# Patient Record
Sex: FEMALE | Race: WHITE | Employment: OTHER | URBAN - METROPOLITAN AREA
[De-identification: names, ages, dates, MRNs, and addresses within clinical notes are randomized per-mention and may not be internally consistent; named-entity substitution may affect disease eponyms.]

---

## 2020-01-01 ENCOUNTER — HOSPITAL ENCOUNTER (OUTPATIENT)
Dept: MRI IMAGING | Age: 80
Discharge: HOME | End: 2020-12-30
Payer: MEDICARE

## 2020-01-01 ENCOUNTER — TRANSCRIBE ORDER (OUTPATIENT)
Dept: SCHEDULING | Age: 80
End: 2020-01-01

## 2020-01-01 DIAGNOSIS — M48.54XA COLLAPSED VERTEBRA, NOT ELSEWHERE CLASSIFIED, THORACIC REGION, INITIAL ENCOUNTER FOR FRACTURE (HCC): ICD-10-CM

## 2020-01-01 DIAGNOSIS — M48.56XA COLLAPSED VERTEBRA, NOT ELSEWHERE CLASSIFIED, LUMBAR REGION, INITIAL ENCOUNTER FOR FRACTURE (HCC): ICD-10-CM

## 2020-01-01 DIAGNOSIS — M48.56XA COLLAPSED VERTEBRA, NOT ELSEWHERE CLASSIFIED, LUMBAR REGION, INITIAL ENCOUNTER FOR FRACTURE (HCC): Primary | ICD-10-CM

## 2020-01-01 DIAGNOSIS — M48.54XA COLLAPSED VERTEBRA, NOT ELSEWHERE CLASSIFIED, THORACIC REGION, INITIAL ENCOUNTER FOR FRACTURE (HCC): Primary | ICD-10-CM

## 2020-01-01 PROCEDURE — 72146 MRI CHEST SPINE W/O DYE: CPT

## 2020-01-01 PROCEDURE — 72148 MRI LUMBAR SPINE W/O DYE: CPT

## 2021-01-01 ENCOUNTER — PATIENT OUTREACH (OUTPATIENT)
Dept: CASE MANAGEMENT | Age: 81
End: 2021-01-01

## 2021-01-01 ENCOUNTER — HOSPITAL ENCOUNTER (EMERGENCY)
Age: 81
Discharge: HOME | End: 2021-02-08
Attending: EMERGENCY MEDICINE
Payer: MEDICARE

## 2021-01-01 ENCOUNTER — APPOINTMENT (OUTPATIENT)
Dept: GENERAL RADIOLOGY | Age: 81
DRG: 871 | End: 2021-01-01
Attending: PHYSICIAN ASSISTANT
Payer: MEDICARE

## 2021-01-01 ENCOUNTER — APPOINTMENT (OUTPATIENT)
Dept: GENERAL RADIOLOGY | Age: 81
DRG: 871 | End: 2021-01-01
Attending: INTERNAL MEDICINE
Payer: MEDICARE

## 2021-01-01 ENCOUNTER — HOSPITAL ENCOUNTER (OUTPATIENT)
Age: 81
Setting detail: OBSERVATION
Discharge: HOME | End: 2021-01-04
Attending: EMERGENCY MEDICINE | Admitting: INTERNAL MEDICINE
Payer: MEDICARE

## 2021-01-01 ENCOUNTER — APPOINTMENT (OUTPATIENT)
Dept: CT IMAGING | Age: 81
DRG: 643 | End: 2021-01-01
Attending: EMERGENCY MEDICINE
Payer: MEDICARE

## 2021-01-01 ENCOUNTER — HOSPITAL ENCOUNTER (INPATIENT)
Age: 81
LOS: 2 days | Discharge: HOME HEALTH | DRG: 643 | End: 2021-03-10
Attending: EMERGENCY MEDICINE | Admitting: HOSPITALIST
Payer: MEDICARE

## 2021-01-01 ENCOUNTER — APPOINTMENT (OUTPATIENT)
Dept: MRI IMAGING | Age: 81
DRG: 871 | End: 2021-01-01
Attending: INTERNAL MEDICINE
Payer: MEDICARE

## 2021-01-01 ENCOUNTER — HOSPITAL ENCOUNTER (INPATIENT)
Age: 81
LOS: 19 days | Discharge: NURSING, REHAB OR LONG-TERM CARE | DRG: 871 | End: 2021-06-07
Attending: EMERGENCY MEDICINE | Admitting: HOSPITALIST
Payer: MEDICARE

## 2021-01-01 ENCOUNTER — APPOINTMENT (OUTPATIENT)
Dept: CT IMAGING | Age: 81
End: 2021-01-01
Attending: INTERNAL MEDICINE
Payer: MEDICARE

## 2021-01-01 ENCOUNTER — APPOINTMENT (OUTPATIENT)
Dept: CT IMAGING | Age: 81
DRG: 177 | End: 2021-01-01
Attending: EMERGENCY MEDICINE
Payer: MEDICARE

## 2021-01-01 ENCOUNTER — HOSPITAL ENCOUNTER (INPATIENT)
Age: 81
LOS: 3 days | Discharge: HOSPICE | DRG: 177 | End: 2021-06-24
Attending: EMERGENCY MEDICINE | Admitting: HOSPITALIST
Payer: MEDICARE

## 2021-01-01 ENCOUNTER — TRANSCRIBE ORDER (OUTPATIENT)
Dept: SCHEDULING | Age: 81
End: 2021-01-01

## 2021-01-01 ENCOUNTER — APPOINTMENT (OUTPATIENT)
Dept: CT IMAGING | Age: 81
DRG: 871 | End: 2021-01-01
Attending: EMERGENCY MEDICINE
Payer: MEDICARE

## 2021-01-01 ENCOUNTER — APPOINTMENT (OUTPATIENT)
Dept: CT IMAGING | Age: 81
DRG: 193 | End: 2021-01-01
Attending: STUDENT IN AN ORGANIZED HEALTH CARE EDUCATION/TRAINING PROGRAM
Payer: MEDICARE

## 2021-01-01 ENCOUNTER — HOSPICE ADMISSION (OUTPATIENT)
Dept: HOSPICE | Facility: HOSPICE | Age: 81
End: 2021-01-01
Payer: MEDICARE

## 2021-01-01 ENCOUNTER — APPOINTMENT (OUTPATIENT)
Dept: VASCULAR SURGERY | Age: 81
DRG: 175 | End: 2021-01-01
Attending: PSYCHIATRY & NEUROLOGY
Payer: MEDICARE

## 2021-01-01 ENCOUNTER — APPOINTMENT (OUTPATIENT)
Dept: CT IMAGING | Age: 81
DRG: 871 | End: 2021-01-01
Attending: INTERNAL MEDICINE
Payer: MEDICARE

## 2021-01-01 ENCOUNTER — HOSPITAL ENCOUNTER (INPATIENT)
Age: 81
LOS: 1 days | End: 2021-06-25
Attending: FAMILY MEDICINE | Admitting: FAMILY MEDICINE
Payer: MEDICARE

## 2021-01-01 ENCOUNTER — APPOINTMENT (OUTPATIENT)
Dept: CT IMAGING | Age: 81
DRG: 177 | End: 2021-01-01
Attending: HOSPITALIST
Payer: MEDICARE

## 2021-01-01 ENCOUNTER — APPOINTMENT (OUTPATIENT)
Dept: VASCULAR SURGERY | Age: 81
DRG: 871 | End: 2021-01-01
Attending: INTERNAL MEDICINE
Payer: MEDICARE

## 2021-01-01 ENCOUNTER — APPOINTMENT (OUTPATIENT)
Dept: MRI IMAGING | Age: 81
DRG: 643 | End: 2021-01-01
Attending: HOSPITALIST
Payer: MEDICARE

## 2021-01-01 ENCOUNTER — APPOINTMENT (OUTPATIENT)
Dept: CT IMAGING | Age: 81
End: 2021-01-01
Attending: EMERGENCY MEDICINE
Payer: MEDICARE

## 2021-01-01 ENCOUNTER — APPOINTMENT (OUTPATIENT)
Dept: GENERAL RADIOLOGY | Age: 81
End: 2021-01-01
Attending: NURSE PRACTITIONER
Payer: MEDICARE

## 2021-01-01 ENCOUNTER — HOSPITAL ENCOUNTER (INPATIENT)
Age: 81
LOS: 2 days | Discharge: HOME HEALTH | DRG: 175 | End: 2021-02-27
Attending: STUDENT IN AN ORGANIZED HEALTH CARE EDUCATION/TRAINING PROGRAM | Admitting: INTERNAL MEDICINE
Payer: MEDICARE

## 2021-01-01 ENCOUNTER — APPOINTMENT (OUTPATIENT)
Dept: GENERAL RADIOLOGY | Age: 81
DRG: 175 | End: 2021-01-01
Attending: STUDENT IN AN ORGANIZED HEALTH CARE EDUCATION/TRAINING PROGRAM
Payer: MEDICARE

## 2021-01-01 ENCOUNTER — APPOINTMENT (OUTPATIENT)
Dept: CT IMAGING | Age: 81
DRG: 175 | End: 2021-01-01
Attending: STUDENT IN AN ORGANIZED HEALTH CARE EDUCATION/TRAINING PROGRAM
Payer: MEDICARE

## 2021-01-01 ENCOUNTER — OFFICE VISIT (OUTPATIENT)
Dept: NEUROLOGY | Age: 81
End: 2021-01-01
Payer: MEDICARE

## 2021-01-01 ENCOUNTER — APPOINTMENT (OUTPATIENT)
Dept: GENERAL RADIOLOGY | Age: 81
End: 2021-01-01
Attending: EMERGENCY MEDICINE
Payer: MEDICARE

## 2021-01-01 ENCOUNTER — APPOINTMENT (OUTPATIENT)
Dept: NON INVASIVE DIAGNOSTICS | Age: 81
DRG: 175 | End: 2021-01-01
Attending: INTERNAL MEDICINE
Payer: MEDICARE

## 2021-01-01 ENCOUNTER — TELEPHONE (OUTPATIENT)
Dept: CARDIOLOGY CLINIC | Age: 81
End: 2021-01-01

## 2021-01-01 ENCOUNTER — APPOINTMENT (OUTPATIENT)
Dept: NON INVASIVE DIAGNOSTICS | Age: 81
DRG: 177 | End: 2021-01-01
Attending: INTERNAL MEDICINE
Payer: MEDICARE

## 2021-01-01 ENCOUNTER — APPOINTMENT (OUTPATIENT)
Dept: GENERAL RADIOLOGY | Age: 81
DRG: 177 | End: 2021-01-01
Attending: EMERGENCY MEDICINE
Payer: MEDICARE

## 2021-01-01 ENCOUNTER — APPOINTMENT (OUTPATIENT)
Dept: GENERAL RADIOLOGY | Age: 81
DRG: 177 | End: 2021-01-01
Attending: INTERNAL MEDICINE
Payer: MEDICARE

## 2021-01-01 ENCOUNTER — APPOINTMENT (OUTPATIENT)
Dept: GENERAL RADIOLOGY | Age: 81
DRG: 643 | End: 2021-01-01
Attending: EMERGENCY MEDICINE
Payer: MEDICARE

## 2021-01-01 ENCOUNTER — TELEPHONE (OUTPATIENT)
Dept: NEUROLOGY | Age: 81
End: 2021-01-01

## 2021-01-01 ENCOUNTER — APPOINTMENT (OUTPATIENT)
Dept: VASCULAR SURGERY | Age: 81
DRG: 175 | End: 2021-01-01
Attending: INTERNAL MEDICINE
Payer: MEDICARE

## 2021-01-01 ENCOUNTER — APPOINTMENT (OUTPATIENT)
Dept: MRI IMAGING | Age: 81
DRG: 175 | End: 2021-01-01
Attending: INTERNAL MEDICINE
Payer: MEDICARE

## 2021-01-01 ENCOUNTER — HOSPITAL ENCOUNTER (INPATIENT)
Dept: MRI IMAGING | Age: 81
Discharge: HOME | End: 2021-03-02
Attending: INTERNAL MEDICINE
Payer: MEDICARE

## 2021-01-01 ENCOUNTER — HOSPITAL ENCOUNTER (OUTPATIENT)
Age: 81
Setting detail: OBSERVATION
Discharge: HOME HEALTH | End: 2021-03-03
Attending: EMERGENCY MEDICINE | Admitting: INTERNAL MEDICINE
Payer: MEDICARE

## 2021-01-01 ENCOUNTER — HOSPITAL ENCOUNTER (INPATIENT)
Age: 81
LOS: 3 days | Discharge: HOME | DRG: 193 | End: 2021-05-13
Attending: STUDENT IN AN ORGANIZED HEALTH CARE EDUCATION/TRAINING PROGRAM | Admitting: HOSPITALIST
Payer: MEDICARE

## 2021-01-01 ENCOUNTER — APPOINTMENT (OUTPATIENT)
Dept: NON INVASIVE DIAGNOSTICS | Age: 81
End: 2021-01-01
Attending: NURSE PRACTITIONER
Payer: MEDICARE

## 2021-01-01 ENCOUNTER — APPOINTMENT (OUTPATIENT)
Dept: ULTRASOUND IMAGING | Age: 81
DRG: 175 | End: 2021-01-01
Attending: INTERNAL MEDICINE
Payer: MEDICARE

## 2021-01-01 VITALS
HEIGHT: 64 IN | RESPIRATION RATE: 20 BRPM | HEART RATE: 78 BPM | TEMPERATURE: 97.3 F | BODY MASS INDEX: 21.68 KG/M2 | SYSTOLIC BLOOD PRESSURE: 145 MMHG | DIASTOLIC BLOOD PRESSURE: 57 MMHG | WEIGHT: 127 LBS | OXYGEN SATURATION: 96 %

## 2021-01-01 VITALS
HEIGHT: 64 IN | WEIGHT: 129 LBS | TEMPERATURE: 98.1 F | DIASTOLIC BLOOD PRESSURE: 55 MMHG | RESPIRATION RATE: 16 BRPM | BODY MASS INDEX: 22.02 KG/M2 | SYSTOLIC BLOOD PRESSURE: 146 MMHG | HEART RATE: 87 BPM | OXYGEN SATURATION: 96 %

## 2021-01-01 VITALS
TEMPERATURE: 97.8 F | HEART RATE: 82 BPM | BODY MASS INDEX: 22.06 KG/M2 | HEIGHT: 64 IN | WEIGHT: 129.19 LBS | OXYGEN SATURATION: 95 % | DIASTOLIC BLOOD PRESSURE: 60 MMHG | RESPIRATION RATE: 20 BRPM | SYSTOLIC BLOOD PRESSURE: 154 MMHG

## 2021-01-01 VITALS
BODY MASS INDEX: 22.02 KG/M2 | HEART RATE: 80 BPM | DIASTOLIC BLOOD PRESSURE: 66 MMHG | OXYGEN SATURATION: 97 % | TEMPERATURE: 98 F | SYSTOLIC BLOOD PRESSURE: 145 MMHG | RESPIRATION RATE: 18 BRPM | HEIGHT: 64 IN | WEIGHT: 129 LBS

## 2021-01-01 VITALS
TEMPERATURE: 100.8 F | OXYGEN SATURATION: 71 % | RESPIRATION RATE: 9 BRPM | HEART RATE: 74 BPM | DIASTOLIC BLOOD PRESSURE: 38 MMHG | SYSTOLIC BLOOD PRESSURE: 72 MMHG

## 2021-01-01 VITALS
HEIGHT: 64 IN | HEART RATE: 72 BPM | OXYGEN SATURATION: 95 % | DIASTOLIC BLOOD PRESSURE: 63 MMHG | BODY MASS INDEX: 20.89 KG/M2 | SYSTOLIC BLOOD PRESSURE: 115 MMHG | RESPIRATION RATE: 12 BRPM | TEMPERATURE: 97.2 F | WEIGHT: 122.36 LBS

## 2021-01-01 VITALS
TEMPERATURE: 98.3 F | OXYGEN SATURATION: 95 % | DIASTOLIC BLOOD PRESSURE: 72 MMHG | BODY MASS INDEX: 21.34 KG/M2 | RESPIRATION RATE: 18 BRPM | WEIGHT: 125 LBS | HEART RATE: 81 BPM | SYSTOLIC BLOOD PRESSURE: 181 MMHG | HEIGHT: 64 IN

## 2021-01-01 VITALS
TEMPERATURE: 98 F | HEART RATE: 77 BPM | OXYGEN SATURATION: 98 % | DIASTOLIC BLOOD PRESSURE: 63 MMHG | SYSTOLIC BLOOD PRESSURE: 135 MMHG | HEIGHT: 64 IN | WEIGHT: 144.84 LBS | RESPIRATION RATE: 18 BRPM | BODY MASS INDEX: 24.73 KG/M2

## 2021-01-01 VITALS
BODY MASS INDEX: 22.02 KG/M2 | OXYGEN SATURATION: 98 % | SYSTOLIC BLOOD PRESSURE: 147 MMHG | TEMPERATURE: 97.4 F | WEIGHT: 129 LBS | DIASTOLIC BLOOD PRESSURE: 69 MMHG | HEIGHT: 64 IN | HEART RATE: 95 BPM | RESPIRATION RATE: 20 BRPM

## 2021-01-01 VITALS
HEART RATE: 78 BPM | RESPIRATION RATE: 16 BRPM | DIASTOLIC BLOOD PRESSURE: 60 MMHG | HEIGHT: 66 IN | SYSTOLIC BLOOD PRESSURE: 104 MMHG | TEMPERATURE: 97.9 F | WEIGHT: 122.36 LBS | BODY MASS INDEX: 19.66 KG/M2 | OXYGEN SATURATION: 96 %

## 2021-01-01 DIAGNOSIS — R52 DIFFUSE PAIN: ICD-10-CM

## 2021-01-01 DIAGNOSIS — K59.00 CONSTIPATION, UNSPECIFIED CONSTIPATION TYPE: ICD-10-CM

## 2021-01-01 DIAGNOSIS — E87.1 HYPONATREMIA: Primary | ICD-10-CM

## 2021-01-01 DIAGNOSIS — R79.89 ELEVATED BRAIN NATRIURETIC PEPTIDE (BNP) LEVEL: ICD-10-CM

## 2021-01-01 DIAGNOSIS — I63.9 CEREBROVASCULAR ACCIDENT (CVA), UNSPECIFIED MECHANISM (HCC): Primary | ICD-10-CM

## 2021-01-01 DIAGNOSIS — G81.14 LEFT SPASTIC HEMIPARESIS (HCC): ICD-10-CM

## 2021-01-01 DIAGNOSIS — R07.9 CHEST PAIN, UNSPECIFIED TYPE: ICD-10-CM

## 2021-01-01 DIAGNOSIS — R06.09 DYSPNEA ON EXERTION: ICD-10-CM

## 2021-01-01 DIAGNOSIS — R47.1 DYSARTHRIA: ICD-10-CM

## 2021-01-01 DIAGNOSIS — G89.4 CHRONIC PAIN SYNDROME: ICD-10-CM

## 2021-01-01 DIAGNOSIS — R53.81 DEBILITY: ICD-10-CM

## 2021-01-01 DIAGNOSIS — J96.01 ACUTE HYPOXEMIC RESPIRATORY FAILURE (HCC): ICD-10-CM

## 2021-01-01 DIAGNOSIS — Z79.899 ON STATIN THERAPY: ICD-10-CM

## 2021-01-01 DIAGNOSIS — Z87.09 HISTORY OF COPD: Primary | ICD-10-CM

## 2021-01-01 DIAGNOSIS — F32.A ANXIETY AND DEPRESSION: ICD-10-CM

## 2021-01-01 DIAGNOSIS — R45.1 RESTLESSNESS: ICD-10-CM

## 2021-01-01 DIAGNOSIS — I15.9 SECONDARY HYPERTENSION: ICD-10-CM

## 2021-01-01 DIAGNOSIS — G89.21 CHRONIC PAIN DUE TO TRAUMA: ICD-10-CM

## 2021-01-01 DIAGNOSIS — R52 PAIN: ICD-10-CM

## 2021-01-01 DIAGNOSIS — R07.81 RIB PAIN ON RIGHT SIDE: Primary | ICD-10-CM

## 2021-01-01 DIAGNOSIS — I63.9 ACUTE CVA (CEREBROVASCULAR ACCIDENT) (HCC): ICD-10-CM

## 2021-01-01 DIAGNOSIS — Z86.73 HISTORY OF CVA (CEREBROVASCULAR ACCIDENT): ICD-10-CM
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
## 2021-01-01 DIAGNOSIS — R20.2 NUMBNESS AND TINGLING IN RIGHT HAND: ICD-10-CM

## 2021-01-01 DIAGNOSIS — J18.9 HAP (HOSPITAL-ACQUIRED PNEUMONIA): Primary | ICD-10-CM

## 2021-01-01 DIAGNOSIS — F41.9 ANXIETY AND DEPRESSION: ICD-10-CM

## 2021-01-01 DIAGNOSIS — D50.9 IRON DEFICIENCY ANEMIA, UNSPECIFIED IRON DEFICIENCY ANEMIA TYPE: Primary | ICD-10-CM

## 2021-01-01 DIAGNOSIS — E87.1 HYPONATREMIA: ICD-10-CM

## 2021-01-01 DIAGNOSIS — A41.9 SEPSIS, DUE TO UNSPECIFIED ORGANISM, UNSPECIFIED WHETHER ACUTE ORGAN DYSFUNCTION PRESENT (HCC): ICD-10-CM

## 2021-01-01 DIAGNOSIS — R10.84 ABDOMINAL PAIN, GENERALIZED: ICD-10-CM

## 2021-01-01 DIAGNOSIS — S51.012A ELBOW LACERATION, LEFT, INITIAL ENCOUNTER: ICD-10-CM

## 2021-01-01 DIAGNOSIS — K11.7 INCREASED OROPHARYNGEAL SECRETIONS: ICD-10-CM

## 2021-01-01 DIAGNOSIS — Z79.82 ASPIRIN LONG-TERM USE: ICD-10-CM

## 2021-01-01 DIAGNOSIS — F41.9 ANXIETY: ICD-10-CM

## 2021-01-01 DIAGNOSIS — R41.82 ALTERED MENTAL STATUS, UNSPECIFIED ALTERED MENTAL STATUS TYPE: ICD-10-CM

## 2021-01-01 DIAGNOSIS — R47.81 SLURRED SPEECH: ICD-10-CM

## 2021-01-01 DIAGNOSIS — R00.2 PALPITATIONS: ICD-10-CM
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
## 2021-01-01 DIAGNOSIS — Z51.5 HOSPICE CARE: ICD-10-CM

## 2021-01-01 DIAGNOSIS — R42 DIZZINESS: ICD-10-CM

## 2021-01-01 DIAGNOSIS — R07.9 ACUTE CHEST PAIN: ICD-10-CM

## 2021-01-01 DIAGNOSIS — R06.4 LABORED BREATHING: ICD-10-CM
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
## 2021-01-01 DIAGNOSIS — G62.9 NEUROPATHY: ICD-10-CM

## 2021-01-01 DIAGNOSIS — I27.82 OTHER CHRONIC PULMONARY EMBOLISM WITHOUT ACUTE COR PULMONALE (HCC): ICD-10-CM

## 2021-01-01 DIAGNOSIS — R07.2 PRECORDIAL PAIN: ICD-10-CM

## 2021-01-01 DIAGNOSIS — I26.99 ACUTE PULMONARY EMBOLISM WITHOUT ACUTE COR PULMONALE, UNSPECIFIED PULMONARY EMBOLISM TYPE (HCC): Primary | ICD-10-CM

## 2021-01-01 DIAGNOSIS — J18.9 COMMUNITY ACQUIRED PNEUMONIA, UNSPECIFIED LATERALITY: ICD-10-CM

## 2021-01-01 DIAGNOSIS — Z71.89 ACP (ADVANCE CARE PLANNING): ICD-10-CM

## 2021-01-01 DIAGNOSIS — R29.90 STROKE-LIKE SYMPTOMS: ICD-10-CM

## 2021-01-01 DIAGNOSIS — R51.9 ACUTE NONINTRACTABLE HEADACHE, UNSPECIFIED HEADACHE TYPE: ICD-10-CM

## 2021-01-01 DIAGNOSIS — D72.825 BANDEMIA: ICD-10-CM

## 2021-01-01 DIAGNOSIS — J18.9 PNEUMONIA: Primary | ICD-10-CM

## 2021-01-01 DIAGNOSIS — I26.99 OTHER ACUTE PULMONARY EMBOLISM WITHOUT ACUTE COR PULMONALE (HCC): ICD-10-CM

## 2021-01-01 DIAGNOSIS — R20.0 NUMBNESS AND TINGLING IN RIGHT HAND: ICD-10-CM

## 2021-01-01 DIAGNOSIS — R77.8 TROPONIN LEVEL ELEVATED: ICD-10-CM

## 2021-01-01 DIAGNOSIS — J18.9 HOSPITAL-ACQUIRED PNEUMONIA: Primary | ICD-10-CM

## 2021-01-01 DIAGNOSIS — Z71.89 GOALS OF CARE, COUNSELING/DISCUSSION: ICD-10-CM

## 2021-01-01 DIAGNOSIS — N39.0 URINARY TRACT INFECTION WITHOUT HEMATURIA, SITE UNSPECIFIED: ICD-10-CM
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
## 2021-01-01 LAB
ALBUMIN SERPL-MCNC: 2.3 G/DL (ref 3.5–5)
ALBUMIN SERPL-MCNC: 2.3 G/DL (ref 3.5–5)
ALBUMIN SERPL-MCNC: 2.4 G/DL (ref 3.5–5)
ALBUMIN SERPL-MCNC: 2.7 G/DL (ref 3.5–5)
ALBUMIN SERPL-MCNC: 2.9 G/DL (ref 3.5–5)
ALBUMIN SERPL-MCNC: 3 G/DL (ref 3.5–5)
ALBUMIN SERPL-MCNC: 3 G/DL (ref 3.5–5)
ALBUMIN SERPL-MCNC: 3.1 G/DL (ref 3.5–5)
ALBUMIN SERPL-MCNC: 3.2 G/DL (ref 3.5–5)
ALBUMIN SERPL-MCNC: 3.3 G/DL (ref 3.5–5)
ALBUMIN SERPL-MCNC: 3.8 G/DL (ref 3.5–5)
ALBUMIN/GLOB SERPL: 0.6 {RATIO} (ref 1.1–2.2)
ALBUMIN/GLOB SERPL: 0.7 {RATIO} (ref 1.1–2.2)
ALBUMIN/GLOB SERPL: 0.8 {RATIO} (ref 1.1–2.2)
ALBUMIN/GLOB SERPL: 0.9 {RATIO} (ref 1.1–2.2)
ALBUMIN/GLOB SERPL: 0.9 {RATIO} (ref 1.1–2.2)
ALBUMIN/GLOB SERPL: 1 {RATIO} (ref 1.1–2.2)
ALBUMIN/GLOB SERPL: 1.1 {RATIO} (ref 1.1–2.2)
ALBUMIN/GLOB SERPL: 1.1 {RATIO} (ref 1.1–2.2)
ALP SERPL-CCNC: 65 U/L (ref 45–117)
ALP SERPL-CCNC: 68 U/L (ref 45–117)
ALP SERPL-CCNC: 71 U/L (ref 45–117)
ALP SERPL-CCNC: 73 U/L (ref 45–117)
ALP SERPL-CCNC: 74 U/L (ref 45–117)
ALP SERPL-CCNC: 78 U/L (ref 45–117)
ALP SERPL-CCNC: 78 U/L (ref 45–117)
ALP SERPL-CCNC: 79 U/L (ref 45–117)
ALP SERPL-CCNC: 86 U/L (ref 45–117)
ALP SERPL-CCNC: 86 U/L (ref 45–117)
ALP SERPL-CCNC: 90 U/L (ref 45–117)
ALP SERPL-CCNC: 91 U/L (ref 45–117)
ALP SERPL-CCNC: 92 U/L (ref 45–117)
ALP SERPL-CCNC: 97 U/L (ref 45–117)
ALT SERPL-CCNC: 26 U/L (ref 12–78)
ALT SERPL-CCNC: 28 U/L (ref 12–78)
ALT SERPL-CCNC: 30 U/L (ref 12–78)
ALT SERPL-CCNC: 32 U/L (ref 12–78)
ALT SERPL-CCNC: 34 U/L (ref 12–78)
ALT SERPL-CCNC: 36 U/L (ref 12–78)
ALT SERPL-CCNC: 37 U/L (ref 12–78)
ALT SERPL-CCNC: 37 U/L (ref 12–78)
ALT SERPL-CCNC: 39 U/L (ref 12–78)
ALT SERPL-CCNC: 40 U/L (ref 12–78)
ALT SERPL-CCNC: 47 U/L (ref 12–78)
ALT SERPL-CCNC: 49 U/L (ref 12–78)
ALT SERPL-CCNC: 52 U/L (ref 12–78)
ALT SERPL-CCNC: 69 U/L (ref 12–78)
ANION GAP SERPL CALC-SCNC: 0 MMOL/L (ref 5–15)
ANION GAP SERPL CALC-SCNC: 1 MMOL/L (ref 5–15)
ANION GAP SERPL CALC-SCNC: 1 MMOL/L (ref 5–15)
ANION GAP SERPL CALC-SCNC: 10 MMOL/L (ref 5–15)
ANION GAP SERPL CALC-SCNC: 16 MMOL/L (ref 5–15)
ANION GAP SERPL CALC-SCNC: 2 MMOL/L (ref 5–15)
ANION GAP SERPL CALC-SCNC: 3 MMOL/L (ref 5–15)
ANION GAP SERPL CALC-SCNC: 4 MMOL/L (ref 5–15)
ANION GAP SERPL CALC-SCNC: 5 MMOL/L (ref 5–15)
ANION GAP SERPL CALC-SCNC: 6 MMOL/L (ref 5–15)
ANION GAP SERPL CALC-SCNC: 7 MMOL/L (ref 5–15)
ANION GAP SERPL CALC-SCNC: 8 MMOL/L (ref 5–15)
ANION GAP SERPL CALC-SCNC: 8 MMOL/L (ref 5–15)
ANION GAP SERPL CALC-SCNC: 9 MMOL/L (ref 5–15)
APPEARANCE UR: CLEAR
APTT PPP: 28.6 SEC (ref 22.1–31)
APTT PPP: 31.3 SEC (ref 22.1–31)
AST SERPL-CCNC: 24 U/L (ref 15–37)
AST SERPL-CCNC: 25 U/L (ref 15–37)
AST SERPL-CCNC: 26 U/L (ref 15–37)
AST SERPL-CCNC: 27 U/L (ref 15–37)
AST SERPL-CCNC: 30 U/L (ref 15–37)
AST SERPL-CCNC: 32 U/L (ref 15–37)
AST SERPL-CCNC: 35 U/L (ref 15–37)
AST SERPL-CCNC: 35 U/L (ref 15–37)
AST SERPL-CCNC: 37 U/L (ref 15–37)
AST SERPL-CCNC: 41 U/L (ref 15–37)
AST SERPL-CCNC: 42 U/L (ref 15–37)
AST SERPL-CCNC: 98 U/L (ref 15–37)
ATRIAL RATE: 80 BPM
ATRIAL RATE: 81 BPM
ATRIAL RATE: 84 BPM
ATRIAL RATE: 87 BPM
ATRIAL RATE: 87 BPM
ATRIAL RATE: 94 BPM
ATRIAL RATE: 94 BPM
ATRIAL RATE: 99 BPM
BACTERIA SPEC CULT: ABNORMAL
BACTERIA SPEC CULT: NORMAL
BACTERIA URNS QL MICRO: ABNORMAL /HPF
BACTERIA URNS QL MICRO: NEGATIVE /HPF
BASE EXCESS BLD CALC-SCNC: 3.7 MMOL/L
BASOPHILS # BLD: 0 K/UL (ref 0–0.1)
BASOPHILS # BLD: 0.1 K/UL (ref 0–0.1)
BASOPHILS NFR BLD: 0 % (ref 0–1)
BASOPHILS NFR BLD: 1 % (ref 0–1)
BILIRUB DIRECT SERPL-MCNC: 0.1 MG/DL (ref 0–0.2)
BILIRUB SERPL-MCNC: 0.3 MG/DL (ref 0.2–1)
BILIRUB SERPL-MCNC: 0.4 MG/DL (ref 0.2–1)
BILIRUB SERPL-MCNC: 0.5 MG/DL (ref 0.2–1)
BILIRUB SERPL-MCNC: 0.6 MG/DL (ref 0.2–1)
BILIRUB SERPL-MCNC: 0.7 MG/DL (ref 0.2–1)
BILIRUB SERPL-MCNC: 0.7 MG/DL (ref 0.2–1)
BILIRUB SERPL-MCNC: 0.8 MG/DL (ref 0.2–1)
BILIRUB SERPL-MCNC: 0.8 MG/DL (ref 0.2–1)
BILIRUB SERPL-MCNC: 1.2 MG/DL (ref 0.2–1)
BILIRUB UR QL: NEGATIVE
BNP SERPL-MCNC: 1350 PG/ML
BNP SERPL-MCNC: 1822 PG/ML
BNP SERPL-MCNC: 2696 PG/ML
BNP SERPL-MCNC: 3192 PG/ML
BNP SERPL-MCNC: 4329 PG/ML
BUN SERPL-MCNC: 10 MG/DL (ref 6–20)
BUN SERPL-MCNC: 11 MG/DL (ref 6–20)
BUN SERPL-MCNC: 12 MG/DL (ref 6–20)
BUN SERPL-MCNC: 13 MG/DL (ref 6–20)
BUN SERPL-MCNC: 15 MG/DL (ref 6–20)
BUN SERPL-MCNC: 16 MG/DL (ref 6–20)
BUN SERPL-MCNC: 17 MG/DL (ref 6–20)
BUN SERPL-MCNC: 18 MG/DL (ref 6–20)
BUN SERPL-MCNC: 18 MG/DL (ref 6–20)
BUN SERPL-MCNC: 19 MG/DL (ref 6–20)
BUN SERPL-MCNC: 20 MG/DL (ref 6–20)
BUN SERPL-MCNC: 21 MG/DL (ref 6–20)
BUN SERPL-MCNC: 22 MG/DL (ref 6–20)
BUN SERPL-MCNC: 23 MG/DL (ref 6–20)
BUN SERPL-MCNC: 23 MG/DL (ref 6–20)
BUN SERPL-MCNC: 25 MG/DL (ref 6–20)
BUN SERPL-MCNC: 25 MG/DL (ref 6–20)
BUN SERPL-MCNC: 28 MG/DL (ref 6–20)
BUN SERPL-MCNC: 30 MG/DL (ref 6–20)
BUN SERPL-MCNC: 30 MG/DL (ref 6–20)
BUN SERPL-MCNC: 34 MG/DL (ref 6–20)
BUN SERPL-MCNC: 40 MG/DL (ref 6–20)
BUN SERPL-MCNC: 8 MG/DL (ref 6–20)
BUN SERPL-MCNC: 9 MG/DL (ref 6–20)
BUN SERPL-MCNC: 9 MG/DL (ref 6–20)
BUN/CREAT SERPL: 13 (ref 12–20)
BUN/CREAT SERPL: 13 (ref 12–20)
BUN/CREAT SERPL: 14 (ref 12–20)
BUN/CREAT SERPL: 15 (ref 12–20)
BUN/CREAT SERPL: 16 (ref 12–20)
BUN/CREAT SERPL: 17 (ref 12–20)
BUN/CREAT SERPL: 19 (ref 12–20)
BUN/CREAT SERPL: 20 (ref 12–20)
BUN/CREAT SERPL: 24 (ref 12–20)
BUN/CREAT SERPL: 26 (ref 12–20)
BUN/CREAT SERPL: 27 (ref 12–20)
BUN/CREAT SERPL: 28 (ref 12–20)
BUN/CREAT SERPL: 28 (ref 12–20)
BUN/CREAT SERPL: 29 (ref 12–20)
BUN/CREAT SERPL: 31 (ref 12–20)
BUN/CREAT SERPL: 32 (ref 12–20)
BUN/CREAT SERPL: 33 (ref 12–20)
BUN/CREAT SERPL: 33 (ref 12–20)
BUN/CREAT SERPL: 38 (ref 12–20)
BUN/CREAT SERPL: 39 (ref 12–20)
BUN/CREAT SERPL: 40 (ref 12–20)
BUN/CREAT SERPL: 41 (ref 12–20)
BUN/CREAT SERPL: 42 (ref 12–20)
BUN/CREAT SERPL: 43 (ref 12–20)
BUN/CREAT SERPL: 46 (ref 12–20)
BUN/CREAT SERPL: 48 (ref 12–20)
BUN/CREAT SERPL: 56 (ref 12–20)
CA-I BLD-MCNC: 1.06 MMOL/L (ref 1.12–1.32)
CALCIUM SERPL-MCNC: 6.7 MG/DL (ref 8.5–10.1)
CALCIUM SERPL-MCNC: 6.9 MG/DL (ref 8.5–10.1)
CALCIUM SERPL-MCNC: 7 MG/DL (ref 8.5–10.1)
CALCIUM SERPL-MCNC: 7.1 MG/DL (ref 8.5–10.1)
CALCIUM SERPL-MCNC: 7.2 MG/DL (ref 8.5–10.1)
CALCIUM SERPL-MCNC: 7.3 MG/DL (ref 8.5–10.1)
CALCIUM SERPL-MCNC: 7.4 MG/DL (ref 8.5–10.1)
CALCIUM SERPL-MCNC: 7.5 MG/DL (ref 8.5–10.1)
CALCIUM SERPL-MCNC: 7.6 MG/DL (ref 8.5–10.1)
CALCIUM SERPL-MCNC: 7.6 MG/DL (ref 8.5–10.1)
CALCIUM SERPL-MCNC: 7.7 MG/DL (ref 8.5–10.1)
CALCIUM SERPL-MCNC: 7.8 MG/DL (ref 8.5–10.1)
CALCIUM SERPL-MCNC: 7.9 MG/DL (ref 8.5–10.1)
CALCIUM SERPL-MCNC: 8 MG/DL (ref 8.5–10.1)
CALCIUM SERPL-MCNC: 8.1 MG/DL (ref 8.5–10.1)
CALCIUM SERPL-MCNC: 8.3 MG/DL (ref 8.5–10.1)
CALCIUM SERPL-MCNC: 8.3 MG/DL (ref 8.5–10.1)
CALCIUM SERPL-MCNC: 8.7 MG/DL (ref 8.5–10.1)
CALCULATED P AXIS, ECG09: 28 DEGREES
CALCULATED P AXIS, ECG09: 52 DEGREES
CALCULATED P AXIS, ECG09: 56 DEGREES
CALCULATED P AXIS, ECG09: 64 DEGREES
CALCULATED P AXIS, ECG09: 70 DEGREES
CALCULATED P AXIS, ECG09: 70 DEGREES
CALCULATED P AXIS, ECG09: 73 DEGREES
CALCULATED R AXIS, ECG10: -3 DEGREES
CALCULATED R AXIS, ECG10: 15 DEGREES
CALCULATED R AXIS, ECG10: 15 DEGREES
CALCULATED R AXIS, ECG10: 19 DEGREES
CALCULATED R AXIS, ECG10: 2 DEGREES
CALCULATED R AXIS, ECG10: 21 DEGREES
CALCULATED R AXIS, ECG10: 21 DEGREES
CALCULATED T AXIS, ECG11: -5 DEGREES
CALCULATED T AXIS, ECG11: 12 DEGREES
CALCULATED T AXIS, ECG11: 14 DEGREES
CALCULATED T AXIS, ECG11: 15 DEGREES
CALCULATED T AXIS, ECG11: 15 DEGREES
CALCULATED T AXIS, ECG11: 2 DEGREES
CALCULATED T AXIS, ECG11: 22 DEGREES
CALCULATED T AXIS, ECG11: 5 DEGREES
CC UR VC: ABNORMAL
CHLORIDE BLD-SCNC: 88 MMOL/L (ref 100–108)
CHLORIDE SERPL-SCNC: 100 MMOL/L (ref 97–108)
CHLORIDE SERPL-SCNC: 101 MMOL/L (ref 97–108)
CHLORIDE SERPL-SCNC: 102 MMOL/L (ref 97–108)
CHLORIDE SERPL-SCNC: 102 MMOL/L (ref 97–108)
CHLORIDE SERPL-SCNC: 103 MMOL/L (ref 97–108)
CHLORIDE SERPL-SCNC: 105 MMOL/L (ref 97–108)
CHLORIDE SERPL-SCNC: 105 MMOL/L (ref 97–108)
CHLORIDE SERPL-SCNC: 106 MMOL/L (ref 97–108)
CHLORIDE SERPL-SCNC: 107 MMOL/L (ref 97–108)
CHLORIDE SERPL-SCNC: 107 MMOL/L (ref 97–108)
CHLORIDE SERPL-SCNC: 108 MMOL/L (ref 97–108)
CHLORIDE SERPL-SCNC: 109 MMOL/L (ref 97–108)
CHLORIDE SERPL-SCNC: 111 MMOL/L (ref 97–108)
CHLORIDE SERPL-SCNC: 90 MMOL/L (ref 97–108)
CHLORIDE SERPL-SCNC: 90 MMOL/L (ref 97–108)
CHLORIDE SERPL-SCNC: 91 MMOL/L (ref 97–108)
CHLORIDE SERPL-SCNC: 92 MMOL/L (ref 97–108)
CHLORIDE SERPL-SCNC: 93 MMOL/L (ref 97–108)
CHLORIDE SERPL-SCNC: 94 MMOL/L (ref 97–108)
CHLORIDE SERPL-SCNC: 94 MMOL/L (ref 97–108)
CHLORIDE SERPL-SCNC: 95 MMOL/L (ref 97–108)
CHLORIDE SERPL-SCNC: 95 MMOL/L (ref 97–108)
CHLORIDE SERPL-SCNC: 96 MMOL/L (ref 97–108)
CHLORIDE SERPL-SCNC: 97 MMOL/L (ref 97–108)
CHLORIDE SERPL-SCNC: 98 MMOL/L (ref 97–108)
CHOLEST SERPL-MCNC: 89 MG/DL
CO2 BLD-SCNC: 28 MMOL/L (ref 19–24)
CO2 SERPL-SCNC: 21 MMOL/L (ref 21–32)
CO2 SERPL-SCNC: 22 MMOL/L (ref 21–32)
CO2 SERPL-SCNC: 23 MMOL/L (ref 21–32)
CO2 SERPL-SCNC: 23 MMOL/L (ref 21–32)
CO2 SERPL-SCNC: 24 MMOL/L (ref 21–32)
CO2 SERPL-SCNC: 25 MMOL/L (ref 21–32)
CO2 SERPL-SCNC: 26 MMOL/L (ref 21–32)
CO2 SERPL-SCNC: 27 MMOL/L (ref 21–32)
CO2 SERPL-SCNC: 28 MMOL/L (ref 21–32)
CO2 SERPL-SCNC: 29 MMOL/L (ref 21–32)
CO2 SERPL-SCNC: 31 MMOL/L (ref 21–32)
CO2 SERPL-SCNC: 31 MMOL/L (ref 21–32)
CO2 SERPL-SCNC: 32 MMOL/L (ref 21–32)
CO2 SERPL-SCNC: 33 MMOL/L (ref 21–32)
CO2 SERPL-SCNC: 34 MMOL/L (ref 21–32)
CO2 SERPL-SCNC: 34 MMOL/L (ref 21–32)
CO2 SERPL-SCNC: 35 MMOL/L (ref 21–32)
CO2 SERPL-SCNC: 36 MMOL/L (ref 21–32)
CO2 SERPL-SCNC: 37 MMOL/L (ref 21–32)
CO2 SERPL-SCNC: 37 MMOL/L (ref 21–32)
COLOR UR: ABNORMAL
COLOR UR: NORMAL
COMMENT, HOLDF: NORMAL
COVID-19 RAPID TEST, COVR: NOT DETECTED
CREAT SERPL-MCNC: 0.41 MG/DL (ref 0.55–1.02)
CREAT SERPL-MCNC: 0.42 MG/DL (ref 0.55–1.02)
CREAT SERPL-MCNC: 0.44 MG/DL (ref 0.55–1.02)
CREAT SERPL-MCNC: 0.47 MG/DL (ref 0.55–1.02)
CREAT SERPL-MCNC: 0.49 MG/DL (ref 0.55–1.02)
CREAT SERPL-MCNC: 0.49 MG/DL (ref 0.55–1.02)
CREAT SERPL-MCNC: 0.5 MG/DL (ref 0.55–1.02)
CREAT SERPL-MCNC: 0.51 MG/DL (ref 0.55–1.02)
CREAT SERPL-MCNC: 0.53 MG/DL (ref 0.55–1.02)
CREAT SERPL-MCNC: 0.53 MG/DL (ref 0.55–1.02)
CREAT SERPL-MCNC: 0.54 MG/DL (ref 0.55–1.02)
CREAT SERPL-MCNC: 0.54 MG/DL (ref 0.55–1.02)
CREAT SERPL-MCNC: 0.56 MG/DL (ref 0.55–1.02)
CREAT SERPL-MCNC: 0.6 MG/DL (ref 0.55–1.02)
CREAT SERPL-MCNC: 0.61 MG/DL (ref 0.55–1.02)
CREAT SERPL-MCNC: 0.61 MG/DL (ref 0.55–1.02)
CREAT SERPL-MCNC: 0.62 MG/DL (ref 0.55–1.02)
CREAT SERPL-MCNC: 0.63 MG/DL (ref 0.55–1.02)
CREAT SERPL-MCNC: 0.63 MG/DL (ref 0.55–1.02)
CREAT SERPL-MCNC: 0.64 MG/DL (ref 0.55–1.02)
CREAT SERPL-MCNC: 0.65 MG/DL (ref 0.55–1.02)
CREAT SERPL-MCNC: 0.65 MG/DL (ref 0.55–1.02)
CREAT SERPL-MCNC: 0.66 MG/DL (ref 0.55–1.02)
CREAT SERPL-MCNC: 0.67 MG/DL (ref 0.55–1.02)
CREAT SERPL-MCNC: 0.69 MG/DL (ref 0.55–1.02)
CREAT SERPL-MCNC: 0.69 MG/DL (ref 0.55–1.02)
CREAT SERPL-MCNC: 0.7 MG/DL (ref 0.55–1.02)
CREAT SERPL-MCNC: 0.71 MG/DL (ref 0.55–1.02)
CREAT SERPL-MCNC: 0.72 MG/DL (ref 0.55–1.02)
CREAT SERPL-MCNC: 0.72 MG/DL (ref 0.55–1.02)
CREAT SERPL-MCNC: 0.74 MG/DL (ref 0.55–1.02)
CREAT SERPL-MCNC: 0.74 MG/DL (ref 0.55–1.02)
CREAT SERPL-MCNC: 0.79 MG/DL (ref 0.55–1.02)
CREAT SERPL-MCNC: 0.81 MG/DL (ref 0.55–1.02)
CREAT SERPL-MCNC: 0.82 MG/DL (ref 0.55–1.02)
CREAT SERPL-MCNC: 0.82 MG/DL (ref 0.55–1.02)
CREAT SERPL-MCNC: 0.85 MG/DL (ref 0.55–1.02)
CREAT SERPL-MCNC: 0.91 MG/DL (ref 0.55–1.02)
CREAT UR-MCNC: 0.8 MG/DL (ref 0.6–1.3)
D DIMER PPP FEU-MCNC: 0.74 MG/L FEU (ref 0–0.65)
DATE LAST DOSE: ABNORMAL
DIAGNOSIS, 93000: NORMAL
DIFFERENTIAL METHOD BLD: ABNORMAL
ECHO AO ROOT DIAM: 2.39 CM
ECHO AO ROOT DIAM: 3.15 CM
ECHO AR MAX VEL PISA: 228.88 CENTIMETER/SECOND
ECHO AV AREA PEAK VELOCITY: 0.71 CM2
ECHO AV AREA PEAK VELOCITY: 0.76 CM2
ECHO AV AREA VTI: 0.76 CM2
ECHO AV AREA VTI: 0.84 CM2
ECHO AV AREA/BSA PEAK VELOCITY: 0.4 CM2/M2
ECHO AV AREA/BSA PEAK VELOCITY: 0.5 CM2/M2
ECHO AV AREA/BSA VTI: 0.5 CM2/M2
ECHO AV AREA/BSA VTI: 0.5 CM2/M2
ECHO AV MEAN GRADIENT: 17.87 MMHG
ECHO AV MEAN GRADIENT: 21.16 MMHG
ECHO AV PEAK GRADIENT: 30.18 MMHG
ECHO AV PEAK GRADIENT: 36.32 MMHG
ECHO AV PEAK VELOCITY: 274.65 CM/S
ECHO AV PEAK VELOCITY: 301.35 CM/S
ECHO AV REGURGITANT PHT: 611.98 MS
ECHO AV VTI: 62.81 CM
ECHO AV VTI: 73.31 CM
ECHO EST RA PRESSURE: 3 MMHG
ECHO LA AREA 4C: 26.53 CM2
ECHO LA MAJOR AXIS: 3.22 CM
ECHO LA MAJOR AXIS: 3.99 CM
ECHO LA MINOR AXIS: 1.96 CM
ECHO LA MINOR AXIS: 2.46 CM
ECHO LA VOL 2C: 81.79 ML (ref 22–52)
ECHO LA VOL 4C: 91.06 ML (ref 22–52)
ECHO LA VOL BP: 94.05 ML (ref 22–52)
ECHO LA VOL/BSA BIPLANE: 57.22 ML/M2 (ref 16–28)
ECHO LA VOLUME INDEX A2C: 49.76 ML/M2 (ref 16–28)
ECHO LA VOLUME INDEX A4C: 55.4 ML/M2 (ref 16–28)
ECHO LV E' LATERAL VELOCITY: 11.6 CENTIMETER/SECOND
ECHO LV E' LATERAL VELOCITY: 9.48 CM/S
ECHO LV E' SEPTAL VELOCITY: 5.44 CENTIMETER/SECOND
ECHO LV E' SEPTAL VELOCITY: 8.17 CM/S
ECHO LV INTERNAL DIMENSION DIASTOLIC: 3.79 CM (ref 3.9–5.3)
ECHO LV INTERNAL DIMENSION DIASTOLIC: 3.95 CM (ref 3.9–5.3)
ECHO LV INTERNAL DIMENSION SYSTOLIC: 2.66 CM
ECHO LV INTERNAL DIMENSION SYSTOLIC: 3.27 CM
ECHO LV IVSD: 0.83 CM (ref 0.6–0.9)
ECHO LV IVSD: 1.01 CM (ref 0.6–0.9)
ECHO LV MASS 2D: 105.4 G (ref 67–162)
ECHO LV MASS 2D: 121.1 G (ref 67–162)
ECHO LV MASS INDEX 2D: 64.1 G/M2 (ref 43–95)
ECHO LV MASS INDEX 2D: 74.7 G/M2 (ref 43–95)
ECHO LV POSTERIOR WALL DIASTOLIC: 0.85 CM (ref 0.6–0.9)
ECHO LV POSTERIOR WALL DIASTOLIC: 1.13 CM (ref 0.6–0.9)
ECHO LVOT CARDIAC OUTPUT: 4.32 LITER/MINUTE
ECHO LVOT DIAM: 1.82 CM
ECHO LVOT DIAM: 2 CM
ECHO LVOT PEAK GRADIENT: 2.2 MMHG
ECHO LVOT PEAK GRADIENT: 2.44 MMHG
ECHO LVOT PEAK VELOCITY: 73.48 CM/S
ECHO LVOT PEAK VELOCITY: 74.22 CM/S
ECHO LVOT SV: 52.9 ML
ECHO LVOT SV: 55.7 ML
ECHO LVOT VTI: 17.82 CM
ECHO LVOT VTI: 20.24 CM
ECHO MV A VELOCITY: 117.47 CENTIMETER/SECOND
ECHO MV A VELOCITY: 125.98 CM/S
ECHO MV AREA PHT: 3.79 CM2
ECHO MV AREA PHT: 5.66 CM2
ECHO MV AREA VTI: 1.29 CM2
ECHO MV AREA VTI: 2.44 CM2
ECHO MV E DECELERATION TIME (DT): 126.75 MS
ECHO MV E DECELERATION TIME (DT): 200.24 MS
ECHO MV E VELOCITY: 105.29 CENTIMETER/SECOND
ECHO MV E VELOCITY: 82.42 CM/S
ECHO MV E/A RATIO: 0.65
ECHO MV E/E' LATERAL: 8.69
ECHO MV E/E' RATIO (AVERAGED): 9.39
ECHO MV E/E' SEPTAL: 10.09
ECHO MV MAX VELOCITY: 142.07 CM/S
ECHO MV MAX VELOCITY: 158.68 CM/S
ECHO MV MEAN GRADIENT: 3.26 MMHG
ECHO MV MEAN GRADIENT: 4.8 MMHG
ECHO MV PEAK GRADIENT: 10.07 MMHG
ECHO MV PEAK GRADIENT: 8.07 MMHG
ECHO MV PRESSURE HALF TIME (PHT): 38.89 MS
ECHO MV PRESSURE HALF TIME (PHT): 58.07 MS
ECHO MV VTI: 21.65 CM
ECHO MV VTI: 43.27 CM
ECHO PV PEAK INSTANTANEOUS GRADIENT SYSTOLIC: 2.72 MMHG
ECHO PV REGURGITANT MAX VELOCITY: 82.35 CM/S
ECHO RIGHT VENTRICULAR SYSTOLIC PRESSURE (RVSP): 26.36 MMHG
ECHO RV INTERNAL DIMENSION: 3.31 CM
ECHO RV TAPSE: 1.99 CM (ref 1.5–2)
ECHO TV REGURGITANT MAX VELOCITY: 241.66 CM/S
ECHO TV REGURGITANT PEAK GRADIENT: 23.36 MMHG
EOSINOPHIL # BLD: 0 K/UL (ref 0–0.4)
EOSINOPHIL # BLD: 0.1 K/UL (ref 0–0.4)
EOSINOPHIL # BLD: 0.1 K/UL (ref 0–0.4)
EOSINOPHIL # BLD: 0.2 K/UL (ref 0–0.4)
EOSINOPHIL # BLD: 0.3 K/UL (ref 0–0.4)
EOSINOPHIL # BLD: 0.4 K/UL (ref 0–0.4)
EOSINOPHIL # BLD: 0.5 K/UL (ref 0–0.4)
EOSINOPHIL NFR BLD: 0 % (ref 0–7)
EOSINOPHIL NFR BLD: 1 % (ref 0–7)
EOSINOPHIL NFR BLD: 2 % (ref 0–7)
EOSINOPHIL NFR BLD: 3 % (ref 0–7)
EOSINOPHIL NFR BLD: 4 % (ref 0–7)
EOSINOPHIL NFR BLD: 5 % (ref 0–7)
EOSINOPHIL NFR BLD: 5 % (ref 0–7)
EPITH CASTS URNS QL MICRO: ABNORMAL /LPF
EPITH CASTS URNS QL MICRO: NORMAL /LPF
ERYTHROCYTE [DISTWIDTH] IN BLOOD BY AUTOMATED COUNT: 14.3 % (ref 11.5–14.5)
ERYTHROCYTE [DISTWIDTH] IN BLOOD BY AUTOMATED COUNT: 14.3 % (ref 11.5–14.5)
ERYTHROCYTE [DISTWIDTH] IN BLOOD BY AUTOMATED COUNT: 14.5 % (ref 11.5–14.5)
ERYTHROCYTE [DISTWIDTH] IN BLOOD BY AUTOMATED COUNT: 14.6 % (ref 11.5–14.5)
ERYTHROCYTE [DISTWIDTH] IN BLOOD BY AUTOMATED COUNT: 14.7 % (ref 11.5–14.5)
ERYTHROCYTE [DISTWIDTH] IN BLOOD BY AUTOMATED COUNT: 14.8 % (ref 11.5–14.5)
ERYTHROCYTE [DISTWIDTH] IN BLOOD BY AUTOMATED COUNT: 14.9 % (ref 11.5–14.5)
ERYTHROCYTE [DISTWIDTH] IN BLOOD BY AUTOMATED COUNT: 14.9 % (ref 11.5–14.5)
ERYTHROCYTE [DISTWIDTH] IN BLOOD BY AUTOMATED COUNT: 15 % (ref 11.5–14.5)
ERYTHROCYTE [DISTWIDTH] IN BLOOD BY AUTOMATED COUNT: 15.3 % (ref 11.5–14.5)
ERYTHROCYTE [DISTWIDTH] IN BLOOD BY AUTOMATED COUNT: 15.4 % (ref 11.5–14.5)
ERYTHROCYTE [DISTWIDTH] IN BLOOD BY AUTOMATED COUNT: 15.7 % (ref 11.5–14.5)
ERYTHROCYTE [DISTWIDTH] IN BLOOD BY AUTOMATED COUNT: 15.8 % (ref 11.5–14.5)
ERYTHROCYTE [DISTWIDTH] IN BLOOD BY AUTOMATED COUNT: 15.9 % (ref 11.5–14.5)
EST. AVERAGE GLUCOSE BLD GHB EST-MCNC: 134 MG/DL
EST. AVERAGE GLUCOSE BLD GHB EST-MCNC: 137 MG/DL
EST. AVERAGE GLUCOSE BLD GHB EST-MCNC: 146 MG/DL
FLUID CULTURE, SPNG2: NORMAL
FLUID CULTURE, SPNG2: NORMAL
FOLATE SERPL-MCNC: 40.4 NG/ML (ref 5–21)
GLOBULIN SER CALC-MCNC: 2.8 G/DL (ref 2–4)
GLOBULIN SER CALC-MCNC: 2.8 G/DL (ref 2–4)
GLOBULIN SER CALC-MCNC: 2.9 G/DL (ref 2–4)
GLOBULIN SER CALC-MCNC: 2.9 G/DL (ref 2–4)
GLOBULIN SER CALC-MCNC: 3 G/DL (ref 2–4)
GLOBULIN SER CALC-MCNC: 3.1 G/DL (ref 2–4)
GLOBULIN SER CALC-MCNC: 3.1 G/DL (ref 2–4)
GLOBULIN SER CALC-MCNC: 3.4 G/DL (ref 2–4)
GLOBULIN SER CALC-MCNC: 3.4 G/DL (ref 2–4)
GLOBULIN SER CALC-MCNC: 3.5 G/DL (ref 2–4)
GLOBULIN SER CALC-MCNC: 3.8 G/DL (ref 2–4)
GLOBULIN SER CALC-MCNC: 3.9 G/DL (ref 2–4)
GLOBULIN SER CALC-MCNC: 4 G/DL (ref 2–4)
GLOBULIN SER CALC-MCNC: 4.2 G/DL (ref 2–4)
GLUCOSE BLD STRIP.AUTO-MCNC: 100 MG/DL (ref 65–117)
GLUCOSE BLD STRIP.AUTO-MCNC: 102 MG/DL (ref 74–106)
GLUCOSE BLD STRIP.AUTO-MCNC: 104 MG/DL (ref 65–117)
GLUCOSE BLD STRIP.AUTO-MCNC: 105 MG/DL (ref 65–100)
GLUCOSE BLD STRIP.AUTO-MCNC: 105 MG/DL (ref 65–117)
GLUCOSE BLD STRIP.AUTO-MCNC: 106 MG/DL (ref 65–117)
GLUCOSE BLD STRIP.AUTO-MCNC: 107 MG/DL (ref 65–100)
GLUCOSE BLD STRIP.AUTO-MCNC: 107 MG/DL (ref 65–100)
GLUCOSE BLD STRIP.AUTO-MCNC: 111 MG/DL (ref 65–117)
GLUCOSE BLD STRIP.AUTO-MCNC: 112 MG/DL (ref 65–117)
GLUCOSE BLD STRIP.AUTO-MCNC: 114 MG/DL (ref 65–100)
GLUCOSE BLD STRIP.AUTO-MCNC: 115 MG/DL (ref 65–100)
GLUCOSE BLD STRIP.AUTO-MCNC: 118 MG/DL (ref 65–117)
GLUCOSE BLD STRIP.AUTO-MCNC: 119 MG/DL (ref 65–100)
GLUCOSE BLD STRIP.AUTO-MCNC: 124 MG/DL (ref 65–100)
GLUCOSE BLD STRIP.AUTO-MCNC: 125 MG/DL (ref 65–100)
GLUCOSE BLD STRIP.AUTO-MCNC: 129 MG/DL (ref 65–100)
GLUCOSE BLD STRIP.AUTO-MCNC: 130 MG/DL (ref 65–100)
GLUCOSE BLD STRIP.AUTO-MCNC: 132 MG/DL (ref 65–117)
GLUCOSE BLD STRIP.AUTO-MCNC: 134 MG/DL (ref 65–117)
GLUCOSE BLD STRIP.AUTO-MCNC: 136 MG/DL (ref 65–100)
GLUCOSE BLD STRIP.AUTO-MCNC: 138 MG/DL (ref 65–100)
GLUCOSE BLD STRIP.AUTO-MCNC: 138 MG/DL (ref 65–117)
GLUCOSE BLD STRIP.AUTO-MCNC: 138 MG/DL (ref 65–117)
GLUCOSE BLD STRIP.AUTO-MCNC: 139 MG/DL (ref 65–100)
GLUCOSE BLD STRIP.AUTO-MCNC: 142 MG/DL (ref 65–117)
GLUCOSE BLD STRIP.AUTO-MCNC: 146 MG/DL (ref 65–117)
GLUCOSE BLD STRIP.AUTO-MCNC: 147 MG/DL (ref 65–117)
GLUCOSE BLD STRIP.AUTO-MCNC: 149 MG/DL (ref 65–100)
GLUCOSE BLD STRIP.AUTO-MCNC: 150 MG/DL (ref 65–117)
GLUCOSE BLD STRIP.AUTO-MCNC: 151 MG/DL (ref 65–100)
GLUCOSE BLD STRIP.AUTO-MCNC: 151 MG/DL (ref 65–117)
GLUCOSE BLD STRIP.AUTO-MCNC: 153 MG/DL (ref 65–100)
GLUCOSE BLD STRIP.AUTO-MCNC: 160 MG/DL (ref 65–100)
GLUCOSE BLD STRIP.AUTO-MCNC: 164 MG/DL (ref 65–100)
GLUCOSE BLD STRIP.AUTO-MCNC: 164 MG/DL (ref 65–117)
GLUCOSE BLD STRIP.AUTO-MCNC: 166 MG/DL (ref 65–117)
GLUCOSE BLD STRIP.AUTO-MCNC: 168 MG/DL (ref 65–100)
GLUCOSE BLD STRIP.AUTO-MCNC: 172 MG/DL (ref 65–117)
GLUCOSE BLD STRIP.AUTO-MCNC: 180 MG/DL (ref 65–117)
GLUCOSE BLD STRIP.AUTO-MCNC: 189 MG/DL (ref 65–100)
GLUCOSE BLD STRIP.AUTO-MCNC: 193 MG/DL (ref 65–100)
GLUCOSE BLD STRIP.AUTO-MCNC: 193 MG/DL (ref 65–117)
GLUCOSE BLD STRIP.AUTO-MCNC: 197 MG/DL (ref 65–100)
GLUCOSE BLD STRIP.AUTO-MCNC: 202 MG/DL (ref 65–117)
GLUCOSE BLD STRIP.AUTO-MCNC: 204 MG/DL (ref 65–117)
GLUCOSE BLD STRIP.AUTO-MCNC: 205 MG/DL (ref 65–117)
GLUCOSE BLD STRIP.AUTO-MCNC: 206 MG/DL (ref 65–117)
GLUCOSE BLD STRIP.AUTO-MCNC: 207 MG/DL (ref 65–100)
GLUCOSE BLD STRIP.AUTO-MCNC: 207 MG/DL (ref 65–117)
GLUCOSE BLD STRIP.AUTO-MCNC: 207 MG/DL (ref 65–117)
GLUCOSE BLD STRIP.AUTO-MCNC: 210 MG/DL (ref 65–117)
GLUCOSE BLD STRIP.AUTO-MCNC: 213 MG/DL (ref 65–117)
GLUCOSE BLD STRIP.AUTO-MCNC: 215 MG/DL (ref 65–100)
GLUCOSE BLD STRIP.AUTO-MCNC: 215 MG/DL (ref 65–117)
GLUCOSE BLD STRIP.AUTO-MCNC: 216 MG/DL (ref 65–117)
GLUCOSE BLD STRIP.AUTO-MCNC: 217 MG/DL (ref 65–117)
GLUCOSE BLD STRIP.AUTO-MCNC: 217 MG/DL (ref 65–117)
GLUCOSE BLD STRIP.AUTO-MCNC: 220 MG/DL (ref 65–117)
GLUCOSE BLD STRIP.AUTO-MCNC: 223 MG/DL (ref 65–117)
GLUCOSE BLD STRIP.AUTO-MCNC: 226 MG/DL (ref 65–117)
GLUCOSE BLD STRIP.AUTO-MCNC: 229 MG/DL (ref 65–117)
GLUCOSE BLD STRIP.AUTO-MCNC: 238 MG/DL (ref 65–117)
GLUCOSE BLD STRIP.AUTO-MCNC: 238 MG/DL (ref 65–117)
GLUCOSE BLD STRIP.AUTO-MCNC: 240 MG/DL (ref 65–100)
GLUCOSE BLD STRIP.AUTO-MCNC: 240 MG/DL (ref 65–117)
GLUCOSE BLD STRIP.AUTO-MCNC: 245 MG/DL (ref 65–117)
GLUCOSE BLD STRIP.AUTO-MCNC: 247 MG/DL (ref 65–117)
GLUCOSE BLD STRIP.AUTO-MCNC: 248 MG/DL (ref 65–100)
GLUCOSE BLD STRIP.AUTO-MCNC: 248 MG/DL (ref 65–117)
GLUCOSE BLD STRIP.AUTO-MCNC: 250 MG/DL (ref 65–117)
GLUCOSE BLD STRIP.AUTO-MCNC: 259 MG/DL (ref 65–117)
GLUCOSE BLD STRIP.AUTO-MCNC: 262 MG/DL (ref 65–117)
GLUCOSE BLD STRIP.AUTO-MCNC: 262 MG/DL (ref 65–117)
GLUCOSE BLD STRIP.AUTO-MCNC: 266 MG/DL (ref 65–117)
GLUCOSE BLD STRIP.AUTO-MCNC: 266 MG/DL (ref 65–117)
GLUCOSE BLD STRIP.AUTO-MCNC: 275 MG/DL (ref 65–117)
GLUCOSE BLD STRIP.AUTO-MCNC: 276 MG/DL (ref 65–117)
GLUCOSE BLD STRIP.AUTO-MCNC: 276 MG/DL (ref 65–117)
GLUCOSE BLD STRIP.AUTO-MCNC: 277 MG/DL (ref 65–117)
GLUCOSE BLD STRIP.AUTO-MCNC: 277 MG/DL (ref 65–117)
GLUCOSE BLD STRIP.AUTO-MCNC: 278 MG/DL (ref 65–117)
GLUCOSE BLD STRIP.AUTO-MCNC: 279 MG/DL (ref 65–100)
GLUCOSE BLD STRIP.AUTO-MCNC: 280 MG/DL (ref 65–117)
GLUCOSE BLD STRIP.AUTO-MCNC: 283 MG/DL (ref 65–117)
GLUCOSE BLD STRIP.AUTO-MCNC: 284 MG/DL (ref 65–117)
GLUCOSE BLD STRIP.AUTO-MCNC: 284 MG/DL (ref 65–117)
GLUCOSE BLD STRIP.AUTO-MCNC: 288 MG/DL (ref 65–117)
GLUCOSE BLD STRIP.AUTO-MCNC: 293 MG/DL (ref 65–100)
GLUCOSE BLD STRIP.AUTO-MCNC: 293 MG/DL (ref 65–117)
GLUCOSE BLD STRIP.AUTO-MCNC: 294 MG/DL (ref 65–100)
GLUCOSE BLD STRIP.AUTO-MCNC: 294 MG/DL (ref 65–117)
GLUCOSE BLD STRIP.AUTO-MCNC: 295 MG/DL (ref 65–117)
GLUCOSE BLD STRIP.AUTO-MCNC: 296 MG/DL (ref 65–117)
GLUCOSE BLD STRIP.AUTO-MCNC: 297 MG/DL (ref 65–117)
GLUCOSE BLD STRIP.AUTO-MCNC: 300 MG/DL (ref 65–117)
GLUCOSE BLD STRIP.AUTO-MCNC: 300 MG/DL (ref 65–117)
GLUCOSE BLD STRIP.AUTO-MCNC: 301 MG/DL (ref 65–117)
GLUCOSE BLD STRIP.AUTO-MCNC: 302 MG/DL (ref 65–117)
GLUCOSE BLD STRIP.AUTO-MCNC: 302 MG/DL (ref 65–117)
GLUCOSE BLD STRIP.AUTO-MCNC: 316 MG/DL (ref 65–117)
GLUCOSE BLD STRIP.AUTO-MCNC: 322 MG/DL (ref 65–117)
GLUCOSE BLD STRIP.AUTO-MCNC: 325 MG/DL (ref 65–117)
GLUCOSE BLD STRIP.AUTO-MCNC: 331 MG/DL (ref 65–117)
GLUCOSE BLD STRIP.AUTO-MCNC: 331 MG/DL (ref 65–117)
GLUCOSE BLD STRIP.AUTO-MCNC: 340 MG/DL (ref 65–117)
GLUCOSE BLD STRIP.AUTO-MCNC: 342 MG/DL (ref 65–117)
GLUCOSE BLD STRIP.AUTO-MCNC: 342 MG/DL (ref 65–117)
GLUCOSE BLD STRIP.AUTO-MCNC: 346 MG/DL (ref 65–117)
GLUCOSE BLD STRIP.AUTO-MCNC: 347 MG/DL (ref 65–100)
GLUCOSE BLD STRIP.AUTO-MCNC: 348 MG/DL (ref 65–117)
GLUCOSE BLD STRIP.AUTO-MCNC: 356 MG/DL (ref 65–100)
GLUCOSE BLD STRIP.AUTO-MCNC: 357 MG/DL (ref 65–117)
GLUCOSE BLD STRIP.AUTO-MCNC: 399 MG/DL (ref 65–117)
GLUCOSE BLD STRIP.AUTO-MCNC: 411 MG/DL (ref 65–117)
GLUCOSE BLD STRIP.AUTO-MCNC: 414 MG/DL (ref 65–117)
GLUCOSE BLD STRIP.AUTO-MCNC: 426 MG/DL (ref 65–117)
GLUCOSE BLD STRIP.AUTO-MCNC: 428 MG/DL (ref 65–117)
GLUCOSE BLD STRIP.AUTO-MCNC: 437 MG/DL (ref 65–117)
GLUCOSE BLD STRIP.AUTO-MCNC: 469 MG/DL (ref 65–117)
GLUCOSE BLD STRIP.AUTO-MCNC: 470 MG/DL (ref 65–117)
GLUCOSE BLD STRIP.AUTO-MCNC: 496 MG/DL (ref 65–117)
GLUCOSE BLD STRIP.AUTO-MCNC: 65 MG/DL (ref 65–100)
GLUCOSE BLD STRIP.AUTO-MCNC: 82 MG/DL (ref 65–117)
GLUCOSE BLD STRIP.AUTO-MCNC: 85 MG/DL (ref 65–100)
GLUCOSE BLD STRIP.AUTO-MCNC: 86 MG/DL (ref 65–100)
GLUCOSE BLD STRIP.AUTO-MCNC: 89 MG/DL (ref 65–100)
GLUCOSE BLD STRIP.AUTO-MCNC: 89 MG/DL (ref 65–100)
GLUCOSE BLD STRIP.AUTO-MCNC: 91 MG/DL (ref 65–100)
GLUCOSE BLD STRIP.AUTO-MCNC: 95 MG/DL (ref 65–117)
GLUCOSE SERPL-MCNC: 100 MG/DL (ref 65–100)
GLUCOSE SERPL-MCNC: 110 MG/DL (ref 65–100)
GLUCOSE SERPL-MCNC: 114 MG/DL (ref 65–100)
GLUCOSE SERPL-MCNC: 117 MG/DL (ref 65–100)
GLUCOSE SERPL-MCNC: 117 MG/DL (ref 65–100)
GLUCOSE SERPL-MCNC: 123 MG/DL (ref 65–100)
GLUCOSE SERPL-MCNC: 129 MG/DL (ref 65–100)
GLUCOSE SERPL-MCNC: 136 MG/DL (ref 65–100)
GLUCOSE SERPL-MCNC: 139 MG/DL (ref 65–100)
GLUCOSE SERPL-MCNC: 140 MG/DL (ref 65–100)
GLUCOSE SERPL-MCNC: 146 MG/DL (ref 65–100)
GLUCOSE SERPL-MCNC: 147 MG/DL (ref 65–100)
GLUCOSE SERPL-MCNC: 153 MG/DL (ref 65–100)
GLUCOSE SERPL-MCNC: 156 MG/DL (ref 65–100)
GLUCOSE SERPL-MCNC: 157 MG/DL (ref 65–100)
GLUCOSE SERPL-MCNC: 160 MG/DL (ref 65–100)
GLUCOSE SERPL-MCNC: 175 MG/DL (ref 65–100)
GLUCOSE SERPL-MCNC: 177 MG/DL (ref 65–100)
GLUCOSE SERPL-MCNC: 179 MG/DL (ref 65–100)
GLUCOSE SERPL-MCNC: 179 MG/DL (ref 65–100)
GLUCOSE SERPL-MCNC: 194 MG/DL (ref 65–100)
GLUCOSE SERPL-MCNC: 194 MG/DL (ref 65–100)
GLUCOSE SERPL-MCNC: 196 MG/DL (ref 65–100)
GLUCOSE SERPL-MCNC: 197 MG/DL (ref 65–100)
GLUCOSE SERPL-MCNC: 200 MG/DL (ref 65–100)
GLUCOSE SERPL-MCNC: 208 MG/DL (ref 65–100)
GLUCOSE SERPL-MCNC: 214 MG/DL (ref 65–100)
GLUCOSE SERPL-MCNC: 232 MG/DL (ref 65–100)
GLUCOSE SERPL-MCNC: 234 MG/DL (ref 65–100)
GLUCOSE SERPL-MCNC: 235 MG/DL (ref 65–100)
GLUCOSE SERPL-MCNC: 237 MG/DL (ref 65–100)
GLUCOSE SERPL-MCNC: 250 MG/DL (ref 65–100)
GLUCOSE SERPL-MCNC: 265 MG/DL (ref 65–100)
GLUCOSE SERPL-MCNC: 279 MG/DL (ref 65–100)
GLUCOSE SERPL-MCNC: 85 MG/DL (ref 65–100)
GLUCOSE SERPL-MCNC: 85 MG/DL (ref 65–100)
GLUCOSE SERPL-MCNC: 90 MG/DL (ref 65–100)
GLUCOSE SERPL-MCNC: 96 MG/DL (ref 65–100)
GLUCOSE SERPL-MCNC: 98 MG/DL (ref 65–100)
GLUCOSE UR STRIP.AUTO-MCNC: 100 MG/DL
GLUCOSE UR STRIP.AUTO-MCNC: NEGATIVE MG/DL
GRAM STN SPEC: ABNORMAL
HAPTOGLOB SERPL-MCNC: 180 MG/DL (ref 30–200)
HBA1C MFR BLD: 6.3 % (ref 4–5.6)
HBA1C MFR BLD: 6.4 % (ref 4–5.6)
HBA1C MFR BLD: 6.7 % (ref 4–5.6)
HCO3 BLDA-SCNC: 28 MMOL/L
HCT VFR BLD AUTO: 24.2 % (ref 35–47)
HCT VFR BLD AUTO: 26.6 % (ref 35–47)
HCT VFR BLD AUTO: 27.6 % (ref 35–47)
HCT VFR BLD AUTO: 28.1 % (ref 35–47)
HCT VFR BLD AUTO: 28.9 % (ref 35–47)
HCT VFR BLD AUTO: 29.2 % (ref 35–47)
HCT VFR BLD AUTO: 29.5 % (ref 35–47)
HCT VFR BLD AUTO: 29.9 % (ref 35–47)
HCT VFR BLD AUTO: 30.2 % (ref 35–47)
HCT VFR BLD AUTO: 30.4 % (ref 35–47)
HCT VFR BLD AUTO: 30.7 % (ref 35–47)
HCT VFR BLD AUTO: 30.7 % (ref 35–47)
HCT VFR BLD AUTO: 31.2 % (ref 35–47)
HCT VFR BLD AUTO: 31.2 % (ref 35–47)
HCT VFR BLD AUTO: 31.5 % (ref 35–47)
HCT VFR BLD AUTO: 31.8 % (ref 35–47)
HCT VFR BLD AUTO: 31.9 % (ref 35–47)
HCT VFR BLD AUTO: 32.2 % (ref 35–47)
HCT VFR BLD AUTO: 32.6 % (ref 35–47)
HCT VFR BLD AUTO: 32.9 % (ref 35–47)
HCT VFR BLD AUTO: 33.2 % (ref 35–47)
HCT VFR BLD AUTO: 33.3 % (ref 35–47)
HCT VFR BLD AUTO: 33.4 % (ref 35–47)
HCT VFR BLD AUTO: 33.7 % (ref 35–47)
HCT VFR BLD AUTO: 33.7 % (ref 35–47)
HCT VFR BLD AUTO: 33.8 % (ref 35–47)
HCT VFR BLD AUTO: 33.9 % (ref 35–47)
HCT VFR BLD AUTO: 34.1 % (ref 35–47)
HCT VFR BLD AUTO: 34.1 % (ref 35–47)
HCT VFR BLD AUTO: 34.3 % (ref 35–47)
HCT VFR BLD AUTO: 34.8 % (ref 35–47)
HCT VFR BLD AUTO: 35.3 % (ref 35–47)
HCT VFR BLD AUTO: 36 % (ref 35–47)
HCT VFR BLD AUTO: 38.6 % (ref 35–47)
HCYS SERPL-SCNC: 4.2 UMOL/L (ref 3.7–13.9)
HDLC SERPL-MCNC: 44 MG/DL
HDLC SERPL: 2 {RATIO} (ref 0–5)
HEALTH STATUS, XMCV2T: NORMAL
HEMOCCULT STL QL: NEGATIVE
HETEROPH AB BLD QL IA: NEGATIVE
HGB BLD-MCNC: 10 G/DL (ref 11.5–16)
HGB BLD-MCNC: 10.1 G/DL (ref 11.5–16)
HGB BLD-MCNC: 10.2 G/DL (ref 11.5–16)
HGB BLD-MCNC: 10.2 G/DL (ref 11.5–16)
HGB BLD-MCNC: 10.3 G/DL (ref 11.5–16)
HGB BLD-MCNC: 10.3 G/DL (ref 11.5–16)
HGB BLD-MCNC: 10.4 G/DL (ref 11.5–16)
HGB BLD-MCNC: 10.5 G/DL (ref 11.5–16)
HGB BLD-MCNC: 10.7 G/DL (ref 11.5–16)
HGB BLD-MCNC: 10.8 G/DL (ref 11.5–16)
HGB BLD-MCNC: 10.8 G/DL (ref 11.5–16)
HGB BLD-MCNC: 11.2 G/DL (ref 11.5–16)
HGB BLD-MCNC: 11.3 G/DL (ref 11.5–16)
HGB BLD-MCNC: 11.4 G/DL (ref 11.5–16)
HGB BLD-MCNC: 11.8 G/DL (ref 11.5–16)
HGB BLD-MCNC: 7.5 G/DL (ref 11.5–16)
HGB BLD-MCNC: 8.2 G/DL (ref 11.5–16)
HGB BLD-MCNC: 8.6 G/DL (ref 11.5–16)
HGB BLD-MCNC: 8.8 G/DL (ref 11.5–16)
HGB BLD-MCNC: 8.8 G/DL (ref 11.5–16)
HGB BLD-MCNC: 9.1 G/DL (ref 11.5–16)
HGB BLD-MCNC: 9.1 G/DL (ref 11.5–16)
HGB BLD-MCNC: 9.2 G/DL (ref 11.5–16)
HGB BLD-MCNC: 9.3 G/DL (ref 11.5–16)
HGB BLD-MCNC: 9.3 G/DL (ref 11.5–16)
HGB BLD-MCNC: 9.4 G/DL (ref 11.5–16)
HGB BLD-MCNC: 9.7 G/DL (ref 11.5–16)
HGB BLD-MCNC: 9.8 G/DL (ref 11.5–16)
HGB BLD-MCNC: 9.9 G/DL (ref 11.5–16)
HGB BLD-MCNC: 9.9 G/DL (ref 11.5–16)
HGB UR QL STRIP: ABNORMAL
HGB UR QL STRIP: ABNORMAL
HGB UR QL STRIP: NEGATIVE
HYALINE CASTS URNS QL MICRO: ABNORMAL /LPF (ref 0–5)
HYALINE CASTS URNS QL MICRO: ABNORMAL /LPF (ref 0–5)
HYALINE CASTS URNS QL MICRO: NORMAL /LPF (ref 0–5)
IMM GRANULOCYTES # BLD AUTO: 0 K/UL
IMM GRANULOCYTES # BLD AUTO: 0.1 K/UL (ref 0–0.04)
IMM GRANULOCYTES # BLD AUTO: 0.2 K/UL (ref 0–0.04)
IMM GRANULOCYTES # BLD AUTO: 0.3 K/UL (ref 0–0.04)
IMM GRANULOCYTES NFR BLD AUTO: 0 %
IMM GRANULOCYTES NFR BLD AUTO: 1 % (ref 0–0.5)
IMM GRANULOCYTES NFR BLD AUTO: 2 % (ref 0–0.5)
INR PPP: 1.2 (ref 0.9–1.1)
INR PPP: 1.2 (ref 0.9–1.1)
INR PPP: 1.3 (ref 0.9–1.1)
INR PPP: 1.3 (ref 0.9–1.1)
IRON SATN MFR SERPL: 6 % (ref 20–50)
IRON SERPL-MCNC: 16 UG/DL (ref 35–150)
KETONES UR QL STRIP.AUTO: NEGATIVE MG/DL
L PNEUMO1 AG UR QL IA: NEGATIVE
L PNEUMO1 AG UR QL IA: NEGATIVE
LA VOL DISK BP: 86.73 ML (ref 22–52)
LACTATE BLD-SCNC: 0.9 MMOL/L (ref 0.4–2)
LACTATE SERPL-SCNC: 1.1 MMOL/L (ref 0.4–2)
LDLC SERPL CALC-MCNC: 26 MG/DL (ref 0–100)
LEFT CCA DIST DIAS: 9 CM/S
LEFT CCA DIST SYS: 110.7 CM/S
LEFT CCA PROX DIAS: 0 CM/S
LEFT CCA PROX SYS: 183.6 CM/S
LEFT ECA DIAS: 0 CM/S
LEFT ECA SYS: 125.2 CM/S
LEFT ICA DIST DIAS: 6 CM/S
LEFT ICA DIST SYS: 122.5 CM/S
LEFT ICA MID DIAS: 6 CM/S
LEFT ICA MID SYS: 105.9 CM/S
LEFT ICA PROX DIAS: 16 CM/S
LEFT ICA PROX SYS: 148.5 CM/S
LEFT ICA/CCA SYS: 1.34
LEFT SUBCLAVIAN DIAS: 0 CM/S
LEFT SUBCLAVIAN SYS: 108.1 CM/S
LEFT VERTEBRAL DIAS: 13.6 CM/S
LEFT VERTEBRAL SYS: 74.7 CM/S
LEUKOCYTE ESTERASE UR QL STRIP.AUTO: ABNORMAL
LEUKOCYTE ESTERASE UR QL STRIP.AUTO: NEGATIVE
LIPASE SERPL-CCNC: 24 U/L (ref 73–393)
LIPASE SERPL-CCNC: 54 U/L (ref 73–393)
LIPID PROFILE,FLP: NORMAL
LVOT MG: 1.13 MMHG
LVOT MG: 1.16 MMHG
LYMPHOCYTES # BLD: 0.2 K/UL (ref 0.8–3.5)
LYMPHOCYTES # BLD: 0.3 K/UL (ref 0.8–3.5)
LYMPHOCYTES # BLD: 0.4 K/UL (ref 0.8–3.5)
LYMPHOCYTES # BLD: 0.5 K/UL (ref 0.8–3.5)
LYMPHOCYTES # BLD: 0.6 K/UL (ref 0.8–3.5)
LYMPHOCYTES # BLD: 0.7 K/UL (ref 0.8–3.5)
LYMPHOCYTES # BLD: 0.8 K/UL (ref 0.8–3.5)
LYMPHOCYTES # BLD: 0.9 K/UL (ref 0.8–3.5)
LYMPHOCYTES # BLD: 1 K/UL (ref 0.8–3.5)
LYMPHOCYTES NFR BLD: 1 % (ref 12–49)
LYMPHOCYTES NFR BLD: 10 % (ref 12–49)
LYMPHOCYTES NFR BLD: 10 % (ref 12–49)
LYMPHOCYTES NFR BLD: 11 % (ref 12–49)
LYMPHOCYTES NFR BLD: 2 % (ref 12–49)
LYMPHOCYTES NFR BLD: 3 % (ref 12–49)
LYMPHOCYTES NFR BLD: 4 % (ref 12–49)
LYMPHOCYTES NFR BLD: 5 % (ref 12–49)
LYMPHOCYTES NFR BLD: 6 % (ref 12–49)
LYMPHOCYTES NFR BLD: 6 % (ref 12–49)
LYMPHOCYTES NFR BLD: 7 % (ref 12–49)
LYMPHOCYTES NFR BLD: 8 % (ref 12–49)
M PNEUMO IGG SER IA-ACNC: 266 U/ML (ref 0–99)
M PNEUMO IGM SER IA-ACNC: <770 U/ML (ref 0–769)
MAGNESIUM SERPL-MCNC: 1.6 MG/DL (ref 1.6–2.4)
MAGNESIUM SERPL-MCNC: 1.7 MG/DL (ref 1.6–2.4)
MAGNESIUM SERPL-MCNC: 1.7 MG/DL (ref 1.6–2.4)
MAGNESIUM SERPL-MCNC: 1.8 MG/DL (ref 1.6–2.4)
MAGNESIUM SERPL-MCNC: 1.9 MG/DL (ref 1.6–2.4)
MCH RBC QN AUTO: 24.1 PG (ref 26–34)
MCH RBC QN AUTO: 24.2 PG (ref 26–34)
MCH RBC QN AUTO: 24.4 PG (ref 26–34)
MCH RBC QN AUTO: 24.5 PG (ref 26–34)
MCH RBC QN AUTO: 24.6 PG (ref 26–34)
MCH RBC QN AUTO: 24.6 PG (ref 26–34)
MCH RBC QN AUTO: 24.7 PG (ref 26–34)
MCH RBC QN AUTO: 24.8 PG (ref 26–34)
MCH RBC QN AUTO: 24.9 PG (ref 26–34)
MCH RBC QN AUTO: 24.9 PG (ref 26–34)
MCH RBC QN AUTO: 25 PG (ref 26–34)
MCH RBC QN AUTO: 25.1 PG (ref 26–34)
MCH RBC QN AUTO: 25.2 PG (ref 26–34)
MCH RBC QN AUTO: 25.2 PG (ref 26–34)
MCH RBC QN AUTO: 25.3 PG (ref 26–34)
MCH RBC QN AUTO: 25.3 PG (ref 26–34)
MCH RBC QN AUTO: 25.6 PG (ref 26–34)
MCH RBC QN AUTO: 25.7 PG (ref 26–34)
MCH RBC QN AUTO: 25.8 PG (ref 26–34)
MCH RBC QN AUTO: 25.9 PG (ref 26–34)
MCH RBC QN AUTO: 26 PG (ref 26–34)
MCH RBC QN AUTO: 26.2 PG (ref 26–34)
MCH RBC QN AUTO: 26.2 PG (ref 26–34)
MCH RBC QN AUTO: 26.4 PG (ref 26–34)
MCH RBC QN AUTO: 26.5 PG (ref 26–34)
MCHC RBC AUTO-ENTMCNC: 29.3 G/DL (ref 30–36.5)
MCHC RBC AUTO-ENTMCNC: 29.9 G/DL (ref 30–36.5)
MCHC RBC AUTO-ENTMCNC: 30 G/DL (ref 30–36.5)
MCHC RBC AUTO-ENTMCNC: 30 G/DL (ref 30–36.5)
MCHC RBC AUTO-ENTMCNC: 30.1 G/DL (ref 30–36.5)
MCHC RBC AUTO-ENTMCNC: 30.3 G/DL (ref 30–36.5)
MCHC RBC AUTO-ENTMCNC: 30.4 G/DL (ref 30–36.5)
MCHC RBC AUTO-ENTMCNC: 30.4 G/DL (ref 30–36.5)
MCHC RBC AUTO-ENTMCNC: 30.5 G/DL (ref 30–36.5)
MCHC RBC AUTO-ENTMCNC: 30.6 G/DL (ref 30–36.5)
MCHC RBC AUTO-ENTMCNC: 30.7 G/DL (ref 30–36.5)
MCHC RBC AUTO-ENTMCNC: 30.8 G/DL (ref 30–36.5)
MCHC RBC AUTO-ENTMCNC: 30.8 G/DL (ref 30–36.5)
MCHC RBC AUTO-ENTMCNC: 31 G/DL (ref 30–36.5)
MCHC RBC AUTO-ENTMCNC: 31 G/DL (ref 30–36.5)
MCHC RBC AUTO-ENTMCNC: 31.1 G/DL (ref 30–36.5)
MCHC RBC AUTO-ENTMCNC: 31.2 G/DL (ref 30–36.5)
MCHC RBC AUTO-ENTMCNC: 31.2 G/DL (ref 30–36.5)
MCHC RBC AUTO-ENTMCNC: 31.7 G/DL (ref 30–36.5)
MCHC RBC AUTO-ENTMCNC: 31.9 G/DL (ref 30–36.5)
MCHC RBC AUTO-ENTMCNC: 31.9 G/DL (ref 30–36.5)
MCHC RBC AUTO-ENTMCNC: 32 G/DL (ref 30–36.5)
MCHC RBC AUTO-ENTMCNC: 32 G/DL (ref 30–36.5)
MCHC RBC AUTO-ENTMCNC: 32.3 G/DL (ref 30–36.5)
MCHC RBC AUTO-ENTMCNC: 32.4 G/DL (ref 30–36.5)
MCHC RBC AUTO-ENTMCNC: 32.5 G/DL (ref 30–36.5)
MCHC RBC AUTO-ENTMCNC: 32.5 G/DL (ref 30–36.5)
MCHC RBC AUTO-ENTMCNC: 33.1 G/DL (ref 30–36.5)
MCHC RBC AUTO-ENTMCNC: 33.1 G/DL (ref 30–36.5)
MCHC RBC AUTO-ENTMCNC: 33.9 G/DL (ref 30–36.5)
MCV RBC AUTO: 77.1 FL (ref 80–99)
MCV RBC AUTO: 77.1 FL (ref 80–99)
MCV RBC AUTO: 78.2 FL (ref 80–99)
MCV RBC AUTO: 78.6 FL (ref 80–99)
MCV RBC AUTO: 79.2 FL (ref 80–99)
MCV RBC AUTO: 79.3 FL (ref 80–99)
MCV RBC AUTO: 79.9 FL (ref 80–99)
MCV RBC AUTO: 80.2 FL (ref 80–99)
MCV RBC AUTO: 80.4 FL (ref 80–99)
MCV RBC AUTO: 80.4 FL (ref 80–99)
MCV RBC AUTO: 80.7 FL (ref 80–99)
MCV RBC AUTO: 80.8 FL (ref 80–99)
MCV RBC AUTO: 80.8 FL (ref 80–99)
MCV RBC AUTO: 80.9 FL (ref 80–99)
MCV RBC AUTO: 81 FL (ref 80–99)
MCV RBC AUTO: 81 FL (ref 80–99)
MCV RBC AUTO: 81.4 FL (ref 80–99)
MCV RBC AUTO: 81.5 FL (ref 80–99)
MCV RBC AUTO: 81.6 FL (ref 80–99)
MCV RBC AUTO: 81.7 FL (ref 80–99)
MCV RBC AUTO: 81.8 FL (ref 80–99)
MCV RBC AUTO: 81.8 FL (ref 80–99)
MCV RBC AUTO: 81.9 FL (ref 80–99)
MCV RBC AUTO: 82 FL (ref 80–99)
MCV RBC AUTO: 82.5 FL (ref 80–99)
MCV RBC AUTO: 82.6 FL (ref 80–99)
MCV RBC AUTO: 83.1 FL (ref 80–99)
MCV RBC AUTO: 83.1 FL (ref 80–99)
MCV RBC AUTO: 83.4 FL (ref 80–99)
MCV RBC AUTO: 83.7 FL (ref 80–99)
MONOCYTES # BLD: 0 K/UL (ref 0–1)
MONOCYTES # BLD: 0.1 K/UL (ref 0–1)
MONOCYTES # BLD: 0.2 K/UL (ref 0–1)
MONOCYTES # BLD: 0.3 K/UL (ref 0–1)
MONOCYTES # BLD: 0.4 K/UL (ref 0–1)
MONOCYTES # BLD: 0.5 K/UL (ref 0–1)
MONOCYTES # BLD: 0.6 K/UL (ref 0–1)
MONOCYTES # BLD: 0.7 K/UL (ref 0–1)
MONOCYTES # BLD: 0.7 K/UL (ref 0–1)
MONOCYTES # BLD: 0.8 K/UL (ref 0–1)
MONOCYTES # BLD: 0.8 K/UL (ref 0–1)
MONOCYTES # BLD: 0.9 K/UL (ref 0–1)
MONOCYTES # BLD: 1 K/UL (ref 0–1)
MONOCYTES # BLD: 1.9 K/UL (ref 0–1)
MONOCYTES NFR BLD: 0 % (ref 5–13)
MONOCYTES NFR BLD: 1 % (ref 5–13)
MONOCYTES NFR BLD: 2 % (ref 5–13)
MONOCYTES NFR BLD: 3 % (ref 5–13)
MONOCYTES NFR BLD: 4 % (ref 5–13)
MONOCYTES NFR BLD: 5 % (ref 5–13)
MONOCYTES NFR BLD: 6 % (ref 5–13)
MONOCYTES NFR BLD: 6 % (ref 5–13)
MONOCYTES NFR BLD: 7 % (ref 5–13)
MONOCYTES NFR BLD: 8 % (ref 5–13)
MYELOCYTES NFR BLD MANUAL: 2 %
NEUTS BAND NFR BLD MANUAL: 22 % (ref 0–6)
NEUTS BAND NFR BLD MANUAL: 3 % (ref 0–6)
NEUTS BAND NFR BLD MANUAL: 3 % (ref 0–6)
NEUTS SEG # BLD: 11 K/UL (ref 1.8–8)
NEUTS SEG # BLD: 11.8 K/UL (ref 1.8–8)
NEUTS SEG # BLD: 12.9 K/UL (ref 1.8–8)
NEUTS SEG # BLD: 13.1 K/UL (ref 1.8–8)
NEUTS SEG # BLD: 13.4 K/UL (ref 1.8–8)
NEUTS SEG # BLD: 13.5 K/UL (ref 1.8–8)
NEUTS SEG # BLD: 14.1 K/UL (ref 1.8–8)
NEUTS SEG # BLD: 14.2 K/UL (ref 1.8–8)
NEUTS SEG # BLD: 14.7 K/UL (ref 1.8–8)
NEUTS SEG # BLD: 14.8 K/UL (ref 1.8–8)
NEUTS SEG # BLD: 15.1 K/UL (ref 1.8–8)
NEUTS SEG # BLD: 15.1 K/UL (ref 1.8–8)
NEUTS SEG # BLD: 15.7 K/UL (ref 1.8–8)
NEUTS SEG # BLD: 17.2 K/UL (ref 1.8–8)
NEUTS SEG # BLD: 17.4 K/UL (ref 1.8–8)
NEUTS SEG # BLD: 20.5 K/UL (ref 1.8–8)
NEUTS SEG # BLD: 21.6 K/UL (ref 1.8–8)
NEUTS SEG # BLD: 27.3 K/UL (ref 1.8–8)
NEUTS SEG # BLD: 27.6 K/UL (ref 1.8–8)
NEUTS SEG # BLD: 28.9 K/UL (ref 1.8–8)
NEUTS SEG # BLD: 31.2 K/UL (ref 1.8–8)
NEUTS SEG # BLD: 44.6 K/UL (ref 1.8–8)
NEUTS SEG # BLD: 5.8 K/UL (ref 1.8–8)
NEUTS SEG # BLD: 5.9 K/UL (ref 1.8–8)
NEUTS SEG # BLD: 6.8 K/UL (ref 1.8–8)
NEUTS SEG # BLD: 7 K/UL (ref 1.8–8)
NEUTS SEG # BLD: 8.8 K/UL (ref 1.8–8)
NEUTS SEG # BLD: 9.1 K/UL (ref 1.8–8)
NEUTS SEG # BLD: 9.6 K/UL (ref 1.8–8)
NEUTS SEG NFR BLD: 73 % (ref 32–75)
NEUTS SEG NFR BLD: 76 % (ref 32–75)
NEUTS SEG NFR BLD: 76 % (ref 32–75)
NEUTS SEG NFR BLD: 77 % (ref 32–75)
NEUTS SEG NFR BLD: 81 % (ref 32–75)
NEUTS SEG NFR BLD: 84 % (ref 32–75)
NEUTS SEG NFR BLD: 85 % (ref 32–75)
NEUTS SEG NFR BLD: 86 % (ref 32–75)
NEUTS SEG NFR BLD: 88 % (ref 32–75)
NEUTS SEG NFR BLD: 90 % (ref 32–75)
NEUTS SEG NFR BLD: 91 % (ref 32–75)
NEUTS SEG NFR BLD: 92 % (ref 32–75)
NEUTS SEG NFR BLD: 93 % (ref 32–75)
NEUTS SEG NFR BLD: 94 % (ref 32–75)
NEUTS SEG NFR BLD: 94 % (ref 32–75)
NEUTS SEG NFR BLD: 95 % (ref 32–75)
NEUTS SEG NFR BLD: 96 % (ref 32–75)
NEUTS SEG NFR BLD: 96 % (ref 32–75)
NEUTS SEG NFR BLD: 99 % (ref 32–75)
NITRITE UR QL STRIP.AUTO: NEGATIVE
NITRITE UR QL STRIP.AUTO: POSITIVE
NRBC # BLD: 0 K/UL (ref 0–0.01)
NRBC BLD-RTO: 0 PER 100 WBC
ORGANISM ID, SPNG3: NORMAL
ORGANISM ID, SPNG3: NORMAL
OSMOLALITY SERPL: 251 MOSM/KG H2O
OSMOLALITY SERPL: 272 MOSM/KG H2O
OSMOLALITY UR: 225 MOSM/KG H2O
OSMOLALITY UR: 301 MOSM/KG H2O
P-R INTERVAL, ECG05: 156 MS
P-R INTERVAL, ECG05: 172 MS
P-R INTERVAL, ECG05: 174 MS
P-R INTERVAL, ECG05: 176 MS
P-R INTERVAL, ECG05: 182 MS
P-R INTERVAL, ECG05: 206 MS
P-R INTERVAL, ECG05: 210 MS
P2Y12 PLT RESPONSE,PPPR: 246 PRU (ref 194–418)
PCO2 BLDV: 39.8 MMHG (ref 41–51)
PH BLDV: 7.46 [PH] (ref 7.32–7.42)
PH UR STRIP: 6.5 [PH] (ref 5–8)
PH UR STRIP: 6.5 [PH] (ref 5–8)
PH UR STRIP: 7 [PH] (ref 5–8)
PH UR STRIP: 7.5 [PH] (ref 5–8)
PHOSPHATE SERPL-MCNC: 3.3 MG/DL (ref 2.6–4.7)
PHOSPHATE SERPL-MCNC: 3.6 MG/DL (ref 2.6–4.7)
PHOSPHATE SERPL-MCNC: 3.7 MG/DL (ref 2.6–4.7)
PHOSPHATE SERPL-MCNC: 3.7 MG/DL (ref 2.6–4.7)
PHOSPHATE SERPL-MCNC: 4.2 MG/DL (ref 2.6–4.7)
PLATELET # BLD AUTO: 222 K/UL (ref 150–400)
PLATELET # BLD AUTO: 228 K/UL (ref 150–400)
PLATELET # BLD AUTO: 240 K/UL (ref 150–400)
PLATELET # BLD AUTO: 253 K/UL (ref 150–400)
PLATELET # BLD AUTO: 280 K/UL (ref 150–400)
PLATELET # BLD AUTO: 284 K/UL (ref 150–400)
PLATELET # BLD AUTO: 308 K/UL (ref 150–400)
PLATELET # BLD AUTO: 313 K/UL (ref 150–400)
PLATELET # BLD AUTO: 318 K/UL (ref 150–400)
PLATELET # BLD AUTO: 321 K/UL (ref 150–400)
PLATELET # BLD AUTO: 321 K/UL (ref 150–400)
PLATELET # BLD AUTO: 333 K/UL (ref 150–400)
PLATELET # BLD AUTO: 339 K/UL (ref 150–400)
PLATELET # BLD AUTO: 339 K/UL (ref 150–400)
PLATELET # BLD AUTO: 340 K/UL (ref 150–400)
PLATELET # BLD AUTO: 349 K/UL (ref 150–400)
PLATELET # BLD AUTO: 355 K/UL (ref 150–400)
PLATELET # BLD AUTO: 358 K/UL (ref 150–400)
PLATELET # BLD AUTO: 358 K/UL (ref 150–400)
PLATELET # BLD AUTO: 368 K/UL (ref 150–400)
PLATELET # BLD AUTO: 374 K/UL (ref 150–400)
PLATELET # BLD AUTO: 388 K/UL (ref 150–400)
PLATELET # BLD AUTO: 394 K/UL (ref 150–400)
PLATELET # BLD AUTO: 396 K/UL (ref 150–400)
PLATELET # BLD AUTO: 399 K/UL (ref 150–400)
PLATELET # BLD AUTO: 405 K/UL (ref 150–400)
PLATELET # BLD AUTO: 422 K/UL (ref 150–400)
PLATELET # BLD AUTO: 423 K/UL (ref 150–400)
PLATELET # BLD AUTO: 441 K/UL (ref 150–400)
PLATELET # BLD AUTO: 462 K/UL (ref 150–400)
PLATELET # BLD AUTO: 462 K/UL (ref 150–400)
PLATELET # BLD AUTO: 499 K/UL (ref 150–400)
PLEASE NOTE, SPNG4: NORMAL
PLEASE NOTE, SPNG4: NORMAL
PMV BLD AUTO: 10 FL (ref 8.9–12.9)
PMV BLD AUTO: 10 FL (ref 8.9–12.9)
PMV BLD AUTO: 10.1 FL (ref 8.9–12.9)
PMV BLD AUTO: 10.1 FL (ref 8.9–12.9)
PMV BLD AUTO: 10.2 FL (ref 8.9–12.9)
PMV BLD AUTO: 10.3 FL (ref 8.9–12.9)
PMV BLD AUTO: 10.4 FL (ref 8.9–12.9)
PMV BLD AUTO: 10.5 FL (ref 8.9–12.9)
PMV BLD AUTO: 9.1 FL (ref 8.9–12.9)
PMV BLD AUTO: 9.3 FL (ref 8.9–12.9)
PMV BLD AUTO: 9.4 FL (ref 8.9–12.9)
PMV BLD AUTO: 9.6 FL (ref 8.9–12.9)
PMV BLD AUTO: 9.7 FL (ref 8.9–12.9)
PMV BLD AUTO: 9.8 FL (ref 8.9–12.9)
PMV BLD AUTO: 9.9 FL (ref 8.9–12.9)
PO2 BLDV: 57 MMHG (ref 25–40)
POTASSIUM BLD-SCNC: 3.1 MMOL/L (ref 3.5–5.5)
POTASSIUM SERPL-SCNC: 3 MMOL/L (ref 3.5–5.1)
POTASSIUM SERPL-SCNC: 3.1 MMOL/L (ref 3.5–5.1)
POTASSIUM SERPL-SCNC: 3.3 MMOL/L (ref 3.5–5.1)
POTASSIUM SERPL-SCNC: 3.5 MMOL/L (ref 3.5–5.1)
POTASSIUM SERPL-SCNC: 3.6 MMOL/L (ref 3.5–5.1)
POTASSIUM SERPL-SCNC: 3.7 MMOL/L (ref 3.5–5.1)
POTASSIUM SERPL-SCNC: 3.8 MMOL/L (ref 3.5–5.1)
POTASSIUM SERPL-SCNC: 3.9 MMOL/L (ref 3.5–5.1)
POTASSIUM SERPL-SCNC: 3.9 MMOL/L (ref 3.5–5.1)
POTASSIUM SERPL-SCNC: 4 MMOL/L (ref 3.5–5.1)
POTASSIUM SERPL-SCNC: 4.1 MMOL/L (ref 3.5–5.1)
POTASSIUM SERPL-SCNC: 4.2 MMOL/L (ref 3.5–5.1)
POTASSIUM SERPL-SCNC: 4.3 MMOL/L (ref 3.5–5.1)
POTASSIUM SERPL-SCNC: 4.3 MMOL/L (ref 3.5–5.1)
POTASSIUM SERPL-SCNC: 4.4 MMOL/L (ref 3.5–5.1)
POTASSIUM SERPL-SCNC: 4.5 MMOL/L (ref 3.5–5.1)
POTASSIUM SERPL-SCNC: 4.6 MMOL/L (ref 3.5–5.1)
POTASSIUM SERPL-SCNC: 4.9 MMOL/L (ref 3.5–5.1)
PROCALCITONIN SERPL-MCNC: 0.16 NG/ML
PROCALCITONIN SERPL-MCNC: 0.6 NG/ML
PROCALCITONIN SERPL-MCNC: 0.62 NG/ML
PROCALCITONIN SERPL-MCNC: <0.05 NG/ML
PROCALCITONIN SERPL-MCNC: <0.05 NG/ML
PROT SERPL-MCNC: 5.2 G/DL (ref 6.4–8.2)
PROT SERPL-MCNC: 5.3 G/DL (ref 6.4–8.2)
PROT SERPL-MCNC: 5.7 G/DL (ref 6.4–8.2)
PROT SERPL-MCNC: 5.8 G/DL (ref 6.4–8.2)
PROT SERPL-MCNC: 6 G/DL (ref 6.4–8.2)
PROT SERPL-MCNC: 6.1 G/DL (ref 6.4–8.2)
PROT SERPL-MCNC: 6.3 G/DL (ref 6.4–8.2)
PROT SERPL-MCNC: 6.3 G/DL (ref 6.4–8.2)
PROT SERPL-MCNC: 6.4 G/DL (ref 6.4–8.2)
PROT SERPL-MCNC: 6.6 G/DL (ref 6.4–8.2)
PROT SERPL-MCNC: 6.6 G/DL (ref 6.4–8.2)
PROT SERPL-MCNC: 6.7 G/DL (ref 6.4–8.2)
PROT SERPL-MCNC: 6.8 G/DL (ref 6.4–8.2)
PROT SERPL-MCNC: 7.4 G/DL (ref 6.4–8.2)
PROT UR STRIP-MCNC: NEGATIVE MG/DL
PROTHROMBIN TIME: 12.3 SEC (ref 9–11.1)
PROTHROMBIN TIME: 12.8 SEC (ref 9–11.1)
PROTHROMBIN TIME: 13.5 SEC (ref 9–11.1)
PROTHROMBIN TIME: 13.6 SEC (ref 9–11.1)
Q-T INTERVAL, ECG07: 356 MS
Q-T INTERVAL, ECG07: 366 MS
Q-T INTERVAL, ECG07: 366 MS
Q-T INTERVAL, ECG07: 378 MS
Q-T INTERVAL, ECG07: 384 MS
Q-T INTERVAL, ECG07: 392 MS
Q-T INTERVAL, ECG07: 394 MS
Q-T INTERVAL, ECG07: 420 MS
QRS DURATION, ECG06: 110 MS
QRS DURATION, ECG06: 118 MS
QRS DURATION, ECG06: 120 MS
QRS DURATION, ECG06: 122 MS
QRS DURATION, ECG06: 124 MS
QRS DURATION, ECG06: 124 MS
QRS DURATION, ECG06: 130 MS
QRS DURATION, ECG06: 132 MS
QTC CALCULATION (BEZET), ECG08: 452 MS
QTC CALCULATION (BEZET), ECG08: 454 MS
QTC CALCULATION (BEZET), ECG08: 456 MS
QTC CALCULATION (BEZET), ECG08: 457 MS
QTC CALCULATION (BEZET), ECG08: 462 MS
QTC CALCULATION (BEZET), ECG08: 496 MS
RBC # BLD AUTO: 3.08 M/UL (ref 3.8–5.2)
RBC # BLD AUTO: 3.19 M/UL (ref 3.8–5.2)
RBC # BLD AUTO: 3.43 M/UL (ref 3.8–5.2)
RBC # BLD AUTO: 3.58 M/UL (ref 3.8–5.2)
RBC # BLD AUTO: 3.61 M/UL (ref 3.8–5.2)
RBC # BLD AUTO: 3.64 M/UL (ref 3.8–5.2)
RBC # BLD AUTO: 3.65 M/UL (ref 3.8–5.2)
RBC # BLD AUTO: 3.67 M/UL (ref 3.8–5.2)
RBC # BLD AUTO: 3.72 M/UL (ref 3.8–5.2)
RBC # BLD AUTO: 3.76 M/UL (ref 3.8–5.2)
RBC # BLD AUTO: 3.79 M/UL (ref 3.8–5.2)
RBC # BLD AUTO: 3.81 M/UL (ref 3.8–5.2)
RBC # BLD AUTO: 3.86 M/UL (ref 3.8–5.2)
RBC # BLD AUTO: 3.9 M/UL (ref 3.8–5.2)
RBC # BLD AUTO: 3.92 M/UL (ref 3.8–5.2)
RBC # BLD AUTO: 3.93 M/UL (ref 3.8–5.2)
RBC # BLD AUTO: 4 M/UL (ref 3.8–5.2)
RBC # BLD AUTO: 4.04 M/UL (ref 3.8–5.2)
RBC # BLD AUTO: 4.05 M/UL (ref 3.8–5.2)
RBC # BLD AUTO: 4.06 M/UL (ref 3.8–5.2)
RBC # BLD AUTO: 4.08 M/UL (ref 3.8–5.2)
RBC # BLD AUTO: 4.09 M/UL (ref 3.8–5.2)
RBC # BLD AUTO: 4.11 M/UL (ref 3.8–5.2)
RBC # BLD AUTO: 4.13 M/UL (ref 3.8–5.2)
RBC # BLD AUTO: 4.19 M/UL (ref 3.8–5.2)
RBC # BLD AUTO: 4.25 M/UL (ref 3.8–5.2)
RBC # BLD AUTO: 4.27 M/UL (ref 3.8–5.2)
RBC # BLD AUTO: 4.31 M/UL (ref 3.8–5.2)
RBC # BLD AUTO: 4.31 M/UL (ref 3.8–5.2)
RBC # BLD AUTO: 4.32 M/UL (ref 3.8–5.2)
RBC # BLD AUTO: 4.54 M/UL (ref 3.8–5.2)
RBC # BLD AUTO: 4.72 M/UL (ref 3.8–5.2)
RBC #/AREA URNS HPF: ABNORMAL /HPF (ref 0–5)
RBC #/AREA URNS HPF: NORMAL /HPF (ref 0–5)
RBC MORPH BLD: ABNORMAL
REPORTED DOSE,DOSE: ABNORMAL UNITS
REPORTED DOSE/TIME,TMG: ABNORMAL
RETICS # AUTO: 0.05 M/UL (ref 0.02–0.08)
RETICS/RBC NFR AUTO: 1.6 % (ref 0.7–2.1)
RIGHT CCA DIST DIAS: 13 CM/S
RIGHT CCA DIST SYS: 39.2 CM/S
RIGHT CCA PROX DIAS: 7.8 CM/S
RIGHT CCA PROX SYS: 32.2 CM/S
RIGHT ECA DIAS: 0 CM/S
RIGHT ECA SYS: 45.8 CM/S
RIGHT ICA DIST DIAS: 22.2 CM/S
RIGHT ICA DIST SYS: 47.4 CM/S
RIGHT ICA MID DIAS: 15.8 CM/S
RIGHT ICA MID SYS: 48.2 CM/S
RIGHT ICA PROX DIAS: 15.3 CM/S
RIGHT ICA PROX SYS: 52.7 CM/S
RIGHT ICA/CCA SYS: 1.3
RIGHT SUBCLAVIAN DIAS: 0 CM/S
RIGHT SUBCLAVIAN SYS: 45.5 CM/S
RIGHT VERTEBRAL DIAS: 14.48 CM/S
RIGHT VERTEBRAL SYS: 32.5 CM/S
S PNEUM AG SPEC QL LA: NEGATIVE
S PNEUM AG SPEC QL LA: NEGATIVE
SAMPLES BEING HELD,HOLD: NORMAL
SARS-COV-2, COV2: NORMAL
SARS-COV-2, XPLCVT: NOT DETECTED
SERVICE CMNT-IMP: ABNORMAL
SERVICE CMNT-IMP: NORMAL
SODIUM BLD-SCNC: 132 MMOL/L (ref 136–145)
SODIUM SERPL-SCNC: 120 MMOL/L (ref 136–145)
SODIUM SERPL-SCNC: 122 MMOL/L (ref 136–145)
SODIUM SERPL-SCNC: 122 MMOL/L (ref 136–145)
SODIUM SERPL-SCNC: 123 MMOL/L (ref 136–145)
SODIUM SERPL-SCNC: 126 MMOL/L (ref 136–145)
SODIUM SERPL-SCNC: 126 MMOL/L (ref 136–145)
SODIUM SERPL-SCNC: 130 MMOL/L (ref 136–145)
SODIUM SERPL-SCNC: 131 MMOL/L (ref 136–145)
SODIUM SERPL-SCNC: 132 MMOL/L (ref 136–145)
SODIUM SERPL-SCNC: 133 MMOL/L (ref 136–145)
SODIUM SERPL-SCNC: 134 MMOL/L (ref 136–145)
SODIUM SERPL-SCNC: 135 MMOL/L (ref 136–145)
SODIUM SERPL-SCNC: 136 MMOL/L (ref 136–145)
SODIUM SERPL-SCNC: 137 MMOL/L (ref 136–145)
SODIUM SERPL-SCNC: 138 MMOL/L (ref 136–145)
SODIUM SERPL-SCNC: 138 MMOL/L (ref 136–145)
SODIUM SERPL-SCNC: 139 MMOL/L (ref 136–145)
SODIUM SERPL-SCNC: 140 MMOL/L (ref 136–145)
SODIUM SERPL-SCNC: 140 MMOL/L (ref 136–145)
SODIUM SERPL-SCNC: 141 MMOL/L (ref 136–145)
SODIUM SERPL-SCNC: 141 MMOL/L (ref 136–145)
SODIUM SERPL-SCNC: 143 MMOL/L (ref 136–145)
SODIUM UR-SCNC: 62 MMOL/L
SODIUM UR-SCNC: 79 MMOL/L
SOURCE, COVRS: NORMAL
SP GR UR REFRACTOMETRY: 1.01 (ref 1–1.03)
SP GR UR REFRACTOMETRY: 1.02 (ref 1–1.03)
SPECIMEN SITE: ABNORMAL
SPECIMEN SOURCE, FCOV2M: NORMAL
SPECIMEN SOURCE: NORMAL
SPECIMEN TYPE, XMCV1T: NORMAL
SPECIMEN, SPNG1: NORMAL
SPECIMEN, SPNG1: NORMAL
T4 SERPL-MCNC: 9.6 UG/DL (ref 4.8–13.9)
THERAPEUTIC RANGE,PTTT: ABNORMAL SECS (ref 58–77)
THERAPEUTIC RANGE,PTTT: NORMAL SECS (ref 58–77)
TIBC SERPL-MCNC: 278 UG/DL (ref 250–450)
TRIGL SERPL-MCNC: 95 MG/DL (ref ?–150)
TROPONIN I SERPL-MCNC: 0.1 NG/ML
TROPONIN I SERPL-MCNC: 0.22 NG/ML
TROPONIN I SERPL-MCNC: 0.35 NG/ML
TROPONIN I SERPL-MCNC: 0.37 NG/ML
TROPONIN I SERPL-MCNC: 0.37 NG/ML
TROPONIN I SERPL-MCNC: <0.05 NG/ML
TSH SERPL DL<=0.05 MIU/L-ACNC: 2.37 UIU/ML (ref 0.36–3.74)
TSH SERPL DL<=0.05 MIU/L-ACNC: 3.12 UIU/ML (ref 0.36–3.74)
TSH SERPL DL<=0.05 MIU/L-ACNC: 3.75 UIU/ML (ref 0.36–3.74)
UA: UC IF INDICATED,UAUC: ABNORMAL
UA: UC IF INDICATED,UAUC: ABNORMAL
UA: UC IF INDICATED,UAUC: NORMAL
UR CULT HOLD, URHOLD: NORMAL
UR CULT HOLD, URHOLD: NORMAL
URATE SERPL-MCNC: 3 MG/DL (ref 2.6–6)
UROBILINOGEN UR QL STRIP.AUTO: 0.2 EU/DL (ref 0.2–1)
UROBILINOGEN UR QL STRIP.AUTO: 1 EU/DL (ref 0.2–1)
VANCOMYCIN SERPL-MCNC: 16.6 UG/ML
VANCOMYCIN SERPL-MCNC: 17.5 UG/ML
VANCOMYCIN TROUGH SERPL-MCNC: 10.7 UG/ML (ref 5–10)
VANCOMYCIN TROUGH SERPL-MCNC: 17.2 UG/ML (ref 5–10)
VANCOMYCIN TROUGH SERPL-MCNC: 19 UG/ML (ref 5–10)
VANCOMYCIN TROUGH SERPL-MCNC: 20.6 UG/ML (ref 5–10)
VANCOMYCIN TROUGH SERPL-MCNC: 28.5 UG/ML (ref 5–10)
VENTRICULAR RATE, ECG03: 80 BPM
VENTRICULAR RATE, ECG03: 81 BPM
VENTRICULAR RATE, ECG03: 84 BPM
VENTRICULAR RATE, ECG03: 87 BPM
VENTRICULAR RATE, ECG03: 87 BPM
VENTRICULAR RATE, ECG03: 94 BPM
VENTRICULAR RATE, ECG03: 94 BPM
VENTRICULAR RATE, ECG03: 99 BPM
VIT B12 SERPL-MCNC: 981 PG/ML (ref 193–986)
VLDLC SERPL CALC-MCNC: 19 MG/DL
WBC # BLD AUTO: 10.3 K/UL (ref 3.6–11)
WBC # BLD AUTO: 10.8 K/UL (ref 3.6–11)
WBC # BLD AUTO: 11.2 K/UL (ref 3.6–11)
WBC # BLD AUTO: 12 K/UL (ref 3.6–11)
WBC # BLD AUTO: 13.4 K/UL (ref 3.6–11)
WBC # BLD AUTO: 13.5 K/UL (ref 3.6–11)
WBC # BLD AUTO: 14.1 K/UL (ref 3.6–11)
WBC # BLD AUTO: 14.5 K/UL (ref 3.6–11)
WBC # BLD AUTO: 14.5 K/UL (ref 3.6–11)
WBC # BLD AUTO: 14.9 K/UL (ref 3.6–11)
WBC # BLD AUTO: 15.6 K/UL (ref 3.6–11)
WBC # BLD AUTO: 15.8 K/UL (ref 3.6–11)
WBC # BLD AUTO: 16.1 K/UL (ref 3.6–11)
WBC # BLD AUTO: 16.2 K/UL (ref 3.6–11)
WBC # BLD AUTO: 16.4 K/UL (ref 3.6–11)
WBC # BLD AUTO: 16.5 K/UL (ref 3.6–11)
WBC # BLD AUTO: 16.9 K/UL (ref 3.6–11)
WBC # BLD AUTO: 17.3 K/UL (ref 3.6–11)
WBC # BLD AUTO: 18.1 K/UL (ref 3.6–11)
WBC # BLD AUTO: 18.6 K/UL (ref 3.6–11)
WBC # BLD AUTO: 21.5 K/UL (ref 3.6–11)
WBC # BLD AUTO: 22.4 K/UL (ref 3.6–11)
WBC # BLD AUTO: 27.9 K/UL (ref 3.6–11)
WBC # BLD AUTO: 28.5 K/UL (ref 3.6–11)
WBC # BLD AUTO: 30.4 K/UL (ref 3.6–11)
WBC # BLD AUTO: 32.5 K/UL (ref 3.6–11)
WBC # BLD AUTO: 47 K/UL (ref 3.6–11)
WBC # BLD AUTO: 7.6 K/UL (ref 3.6–11)
WBC # BLD AUTO: 7.6 K/UL (ref 3.6–11)
WBC # BLD AUTO: 7.8 K/UL (ref 3.6–11)
WBC # BLD AUTO: 8.4 K/UL (ref 3.6–11)
WBC # BLD AUTO: 8.5 K/UL (ref 3.6–11)
WBC # BLD AUTO: 8.8 K/UL (ref 3.6–11)
WBC # BLD AUTO: 9.2 K/UL (ref 3.6–11)
WBC MORPH BLD: ABNORMAL
WBC URNS QL MICRO: ABNORMAL /HPF (ref 0–4)
WBC URNS QL MICRO: NORMAL /HPF (ref 0–4)

## 2021-01-01 PROCEDURE — 87086 URINE CULTURE/COLONY COUNT: CPT

## 2021-01-01 PROCEDURE — 74011250637 HC RX REV CODE- 250/637: Performed by: NURSE PRACTITIONER

## 2021-01-01 PROCEDURE — 74011250637 HC RX REV CODE- 250/637: Performed by: INTERNAL MEDICINE

## 2021-01-01 PROCEDURE — 80053 COMPREHEN METABOLIC PANEL: CPT

## 2021-01-01 PROCEDURE — 83735 ASSAY OF MAGNESIUM: CPT

## 2021-01-01 PROCEDURE — 82962 GLUCOSE BLOOD TEST: CPT

## 2021-01-01 PROCEDURE — 65660000000 HC RM CCU STEPDOWN

## 2021-01-01 PROCEDURE — 99233 SBSQ HOSP IP/OBS HIGH 50: CPT | Performed by: NURSE PRACTITIONER

## 2021-01-01 PROCEDURE — 97165 OT EVAL LOW COMPLEX 30 MIN: CPT | Performed by: OCCUPATIONAL THERAPIST

## 2021-01-01 PROCEDURE — 74011250636 HC RX REV CODE- 250/636: Performed by: INTERNAL MEDICINE

## 2021-01-01 PROCEDURE — 74011000258 HC RX REV CODE- 258: Performed by: INTERNAL MEDICINE

## 2021-01-01 PROCEDURE — 84484 ASSAY OF TROPONIN QUANT: CPT

## 2021-01-01 PROCEDURE — 74011000250 HC RX REV CODE- 250: Performed by: INTERNAL MEDICINE

## 2021-01-01 PROCEDURE — 84550 ASSAY OF BLOOD/URIC ACID: CPT

## 2021-01-01 PROCEDURE — 74011000250 HC RX REV CODE- 250: Performed by: HOSPITALIST

## 2021-01-01 PROCEDURE — 74011250637 HC RX REV CODE- 250/637: Performed by: FAMILY MEDICINE

## 2021-01-01 PROCEDURE — 77030038269 HC DRN EXT URIN PURWCK BARD -A

## 2021-01-01 PROCEDURE — 74011636637 HC RX REV CODE- 636/637: Performed by: FAMILY MEDICINE

## 2021-01-01 PROCEDURE — 97530 THERAPEUTIC ACTIVITIES: CPT

## 2021-01-01 PROCEDURE — 94640 AIRWAY INHALATION TREATMENT: CPT

## 2021-01-01 PROCEDURE — 36415 COLL VENOUS BLD VENIPUNCTURE: CPT

## 2021-01-01 PROCEDURE — 2709999900 HC NON-CHARGEABLE SUPPLY

## 2021-01-01 PROCEDURE — 94664 DEMO&/EVAL PT USE INHALER: CPT

## 2021-01-01 PROCEDURE — 99356 PR PROLONGED SVC I/P OR OBS SETTING 1ST HOUR: CPT | Performed by: NURSE PRACTITIONER

## 2021-01-01 PROCEDURE — 97116 GAIT TRAINING THERAPY: CPT

## 2021-01-01 PROCEDURE — 84145 PROCALCITONIN (PCT): CPT

## 2021-01-01 PROCEDURE — 77010033678 HC OXYGEN DAILY

## 2021-01-01 PROCEDURE — 74011250636 HC RX REV CODE- 250/636: Performed by: FAMILY MEDICINE

## 2021-01-01 PROCEDURE — 74011000636 HC RX REV CODE- 636: Performed by: STUDENT IN AN ORGANIZED HEALTH CARE EDUCATION/TRAINING PROGRAM

## 2021-01-01 PROCEDURE — 93306 TTE W/DOPPLER COMPLETE: CPT | Performed by: INTERNAL MEDICINE

## 2021-01-01 PROCEDURE — 77030029684 HC NEB SM VOL KT MONA -A

## 2021-01-01 PROCEDURE — 77030003560 HC NDL HUBR BARD -A

## 2021-01-01 PROCEDURE — 74011636637 HC RX REV CODE- 636/637: Performed by: HOSPITALIST

## 2021-01-01 PROCEDURE — 84100 ASSAY OF PHOSPHORUS: CPT

## 2021-01-01 PROCEDURE — 74011000250 HC RX REV CODE- 250: Performed by: FAMILY MEDICINE

## 2021-01-01 PROCEDURE — 77030013140 HC MSK NEB VYRM -A

## 2021-01-01 PROCEDURE — 87635 SARS-COV-2 COVID-19 AMP PRB: CPT

## 2021-01-01 PROCEDURE — 74011250636 HC RX REV CODE- 250/636: Performed by: NURSE PRACTITIONER

## 2021-01-01 PROCEDURE — 77030012341 HC CHMB SPCR OPTC MDI VYRM -A

## 2021-01-01 PROCEDURE — 96375 TX/PRO/DX INJ NEW DRUG ADDON: CPT

## 2021-01-01 PROCEDURE — 74177 CT ABD & PELVIS W/CONTRAST: CPT

## 2021-01-01 PROCEDURE — 80061 LIPID PANEL: CPT

## 2021-01-01 PROCEDURE — C9113 INJ PANTOPRAZOLE SODIUM, VIA: HCPCS | Performed by: INTERNAL MEDICINE

## 2021-01-01 PROCEDURE — 74011250636 HC RX REV CODE- 250/636: Performed by: EMERGENCY MEDICINE

## 2021-01-01 PROCEDURE — 74011636637 HC RX REV CODE- 636/637: Performed by: INTERNAL MEDICINE

## 2021-01-01 PROCEDURE — 74011250636 HC RX REV CODE- 250/636: Performed by: HOSPITALIST

## 2021-01-01 PROCEDURE — 92526 ORAL FUNCTION THERAPY: CPT

## 2021-01-01 PROCEDURE — 85025 COMPLETE CBC W/AUTO DIFF WBC: CPT

## 2021-01-01 PROCEDURE — 70551 MRI BRAIN STEM W/O DYE: CPT

## 2021-01-01 PROCEDURE — 74011250636 HC RX REV CODE- 250/636: Performed by: PHYSICIAN ASSISTANT

## 2021-01-01 PROCEDURE — 65270000029 HC RM PRIVATE

## 2021-01-01 PROCEDURE — 94760 N-INVAS EAR/PLS OXIMETRY 1: CPT

## 2021-01-01 PROCEDURE — 97162 PT EVAL MOD COMPLEX 30 MIN: CPT

## 2021-01-01 PROCEDURE — 83090 ASSAY OF HOMOCYSTEINE: CPT

## 2021-01-01 PROCEDURE — 99232 SBSQ HOSP IP/OBS MODERATE 35: CPT | Performed by: NURSE PRACTITIONER

## 2021-01-01 PROCEDURE — 99223 1ST HOSP IP/OBS HIGH 75: CPT | Performed by: PSYCHIATRY & NEUROLOGY

## 2021-01-01 PROCEDURE — 74176 CT ABD & PELVIS W/O CONTRAST: CPT

## 2021-01-01 PROCEDURE — 85730 THROMBOPLASTIN TIME PARTIAL: CPT

## 2021-01-01 PROCEDURE — 81001 URINALYSIS AUTO W/SCOPE: CPT

## 2021-01-01 PROCEDURE — 83880 ASSAY OF NATRIURETIC PEPTIDE: CPT

## 2021-01-01 PROCEDURE — 74011000250 HC RX REV CODE- 250: Performed by: EMERGENCY MEDICINE

## 2021-01-01 PROCEDURE — 74011000258 HC RX REV CODE- 258: Performed by: HOSPITALIST

## 2021-01-01 PROCEDURE — 74011250637 HC RX REV CODE- 250/637: Performed by: STUDENT IN AN ORGANIZED HEALTH CARE EDUCATION/TRAINING PROGRAM

## 2021-01-01 PROCEDURE — 74011250637 HC RX REV CODE- 250/637: Performed by: HOSPITALIST

## 2021-01-01 PROCEDURE — 80048 BASIC METABOLIC PNL TOTAL CA: CPT

## 2021-01-01 PROCEDURE — 97535 SELF CARE MNGMENT TRAINING: CPT

## 2021-01-01 PROCEDURE — 76700 US EXAM ABDOM COMPLETE: CPT

## 2021-01-01 PROCEDURE — 71045 X-RAY EXAM CHEST 1 VIEW: CPT

## 2021-01-01 PROCEDURE — 97161 PT EVAL LOW COMPLEX 20 MIN: CPT

## 2021-01-01 PROCEDURE — 93306 TTE W/DOPPLER COMPLETE: CPT

## 2021-01-01 PROCEDURE — 93005 ELECTROCARDIOGRAM TRACING: CPT

## 2021-01-01 PROCEDURE — 99218 HC RM OBSERVATION: CPT

## 2021-01-01 PROCEDURE — 85610 PROTHROMBIN TIME: CPT

## 2021-01-01 PROCEDURE — 83540 ASSAY OF IRON: CPT

## 2021-01-01 PROCEDURE — 87449 NOS EACH ORGANISM AG IA: CPT

## 2021-01-01 PROCEDURE — 87186 SC STD MICRODIL/AGAR DIL: CPT

## 2021-01-01 PROCEDURE — 96376 TX/PRO/DX INJ SAME DRUG ADON: CPT

## 2021-01-01 PROCEDURE — 80202 ASSAY OF VANCOMYCIN: CPT

## 2021-01-01 PROCEDURE — 96365 THER/PROPH/DIAG IV INF INIT: CPT

## 2021-01-01 PROCEDURE — 5A09357 ASSISTANCE WITH RESPIRATORY VENTILATION, LESS THAN 24 CONSECUTIVE HOURS, CONTINUOUS POSITIVE AIRWAY PRESSURE: ICD-10-PCS | Performed by: INTERNAL MEDICINE

## 2021-01-01 PROCEDURE — 70498 CT ANGIOGRAPHY NECK: CPT

## 2021-01-01 PROCEDURE — 94761 N-INVAS EAR/PLS OXIMETRY MLT: CPT

## 2021-01-01 PROCEDURE — 84300 ASSAY OF URINE SODIUM: CPT

## 2021-01-01 PROCEDURE — G0299 HHS/HOSPICE OF RN EA 15 MIN: HCPCS

## 2021-01-01 PROCEDURE — 96367 TX/PROPH/DG ADDL SEQ IV INF: CPT

## 2021-01-01 PROCEDURE — 83930 ASSAY OF BLOOD OSMOLALITY: CPT

## 2021-01-01 PROCEDURE — 83010 ASSAY OF HAPTOGLOBIN QUANT: CPT

## 2021-01-01 PROCEDURE — 74011000250 HC RX REV CODE- 250: Performed by: NURSE PRACTITIONER

## 2021-01-01 PROCEDURE — 87040 BLOOD CULTURE FOR BACTERIA: CPT

## 2021-01-01 PROCEDURE — 97165 OT EVAL LOW COMPLEX 30 MIN: CPT

## 2021-01-01 PROCEDURE — 93970 EXTREMITY STUDY: CPT

## 2021-01-01 PROCEDURE — U0003 INFECTIOUS AGENT DETECTION BY NUCLEIC ACID (DNA OR RNA); SEVERE ACUTE RESPIRATORY SYNDROME CORONAVIRUS 2 (SARS-COV-2) (CORONAVIRUS DISEASE [COVID-19]), AMPLIFIED PROBE TECHNIQUE, MAKING USE OF HIGH THROUGHPUT TECHNOLOGIES AS DESCRIBED BY CMS-2020-01-R: HCPCS

## 2021-01-01 PROCEDURE — 83690 ASSAY OF LIPASE: CPT

## 2021-01-01 PROCEDURE — U0005 INFEC AGEN DETEC AMPLI PROBE: HCPCS

## 2021-01-01 PROCEDURE — 71046 X-RAY EXAM CHEST 2 VIEWS: CPT

## 2021-01-01 PROCEDURE — 87899 AGENT NOS ASSAY W/OPTIC: CPT

## 2021-01-01 PROCEDURE — 92610 EVALUATE SWALLOWING FUNCTION: CPT

## 2021-01-01 PROCEDURE — G8427 DOCREV CUR MEDS BY ELIG CLIN: HCPCS | Performed by: PSYCHIATRY & NEUROLOGY

## 2021-01-01 PROCEDURE — 97535 SELF CARE MNGMENT TRAINING: CPT | Performed by: OCCUPATIONAL THERAPIST

## 2021-01-01 PROCEDURE — G8754 DIAS BP LESS 90: HCPCS | Performed by: PSYCHIATRY & NEUROLOGY

## 2021-01-01 PROCEDURE — 3336500001 HSPC ELECTION

## 2021-01-01 PROCEDURE — P9047 ALBUMIN (HUMAN), 25%, 50ML: HCPCS | Performed by: HOSPITALIST

## 2021-01-01 PROCEDURE — 74011250636 HC RX REV CODE- 250/636: Performed by: STUDENT IN AN ORGANIZED HEALTH CARE EDUCATION/TRAINING PROGRAM

## 2021-01-01 PROCEDURE — 99283 EMERGENCY DEPT VISIT LOW MDM: CPT

## 2021-01-01 PROCEDURE — 83036 HEMOGLOBIN GLYCOSYLATED A1C: CPT

## 2021-01-01 PROCEDURE — 74011250637 HC RX REV CODE- 250/637: Performed by: GENERAL ACUTE CARE HOSPITAL

## 2021-01-01 PROCEDURE — 77030040361 HC SLV COMPR DVT MDII -B

## 2021-01-01 PROCEDURE — 82746 ASSAY OF FOLIC ACID SERUM: CPT

## 2021-01-01 PROCEDURE — 74011250637 HC RX REV CODE- 250/637: Performed by: EMERGENCY MEDICINE

## 2021-01-01 PROCEDURE — 77030037878 HC DRSG MEPILEX >48IN BORD MOLN -B

## 2021-01-01 PROCEDURE — 70450 CT HEAD/BRAIN W/O DYE: CPT

## 2021-01-01 PROCEDURE — 99215 OFFICE O/P EST HI 40 MIN: CPT | Performed by: PSYCHIATRY & NEUROLOGY

## 2021-01-01 PROCEDURE — 87070 CULTURE OTHR SPECIMN AEROBIC: CPT

## 2021-01-01 PROCEDURE — 77030027138 HC INCENT SPIROMETER -A

## 2021-01-01 PROCEDURE — 92526 ORAL FUNCTION THERAPY: CPT | Performed by: SPEECH-LANGUAGE PATHOLOGIST

## 2021-01-01 PROCEDURE — 99285 EMERGENCY DEPT VISIT HI MDM: CPT

## 2021-01-01 PROCEDURE — 96372 THER/PROPH/DIAG INJ SC/IM: CPT

## 2021-01-01 PROCEDURE — 93880 EXTRACRANIAL BILAT STUDY: CPT

## 2021-01-01 PROCEDURE — 74011636637 HC RX REV CODE- 636/637: Performed by: NURSE PRACTITIONER

## 2021-01-01 PROCEDURE — 4A03X5D MEASUREMENT OF ARTERIAL FLOW, INTRACRANIAL, EXTERNAL APPROACH: ICD-10-PCS | Performed by: RADIOLOGY

## 2021-01-01 PROCEDURE — 95816 EEG AWAKE AND DROWSY: CPT | Performed by: PSYCHIATRY & NEUROLOGY

## 2021-01-01 PROCEDURE — 1090F PRES/ABSN URINE INCON ASSESS: CPT | Performed by: PSYCHIATRY & NEUROLOGY

## 2021-01-01 PROCEDURE — G9717 DOC PT DX DEP/BP F/U NT REQ: HCPCS | Performed by: PSYCHIATRY & NEUROLOGY

## 2021-01-01 PROCEDURE — 85379 FIBRIN DEGRADATION QUANT: CPT

## 2021-01-01 PROCEDURE — 70496 CT ANGIOGRAPHY HEAD: CPT

## 2021-01-01 PROCEDURE — 0656 HSPC GENERAL INPATIENT

## 2021-01-01 PROCEDURE — 85576 BLOOD PLATELET AGGREGATION: CPT

## 2021-01-01 PROCEDURE — 96366 THER/PROPH/DIAG IV INF ADDON: CPT

## 2021-01-01 PROCEDURE — 83935 ASSAY OF URINE OSMOLALITY: CPT

## 2021-01-01 PROCEDURE — 99233 SBSQ HOSP IP/OBS HIGH 50: CPT | Performed by: PSYCHIATRY & NEUROLOGY

## 2021-01-01 PROCEDURE — 87077 CULTURE AEROBIC IDENTIFY: CPT

## 2021-01-01 PROCEDURE — 77030037877 HC DRSG MEPILEX >48IN BORD MOLN -A

## 2021-01-01 PROCEDURE — 73080 X-RAY EXAM OF ELBOW: CPT

## 2021-01-01 PROCEDURE — 99232 SBSQ HOSP IP/OBS MODERATE 35: CPT | Performed by: INTERNAL MEDICINE

## 2021-01-01 PROCEDURE — 1111F DSCHRG MED/CURRENT MED MERGE: CPT | Performed by: PSYCHIATRY & NEUROLOGY

## 2021-01-01 PROCEDURE — 71275 CT ANGIOGRAPHY CHEST: CPT

## 2021-01-01 PROCEDURE — 84295 ASSAY OF SERUM SODIUM: CPT

## 2021-01-01 PROCEDURE — 96374 THER/PROPH/DIAG INJ IV PUSH: CPT

## 2021-01-01 PROCEDURE — 99223 1ST HOSP IP/OBS HIGH 75: CPT | Performed by: INTERNAL MEDICINE

## 2021-01-01 PROCEDURE — 99223 1ST HOSP IP/OBS HIGH 75: CPT | Performed by: NURSE PRACTITIONER

## 2021-01-01 PROCEDURE — 74018 RADEX ABDOMEN 1 VIEW: CPT

## 2021-01-01 PROCEDURE — 0HQEXZZ REPAIR LEFT LOWER ARM SKIN, EXTERNAL APPROACH: ICD-10-PCS | Performed by: EMERGENCY MEDICINE

## 2021-01-01 PROCEDURE — 74011000636 HC RX REV CODE- 636: Performed by: INTERNAL MEDICINE

## 2021-01-01 PROCEDURE — 86738 MYCOPLASMA ANTIBODY: CPT

## 2021-01-01 PROCEDURE — 84443 ASSAY THYROID STIM HORMONE: CPT

## 2021-01-01 PROCEDURE — G8420 CALC BMI NORM PARAMETERS: HCPCS | Performed by: PSYCHIATRY & NEUROLOGY

## 2021-01-01 PROCEDURE — 74011000636 HC RX REV CODE- 636

## 2021-01-01 PROCEDURE — 92611 MOTION FLUOROSCOPY/SWALLOW: CPT

## 2021-01-01 PROCEDURE — 77030013038 HC MSK CPAP FISP -A

## 2021-01-01 PROCEDURE — 99231 SBSQ HOSP IP/OBS SF/LOW 25: CPT | Performed by: NURSE PRACTITIONER

## 2021-01-01 PROCEDURE — 85027 COMPLETE CBC AUTOMATED: CPT

## 2021-01-01 PROCEDURE — 80069 RENAL FUNCTION PANEL: CPT

## 2021-01-01 PROCEDURE — 75810000293 HC SIMP/SUPERF WND  RPR

## 2021-01-01 PROCEDURE — 86308 HETEROPHILE ANTIBODY SCREEN: CPT

## 2021-01-01 PROCEDURE — 71250 CT THORAX DX C-: CPT

## 2021-01-01 PROCEDURE — 74011000258 HC RX REV CODE- 258: Performed by: EMERGENCY MEDICINE

## 2021-01-01 PROCEDURE — 0042T CT PERF W CBF: CPT

## 2021-01-01 PROCEDURE — 80076 HEPATIC FUNCTION PANEL: CPT

## 2021-01-01 PROCEDURE — 94660 CPAP INITIATION&MGMT: CPT

## 2021-01-01 PROCEDURE — 74011000258 HC RX REV CODE- 258: Performed by: STUDENT IN AN ORGANIZED HEALTH CARE EDUCATION/TRAINING PROGRAM

## 2021-01-01 PROCEDURE — 97166 OT EVAL MOD COMPLEX 45 MIN: CPT

## 2021-01-01 PROCEDURE — G8536 NO DOC ELDER MAL SCRN: HCPCS | Performed by: PSYCHIATRY & NEUROLOGY

## 2021-01-01 PROCEDURE — 99233 SBSQ HOSP IP/OBS HIGH 50: CPT | Performed by: FAMILY MEDICINE

## 2021-01-01 PROCEDURE — 93971 EXTREMITY STUDY: CPT

## 2021-01-01 PROCEDURE — 84436 ASSAY OF TOTAL THYROXINE: CPT

## 2021-01-01 PROCEDURE — 82272 OCCULT BLD FECES 1-3 TESTS: CPT

## 2021-01-01 PROCEDURE — 85045 AUTOMATED RETICULOCYTE COUNT: CPT

## 2021-01-01 PROCEDURE — 82607 VITAMIN B-12: CPT

## 2021-01-01 PROCEDURE — 0042T CT CODE NEURO PERF W CBF: CPT

## 2021-01-01 PROCEDURE — 93271 ECG/MONITORING AND ANALYSIS: CPT

## 2021-01-01 PROCEDURE — G8753 SYS BP > OR = 140: HCPCS | Performed by: PSYCHIATRY & NEUROLOGY

## 2021-01-01 PROCEDURE — 72125 CT NECK SPINE W/O DYE: CPT

## 2021-01-01 PROCEDURE — 83605 ASSAY OF LACTIC ACID: CPT

## 2021-01-01 PROCEDURE — 74230 X-RAY XM SWLNG FUNCJ C+: CPT

## 2021-01-01 PROCEDURE — G8400 PT W/DXA NO RESULTS DOC: HCPCS | Performed by: PSYCHIATRY & NEUROLOGY

## 2021-01-01 PROCEDURE — 1101F PT FALLS ASSESS-DOCD LE1/YR: CPT | Performed by: PSYCHIATRY & NEUROLOGY

## 2021-01-01 RX ORDER — INSULIN LISPRO 100 [IU]/ML
10 INJECTION, SOLUTION INTRAVENOUS; SUBCUTANEOUS ONCE
Status: COMPLETED | OUTPATIENT
Start: 2021-01-01 | End: 2021-01-01

## 2021-01-01 RX ORDER — GUAIFENESIN/DEXTROMETHORPHAN 100-10MG/5
5 SYRUP ORAL
Status: DISCONTINUED | OUTPATIENT
Start: 2021-01-01 | End: 2021-01-01

## 2021-01-01 RX ORDER — INSULIN GLARGINE 100 [IU]/ML
0.3 INJECTION, SOLUTION SUBCUTANEOUS DAILY
Status: DISCONTINUED | OUTPATIENT
Start: 2021-01-01 | End: 2021-01-01

## 2021-01-01 RX ORDER — MAGNESIUM SULFATE 100 %
4 CRYSTALS MISCELLANEOUS AS NEEDED
Status: DISCONTINUED | OUTPATIENT
Start: 2021-01-01 | End: 2021-01-01 | Stop reason: HOSPADM

## 2021-01-01 RX ORDER — SODIUM CHLORIDE 9 MG/ML
100 INJECTION, SOLUTION INTRAVENOUS CONTINUOUS
Status: DISCONTINUED | OUTPATIENT
Start: 2021-01-01 | End: 2021-01-01

## 2021-01-01 RX ORDER — DEXTROSE 50 % IN WATER (D50W) INTRAVENOUS SYRINGE
25-50 AS NEEDED
Status: DISCONTINUED | OUTPATIENT
Start: 2021-01-01 | End: 2021-01-01 | Stop reason: HOSPADM

## 2021-01-01 RX ORDER — ACETAMINOPHEN 650 MG/1
650 SUPPOSITORY RECTAL
Status: DISCONTINUED | OUTPATIENT
Start: 2021-01-01 | End: 2021-01-01 | Stop reason: HOSPADM

## 2021-01-01 RX ORDER — SODIUM CHLORIDE 450 MG/100ML
75 INJECTION, SOLUTION INTRAVENOUS CONTINUOUS
Status: DISCONTINUED | OUTPATIENT
Start: 2021-01-01 | End: 2021-01-01

## 2021-01-01 RX ORDER — CLONAZEPAM 0.5 MG/1
0.25 TABLET ORAL EVERY EVENING
Status: DISCONTINUED | OUTPATIENT
Start: 2021-01-01 | End: 2021-01-01 | Stop reason: HOSPADM

## 2021-01-01 RX ORDER — PREDNISONE 5 MG/1
5 TABLET ORAL DAILY
Status: DISCONTINUED | OUTPATIENT
Start: 2021-01-01 | End: 2021-01-01 | Stop reason: HOSPADM

## 2021-01-01 RX ORDER — IPRATROPIUM BROMIDE AND ALBUTEROL SULFATE 2.5; .5 MG/3ML; MG/3ML
3 SOLUTION RESPIRATORY (INHALATION)
Status: COMPLETED | OUTPATIENT
Start: 2021-01-01 | End: 2021-01-01

## 2021-01-01 RX ORDER — KETOROLAC TROMETHAMINE 30 MG/ML
15 INJECTION, SOLUTION INTRAMUSCULAR; INTRAVENOUS
Status: COMPLETED | OUTPATIENT
Start: 2021-01-01 | End: 2021-01-01

## 2021-01-01 RX ORDER — FUROSEMIDE 20 MG/1
20 TABLET ORAL
Status: DISCONTINUED | OUTPATIENT
Start: 2021-01-01 | End: 2021-01-01 | Stop reason: HOSPADM

## 2021-01-01 RX ORDER — INSULIN GLARGINE 100 [IU]/ML
16 INJECTION, SOLUTION SUBCUTANEOUS DAILY
Status: DISCONTINUED | OUTPATIENT
Start: 2021-01-01 | End: 2021-01-01 | Stop reason: HOSPADM

## 2021-01-01 RX ORDER — PREDNISONE 20 MG/1
40 TABLET ORAL 2 TIMES DAILY WITH MEALS
Status: DISCONTINUED | OUTPATIENT
Start: 2021-01-01 | End: 2021-01-01

## 2021-01-01 RX ORDER — DEXAMETHASONE SODIUM PHOSPHATE 10 MG/ML
10 INJECTION INTRAMUSCULAR; INTRAVENOUS
Status: COMPLETED | OUTPATIENT
Start: 2021-01-01 | End: 2021-01-01

## 2021-01-01 RX ORDER — SODIUM CHLORIDE 9 MG/ML
500 INJECTION, SOLUTION INTRAVENOUS ONCE
Status: DISCONTINUED | OUTPATIENT
Start: 2021-01-01 | End: 2021-01-01

## 2021-01-01 RX ORDER — FUROSEMIDE 20 MG/1
20 TABLET ORAL
COMMUNITY
End: 2021-01-01

## 2021-01-01 RX ORDER — HEPARIN 100 UNIT/ML
300 SYRINGE INTRAVENOUS AS NEEDED
Status: DISCONTINUED | OUTPATIENT
Start: 2021-01-01 | End: 2021-01-01 | Stop reason: HOSPADM

## 2021-01-01 RX ORDER — IPRATROPIUM BROMIDE AND ALBUTEROL SULFATE 2.5; .5 MG/3ML; MG/3ML
3 SOLUTION RESPIRATORY (INHALATION) ONCE
Status: COMPLETED | OUTPATIENT
Start: 2021-01-01 | End: 2021-01-01

## 2021-01-01 RX ORDER — HYDROMORPHONE HYDROCHLORIDE 2 MG/ML
2 INJECTION, SOLUTION INTRAMUSCULAR; INTRAVENOUS; SUBCUTANEOUS
Status: DISCONTINUED | OUTPATIENT
Start: 2021-01-01 | End: 2021-06-26 | Stop reason: HOSPADM

## 2021-01-01 RX ORDER — GLYCOPYRROLATE 0.2 MG/ML
0.2 INJECTION INTRAMUSCULAR; INTRAVENOUS
Status: DISCONTINUED | OUTPATIENT
Start: 2021-01-01 | End: 2021-01-01 | Stop reason: HOSPADM

## 2021-01-01 RX ORDER — ONDANSETRON 2 MG/ML
4 INJECTION INTRAMUSCULAR; INTRAVENOUS
Status: DISCONTINUED | OUTPATIENT
Start: 2021-01-01 | End: 2021-01-01 | Stop reason: HOSPADM

## 2021-01-01 RX ORDER — PREDNISONE 20 MG/1
40 TABLET ORAL
Status: DISCONTINUED | OUTPATIENT
Start: 2021-01-01 | End: 2021-01-01 | Stop reason: HOSPADM

## 2021-01-01 RX ORDER — LATANOPROST 50 UG/ML
1 SOLUTION/ DROPS OPHTHALMIC
COMMUNITY

## 2021-01-01 RX ORDER — ARFORMOTEROL TARTRATE 15 UG/2ML
15 SOLUTION RESPIRATORY (INHALATION)
Status: DISCONTINUED | OUTPATIENT
Start: 2021-01-01 | End: 2021-01-01 | Stop reason: HOSPADM

## 2021-01-01 RX ORDER — GABAPENTIN 300 MG/1
600 CAPSULE ORAL 2 TIMES DAILY
Status: DISCONTINUED | OUTPATIENT
Start: 2021-01-01 | End: 2021-01-01 | Stop reason: HOSPADM

## 2021-01-01 RX ORDER — LORAZEPAM 2 MG/ML
0.5 INJECTION INTRAMUSCULAR
Status: DISCONTINUED | OUTPATIENT
Start: 2021-01-01 | End: 2021-01-01

## 2021-01-01 RX ORDER — CLONAZEPAM 0.5 MG/1
0.25 TABLET, ORALLY DISINTEGRATING ORAL EVERY EVENING
Status: DISCONTINUED | OUTPATIENT
Start: 2021-01-01 | End: 2021-01-01 | Stop reason: SDUPTHER

## 2021-01-01 RX ORDER — ASPIRIN 81 MG/1
81 TABLET ORAL DAILY
Status: DISCONTINUED | OUTPATIENT
Start: 2021-01-01 | End: 2021-01-01 | Stop reason: HOSPADM

## 2021-01-01 RX ORDER — POTASSIUM CHLORIDE 20 MEQ/1
40 TABLET, EXTENDED RELEASE ORAL
Status: COMPLETED | OUTPATIENT
Start: 2021-01-01 | End: 2021-01-01

## 2021-01-01 RX ORDER — MECLIZINE HCL 12.5 MG 12.5 MG/1
12.5 TABLET ORAL
Status: DISCONTINUED | OUTPATIENT
Start: 2021-01-01 | End: 2021-01-01 | Stop reason: HOSPADM

## 2021-01-01 RX ORDER — ACETAMINOPHEN 325 MG/1
650 TABLET ORAL
Status: DISCONTINUED | OUTPATIENT
Start: 2021-01-01 | End: 2021-01-01 | Stop reason: HOSPADM

## 2021-01-01 RX ORDER — FUROSEMIDE 40 MG/1
20 TABLET ORAL
Status: DISCONTINUED | OUTPATIENT
Start: 2021-01-01 | End: 2021-01-01

## 2021-01-01 RX ORDER — OXYCODONE HYDROCHLORIDE 5 MG/1
5 TABLET ORAL ONCE
Status: DISCONTINUED | OUTPATIENT
Start: 2021-01-01 | End: 2021-01-01

## 2021-01-01 RX ORDER — ALBUMIN HUMAN 250 G/1000ML
50 SOLUTION INTRAVENOUS EVERY 6 HOURS
Status: DISPENSED | OUTPATIENT
Start: 2021-01-01 | End: 2021-01-01

## 2021-01-01 RX ORDER — FLUOXETINE 10 MG/1
10 CAPSULE ORAL EVERY EVENING
Status: DISCONTINUED | OUTPATIENT
Start: 2021-01-01 | End: 2021-01-01 | Stop reason: HOSPADM

## 2021-01-01 RX ORDER — DOXYCYCLINE 100 MG/1
100 CAPSULE ORAL 2 TIMES DAILY
Qty: 10 CAPSULE | Refills: 0 | Status: SHIPPED
Start: 2021-01-01 | End: 2021-06-29

## 2021-01-01 RX ORDER — DIPHENHYDRAMINE HYDROCHLORIDE 50 MG/ML
25 INJECTION, SOLUTION INTRAMUSCULAR; INTRAVENOUS
Status: DISCONTINUED | OUTPATIENT
Start: 2021-01-01 | End: 2021-01-01

## 2021-01-01 RX ORDER — INSULIN LISPRO 100 [IU]/ML
INJECTION, SOLUTION INTRAVENOUS; SUBCUTANEOUS
Qty: 1 VIAL | Refills: 0 | Status: SHIPPED
Start: 2021-01-01 | End: 2021-01-01

## 2021-01-01 RX ORDER — IPRATROPIUM BROMIDE AND ALBUTEROL SULFATE 2.5; .5 MG/3ML; MG/3ML
3 SOLUTION RESPIRATORY (INHALATION)
Status: DISCONTINUED | OUTPATIENT
Start: 2021-01-01 | End: 2021-01-01 | Stop reason: HOSPADM

## 2021-01-01 RX ORDER — BACITRACIN 500 UNIT/G
1 PACKET (EA) TOPICAL DAILY
Status: DISCONTINUED | OUTPATIENT
Start: 2021-01-01 | End: 2021-01-01 | Stop reason: HOSPADM

## 2021-01-01 RX ORDER — LEVOFLOXACIN 750 MG/1
750 TABLET ORAL
Status: DISCONTINUED | OUTPATIENT
Start: 2021-01-01 | End: 2021-01-01 | Stop reason: HOSPADM

## 2021-01-01 RX ORDER — LIDOCAINE HYDROCHLORIDE AND EPINEPHRINE 10; 10 MG/ML; UG/ML
1.5 INJECTION, SOLUTION INFILTRATION; PERINEURAL ONCE
Status: COMPLETED | OUTPATIENT
Start: 2021-01-01 | End: 2021-01-01

## 2021-01-01 RX ORDER — OXYCODONE HYDROCHLORIDE 5 MG/1
5 TABLET ORAL
Status: DISCONTINUED | OUTPATIENT
Start: 2021-01-01 | End: 2021-01-01 | Stop reason: HOSPADM

## 2021-01-01 RX ORDER — ALPRAZOLAM 0.25 MG/1
0.25 TABLET ORAL
Status: DISCONTINUED | OUTPATIENT
Start: 2021-01-01 | End: 2021-01-01

## 2021-01-01 RX ORDER — AMLODIPINE BESYLATE 5 MG/1
5 TABLET ORAL DAILY
Qty: 30 TAB | Refills: 0 | Status: SHIPPED | OUTPATIENT
Start: 2021-01-01 | End: 2021-01-01

## 2021-01-01 RX ORDER — HYDROMORPHONE HYDROCHLORIDE 1 MG/ML
0.2 INJECTION, SOLUTION INTRAMUSCULAR; INTRAVENOUS; SUBCUTANEOUS ONCE
Status: COMPLETED | OUTPATIENT
Start: 2021-01-01 | End: 2021-01-01

## 2021-01-01 RX ORDER — IPRATROPIUM BROMIDE 0.5 MG/2.5ML
0.5 SOLUTION RESPIRATORY (INHALATION)
Status: DISCONTINUED | OUTPATIENT
Start: 2021-01-01 | End: 2021-01-01 | Stop reason: HOSPADM

## 2021-01-01 RX ORDER — AMOXICILLIN 250 MG
1 CAPSULE ORAL DAILY
Status: DISCONTINUED | OUTPATIENT
Start: 2021-01-01 | End: 2021-01-01

## 2021-01-01 RX ORDER — CLONAZEPAM 0.5 MG/1
0.25 TABLET ORAL
Status: DISCONTINUED | OUTPATIENT
Start: 2021-01-01 | End: 2021-01-01 | Stop reason: HOSPADM

## 2021-01-01 RX ORDER — GABAPENTIN 300 MG/1
300 CAPSULE ORAL 3 TIMES DAILY
Status: DISCONTINUED | OUTPATIENT
Start: 2021-01-01 | End: 2021-01-01

## 2021-01-01 RX ORDER — CLOPIDOGREL BISULFATE 75 MG/1
75 TABLET ORAL DAILY
Status: DISCONTINUED | OUTPATIENT
Start: 2021-01-01 | End: 2021-01-01

## 2021-01-01 RX ORDER — SODIUM CHLORIDE 0.9 % (FLUSH) 0.9 %
5-40 SYRINGE (ML) INJECTION EVERY 8 HOURS
Status: DISCONTINUED | OUTPATIENT
Start: 2021-01-01 | End: 2021-01-01 | Stop reason: HOSPADM

## 2021-01-01 RX ORDER — PROMETHAZINE HYDROCHLORIDE 25 MG/1
12.5 TABLET ORAL
Status: DISCONTINUED | OUTPATIENT
Start: 2021-01-01 | End: 2021-01-01 | Stop reason: HOSPADM

## 2021-01-01 RX ORDER — TRAMADOL HYDROCHLORIDE 50 MG/1
25 TABLET ORAL
Status: DISCONTINUED | OUTPATIENT
Start: 2021-01-01 | End: 2021-01-01 | Stop reason: HOSPADM

## 2021-01-01 RX ORDER — ENOXAPARIN SODIUM 100 MG/ML
40 INJECTION SUBCUTANEOUS EVERY 24 HOURS
Status: DISCONTINUED | OUTPATIENT
Start: 2021-01-01 | End: 2021-01-01 | Stop reason: HOSPADM

## 2021-01-01 RX ORDER — ATORVASTATIN CALCIUM 20 MG/1
80 TABLET, FILM COATED ORAL
Status: DISCONTINUED | OUTPATIENT
Start: 2021-01-01 | End: 2021-01-01 | Stop reason: HOSPADM

## 2021-01-01 RX ORDER — LATANOPROST 50 UG/ML
1 SOLUTION/ DROPS OPHTHALMIC DAILY
Status: DISCONTINUED | OUTPATIENT
Start: 2021-01-01 | End: 2021-01-01 | Stop reason: HOSPADM

## 2021-01-01 RX ORDER — FACIAL-BODY WIPES
10 EACH TOPICAL DAILY PRN
Status: DISCONTINUED | OUTPATIENT
Start: 2021-01-01 | End: 2021-01-01 | Stop reason: HOSPADM

## 2021-01-01 RX ORDER — INSULIN LISPRO 100 [IU]/ML
INJECTION, SOLUTION INTRAVENOUS; SUBCUTANEOUS
Status: DISCONTINUED | OUTPATIENT
Start: 2021-01-01 | End: 2021-01-01

## 2021-01-01 RX ORDER — PREDNISONE 5 MG/1
5 TABLET ORAL DAILY
COMMUNITY
End: 2021-01-01

## 2021-01-01 RX ORDER — DEXTROSE 50 % IN WATER (D50W) INTRAVENOUS SYRINGE
25-50 AS NEEDED
Status: DISCONTINUED | OUTPATIENT
Start: 2021-01-01 | End: 2021-01-01 | Stop reason: SDUPTHER

## 2021-01-01 RX ORDER — LATANOPROST 50 UG/ML
1 SOLUTION/ DROPS OPHTHALMIC
Status: DISCONTINUED | OUTPATIENT
Start: 2021-01-01 | End: 2021-01-01 | Stop reason: HOSPADM

## 2021-01-01 RX ORDER — PANTOPRAZOLE SODIUM 40 MG/1
40 TABLET, DELAYED RELEASE ORAL
Status: DISCONTINUED | OUTPATIENT
Start: 2021-01-01 | End: 2021-01-01 | Stop reason: HOSPADM

## 2021-01-01 RX ORDER — SODIUM CHLORIDE 0.9 % (FLUSH) 0.9 %
5-40 SYRINGE (ML) INJECTION EVERY 8 HOURS
Status: DISCONTINUED | OUTPATIENT
Start: 2021-01-01 | End: 2021-01-01

## 2021-01-01 RX ORDER — FLUOXETINE 10 MG/1
10 CAPSULE ORAL EVERY EVENING
Status: DISCONTINUED | OUTPATIENT
Start: 2021-01-01 | End: 2021-01-01

## 2021-01-01 RX ORDER — HYDROMORPHONE HYDROCHLORIDE 2 MG/ML
0.5 INJECTION, SOLUTION INTRAMUSCULAR; INTRAVENOUS; SUBCUTANEOUS
Status: COMPLETED | OUTPATIENT
Start: 2021-01-01 | End: 2021-01-01

## 2021-01-01 RX ORDER — SIMETHICONE 80 MG
80 TABLET,CHEWABLE ORAL
Status: DISCONTINUED | OUTPATIENT
Start: 2021-01-01 | End: 2021-01-01 | Stop reason: HOSPADM

## 2021-01-01 RX ORDER — INSULIN LISPRO 100 [IU]/ML
INJECTION, SOLUTION INTRAVENOUS; SUBCUTANEOUS
Status: DISCONTINUED | OUTPATIENT
Start: 2021-01-01 | End: 2021-01-01 | Stop reason: HOSPADM

## 2021-01-01 RX ORDER — AMOXICILLIN 250 MG
2 CAPSULE ORAL 2 TIMES DAILY
Status: DISCONTINUED | OUTPATIENT
Start: 2021-01-01 | End: 2021-01-01

## 2021-01-01 RX ORDER — BUDESONIDE 0.5 MG/2ML
500 INHALANT ORAL
Status: DISCONTINUED | OUTPATIENT
Start: 2021-01-01 | End: 2021-01-01 | Stop reason: HOSPADM

## 2021-01-01 RX ORDER — MORPHINE SULFATE 2 MG/ML
2 INJECTION, SOLUTION INTRAMUSCULAR; INTRAVENOUS
Status: DISCONTINUED | OUTPATIENT
Start: 2021-01-01 | End: 2021-01-01

## 2021-01-01 RX ORDER — OXYCODONE HCL 20 MG/1
20 TABLET, FILM COATED, EXTENDED RELEASE ORAL EVERY 12 HOURS
Qty: 30 TABLET | Refills: 0 | Status: ON HOLD | OUTPATIENT
Start: 2021-01-01 | End: 2021-01-01

## 2021-01-01 RX ORDER — LABETALOL HCL 20 MG/4 ML
5 SYRINGE (ML) INTRAVENOUS
Status: DISCONTINUED | OUTPATIENT
Start: 2021-01-01 | End: 2021-01-01 | Stop reason: HOSPADM

## 2021-01-01 RX ORDER — HEPARIN 100 UNIT/ML
500 SYRINGE INTRAVENOUS AS NEEDED
Status: DISCONTINUED | OUTPATIENT
Start: 2021-01-01 | End: 2021-01-01 | Stop reason: HOSPADM

## 2021-01-01 RX ORDER — OXYCODONE HYDROCHLORIDE 5 MG/1
5 TABLET ORAL 2 TIMES DAILY
COMMUNITY
End: 2021-01-01

## 2021-01-01 RX ORDER — HYDRALAZINE HYDROCHLORIDE 20 MG/ML
20 INJECTION INTRAMUSCULAR; INTRAVENOUS
Status: DISCONTINUED | OUTPATIENT
Start: 2021-01-01 | End: 2021-01-01 | Stop reason: HOSPADM

## 2021-01-01 RX ORDER — HYDROMORPHONE HYDROCHLORIDE 1 MG/ML
0.5 INJECTION, SOLUTION INTRAMUSCULAR; INTRAVENOUS; SUBCUTANEOUS
Status: DISCONTINUED | OUTPATIENT
Start: 2021-01-01 | End: 2021-01-01

## 2021-01-01 RX ORDER — SODIUM CHLORIDE 9 MG/ML
25 INJECTION, SOLUTION INTRAVENOUS AS NEEDED
Status: DISCONTINUED | OUTPATIENT
Start: 2021-01-01 | End: 2021-01-01 | Stop reason: HOSPADM

## 2021-01-01 RX ORDER — TOLTERODINE 2 MG/1
4 CAPSULE, EXTENDED RELEASE ORAL DAILY
Status: DISCONTINUED | OUTPATIENT
Start: 2021-01-01 | End: 2021-01-01

## 2021-01-01 RX ORDER — INSULIN GLARGINE 100 [IU]/ML
13 INJECTION, SOLUTION SUBCUTANEOUS
Status: DISCONTINUED | OUTPATIENT
Start: 2021-01-01 | End: 2021-01-01 | Stop reason: HOSPADM

## 2021-01-01 RX ORDER — LORAZEPAM 2 MG/ML
1 INJECTION INTRAMUSCULAR EVERY 4 HOURS
Status: DISCONTINUED | OUTPATIENT
Start: 2021-01-01 | End: 2021-06-26 | Stop reason: HOSPADM

## 2021-01-01 RX ORDER — OXYCODONE AND ACETAMINOPHEN 5; 325 MG/1; MG/1
1 TABLET ORAL EVERY 6 HOURS
Status: DISCONTINUED | OUTPATIENT
Start: 2021-01-01 | End: 2021-01-01

## 2021-01-01 RX ORDER — LEVOFLOXACIN 750 MG/1
750 TABLET ORAL
Qty: 1 TAB | Refills: 0 | Status: SHIPPED | OUTPATIENT
Start: 2021-01-01 | End: 2021-01-01

## 2021-01-01 RX ORDER — NALOXONE HYDROCHLORIDE 0.4 MG/ML
0.4 INJECTION, SOLUTION INTRAMUSCULAR; INTRAVENOUS; SUBCUTANEOUS
Status: DISCONTINUED | OUTPATIENT
Start: 2021-01-01 | End: 2021-01-01 | Stop reason: HOSPADM

## 2021-01-01 RX ORDER — ASPIRIN 81 MG/1
81 TABLET ORAL DAILY
Status: DISCONTINUED | OUTPATIENT
Start: 2021-01-01 | End: 2021-01-01

## 2021-01-01 RX ORDER — CLONAZEPAM 0.5 MG/1
0.25 TABLET ORAL
Status: DISCONTINUED | OUTPATIENT
Start: 2021-01-01 | End: 2021-01-01

## 2021-01-01 RX ORDER — ALBUTEROL SULFATE 2.5 MG/.5ML
2.5 SOLUTION RESPIRATORY (INHALATION)
Status: ON HOLD | COMMUNITY
End: 2021-01-01 | Stop reason: ALTCHOICE

## 2021-01-01 RX ORDER — NALOXONE HYDROCHLORIDE 2 MG/.4ML
2 INJECTION, SOLUTION INTRAMUSCULAR; SUBCUTANEOUS
COMMUNITY

## 2021-01-01 RX ORDER — BUDESONIDE 0.5 MG/2ML
500 INHALANT ORAL
Status: DISCONTINUED | OUTPATIENT
Start: 2021-01-01 | End: 2021-01-01

## 2021-01-01 RX ORDER — LORAZEPAM 2 MG/ML
0.25 INJECTION INTRAMUSCULAR ONCE
Status: COMPLETED | OUTPATIENT
Start: 2021-01-01 | End: 2021-01-01

## 2021-01-01 RX ORDER — BUMETANIDE 0.25 MG/ML
1 INJECTION INTRAMUSCULAR; INTRAVENOUS DAILY
Status: DISCONTINUED | OUTPATIENT
Start: 2021-01-01 | End: 2021-01-01

## 2021-01-01 RX ORDER — PANTOPRAZOLE SODIUM 40 MG/1
40 TABLET, DELAYED RELEASE ORAL DAILY
COMMUNITY
End: 2021-01-01

## 2021-01-01 RX ORDER — LEVOTHYROXINE SODIUM 75 UG/1
75 TABLET ORAL
Status: DISCONTINUED | OUTPATIENT
Start: 2021-01-01 | End: 2021-01-01 | Stop reason: HOSPADM

## 2021-01-01 RX ORDER — ALBUTEROL SULFATE 90 UG/1
2 AEROSOL, METERED RESPIRATORY (INHALATION)
Status: DISCONTINUED | OUTPATIENT
Start: 2021-01-01 | End: 2021-01-01 | Stop reason: HOSPADM

## 2021-01-01 RX ORDER — MULTIVITAMIN
1 TABLET ORAL
COMMUNITY
End: 2021-01-01

## 2021-01-01 RX ORDER — PREDNISONE 20 MG/1
40 TABLET ORAL
Status: DISCONTINUED | OUTPATIENT
Start: 2021-01-01 | End: 2021-01-01

## 2021-01-01 RX ORDER — NOREPINEPHRINE BITARTRATE/D5W 8 MG/250ML
.5-2 PLASTIC BAG, INJECTION (ML) INTRAVENOUS
Status: DISCONTINUED | OUTPATIENT
Start: 2021-01-01 | End: 2021-01-01

## 2021-01-01 RX ORDER — ENOXAPARIN SODIUM 100 MG/ML
1 INJECTION SUBCUTANEOUS
Status: COMPLETED | OUTPATIENT
Start: 2021-01-01 | End: 2021-01-01

## 2021-01-01 RX ORDER — GUAIFENESIN 600 MG/1
600 TABLET, EXTENDED RELEASE ORAL 2 TIMES DAILY
Status: DISCONTINUED | OUTPATIENT
Start: 2021-01-01 | End: 2021-01-01 | Stop reason: HOSPADM

## 2021-01-01 RX ORDER — DOXYCYCLINE HYCLATE 100 MG
100 TABLET ORAL 2 TIMES DAILY WITH MEALS
Qty: 10 TAB | Refills: 0 | Status: SHIPPED | OUTPATIENT
Start: 2021-01-01 | End: 2021-01-01

## 2021-01-01 RX ORDER — SODIUM CHLORIDE 0.9 % (FLUSH) 0.9 %
5-40 SYRINGE (ML) INJECTION AS NEEDED
Status: DISCONTINUED | OUTPATIENT
Start: 2021-01-01 | End: 2021-01-01 | Stop reason: HOSPADM

## 2021-01-01 RX ORDER — IPRATROPIUM BROMIDE AND ALBUTEROL SULFATE 2.5; .5 MG/3ML; MG/3ML
3 SOLUTION RESPIRATORY (INHALATION)
Status: DISCONTINUED | OUTPATIENT
Start: 2021-01-01 | End: 2021-01-01

## 2021-01-01 RX ORDER — HYDROMORPHONE HYDROCHLORIDE 2 MG/ML
2 INJECTION, SOLUTION INTRAMUSCULAR; INTRAVENOUS; SUBCUTANEOUS EVERY 4 HOURS
Status: DISCONTINUED | OUTPATIENT
Start: 2021-01-01 | End: 2021-06-26 | Stop reason: HOSPADM

## 2021-01-01 RX ORDER — INSULIN GLARGINE 100 [IU]/ML
16 INJECTION, SOLUTION SUBCUTANEOUS
Qty: 4.8 ML | Refills: 0 | Status: SHIPPED
Start: 2021-01-01 | End: 2021-01-01

## 2021-01-01 RX ORDER — IPRATROPIUM BROMIDE AND ALBUTEROL SULFATE 2.5; .5 MG/3ML; MG/3ML
3 SOLUTION RESPIRATORY (INHALATION) 3 TIMES DAILY
Status: DISCONTINUED | OUTPATIENT
Start: 2021-01-01 | End: 2021-01-01

## 2021-01-01 RX ORDER — OXYCODONE HCL 10 MG/1
10 TABLET, FILM COATED, EXTENDED RELEASE ORAL EVERY 12 HOURS
Status: DISCONTINUED | OUTPATIENT
Start: 2021-01-01 | End: 2021-01-01 | Stop reason: SDUPTHER

## 2021-01-01 RX ORDER — ENOXAPARIN SODIUM 100 MG/ML
60 INJECTION SUBCUTANEOUS EVERY 12 HOURS
Status: DISCONTINUED | OUTPATIENT
Start: 2021-01-01 | End: 2021-01-01

## 2021-01-01 RX ORDER — OXYCODONE AND ACETAMINOPHEN 5; 325 MG/1; MG/1
1 TABLET ORAL
Status: DISCONTINUED | OUTPATIENT
Start: 2021-01-01 | End: 2021-01-01

## 2021-01-01 RX ORDER — POLYETHYLENE GLYCOL 3350 17 G/17G
17 POWDER, FOR SOLUTION ORAL DAILY PRN
Status: DISCONTINUED | OUTPATIENT
Start: 2021-01-01 | End: 2021-01-01 | Stop reason: HOSPADM

## 2021-01-01 RX ORDER — PANTOPRAZOLE SODIUM 40 MG/1
40 TABLET, DELAYED RELEASE ORAL DAILY
Status: DISCONTINUED | OUTPATIENT
Start: 2021-01-01 | End: 2021-01-01 | Stop reason: HOSPADM

## 2021-01-01 RX ORDER — ALPRAZOLAM 0.5 MG/1
0.5 TABLET ORAL
Status: DISCONTINUED | OUTPATIENT
Start: 2021-01-01 | End: 2021-01-01

## 2021-01-01 RX ORDER — LISINOPRIL 20 MG/1
40 TABLET ORAL DAILY
Status: DISCONTINUED | OUTPATIENT
Start: 2021-01-01 | End: 2021-01-01

## 2021-01-01 RX ORDER — CALCIUM CARB/MAGNESIUM CARB 311-232MG
5 TABLET ORAL
Status: DISCONTINUED | OUTPATIENT
Start: 2021-01-01 | End: 2021-01-01 | Stop reason: HOSPADM

## 2021-01-01 RX ORDER — BISMUTH SUBSALICYLATE 262 MG
1 TABLET,CHEWABLE ORAL DAILY
Status: ON HOLD | COMMUNITY
End: 2021-01-01

## 2021-01-01 RX ORDER — CLOPIDOGREL BISULFATE 75 MG/1
75 TABLET ORAL DAILY
Status: DISCONTINUED | OUTPATIENT
Start: 2021-01-01 | End: 2021-01-01 | Stop reason: HOSPADM

## 2021-01-01 RX ORDER — BACITRACIN 500 UNIT/G
1 PACKET (EA) TOPICAL 3 TIMES DAILY
Status: DISCONTINUED | OUTPATIENT
Start: 2021-01-01 | End: 2021-01-01

## 2021-01-01 RX ORDER — OXYBUTYNIN CHLORIDE 5 MG/1
10 TABLET, EXTENDED RELEASE ORAL DAILY
Status: DISCONTINUED | OUTPATIENT
Start: 2021-01-01 | End: 2021-01-01

## 2021-01-01 RX ORDER — INSULIN GLARGINE 100 [IU]/ML
16 INJECTION, SOLUTION SUBCUTANEOUS
Status: ON HOLD | COMMUNITY
End: 2021-01-01 | Stop reason: SDUPTHER

## 2021-01-01 RX ORDER — MORPHINE SULFATE 2 MG/ML
2 INJECTION, SOLUTION INTRAMUSCULAR; INTRAVENOUS
Status: DISCONTINUED | OUTPATIENT
Start: 2021-01-01 | End: 2021-01-01 | Stop reason: HOSPADM

## 2021-01-01 RX ORDER — INSULIN GLARGINE 100 [IU]/ML
0.2 INJECTION, SOLUTION SUBCUTANEOUS DAILY
Status: DISCONTINUED | OUTPATIENT
Start: 2021-01-01 | End: 2021-01-01

## 2021-01-01 RX ORDER — ATORVASTATIN CALCIUM 40 MG/1
80 TABLET, FILM COATED ORAL
Status: DISCONTINUED | OUTPATIENT
Start: 2021-01-01 | End: 2021-01-01

## 2021-01-01 RX ORDER — TOLTERODINE 4 MG/1
4 CAPSULE, EXTENDED RELEASE ORAL DAILY
COMMUNITY

## 2021-01-01 RX ORDER — OXYCODONE AND ACETAMINOPHEN 7.5; 325 MG/1; MG/1
1 TABLET ORAL
Qty: 10 TABLET | Refills: 0 | Status: SHIPPED | OUTPATIENT
Start: 2021-01-01 | End: 2021-01-01

## 2021-01-01 RX ORDER — OXYCODONE AND ACETAMINOPHEN 5; 325 MG/1; MG/1
1 TABLET ORAL 3 TIMES DAILY
Status: ON HOLD | COMMUNITY
End: 2021-01-01 | Stop reason: SDUPTHER

## 2021-01-01 RX ORDER — HYDROMORPHONE HYDROCHLORIDE 1 MG/ML
0.2 INJECTION, SOLUTION INTRAMUSCULAR; INTRAVENOUS; SUBCUTANEOUS
Status: DISCONTINUED | OUTPATIENT
Start: 2021-01-01 | End: 2021-01-01

## 2021-01-01 RX ORDER — AMOXICILLIN 250 MG
1 CAPSULE ORAL 2 TIMES DAILY
Status: DISCONTINUED | OUTPATIENT
Start: 2021-01-01 | End: 2021-01-01 | Stop reason: HOSPADM

## 2021-01-01 RX ORDER — ARFORMOTEROL TARTRATE 15 UG/2ML
15 SOLUTION RESPIRATORY (INHALATION)
Status: DISCONTINUED | OUTPATIENT
Start: 2021-01-01 | End: 2021-01-01

## 2021-01-01 RX ORDER — CLOPIDOGREL BISULFATE 75 MG/1
75 TABLET ORAL DAILY
COMMUNITY
End: 2021-01-01

## 2021-01-01 RX ORDER — HYDROMORPHONE HYDROCHLORIDE 1 MG/ML
1 INJECTION, SOLUTION INTRAMUSCULAR; INTRAVENOUS; SUBCUTANEOUS EVERY 4 HOURS
Status: DISCONTINUED | OUTPATIENT
Start: 2021-01-01 | End: 2021-01-01

## 2021-01-01 RX ORDER — CEFDINIR 300 MG/1
300 CAPSULE ORAL 2 TIMES DAILY
Qty: 6 CAP | Refills: 0 | Status: SHIPPED | OUTPATIENT
Start: 2021-01-01 | End: 2021-01-01

## 2021-01-01 RX ORDER — LORAZEPAM 2 MG/ML
1 INJECTION INTRAMUSCULAR
Status: DISCONTINUED | OUTPATIENT
Start: 2021-01-01 | End: 2021-06-26 | Stop reason: HOSPADM

## 2021-01-01 RX ORDER — AZTREONAM 1 G/1
1 INJECTION, POWDER, LYOPHILIZED, FOR SOLUTION INTRAMUSCULAR; INTRAVENOUS EVERY 8 HOURS
Status: DISCONTINUED | OUTPATIENT
Start: 2021-01-01 | End: 2021-01-01

## 2021-01-01 RX ORDER — CALCIUM CARB/MAGNESIUM CARB 311-232MG
3 TABLET ORAL
Status: DISCONTINUED | OUTPATIENT
Start: 2021-01-01 | End: 2021-01-01 | Stop reason: HOSPADM

## 2021-01-01 RX ORDER — GUAIFENESIN/DEXTROMETHORPHAN 100-10MG/5
10 SYRUP ORAL EVERY 6 HOURS
Status: DISCONTINUED | OUTPATIENT
Start: 2021-01-01 | End: 2021-01-01 | Stop reason: HOSPADM

## 2021-01-01 RX ORDER — KETOROLAC TROMETHAMINE 30 MG/ML
15 INJECTION, SOLUTION INTRAMUSCULAR; INTRAVENOUS EVERY 6 HOURS
Status: DISCONTINUED | OUTPATIENT
Start: 2021-01-01 | End: 2021-01-01

## 2021-01-01 RX ORDER — PREDNISONE 20 MG/1
20 TABLET ORAL
Status: DISCONTINUED | OUTPATIENT
Start: 2021-01-01 | End: 2021-01-01 | Stop reason: HOSPADM

## 2021-01-01 RX ORDER — FLUOXETINE HYDROCHLORIDE 20 MG/1
20 CAPSULE ORAL EVERY EVENING
Status: DISCONTINUED | OUTPATIENT
Start: 2021-01-01 | End: 2021-01-01 | Stop reason: HOSPADM

## 2021-01-01 RX ORDER — GUAIFENESIN 100 MG/5ML
81 LIQUID (ML) ORAL DAILY
Status: DISCONTINUED | OUTPATIENT
Start: 2021-01-01 | End: 2021-01-01 | Stop reason: HOSPADM

## 2021-01-01 RX ORDER — ALBUTEROL SULFATE 0.83 MG/ML
10 SOLUTION RESPIRATORY (INHALATION)
Status: DISCONTINUED | OUTPATIENT
Start: 2021-01-01 | End: 2021-01-01 | Stop reason: HOSPADM

## 2021-01-01 RX ORDER — MAG HYDROX/ALUMINUM HYD/SIMETH 200-200-20
30 SUSPENSION, ORAL (FINAL DOSE FORM) ORAL
Status: DISCONTINUED | OUTPATIENT
Start: 2021-01-01 | End: 2021-01-01 | Stop reason: HOSPADM

## 2021-01-01 RX ORDER — LEVOTHYROXINE SODIUM 75 UG/1
75 TABLET ORAL
COMMUNITY

## 2021-01-01 RX ORDER — AMLODIPINE BESYLATE 5 MG/1
10 TABLET ORAL DAILY
Status: DISCONTINUED | OUTPATIENT
Start: 2021-01-01 | End: 2021-01-01 | Stop reason: HOSPADM

## 2021-01-01 RX ORDER — ALBUTEROL SULFATE 2.5 MG/.5ML
2.5 SOLUTION RESPIRATORY (INHALATION)
COMMUNITY
End: 2021-01-01

## 2021-01-01 RX ORDER — GLYCOPYRROLATE 0.2 MG/ML
0.2 INJECTION INTRAMUSCULAR; INTRAVENOUS EVERY 4 HOURS
Status: DISCONTINUED | OUTPATIENT
Start: 2021-01-01 | End: 2021-06-26 | Stop reason: HOSPADM

## 2021-01-01 RX ORDER — PREDNISONE 20 MG/1
TABLET ORAL
Qty: 18 TAB | Refills: 0 | Status: ON HOLD | OUTPATIENT
Start: 2021-01-01 | End: 2021-01-01

## 2021-01-01 RX ORDER — ALBUTEROL SULFATE 0.83 MG/ML
10 SOLUTION RESPIRATORY (INHALATION)
Status: DISCONTINUED | OUTPATIENT
Start: 2021-01-01 | End: 2021-06-26 | Stop reason: HOSPADM

## 2021-01-01 RX ORDER — LISINOPRIL 40 MG/1
40 TABLET ORAL DAILY
Status: ON HOLD | COMMUNITY
End: 2021-01-01

## 2021-01-01 RX ORDER — GUAIFENESIN 100 MG/5ML
200 SOLUTION ORAL EVERY 6 HOURS
Status: COMPLETED | OUTPATIENT
Start: 2021-01-01 | End: 2021-01-01

## 2021-01-01 RX ORDER — FLUTICASONE FUROATE, UMECLIDINIUM BROMIDE AND VILANTEROL TRIFENATATE 100; 62.5; 25 UG/1; UG/1; UG/1
1 POWDER RESPIRATORY (INHALATION) DAILY
COMMUNITY

## 2021-01-01 RX ORDER — BUMETANIDE 0.25 MG/ML
1 INJECTION INTRAMUSCULAR; INTRAVENOUS 2 TIMES DAILY
Status: DISCONTINUED | OUTPATIENT
Start: 2021-01-01 | End: 2021-01-01

## 2021-01-01 RX ORDER — OXYCODONE HCL 20 MG/1
20 TABLET, FILM COATED, EXTENDED RELEASE ORAL EVERY 12 HOURS
Status: DISCONTINUED | OUTPATIENT
Start: 2021-01-01 | End: 2021-01-01 | Stop reason: HOSPADM

## 2021-01-01 RX ORDER — OXYCODONE HCL 10 MG/1
10 TABLET, FILM COATED, EXTENDED RELEASE ORAL EVERY 12 HOURS
Status: DISCONTINUED | OUTPATIENT
Start: 2021-01-01 | End: 2021-01-01 | Stop reason: HOSPADM

## 2021-01-01 RX ORDER — AMLODIPINE BESYLATE 5 MG/1
10 TABLET ORAL DAILY
Status: DISCONTINUED | OUTPATIENT
Start: 2021-01-01 | End: 2021-01-01

## 2021-01-01 RX ORDER — SODIUM CHLORIDE TAB 1 GM 1 G
1 TAB MISCELLANEOUS
COMMUNITY
End: 2021-01-01

## 2021-01-01 RX ORDER — ATORVASTATIN CALCIUM 80 MG/1
80 TABLET, FILM COATED ORAL
COMMUNITY

## 2021-01-01 RX ORDER — LANOLIN ALCOHOL/MO/W.PET/CERES
400 CREAM (GRAM) TOPICAL DAILY
COMMUNITY

## 2021-01-01 RX ORDER — HEPARIN 100 UNIT/ML
500 SYRINGE INTRAVENOUS ONCE
Status: COMPLETED | OUTPATIENT
Start: 2021-01-01 | End: 2021-01-01

## 2021-01-01 RX ORDER — ONDANSETRON 2 MG/ML
4 INJECTION INTRAMUSCULAR; INTRAVENOUS
Status: COMPLETED | OUTPATIENT
Start: 2021-01-01 | End: 2021-01-01

## 2021-01-01 RX ORDER — LEVOFLOXACIN 5 MG/ML
750 INJECTION, SOLUTION INTRAVENOUS
Status: DISCONTINUED | OUTPATIENT
Start: 2021-01-01 | End: 2021-01-01

## 2021-01-01 RX ORDER — PROCHLORPERAZINE EDISYLATE 5 MG/ML
10 INJECTION INTRAMUSCULAR; INTRAVENOUS
Status: DISCONTINUED | OUTPATIENT
Start: 2021-01-01 | End: 2021-06-26 | Stop reason: HOSPADM

## 2021-01-01 RX ORDER — GLYCOPYRROLATE 0.2 MG/ML
0.2 INJECTION INTRAMUSCULAR; INTRAVENOUS
Status: DISCONTINUED | OUTPATIENT
Start: 2021-01-01 | End: 2021-01-01

## 2021-01-01 RX ORDER — LANOLIN ALCOHOL/MO/W.PET/CERES
400 CREAM (GRAM) TOPICAL DAILY
Status: DISCONTINUED | OUTPATIENT
Start: 2021-01-01 | End: 2021-01-01 | Stop reason: HOSPADM

## 2021-01-01 RX ORDER — GUAIFENESIN 600 MG/1
600 TABLET, EXTENDED RELEASE ORAL EVERY 12 HOURS
Status: DISCONTINUED | OUTPATIENT
Start: 2021-01-01 | End: 2021-01-01 | Stop reason: HOSPADM

## 2021-01-01 RX ORDER — LORAZEPAM 2 MG/ML
0.5 INJECTION INTRAMUSCULAR
Status: DISCONTINUED | OUTPATIENT
Start: 2021-01-01 | End: 2021-01-01 | Stop reason: HOSPADM

## 2021-01-01 RX ORDER — KETOROLAC TROMETHAMINE 30 MG/ML
30 INJECTION, SOLUTION INTRAMUSCULAR; INTRAVENOUS
Status: DISCONTINUED | OUTPATIENT
Start: 2021-01-01 | End: 2021-06-26 | Stop reason: HOSPADM

## 2021-01-01 RX ORDER — ACETAMINOPHEN 650 MG/1
650 SUPPOSITORY RECTAL
Status: DISCONTINUED | OUTPATIENT
Start: 2021-01-01 | End: 2021-06-26 | Stop reason: HOSPADM

## 2021-01-01 RX ORDER — NALOXONE HYDROCHLORIDE 2 MG/.4ML
2 INJECTION, SOLUTION INTRAMUSCULAR; SUBCUTANEOUS
Qty: 1 DEVICE | Refills: 0 | Status: SHIPPED | OUTPATIENT
Start: 2021-01-01 | End: 2021-01-01

## 2021-01-01 RX ORDER — ALBUTEROL SULFATE 90 UG/1
4 AEROSOL, METERED RESPIRATORY (INHALATION)
Status: COMPLETED | OUTPATIENT
Start: 2021-01-01 | End: 2021-01-01

## 2021-01-01 RX ORDER — LABETALOL HYDROCHLORIDE 5 MG/ML
20 INJECTION, SOLUTION INTRAVENOUS
Status: DISCONTINUED | OUTPATIENT
Start: 2021-01-01 | End: 2021-01-01 | Stop reason: HOSPADM

## 2021-01-01 RX ORDER — INSULIN LISPRO 100 [IU]/ML
INJECTION, SOLUTION INTRAVENOUS; SUBCUTANEOUS
COMMUNITY

## 2021-01-01 RX ORDER — OXYCODONE HYDROCHLORIDE 5 MG/1
2.5 TABLET ORAL
Status: DISCONTINUED | OUTPATIENT
Start: 2021-01-01 | End: 2021-01-01

## 2021-01-01 RX ORDER — ONDANSETRON 4 MG/1
4 TABLET, ORALLY DISINTEGRATING ORAL
Status: DISCONTINUED | OUTPATIENT
Start: 2021-01-01 | End: 2021-01-01 | Stop reason: HOSPADM

## 2021-01-01 RX ORDER — ONDANSETRON 2 MG/ML
4 INJECTION INTRAMUSCULAR; INTRAVENOUS
Status: DISCONTINUED | OUTPATIENT
Start: 2021-01-01 | End: 2021-06-26 | Stop reason: HOSPADM

## 2021-01-01 RX ORDER — ACETAMINOPHEN 500 MG
1000 TABLET ORAL 3 TIMES DAILY
Status: DISCONTINUED | OUTPATIENT
Start: 2021-01-01 | End: 2021-01-01

## 2021-01-01 RX ORDER — TROSPIUM CHLORIDE 20 MG/1
20 TABLET, FILM COATED ORAL
Status: DISCONTINUED | OUTPATIENT
Start: 2021-01-01 | End: 2021-01-01 | Stop reason: HOSPADM

## 2021-01-01 RX ORDER — DOCUSATE SODIUM 100 MG/1
100 CAPSULE, LIQUID FILLED ORAL 2 TIMES DAILY
Status: DISCONTINUED | OUTPATIENT
Start: 2021-01-01 | End: 2021-01-01

## 2021-01-01 RX ORDER — DIPHENHYDRAMINE HCL 25 MG
25 CAPSULE ORAL
Status: DISCONTINUED | OUTPATIENT
Start: 2021-01-01 | End: 2021-01-01

## 2021-01-01 RX ORDER — LISINOPRIL 20 MG/1
40 TABLET ORAL DAILY
Status: DISCONTINUED | OUTPATIENT
Start: 2021-01-01 | End: 2021-01-01 | Stop reason: HOSPADM

## 2021-01-01 RX ORDER — ASPIRIN 81 MG/1
81 TABLET ORAL
Status: COMPLETED | OUTPATIENT
Start: 2021-01-01 | End: 2021-01-01

## 2021-01-01 RX ORDER — ACETAMINOPHEN 325 MG/1
650 TABLET ORAL
Qty: 10 TABLET | Refills: 0 | Status: SHIPPED
Start: 2021-01-01

## 2021-01-01 RX ORDER — ACETAMINOPHEN 325 MG/1
650 TABLET ORAL
Status: ACTIVE | OUTPATIENT
Start: 2021-01-01 | End: 2021-01-01

## 2021-01-01 RX ORDER — OXYCODONE HYDROCHLORIDE 5 MG/1
5 TABLET ORAL
Status: DISCONTINUED | OUTPATIENT
Start: 2021-01-01 | End: 2021-01-01

## 2021-01-01 RX ORDER — AMLODIPINE BESYLATE 5 MG/1
5 TABLET ORAL DAILY
Status: DISCONTINUED | OUTPATIENT
Start: 2021-01-01 | End: 2021-01-01

## 2021-01-01 RX ORDER — OXYCODONE HYDROCHLORIDE 5 MG/1
5 TABLET ORAL
Status: COMPLETED | OUTPATIENT
Start: 2021-01-01 | End: 2021-01-01

## 2021-01-01 RX ORDER — POLYETHYLENE GLYCOL 3350 17 G/17G
17 POWDER, FOR SOLUTION ORAL DAILY PRN
Status: DISCONTINUED | OUTPATIENT
Start: 2021-01-01 | End: 2021-01-01

## 2021-01-01 RX ORDER — LORAZEPAM 2 MG/ML
1 INJECTION INTRAMUSCULAR
Status: DISCONTINUED | OUTPATIENT
Start: 2021-01-01 | End: 2021-01-01 | Stop reason: HOSPADM

## 2021-01-01 RX ORDER — FUROSEMIDE 40 MG/1
40 TABLET ORAL DAILY
COMMUNITY
End: 2021-01-01

## 2021-01-01 RX ORDER — OXYCODONE AND ACETAMINOPHEN 5; 325 MG/1; MG/1
1 TABLET ORAL
Status: DISCONTINUED | OUTPATIENT
Start: 2021-01-01 | End: 2021-01-01 | Stop reason: HOSPADM

## 2021-01-01 RX ORDER — SIMETHICONE 80 MG
80 TABLET,CHEWABLE ORAL
Qty: 10 TABLET | Refills: 0 | Status: SHIPPED
Start: 2021-01-01

## 2021-01-01 RX ORDER — VANCOMYCIN/0.9 % SOD CHLORIDE 1.5G/250ML
1500 PLASTIC BAG, INJECTION (ML) INTRAVENOUS ONCE
Status: COMPLETED | OUTPATIENT
Start: 2021-01-01 | End: 2021-01-01

## 2021-01-01 RX ORDER — CHOLECALCIFEROL (VITAMIN D3) 50 MCG
2 CAPSULE ORAL 2 TIMES DAILY
COMMUNITY
End: 2021-01-01

## 2021-01-01 RX ORDER — PANTOPRAZOLE SODIUM 40 MG/1
40 TABLET, DELAYED RELEASE ORAL DAILY
Status: DISCONTINUED | OUTPATIENT
Start: 2021-01-01 | End: 2021-01-01

## 2021-01-01 RX ORDER — GABAPENTIN 300 MG/1
600 CAPSULE ORAL 2 TIMES DAILY
Status: DISCONTINUED | OUTPATIENT
Start: 2021-01-01 | End: 2021-01-01

## 2021-01-01 RX ORDER — ALBUTEROL SULFATE 0.83 MG/ML
2.5 SOLUTION RESPIRATORY (INHALATION)
Status: DISCONTINUED | OUTPATIENT
Start: 2021-01-01 | End: 2021-01-01 | Stop reason: HOSPADM

## 2021-01-01 RX ORDER — LEVOFLOXACIN 5 MG/ML
750 INJECTION, SOLUTION INTRAVENOUS EVERY 24 HOURS
Status: DISCONTINUED | OUTPATIENT
Start: 2021-01-01 | End: 2021-01-01

## 2021-01-01 RX ORDER — SODIUM CHLORIDE 450 MG/100ML
75 INJECTION, SOLUTION INTRAVENOUS CONTINUOUS
Status: DISCONTINUED | OUTPATIENT
Start: 2021-01-01 | End: 2021-01-01 | Stop reason: HOSPADM

## 2021-01-01 RX ORDER — PREDNISONE 5 MG/1
TABLET ORAL
Qty: 57 TABLET | Refills: 0 | Status: SHIPPED
Start: 2021-01-01 | End: 2021-01-01

## 2021-01-01 RX ORDER — AMLODIPINE BESYLATE 5 MG/1
5 TABLET ORAL DAILY
Status: DISCONTINUED | OUTPATIENT
Start: 2021-01-01 | End: 2021-01-01 | Stop reason: HOSPADM

## 2021-01-01 RX ORDER — KETOROLAC TROMETHAMINE 30 MG/ML
30 INJECTION, SOLUTION INTRAMUSCULAR; INTRAVENOUS
Status: DISCONTINUED | OUTPATIENT
Start: 2021-01-01 | End: 2021-01-01 | Stop reason: HOSPADM

## 2021-01-01 RX ORDER — HYDROMORPHONE HYDROCHLORIDE 1 MG/ML
1 INJECTION, SOLUTION INTRAMUSCULAR; INTRAVENOUS; SUBCUTANEOUS
Status: DISCONTINUED | OUTPATIENT
Start: 2021-01-01 | End: 2021-01-01 | Stop reason: HOSPADM

## 2021-01-01 RX ORDER — FACIAL-BODY WIPES
10 EACH TOPICAL DAILY PRN
Status: DISCONTINUED | OUTPATIENT
Start: 2021-01-01 | End: 2021-06-26 | Stop reason: HOSPADM

## 2021-01-01 RX ORDER — ASPIRIN 81 MG/1
81 TABLET ORAL DAILY
Qty: 30 TAB | Refills: 0 | Status: SHIPPED | OUTPATIENT
Start: 2021-01-01 | End: 2021-01-01

## 2021-01-01 RX ORDER — FLUOXETINE 10 MG/1
10 CAPSULE ORAL EVERY EVENING
COMMUNITY

## 2021-01-01 RX ORDER — IPRATROPIUM BROMIDE AND ALBUTEROL SULFATE 2.5; .5 MG/3ML; MG/3ML
3 SOLUTION RESPIRATORY (INHALATION) EVERY 6 HOURS
Qty: 30 NEBULE | Refills: 0 | Status: SHIPPED
Start: 2021-01-01 | End: 2021-01-01

## 2021-01-01 RX ORDER — LIDOCAINE 4 G/100G
2 PATCH TOPICAL EVERY 24 HOURS
Status: DISCONTINUED | OUTPATIENT
Start: 2021-01-01 | End: 2021-01-01 | Stop reason: HOSPADM

## 2021-01-01 RX ORDER — SODIUM CHLORIDE 9 MG/ML
50 INJECTION, SOLUTION INTRAVENOUS CONTINUOUS
Status: DISCONTINUED | OUTPATIENT
Start: 2021-01-01 | End: 2021-01-01

## 2021-01-01 RX ORDER — DEXTROSE 50 % IN WATER (D50W) INTRAVENOUS SYRINGE
12.5-25 AS NEEDED
Status: DISCONTINUED | OUTPATIENT
Start: 2021-01-01 | End: 2021-01-01 | Stop reason: HOSPADM

## 2021-01-01 RX ORDER — OXYCODONE AND ACETAMINOPHEN 5; 325 MG/1; MG/1
1 TABLET ORAL
Qty: 45 TAB | Refills: 0 | Status: SHIPPED
Start: 2021-01-01 | End: 2021-01-01

## 2021-01-01 RX ORDER — NITROGLYCERIN 0.4 MG/1
0.4 TABLET SUBLINGUAL
Status: COMPLETED | OUTPATIENT
Start: 2021-01-01 | End: 2021-01-01

## 2021-01-01 RX ORDER — HYDROMORPHONE HYDROCHLORIDE 1 MG/ML
0.5 INJECTION, SOLUTION INTRAMUSCULAR; INTRAVENOUS; SUBCUTANEOUS
Status: DISCONTINUED | OUTPATIENT
Start: 2021-01-01 | End: 2021-01-01 | Stop reason: HOSPADM

## 2021-01-01 RX ORDER — SODIUM CHLORIDE 9 MG/ML
75 INJECTION, SOLUTION INTRAVENOUS CONTINUOUS
Status: DISCONTINUED | OUTPATIENT
Start: 2021-01-01 | End: 2021-01-01 | Stop reason: HOSPADM

## 2021-01-01 RX ORDER — CLONAZEPAM 0.25 MG/1
0.25 TABLET, ORALLY DISINTEGRATING ORAL EVERY EVENING
Status: ON HOLD | COMMUNITY
End: 2021-01-01 | Stop reason: SDUPTHER

## 2021-01-01 RX ORDER — OXYCODONE HCL 20 MG/1
20 TABLET, FILM COATED, EXTENDED RELEASE ORAL EVERY 12 HOURS
Qty: 30 TABLET | Refills: 0 | Status: SHIPPED
Start: 2021-01-01 | End: 2021-07-09

## 2021-01-01 RX ORDER — TROSPIUM CHLORIDE 20 MG/1
20 TABLET, FILM COATED ORAL DAILY
Status: DISCONTINUED | OUTPATIENT
Start: 2021-01-01 | End: 2021-01-01 | Stop reason: HOSPADM

## 2021-01-01 RX ORDER — HYDROMORPHONE HYDROCHLORIDE 1 MG/ML
0.5 INJECTION, SOLUTION INTRAMUSCULAR; INTRAVENOUS; SUBCUTANEOUS ONCE
Status: COMPLETED | OUTPATIENT
Start: 2021-01-01 | End: 2021-01-01

## 2021-01-01 RX ORDER — POLYETHYLENE GLYCOL 3350 17 G/17G
17 POWDER, FOR SOLUTION ORAL DAILY
Status: DISCONTINUED | OUTPATIENT
Start: 2021-01-01 | End: 2021-01-01

## 2021-01-01 RX ORDER — GABAPENTIN 300 MG/1
600 CAPSULE ORAL 2 TIMES DAILY
Status: DISCONTINUED | OUTPATIENT
Start: 2021-01-01 | End: 2021-01-01 | Stop reason: SDUPTHER

## 2021-01-01 RX ORDER — FUROSEMIDE 10 MG/ML
40 INJECTION INTRAMUSCULAR; INTRAVENOUS ONCE
Status: COMPLETED | OUTPATIENT
Start: 2021-01-01 | End: 2021-01-01

## 2021-01-01 RX ORDER — LIDOCAINE 4 G/100G
1 PATCH TOPICAL EVERY 24 HOURS
Status: DISCONTINUED | OUTPATIENT
Start: 2021-01-01 | End: 2021-01-01 | Stop reason: HOSPADM

## 2021-01-01 RX ORDER — HYDRALAZINE HYDROCHLORIDE 20 MG/ML
10 INJECTION INTRAMUSCULAR; INTRAVENOUS
Status: DISCONTINUED | OUTPATIENT
Start: 2021-01-01 | End: 2021-01-01 | Stop reason: HOSPADM

## 2021-01-01 RX ORDER — ALBUTEROL SULFATE 90 UG/1
6 AEROSOL, METERED RESPIRATORY (INHALATION) ONCE
Status: COMPLETED | OUTPATIENT
Start: 2021-01-01 | End: 2021-01-01

## 2021-01-01 RX ORDER — INSULIN GLARGINE 100 [IU]/ML
10 INJECTION, SOLUTION SUBCUTANEOUS
Status: DISCONTINUED | OUTPATIENT
Start: 2021-01-01 | End: 2021-01-01

## 2021-01-01 RX ORDER — LORAZEPAM 0.5 MG/1
0.5 TABLET ORAL ONCE
Status: COMPLETED | OUTPATIENT
Start: 2021-01-01 | End: 2021-01-01

## 2021-01-01 RX ORDER — AMLODIPINE BESYLATE 10 MG/1
10 TABLET ORAL DAILY
Qty: 30 TABLET | Refills: 0 | Status: SHIPPED
Start: 2021-01-01 | End: 2021-07-08

## 2021-01-01 RX ORDER — INSULIN LISPRO 100 [IU]/ML
INJECTION, SOLUTION INTRAVENOUS; SUBCUTANEOUS
COMMUNITY
End: 2021-01-01

## 2021-01-01 RX ORDER — TOLTERODINE 4 MG/1
4 CAPSULE, EXTENDED RELEASE ORAL DAILY
Status: DISCONTINUED | OUTPATIENT
Start: 2021-01-01 | End: 2021-01-01 | Stop reason: HOSPADM

## 2021-01-01 RX ORDER — FUROSEMIDE 40 MG/1
40 TABLET ORAL DAILY
Status: DISCONTINUED | OUTPATIENT
Start: 2021-01-01 | End: 2021-01-01

## 2021-01-01 RX ORDER — GUAIFENESIN/DEXTROMETHORPHAN 100-10MG/5
10 SYRUP ORAL EVERY 6 HOURS
Qty: 400 ML | Refills: 0 | Status: SHIPPED
Start: 2021-01-01 | End: 2021-01-01

## 2021-01-01 RX ORDER — OXYCODONE AND ACETAMINOPHEN 7.5; 325 MG/1; MG/1
1 TABLET ORAL
Status: DISCONTINUED | OUTPATIENT
Start: 2021-01-01 | End: 2021-01-01 | Stop reason: HOSPADM

## 2021-01-01 RX ORDER — CLONAZEPAM 0.25 MG/1
0.25 TABLET, ORALLY DISINTEGRATING ORAL EVERY EVENING
Qty: 30 TABLET | Refills: 0 | Status: SHIPPED | OUTPATIENT
Start: 2021-01-01 | End: 2021-01-01

## 2021-01-01 RX ORDER — PREDNISONE 20 MG/1
TABLET ORAL
Qty: 45 TAB | Refills: 0 | Status: ON HOLD | OUTPATIENT
Start: 2021-01-01 | End: 2021-01-01

## 2021-01-01 RX ORDER — ALBUTEROL SULFATE 90 UG/1
1 AEROSOL, METERED RESPIRATORY (INHALATION)
COMMUNITY
End: 2021-01-01

## 2021-01-01 RX ORDER — INSULIN GLARGINE 100 [IU]/ML
10 INJECTION, SOLUTION SUBCUTANEOUS ONCE
Status: COMPLETED | OUTPATIENT
Start: 2021-01-01 | End: 2021-01-01

## 2021-01-01 RX ORDER — LEVOTHYROXINE SODIUM 75 UG/1
75 TABLET ORAL
Status: DISCONTINUED | OUTPATIENT
Start: 2021-01-01 | End: 2021-01-01

## 2021-01-01 RX ORDER — PROCHLORPERAZINE EDISYLATE 5 MG/ML
10 INJECTION INTRAMUSCULAR; INTRAVENOUS
Status: DISCONTINUED | OUTPATIENT
Start: 2021-01-01 | End: 2021-01-01 | Stop reason: HOSPADM

## 2021-01-01 RX ORDER — ACETAMINOPHEN 325 MG/1
650 TABLET ORAL ONCE
Status: ACTIVE | OUTPATIENT
Start: 2021-01-01 | End: 2021-01-01

## 2021-01-01 RX ORDER — HYDROMORPHONE HYDROCHLORIDE 1 MG/ML
1 INJECTION, SOLUTION INTRAMUSCULAR; INTRAVENOUS; SUBCUTANEOUS
Status: DISCONTINUED | OUTPATIENT
Start: 2021-01-01 | End: 2021-01-01

## 2021-01-01 RX ORDER — FAMOTIDINE 20 MG/1
20 TABLET, FILM COATED ORAL 2 TIMES DAILY
Status: DISCONTINUED | OUTPATIENT
Start: 2021-01-01 | End: 2021-01-01

## 2021-01-01 RX ORDER — CLONAZEPAM 0.5 MG/1
0.25 TABLET ORAL EVERY EVENING
Status: DISCONTINUED | OUTPATIENT
Start: 2021-01-01 | End: 2021-01-01

## 2021-01-01 RX ORDER — BUDESONIDE AND FORMOTEROL FUMARATE DIHYDRATE 160; 4.5 UG/1; UG/1
2 AEROSOL RESPIRATORY (INHALATION)
Status: DISCONTINUED | OUTPATIENT
Start: 2021-01-01 | End: 2021-01-01 | Stop reason: HOSPADM

## 2021-01-01 RX ORDER — ACETAMINOPHEN 650 MG/1
650 SUPPOSITORY RECTAL
Status: DISCONTINUED | OUTPATIENT
Start: 2021-01-01 | End: 2021-01-01

## 2021-01-01 RX ORDER — ALBUTEROL SULFATE 0.83 MG/ML
2.5 SOLUTION RESPIRATORY (INHALATION) EVERY 6 HOURS
COMMUNITY

## 2021-01-01 RX ORDER — SODIUM CHLORIDE 0.9 % (FLUSH) 0.9 %
5-10 SYRINGE (ML) INJECTION AS NEEDED
Status: DISCONTINUED | OUTPATIENT
Start: 2021-01-01 | End: 2021-01-01 | Stop reason: HOSPADM

## 2021-01-01 RX ORDER — OXYCODONE HYDROCHLORIDE 5 MG/1
10 TABLET ORAL
Status: DISCONTINUED | OUTPATIENT
Start: 2021-01-01 | End: 2021-01-01 | Stop reason: HOSPADM

## 2021-01-01 RX ORDER — HYDROMORPHONE HYDROCHLORIDE 1 MG/ML
0.75 INJECTION, SOLUTION INTRAMUSCULAR; INTRAVENOUS; SUBCUTANEOUS
Status: DISCONTINUED | OUTPATIENT
Start: 2021-01-01 | End: 2021-01-01

## 2021-01-01 RX ORDER — HYDROXYZINE HYDROCHLORIDE 25 MG/ML
25 INJECTION, SOLUTION INTRAMUSCULAR
Status: DISCONTINUED | OUTPATIENT
Start: 2021-01-01 | End: 2021-01-01 | Stop reason: HOSPADM

## 2021-01-01 RX ORDER — GABAPENTIN 300 MG/1
600 CAPSULE ORAL 2 TIMES DAILY
Status: ON HOLD | COMMUNITY
End: 2021-01-01 | Stop reason: SDUPTHER

## 2021-01-01 RX ORDER — FLUOXETINE HYDROCHLORIDE 20 MG/1
40 CAPSULE ORAL EVERY EVENING
Status: DISCONTINUED | OUTPATIENT
Start: 2021-01-01 | End: 2021-01-01

## 2021-01-01 RX ORDER — DIPHENHYDRAMINE HYDROCHLORIDE 50 MG/ML
25 INJECTION, SOLUTION INTRAMUSCULAR; INTRAVENOUS
Status: DISCONTINUED | OUTPATIENT
Start: 2021-01-01 | End: 2021-01-01 | Stop reason: HOSPADM

## 2021-01-01 RX ORDER — ALBUTEROL SULFATE 2.5 MG/.5ML
2.5 SOLUTION RESPIRATORY (INHALATION)
Status: DISCONTINUED | OUTPATIENT
Start: 2021-01-01 | End: 2021-01-01 | Stop reason: SDUPTHER

## 2021-01-01 RX ORDER — CLONAZEPAM 0.5 MG/1
0.5 TABLET ORAL
Status: DISCONTINUED | OUTPATIENT
Start: 2021-01-01 | End: 2021-01-01

## 2021-01-01 RX ORDER — INSULIN GLARGINE 100 [IU]/ML
0.2 INJECTION, SOLUTION SUBCUTANEOUS
Status: DISCONTINUED | OUTPATIENT
Start: 2021-01-01 | End: 2021-01-01

## 2021-01-01 RX ORDER — ENOXAPARIN SODIUM 100 MG/ML
40 INJECTION SUBCUTANEOUS
Status: DISCONTINUED | OUTPATIENT
Start: 2021-01-01 | End: 2021-01-01

## 2021-01-01 RX ORDER — THERA TABS 400 MCG
1 TAB ORAL DAILY
Status: DISCONTINUED | OUTPATIENT
Start: 2021-01-01 | End: 2021-01-01 | Stop reason: HOSPADM

## 2021-01-01 RX ORDER — POLYETHYLENE GLYCOL 3350 17 G/17G
17 POWDER, FOR SOLUTION ORAL DAILY
Status: DISCONTINUED | OUTPATIENT
Start: 2021-01-01 | End: 2021-01-01 | Stop reason: HOSPADM

## 2021-01-01 RX ORDER — PREDNISONE 20 MG/1
60 TABLET ORAL
Status: DISCONTINUED | OUTPATIENT
Start: 2021-01-01 | End: 2021-01-01 | Stop reason: HOSPADM

## 2021-01-01 RX ORDER — ALBUTEROL SULFATE 0.83 MG/ML
10 SOLUTION RESPIRATORY (INHALATION)
Status: DISCONTINUED | OUTPATIENT
Start: 2021-01-01 | End: 2021-01-01 | Stop reason: SDUPTHER

## 2021-01-01 RX ORDER — INSULIN GLARGINE 100 [IU]/ML
15 INJECTION, SOLUTION SUBCUTANEOUS
Status: DISCONTINUED | OUTPATIENT
Start: 2021-01-01 | End: 2021-01-01

## 2021-01-01 RX ORDER — INSULIN GLARGINE 100 [IU]/ML
20 INJECTION, SOLUTION SUBCUTANEOUS DAILY
Status: DISCONTINUED | OUTPATIENT
Start: 2021-01-01 | End: 2021-01-01 | Stop reason: HOSPADM

## 2021-01-01 RX ORDER — GABAPENTIN 300 MG/1
600 CAPSULE ORAL 2 TIMES DAILY
Qty: 120 CAPSULE | Refills: 0 | Status: SHIPPED | OUTPATIENT
Start: 2021-01-01 | End: 2021-07-07

## 2021-01-01 RX ORDER — GUAIFENESIN 600 MG/1
600 TABLET, EXTENDED RELEASE ORAL 2 TIMES DAILY
COMMUNITY
End: 2021-01-01

## 2021-01-01 RX ORDER — DOXYCYCLINE HYCLATE 100 MG
100 TABLET ORAL 2 TIMES DAILY WITH MEALS
Status: DISCONTINUED | OUTPATIENT
Start: 2021-01-01 | End: 2021-01-01 | Stop reason: HOSPADM

## 2021-01-01 RX ORDER — NYSTATIN 100000 [USP'U]/ML
500000 SUSPENSION ORAL 4 TIMES DAILY
Status: DISCONTINUED | OUTPATIENT
Start: 2021-01-01 | End: 2021-01-01 | Stop reason: HOSPADM

## 2021-01-01 RX ORDER — HYDROCODONE BITARTRATE AND ACETAMINOPHEN 5; 325 MG/1; MG/1
1 TABLET ORAL
Status: COMPLETED | OUTPATIENT
Start: 2021-01-01 | End: 2021-01-01

## 2021-01-01 RX ORDER — PREDNISONE 5 MG/1
5 TABLET ORAL DAILY
COMMUNITY

## 2021-01-01 RX ORDER — OXYCODONE AND ACETAMINOPHEN 7.5; 325 MG/1; MG/1
1 TABLET ORAL
COMMUNITY

## 2021-01-01 RX ORDER — IPRATROPIUM BROMIDE AND ALBUTEROL SULFATE 2.5; .5 MG/3ML; MG/3ML
3 SOLUTION RESPIRATORY (INHALATION)
Qty: 30 NEBULE | Refills: 0 | Status: SHIPPED | OUTPATIENT
Start: 2021-01-01 | End: 2021-01-01

## 2021-01-01 RX ORDER — CHOLECALCIFEROL (VITAMIN D3) 125 MCG
5 CAPSULE ORAL
COMMUNITY

## 2021-01-01 RX ORDER — OXYCODONE HCL 10 MG/1
20 TABLET, FILM COATED, EXTENDED RELEASE ORAL EVERY 12 HOURS
Status: DISCONTINUED | OUTPATIENT
Start: 2021-01-01 | End: 2021-01-01

## 2021-01-01 RX ORDER — LEVOFLOXACIN 5 MG/ML
750 INJECTION, SOLUTION INTRAVENOUS ONCE
Status: COMPLETED | OUTPATIENT
Start: 2021-01-01 | End: 2021-01-01

## 2021-01-01 RX ORDER — CLONAZEPAM 0.25 MG/1
0.25 TABLET, ORALLY DISINTEGRATING ORAL 3 TIMES DAILY
COMMUNITY

## 2021-01-01 RX ORDER — ENOXAPARIN SODIUM 100 MG/ML
50 INJECTION SUBCUTANEOUS EVERY 12 HOURS
Status: DISCONTINUED | OUTPATIENT
Start: 2021-01-01 | End: 2021-01-01

## 2021-01-01 RX ORDER — POLYETHYLENE GLYCOL 3350 17 G/17G
17 POWDER, FOR SOLUTION ORAL DAILY
Qty: 30 PACKET | Refills: 0 | Status: SHIPPED
Start: 2021-01-01 | End: 2021-07-07

## 2021-01-01 RX ORDER — CLOPIDOGREL BISULFATE 75 MG/1
75 TABLET ORAL DAILY
Qty: 30 TAB | Refills: 0 | Status: SHIPPED | OUTPATIENT
Start: 2021-01-01 | End: 2021-01-01

## 2021-01-01 RX ORDER — LEVOFLOXACIN 5 MG/ML
750 INJECTION, SOLUTION INTRAVENOUS
Status: DISCONTINUED | OUTPATIENT
Start: 2021-06-26 | End: 2021-01-01

## 2021-01-01 RX ORDER — CALCIUM CARBONATE/VITAMIN D3 600 MG-125
1 TABLET ORAL DAILY
Status: ON HOLD | COMMUNITY
End: 2021-01-01

## 2021-01-01 RX ADMIN — OXYCODONE HYDROCHLORIDE 20 MG: 20 TABLET, FILM COATED, EXTENDED RELEASE ORAL at 08:30

## 2021-01-01 RX ADMIN — MORPHINE SULFATE 2 MG: 2 INJECTION, SOLUTION INTRAMUSCULAR; INTRAVENOUS at 08:07

## 2021-01-01 RX ADMIN — Medication 10 ML: at 14:00

## 2021-01-01 RX ADMIN — PANTOPRAZOLE SODIUM 40 MG: 40 TABLET, DELAYED RELEASE ORAL at 06:56

## 2021-01-01 RX ADMIN — Medication 10 ML: at 14:52

## 2021-01-01 RX ADMIN — NYSTATIN 500000 UNITS: 100000 SUSPENSION ORAL at 09:23

## 2021-01-01 RX ADMIN — ARFORMOTEROL TARTRATE 15 MCG: 15 SOLUTION RESPIRATORY (INHALATION) at 11:11

## 2021-01-01 RX ADMIN — DIPHENHYDRAMINE HYDROCHLORIDE AND LIDOCAINE HYDROCHLORIDE AND ALUMINUM HYDROXIDE AND MAGNESIUM HYDRO 10 ML: KIT at 20:56

## 2021-01-01 RX ADMIN — ARFORMOTEROL TARTRATE: 15 SOLUTION RESPIRATORY (INHALATION) at 20:55

## 2021-01-01 RX ADMIN — LORAZEPAM 0.5 MG: 2 INJECTION INTRAMUSCULAR; INTRAVENOUS at 09:24

## 2021-01-01 RX ADMIN — Medication 10 ML: at 05:02

## 2021-01-01 RX ADMIN — PANTOPRAZOLE SODIUM 40 MG: 40 TABLET, DELAYED RELEASE ORAL at 08:29

## 2021-01-01 RX ADMIN — Medication 10 ML: at 15:30

## 2021-01-01 RX ADMIN — NYSTATIN 500000 UNITS: 100000 SUSPENSION ORAL at 19:23

## 2021-01-01 RX ADMIN — NYSTATIN 500000 UNITS: 100000 SUSPENSION ORAL at 14:57

## 2021-01-01 RX ADMIN — PROMETHAZINE HYDROCHLORIDE 12.5 MG: 25 INJECTION INTRAMUSCULAR; INTRAVENOUS at 15:19

## 2021-01-01 RX ADMIN — GUAIFENESIN AND DEXTROMETHORPHAN 10 ML: 100; 10 SYRUP ORAL at 05:09

## 2021-01-01 RX ADMIN — ARFORMOTEROL TARTRATE: 15 SOLUTION RESPIRATORY (INHALATION) at 19:40

## 2021-01-01 RX ADMIN — GABAPENTIN 600 MG: 300 CAPSULE ORAL at 10:01

## 2021-01-01 RX ADMIN — FLUOXETINE 10 MG: 10 CAPSULE ORAL at 18:55

## 2021-01-01 RX ADMIN — Medication 81 MG: at 09:33

## 2021-01-01 RX ADMIN — Medication 10 ML: at 21:38

## 2021-01-01 RX ADMIN — FLUOXETINE 20 MG: 20 CAPSULE ORAL at 19:23

## 2021-01-01 RX ADMIN — NYSTATIN 500000 UNITS: 100000 SUSPENSION ORAL at 08:12

## 2021-01-01 RX ADMIN — LORAZEPAM 1 MG: 2 INJECTION INTRAMUSCULAR; INTRAVENOUS at 15:44

## 2021-01-01 RX ADMIN — INSULIN LISPRO 2 UNITS: 100 INJECTION, SOLUTION INTRAVENOUS; SUBCUTANEOUS at 09:52

## 2021-01-01 RX ADMIN — ARFORMOTEROL TARTRATE: 15 SOLUTION RESPIRATORY (INHALATION) at 07:34

## 2021-01-01 RX ADMIN — DEXAMETHASONE SODIUM PHOSPHATE 10 MG: 10 INJECTION, SOLUTION INTRAMUSCULAR; INTRAVENOUS at 17:28

## 2021-01-01 RX ADMIN — OXYCODONE HYDROCHLORIDE 5 MG: 5 TABLET ORAL at 21:24

## 2021-01-01 RX ADMIN — PANTOPRAZOLE SODIUM 40 MG: 40 TABLET, DELAYED RELEASE ORAL at 07:59

## 2021-01-01 RX ADMIN — PREDNISONE 40 MG: 20 TABLET ORAL at 16:28

## 2021-01-01 RX ADMIN — FLUOXETINE 10 MG: 10 CAPSULE ORAL at 17:28

## 2021-01-01 RX ADMIN — ENOXAPARIN SODIUM 40 MG: 40 INJECTION SUBCUTANEOUS at 11:40

## 2021-01-01 RX ADMIN — IPRATROPIUM BROMIDE AND ALBUTEROL SULFATE 3 ML: .5; 3 SOLUTION RESPIRATORY (INHALATION) at 07:30

## 2021-01-01 RX ADMIN — GABAPENTIN 600 MG: 300 CAPSULE ORAL at 08:19

## 2021-01-01 RX ADMIN — LORAZEPAM 0.5 MG: 2 INJECTION INTRAMUSCULAR; INTRAVENOUS at 11:40

## 2021-01-01 RX ADMIN — OXYCODONE HYDROCHLORIDE AND ACETAMINOPHEN 1 TABLET: 5; 325 TABLET ORAL at 12:32

## 2021-01-01 RX ADMIN — PANTOPRAZOLE SODIUM 40 MG: 40 TABLET, DELAYED RELEASE ORAL at 06:47

## 2021-01-01 RX ADMIN — VANCOMYCIN HYDROCHLORIDE 750 MG: 750 INJECTION, POWDER, LYOPHILIZED, FOR SOLUTION INTRAVENOUS at 03:35

## 2021-01-01 RX ADMIN — OXYCODONE HYDROCHLORIDE 20 MG: 20 TABLET, FILM COATED, EXTENDED RELEASE ORAL at 21:35

## 2021-01-01 RX ADMIN — LORAZEPAM 0.5 MG: 2 INJECTION INTRAMUSCULAR; INTRAVENOUS at 11:22

## 2021-01-01 RX ADMIN — INSULIN GLARGINE 16 UNITS: 100 INJECTION, SOLUTION SUBCUTANEOUS at 11:29

## 2021-01-01 RX ADMIN — Medication 10 ML: at 06:37

## 2021-01-01 RX ADMIN — VANCOMYCIN HYDROCHLORIDE 1000 MG: 1 INJECTION, POWDER, LYOPHILIZED, FOR SOLUTION INTRAVENOUS at 09:28

## 2021-01-01 RX ADMIN — GUAIFENESIN AND DEXTROMETHORPHAN 10 ML: 100; 10 SYRUP ORAL at 07:10

## 2021-01-01 RX ADMIN — GABAPENTIN 600 MG: 300 CAPSULE ORAL at 22:36

## 2021-01-01 RX ADMIN — GLYCOPYRROLATE 0.2 MG: 0.2 INJECTION INTRAMUSCULAR; INTRAVENOUS at 22:10

## 2021-01-01 RX ADMIN — PANTOPRAZOLE SODIUM 40 MG: 40 TABLET, DELAYED RELEASE ORAL at 15:39

## 2021-01-01 RX ADMIN — GABAPENTIN 600 MG: 300 CAPSULE ORAL at 21:32

## 2021-01-01 RX ADMIN — METHYLPREDNISOLONE SODIUM SUCCINATE 40 MG: 40 INJECTION, POWDER, FOR SOLUTION INTRAMUSCULAR; INTRAVENOUS at 23:06

## 2021-01-01 RX ADMIN — INSULIN GLARGINE 16 UNITS: 100 INJECTION, SOLUTION SUBCUTANEOUS at 10:09

## 2021-01-01 RX ADMIN — OXYCODONE HYDROCHLORIDE 20 MG: 20 TABLET, FILM COATED, EXTENDED RELEASE ORAL at 08:18

## 2021-01-01 RX ADMIN — Medication 81 MG: at 08:57

## 2021-01-01 RX ADMIN — GABAPENTIN 600 MG: 300 CAPSULE ORAL at 08:28

## 2021-01-01 RX ADMIN — INSULIN GLARGINE 12 UNITS: 100 INJECTION, SOLUTION SUBCUTANEOUS at 21:35

## 2021-01-01 RX ADMIN — IPRATROPIUM BROMIDE AND ALBUTEROL SULFATE 3 ML: .5; 3 SOLUTION RESPIRATORY (INHALATION) at 11:33

## 2021-01-01 RX ADMIN — AZTREONAM 1 G: 2 INJECTION, POWDER, LYOPHILIZED, FOR SOLUTION INTRAMUSCULAR; INTRAVENOUS at 06:08

## 2021-01-01 RX ADMIN — PANTOPRAZOLE SODIUM 40 MG: 40 TABLET, DELAYED RELEASE ORAL at 07:05

## 2021-01-01 RX ADMIN — ALPRAZOLAM 0.25 MG: 0.25 TABLET ORAL at 00:02

## 2021-01-01 RX ADMIN — INSULIN LISPRO 2 UNITS: 100 INJECTION, SOLUTION INTRAVENOUS; SUBCUTANEOUS at 21:43

## 2021-01-01 RX ADMIN — BUDESONIDE 500 MCG: 0.5 SUSPENSION RESPIRATORY (INHALATION) at 07:12

## 2021-01-01 RX ADMIN — OXYCODONE HYDROCHLORIDE 10 MG: 10 TABLET, FILM COATED, EXTENDED RELEASE ORAL at 21:07

## 2021-01-01 RX ADMIN — LATANOPROST 1 DROP: 50 SOLUTION OPHTHALMIC at 22:17

## 2021-01-01 RX ADMIN — OXYCODONE HYDROCHLORIDE 20 MG: 20 TABLET, FILM COATED, EXTENDED RELEASE ORAL at 16:14

## 2021-01-01 RX ADMIN — GUAIFENESIN 600 MG: 600 TABLET ORAL at 22:43

## 2021-01-01 RX ADMIN — ONDANSETRON 4 MG: 2 INJECTION INTRAMUSCULAR; INTRAVENOUS at 19:03

## 2021-01-01 RX ADMIN — HYDROMORPHONE HYDROCHLORIDE 0.75 MG: 1 INJECTION, SOLUTION INTRAMUSCULAR; INTRAVENOUS; SUBCUTANEOUS at 08:31

## 2021-01-01 RX ADMIN — OXYCODONE HYDROCHLORIDE 20 MG: 20 TABLET, FILM COATED, EXTENDED RELEASE ORAL at 21:27

## 2021-01-01 RX ADMIN — NYSTATIN 500000 UNITS: 100000 SUSPENSION ORAL at 23:06

## 2021-01-01 RX ADMIN — HYDROMORPHONE HYDROCHLORIDE 0.5 MG: 1 INJECTION, SOLUTION INTRAMUSCULAR; INTRAVENOUS; SUBCUTANEOUS at 08:02

## 2021-01-01 RX ADMIN — DOCUSATE SODIUM 50MG AND SENNOSIDES 8.6MG 2 TABLET: 8.6; 5 TABLET, FILM COATED ORAL at 23:03

## 2021-01-01 RX ADMIN — INSULIN LISPRO 3 UNITS: 100 INJECTION, SOLUTION INTRAVENOUS; SUBCUTANEOUS at 09:20

## 2021-01-01 RX ADMIN — OXYCODONE HYDROCHLORIDE AND ACETAMINOPHEN 1 TABLET: 5; 325 TABLET ORAL at 05:14

## 2021-01-01 RX ADMIN — Medication 10 ML: at 06:15

## 2021-01-01 RX ADMIN — OXYCODONE AND ACETAMINOPHEN 1 TABLET: 7.5; 325 TABLET ORAL at 09:10

## 2021-01-01 RX ADMIN — Medication 1 CAPSULE: at 08:19

## 2021-01-01 RX ADMIN — ONDANSETRON 4 MG: 2 INJECTION INTRAMUSCULAR; INTRAVENOUS at 11:49

## 2021-01-01 RX ADMIN — GABAPENTIN 600 MG: 300 CAPSULE ORAL at 20:36

## 2021-01-01 RX ADMIN — KETOROLAC TROMETHAMINE 15 MG: 30 INJECTION, SOLUTION INTRAMUSCULAR at 11:55

## 2021-01-01 RX ADMIN — LATANOPROST 1 DROP: 50 SOLUTION OPHTHALMIC at 22:00

## 2021-01-01 RX ADMIN — OXYCODONE 5 MG: 5 TABLET ORAL at 15:02

## 2021-01-01 RX ADMIN — ATORVASTATIN CALCIUM 80 MG: 20 TABLET, FILM COATED ORAL at 22:09

## 2021-01-01 RX ADMIN — Medication 10 ML: at 07:10

## 2021-01-01 RX ADMIN — Medication 81 MG: at 08:12

## 2021-01-01 RX ADMIN — ARFORMOTEROL TARTRATE: 15 SOLUTION RESPIRATORY (INHALATION) at 20:33

## 2021-01-01 RX ADMIN — VANCOMYCIN HYDROCHLORIDE 750 MG: 750 INJECTION, POWDER, LYOPHILIZED, FOR SOLUTION INTRAVENOUS at 16:03

## 2021-01-01 RX ADMIN — ASPIRIN 81 MG: 81 TABLET, CHEWABLE ORAL at 10:01

## 2021-01-01 RX ADMIN — INSULIN GLARGINE 13 UNITS: 100 INJECTION, SOLUTION SUBCUTANEOUS at 21:28

## 2021-01-01 RX ADMIN — Medication 5 MG: at 23:03

## 2021-01-01 RX ADMIN — IPRATROPIUM BROMIDE AND ALBUTEROL SULFATE 3 ML: .5; 3 SOLUTION RESPIRATORY (INHALATION) at 01:53

## 2021-01-01 RX ADMIN — CLONAZEPAM 0.25 MG: 0.5 TABLET ORAL at 19:23

## 2021-01-01 RX ADMIN — MORPHINE SULFATE 2 MG: 2 INJECTION, SOLUTION INTRAMUSCULAR; INTRAVENOUS at 14:49

## 2021-01-01 RX ADMIN — AMLODIPINE BESYLATE 10 MG: 5 TABLET ORAL at 08:30

## 2021-01-01 RX ADMIN — LATANOPROST 1 DROP: 50 SOLUTION OPHTHALMIC at 21:43

## 2021-01-01 RX ADMIN — OXYCODONE HYDROCHLORIDE 20 MG: 20 TABLET, FILM COATED, EXTENDED RELEASE ORAL at 08:04

## 2021-01-01 RX ADMIN — LORAZEPAM 0.5 MG: 2 INJECTION INTRAMUSCULAR; INTRAVENOUS at 16:21

## 2021-01-01 RX ADMIN — ACETAMINOPHEN 650 MG: 325 TABLET ORAL at 10:23

## 2021-01-01 RX ADMIN — GUAIFENESIN 600 MG: 600 TABLET ORAL at 23:36

## 2021-01-01 RX ADMIN — AMLODIPINE BESYLATE 10 MG: 5 TABLET ORAL at 09:31

## 2021-01-01 RX ADMIN — GUAIFENESIN 600 MG: 600 TABLET ORAL at 21:26

## 2021-01-01 RX ADMIN — METHYLPREDNISOLONE SODIUM SUCCINATE 20 MG: 40 INJECTION, POWDER, FOR SOLUTION INTRAMUSCULAR; INTRAVENOUS at 21:40

## 2021-01-01 RX ADMIN — IPRATROPIUM BROMIDE AND ALBUTEROL SULFATE 3 ML: .5; 2.5 SOLUTION RESPIRATORY (INHALATION) at 01:09

## 2021-01-01 RX ADMIN — MORPHINE SULFATE 2 MG: 2 INJECTION, SOLUTION INTRAMUSCULAR; INTRAVENOUS at 21:58

## 2021-01-01 RX ADMIN — ATORVASTATIN CALCIUM 80 MG: 20 TABLET, FILM COATED ORAL at 20:55

## 2021-01-01 RX ADMIN — LORAZEPAM 1 MG: 2 INJECTION INTRAMUSCULAR; INTRAVENOUS at 13:32

## 2021-01-01 RX ADMIN — ARFORMOTEROL TARTRATE: 15 SOLUTION RESPIRATORY (INHALATION) at 21:10

## 2021-01-01 RX ADMIN — GABAPENTIN 600 MG: 300 CAPSULE ORAL at 21:50

## 2021-01-01 RX ADMIN — LORAZEPAM 0.5 MG: 2 INJECTION INTRAMUSCULAR; INTRAVENOUS at 23:36

## 2021-01-01 RX ADMIN — Medication 400 MG: at 08:56

## 2021-01-01 RX ADMIN — LORAZEPAM 0.5 MG: 2 INJECTION INTRAMUSCULAR; INTRAVENOUS at 08:00

## 2021-01-01 RX ADMIN — OXYCODONE HYDROCHLORIDE AND ACETAMINOPHEN 1 TABLET: 5; 325 TABLET ORAL at 12:05

## 2021-01-01 RX ADMIN — MORPHINE SULFATE 2 MG: 2 INJECTION, SOLUTION INTRAMUSCULAR; INTRAVENOUS at 09:32

## 2021-01-01 RX ADMIN — CEFEPIME HYDROCHLORIDE 2 G: 2 INJECTION, POWDER, FOR SOLUTION INTRAVENOUS at 21:31

## 2021-01-01 RX ADMIN — ALPRAZOLAM 0.25 MG: 0.25 TABLET ORAL at 02:58

## 2021-01-01 RX ADMIN — ALBUTEROL SULFATE 4 PUFF: 90 AEROSOL, METERED RESPIRATORY (INHALATION) at 23:29

## 2021-01-01 RX ADMIN — GUAIFENESIN AND DEXTROMETHORPHAN 10 ML: 100; 10 SYRUP ORAL at 06:14

## 2021-01-01 RX ADMIN — HYDROMORPHONE HYDROCHLORIDE 1 MG: 1 INJECTION, SOLUTION INTRAMUSCULAR; INTRAVENOUS; SUBCUTANEOUS at 01:47

## 2021-01-01 RX ADMIN — HYDROMORPHONE HYDROCHLORIDE 1 MG: 1 INJECTION, SOLUTION INTRAMUSCULAR; INTRAVENOUS; SUBCUTANEOUS at 15:45

## 2021-01-01 RX ADMIN — CLONAZEPAM 0.25 MG: 0.5 TABLET ORAL at 21:01

## 2021-01-01 RX ADMIN — ENOXAPARIN SODIUM 40 MG: 40 INJECTION SUBCUTANEOUS at 13:20

## 2021-01-01 RX ADMIN — AMLODIPINE BESYLATE 10 MG: 5 TABLET ORAL at 08:58

## 2021-01-01 RX ADMIN — Medication 10 ML: at 06:13

## 2021-01-01 RX ADMIN — OXYCODONE HYDROCHLORIDE AND ACETAMINOPHEN 1 TABLET: 5; 325 TABLET ORAL at 08:10

## 2021-01-01 RX ADMIN — OXYCODONE AND ACETAMINOPHEN 1 TABLET: 7.5; 325 TABLET ORAL at 11:45

## 2021-01-01 RX ADMIN — SODIUM CHLORIDE 100 ML/HR: 9 INJECTION, SOLUTION INTRAVENOUS at 10:45

## 2021-01-01 RX ADMIN — ARFORMOTEROL TARTRATE: 15 SOLUTION RESPIRATORY (INHALATION) at 07:36

## 2021-01-01 RX ADMIN — GLYCOPYRROLATE 0.2 MG: 0.2 INJECTION INTRAMUSCULAR; INTRAVENOUS at 13:31

## 2021-01-01 RX ADMIN — Medication 10 ML: at 06:57

## 2021-01-01 RX ADMIN — OXYCODONE AND ACETAMINOPHEN 1 TABLET: 7.5; 325 TABLET ORAL at 01:33

## 2021-01-01 RX ADMIN — NYSTATIN 500000 UNITS: 100000 SUSPENSION ORAL at 09:10

## 2021-01-01 RX ADMIN — PANTOPRAZOLE SODIUM 40 MG: 40 INJECTION, POWDER, FOR SOLUTION INTRAVENOUS at 20:27

## 2021-01-01 RX ADMIN — Medication 10 ML: at 13:35

## 2021-01-01 RX ADMIN — HYDROMORPHONE HYDROCHLORIDE 1 MG: 1 INJECTION, SOLUTION INTRAMUSCULAR; INTRAVENOUS; SUBCUTANEOUS at 02:55

## 2021-01-01 RX ADMIN — OXYCODONE HYDROCHLORIDE 20 MG: 20 TABLET, FILM COATED, EXTENDED RELEASE ORAL at 21:45

## 2021-01-01 RX ADMIN — IPRATROPIUM BROMIDE AND ALBUTEROL SULFATE 3 ML: .5; 3 SOLUTION RESPIRATORY (INHALATION) at 06:23

## 2021-01-01 RX ADMIN — PANTOPRAZOLE SODIUM 40 MG: 40 TABLET, DELAYED RELEASE ORAL at 08:04

## 2021-01-01 RX ADMIN — VANCOMYCIN HYDROCHLORIDE 1000 MG: 1 INJECTION, POWDER, LYOPHILIZED, FOR SOLUTION INTRAVENOUS at 20:14

## 2021-01-01 RX ADMIN — GABAPENTIN 600 MG: 300 CAPSULE ORAL at 10:13

## 2021-01-01 RX ADMIN — INSULIN LISPRO 4 UNITS: 100 INJECTION, SOLUTION INTRAVENOUS; SUBCUTANEOUS at 11:40

## 2021-01-01 RX ADMIN — OXYCODONE HYDROCHLORIDE AND ACETAMINOPHEN 1 TABLET: 5; 325 TABLET ORAL at 23:01

## 2021-01-01 RX ADMIN — VANCOMYCIN HYDROCHLORIDE 1000 MG: 1 INJECTION, POWDER, LYOPHILIZED, FOR SOLUTION INTRAVENOUS at 12:22

## 2021-01-01 RX ADMIN — OXYCODONE HYDROCHLORIDE 10 MG: 10 TABLET, FILM COATED, EXTENDED RELEASE ORAL at 22:04

## 2021-01-01 RX ADMIN — ARFORMOTEROL TARTRATE: 15 SOLUTION RESPIRATORY (INHALATION) at 06:33

## 2021-01-01 RX ADMIN — ACETAMINOPHEN 650 MG: 325 TABLET ORAL at 21:08

## 2021-01-01 RX ADMIN — LORAZEPAM 0.5 MG: 2 INJECTION INTRAMUSCULAR; INTRAVENOUS at 15:07

## 2021-01-01 RX ADMIN — BUMETANIDE 1 MG: 0.25 INJECTION INTRAMUSCULAR; INTRAVENOUS at 19:19

## 2021-01-01 RX ADMIN — OXYCODONE HYDROCHLORIDE AND ACETAMINOPHEN 1 TABLET: 5; 325 TABLET ORAL at 08:28

## 2021-01-01 RX ADMIN — LORAZEPAM 0.5 MG: 2 INJECTION INTRAMUSCULAR; INTRAVENOUS at 03:06

## 2021-01-01 RX ADMIN — METHYLPREDNISOLONE SODIUM SUCCINATE 40 MG: 40 INJECTION, POWDER, FOR SOLUTION INTRAMUSCULAR; INTRAVENOUS at 17:33

## 2021-01-01 RX ADMIN — OXYCODONE HYDROCHLORIDE 10 MG: 10 TABLET, FILM COATED, EXTENDED RELEASE ORAL at 12:25

## 2021-01-01 RX ADMIN — LATANOPROST 1 DROP: 50 SOLUTION OPHTHALMIC at 20:37

## 2021-01-01 RX ADMIN — INSULIN LISPRO 2 UNITS: 100 INJECTION, SOLUTION INTRAVENOUS; SUBCUTANEOUS at 08:19

## 2021-01-01 RX ADMIN — OXYCODONE HYDROCHLORIDE AND ACETAMINOPHEN 1 TABLET: 5; 325 TABLET ORAL at 17:33

## 2021-01-01 RX ADMIN — GUAIFENESIN AND DEXTROMETHORPHAN 10 ML: 100; 10 SYRUP ORAL at 09:01

## 2021-01-01 RX ADMIN — GABAPENTIN 600 MG: 300 CAPSULE ORAL at 09:23

## 2021-01-01 RX ADMIN — ATORVASTATIN CALCIUM 80 MG: 20 TABLET, FILM COATED ORAL at 21:06

## 2021-01-01 RX ADMIN — GLYCOPYRROLATE 0.2 MG: 0.2 INJECTION INTRAMUSCULAR; INTRAVENOUS at 22:07

## 2021-01-01 RX ADMIN — LEVOTHYROXINE SODIUM 75 MCG: 0.07 TABLET ORAL at 07:10

## 2021-01-01 RX ADMIN — IPRATROPIUM BROMIDE AND ALBUTEROL SULFATE 3 ML: .5; 2.5 SOLUTION RESPIRATORY (INHALATION) at 07:20

## 2021-01-01 RX ADMIN — OXYCODONE HYDROCHLORIDE AND ACETAMINOPHEN 1 TABLET: 5; 325 TABLET ORAL at 18:47

## 2021-01-01 RX ADMIN — NYSTATIN 500000 UNITS: 100000 SUSPENSION ORAL at 17:33

## 2021-01-01 RX ADMIN — IPRATROPIUM BROMIDE AND ALBUTEROL SULFATE 3 ML: .5; 2.5 SOLUTION RESPIRATORY (INHALATION) at 12:06

## 2021-01-01 RX ADMIN — DOCUSATE SODIUM 100 MG: 100 CAPSULE, LIQUID FILLED ORAL at 17:22

## 2021-01-01 RX ADMIN — CEFEPIME HYDROCHLORIDE 2 G: 2 INJECTION, POWDER, FOR SOLUTION INTRAVENOUS at 21:44

## 2021-01-01 RX ADMIN — GABAPENTIN 600 MG: 300 CAPSULE ORAL at 11:07

## 2021-01-01 RX ADMIN — GUAIFENESIN 200 MG: 200 SOLUTION ORAL at 12:32

## 2021-01-01 RX ADMIN — SODIUM CHLORIDE 100 ML/HR: 9 INJECTION, SOLUTION INTRAVENOUS at 01:46

## 2021-01-01 RX ADMIN — Medication 10 ML: at 06:56

## 2021-01-01 RX ADMIN — ARFORMOTEROL TARTRATE: 15 SOLUTION RESPIRATORY (INHALATION) at 09:07

## 2021-01-01 RX ADMIN — HYDROMORPHONE HYDROCHLORIDE 0.5 MG: 1 INJECTION, SOLUTION INTRAMUSCULAR; INTRAVENOUS; SUBCUTANEOUS at 13:14

## 2021-01-01 RX ADMIN — Medication 10 ML: at 23:06

## 2021-01-01 RX ADMIN — Medication 5 MG: at 20:46

## 2021-01-01 RX ADMIN — HYDRALAZINE HYDROCHLORIDE 10 MG: 20 INJECTION INTRAMUSCULAR; INTRAVENOUS at 16:22

## 2021-01-01 RX ADMIN — Medication 10 ML: at 21:50

## 2021-01-01 RX ADMIN — Medication 1 CAPSULE: at 08:04

## 2021-01-01 RX ADMIN — PANTOPRAZOLE SODIUM 40 MG: 40 TABLET, DELAYED RELEASE ORAL at 10:01

## 2021-01-01 RX ADMIN — ENOXAPARIN SODIUM 40 MG: 40 INJECTION SUBCUTANEOUS at 13:37

## 2021-01-01 RX ADMIN — GABAPENTIN 600 MG: 300 CAPSULE ORAL at 09:32

## 2021-01-01 RX ADMIN — OXYCODONE HYDROCHLORIDE 20 MG: 20 TABLET, FILM COATED, EXTENDED RELEASE ORAL at 22:02

## 2021-01-01 RX ADMIN — GLYCOPYRROLATE 0.2 MG: 0.2 INJECTION INTRAMUSCULAR; INTRAVENOUS at 06:17

## 2021-01-01 RX ADMIN — OXYCODONE 5 MG: 5 TABLET ORAL at 05:05

## 2021-01-01 RX ADMIN — ALPRAZOLAM 0.25 MG: 0.25 TABLET ORAL at 11:08

## 2021-01-01 RX ADMIN — Medication 10 ML: at 09:27

## 2021-01-01 RX ADMIN — ACETAMINOPHEN 650 MG: 325 TABLET ORAL at 04:27

## 2021-01-01 RX ADMIN — ENOXAPARIN SODIUM 40 MG: 40 INJECTION SUBCUTANEOUS at 12:06

## 2021-01-01 RX ADMIN — GABAPENTIN 600 MG: 300 CAPSULE ORAL at 21:49

## 2021-01-01 RX ADMIN — LORAZEPAM 0.5 MG: 2 INJECTION INTRAMUSCULAR; INTRAVENOUS at 01:48

## 2021-01-01 RX ADMIN — VANCOMYCIN HYDROCHLORIDE 1500 MG: 10 INJECTION, POWDER, LYOPHILIZED, FOR SOLUTION INTRAVENOUS at 02:01

## 2021-01-01 RX ADMIN — FLUOXETINE 20 MG: 20 CAPSULE ORAL at 17:37

## 2021-01-01 RX ADMIN — ARFORMOTEROL TARTRATE 15 MCG: 15 SOLUTION RESPIRATORY (INHALATION) at 07:42

## 2021-01-01 RX ADMIN — IPRATROPIUM BROMIDE AND ALBUTEROL SULFATE 3 ML: .5; 2.5 SOLUTION RESPIRATORY (INHALATION) at 08:18

## 2021-01-01 RX ADMIN — ARFORMOTEROL TARTRATE 15 MCG: 15 SOLUTION RESPIRATORY (INHALATION) at 07:47

## 2021-01-01 RX ADMIN — OXYCODONE HYDROCHLORIDE 10 MG: 10 TABLET, FILM COATED, EXTENDED RELEASE ORAL at 13:09

## 2021-01-01 RX ADMIN — BACITRACIN 1 PACKET: 500 OINTMENT TOPICAL at 18:42

## 2021-01-01 RX ADMIN — INSULIN LISPRO 15 UNITS: 100 INJECTION, SOLUTION INTRAVENOUS; SUBCUTANEOUS at 23:06

## 2021-01-01 RX ADMIN — IPRATROPIUM BROMIDE AND ALBUTEROL SULFATE 3 ML: .5; 3 SOLUTION RESPIRATORY (INHALATION) at 07:19

## 2021-01-01 RX ADMIN — INSULIN LISPRO 4 UNITS: 100 INJECTION, SOLUTION INTRAVENOUS; SUBCUTANEOUS at 16:29

## 2021-01-01 RX ADMIN — SODIUM CHLORIDE 40 MG: 9 INJECTION INTRAMUSCULAR; INTRAVENOUS; SUBCUTANEOUS at 15:19

## 2021-01-01 RX ADMIN — SODIUM CHLORIDE 100 ML/HR: 9 INJECTION, SOLUTION INTRAVENOUS at 14:07

## 2021-01-01 RX ADMIN — OXYCODONE HYDROCHLORIDE 10 MG: 10 TABLET, FILM COATED, EXTENDED RELEASE ORAL at 08:15

## 2021-01-01 RX ADMIN — IPRATROPIUM BROMIDE AND ALBUTEROL SULFATE 3 ML: .5; 2.5 SOLUTION RESPIRATORY (INHALATION) at 00:26

## 2021-01-01 RX ADMIN — INSULIN LISPRO 3 UNITS: 100 INJECTION, SOLUTION INTRAVENOUS; SUBCUTANEOUS at 16:21

## 2021-01-01 RX ADMIN — BACITRACIN 1 PACKET: 500 OINTMENT TOPICAL at 10:01

## 2021-01-01 RX ADMIN — IPRATROPIUM BROMIDE AND ALBUTEROL SULFATE 3 ML: .5; 2.5 SOLUTION RESPIRATORY (INHALATION) at 13:47

## 2021-01-01 RX ADMIN — AZTREONAM 1 G: 2 INJECTION, POWDER, LYOPHILIZED, FOR SOLUTION INTRAMUSCULAR; INTRAVENOUS at 16:26

## 2021-01-01 RX ADMIN — FLUOXETINE 10 MG: 10 CAPSULE ORAL at 18:00

## 2021-01-01 RX ADMIN — IOPAMIDOL 140 ML: 755 INJECTION, SOLUTION INTRAVENOUS at 16:59

## 2021-01-01 RX ADMIN — AMLODIPINE BESYLATE 10 MG: 5 TABLET ORAL at 09:10

## 2021-01-01 RX ADMIN — CLONAZEPAM 0.25 MG: 0.5 TABLET ORAL at 18:47

## 2021-01-01 RX ADMIN — OXYCODONE HYDROCHLORIDE 20 MG: 20 TABLET, FILM COATED, EXTENDED RELEASE ORAL at 20:55

## 2021-01-01 RX ADMIN — GABAPENTIN 600 MG: 300 CAPSULE ORAL at 20:56

## 2021-01-01 RX ADMIN — METHYLPREDNISOLONE SODIUM SUCCINATE 40 MG: 40 INJECTION, POWDER, FOR SOLUTION INTRAMUSCULAR; INTRAVENOUS at 06:45

## 2021-01-01 RX ADMIN — CEFEPIME HYDROCHLORIDE 2 G: 2 INJECTION, POWDER, FOR SOLUTION INTRAVENOUS at 23:36

## 2021-01-01 RX ADMIN — METHYLPREDNISOLONE SODIUM SUCCINATE 40 MG: 40 INJECTION, POWDER, FOR SOLUTION INTRAMUSCULAR; INTRAVENOUS at 06:56

## 2021-01-01 RX ADMIN — GUAIFENESIN 600 MG: 600 TABLET ORAL at 09:03

## 2021-01-01 RX ADMIN — GUAIFENESIN AND DEXTROMETHORPHAN 10 ML: 100; 10 SYRUP ORAL at 01:05

## 2021-01-01 RX ADMIN — IPRATROPIUM BROMIDE AND ALBUTEROL SULFATE 3 ML: .5; 3 SOLUTION RESPIRATORY (INHALATION) at 08:30

## 2021-01-01 RX ADMIN — HYDROMORPHONE HYDROCHLORIDE 0.75 MG: 1 INJECTION, SOLUTION INTRAMUSCULAR; INTRAVENOUS; SUBCUTANEOUS at 15:53

## 2021-01-01 RX ADMIN — GABAPENTIN 600 MG: 300 CAPSULE ORAL at 09:41

## 2021-01-01 RX ADMIN — PREDNISONE 5 MG: 5 TABLET ORAL at 10:16

## 2021-01-01 RX ADMIN — ARFORMOTEROL TARTRATE: 15 SOLUTION RESPIRATORY (INHALATION) at 07:24

## 2021-01-01 RX ADMIN — NYSTATIN 500000 UNITS: 100000 SUSPENSION ORAL at 22:37

## 2021-01-01 RX ADMIN — Medication 10 ML: at 23:02

## 2021-01-01 RX ADMIN — FAMOTIDINE 20 MG: 20 TABLET, FILM COATED ORAL at 08:07

## 2021-01-01 RX ADMIN — CEFTRIAXONE 1 G: 1 INJECTION, POWDER, FOR SOLUTION INTRAMUSCULAR; INTRAVENOUS at 23:29

## 2021-01-01 RX ADMIN — IPRATROPIUM BROMIDE AND ALBUTEROL SULFATE 3 ML: .5; 2.5 SOLUTION RESPIRATORY (INHALATION) at 20:53

## 2021-01-01 RX ADMIN — GABAPENTIN 600 MG: 300 CAPSULE ORAL at 21:06

## 2021-01-01 RX ADMIN — HYDROMORPHONE HYDROCHLORIDE 2 MG: 2 INJECTION, SOLUTION INTRAMUSCULAR; INTRAVENOUS; SUBCUTANEOUS at 17:14

## 2021-01-01 RX ADMIN — INSULIN LISPRO 3 UNITS: 100 INJECTION, SOLUTION INTRAVENOUS; SUBCUTANEOUS at 08:12

## 2021-01-01 RX ADMIN — APIXABAN 10 MG: 5 TABLET, FILM COATED ORAL at 15:03

## 2021-01-01 RX ADMIN — TROSPIUM CHLORIDE 20 MG: 20 TABLET, FILM COATED ORAL at 11:35

## 2021-01-01 RX ADMIN — Medication 81 MG: at 09:03

## 2021-01-01 RX ADMIN — NITROGLYCERIN 0.4 MG: 0.4 TABLET, ORALLY DISINTEGRATING SUBLINGUAL at 06:47

## 2021-01-01 RX ADMIN — Medication 10 ML: at 23:21

## 2021-01-01 RX ADMIN — GLYCOPYRROLATE 0.2 MG: 0.2 INJECTION INTRAMUSCULAR; INTRAVENOUS at 09:30

## 2021-01-01 RX ADMIN — SODIUM CHLORIDE 100 ML/HR: 9 INJECTION, SOLUTION INTRAVENOUS at 22:18

## 2021-01-01 RX ADMIN — CLONAZEPAM 0.25 MG: 0.5 TABLET ORAL at 17:21

## 2021-01-01 RX ADMIN — NYSTATIN 500000 UNITS: 100000 SUSPENSION ORAL at 16:55

## 2021-01-01 RX ADMIN — GUAIFENESIN AND DEXTROMETHORPHAN 10 ML: 100; 10 SYRUP ORAL at 18:19

## 2021-01-01 RX ADMIN — ACETAMINOPHEN 650 MG: 325 TABLET ORAL at 00:57

## 2021-01-01 RX ADMIN — GABAPENTIN 600 MG: 300 CAPSULE ORAL at 17:06

## 2021-01-01 RX ADMIN — NYSTATIN 500000 UNITS: 100000 SUSPENSION ORAL at 21:18

## 2021-01-01 RX ADMIN — IPRATROPIUM BROMIDE AND ALBUTEROL SULFATE 3 ML: .5; 2.5 SOLUTION RESPIRATORY (INHALATION) at 07:28

## 2021-01-01 RX ADMIN — OXYCODONE HYDROCHLORIDE 20 MG: 20 TABLET, FILM COATED, EXTENDED RELEASE ORAL at 21:50

## 2021-01-01 RX ADMIN — IPRATROPIUM BROMIDE AND ALBUTEROL SULFATE 3 ML: .5; 3 SOLUTION RESPIRATORY (INHALATION) at 20:25

## 2021-01-01 RX ADMIN — GUAIFENESIN AND DEXTROMETHORPHAN 10 ML: 100; 10 SYRUP ORAL at 18:07

## 2021-01-01 RX ADMIN — ATORVASTATIN CALCIUM 80 MG: 20 TABLET, FILM COATED ORAL at 21:49

## 2021-01-01 RX ADMIN — NYSTATIN 500000 UNITS: 100000 SUSPENSION ORAL at 13:34

## 2021-01-01 RX ADMIN — IPRATROPIUM BROMIDE AND ALBUTEROL SULFATE 3 ML: .5; 3 SOLUTION RESPIRATORY (INHALATION) at 06:36

## 2021-01-01 RX ADMIN — GUAIFENESIN AND DEXTROMETHORPHAN 10 ML: 100; 10 SYRUP ORAL at 23:00

## 2021-01-01 RX ADMIN — PREDNISONE 40 MG: 20 TABLET ORAL at 09:23

## 2021-01-01 RX ADMIN — Medication 10 ML: at 22:03

## 2021-01-01 RX ADMIN — ALBUTEROL SULFATE 6 PUFF: 90 AEROSOL, METERED RESPIRATORY (INHALATION) at 15:20

## 2021-01-01 RX ADMIN — Medication 1 CAPSULE: at 08:18

## 2021-01-01 RX ADMIN — ENOXAPARIN SODIUM 40 MG: 40 INJECTION SUBCUTANEOUS at 12:20

## 2021-01-01 RX ADMIN — PANTOPRAZOLE SODIUM 40 MG: 40 TABLET, DELAYED RELEASE ORAL at 16:25

## 2021-01-01 RX ADMIN — METHYLPREDNISOLONE SODIUM SUCCINATE 60 MG: 40 INJECTION, POWDER, FOR SOLUTION INTRAMUSCULAR; INTRAVENOUS at 05:49

## 2021-01-01 RX ADMIN — LEVOTHYROXINE SODIUM 75 MCG: 0.07 TABLET ORAL at 05:14

## 2021-01-01 RX ADMIN — GUAIFENESIN 600 MG: 600 TABLET ORAL at 21:40

## 2021-01-01 RX ADMIN — ATORVASTATIN CALCIUM 80 MG: 20 TABLET, FILM COATED ORAL at 20:31

## 2021-01-01 RX ADMIN — INSULIN LISPRO 3 UNITS: 100 INJECTION, SOLUTION INTRAVENOUS; SUBCUTANEOUS at 22:00

## 2021-01-01 RX ADMIN — ARFORMOTEROL TARTRATE: 15 SOLUTION RESPIRATORY (INHALATION) at 06:32

## 2021-01-01 RX ADMIN — IPRATROPIUM BROMIDE AND ALBUTEROL SULFATE 3 ML: .5; 3 SOLUTION RESPIRATORY (INHALATION) at 13:03

## 2021-01-01 RX ADMIN — BUMETANIDE 1 MG: 0.25 INJECTION INTRAMUSCULAR; INTRAVENOUS at 17:47

## 2021-01-01 RX ADMIN — SODIUM CHLORIDE 100 ML/HR: 9 INJECTION, SOLUTION INTRAVENOUS at 23:20

## 2021-01-01 RX ADMIN — PANTOPRAZOLE SODIUM 40 MG: 40 TABLET, DELAYED RELEASE ORAL at 05:15

## 2021-01-01 RX ADMIN — ARFORMOTEROL TARTRATE: 15 SOLUTION RESPIRATORY (INHALATION) at 07:23

## 2021-01-01 RX ADMIN — OXYCODONE HYDROCHLORIDE AND ACETAMINOPHEN 1 TABLET: 5; 325 TABLET ORAL at 03:35

## 2021-01-01 RX ADMIN — IPRATROPIUM BROMIDE AND ALBUTEROL SULFATE 3 ML: .5; 2.5 SOLUTION RESPIRATORY (INHALATION) at 13:29

## 2021-01-01 RX ADMIN — BUDESONIDE 500 MCG: 0.5 SUSPENSION RESPIRATORY (INHALATION) at 20:01

## 2021-01-01 RX ADMIN — BUMETANIDE 1 MG: 0.25 INJECTION INTRAMUSCULAR; INTRAVENOUS at 08:30

## 2021-01-01 RX ADMIN — INSULIN LISPRO 3 UNITS: 100 INJECTION, SOLUTION INTRAVENOUS; SUBCUTANEOUS at 16:57

## 2021-01-01 RX ADMIN — NYSTATIN 500000 UNITS: 100000 SUSPENSION ORAL at 18:57

## 2021-01-01 RX ADMIN — ALPRAZOLAM 0.25 MG: 0.25 TABLET ORAL at 09:04

## 2021-01-01 RX ADMIN — PREDNISONE 40 MG: 20 TABLET ORAL at 16:59

## 2021-01-01 RX ADMIN — INSULIN GLARGINE 12 UNITS: 100 INJECTION, SOLUTION SUBCUTANEOUS at 22:44

## 2021-01-01 RX ADMIN — VANCOMYCIN HYDROCHLORIDE 750 MG: 750 INJECTION, POWDER, LYOPHILIZED, FOR SOLUTION INTRAVENOUS at 06:10

## 2021-01-01 RX ADMIN — NYSTATIN 500000 UNITS: 100000 SUSPENSION ORAL at 08:30

## 2021-01-01 RX ADMIN — OXYCODONE HYDROCHLORIDE 10 MG: 10 TABLET, FILM COATED, EXTENDED RELEASE ORAL at 09:01

## 2021-01-01 RX ADMIN — GUAIFENESIN AND DEXTROMETHORPHAN 10 ML: 100; 10 SYRUP ORAL at 11:40

## 2021-01-01 RX ADMIN — GUAIFENESIN 600 MG: 600 TABLET ORAL at 21:33

## 2021-01-01 RX ADMIN — Medication 10 ML: at 21:46

## 2021-01-01 RX ADMIN — PANTOPRAZOLE SODIUM 40 MG: 40 TABLET, DELAYED RELEASE ORAL at 16:32

## 2021-01-01 RX ADMIN — LEVOTHYROXINE SODIUM 75 MCG: 0.07 TABLET ORAL at 06:14

## 2021-01-01 RX ADMIN — SODIUM CHLORIDE 250 ML: 9 INJECTION, SOLUTION INTRAVENOUS at 06:46

## 2021-01-01 RX ADMIN — PANTOPRAZOLE SODIUM 40 MG: 40 TABLET, DELAYED RELEASE ORAL at 16:23

## 2021-01-01 RX ADMIN — MORPHINE SULFATE 2 MG: 2 INJECTION, SOLUTION INTRAMUSCULAR; INTRAVENOUS at 14:21

## 2021-01-01 RX ADMIN — CLOPIDOGREL BISULFATE 75 MG: 75 TABLET ORAL at 08:19

## 2021-01-01 RX ADMIN — IPRATROPIUM BROMIDE AND ALBUTEROL SULFATE 3 ML: .5; 2.5 SOLUTION RESPIRATORY (INHALATION) at 20:57

## 2021-01-01 RX ADMIN — HYDROMORPHONE HYDROCHLORIDE 0.5 MG: 1 INJECTION, SOLUTION INTRAMUSCULAR; INTRAVENOUS; SUBCUTANEOUS at 03:39

## 2021-01-01 RX ADMIN — Medication 1 CAPSULE: at 08:28

## 2021-01-01 RX ADMIN — VANCOMYCIN HYDROCHLORIDE 750 MG: 750 INJECTION, POWDER, LYOPHILIZED, FOR SOLUTION INTRAVENOUS at 09:54

## 2021-01-01 RX ADMIN — ARFORMOTEROL TARTRATE: 15 SOLUTION RESPIRATORY (INHALATION) at 08:52

## 2021-01-01 RX ADMIN — INSULIN LISPRO 4 UNITS: 100 INJECTION, SOLUTION INTRAVENOUS; SUBCUTANEOUS at 16:25

## 2021-01-01 RX ADMIN — ACETAMINOPHEN 650 MG: 325 TABLET ORAL at 11:52

## 2021-01-01 RX ADMIN — INSULIN LISPRO 4 UNITS: 100 INJECTION, SOLUTION INTRAVENOUS; SUBCUTANEOUS at 16:57

## 2021-01-01 RX ADMIN — VANCOMYCIN HYDROCHLORIDE 750 MG: 750 INJECTION, POWDER, LYOPHILIZED, FOR SOLUTION INTRAVENOUS at 21:32

## 2021-01-01 RX ADMIN — LORAZEPAM 1 MG: 2 INJECTION INTRAMUSCULAR; INTRAVENOUS at 22:10

## 2021-01-01 RX ADMIN — INSULIN LISPRO 7 UNITS: 100 INJECTION, SOLUTION INTRAVENOUS; SUBCUTANEOUS at 23:02

## 2021-01-01 RX ADMIN — LORAZEPAM 0.5 MG: 2 INJECTION INTRAMUSCULAR; INTRAVENOUS at 09:03

## 2021-01-01 RX ADMIN — Medication 10 ML: at 13:08

## 2021-01-01 RX ADMIN — AMLODIPINE BESYLATE 10 MG: 5 TABLET ORAL at 09:41

## 2021-01-01 RX ADMIN — ARFORMOTEROL TARTRATE: 15 SOLUTION RESPIRATORY (INHALATION) at 20:03

## 2021-01-01 RX ADMIN — LORAZEPAM 0.5 MG: 2 INJECTION INTRAMUSCULAR; INTRAVENOUS at 10:15

## 2021-01-01 RX ADMIN — LORAZEPAM 0.5 MG: 2 INJECTION INTRAMUSCULAR; INTRAVENOUS at 16:05

## 2021-01-01 RX ADMIN — Medication 10 ML: at 15:39

## 2021-01-01 RX ADMIN — OXYBUTYNIN CHLORIDE 10 MG: 5 TABLET, EXTENDED RELEASE ORAL at 08:04

## 2021-01-01 RX ADMIN — PREDNISONE 5 MG: 5 TABLET ORAL at 13:09

## 2021-01-01 RX ADMIN — VANCOMYCIN HYDROCHLORIDE 750 MG: 750 INJECTION, POWDER, LYOPHILIZED, FOR SOLUTION INTRAVENOUS at 17:28

## 2021-01-01 RX ADMIN — IPRATROPIUM BROMIDE AND ALBUTEROL SULFATE 3 ML: .5; 2.5 SOLUTION RESPIRATORY (INHALATION) at 16:22

## 2021-01-01 RX ADMIN — GUAIFENESIN 600 MG: 600 TABLET ORAL at 21:48

## 2021-01-01 RX ADMIN — ATORVASTATIN CALCIUM 80 MG: 20 TABLET, FILM COATED ORAL at 21:18

## 2021-01-01 RX ADMIN — OXYCODONE AND ACETAMINOPHEN 1 TABLET: 7.5; 325 TABLET ORAL at 05:16

## 2021-01-01 RX ADMIN — INSULIN LISPRO 4 UNITS: 100 INJECTION, SOLUTION INTRAVENOUS; SUBCUTANEOUS at 08:29

## 2021-01-01 RX ADMIN — INSULIN LISPRO 10 UNITS: 100 INJECTION, SOLUTION INTRAVENOUS; SUBCUTANEOUS at 22:44

## 2021-01-01 RX ADMIN — OXYBUTYNIN CHLORIDE 10 MG: 5 TABLET, EXTENDED RELEASE ORAL at 08:28

## 2021-01-01 RX ADMIN — HYDROMORPHONE HYDROCHLORIDE 0.5 MG: 2 INJECTION, SOLUTION INTRAMUSCULAR; INTRAVENOUS; SUBCUTANEOUS at 22:00

## 2021-01-01 RX ADMIN — LATANOPROST 1 DROP: 50 SOLUTION OPHTHALMIC at 21:35

## 2021-01-01 RX ADMIN — ARFORMOTEROL TARTRATE: 15 SOLUTION RESPIRATORY (INHALATION) at 19:08

## 2021-01-01 RX ADMIN — FLUOXETINE 40 MG: 20 CAPSULE ORAL at 17:33

## 2021-01-01 RX ADMIN — NYSTATIN 500000 UNITS: 100000 SUSPENSION ORAL at 09:31

## 2021-01-01 RX ADMIN — MORPHINE SULFATE 2 MG: 2 INJECTION, SOLUTION INTRAMUSCULAR; INTRAVENOUS at 21:57

## 2021-01-01 RX ADMIN — SODIUM CHLORIDE 1000 ML: 9 INJECTION, SOLUTION INTRAVENOUS at 01:42

## 2021-01-01 RX ADMIN — PANTOPRAZOLE SODIUM 40 MG: 40 TABLET, DELAYED RELEASE ORAL at 11:28

## 2021-01-01 RX ADMIN — HYDROMORPHONE HYDROCHLORIDE 1 MG: 1 INJECTION, SOLUTION INTRAMUSCULAR; INTRAVENOUS; SUBCUTANEOUS at 07:31

## 2021-01-01 RX ADMIN — IPRATROPIUM BROMIDE AND ALBUTEROL SULFATE 3 ML: .5; 2.5 SOLUTION RESPIRATORY (INHALATION) at 20:06

## 2021-01-01 RX ADMIN — IPRATROPIUM BROMIDE AND ALBUTEROL SULFATE 3 ML: .5; 2.5 SOLUTION RESPIRATORY (INHALATION) at 06:10

## 2021-01-01 RX ADMIN — ALPRAZOLAM 0.25 MG: 0.25 TABLET ORAL at 22:34

## 2021-01-01 RX ADMIN — INSULIN LISPRO 4 UNITS: 100 INJECTION, SOLUTION INTRAVENOUS; SUBCUTANEOUS at 17:33

## 2021-01-01 RX ADMIN — INSULIN LISPRO 2 UNITS: 100 INJECTION, SOLUTION INTRAVENOUS; SUBCUTANEOUS at 18:43

## 2021-01-01 RX ADMIN — IPRATROPIUM BROMIDE AND ALBUTEROL SULFATE 3 ML: .5; 2.5 SOLUTION RESPIRATORY (INHALATION) at 01:02

## 2021-01-01 RX ADMIN — Medication 10 ML: at 17:31

## 2021-01-01 RX ADMIN — FAMOTIDINE 20 MG: 20 TABLET, FILM COATED ORAL at 21:08

## 2021-01-01 RX ADMIN — LEVOTHYROXINE SODIUM 75 MCG: 0.07 TABLET ORAL at 08:04

## 2021-01-01 RX ADMIN — INSULIN LISPRO 2 UNITS: 100 INJECTION, SOLUTION INTRAVENOUS; SUBCUTANEOUS at 21:28

## 2021-01-01 RX ADMIN — Medication 10 ML: at 13:12

## 2021-01-01 RX ADMIN — INSULIN GLARGINE 16 UNITS: 100 INJECTION, SOLUTION SUBCUTANEOUS at 10:14

## 2021-01-01 RX ADMIN — VANCOMYCIN HYDROCHLORIDE 750 MG: 750 INJECTION, POWDER, LYOPHILIZED, FOR SOLUTION INTRAVENOUS at 03:43

## 2021-01-01 RX ADMIN — IPRATROPIUM BROMIDE AND ALBUTEROL SULFATE 3 ML: .5; 3 SOLUTION RESPIRATORY (INHALATION) at 08:00

## 2021-01-01 RX ADMIN — AMLODIPINE BESYLATE 10 MG: 5 TABLET ORAL at 10:11

## 2021-01-01 RX ADMIN — HYDROMORPHONE HYDROCHLORIDE 0.5 MG: 1 INJECTION, SOLUTION INTRAMUSCULAR; INTRAVENOUS; SUBCUTANEOUS at 15:16

## 2021-01-01 RX ADMIN — DOCUSATE SODIUM 50 MG AND SENNOSIDES 8.6 MG 1 TABLET: 8.6; 5 TABLET, FILM COATED ORAL at 10:13

## 2021-01-01 RX ADMIN — ATORVASTATIN CALCIUM 80 MG: 20 TABLET, FILM COATED ORAL at 21:44

## 2021-01-01 RX ADMIN — GABAPENTIN 600 MG: 300 CAPSULE ORAL at 08:16

## 2021-01-01 RX ADMIN — GABAPENTIN 600 MG: 300 CAPSULE ORAL at 08:11

## 2021-01-01 RX ADMIN — HYDROMORPHONE HYDROCHLORIDE 1 MG: 1 INJECTION, SOLUTION INTRAMUSCULAR; INTRAVENOUS; SUBCUTANEOUS at 11:54

## 2021-01-01 RX ADMIN — Medication 10 ML: at 00:50

## 2021-01-01 RX ADMIN — AMLODIPINE BESYLATE 10 MG: 5 TABLET ORAL at 10:51

## 2021-01-01 RX ADMIN — ARFORMOTEROL TARTRATE: 15 SOLUTION RESPIRATORY (INHALATION) at 20:13

## 2021-01-01 RX ADMIN — GUAIFENESIN AND DEXTROMETHORPHAN 10 ML: 100; 10 SYRUP ORAL at 00:01

## 2021-01-01 RX ADMIN — LEVOFLOXACIN 750 MG: 5 INJECTION, SOLUTION INTRAVENOUS at 00:45

## 2021-01-01 RX ADMIN — ARFORMOTEROL TARTRATE: 15 SOLUTION RESPIRATORY (INHALATION) at 07:27

## 2021-01-01 RX ADMIN — FLUOXETINE 20 MG: 20 CAPSULE ORAL at 17:38

## 2021-01-01 RX ADMIN — ATORVASTATIN CALCIUM 80 MG: 20 TABLET, FILM COATED ORAL at 21:27

## 2021-01-01 RX ADMIN — IPRATROPIUM BROMIDE AND ALBUTEROL SULFATE 3 ML: .5; 3 SOLUTION RESPIRATORY (INHALATION) at 11:36

## 2021-01-01 RX ADMIN — LORAZEPAM 0.5 MG: 2 INJECTION INTRAMUSCULAR; INTRAVENOUS at 08:29

## 2021-01-01 RX ADMIN — Medication 10 ML: at 05:16

## 2021-01-01 RX ADMIN — GUAIFENESIN 600 MG: 600 TABLET ORAL at 12:25

## 2021-01-01 RX ADMIN — METHYLPREDNISOLONE SODIUM SUCCINATE 40 MG: 40 INJECTION, POWDER, FOR SOLUTION INTRAMUSCULAR; INTRAVENOUS at 20:56

## 2021-01-01 RX ADMIN — HYDROMORPHONE HYDROCHLORIDE 1 MG: 1 INJECTION, SOLUTION INTRAMUSCULAR; INTRAVENOUS; SUBCUTANEOUS at 22:10

## 2021-01-01 RX ADMIN — ARFORMOTEROL TARTRATE: 15 SOLUTION RESPIRATORY (INHALATION) at 10:15

## 2021-01-01 RX ADMIN — GABAPENTIN 600 MG: 300 CAPSULE ORAL at 21:38

## 2021-01-01 RX ADMIN — IPRATROPIUM BROMIDE AND ALBUTEROL SULFATE 3 ML: .5; 2.5 SOLUTION RESPIRATORY (INHALATION) at 06:26

## 2021-01-01 RX ADMIN — PREDNISONE 40 MG: 20 TABLET ORAL at 08:15

## 2021-01-01 RX ADMIN — PANTOPRAZOLE SODIUM 40 MG: 40 INJECTION, POWDER, FOR SOLUTION INTRAVENOUS at 21:37

## 2021-01-01 RX ADMIN — FLUOXETINE 20 MG: 20 CAPSULE ORAL at 18:36

## 2021-01-01 RX ADMIN — CLONAZEPAM 0.25 MG: 0.5 TABLET ORAL at 17:33

## 2021-01-01 RX ADMIN — MORPHINE SULFATE 2 MG: 2 INJECTION, SOLUTION INTRAMUSCULAR; INTRAVENOUS at 19:37

## 2021-01-01 RX ADMIN — PANTOPRAZOLE SODIUM 40 MG: 40 TABLET, DELAYED RELEASE ORAL at 19:23

## 2021-01-01 RX ADMIN — INSULIN LISPRO 2 UNITS: 100 INJECTION, SOLUTION INTRAVENOUS; SUBCUTANEOUS at 21:41

## 2021-01-01 RX ADMIN — IPRATROPIUM BROMIDE AND ALBUTEROL SULFATE 3 ML: .5; 3 SOLUTION RESPIRATORY (INHALATION) at 20:12

## 2021-01-01 RX ADMIN — LORAZEPAM 0.5 MG: 2 INJECTION INTRAMUSCULAR; INTRAVENOUS at 03:27

## 2021-01-01 RX ADMIN — GABAPENTIN 600 MG: 300 CAPSULE ORAL at 12:24

## 2021-01-01 RX ADMIN — ENOXAPARIN SODIUM 40 MG: 40 INJECTION SUBCUTANEOUS at 12:25

## 2021-01-01 RX ADMIN — GABAPENTIN 600 MG: 300 CAPSULE ORAL at 08:56

## 2021-01-01 RX ADMIN — OXYCODONE 5 MG: 5 TABLET ORAL at 05:15

## 2021-01-01 RX ADMIN — ENOXAPARIN SODIUM 40 MG: 40 INJECTION SUBCUTANEOUS at 14:57

## 2021-01-01 RX ADMIN — Medication 10 ML: at 21:11

## 2021-01-01 RX ADMIN — IPRATROPIUM BROMIDE AND ALBUTEROL SULFATE 3 ML: .5; 2.5 SOLUTION RESPIRATORY (INHALATION) at 14:12

## 2021-01-01 RX ADMIN — GABAPENTIN 600 MG: 300 CAPSULE ORAL at 09:10

## 2021-01-01 RX ADMIN — GUAIFENESIN AND DEXTROMETHORPHAN 10 ML: 100; 10 SYRUP ORAL at 17:51

## 2021-01-01 RX ADMIN — OXYCODONE AND ACETAMINOPHEN 1 TABLET: 7.5; 325 TABLET ORAL at 06:14

## 2021-01-01 RX ADMIN — ONDANSETRON 4 MG: 2 INJECTION INTRAMUSCULAR; INTRAVENOUS at 05:57

## 2021-01-01 RX ADMIN — GUAIFENESIN AND DEXTROMETHORPHAN 10 ML: 100; 10 SYRUP ORAL at 19:23

## 2021-01-01 RX ADMIN — INSULIN LISPRO 3 UNITS: 100 INJECTION, SOLUTION INTRAVENOUS; SUBCUTANEOUS at 12:25

## 2021-01-01 RX ADMIN — OXYCODONE 5 MG: 5 TABLET ORAL at 00:31

## 2021-01-01 RX ADMIN — LORAZEPAM 0.5 MG: 2 INJECTION INTRAMUSCULAR; INTRAVENOUS at 09:45

## 2021-01-01 RX ADMIN — INSULIN LISPRO 4 UNITS: 100 INJECTION, SOLUTION INTRAVENOUS; SUBCUTANEOUS at 17:47

## 2021-01-01 RX ADMIN — HYDROMORPHONE HYDROCHLORIDE 1 MG: 1 INJECTION, SOLUTION INTRAMUSCULAR; INTRAVENOUS; SUBCUTANEOUS at 18:26

## 2021-01-01 RX ADMIN — FAMOTIDINE 20 MG: 20 TABLET, FILM COATED ORAL at 08:23

## 2021-01-01 RX ADMIN — GUAIFENESIN 600 MG: 600 TABLET ORAL at 09:34

## 2021-01-01 RX ADMIN — IPRATROPIUM BROMIDE AND ALBUTEROL SULFATE 3 ML: .5; 3 SOLUTION RESPIRATORY (INHALATION) at 17:55

## 2021-01-01 RX ADMIN — INSULIN LISPRO 4 UNITS: 100 INJECTION, SOLUTION INTRAVENOUS; SUBCUTANEOUS at 21:35

## 2021-01-01 RX ADMIN — GUAIFENESIN AND DEXTROMETHORPHAN 10 ML: 100; 10 SYRUP ORAL at 05:37

## 2021-01-01 RX ADMIN — ARFORMOTEROL TARTRATE 15 MCG: 15 SOLUTION RESPIRATORY (INHALATION) at 20:01

## 2021-01-01 RX ADMIN — MECLIZINE 12.5 MG: 12.5 TABLET ORAL at 20:13

## 2021-01-01 RX ADMIN — GUAIFENESIN 600 MG: 600 TABLET ORAL at 09:11

## 2021-01-01 RX ADMIN — INSULIN LISPRO 4 UNITS: 100 INJECTION, SOLUTION INTRAVENOUS; SUBCUTANEOUS at 07:58

## 2021-01-01 RX ADMIN — LEVOTHYROXINE SODIUM 75 MCG: 0.07 TABLET ORAL at 07:05

## 2021-01-01 RX ADMIN — LATANOPROST 1 DROP: 50 SOLUTION OPHTHALMIC at 10:00

## 2021-01-01 RX ADMIN — ACETAMINOPHEN 650 MG: 325 TABLET ORAL at 09:11

## 2021-01-01 RX ADMIN — POLYETHYLENE GLYCOL 3350 17 G: 17 POWDER, FOR SOLUTION ORAL at 08:18

## 2021-01-01 RX ADMIN — IPRATROPIUM BROMIDE 0.5 MG: 0.5 SOLUTION RESPIRATORY (INHALATION) at 07:42

## 2021-01-01 RX ADMIN — Medication 5 MG: at 21:34

## 2021-01-01 RX ADMIN — LORAZEPAM 0.5 MG: 2 INJECTION INTRAMUSCULAR; INTRAVENOUS at 10:01

## 2021-01-01 RX ADMIN — IPRATROPIUM BROMIDE AND ALBUTEROL SULFATE 3 ML: .5; 3 SOLUTION RESPIRATORY (INHALATION) at 16:42

## 2021-01-01 RX ADMIN — CLONAZEPAM 0.25 MG: 0.5 TABLET ORAL at 19:50

## 2021-01-01 RX ADMIN — HYDROMORPHONE HYDROCHLORIDE 2 MG: 2 INJECTION, SOLUTION INTRAMUSCULAR; INTRAVENOUS; SUBCUTANEOUS at 22:06

## 2021-01-01 RX ADMIN — PREDNISONE 5 MG: 5 TABLET ORAL at 11:28

## 2021-01-01 RX ADMIN — PREDNISONE 40 MG: 20 TABLET ORAL at 09:00

## 2021-01-01 RX ADMIN — PANTOPRAZOLE SODIUM 40 MG: 40 TABLET, DELAYED RELEASE ORAL at 10:15

## 2021-01-01 RX ADMIN — OXYCODONE HYDROCHLORIDE 5 MG: 5 TABLET ORAL at 21:32

## 2021-01-01 RX ADMIN — GABAPENTIN 600 MG: 300 CAPSULE ORAL at 18:39

## 2021-01-01 RX ADMIN — GUAIFENESIN AND DEXTROMETHORPHAN 10 ML: 100; 10 SYRUP ORAL at 12:24

## 2021-01-01 RX ADMIN — CEFEPIME HYDROCHLORIDE 2 G: 2 INJECTION, POWDER, FOR SOLUTION INTRAVENOUS at 08:07

## 2021-01-01 RX ADMIN — FLUOXETINE 10 MG: 10 CAPSULE ORAL at 17:34

## 2021-01-01 RX ADMIN — LEVOFLOXACIN 750 MG: 750 TABLET, FILM COATED ORAL at 08:43

## 2021-01-01 RX ADMIN — BACITRACIN 1 PACKET: 500 OINTMENT TOPICAL at 23:12

## 2021-01-01 RX ADMIN — INSULIN GLARGINE 12 UNITS: 100 INJECTION, SOLUTION SUBCUTANEOUS at 22:18

## 2021-01-01 RX ADMIN — FLUOXETINE 20 MG: 20 CAPSULE ORAL at 17:31

## 2021-01-01 RX ADMIN — IPRATROPIUM BROMIDE AND ALBUTEROL SULFATE 3 ML: .5; 2.5 SOLUTION RESPIRATORY (INHALATION) at 19:06

## 2021-01-01 RX ADMIN — GABAPENTIN 600 MG: 300 CAPSULE ORAL at 21:09

## 2021-01-01 RX ADMIN — INSULIN LISPRO 3 UNITS: 100 INJECTION, SOLUTION INTRAVENOUS; SUBCUTANEOUS at 16:54

## 2021-01-01 RX ADMIN — GUAIFENESIN 600 MG: 600 TABLET ORAL at 21:44

## 2021-01-01 RX ADMIN — Medication 10 ML: at 13:59

## 2021-01-01 RX ADMIN — LORAZEPAM 0.5 MG: 2 INJECTION INTRAMUSCULAR; INTRAVENOUS at 22:18

## 2021-01-01 RX ADMIN — GABAPENTIN 600 MG: 300 CAPSULE ORAL at 22:12

## 2021-01-01 RX ADMIN — SODIUM CHLORIDE 100 ML/HR: 9 INJECTION, SOLUTION INTRAVENOUS at 12:52

## 2021-01-01 RX ADMIN — LEVOTHYROXINE SODIUM 75 MCG: 0.07 TABLET ORAL at 05:16

## 2021-01-01 RX ADMIN — OXYCODONE HYDROCHLORIDE 20 MG: 20 TABLET, FILM COATED, EXTENDED RELEASE ORAL at 23:06

## 2021-01-01 RX ADMIN — IPRATROPIUM BROMIDE AND ALBUTEROL SULFATE 3 ML: .5; 2.5 SOLUTION RESPIRATORY (INHALATION) at 20:54

## 2021-01-01 RX ADMIN — INSULIN GLARGINE 12 UNITS: 100 INJECTION, SOLUTION SUBCUTANEOUS at 21:10

## 2021-01-01 RX ADMIN — IPRATROPIUM BROMIDE 0.5 MG: 0.5 SOLUTION RESPIRATORY (INHALATION) at 01:24

## 2021-01-01 RX ADMIN — LATANOPROST 1 DROP: 50 SOLUTION OPHTHALMIC at 22:03

## 2021-01-01 RX ADMIN — IPRATROPIUM BROMIDE AND ALBUTEROL SULFATE 3 ML: .5; 3 SOLUTION RESPIRATORY (INHALATION) at 08:52

## 2021-01-01 RX ADMIN — Medication 5 MG: at 20:55

## 2021-01-01 RX ADMIN — DIPHENHYDRAMINE HYDROCHLORIDE AND LIDOCAINE HYDROCHLORIDE AND ALUMINUM HYDROXIDE AND MAGNESIUM HYDRO 10 ML: KIT at 01:10

## 2021-01-01 RX ADMIN — Medication 10 ML: at 08:16

## 2021-01-01 RX ADMIN — Medication 2 PACKET: at 11:19

## 2021-01-01 RX ADMIN — ARFORMOTEROL TARTRATE 15 MCG: 15 SOLUTION RESPIRATORY (INHALATION) at 07:12

## 2021-01-01 RX ADMIN — ALPRAZOLAM 0.25 MG: 0.25 TABLET ORAL at 03:43

## 2021-01-01 RX ADMIN — Medication 10 ML: at 06:27

## 2021-01-01 RX ADMIN — MORPHINE SULFATE 2 MG: 2 INJECTION, SOLUTION INTRAMUSCULAR; INTRAVENOUS at 01:05

## 2021-01-01 RX ADMIN — LORAZEPAM 1 MG: 2 INJECTION INTRAMUSCULAR; INTRAVENOUS at 01:46

## 2021-01-01 RX ADMIN — INSULIN LISPRO 10 UNITS: 100 INJECTION, SOLUTION INTRAVENOUS; SUBCUTANEOUS at 00:56

## 2021-01-01 RX ADMIN — IPRATROPIUM BROMIDE AND ALBUTEROL SULFATE 3 ML: .5; 3 SOLUTION RESPIRATORY (INHALATION) at 19:06

## 2021-01-01 RX ADMIN — GUAIFENESIN AND DEXTROMETHORPHAN 10 ML: 100; 10 SYRUP ORAL at 12:17

## 2021-01-01 RX ADMIN — CLONAZEPAM 0.25 MG: 0.5 TABLET ORAL at 17:30

## 2021-01-01 RX ADMIN — OXYCODONE HYDROCHLORIDE 20 MG: 20 TABLET, FILM COATED, EXTENDED RELEASE ORAL at 10:10

## 2021-01-01 RX ADMIN — NYSTATIN 500000 UNITS: 100000 SUSPENSION ORAL at 12:19

## 2021-01-01 RX ADMIN — DOCUSATE SODIUM 100 MG: 100 CAPSULE, LIQUID FILLED ORAL at 08:28

## 2021-01-01 RX ADMIN — IPRATROPIUM BROMIDE AND ALBUTEROL SULFATE 3 ML: .5; 2.5 SOLUTION RESPIRATORY (INHALATION) at 11:32

## 2021-01-01 RX ADMIN — INSULIN GLARGINE 16 UNITS: 100 INJECTION, SOLUTION SUBCUTANEOUS at 08:20

## 2021-01-01 RX ADMIN — LORAZEPAM 1 MG: 2 INJECTION INTRAMUSCULAR; INTRAVENOUS at 09:30

## 2021-01-01 RX ADMIN — IPRATROPIUM BROMIDE AND ALBUTEROL SULFATE 3 ML: .5; 2.5 SOLUTION RESPIRATORY (INHALATION) at 08:22

## 2021-01-01 RX ADMIN — NYSTATIN 500000 UNITS: 100000 SUSPENSION ORAL at 21:40

## 2021-01-01 RX ADMIN — CEFEPIME HYDROCHLORIDE 2 G: 2 INJECTION, POWDER, FOR SOLUTION INTRAVENOUS at 21:41

## 2021-01-01 RX ADMIN — APIXABAN 10 MG: 5 TABLET, FILM COATED ORAL at 09:00

## 2021-01-01 RX ADMIN — IPRATROPIUM BROMIDE AND ALBUTEROL SULFATE 3 ML: .5; 3 SOLUTION RESPIRATORY (INHALATION) at 13:51

## 2021-01-01 RX ADMIN — ARFORMOTEROL TARTRATE: 15 SOLUTION RESPIRATORY (INHALATION) at 08:27

## 2021-01-01 RX ADMIN — OXYCODONE HYDROCHLORIDE 5 MG: 5 TABLET ORAL at 08:07

## 2021-01-01 RX ADMIN — GUAIFENESIN 600 MG: 600 TABLET ORAL at 09:31

## 2021-01-01 RX ADMIN — LORAZEPAM 0.5 MG: 2 INJECTION INTRAMUSCULAR; INTRAVENOUS at 19:19

## 2021-01-01 RX ADMIN — TROSPIUM CHLORIDE 20 MG: 20 TABLET, FILM COATED ORAL at 13:09

## 2021-01-01 RX ADMIN — INSULIN LISPRO 2 UNITS: 100 INJECTION, SOLUTION INTRAVENOUS; SUBCUTANEOUS at 17:33

## 2021-01-01 RX ADMIN — METHYLPREDNISOLONE SODIUM SUCCINATE 40 MG: 40 INJECTION, POWDER, FOR SOLUTION INTRAMUSCULAR; INTRAVENOUS at 05:57

## 2021-01-01 RX ADMIN — GUAIFENESIN 600 MG: 600 TABLET ORAL at 09:29

## 2021-01-01 RX ADMIN — GUAIFENESIN 600 MG: 600 TABLET ORAL at 22:36

## 2021-01-01 RX ADMIN — AZTREONAM 1 G: 2 INJECTION, POWDER, LYOPHILIZED, FOR SOLUTION INTRAMUSCULAR; INTRAVENOUS at 22:43

## 2021-01-01 RX ADMIN — Medication 10 ML: at 05:31

## 2021-01-01 RX ADMIN — OXYCODONE HYDROCHLORIDE AND ACETAMINOPHEN 1 TABLET: 5; 325 TABLET ORAL at 01:17

## 2021-01-01 RX ADMIN — IPRATROPIUM BROMIDE AND ALBUTEROL 1 PUFF: 20; 100 SPRAY, METERED RESPIRATORY (INHALATION) at 08:16

## 2021-01-01 RX ADMIN — IPRATROPIUM BROMIDE AND ALBUTEROL SULFATE 3 ML: .5; 2.5 SOLUTION RESPIRATORY (INHALATION) at 11:28

## 2021-01-01 RX ADMIN — Medication 10 ML: at 21:02

## 2021-01-01 RX ADMIN — DOXYCYCLINE HYCLATE 100 MG: 100 TABLET, COATED ORAL at 08:19

## 2021-01-01 RX ADMIN — PANTOPRAZOLE SODIUM 40 MG: 40 INJECTION, POWDER, FOR SOLUTION INTRAVENOUS at 21:31

## 2021-01-01 RX ADMIN — OXYCODONE HYDROCHLORIDE AND ACETAMINOPHEN 1 TABLET: 5; 325 TABLET ORAL at 08:19

## 2021-01-01 RX ADMIN — NYSTATIN 500000 UNITS: 100000 SUSPENSION ORAL at 21:26

## 2021-01-01 RX ADMIN — HYDROMORPHONE HYDROCHLORIDE 1 MG: 1 INJECTION, SOLUTION INTRAMUSCULAR; INTRAVENOUS; SUBCUTANEOUS at 06:16

## 2021-01-01 RX ADMIN — PANTOPRAZOLE SODIUM 40 MG: 40 TABLET, DELAYED RELEASE ORAL at 06:57

## 2021-01-01 RX ADMIN — METHYLPREDNISOLONE SODIUM SUCCINATE 60 MG: 40 INJECTION, POWDER, FOR SOLUTION INTRAMUSCULAR; INTRAVENOUS at 21:07

## 2021-01-01 RX ADMIN — METHYLPREDNISOLONE SODIUM SUCCINATE 40 MG: 40 INJECTION, POWDER, FOR SOLUTION INTRAMUSCULAR; INTRAVENOUS at 08:18

## 2021-01-01 RX ADMIN — MORPHINE SULFATE 2 MG: 2 INJECTION, SOLUTION INTRAMUSCULAR; INTRAVENOUS at 16:30

## 2021-01-01 RX ADMIN — PANTOPRAZOLE SODIUM 40 MG: 40 INJECTION, POWDER, FOR SOLUTION INTRAVENOUS at 11:48

## 2021-01-01 RX ADMIN — Medication 10 ML: at 05:49

## 2021-01-01 RX ADMIN — INSULIN LISPRO 3 UNITS: 100 INJECTION, SOLUTION INTRAVENOUS; SUBCUTANEOUS at 08:30

## 2021-01-01 RX ADMIN — Medication 1 PACKET: at 08:48

## 2021-01-01 RX ADMIN — Medication 5 MG: at 23:05

## 2021-01-01 RX ADMIN — Medication 10 ML: at 06:00

## 2021-01-01 RX ADMIN — GUAIFENESIN AND DEXTROMETHORPHAN 10 ML: 100; 10 SYRUP ORAL at 07:59

## 2021-01-01 RX ADMIN — LISINOPRIL 40 MG: 20 TABLET ORAL at 09:07

## 2021-01-01 RX ADMIN — NYSTATIN 500000 UNITS: 100000 SUSPENSION ORAL at 14:18

## 2021-01-01 RX ADMIN — DOCUSATE SODIUM 50MG AND SENNOSIDES 8.6MG 2 TABLET: 8.6; 5 TABLET, FILM COATED ORAL at 22:12

## 2021-01-01 RX ADMIN — PANTOPRAZOLE SODIUM 40 MG: 40 TABLET, DELAYED RELEASE ORAL at 05:14

## 2021-01-01 RX ADMIN — VANCOMYCIN HYDROCHLORIDE 1000 MG: 1 INJECTION, POWDER, LYOPHILIZED, FOR SOLUTION INTRAVENOUS at 12:26

## 2021-01-01 RX ADMIN — BUMETANIDE 1 MG: 0.25 INJECTION INTRAMUSCULAR; INTRAVENOUS at 18:39

## 2021-01-01 RX ADMIN — AMLODIPINE BESYLATE 10 MG: 5 TABLET ORAL at 09:33

## 2021-01-01 RX ADMIN — PANTOPRAZOLE SODIUM 40 MG: 40 INJECTION, POWDER, FOR SOLUTION INTRAVENOUS at 09:27

## 2021-01-01 RX ADMIN — HYDROMORPHONE HYDROCHLORIDE 0.5 MG: 2 INJECTION, SOLUTION INTRAMUSCULAR; INTRAVENOUS; SUBCUTANEOUS at 18:44

## 2021-01-01 RX ADMIN — PREDNISONE 40 MG: 20 TABLET ORAL at 09:40

## 2021-01-01 RX ADMIN — METHYLPREDNISOLONE SODIUM SUCCINATE 40 MG: 40 INJECTION, POWDER, FOR SOLUTION INTRAMUSCULAR; INTRAVENOUS at 01:13

## 2021-01-01 RX ADMIN — GABAPENTIN 600 MG: 300 CAPSULE ORAL at 21:18

## 2021-01-01 RX ADMIN — GUAIFENESIN AND DEXTROMETHORPHAN 10 ML: 100; 10 SYRUP ORAL at 14:18

## 2021-01-01 RX ADMIN — TROSPIUM CHLORIDE 20 MG: 20 TABLET, FILM COATED ORAL at 11:59

## 2021-01-01 RX ADMIN — IPRATROPIUM BROMIDE AND ALBUTEROL SULFATE 3 ML: .5; 2.5 SOLUTION RESPIRATORY (INHALATION) at 07:25

## 2021-01-01 RX ADMIN — ATORVASTATIN CALCIUM 80 MG: 20 TABLET, FILM COATED ORAL at 21:33

## 2021-01-01 RX ADMIN — Medication 81 MG: at 08:30

## 2021-01-01 RX ADMIN — Medication 10 ML: at 22:13

## 2021-01-01 RX ADMIN — BACITRACIN 1 PACKET: 500 OINTMENT TOPICAL at 03:41

## 2021-01-01 RX ADMIN — CLONAZEPAM 0.25 MG: 0.5 TABLET ORAL at 17:16

## 2021-01-01 RX ADMIN — ONDANSETRON 4 MG: 2 INJECTION INTRAMUSCULAR; INTRAVENOUS at 21:42

## 2021-01-01 RX ADMIN — IPRATROPIUM BROMIDE AND ALBUTEROL SULFATE 3 ML: .5; 2.5 SOLUTION RESPIRATORY (INHALATION) at 11:26

## 2021-01-01 RX ADMIN — Medication 5 MG: at 00:30

## 2021-01-01 RX ADMIN — IPRATROPIUM BROMIDE AND ALBUTEROL SULFATE 3 ML: .5; 3 SOLUTION RESPIRATORY (INHALATION) at 08:49

## 2021-01-01 RX ADMIN — GUAIFENESIN AND DEXTROMETHORPHAN 10 ML: 100; 10 SYRUP ORAL at 06:27

## 2021-01-01 RX ADMIN — FUROSEMIDE 20 MG: 40 TABLET ORAL at 08:18

## 2021-01-01 RX ADMIN — VANCOMYCIN HYDROCHLORIDE 750 MG: 750 INJECTION, POWDER, LYOPHILIZED, FOR SOLUTION INTRAVENOUS at 18:19

## 2021-01-01 RX ADMIN — GUAIFENESIN 600 MG: 600 TABLET ORAL at 08:16

## 2021-01-01 RX ADMIN — OXYCODONE HYDROCHLORIDE 20 MG: 20 TABLET, FILM COATED, EXTENDED RELEASE ORAL at 08:58

## 2021-01-01 RX ADMIN — INSULIN GLARGINE 15 UNITS: 100 INJECTION, SOLUTION SUBCUTANEOUS at 21:59

## 2021-01-01 RX ADMIN — GUAIFENESIN AND DEXTROMETHORPHAN 10 ML: 100; 10 SYRUP ORAL at 17:33

## 2021-01-01 RX ADMIN — CLONAZEPAM 0.25 MG: 0.5 TABLET ORAL at 17:28

## 2021-01-01 RX ADMIN — Medication 10 ML: at 16:28

## 2021-01-01 RX ADMIN — GUAIFENESIN 600 MG: 600 TABLET ORAL at 05:59

## 2021-01-01 RX ADMIN — MORPHINE SULFATE 2 MG: 2 INJECTION, SOLUTION INTRAMUSCULAR; INTRAVENOUS at 13:07

## 2021-01-01 RX ADMIN — ENOXAPARIN SODIUM 60 MG: 60 INJECTION SUBCUTANEOUS at 06:49

## 2021-01-01 RX ADMIN — CLONAZEPAM 0.25 MG: 0.5 TABLET ORAL at 18:19

## 2021-01-01 RX ADMIN — Medication 10 ML: at 21:06

## 2021-01-01 RX ADMIN — LORAZEPAM 1 MG: 2 INJECTION INTRAMUSCULAR; INTRAVENOUS at 16:53

## 2021-01-01 RX ADMIN — Medication 10 ML: at 21:36

## 2021-01-01 RX ADMIN — Medication 10 ML: at 05:54

## 2021-01-01 RX ADMIN — OXYCODONE HYDROCHLORIDE AND ACETAMINOPHEN 1 TABLET: 5; 325 TABLET ORAL at 18:07

## 2021-01-01 RX ADMIN — OXYCODONE HYDROCHLORIDE 20 MG: 20 TABLET, FILM COATED, EXTENDED RELEASE ORAL at 08:11

## 2021-01-01 RX ADMIN — IPRATROPIUM BROMIDE 0.5 MG: 0.5 SOLUTION RESPIRATORY (INHALATION) at 15:31

## 2021-01-01 RX ADMIN — GUAIFENESIN AND DEXTROMETHORPHAN 10 ML: 100; 10 SYRUP ORAL at 00:23

## 2021-01-01 RX ADMIN — ASPIRIN 81 MG: 81 TABLET, COATED ORAL at 08:56

## 2021-01-01 RX ADMIN — OXYCODONE 10 MG: 5 TABLET ORAL at 20:52

## 2021-01-01 RX ADMIN — GABAPENTIN 600 MG: 300 CAPSULE ORAL at 08:59

## 2021-01-01 RX ADMIN — ASPIRIN 81 MG: 81 TABLET, COATED ORAL at 08:59

## 2021-01-01 RX ADMIN — LATANOPROST 1 DROP: 50 SOLUTION OPHTHALMIC at 22:40

## 2021-01-01 RX ADMIN — Medication 10 ML: at 21:28

## 2021-01-01 RX ADMIN — LEVOTHYROXINE SODIUM 75 MCG: 0.07 TABLET ORAL at 05:32

## 2021-01-01 RX ADMIN — MORPHINE SULFATE 2 MG: 2 INJECTION, SOLUTION INTRAMUSCULAR; INTRAVENOUS at 03:34

## 2021-01-01 RX ADMIN — PREDNISONE 20 MG: 20 TABLET ORAL at 09:31

## 2021-01-01 RX ADMIN — ENOXAPARIN SODIUM 60 MG: 60 INJECTION SUBCUTANEOUS at 23:00

## 2021-01-01 RX ADMIN — SODIUM CHLORIDE 100 ML/HR: 9 INJECTION, SOLUTION INTRAVENOUS at 23:42

## 2021-01-01 RX ADMIN — INSULIN LISPRO 2 UNITS: 100 INJECTION, SOLUTION INTRAVENOUS; SUBCUTANEOUS at 16:59

## 2021-01-01 RX ADMIN — MORPHINE SULFATE 2 MG: 2 INJECTION, SOLUTION INTRAMUSCULAR; INTRAVENOUS at 03:36

## 2021-01-01 RX ADMIN — CLONAZEPAM 0.25 MG: 0.5 TABLET ORAL at 18:07

## 2021-01-01 RX ADMIN — VANCOMYCIN HYDROCHLORIDE 1500 MG: 10 INJECTION, POWDER, LYOPHILIZED, FOR SOLUTION INTRAVENOUS at 21:08

## 2021-01-01 RX ADMIN — INSULIN LISPRO 4 UNITS: 100 INJECTION, SOLUTION INTRAVENOUS; SUBCUTANEOUS at 12:20

## 2021-01-01 RX ADMIN — METHYLPREDNISOLONE SODIUM SUCCINATE 40 MG: 40 INJECTION, POWDER, FOR SOLUTION INTRAMUSCULAR; INTRAVENOUS at 00:50

## 2021-01-01 RX ADMIN — SODIUM CHLORIDE 100 ML/HR: 9 INJECTION, SOLUTION INTRAVENOUS at 10:54

## 2021-01-01 RX ADMIN — IPRATROPIUM BROMIDE AND ALBUTEROL SULFATE 3 ML: .5; 3 SOLUTION RESPIRATORY (INHALATION) at 20:55

## 2021-01-01 RX ADMIN — HYDROMORPHONE HYDROCHLORIDE 1 MG: 1 INJECTION, SOLUTION INTRAMUSCULAR; INTRAVENOUS; SUBCUTANEOUS at 12:22

## 2021-01-01 RX ADMIN — ACETAMINOPHEN 650 MG: 325 TABLET ORAL at 08:59

## 2021-01-01 RX ADMIN — AZTREONAM 1 G: 2 INJECTION, POWDER, LYOPHILIZED, FOR SOLUTION INTRAMUSCULAR; INTRAVENOUS at 06:56

## 2021-01-01 RX ADMIN — OXYCODONE 5 MG: 5 TABLET ORAL at 15:52

## 2021-01-01 RX ADMIN — Medication 2 PACKET: at 10:12

## 2021-01-01 RX ADMIN — PANTOPRAZOLE SODIUM 40 MG: 40 TABLET, DELAYED RELEASE ORAL at 16:29

## 2021-01-01 RX ADMIN — VANCOMYCIN HYDROCHLORIDE 750 MG: 750 INJECTION, POWDER, LYOPHILIZED, FOR SOLUTION INTRAVENOUS at 06:20

## 2021-01-01 RX ADMIN — Medication 10 ML: at 18:02

## 2021-01-01 RX ADMIN — Medication 5 MG: at 21:40

## 2021-01-01 RX ADMIN — HYDROMORPHONE HYDROCHLORIDE 2 MG: 2 INJECTION, SOLUTION INTRAMUSCULAR; INTRAVENOUS; SUBCUTANEOUS at 13:31

## 2021-01-01 RX ADMIN — ATORVASTATIN CALCIUM 80 MG: 40 TABLET, FILM COATED ORAL at 22:33

## 2021-01-01 RX ADMIN — METHYLPREDNISOLONE SODIUM SUCCINATE 40 MG: 40 INJECTION, POWDER, FOR SOLUTION INTRAMUSCULAR; INTRAVENOUS at 11:20

## 2021-01-01 RX ADMIN — ALBUMIN (HUMAN) 50 G: 0.25 INJECTION, SOLUTION INTRAVENOUS at 23:03

## 2021-01-01 RX ADMIN — METHYLPREDNISOLONE SODIUM SUCCINATE 40 MG: 40 INJECTION, POWDER, FOR SOLUTION INTRAMUSCULAR; INTRAVENOUS at 01:04

## 2021-01-01 RX ADMIN — GABAPENTIN 600 MG: 300 CAPSULE ORAL at 17:22

## 2021-01-01 RX ADMIN — Medication 10 ML: at 22:40

## 2021-01-01 RX ADMIN — AMLODIPINE BESYLATE 5 MG: 5 TABLET ORAL at 18:38

## 2021-01-01 RX ADMIN — METHYLPREDNISOLONE SODIUM SUCCINATE 60 MG: 40 INJECTION, POWDER, FOR SOLUTION INTRAMUSCULAR; INTRAVENOUS at 13:40

## 2021-01-01 RX ADMIN — Medication 5 MG: at 23:33

## 2021-01-01 RX ADMIN — OXYCODONE AND ACETAMINOPHEN 1 TABLET: 7.5; 325 TABLET ORAL at 12:19

## 2021-01-01 RX ADMIN — INSULIN LISPRO 3 UNITS: 100 INJECTION, SOLUTION INTRAVENOUS; SUBCUTANEOUS at 12:06

## 2021-01-01 RX ADMIN — FLUOXETINE 40 MG: 20 CAPSULE ORAL at 17:45

## 2021-01-01 RX ADMIN — INSULIN GLARGINE 12 UNITS: 100 INJECTION, SOLUTION SUBCUTANEOUS at 23:06

## 2021-01-01 RX ADMIN — GABAPENTIN 600 MG: 300 CAPSULE ORAL at 08:15

## 2021-01-01 RX ADMIN — THERA TABS 1 TABLET: TAB at 09:07

## 2021-01-01 RX ADMIN — FUROSEMIDE 40 MG: 40 TABLET ORAL at 08:19

## 2021-01-01 RX ADMIN — KETOROLAC TROMETHAMINE 15 MG: 30 INJECTION, SOLUTION INTRAMUSCULAR at 12:33

## 2021-01-01 RX ADMIN — LEVOTHYROXINE SODIUM 75 MCG: 0.07 TABLET ORAL at 08:00

## 2021-01-01 RX ADMIN — Medication 10 ML: at 03:34

## 2021-01-01 RX ADMIN — INSULIN LISPRO 2 UNITS: 100 INJECTION, SOLUTION INTRAVENOUS; SUBCUTANEOUS at 12:25

## 2021-01-01 RX ADMIN — GUAIFENESIN AND DEXTROMETHORPHAN 10 ML: 100; 10 SYRUP ORAL at 17:01

## 2021-01-01 RX ADMIN — LEVOTHYROXINE SODIUM 75 MCG: 0.07 TABLET ORAL at 06:28

## 2021-01-01 RX ADMIN — GUAIFENESIN AND DEXTROMETHORPHAN 10 ML: 100; 10 SYRUP ORAL at 11:22

## 2021-01-01 RX ADMIN — OXYCODONE HYDROCHLORIDE 20 MG: 20 TABLET, FILM COATED, EXTENDED RELEASE ORAL at 12:25

## 2021-01-01 RX ADMIN — Medication 10 ML: at 05:08

## 2021-01-01 RX ADMIN — PANTOPRAZOLE SODIUM 40 MG: 40 INJECTION, POWDER, FOR SOLUTION INTRAVENOUS at 08:44

## 2021-01-01 RX ADMIN — PANTOPRAZOLE SODIUM 40 MG: 40 TABLET, DELAYED RELEASE ORAL at 16:59

## 2021-01-01 RX ADMIN — BUDESONIDE 500 MCG: 0.5 INHALANT RESPIRATORY (INHALATION) at 12:50

## 2021-01-01 RX ADMIN — LORAZEPAM 0.5 MG: 2 INJECTION INTRAMUSCULAR; INTRAVENOUS at 13:35

## 2021-01-01 RX ADMIN — HEPARIN 300 UNITS: 100 SYRINGE at 12:37

## 2021-01-01 RX ADMIN — ATORVASTATIN CALCIUM 80 MG: 40 TABLET, FILM COATED ORAL at 21:28

## 2021-01-01 RX ADMIN — INSULIN LISPRO 3 UNITS: 100 INJECTION, SOLUTION INTRAVENOUS; SUBCUTANEOUS at 11:46

## 2021-01-01 RX ADMIN — ENOXAPARIN SODIUM 40 MG: 40 INJECTION SUBCUTANEOUS at 21:11

## 2021-01-01 RX ADMIN — ASPIRIN 81 MG: 81 TABLET, COATED ORAL at 12:29

## 2021-01-01 RX ADMIN — MORPHINE SULFATE 2 MG: 2 INJECTION, SOLUTION INTRAMUSCULAR; INTRAVENOUS at 11:27

## 2021-01-01 RX ADMIN — POTASSIUM CHLORIDE 40 MEQ: 20 TABLET, EXTENDED RELEASE ORAL at 03:46

## 2021-01-01 RX ADMIN — Medication 1 PACKET: at 15:19

## 2021-01-01 RX ADMIN — METHYLPREDNISOLONE SODIUM SUCCINATE 40 MG: 40 INJECTION, POWDER, FOR SOLUTION INTRAMUSCULAR; INTRAVENOUS at 11:12

## 2021-01-01 RX ADMIN — Medication 5 MG: at 20:28

## 2021-01-01 RX ADMIN — IPRATROPIUM BROMIDE AND ALBUTEROL 1 PUFF: 20; 100 SPRAY, METERED RESPIRATORY (INHALATION) at 19:30

## 2021-01-01 RX ADMIN — GUAIFENESIN AND DEXTROMETHORPHAN 10 ML: 100; 10 SYRUP ORAL at 18:33

## 2021-01-01 RX ADMIN — Medication 5 MG: at 21:44

## 2021-01-01 RX ADMIN — LEVOTHYROXINE SODIUM 75 MCG: 0.07 TABLET ORAL at 08:23

## 2021-01-01 RX ADMIN — GUAIFENESIN AND DEXTROMETHORPHAN 10 ML: 100; 10 SYRUP ORAL at 17:45

## 2021-01-01 RX ADMIN — OXYCODONE HYDROCHLORIDE AND ACETAMINOPHEN 1 TABLET: 5; 325 TABLET ORAL at 09:01

## 2021-01-01 RX ADMIN — Medication 10 ML: at 22:20

## 2021-01-01 RX ADMIN — LEVOTHYROXINE SODIUM 75 MCG: 0.07 TABLET ORAL at 08:28

## 2021-01-01 RX ADMIN — MORPHINE SULFATE 2 MG: 2 INJECTION, SOLUTION INTRAMUSCULAR; INTRAVENOUS at 22:33

## 2021-01-01 RX ADMIN — Medication 5 MG: at 21:50

## 2021-01-01 RX ADMIN — LEVOFLOXACIN 750 MG: 750 INJECTION, SOLUTION INTRAVENOUS at 23:13

## 2021-01-01 RX ADMIN — ONDANSETRON 4 MG: 2 INJECTION INTRAMUSCULAR; INTRAVENOUS at 14:43

## 2021-01-01 RX ADMIN — SIMETHICONE 80 MG: 80 TABLET, CHEWABLE ORAL at 16:57

## 2021-01-01 RX ADMIN — IPRATROPIUM BROMIDE AND ALBUTEROL SULFATE 3 ML: .5; 2.5 SOLUTION RESPIRATORY (INHALATION) at 13:52

## 2021-01-01 RX ADMIN — LATANOPROST 1 DROP: 50 SOLUTION OPHTHALMIC at 21:50

## 2021-01-01 RX ADMIN — GABAPENTIN 600 MG: 300 CAPSULE ORAL at 20:28

## 2021-01-01 RX ADMIN — OXYCODONE 5 MG: 5 TABLET ORAL at 03:39

## 2021-01-01 RX ADMIN — ATORVASTATIN CALCIUM 80 MG: 20 TABLET, FILM COATED ORAL at 23:36

## 2021-01-01 RX ADMIN — OXYCODONE AND ACETAMINOPHEN 1 TABLET: 7.5; 325 TABLET ORAL at 04:58

## 2021-01-01 RX ADMIN — Medication 1 CAPSULE: at 11:28

## 2021-01-01 RX ADMIN — AMLODIPINE BESYLATE 10 MG: 5 TABLET ORAL at 09:03

## 2021-01-01 RX ADMIN — ASPIRIN 81 MG: 81 TABLET, COATED ORAL at 08:43

## 2021-01-01 RX ADMIN — IPRATROPIUM BROMIDE AND ALBUTEROL SULFATE 3 ML: .5; 3 SOLUTION RESPIRATORY (INHALATION) at 19:56

## 2021-01-01 RX ADMIN — Medication 10 ML: at 06:07

## 2021-01-01 RX ADMIN — GUAIFENESIN AND DEXTROMETHORPHAN 10 ML: 100; 10 SYRUP ORAL at 12:19

## 2021-01-01 RX ADMIN — LORAZEPAM 0.5 MG: 2 INJECTION INTRAMUSCULAR; INTRAVENOUS at 11:43

## 2021-01-01 RX ADMIN — LEVOTHYROXINE SODIUM 75 MCG: 0.07 TABLET ORAL at 06:36

## 2021-01-01 RX ADMIN — LORAZEPAM 0.5 MG: 2 INJECTION INTRAMUSCULAR; INTRAVENOUS at 03:34

## 2021-01-01 RX ADMIN — PANTOPRAZOLE SODIUM 40 MG: 40 TABLET, DELAYED RELEASE ORAL at 09:00

## 2021-01-01 RX ADMIN — ATORVASTATIN CALCIUM 80 MG: 20 TABLET, FILM COATED ORAL at 21:23

## 2021-01-01 RX ADMIN — GUAIFENESIN AND DEXTROMETHORPHAN 10 ML: 100; 10 SYRUP ORAL at 06:20

## 2021-01-01 RX ADMIN — NYSTATIN 500000 UNITS: 100000 SUSPENSION ORAL at 18:19

## 2021-01-01 RX ADMIN — IPRATROPIUM BROMIDE 0.5 MG: 0.5 SOLUTION RESPIRATORY (INHALATION) at 23:24

## 2021-01-01 RX ADMIN — INSULIN GLARGINE 10 UNITS: 100 INJECTION, SOLUTION SUBCUTANEOUS at 22:50

## 2021-01-01 RX ADMIN — GABAPENTIN 600 MG: 300 CAPSULE ORAL at 23:06

## 2021-01-01 RX ADMIN — GUAIFENESIN AND DEXTROMETHORPHAN 10 ML: 100; 10 SYRUP ORAL at 01:48

## 2021-01-01 RX ADMIN — INSULIN GLARGINE 12 UNITS: 100 INJECTION, SOLUTION SUBCUTANEOUS at 23:02

## 2021-01-01 RX ADMIN — NYSTATIN 500000 UNITS: 100000 SUSPENSION ORAL at 17:38

## 2021-01-01 RX ADMIN — Medication 10 ML: at 06:08

## 2021-01-01 RX ADMIN — MORPHINE SULFATE 2 MG: 2 INJECTION, SOLUTION INTRAMUSCULAR; INTRAVENOUS at 11:40

## 2021-01-01 RX ADMIN — ATORVASTATIN CALCIUM 80 MG: 20 TABLET, FILM COATED ORAL at 21:01

## 2021-01-01 RX ADMIN — DOXYCYCLINE HYCLATE 100 MG: 100 TABLET, COATED ORAL at 16:42

## 2021-01-01 RX ADMIN — Medication 81 MG: at 10:47

## 2021-01-01 RX ADMIN — ASPIRIN 81 MG: 81 TABLET, CHEWABLE ORAL at 10:09

## 2021-01-01 RX ADMIN — ARFORMOTEROL TARTRATE: 15 SOLUTION RESPIRATORY (INHALATION) at 07:32

## 2021-01-01 RX ADMIN — Medication 10 ML: at 10:02

## 2021-01-01 RX ADMIN — AMLODIPINE BESYLATE 10 MG: 5 TABLET ORAL at 12:25

## 2021-01-01 RX ADMIN — PANTOPRAZOLE SODIUM 40 MG: 40 TABLET, DELAYED RELEASE ORAL at 17:33

## 2021-01-01 RX ADMIN — VANCOMYCIN HYDROCHLORIDE 750 MG: 750 INJECTION, POWDER, LYOPHILIZED, FOR SOLUTION INTRAVENOUS at 18:36

## 2021-01-01 RX ADMIN — ONDANSETRON 4 MG: 2 INJECTION INTRAMUSCULAR; INTRAVENOUS at 18:44

## 2021-01-01 RX ADMIN — ARFORMOTEROL TARTRATE: 15 SOLUTION RESPIRATORY (INHALATION) at 20:11

## 2021-01-01 RX ADMIN — OXYCODONE HYDROCHLORIDE AND ACETAMINOPHEN 1 TABLET: 5; 325 TABLET ORAL at 06:36

## 2021-01-01 RX ADMIN — Medication 30 ML: at 15:16

## 2021-01-01 RX ADMIN — LORAZEPAM 0.5 MG: 2 INJECTION INTRAMUSCULAR; INTRAVENOUS at 10:54

## 2021-01-01 RX ADMIN — GUAIFENESIN AND DEXTROMETHORPHAN 10 ML: 100; 10 SYRUP ORAL at 13:37

## 2021-01-01 RX ADMIN — ONDANSETRON 4 MG: 2 INJECTION INTRAMUSCULAR; INTRAVENOUS at 11:48

## 2021-01-01 RX ADMIN — INSULIN LISPRO 4 UNITS: 100 INJECTION, SOLUTION INTRAVENOUS; SUBCUTANEOUS at 14:34

## 2021-01-01 RX ADMIN — Medication 5 MG: at 21:07

## 2021-01-01 RX ADMIN — NYSTATIN 500000 UNITS: 100000 SUSPENSION ORAL at 16:09

## 2021-01-01 RX ADMIN — IPRATROPIUM BROMIDE AND ALBUTEROL 1 PUFF: 20; 100 SPRAY, METERED RESPIRATORY (INHALATION) at 15:05

## 2021-01-01 RX ADMIN — OXYCODONE HYDROCHLORIDE 20 MG: 20 TABLET, FILM COATED, EXTENDED RELEASE ORAL at 09:31

## 2021-01-01 RX ADMIN — LORAZEPAM 0.5 MG: 2 INJECTION INTRAMUSCULAR; INTRAVENOUS at 19:30

## 2021-01-01 RX ADMIN — IPRATROPIUM BROMIDE AND ALBUTEROL SULFATE 3 ML: .5; 2.5 SOLUTION RESPIRATORY (INHALATION) at 13:27

## 2021-01-01 RX ADMIN — GUAIFENESIN AND DEXTROMETHORPHAN 10 ML: 100; 10 SYRUP ORAL at 06:57

## 2021-01-01 RX ADMIN — GABAPENTIN 600 MG: 300 CAPSULE ORAL at 09:24

## 2021-01-01 RX ADMIN — ENOXAPARIN SODIUM 40 MG: 40 INJECTION SUBCUTANEOUS at 16:27

## 2021-01-01 RX ADMIN — POLYETHYLENE GLYCOL 3350 17 G: 17 POWDER, FOR SOLUTION ORAL at 08:28

## 2021-01-01 RX ADMIN — CEFTRIAXONE 1 G: 1 INJECTION, POWDER, FOR SOLUTION INTRAMUSCULAR; INTRAVENOUS at 20:15

## 2021-01-01 RX ADMIN — LORAZEPAM 1 MG: 2 INJECTION INTRAMUSCULAR; INTRAVENOUS at 22:06

## 2021-01-01 RX ADMIN — INSULIN GLARGINE 13 UNITS: 100 INJECTION, SOLUTION SUBCUTANEOUS at 22:34

## 2021-01-01 RX ADMIN — INSULIN LISPRO 2 UNITS: 100 INJECTION, SOLUTION INTRAVENOUS; SUBCUTANEOUS at 23:35

## 2021-01-01 RX ADMIN — GUAIFENESIN AND DEXTROMETHORPHAN 10 ML: 100; 10 SYRUP ORAL at 17:16

## 2021-01-01 RX ADMIN — ONDANSETRON 4 MG: 2 INJECTION INTRAMUSCULAR; INTRAVENOUS at 13:11

## 2021-01-01 RX ADMIN — CLONAZEPAM 0.25 MG: 0.5 TABLET ORAL at 17:51

## 2021-01-01 RX ADMIN — ONDANSETRON 4 MG: 2 INJECTION INTRAMUSCULAR; INTRAVENOUS at 13:58

## 2021-01-01 RX ADMIN — METHYLPREDNISOLONE SODIUM SUCCINATE 40 MG: 40 INJECTION, POWDER, FOR SOLUTION INTRAMUSCULAR; INTRAVENOUS at 06:15

## 2021-01-01 RX ADMIN — OXYCODONE HYDROCHLORIDE 20 MG: 20 TABLET, FILM COATED, EXTENDED RELEASE ORAL at 09:34

## 2021-01-01 RX ADMIN — LATANOPROST 1 DROP: 50 SOLUTION OPHTHALMIC at 21:46

## 2021-01-01 RX ADMIN — GUAIFENESIN 600 MG: 600 TABLET ORAL at 23:06

## 2021-01-01 RX ADMIN — OXYCODONE AND ACETAMINOPHEN 1 TABLET: 7.5; 325 TABLET ORAL at 11:43

## 2021-01-01 RX ADMIN — INSULIN LISPRO 2 UNITS: 100 INJECTION, SOLUTION INTRAVENOUS; SUBCUTANEOUS at 12:39

## 2021-01-01 RX ADMIN — OXYCODONE AND ACETAMINOPHEN 1 TABLET: 7.5; 325 TABLET ORAL at 16:09

## 2021-01-01 RX ADMIN — GABAPENTIN 600 MG: 300 CAPSULE ORAL at 10:09

## 2021-01-01 RX ADMIN — AZTREONAM 1 G: 2 INJECTION, POWDER, LYOPHILIZED, FOR SOLUTION INTRAMUSCULAR; INTRAVENOUS at 22:34

## 2021-01-01 RX ADMIN — MORPHINE SULFATE 2 MG: 2 INJECTION, SOLUTION INTRAMUSCULAR; INTRAVENOUS at 04:09

## 2021-01-01 RX ADMIN — BUDESONIDE 500 MCG: 0.5 SUSPENSION RESPIRATORY (INHALATION) at 23:21

## 2021-01-01 RX ADMIN — OXYCODONE AND ACETAMINOPHEN 1 TABLET: 7.5; 325 TABLET ORAL at 08:30

## 2021-01-01 RX ADMIN — OXYCODONE HYDROCHLORIDE 20 MG: 20 TABLET, FILM COATED, EXTENDED RELEASE ORAL at 22:12

## 2021-01-01 RX ADMIN — Medication 10 ML: at 13:19

## 2021-01-01 RX ADMIN — ARFORMOTEROL TARTRATE: 15 SOLUTION RESPIRATORY (INHALATION) at 19:09

## 2021-01-01 RX ADMIN — OXYCODONE HYDROCHLORIDE 20 MG: 20 TABLET, FILM COATED, EXTENDED RELEASE ORAL at 09:29

## 2021-01-01 RX ADMIN — LORAZEPAM 0.5 MG: 2 INJECTION INTRAMUSCULAR; INTRAVENOUS at 09:42

## 2021-01-01 RX ADMIN — LORAZEPAM 0.5 MG: 2 INJECTION INTRAMUSCULAR; INTRAVENOUS at 12:22

## 2021-01-01 RX ADMIN — Medication 10 ML: at 16:42

## 2021-01-01 RX ADMIN — ALBUTEROL SULFATE 2.5 MG: 2.5 SOLUTION RESPIRATORY (INHALATION) at 06:03

## 2021-01-01 RX ADMIN — OXYBUTYNIN CHLORIDE 10 MG: 5 TABLET, EXTENDED RELEASE ORAL at 08:15

## 2021-01-01 RX ADMIN — CLONAZEPAM 0.25 MG: 0.5 TABLET ORAL at 17:38

## 2021-01-01 RX ADMIN — OXYCODONE HYDROCHLORIDE 5 MG: 5 TABLET ORAL at 15:39

## 2021-01-01 RX ADMIN — METHYLPREDNISOLONE SODIUM SUCCINATE 40 MG: 40 INJECTION, POWDER, FOR SOLUTION INTRAMUSCULAR; INTRAVENOUS at 00:26

## 2021-01-01 RX ADMIN — IPRATROPIUM BROMIDE AND ALBUTEROL SULFATE 3 ML: .5; 2.5 SOLUTION RESPIRATORY (INHALATION) at 11:51

## 2021-01-01 RX ADMIN — LATANOPROST 1 DROP: 50 SOLUTION OPHTHALMIC at 21:48

## 2021-01-01 RX ADMIN — LORAZEPAM 0.5 MG: 2 INJECTION INTRAMUSCULAR; INTRAVENOUS at 04:59

## 2021-01-01 RX ADMIN — Medication 81 MG: at 09:00

## 2021-01-01 RX ADMIN — MORPHINE SULFATE 2 MG: 2 INJECTION, SOLUTION INTRAMUSCULAR; INTRAVENOUS at 09:41

## 2021-01-01 RX ADMIN — ASPIRIN 81 MG: 81 TABLET, COATED ORAL at 08:28

## 2021-01-01 RX ADMIN — Medication 10 ML: at 05:38

## 2021-01-01 RX ADMIN — BUDESONIDE 500 MCG: 0.5 SUSPENSION RESPIRATORY (INHALATION) at 11:11

## 2021-01-01 RX ADMIN — CLONAZEPAM 0.25 MG: 0.5 TABLET ORAL at 21:00

## 2021-01-01 RX ADMIN — NYSTATIN 500000 UNITS: 100000 SUSPENSION ORAL at 13:40

## 2021-01-01 RX ADMIN — ARFORMOTEROL TARTRATE: 15 SOLUTION RESPIRATORY (INHALATION) at 20:30

## 2021-01-01 RX ADMIN — GABAPENTIN 600 MG: 300 CAPSULE ORAL at 08:44

## 2021-01-01 RX ADMIN — DOCUSATE SODIUM 50 MG AND SENNOSIDES 8.6 MG 1 TABLET: 8.6; 5 TABLET, FILM COATED ORAL at 20:51

## 2021-01-01 RX ADMIN — METHYLPREDNISOLONE SODIUM SUCCINATE 40 MG: 40 INJECTION, POWDER, FOR SOLUTION INTRAMUSCULAR; INTRAVENOUS at 18:20

## 2021-01-01 RX ADMIN — LIDOCAINE HYDROCHLORIDE,EPINEPHRINE BITARTRATE 15 MG: 10; .01 INJECTION, SOLUTION INFILTRATION; PERINEURAL at 03:41

## 2021-01-01 RX ADMIN — IPRATROPIUM BROMIDE AND ALBUTEROL SULFATE 3 ML: .5; 2.5 SOLUTION RESPIRATORY (INHALATION) at 12:51

## 2021-01-01 RX ADMIN — GABAPENTIN 600 MG: 300 CAPSULE ORAL at 10:11

## 2021-01-01 RX ADMIN — BUMETANIDE 1 MG: 0.25 INJECTION, SOLUTION INTRAMUSCULAR; INTRAVENOUS at 10:47

## 2021-01-01 RX ADMIN — LORAZEPAM 1 MG: 2 INJECTION INTRAMUSCULAR; INTRAVENOUS at 13:45

## 2021-01-01 RX ADMIN — Medication 400 MG: at 08:23

## 2021-01-01 RX ADMIN — BUMETANIDE 1 MG: 0.25 INJECTION INTRAMUSCULAR; INTRAVENOUS at 18:20

## 2021-01-01 RX ADMIN — MORPHINE SULFATE 2 MG: 2 INJECTION, SOLUTION INTRAMUSCULAR; INTRAVENOUS at 21:40

## 2021-01-01 RX ADMIN — INSULIN LISPRO 10 UNITS: 100 INJECTION, SOLUTION INTRAVENOUS; SUBCUTANEOUS at 23:30

## 2021-01-01 RX ADMIN — SODIUM CHLORIDE 1000 ML: 9 INJECTION, SOLUTION INTRAVENOUS at 20:55

## 2021-01-01 RX ADMIN — Medication 81 MG: at 09:41

## 2021-01-01 RX ADMIN — GABAPENTIN 600 MG: 300 CAPSULE ORAL at 21:44

## 2021-01-01 RX ADMIN — DOCUSATE SODIUM 50MG AND SENNOSIDES 8.6MG 1 TABLET: 8.6; 5 TABLET, FILM COATED ORAL at 09:40

## 2021-01-01 RX ADMIN — LORAZEPAM 0.5 MG: 2 INJECTION INTRAMUSCULAR; INTRAVENOUS at 09:36

## 2021-01-01 RX ADMIN — VANCOMYCIN HYDROCHLORIDE 750 MG: 750 INJECTION, POWDER, LYOPHILIZED, FOR SOLUTION INTRAVENOUS at 06:55

## 2021-01-01 RX ADMIN — Medication 10 ML: at 21:42

## 2021-01-01 RX ADMIN — APIXABAN 10 MG: 5 TABLET, FILM COATED ORAL at 10:14

## 2021-01-01 RX ADMIN — ALBUTEROL SULFATE 2.5 MG: 2.5 SOLUTION RESPIRATORY (INHALATION) at 13:27

## 2021-01-01 RX ADMIN — MORPHINE SULFATE 2 MG: 2 INJECTION, SOLUTION INTRAMUSCULAR; INTRAVENOUS at 18:53

## 2021-01-01 RX ADMIN — Medication 10 ML: at 21:15

## 2021-01-01 RX ADMIN — CEFEPIME HYDROCHLORIDE 2 G: 2 INJECTION, POWDER, FOR SOLUTION INTRAVENOUS at 09:28

## 2021-01-01 RX ADMIN — GUAIFENESIN 600 MG: 600 TABLET ORAL at 08:10

## 2021-01-01 RX ADMIN — BACITRACIN 1 PACKET: 500 OINTMENT TOPICAL at 10:09

## 2021-01-01 RX ADMIN — PANTOPRAZOLE SODIUM 40 MG: 40 INJECTION, POWDER, FOR SOLUTION INTRAVENOUS at 21:10

## 2021-01-01 RX ADMIN — LATANOPROST 1 DROP: 50 SOLUTION OPHTHALMIC at 09:22

## 2021-01-01 RX ADMIN — GABAPENTIN 600 MG: 300 CAPSULE ORAL at 21:26

## 2021-01-01 RX ADMIN — METHYLPREDNISOLONE SODIUM SUCCINATE 125 MG: 40 INJECTION, POWDER, FOR SOLUTION INTRAMUSCULAR; INTRAVENOUS at 00:57

## 2021-01-01 RX ADMIN — CLONAZEPAM 0.25 MG: 0.5 TABLET ORAL at 23:34

## 2021-01-01 RX ADMIN — LATANOPROST 1 DROP: 50 SOLUTION OPHTHALMIC at 23:00

## 2021-01-01 RX ADMIN — Medication 10 ML: at 23:37

## 2021-01-01 RX ADMIN — NYSTATIN 500000 UNITS: 100000 SUSPENSION ORAL at 22:43

## 2021-01-01 RX ADMIN — HYDROMORPHONE HYDROCHLORIDE 1 MG: 1 INJECTION, SOLUTION INTRAMUSCULAR; INTRAVENOUS; SUBCUTANEOUS at 20:33

## 2021-01-01 RX ADMIN — LORAZEPAM 0.5 MG: 2 INJECTION INTRAMUSCULAR; INTRAVENOUS at 11:03

## 2021-01-01 RX ADMIN — IPRATROPIUM BROMIDE AND ALBUTEROL SULFATE 3 ML: .5; 2.5 SOLUTION RESPIRATORY (INHALATION) at 14:11

## 2021-01-01 RX ADMIN — NYSTATIN 500000 UNITS: 100000 SUSPENSION ORAL at 09:34

## 2021-01-01 RX ADMIN — ONDANSETRON 4 MG: 2 INJECTION INTRAMUSCULAR; INTRAVENOUS at 16:14

## 2021-01-01 RX ADMIN — NYSTATIN 500000 UNITS: 100000 SUSPENSION ORAL at 23:10

## 2021-01-01 RX ADMIN — INSULIN GLARGINE 13 UNITS: 100 INJECTION, SOLUTION SUBCUTANEOUS at 21:44

## 2021-01-01 RX ADMIN — BUDESONIDE 500 MCG: 0.5 SUSPENSION RESPIRATORY (INHALATION) at 07:47

## 2021-01-01 RX ADMIN — GLYCOPYRROLATE 0.2 MG: 0.2 INJECTION INTRAMUSCULAR; INTRAVENOUS at 01:47

## 2021-01-01 RX ADMIN — IPRATROPIUM BROMIDE AND ALBUTEROL SULFATE 3 ML: .5; 2.5 SOLUTION RESPIRATORY (INHALATION) at 07:32

## 2021-01-01 RX ADMIN — INSULIN GLARGINE 20 UNITS: 100 INJECTION, SOLUTION SUBCUTANEOUS at 14:34

## 2021-01-01 RX ADMIN — GUAIFENESIN AND DEXTROMETHORPHAN 10 ML: 100; 10 SYRUP ORAL at 01:34

## 2021-01-01 RX ADMIN — NYSTATIN 500000 UNITS: 100000 SUSPENSION ORAL at 14:45

## 2021-01-01 RX ADMIN — MORPHINE SULFATE 2 MG: 2 INJECTION, SOLUTION INTRAMUSCULAR; INTRAVENOUS at 17:45

## 2021-01-01 RX ADMIN — Medication 81 MG: at 08:07

## 2021-01-01 RX ADMIN — IPRATROPIUM BROMIDE AND ALBUTEROL SULFATE 3 ML: .5; 2.5 SOLUTION RESPIRATORY (INHALATION) at 21:30

## 2021-01-01 RX ADMIN — GUAIFENESIN 600 MG: 600 TABLET ORAL at 11:07

## 2021-01-01 RX ADMIN — VANCOMYCIN HYDROCHLORIDE 750 MG: 750 INJECTION, POWDER, LYOPHILIZED, FOR SOLUTION INTRAVENOUS at 06:44

## 2021-01-01 RX ADMIN — IPRATROPIUM BROMIDE AND ALBUTEROL SULFATE 3 ML: .5; 2.5 SOLUTION RESPIRATORY (INHALATION) at 16:33

## 2021-01-01 RX ADMIN — BUDESONIDE 500 MCG: 0.5 SUSPENSION RESPIRATORY (INHALATION) at 07:42

## 2021-01-01 RX ADMIN — FLUOXETINE 10 MG: 10 CAPSULE ORAL at 17:06

## 2021-01-01 RX ADMIN — VANCOMYCIN HYDROCHLORIDE 1250 MG: 1.25 INJECTION, POWDER, LYOPHILIZED, FOR SOLUTION INTRAVENOUS at 08:32

## 2021-01-01 RX ADMIN — IPRATROPIUM BROMIDE AND ALBUTEROL SULFATE 3 ML: .5; 2.5 SOLUTION RESPIRATORY (INHALATION) at 15:30

## 2021-01-01 RX ADMIN — LEVOTHYROXINE SODIUM 75 MCG: 0.07 TABLET ORAL at 07:59

## 2021-01-01 RX ADMIN — LATANOPROST 1 DROP: 50 SOLUTION OPHTHALMIC at 21:05

## 2021-01-01 RX ADMIN — Medication 10 ML: at 13:21

## 2021-01-01 RX ADMIN — PREDNISONE 40 MG: 20 TABLET ORAL at 08:07

## 2021-01-01 RX ADMIN — AMLODIPINE BESYLATE 10 MG: 5 TABLET ORAL at 08:11

## 2021-01-01 RX ADMIN — METHYLPREDNISOLONE SODIUM SUCCINATE 40 MG: 40 INJECTION, POWDER, FOR SOLUTION INTRAMUSCULAR; INTRAVENOUS at 06:20

## 2021-01-01 RX ADMIN — GABAPENTIN 600 MG: 300 CAPSULE ORAL at 22:43

## 2021-01-01 RX ADMIN — CLONAZEPAM 0.25 MG: 0.5 TABLET ORAL at 17:05

## 2021-01-01 RX ADMIN — OXYCODONE AND ACETAMINOPHEN 1 TABLET: 7.5; 325 TABLET ORAL at 16:06

## 2021-01-01 RX ADMIN — METHYLPREDNISOLONE SODIUM SUCCINATE 40 MG: 40 INJECTION, POWDER, FOR SOLUTION INTRAMUSCULAR; INTRAVENOUS at 11:46

## 2021-01-01 RX ADMIN — LEVOTHYROXINE SODIUM 75 MCG: 0.07 TABLET ORAL at 07:27

## 2021-01-01 RX ADMIN — NYSTATIN 500000 UNITS: 100000 SUSPENSION ORAL at 13:37

## 2021-01-01 RX ADMIN — Medication 10 ML: at 01:48

## 2021-01-01 RX ADMIN — GLYCOPYRROLATE 0.2 MG: 0.2 INJECTION INTRAMUSCULAR; INTRAVENOUS at 16:53

## 2021-01-01 RX ADMIN — PREDNISONE 20 MG: 20 TABLET ORAL at 09:24

## 2021-01-01 RX ADMIN — LEVOTHYROXINE SODIUM 75 MCG: 0.07 TABLET ORAL at 08:18

## 2021-01-01 RX ADMIN — NYSTATIN 500000 UNITS: 100000 SUSPENSION ORAL at 18:33

## 2021-01-01 RX ADMIN — DOCUSATE SODIUM 50MG AND SENNOSIDES 8.6MG 1 TABLET: 8.6; 5 TABLET, FILM COATED ORAL at 08:16

## 2021-01-01 RX ADMIN — IPRATROPIUM BROMIDE AND ALBUTEROL SULFATE 3 ML: .5; 2.5 SOLUTION RESPIRATORY (INHALATION) at 07:18

## 2021-01-01 RX ADMIN — VANCOMYCIN HYDROCHLORIDE 750 MG: 750 INJECTION, POWDER, LYOPHILIZED, FOR SOLUTION INTRAVENOUS at 06:15

## 2021-01-01 RX ADMIN — ARFORMOTEROL TARTRATE: 15 SOLUTION RESPIRATORY (INHALATION) at 20:00

## 2021-01-01 RX ADMIN — ASPIRIN 81 MG: 81 TABLET, CHEWABLE ORAL at 09:11

## 2021-01-01 RX ADMIN — CEFTRIAXONE 1 G: 1 INJECTION, POWDER, FOR SOLUTION INTRAMUSCULAR; INTRAVENOUS at 16:38

## 2021-01-01 RX ADMIN — AZTREONAM 1 G: 2 INJECTION, POWDER, LYOPHILIZED, FOR SOLUTION INTRAMUSCULAR; INTRAVENOUS at 21:45

## 2021-01-01 RX ADMIN — GUAIFENESIN 600 MG: 600 TABLET ORAL at 22:03

## 2021-01-01 RX ADMIN — GUAIFENESIN AND DEXTROMETHORPHAN 10 ML: 100; 10 SYRUP ORAL at 00:40

## 2021-01-01 RX ADMIN — DOCUSATE SODIUM 50MG AND SENNOSIDES 8.6MG 1 TABLET: 8.6; 5 TABLET, FILM COATED ORAL at 08:11

## 2021-01-01 RX ADMIN — MORPHINE SULFATE 2 MG: 2 INJECTION, SOLUTION INTRAMUSCULAR; INTRAVENOUS at 09:31

## 2021-01-01 RX ADMIN — PREDNISONE 5 MG: 5 TABLET ORAL at 10:08

## 2021-01-01 RX ADMIN — LATANOPROST 1 DROP: 50 SOLUTION OPHTHALMIC at 20:47

## 2021-01-01 RX ADMIN — NYSTATIN 500000 UNITS: 100000 SUSPENSION ORAL at 21:50

## 2021-01-01 RX ADMIN — GUAIFENESIN AND DEXTROMETHORPHAN 10 ML: 100; 10 SYRUP ORAL at 05:15

## 2021-01-01 RX ADMIN — PREDNISONE 40 MG: 20 TABLET ORAL at 08:16

## 2021-01-01 RX ADMIN — PREDNISONE 5 MG: 5 TABLET ORAL at 10:01

## 2021-01-01 RX ADMIN — GABAPENTIN 600 MG: 300 CAPSULE ORAL at 20:14

## 2021-01-01 RX ADMIN — HYDROMORPHONE HYDROCHLORIDE 0.2 MG: 1 INJECTION, SOLUTION INTRAMUSCULAR; INTRAVENOUS; SUBCUTANEOUS at 16:14

## 2021-01-01 RX ADMIN — GUAIFENESIN AND DEXTROMETHORPHAN 10 ML: 100; 10 SYRUP ORAL at 00:46

## 2021-01-01 RX ADMIN — GABAPENTIN 600 MG: 300 CAPSULE ORAL at 23:35

## 2021-01-01 RX ADMIN — ATORVASTATIN CALCIUM 80 MG: 20 TABLET, FILM COATED ORAL at 21:39

## 2021-01-01 RX ADMIN — LEVOTHYROXINE SODIUM 75 MCG: 0.07 TABLET ORAL at 05:09

## 2021-01-01 RX ADMIN — DOCUSATE SODIUM 50 MG AND SENNOSIDES 8.6 MG 1 TABLET: 8.6; 5 TABLET, FILM COATED ORAL at 08:34

## 2021-01-01 RX ADMIN — VANCOMYCIN HYDROCHLORIDE 750 MG: 750 INJECTION, POWDER, LYOPHILIZED, FOR SOLUTION INTRAVENOUS at 19:07

## 2021-01-01 RX ADMIN — GLYCOPYRROLATE 0.2 MG: 0.2 INJECTION INTRAMUSCULAR; INTRAVENOUS at 17:14

## 2021-01-01 RX ADMIN — HYDROMORPHONE HYDROCHLORIDE 1 MG: 1 INJECTION, SOLUTION INTRAMUSCULAR; INTRAVENOUS; SUBCUTANEOUS at 14:30

## 2021-01-01 RX ADMIN — IPRATROPIUM BROMIDE AND ALBUTEROL SULFATE 3 ML: .5; 2.5 SOLUTION RESPIRATORY (INHALATION) at 15:27

## 2021-01-01 RX ADMIN — FLUOXETINE 10 MG: 10 CAPSULE ORAL at 17:22

## 2021-01-01 RX ADMIN — INSULIN LISPRO 2 UNITS: 100 INJECTION, SOLUTION INTRAVENOUS; SUBCUTANEOUS at 22:00

## 2021-01-01 RX ADMIN — METHYLPREDNISOLONE SODIUM SUCCINATE 40 MG: 40 INJECTION, POWDER, FOR SOLUTION INTRAMUSCULAR; INTRAVENOUS at 14:56

## 2021-01-01 RX ADMIN — IPRATROPIUM BROMIDE AND ALBUTEROL SULFATE 3 ML: .5; 2.5 SOLUTION RESPIRATORY (INHALATION) at 15:08

## 2021-01-01 RX ADMIN — ATORVASTATIN CALCIUM 80 MG: 20 TABLET, FILM COATED ORAL at 22:04

## 2021-01-01 RX ADMIN — PREDNISONE 40 MG: 20 TABLET ORAL at 09:03

## 2021-01-01 RX ADMIN — OXYCODONE 5 MG: 5 TABLET ORAL at 01:17

## 2021-01-01 RX ADMIN — AMLODIPINE BESYLATE 10 MG: 5 TABLET ORAL at 08:19

## 2021-01-01 RX ADMIN — INSULIN LISPRO 4 UNITS: 100 INJECTION, SOLUTION INTRAVENOUS; SUBCUTANEOUS at 21:10

## 2021-01-01 RX ADMIN — GUAIFENESIN AND DEXTROMETHORPHAN 10 ML: 100; 10 SYRUP ORAL at 12:25

## 2021-01-01 RX ADMIN — GUAIFENESIN AND DEXTROMETHORPHAN 10 ML: 100; 10 SYRUP ORAL at 13:20

## 2021-01-01 RX ADMIN — OXYCODONE AND ACETAMINOPHEN 1 TABLET: 7.5; 325 TABLET ORAL at 16:25

## 2021-01-01 RX ADMIN — Medication 10 ML: at 16:29

## 2021-01-01 RX ADMIN — IOPAMIDOL 100 ML: 755 INJECTION, SOLUTION INTRAVENOUS at 15:04

## 2021-01-01 RX ADMIN — GUAIFENESIN 600 MG: 600 TABLET ORAL at 10:47

## 2021-01-01 RX ADMIN — HYDROMORPHONE HYDROCHLORIDE 0.5 MG: 1 INJECTION, SOLUTION INTRAMUSCULAR; INTRAVENOUS; SUBCUTANEOUS at 10:33

## 2021-01-01 RX ADMIN — FLUOXETINE 20 MG: 20 CAPSULE ORAL at 17:34

## 2021-01-01 RX ADMIN — ARFORMOTEROL TARTRATE: 15 SOLUTION RESPIRATORY (INHALATION) at 10:33

## 2021-01-01 RX ADMIN — ALPRAZOLAM 0.25 MG: 0.25 TABLET ORAL at 04:51

## 2021-01-01 RX ADMIN — VANCOMYCIN HYDROCHLORIDE 1000 MG: 1 INJECTION, POWDER, LYOPHILIZED, FOR SOLUTION INTRAVENOUS at 01:20

## 2021-01-01 RX ADMIN — Medication 81 MG: at 12:24

## 2021-01-01 RX ADMIN — LEVOTHYROXINE SODIUM 75 MCG: 0.07 TABLET ORAL at 05:50

## 2021-01-01 RX ADMIN — LEVOTHYROXINE SODIUM 75 MCG: 0.07 TABLET ORAL at 08:07

## 2021-01-01 RX ADMIN — Medication 10 ML: at 13:53

## 2021-01-01 RX ADMIN — OXYCODONE HYDROCHLORIDE AND ACETAMINOPHEN 1 TABLET: 5; 325 TABLET ORAL at 17:21

## 2021-01-01 RX ADMIN — Medication 10 ML: at 13:40

## 2021-01-01 RX ADMIN — ARFORMOTEROL TARTRATE: 15 SOLUTION RESPIRATORY (INHALATION) at 20:58

## 2021-01-01 RX ADMIN — IPRATROPIUM BROMIDE AND ALBUTEROL SULFATE 3 ML: .5; 2.5 SOLUTION RESPIRATORY (INHALATION) at 20:48

## 2021-01-01 RX ADMIN — OXYCODONE HYDROCHLORIDE 20 MG: 20 TABLET, FILM COATED, EXTENDED RELEASE ORAL at 09:10

## 2021-01-01 RX ADMIN — OXYCODONE HYDROCHLORIDE AND ACETAMINOPHEN 1 TABLET: 5; 325 TABLET ORAL at 14:38

## 2021-01-01 RX ADMIN — IOHEXOL 50 ML: 240 INJECTION, SOLUTION INTRATHECAL; INTRAVASCULAR; INTRAVENOUS; ORAL at 13:10

## 2021-01-01 RX ADMIN — GABAPENTIN 600 MG: 300 CAPSULE ORAL at 22:03

## 2021-01-01 RX ADMIN — INSULIN GLARGINE 12 UNITS: 100 INJECTION, SOLUTION SUBCUTANEOUS at 21:45

## 2021-01-01 RX ADMIN — Medication 81 MG: at 09:21

## 2021-01-01 RX ADMIN — LORAZEPAM 0.5 MG: 2 INJECTION INTRAMUSCULAR; INTRAVENOUS at 00:46

## 2021-01-01 RX ADMIN — GUAIFENESIN 600 MG: 600 TABLET ORAL at 10:11

## 2021-01-01 RX ADMIN — IPRATROPIUM BROMIDE AND ALBUTEROL SULFATE 3 ML: .5; 2.5 SOLUTION RESPIRATORY (INHALATION) at 14:38

## 2021-01-01 RX ADMIN — LEVOTHYROXINE SODIUM 75 MCG: 0.07 TABLET ORAL at 06:56

## 2021-01-01 RX ADMIN — Medication 5 MG: at 21:06

## 2021-01-01 RX ADMIN — SODIUM CHLORIDE 100 ML/HR: 9 INJECTION, SOLUTION INTRAVENOUS at 04:20

## 2021-01-01 RX ADMIN — BUMETANIDE 1 MG: 0.25 INJECTION INTRAMUSCULAR; INTRAVENOUS at 08:12

## 2021-01-01 RX ADMIN — LORAZEPAM 0.5 MG: 2 INJECTION INTRAMUSCULAR; INTRAVENOUS at 23:57

## 2021-01-01 RX ADMIN — LORAZEPAM 0.5 MG: 2 INJECTION INTRAMUSCULAR; INTRAVENOUS at 10:00

## 2021-01-01 RX ADMIN — CLONAZEPAM 0.25 MG: 0.5 TABLET ORAL at 22:37

## 2021-01-01 RX ADMIN — METHYLPREDNISOLONE SODIUM SUCCINATE 40 MG: 40 INJECTION, POWDER, FOR SOLUTION INTRAMUSCULAR; INTRAVENOUS at 21:45

## 2021-01-01 RX ADMIN — INSULIN LISPRO 3 UNITS: 100 INJECTION, SOLUTION INTRAVENOUS; SUBCUTANEOUS at 11:25

## 2021-01-01 RX ADMIN — INSULIN GLARGINE 20 UNITS: 100 INJECTION, SOLUTION SUBCUTANEOUS at 08:45

## 2021-01-01 RX ADMIN — BUMETANIDE 1 MG: 0.25 INJECTION INTRAMUSCULAR; INTRAVENOUS at 10:09

## 2021-01-01 RX ADMIN — IPRATROPIUM BROMIDE AND ALBUTEROL SULFATE 3 ML: .5; 3 SOLUTION RESPIRATORY (INHALATION) at 11:26

## 2021-01-01 RX ADMIN — CLONAZEPAM 0.25 MG: 0.5 TABLET ORAL at 18:33

## 2021-01-01 RX ADMIN — CLONAZEPAM 0.25 MG: 0.5 TABLET ORAL at 18:42

## 2021-01-01 RX ADMIN — SODIUM CHLORIDE 1000 ML: 9 INJECTION, SOLUTION INTRAVENOUS at 00:23

## 2021-01-01 RX ADMIN — VANCOMYCIN HYDROCHLORIDE 1250 MG: 1.25 INJECTION, POWDER, LYOPHILIZED, FOR SOLUTION INTRAVENOUS at 09:24

## 2021-01-01 RX ADMIN — Medication 10 ML: at 17:40

## 2021-01-01 RX ADMIN — GABAPENTIN 600 MG: 300 CAPSULE ORAL at 11:28

## 2021-01-01 RX ADMIN — MORPHINE SULFATE 2 MG: 2 INJECTION, SOLUTION INTRAMUSCULAR; INTRAVENOUS at 18:13

## 2021-01-01 RX ADMIN — GUAIFENESIN AND DEXTROMETHORPHAN 10 ML: 100; 10 SYRUP ORAL at 13:58

## 2021-01-01 RX ADMIN — PANTOPRAZOLE SODIUM 40 MG: 40 TABLET, DELAYED RELEASE ORAL at 09:08

## 2021-01-01 RX ADMIN — GUAIFENESIN AND DEXTROMETHORPHAN 10 ML: 100; 10 SYRUP ORAL at 12:30

## 2021-01-01 RX ADMIN — IPRATROPIUM BROMIDE AND ALBUTEROL SULFATE 3 ML: .5; 2.5 SOLUTION RESPIRATORY (INHALATION) at 19:41

## 2021-01-01 RX ADMIN — ARFORMOTEROL TARTRATE: 15 SOLUTION RESPIRATORY (INHALATION) at 06:11

## 2021-01-01 RX ADMIN — IPRATROPIUM BROMIDE AND ALBUTEROL SULFATE 3 ML: .5; 2.5 SOLUTION RESPIRATORY (INHALATION) at 20:12

## 2021-01-01 RX ADMIN — ATORVASTATIN CALCIUM 80 MG: 20 TABLET, FILM COATED ORAL at 23:12

## 2021-01-01 RX ADMIN — Medication 5 MG: at 22:43

## 2021-01-01 RX ADMIN — AMLODIPINE BESYLATE 5 MG: 5 TABLET ORAL at 08:16

## 2021-01-01 RX ADMIN — GABAPENTIN 600 MG: 300 CAPSULE ORAL at 20:46

## 2021-01-01 RX ADMIN — HYDROMORPHONE HYDROCHLORIDE 0.75 MG: 1 INJECTION, SOLUTION INTRAMUSCULAR; INTRAVENOUS; SUBCUTANEOUS at 01:43

## 2021-01-01 RX ADMIN — INSULIN LISPRO 3 UNITS: 100 INJECTION, SOLUTION INTRAVENOUS; SUBCUTANEOUS at 11:44

## 2021-01-01 RX ADMIN — GABAPENTIN 600 MG: 300 CAPSULE ORAL at 09:03

## 2021-01-01 RX ADMIN — INSULIN GLARGINE 12 UNITS: 100 INJECTION, SOLUTION SUBCUTANEOUS at 21:07

## 2021-01-01 RX ADMIN — IPRATROPIUM BROMIDE AND ALBUTEROL SULFATE 3 ML: .5; 3 SOLUTION RESPIRATORY (INHALATION) at 06:33

## 2021-01-01 RX ADMIN — LEVOTHYROXINE SODIUM 75 MCG: 0.07 TABLET ORAL at 08:34

## 2021-01-01 RX ADMIN — GABAPENTIN 600 MG: 300 CAPSULE ORAL at 09:31

## 2021-01-01 RX ADMIN — CEFTRIAXONE 1 G: 1 INJECTION, POWDER, FOR SOLUTION INTRAMUSCULAR; INTRAVENOUS at 20:52

## 2021-01-01 RX ADMIN — Medication 10 ML: at 22:48

## 2021-01-01 RX ADMIN — IPRATROPIUM BROMIDE AND ALBUTEROL SULFATE 3 ML: .5; 3 SOLUTION RESPIRATORY (INHALATION) at 20:43

## 2021-01-01 RX ADMIN — ONDANSETRON 4 MG: 2 INJECTION INTRAMUSCULAR; INTRAVENOUS at 18:08

## 2021-01-01 RX ADMIN — OXYCODONE HYDROCHLORIDE 10 MG: 10 TABLET, FILM COATED, EXTENDED RELEASE ORAL at 10:09

## 2021-01-01 RX ADMIN — ATORVASTATIN CALCIUM 80 MG: 20 TABLET, FILM COATED ORAL at 22:01

## 2021-01-01 RX ADMIN — ARFORMOTEROL TARTRATE: 15 SOLUTION RESPIRATORY (INHALATION) at 07:48

## 2021-01-01 RX ADMIN — INSULIN LISPRO 3 UNITS: 100 INJECTION, SOLUTION INTRAVENOUS; SUBCUTANEOUS at 19:17

## 2021-01-01 RX ADMIN — ARFORMOTEROL TARTRATE: 15 SOLUTION RESPIRATORY (INHALATION) at 19:50

## 2021-01-01 RX ADMIN — METHYLPREDNISOLONE SODIUM SUCCINATE 40 MG: 40 INJECTION, POWDER, FOR SOLUTION INTRAMUSCULAR; INTRAVENOUS at 08:04

## 2021-01-01 RX ADMIN — GUAIFENESIN AND DEXTROMETHORPHAN 10 ML: 100; 10 SYRUP ORAL at 23:07

## 2021-01-01 RX ADMIN — APIXABAN 10 MG: 5 TABLET, FILM COATED ORAL at 23:33

## 2021-01-01 RX ADMIN — PANTOPRAZOLE SODIUM 40 MG: 40 INJECTION, POWDER, FOR SOLUTION INTRAVENOUS at 21:06

## 2021-01-01 RX ADMIN — LEVOTHYROXINE SODIUM 75 MCG: 0.07 TABLET ORAL at 06:47

## 2021-01-01 RX ADMIN — Medication 10 ML: at 21:40

## 2021-01-01 RX ADMIN — INSULIN GLARGINE 16 UNITS: 100 INJECTION, SOLUTION SUBCUTANEOUS at 10:01

## 2021-01-01 RX ADMIN — OXYCODONE AND ACETAMINOPHEN 1 TABLET: 7.5; 325 TABLET ORAL at 19:23

## 2021-01-01 RX ADMIN — LORAZEPAM 0.5 MG: 2 INJECTION INTRAMUSCULAR; INTRAVENOUS at 16:09

## 2021-01-01 RX ADMIN — SODIUM CHLORIDE 100 ML/HR: 9 INJECTION, SOLUTION INTRAVENOUS at 12:29

## 2021-01-01 RX ADMIN — OXYCODONE HYDROCHLORIDE 5 MG: 5 TABLET ORAL at 22:14

## 2021-01-01 RX ADMIN — SODIUM CHLORIDE 75 ML/HR: 9 INJECTION, SOLUTION INTRAVENOUS at 00:27

## 2021-01-01 RX ADMIN — NYSTATIN 500000 UNITS: 100000 SUSPENSION ORAL at 17:49

## 2021-01-01 RX ADMIN — BUDESONIDE AND FORMOTEROL FUMARATE DIHYDRATE 2 PUFF: 160; 4.5 AEROSOL RESPIRATORY (INHALATION) at 08:16

## 2021-01-01 RX ADMIN — LATANOPROST 1 DROP: 50 SOLUTION OPHTHALMIC at 21:49

## 2021-01-01 RX ADMIN — Medication 5 MG: at 22:02

## 2021-01-01 RX ADMIN — IPRATROPIUM BROMIDE AND ALBUTEROL SULFATE 3 ML: .5; 3 SOLUTION RESPIRATORY (INHALATION) at 13:43

## 2021-01-01 RX ADMIN — DIPHENHYDRAMINE HYDROCHLORIDE AND LIDOCAINE HYDROCHLORIDE AND ALUMINUM HYDROXIDE AND MAGNESIUM HYDRO 10 ML: KIT at 16:01

## 2021-01-01 RX ADMIN — BUDESONIDE AND FORMOTEROL FUMARATE DIHYDRATE 2 PUFF: 160; 4.5 AEROSOL RESPIRATORY (INHALATION) at 19:36

## 2021-01-01 RX ADMIN — PREDNISONE 40 MG: 20 TABLET ORAL at 08:43

## 2021-01-01 RX ADMIN — OXYCODONE HYDROCHLORIDE AND ACETAMINOPHEN 1 TABLET: 5; 325 TABLET ORAL at 13:58

## 2021-01-01 RX ADMIN — BUMETANIDE 1 MG: 0.25 INJECTION INTRAMUSCULAR; INTRAVENOUS at 17:35

## 2021-01-01 RX ADMIN — LORAZEPAM 0.5 MG: 2 INJECTION INTRAMUSCULAR; INTRAVENOUS at 18:20

## 2021-01-01 RX ADMIN — HYDRALAZINE HYDROCHLORIDE 10 MG: 20 INJECTION INTRAMUSCULAR; INTRAVENOUS at 06:52

## 2021-01-01 RX ADMIN — INSULIN LISPRO 2 UNITS: 100 INJECTION, SOLUTION INTRAVENOUS; SUBCUTANEOUS at 21:33

## 2021-01-01 RX ADMIN — LORAZEPAM 0.5 MG: 0.5 TABLET ORAL at 21:27

## 2021-01-01 RX ADMIN — ARFORMOTEROL TARTRATE: 15 SOLUTION RESPIRATORY (INHALATION) at 21:37

## 2021-01-01 RX ADMIN — SODIUM CHLORIDE 12.5 MG: 9 INJECTION, SOLUTION INTRAVENOUS at 14:38

## 2021-01-01 RX ADMIN — ONDANSETRON 4 MG: 2 INJECTION INTRAMUSCULAR; INTRAVENOUS at 13:53

## 2021-01-01 RX ADMIN — Medication 81 MG: at 09:31

## 2021-01-01 RX ADMIN — GUAIFENESIN AND DEXTROMETHORPHAN 10 ML: 100; 10 SYRUP ORAL at 06:56

## 2021-01-01 RX ADMIN — ENOXAPARIN SODIUM 40 MG: 40 INJECTION SUBCUTANEOUS at 11:22

## 2021-01-01 RX ADMIN — METHYLPREDNISOLONE SODIUM SUCCINATE 40 MG: 40 INJECTION, POWDER, FOR SOLUTION INTRAMUSCULAR; INTRAVENOUS at 05:59

## 2021-01-01 RX ADMIN — LISINOPRIL 40 MG: 20 TABLET ORAL at 13:24

## 2021-01-01 RX ADMIN — IPRATROPIUM BROMIDE 0.5 MG: 0.5 SOLUTION RESPIRATORY (INHALATION) at 19:58

## 2021-01-01 RX ADMIN — PANTOPRAZOLE SODIUM 40 MG: 40 TABLET, DELAYED RELEASE ORAL at 16:02

## 2021-01-01 RX ADMIN — METHYLPREDNISOLONE SODIUM SUCCINATE 60 MG: 40 INJECTION, POWDER, FOR SOLUTION INTRAMUSCULAR; INTRAVENOUS at 10:01

## 2021-01-01 RX ADMIN — Medication 81 MG: at 10:11

## 2021-01-01 RX ADMIN — Medication 1 CAPSULE: at 08:15

## 2021-01-01 RX ADMIN — PANTOPRAZOLE SODIUM 40 MG: 40 INJECTION, POWDER, FOR SOLUTION INTRAVENOUS at 08:07

## 2021-01-01 RX ADMIN — GUAIFENESIN 600 MG: 600 TABLET ORAL at 09:41

## 2021-01-01 RX ADMIN — Medication 10 ML: at 05:32

## 2021-01-01 RX ADMIN — CLONAZEPAM 0.25 MG: 0.5 TABLET ORAL at 17:22

## 2021-01-01 RX ADMIN — OXYCODONE HYDROCHLORIDE 5 MG: 5 TABLET ORAL at 10:00

## 2021-01-01 RX ADMIN — IPRATROPIUM BROMIDE AND ALBUTEROL 1 PUFF: 20; 100 SPRAY, METERED RESPIRATORY (INHALATION) at 11:25

## 2021-01-01 RX ADMIN — AZTREONAM 1 G: 2 INJECTION, POWDER, LYOPHILIZED, FOR SOLUTION INTRAMUSCULAR; INTRAVENOUS at 13:18

## 2021-01-01 RX ADMIN — INSULIN LISPRO 3 UNITS: 100 INJECTION, SOLUTION INTRAVENOUS; SUBCUTANEOUS at 16:59

## 2021-01-01 RX ADMIN — GUAIFENESIN AND DEXTROMETHORPHAN 10 ML: 100; 10 SYRUP ORAL at 12:22

## 2021-01-01 RX ADMIN — INSULIN GLARGINE 20 UNITS: 100 INJECTION, SOLUTION SUBCUTANEOUS at 08:15

## 2021-01-01 RX ADMIN — LEVOTHYROXINE SODIUM 75 MCG: 0.07 TABLET ORAL at 07:30

## 2021-01-01 RX ADMIN — ARFORMOTEROL TARTRATE: 15 SOLUTION RESPIRATORY (INHALATION) at 19:45

## 2021-01-01 RX ADMIN — AZTREONAM 1 G: 2 INJECTION, POWDER, LYOPHILIZED, FOR SOLUTION INTRAMUSCULAR; INTRAVENOUS at 15:24

## 2021-01-01 RX ADMIN — GUAIFENESIN 600 MG: 600 TABLET ORAL at 23:03

## 2021-01-01 RX ADMIN — INSULIN GLARGINE 15 UNITS: 100 INJECTION, SOLUTION SUBCUTANEOUS at 21:34

## 2021-01-01 RX ADMIN — GUAIFENESIN AND DEXTROMETHORPHAN 10 ML: 100; 10 SYRUP ORAL at 17:30

## 2021-01-01 RX ADMIN — PANTOPRAZOLE SODIUM 40 MG: 40 TABLET, DELAYED RELEASE ORAL at 06:45

## 2021-01-01 RX ADMIN — VANCOMYCIN HYDROCHLORIDE 1250 MG: 1.25 INJECTION, POWDER, LYOPHILIZED, FOR SOLUTION INTRAVENOUS at 21:45

## 2021-01-01 RX ADMIN — CEFEPIME HYDROCHLORIDE 2 G: 2 INJECTION, POWDER, FOR SOLUTION INTRAVENOUS at 09:48

## 2021-01-01 RX ADMIN — GUAIFENESIN 600 MG: 600 TABLET ORAL at 20:14

## 2021-01-01 RX ADMIN — ONDANSETRON 4 MG: 2 INJECTION INTRAMUSCULAR; INTRAVENOUS at 09:11

## 2021-01-01 RX ADMIN — DOCUSATE SODIUM 100 MG: 100 CAPSULE, LIQUID FILLED ORAL at 08:19

## 2021-01-01 RX ADMIN — PANTOPRAZOLE SODIUM 40 MG: 40 TABLET, DELAYED RELEASE ORAL at 10:09

## 2021-01-01 RX ADMIN — DOCUSATE SODIUM 100 MG: 100 CAPSULE, LIQUID FILLED ORAL at 08:14

## 2021-01-01 RX ADMIN — FLUOXETINE 10 MG: 10 CAPSULE ORAL at 18:07

## 2021-01-01 RX ADMIN — LEVOTHYROXINE SODIUM 75 MCG: 0.07 TABLET ORAL at 08:29

## 2021-01-01 RX ADMIN — HYDROMORPHONE HYDROCHLORIDE 0.75 MG: 1 INJECTION, SOLUTION INTRAMUSCULAR; INTRAVENOUS; SUBCUTANEOUS at 06:08

## 2021-01-01 RX ADMIN — MORPHINE SULFATE 2 MG: 2 INJECTION, SOLUTION INTRAMUSCULAR; INTRAVENOUS at 18:35

## 2021-01-01 RX ADMIN — FLUOXETINE 20 MG: 20 CAPSULE ORAL at 18:10

## 2021-01-01 RX ADMIN — LEVOTHYROXINE SODIUM 75 MCG: 0.07 TABLET ORAL at 06:35

## 2021-01-01 RX ADMIN — LORAZEPAM 0.5 MG: 2 INJECTION INTRAMUSCULAR; INTRAVENOUS at 17:28

## 2021-01-01 RX ADMIN — IPRATROPIUM BROMIDE AND ALBUTEROL SULFATE 3 ML: .5; 2.5 SOLUTION RESPIRATORY (INHALATION) at 16:23

## 2021-01-01 RX ADMIN — MECLIZINE 12.5 MG: 12.5 TABLET ORAL at 11:32

## 2021-01-01 RX ADMIN — POLYETHYLENE GLYCOL 3350 17 G: 17 POWDER, FOR SOLUTION ORAL at 07:58

## 2021-01-01 RX ADMIN — INSULIN LISPRO 1 UNITS: 100 INJECTION, SOLUTION INTRAVENOUS; SUBCUTANEOUS at 21:07

## 2021-01-01 RX ADMIN — LEVOTHYROXINE SODIUM 75 MCG: 0.07 TABLET ORAL at 05:37

## 2021-01-01 RX ADMIN — LEVOFLOXACIN 750 MG: 5 INJECTION, SOLUTION INTRAVENOUS at 01:30

## 2021-01-01 RX ADMIN — ARFORMOTEROL TARTRATE: 15 SOLUTION RESPIRATORY (INHALATION) at 22:20

## 2021-01-01 RX ADMIN — GUAIFENESIN 600 MG: 600 TABLET ORAL at 19:01

## 2021-01-01 RX ADMIN — ATORVASTATIN CALCIUM 80 MG: 20 TABLET, FILM COATED ORAL at 21:40

## 2021-01-01 RX ADMIN — Medication 10 ML: at 10:16

## 2021-01-01 RX ADMIN — OXYCODONE HYDROCHLORIDE 10 MG: 10 TABLET, FILM COATED, EXTENDED RELEASE ORAL at 20:28

## 2021-01-01 RX ADMIN — Medication 400 MG: at 09:07

## 2021-01-01 RX ADMIN — OXYCODONE HYDROCHLORIDE 20 MG: 20 TABLET, FILM COATED, EXTENDED RELEASE ORAL at 09:03

## 2021-01-01 RX ADMIN — AZTREONAM 1 G: 2 INJECTION, POWDER, LYOPHILIZED, FOR SOLUTION INTRAMUSCULAR; INTRAVENOUS at 06:26

## 2021-01-01 RX ADMIN — FLUOXETINE 20 MG: 20 CAPSULE ORAL at 18:33

## 2021-01-01 RX ADMIN — OXYCODONE HYDROCHLORIDE 20 MG: 20 TABLET, FILM COATED, EXTENDED RELEASE ORAL at 10:47

## 2021-01-01 RX ADMIN — PANTOPRAZOLE SODIUM 40 MG: 40 INJECTION, POWDER, FOR SOLUTION INTRAVENOUS at 09:24

## 2021-01-01 RX ADMIN — Medication 400 MG: at 08:44

## 2021-01-01 RX ADMIN — INSULIN LISPRO 2 UNITS: 100 INJECTION, SOLUTION INTRAVENOUS; SUBCUTANEOUS at 22:12

## 2021-01-01 RX ADMIN — ARFORMOTEROL TARTRATE 15 MCG: 15 SOLUTION RESPIRATORY (INHALATION) at 23:21

## 2021-01-01 RX ADMIN — INSULIN LISPRO 3 UNITS: 100 INJECTION, SOLUTION INTRAVENOUS; SUBCUTANEOUS at 17:56

## 2021-01-01 RX ADMIN — PANTOPRAZOLE SODIUM 40 MG: 40 TABLET, DELAYED RELEASE ORAL at 06:15

## 2021-01-01 RX ADMIN — LORAZEPAM 0.5 MG: 2 INJECTION INTRAMUSCULAR; INTRAVENOUS at 05:13

## 2021-01-01 RX ADMIN — SODIUM CHLORIDE 100 ML/HR: 9 INJECTION, SOLUTION INTRAVENOUS at 02:22

## 2021-01-01 RX ADMIN — SODIUM CHLORIDE 75 ML/HR: 4.5 INJECTION, SOLUTION INTRAVENOUS at 08:58

## 2021-01-01 RX ADMIN — IPRATROPIUM BROMIDE AND ALBUTEROL SULFATE 3 ML: .5; 3 SOLUTION RESPIRATORY (INHALATION) at 15:06

## 2021-01-01 RX ADMIN — Medication 5 MG: at 22:03

## 2021-01-01 RX ADMIN — GUAIFENESIN AND DEXTROMETHORPHAN 10 ML: 100; 10 SYRUP ORAL at 01:17

## 2021-01-01 RX ADMIN — OXYCODONE AND ACETAMINOPHEN 1 TABLET: 7.5; 325 TABLET ORAL at 01:47

## 2021-01-01 RX ADMIN — PREDNISONE 40 MG: 20 TABLET ORAL at 07:59

## 2021-01-01 RX ADMIN — GABAPENTIN 600 MG: 300 CAPSULE ORAL at 08:57

## 2021-01-01 RX ADMIN — Medication 5 MG: at 23:36

## 2021-01-01 RX ADMIN — DOCUSATE SODIUM 100 MG: 100 CAPSULE, LIQUID FILLED ORAL at 17:28

## 2021-01-01 RX ADMIN — VANCOMYCIN HYDROCHLORIDE 750 MG: 750 INJECTION, POWDER, LYOPHILIZED, FOR SOLUTION INTRAVENOUS at 15:55

## 2021-01-01 RX ADMIN — PIPERACILLIN AND TAZOBACTAM 3.38 G: 3; .375 INJECTION, POWDER, LYOPHILIZED, FOR SOLUTION INTRAVENOUS at 05:22

## 2021-01-01 RX ADMIN — PREDNISONE 40 MG: 20 TABLET ORAL at 08:23

## 2021-01-01 RX ADMIN — IPRATROPIUM BROMIDE AND ALBUTEROL SULFATE 3 ML: .5; 2.5 SOLUTION RESPIRATORY (INHALATION) at 18:19

## 2021-01-01 RX ADMIN — ENOXAPARIN SODIUM 40 MG: 40 INJECTION SUBCUTANEOUS at 12:18

## 2021-01-01 RX ADMIN — NYSTATIN 500000 UNITS: 100000 SUSPENSION ORAL at 22:20

## 2021-01-01 RX ADMIN — GABAPENTIN 600 MG: 300 CAPSULE ORAL at 23:03

## 2021-01-01 RX ADMIN — INSULIN GLARGINE 16 UNITS: 100 INJECTION, SOLUTION SUBCUTANEOUS at 08:58

## 2021-01-01 RX ADMIN — IPRATROPIUM BROMIDE AND ALBUTEROL SULFATE 3 ML: .5; 2.5 SOLUTION RESPIRATORY (INHALATION) at 19:34

## 2021-01-01 RX ADMIN — Medication 81 MG: at 08:16

## 2021-01-01 RX ADMIN — INSULIN GLARGINE 12 UNITS: 100 INJECTION, SOLUTION SUBCUTANEOUS at 22:01

## 2021-01-01 RX ADMIN — GABAPENTIN 600 MG: 300 CAPSULE ORAL at 08:04

## 2021-01-01 RX ADMIN — HYDROMORPHONE HYDROCHLORIDE 0.75 MG: 1 INJECTION, SOLUTION INTRAMUSCULAR; INTRAVENOUS; SUBCUTANEOUS at 03:56

## 2021-01-01 RX ADMIN — METHYLPREDNISOLONE SODIUM SUCCINATE 40 MG: 40 INJECTION, POWDER, FOR SOLUTION INTRAMUSCULAR; INTRAVENOUS at 11:40

## 2021-01-01 RX ADMIN — GUAIFENESIN AND DEXTROMETHORPHAN 10 ML: 100; 10 SYRUP ORAL at 18:37

## 2021-01-01 RX ADMIN — MORPHINE SULFATE 2 MG: 2 INJECTION, SOLUTION INTRAMUSCULAR; INTRAVENOUS at 03:25

## 2021-01-01 RX ADMIN — Medication 10 ML: at 08:30

## 2021-01-01 RX ADMIN — INSULIN LISPRO 2 UNITS: 100 INJECTION, SOLUTION INTRAVENOUS; SUBCUTANEOUS at 12:32

## 2021-01-01 RX ADMIN — Medication 10 ML: at 22:19

## 2021-01-01 RX ADMIN — CLONAZEPAM 0.25 MG: 0.5 TABLET ORAL at 17:34

## 2021-01-01 RX ADMIN — MORPHINE SULFATE 2 MG: 2 INJECTION, SOLUTION INTRAMUSCULAR; INTRAVENOUS at 01:23

## 2021-01-01 RX ADMIN — OXYCODONE HYDROCHLORIDE 20 MG: 20 TABLET, FILM COATED, EXTENDED RELEASE ORAL at 22:41

## 2021-01-01 RX ADMIN — Medication 1 CAPSULE: at 10:13

## 2021-01-01 RX ADMIN — OXYCODONE HYDROCHLORIDE AND ACETAMINOPHEN 1 TABLET: 5; 325 TABLET ORAL at 05:09

## 2021-01-01 RX ADMIN — Medication 81 MG: at 09:24

## 2021-01-01 RX ADMIN — INSULIN LISPRO 2 UNITS: 100 INJECTION, SOLUTION INTRAVENOUS; SUBCUTANEOUS at 12:18

## 2021-01-01 RX ADMIN — FLUOXETINE 20 MG: 20 CAPSULE ORAL at 17:33

## 2021-01-01 RX ADMIN — SODIUM CHLORIDE 100 ML/HR: 9 INJECTION, SOLUTION INTRAVENOUS at 11:21

## 2021-01-01 RX ADMIN — HYDROMORPHONE HYDROCHLORIDE 2 MG: 2 INJECTION, SOLUTION INTRAMUSCULAR; INTRAVENOUS; SUBCUTANEOUS at 09:30

## 2021-01-01 RX ADMIN — Medication 10 ML: at 05:14

## 2021-01-01 RX ADMIN — CLONAZEPAM 0.25 MG: 0.5 TABLET ORAL at 21:13

## 2021-01-01 RX ADMIN — GUAIFENESIN 600 MG: 600 TABLET ORAL at 20:55

## 2021-01-01 RX ADMIN — IPRATROPIUM BROMIDE AND ALBUTEROL SULFATE 3 ML: .5; 3 SOLUTION RESPIRATORY (INHALATION) at 14:26

## 2021-01-01 RX ADMIN — IPRATROPIUM BROMIDE AND ALBUTEROL SULFATE 3 ML: .5; 2.5 SOLUTION RESPIRATORY (INHALATION) at 09:02

## 2021-01-01 RX ADMIN — VANCOMYCIN HYDROCHLORIDE 750 MG: 750 INJECTION, POWDER, LYOPHILIZED, FOR SOLUTION INTRAVENOUS at 03:39

## 2021-01-01 RX ADMIN — Medication 5 MG: at 21:48

## 2021-01-01 RX ADMIN — HEPARIN 500 UNITS: 100 SYRINGE at 13:24

## 2021-01-01 RX ADMIN — NYSTATIN 500000 UNITS: 100000 SUSPENSION ORAL at 21:49

## 2021-01-01 RX ADMIN — FLUOXETINE 20 MG: 20 CAPSULE ORAL at 17:51

## 2021-01-01 RX ADMIN — FLUOXETINE 10 MG: 10 CAPSULE ORAL at 17:33

## 2021-01-01 RX ADMIN — ATORVASTATIN CALCIUM 80 MG: 20 TABLET, FILM COATED ORAL at 23:34

## 2021-01-01 RX ADMIN — ALPRAZOLAM 0.25 MG: 0.25 TABLET ORAL at 19:54

## 2021-01-01 RX ADMIN — VANCOMYCIN HYDROCHLORIDE 1000 MG: 1 INJECTION, POWDER, LYOPHILIZED, FOR SOLUTION INTRAVENOUS at 00:23

## 2021-01-01 RX ADMIN — CEFEPIME HYDROCHLORIDE 2 G: 2 INJECTION, POWDER, FOR SOLUTION INTRAVENOUS at 21:13

## 2021-01-01 RX ADMIN — OXYCODONE HYDROCHLORIDE 10 MG: 10 TABLET, FILM COATED, EXTENDED RELEASE ORAL at 12:39

## 2021-01-01 RX ADMIN — LATANOPROST 1 DROP: 50 SOLUTION OPHTHALMIC at 23:07

## 2021-01-01 RX ADMIN — PANTOPRAZOLE SODIUM 40 MG: 40 TABLET, DELAYED RELEASE ORAL at 15:41

## 2021-01-01 RX ADMIN — LORAZEPAM 0.26 MG: 2 INJECTION INTRAMUSCULAR; INTRAVENOUS at 06:48

## 2021-01-01 RX ADMIN — IPRATROPIUM BROMIDE AND ALBUTEROL SULFATE 3 ML: .5; 2.5 SOLUTION RESPIRATORY (INHALATION) at 19:43

## 2021-01-01 RX ADMIN — FUROSEMIDE 40 MG: 40 TABLET ORAL at 08:04

## 2021-01-01 RX ADMIN — PANTOPRAZOLE SODIUM 40 MG: 40 INJECTION, POWDER, FOR SOLUTION INTRAVENOUS at 08:52

## 2021-01-01 RX ADMIN — LATANOPROST 1 DROP: 50 SOLUTION OPHTHALMIC at 22:47

## 2021-01-01 RX ADMIN — DOCUSATE SODIUM 100 MG: 100 CAPSULE, LIQUID FILLED ORAL at 08:04

## 2021-01-01 RX ADMIN — Medication 10 ML: at 14:31

## 2021-01-01 RX ADMIN — LEVOTHYROXINE SODIUM 75 MCG: 0.07 TABLET ORAL at 07:53

## 2021-01-01 RX ADMIN — CEFEPIME HYDROCHLORIDE 2 G: 2 INJECTION, POWDER, FOR SOLUTION INTRAVENOUS at 11:00

## 2021-01-01 RX ADMIN — Medication 5 MG: at 21:33

## 2021-01-01 RX ADMIN — ATORVASTATIN CALCIUM 80 MG: 20 TABLET, FILM COATED ORAL at 21:08

## 2021-01-01 RX ADMIN — IPRATROPIUM BROMIDE AND ALBUTEROL SULFATE 3 ML: .5; 3 SOLUTION RESPIRATORY (INHALATION) at 19:09

## 2021-01-01 RX ADMIN — MORPHINE SULFATE 2 MG: 2 INJECTION, SOLUTION INTRAMUSCULAR; INTRAVENOUS at 00:24

## 2021-01-01 RX ADMIN — IPRATROPIUM BROMIDE AND ALBUTEROL SULFATE 3 ML: .5; 2.5 SOLUTION RESPIRATORY (INHALATION) at 00:50

## 2021-01-01 RX ADMIN — Medication 10 ML: at 14:02

## 2021-01-01 RX ADMIN — LEVOTHYROXINE SODIUM 75 MCG: 0.07 TABLET ORAL at 06:45

## 2021-01-01 RX ADMIN — ATORVASTATIN CALCIUM 80 MG: 20 TABLET, FILM COATED ORAL at 23:03

## 2021-01-01 RX ADMIN — GUAIFENESIN AND DEXTROMETHORPHAN 10 ML: 100; 10 SYRUP ORAL at 14:57

## 2021-01-01 RX ADMIN — PANTOPRAZOLE SODIUM 40 MG: 40 TABLET, DELAYED RELEASE ORAL at 17:39

## 2021-01-01 RX ADMIN — LATANOPROST 1 DROP: 50 SOLUTION OPHTHALMIC at 11:37

## 2021-01-01 RX ADMIN — INSULIN LISPRO 2 UNITS: 100 INJECTION, SOLUTION INTRAVENOUS; SUBCUTANEOUS at 17:37

## 2021-01-01 RX ADMIN — METHYLPREDNISOLONE SODIUM SUCCINATE 40 MG: 40 INJECTION, POWDER, FOR SOLUTION INTRAMUSCULAR; INTRAVENOUS at 06:07

## 2021-01-01 RX ADMIN — INSULIN LISPRO 2 UNITS: 100 INJECTION, SOLUTION INTRAVENOUS; SUBCUTANEOUS at 09:11

## 2021-01-01 RX ADMIN — INSULIN GLARGINE 12 UNITS: 100 INJECTION, SOLUTION SUBCUTANEOUS at 21:49

## 2021-01-01 RX ADMIN — LISINOPRIL 40 MG: 20 TABLET ORAL at 10:14

## 2021-01-01 RX ADMIN — HYDRALAZINE HYDROCHLORIDE 10 MG: 20 INJECTION INTRAMUSCULAR; INTRAVENOUS at 09:50

## 2021-01-01 RX ADMIN — GABAPENTIN 600 MG: 300 CAPSULE ORAL at 10:46

## 2021-01-01 RX ADMIN — VANCOMYCIN HYDROCHLORIDE 1000 MG: 1 INJECTION, POWDER, LYOPHILIZED, FOR SOLUTION INTRAVENOUS at 23:36

## 2021-01-01 RX ADMIN — GUAIFENESIN 600 MG: 600 TABLET ORAL at 21:18

## 2021-01-01 RX ADMIN — IPRATROPIUM BROMIDE AND ALBUTEROL SULFATE 3 ML: .5; 2.5 SOLUTION RESPIRATORY (INHALATION) at 15:59

## 2021-01-01 RX ADMIN — OXYCODONE HYDROCHLORIDE 10 MG: 10 TABLET, FILM COATED, EXTENDED RELEASE ORAL at 23:34

## 2021-01-01 RX ADMIN — Medication 10 ML: at 23:08

## 2021-01-01 RX ADMIN — VANCOMYCIN HYDROCHLORIDE 1250 MG: 1.25 INJECTION, POWDER, LYOPHILIZED, FOR SOLUTION INTRAVENOUS at 21:13

## 2021-01-01 RX ADMIN — DOCUSATE SODIUM 100 MG: 100 CAPSULE, LIQUID FILLED ORAL at 17:06

## 2021-01-01 RX ADMIN — IPRATROPIUM BROMIDE AND ALBUTEROL SULFATE 3 ML: .5; 2.5 SOLUTION RESPIRATORY (INHALATION) at 15:40

## 2021-01-01 RX ADMIN — OXYCODONE HYDROCHLORIDE AND ACETAMINOPHEN 1 TABLET: 5; 325 TABLET ORAL at 12:23

## 2021-01-01 RX ADMIN — INSULIN GLARGINE 12 UNITS: 100 INJECTION, SOLUTION SUBCUTANEOUS at 22:58

## 2021-01-01 RX ADMIN — INSULIN LISPRO 10 UNITS: 100 INJECTION, SOLUTION INTRAVENOUS; SUBCUTANEOUS at 22:19

## 2021-01-01 RX ADMIN — INSULIN GLARGINE 10 UNITS: 100 INJECTION, SOLUTION SUBCUTANEOUS at 21:45

## 2021-01-01 RX ADMIN — METHYLPREDNISOLONE SODIUM SUCCINATE 40 MG: 40 INJECTION, POWDER, FOR SOLUTION INTRAMUSCULAR; INTRAVENOUS at 08:30

## 2021-01-01 RX ADMIN — LORAZEPAM 1 MG: 2 INJECTION INTRAMUSCULAR; INTRAVENOUS at 18:26

## 2021-01-01 RX ADMIN — NYSTATIN 500000 UNITS: 100000 SUSPENSION ORAL at 08:56

## 2021-01-01 RX ADMIN — DOCUSATE SODIUM 50MG AND SENNOSIDES 8.6MG 1 TABLET: 8.6; 5 TABLET, FILM COATED ORAL at 08:07

## 2021-01-01 RX ADMIN — OXYBUTYNIN CHLORIDE 10 MG: 5 TABLET, EXTENDED RELEASE ORAL at 08:18

## 2021-01-01 RX ADMIN — INSULIN LISPRO 4 UNITS: 100 INJECTION, SOLUTION INTRAVENOUS; SUBCUTANEOUS at 16:22

## 2021-01-01 RX ADMIN — INSULIN LISPRO 2 UNITS: 100 INJECTION, SOLUTION INTRAVENOUS; SUBCUTANEOUS at 13:58

## 2021-01-01 RX ADMIN — GABAPENTIN 600 MG: 300 CAPSULE ORAL at 17:28

## 2021-01-01 RX ADMIN — OXYCODONE AND ACETAMINOPHEN 1 TABLET: 7.5; 325 TABLET ORAL at 12:20

## 2021-01-01 RX ADMIN — INSULIN LISPRO 3 UNITS: 100 INJECTION, SOLUTION INTRAVENOUS; SUBCUTANEOUS at 15:15

## 2021-01-01 RX ADMIN — INSULIN GLARGINE 13 UNITS: 100 INJECTION, SOLUTION SUBCUTANEOUS at 22:13

## 2021-01-01 RX ADMIN — OXYCODONE HYDROCHLORIDE AND ACETAMINOPHEN 1 TABLET: 5; 325 TABLET ORAL at 16:09

## 2021-01-01 RX ADMIN — MORPHINE SULFATE 2 MG: 2 INJECTION, SOLUTION INTRAMUSCULAR; INTRAVENOUS at 11:00

## 2021-01-01 RX ADMIN — MORPHINE SULFATE 2 MG: 2 INJECTION, SOLUTION INTRAMUSCULAR; INTRAVENOUS at 14:27

## 2021-01-01 RX ADMIN — ATORVASTATIN CALCIUM 80 MG: 20 TABLET, FILM COATED ORAL at 21:07

## 2021-01-01 RX ADMIN — OXYCODONE HYDROCHLORIDE AND ACETAMINOPHEN 1 TABLET: 5; 325 TABLET ORAL at 12:26

## 2021-01-01 RX ADMIN — ARFORMOTEROL TARTRATE: 15 SOLUTION RESPIRATORY (INHALATION) at 07:37

## 2021-01-01 RX ADMIN — ASPIRIN 81 MG: 81 TABLET, COATED ORAL at 08:14

## 2021-01-01 RX ADMIN — ENOXAPARIN SODIUM 40 MG: 40 INJECTION SUBCUTANEOUS at 11:43

## 2021-01-01 RX ADMIN — MORPHINE SULFATE 2 MG: 2 INJECTION, SOLUTION INTRAMUSCULAR; INTRAVENOUS at 13:31

## 2021-01-01 RX ADMIN — MORPHINE SULFATE 2 MG: 2 INJECTION, SOLUTION INTRAMUSCULAR; INTRAVENOUS at 16:02

## 2021-01-01 RX ADMIN — OXYCODONE HYDROCHLORIDE AND ACETAMINOPHEN 1 TABLET: 5; 325 TABLET ORAL at 23:07

## 2021-01-01 RX ADMIN — IPRATROPIUM BROMIDE AND ALBUTEROL SULFATE 3 ML: .5; 2.5 SOLUTION RESPIRATORY (INHALATION) at 07:16

## 2021-01-01 RX ADMIN — ONDANSETRON 4 MG: 2 INJECTION INTRAMUSCULAR; INTRAVENOUS at 19:33

## 2021-01-01 RX ADMIN — NYSTATIN 500000 UNITS: 100000 SUSPENSION ORAL at 12:22

## 2021-01-01 RX ADMIN — LEVOTHYROXINE SODIUM 75 MCG: 0.07 TABLET ORAL at 06:24

## 2021-01-01 RX ADMIN — GUAIFENESIN 200 MG: 200 SOLUTION ORAL at 17:21

## 2021-01-01 RX ADMIN — ATORVASTATIN CALCIUM 80 MG: 40 TABLET, FILM COATED ORAL at 21:45

## 2021-01-01 RX ADMIN — OXYCODONE HYDROCHLORIDE 20 MG: 20 TABLET, FILM COATED, EXTENDED RELEASE ORAL at 21:48

## 2021-01-01 RX ADMIN — INSULIN LISPRO 3 UNITS: 100 INJECTION, SOLUTION INTRAVENOUS; SUBCUTANEOUS at 10:11

## 2021-01-01 RX ADMIN — PANTOPRAZOLE SODIUM 40 MG: 40 TABLET, DELAYED RELEASE ORAL at 10:23

## 2021-01-01 RX ADMIN — LORAZEPAM 0.5 MG: 2 INJECTION INTRAMUSCULAR; INTRAVENOUS at 23:00

## 2021-01-01 RX ADMIN — PANTOPRAZOLE SODIUM 40 MG: 40 TABLET, DELAYED RELEASE ORAL at 08:18

## 2021-01-01 RX ADMIN — BUMETANIDE 1 MG: 0.25 INJECTION INTRAMUSCULAR; INTRAVENOUS at 09:03

## 2021-01-01 RX ADMIN — PANTOPRAZOLE SODIUM 40 MG: 40 INJECTION, POWDER, FOR SOLUTION INTRAVENOUS at 09:20

## 2021-01-01 RX ADMIN — Medication 10 ML: at 01:34

## 2021-01-01 RX ADMIN — ONDANSETRON 4 MG: 2 INJECTION INTRAMUSCULAR; INTRAVENOUS at 13:48

## 2021-01-01 RX ADMIN — HYDROMORPHONE HYDROCHLORIDE 0.2 MG: 1 INJECTION, SOLUTION INTRAMUSCULAR; INTRAVENOUS; SUBCUTANEOUS at 19:47

## 2021-01-01 RX ADMIN — LORAZEPAM 0.5 MG: 2 INJECTION INTRAMUSCULAR; INTRAVENOUS at 06:14

## 2021-01-01 RX ADMIN — HEPARIN 500 UNITS: 100 SYRINGE at 15:42

## 2021-01-01 RX ADMIN — OXYCODONE 10 MG: 5 TABLET ORAL at 10:25

## 2021-01-01 RX ADMIN — AMLODIPINE BESYLATE 10 MG: 5 TABLET ORAL at 09:29

## 2021-01-01 RX ADMIN — OXYCODONE AND ACETAMINOPHEN 1 TABLET: 7.5; 325 TABLET ORAL at 17:33

## 2021-01-01 RX ADMIN — LATANOPROST 1 DROP: 50 SOLUTION OPHTHALMIC at 22:45

## 2021-01-01 RX ADMIN — Medication 10 ML: at 06:55

## 2021-01-01 RX ADMIN — ATORVASTATIN CALCIUM 80 MG: 20 TABLET, FILM COATED ORAL at 23:06

## 2021-01-01 RX ADMIN — GUAIFENESIN AND DEXTROMETHORPHAN 10 ML: 100; 10 SYRUP ORAL at 11:44

## 2021-01-01 RX ADMIN — GABAPENTIN 600 MG: 300 CAPSULE ORAL at 18:42

## 2021-01-01 RX ADMIN — LORAZEPAM 1 MG: 2 INJECTION INTRAMUSCULAR; INTRAVENOUS at 17:13

## 2021-01-01 RX ADMIN — IPRATROPIUM BROMIDE AND ALBUTEROL SULFATE 3 ML: .5; 3 SOLUTION RESPIRATORY (INHALATION) at 16:35

## 2021-01-01 RX ADMIN — DOCUSATE SODIUM 50MG AND SENNOSIDES 8.6MG 1 TABLET: 8.6; 5 TABLET, FILM COATED ORAL at 11:07

## 2021-01-01 RX ADMIN — NYSTATIN 500000 UNITS: 100000 SUSPENSION ORAL at 22:12

## 2021-01-01 RX ADMIN — PREDNISONE 20 MG: 20 TABLET ORAL at 08:57

## 2021-01-01 RX ADMIN — ATORVASTATIN CALCIUM 80 MG: 20 TABLET, FILM COATED ORAL at 22:37

## 2021-01-01 RX ADMIN — PANTOPRAZOLE SODIUM 40 MG: 40 TABLET, DELAYED RELEASE ORAL at 16:06

## 2021-01-01 RX ADMIN — PANTOPRAZOLE SODIUM 40 MG: 40 TABLET, DELAYED RELEASE ORAL at 06:36

## 2021-01-01 RX ADMIN — OXYCODONE 5 MG: 5 TABLET ORAL at 17:33

## 2021-01-01 RX ADMIN — DOCUSATE SODIUM 50MG AND SENNOSIDES 8.6MG 1 TABLET: 8.6; 5 TABLET, FILM COATED ORAL at 09:23

## 2021-01-01 RX ADMIN — GABAPENTIN 600 MG: 300 CAPSULE ORAL at 08:10

## 2021-01-01 RX ADMIN — NYSTATIN 500000 UNITS: 100000 SUSPENSION ORAL at 13:20

## 2021-01-01 RX ADMIN — GUAIFENESIN 600 MG: 600 TABLET ORAL at 21:50

## 2021-01-01 RX ADMIN — GUAIFENESIN 600 MG: 600 TABLET ORAL at 09:24

## 2021-01-01 RX ADMIN — LORAZEPAM 1 MG: 2 INJECTION INTRAMUSCULAR; INTRAVENOUS at 06:16

## 2021-01-01 RX ADMIN — ARFORMOTEROL TARTRATE: 15 SOLUTION RESPIRATORY (INHALATION) at 08:44

## 2021-01-01 RX ADMIN — HYDROCODONE BITARTRATE AND ACETAMINOPHEN 1 TABLET: 5; 325 TABLET ORAL at 18:24

## 2021-01-01 RX ADMIN — IPRATROPIUM BROMIDE AND ALBUTEROL SULFATE 3 ML: .5; 2.5 SOLUTION RESPIRATORY (INHALATION) at 10:10

## 2021-01-01 RX ADMIN — ARFORMOTEROL TARTRATE: 15 SOLUTION RESPIRATORY (INHALATION) at 20:01

## 2021-01-01 RX ADMIN — GUAIFENESIN AND DEXTROMETHORPHAN 10 ML: 100; 10 SYRUP ORAL at 17:38

## 2021-01-01 RX ADMIN — CLONAZEPAM 0.25 MG: 0.5 TABLET ORAL at 17:55

## 2021-01-01 RX ADMIN — IOPAMIDOL 100 ML: 755 INJECTION, SOLUTION INTRAVENOUS at 20:30

## 2021-01-01 RX ADMIN — Medication 81 MG: at 09:10

## 2021-01-01 RX ADMIN — MORPHINE SULFATE 2 MG: 2 INJECTION, SOLUTION INTRAMUSCULAR; INTRAVENOUS at 14:46

## 2021-01-01 RX ADMIN — MORPHINE SULFATE 2 MG: 2 INJECTION, SOLUTION INTRAMUSCULAR; INTRAVENOUS at 13:34

## 2021-01-01 RX ADMIN — IPRATROPIUM BROMIDE AND ALBUTEROL SULFATE 3 ML: .5; 3 SOLUTION RESPIRATORY (INHALATION) at 15:22

## 2021-01-01 RX ADMIN — METHYLPREDNISOLONE SODIUM SUCCINATE 40 MG: 40 INJECTION, POWDER, FOR SOLUTION INTRAMUSCULAR; INTRAVENOUS at 21:36

## 2021-01-01 RX ADMIN — LORAZEPAM 0.5 MG: 2 INJECTION INTRAMUSCULAR; INTRAVENOUS at 00:24

## 2021-01-01 RX ADMIN — ASPIRIN 81 MG: 81 TABLET, COATED ORAL at 08:19

## 2021-01-01 RX ADMIN — LORAZEPAM 0.5 MG: 2 INJECTION INTRAMUSCULAR; INTRAVENOUS at 13:00

## 2021-01-01 RX ADMIN — LEVOFLOXACIN 750 MG: 5 INJECTION, SOLUTION INTRAVENOUS at 00:32

## 2021-01-01 RX ADMIN — ASPIRIN 81 MG: 81 TABLET, COATED ORAL at 15:02

## 2021-01-01 RX ADMIN — LEVOFLOXACIN 750 MG: 750 TABLET, FILM COATED ORAL at 08:15

## 2021-01-01 RX ADMIN — ALPRAZOLAM 0.25 MG: 0.25 TABLET ORAL at 08:28

## 2021-01-01 RX ADMIN — NYSTATIN 500000 UNITS: 100000 SUSPENSION ORAL at 12:17

## 2021-01-01 RX ADMIN — Medication 10 ML: at 06:23

## 2021-01-01 RX ADMIN — HYDRALAZINE HYDROCHLORIDE 10 MG: 20 INJECTION INTRAMUSCULAR; INTRAVENOUS at 10:40

## 2021-01-01 RX ADMIN — PIPERACILLIN AND TAZOBACTAM 3.38 G: 3; .375 INJECTION, POWDER, LYOPHILIZED, FOR SOLUTION INTRAVENOUS at 00:24

## 2021-01-01 RX ADMIN — Medication 10 ML: at 20:37

## 2021-01-01 RX ADMIN — NYSTATIN 500000 UNITS: 100000 SUSPENSION ORAL at 20:54

## 2021-01-01 RX ADMIN — MORPHINE SULFATE 2 MG: 2 INJECTION, SOLUTION INTRAMUSCULAR; INTRAVENOUS at 19:33

## 2021-01-01 RX ADMIN — LORAZEPAM 0.5 MG: 2 INJECTION INTRAMUSCULAR; INTRAVENOUS at 18:59

## 2021-01-01 RX ADMIN — OXYCODONE HYDROCHLORIDE 20 MG: 20 TABLET, FILM COATED, EXTENDED RELEASE ORAL at 21:59

## 2021-01-01 RX ADMIN — PREDNISONE 5 MG: 5 TABLET ORAL at 08:59

## 2021-01-01 RX ADMIN — IPRATROPIUM BROMIDE AND ALBUTEROL SULFATE 3 ML: .5; 2.5 SOLUTION RESPIRATORY (INHALATION) at 07:43

## 2021-01-01 RX ADMIN — GABAPENTIN 600 MG: 300 CAPSULE ORAL at 23:33

## 2021-01-01 RX ADMIN — GUAIFENESIN AND DEXTROMETHORPHAN 10 ML: 100; 10 SYRUP ORAL at 06:45

## 2021-01-01 RX ADMIN — LORAZEPAM 0.5 MG: 2 INJECTION INTRAMUSCULAR; INTRAVENOUS at 17:33

## 2021-01-01 RX ADMIN — CEFEPIME HYDROCHLORIDE 2 G: 2 INJECTION, POWDER, FOR SOLUTION INTRAVENOUS at 08:16

## 2021-01-01 RX ADMIN — TROSPIUM CHLORIDE 20 MG: 20 TABLET, FILM COATED ORAL at 09:00

## 2021-01-01 RX ADMIN — SODIUM CHLORIDE 1000 ML: 900 INJECTION, SOLUTION INTRAVENOUS at 18:44

## 2021-01-01 RX ADMIN — MORPHINE SULFATE 2 MG: 2 INJECTION, SOLUTION INTRAMUSCULAR; INTRAVENOUS at 01:34

## 2021-01-01 RX ADMIN — GABAPENTIN 600 MG: 300 CAPSULE ORAL at 08:30

## 2021-01-01 RX ADMIN — IPRATROPIUM BROMIDE AND ALBUTEROL SULFATE 3 ML: .5; 2.5 SOLUTION RESPIRATORY (INHALATION) at 20:08

## 2021-01-01 RX ADMIN — FLUOXETINE 10 MG: 10 CAPSULE ORAL at 17:16

## 2021-01-01 RX ADMIN — VANCOMYCIN HYDROCHLORIDE 750 MG: 750 INJECTION, POWDER, LYOPHILIZED, FOR SOLUTION INTRAVENOUS at 09:21

## 2021-01-01 RX ADMIN — CEFTRIAXONE 1 G: 1 INJECTION, POWDER, FOR SOLUTION INTRAMUSCULAR; INTRAVENOUS at 18:39

## 2021-01-01 RX ADMIN — LEVOTHYROXINE SODIUM 75 MCG: 0.07 TABLET ORAL at 09:23

## 2021-01-01 RX ADMIN — IPRATROPIUM BROMIDE AND ALBUTEROL SULFATE 3 ML: .5; 2.5 SOLUTION RESPIRATORY (INHALATION) at 20:03

## 2021-01-01 RX ADMIN — INSULIN LISPRO 2 UNITS: 100 INJECTION, SOLUTION INTRAVENOUS; SUBCUTANEOUS at 13:03

## 2021-01-01 RX ADMIN — GUAIFENESIN 600 MG: 600 TABLET ORAL at 08:12

## 2021-01-01 RX ADMIN — MORPHINE SULFATE 2 MG: 2 INJECTION, SOLUTION INTRAMUSCULAR; INTRAVENOUS at 14:31

## 2021-01-01 RX ADMIN — OXYCODONE HYDROCHLORIDE AND ACETAMINOPHEN 1 TABLET: 5; 325 TABLET ORAL at 23:00

## 2021-01-01 RX ADMIN — Medication 10 ML: at 20:56

## 2021-01-01 RX ADMIN — CEFEPIME HYDROCHLORIDE 2 G: 2 INJECTION, POWDER, FOR SOLUTION INTRAVENOUS at 19:50

## 2021-01-01 RX ADMIN — MORPHINE SULFATE 2 MG: 2 INJECTION, SOLUTION INTRAMUSCULAR; INTRAVENOUS at 10:51

## 2021-01-01 RX ADMIN — OXYCODONE HYDROCHLORIDE 20 MG: 20 TABLET, FILM COATED, EXTENDED RELEASE ORAL at 17:45

## 2021-01-01 RX ADMIN — INSULIN GLARGINE 10 UNITS: 100 INJECTION, SOLUTION SUBCUTANEOUS at 22:40

## 2021-01-01 RX ADMIN — INSULIN LISPRO 3 UNITS: 100 INJECTION, SOLUTION INTRAVENOUS; SUBCUTANEOUS at 17:34

## 2021-01-01 RX ADMIN — PANTOPRAZOLE SODIUM 40 MG: 40 TABLET, DELAYED RELEASE ORAL at 07:10

## 2021-01-01 RX ADMIN — INSULIN LISPRO 2 UNITS: 100 INJECTION, SOLUTION INTRAVENOUS; SUBCUTANEOUS at 16:32

## 2021-01-01 RX ADMIN — MORPHINE SULFATE 2 MG: 2 INJECTION, SOLUTION INTRAMUSCULAR; INTRAVENOUS at 00:52

## 2021-01-01 RX ADMIN — IOPAMIDOL 140 ML: 755 INJECTION, SOLUTION INTRAVENOUS at 17:46

## 2021-01-01 RX ADMIN — Medication 400 MG: at 08:15

## 2021-01-01 RX ADMIN — PIPERACILLIN AND TAZOBACTAM 3.38 G: 3; .375 INJECTION, POWDER, LYOPHILIZED, FOR SOLUTION INTRAVENOUS at 23:13

## 2021-01-01 RX ADMIN — LORAZEPAM 0.5 MG: 2 INJECTION INTRAMUSCULAR; INTRAVENOUS at 01:05

## 2021-01-01 RX ADMIN — ASPIRIN 81 MG: 81 TABLET, COATED ORAL at 08:15

## 2021-01-01 RX ADMIN — LISINOPRIL 40 MG: 20 TABLET ORAL at 11:28

## 2021-01-01 RX ADMIN — INSULIN LISPRO 2 UNITS: 100 INJECTION, SOLUTION INTRAVENOUS; SUBCUTANEOUS at 21:13

## 2021-01-01 RX ADMIN — CLONAZEPAM 0.25 MG: 0.5 TABLET ORAL at 18:39

## 2021-01-01 RX ADMIN — ASPIRIN 81 MG: 81 TABLET, COATED ORAL at 08:04

## 2021-01-01 RX ADMIN — ENOXAPARIN SODIUM 40 MG: 40 INJECTION SUBCUTANEOUS at 14:18

## 2021-01-01 RX ADMIN — PANTOPRAZOLE SODIUM 40 MG: 40 TABLET, DELAYED RELEASE ORAL at 06:14

## 2021-01-01 RX ADMIN — CEFEPIME HYDROCHLORIDE 2 G: 2 INJECTION, POWDER, FOR SOLUTION INTRAVENOUS at 09:24

## 2021-01-01 RX ADMIN — LORAZEPAM 0.5 MG: 2 INJECTION INTRAMUSCULAR; INTRAVENOUS at 01:34

## 2021-01-01 RX ADMIN — METHYLPREDNISOLONE SODIUM SUCCINATE 40 MG: 40 INJECTION, POWDER, FOR SOLUTION INTRAMUSCULAR; INTRAVENOUS at 19:20

## 2021-01-01 RX ADMIN — BUMETANIDE 1 MG: 0.25 INJECTION INTRAMUSCULAR; INTRAVENOUS at 17:51

## 2021-01-01 RX ADMIN — MORPHINE SULFATE 2 MG: 2 INJECTION, SOLUTION INTRAMUSCULAR; INTRAVENOUS at 11:19

## 2021-01-01 RX ADMIN — LEVOTHYROXINE SODIUM 75 MCG: 0.07 TABLET ORAL at 08:15

## 2021-01-01 RX ADMIN — LATANOPROST 1 DROP: 50 SOLUTION OPHTHALMIC at 21:40

## 2021-01-01 RX ADMIN — ARFORMOTEROL TARTRATE: 15 SOLUTION RESPIRATORY (INHALATION) at 20:17

## 2021-01-01 RX ADMIN — PANTOPRAZOLE SODIUM 40 MG: 40 TABLET, DELAYED RELEASE ORAL at 06:28

## 2021-01-01 RX ADMIN — NYSTATIN 500000 UNITS: 100000 SUSPENSION ORAL at 10:46

## 2021-01-01 RX ADMIN — PANTOPRAZOLE SODIUM 40 MG: 40 TABLET, DELAYED RELEASE ORAL at 16:21

## 2021-01-01 RX ADMIN — ATORVASTATIN CALCIUM 80 MG: 20 TABLET, FILM COATED ORAL at 09:11

## 2021-01-01 RX ADMIN — OXYCODONE HYDROCHLORIDE 10 MG: 10 TABLET, FILM COATED, EXTENDED RELEASE ORAL at 22:24

## 2021-01-01 RX ADMIN — INSULIN LISPRO 4 UNITS: 100 INJECTION, SOLUTION INTRAVENOUS; SUBCUTANEOUS at 16:19

## 2021-01-01 RX ADMIN — SODIUM CHLORIDE 100 ML/HR: 9 INJECTION, SOLUTION INTRAVENOUS at 08:00

## 2021-01-01 RX ADMIN — IPRATROPIUM BROMIDE 0.5 MG: 0.5 SOLUTION RESPIRATORY (INHALATION) at 07:12

## 2021-01-01 RX ADMIN — LATANOPROST 1 DROP: 50 SOLUTION OPHTHALMIC at 10:15

## 2021-01-01 RX ADMIN — SIMETHICONE 80 MG: 80 TABLET, CHEWABLE ORAL at 20:13

## 2021-01-01 RX ADMIN — DIPHENHYDRAMINE HYDROCHLORIDE AND LIDOCAINE HYDROCHLORIDE AND ALUMINUM HYDROXIDE AND MAGNESIUM HYDRO 10 ML: KIT at 11:50

## 2021-01-01 RX ADMIN — SODIUM CHLORIDE 50 ML/HR: 9 INJECTION, SOLUTION INTRAVENOUS at 06:35

## 2021-01-01 RX ADMIN — LATANOPROST 1 DROP: 50 SOLUTION OPHTHALMIC at 21:28

## 2021-01-01 RX ADMIN — INSULIN LISPRO 2 UNITS: 100 INJECTION, SOLUTION INTRAVENOUS; SUBCUTANEOUS at 11:44

## 2021-01-01 RX ADMIN — IPRATROPIUM BROMIDE AND ALBUTEROL SULFATE 3 ML: .5; 2.5 SOLUTION RESPIRATORY (INHALATION) at 14:33

## 2021-01-01 RX ADMIN — PANTOPRAZOLE SODIUM 40 MG: 40 TABLET, DELAYED RELEASE ORAL at 15:52

## 2021-01-01 RX ADMIN — LEVOTHYROXINE SODIUM 75 MCG: 0.07 TABLET ORAL at 06:01

## 2021-01-01 RX ADMIN — LEVOFLOXACIN 750 MG: 5 INJECTION, SOLUTION INTRAVENOUS at 00:02

## 2021-01-01 RX ADMIN — NYSTATIN 500000 UNITS: 100000 SUSPENSION ORAL at 09:04

## 2021-01-01 RX ADMIN — ARFORMOTEROL TARTRATE 15 MCG: 15 SOLUTION RESPIRATORY (INHALATION) at 20:54

## 2021-01-01 RX ADMIN — Medication 5 MG: at 21:18

## 2021-01-01 RX ADMIN — GUAIFENESIN 600 MG: 600 TABLET ORAL at 08:57

## 2021-01-01 RX ADMIN — PREDNISONE 40 MG: 20 TABLET ORAL at 16:21

## 2021-01-01 RX ADMIN — SODIUM CHLORIDE 100 ML/HR: 9 INJECTION, SOLUTION INTRAVENOUS at 23:34

## 2021-01-01 RX ADMIN — GUAIFENESIN 600 MG: 600 TABLET ORAL at 08:30

## 2021-01-01 RX ADMIN — PREDNISONE 20 MG: 20 TABLET ORAL at 09:33

## 2021-01-01 RX ADMIN — OXYCODONE HYDROCHLORIDE 20 MG: 20 TABLET, FILM COATED, EXTENDED RELEASE ORAL at 21:18

## 2021-01-01 RX ADMIN — INSULIN LISPRO 2 UNITS: 100 INJECTION, SOLUTION INTRAVENOUS; SUBCUTANEOUS at 09:31

## 2021-01-01 RX ADMIN — VANCOMYCIN HYDROCHLORIDE 1000 MG: 1 INJECTION, POWDER, LYOPHILIZED, FOR SOLUTION INTRAVENOUS at 12:25

## 2021-01-01 RX ADMIN — METHYLPREDNISOLONE SODIUM SUCCINATE 40 MG: 40 INJECTION, POWDER, FOR SOLUTION INTRAMUSCULAR; INTRAVENOUS at 00:46

## 2021-01-01 RX ADMIN — LORAZEPAM 0.5 MG: 2 INJECTION INTRAMUSCULAR; INTRAVENOUS at 17:35

## 2021-01-01 RX ADMIN — GUAIFENESIN 200 MG: 200 SOLUTION ORAL at 23:01

## 2021-01-01 RX ADMIN — LORAZEPAM 0.5 MG: 2 INJECTION INTRAMUSCULAR; INTRAVENOUS at 16:22

## 2021-01-01 RX ADMIN — CEFEPIME HYDROCHLORIDE 2 G: 2 INJECTION, POWDER, FOR SOLUTION INTRAVENOUS at 20:15

## 2021-01-01 RX ADMIN — INSULIN GLARGINE 12 UNITS: 100 INJECTION, SOLUTION SUBCUTANEOUS at 23:35

## 2021-01-01 RX ADMIN — IPRATROPIUM BROMIDE AND ALBUTEROL SULFATE 3 ML: .5; 2.5 SOLUTION RESPIRATORY (INHALATION) at 20:31

## 2021-01-01 RX ADMIN — INSULIN LISPRO 3 UNITS: 100 INJECTION, SOLUTION INTRAVENOUS; SUBCUTANEOUS at 21:45

## 2021-01-01 RX ADMIN — Medication 5 MG: at 22:36

## 2021-01-01 RX ADMIN — HYDROMORPHONE HYDROCHLORIDE 0.2 MG: 1 INJECTION, SOLUTION INTRAMUSCULAR; INTRAVENOUS; SUBCUTANEOUS at 17:28

## 2021-01-01 RX ADMIN — IOPAMIDOL 140 ML: 755 INJECTION, SOLUTION INTRAVENOUS at 20:05

## 2021-01-01 RX ADMIN — PANTOPRAZOLE SODIUM 40 MG: 40 TABLET, DELAYED RELEASE ORAL at 06:01

## 2021-01-01 RX ADMIN — OXYCODONE HYDROCHLORIDE 5 MG: 5 TABLET ORAL at 16:29

## 2021-01-01 RX ADMIN — INSULIN GLARGINE 10 UNITS: 100 INJECTION, SOLUTION SUBCUTANEOUS at 14:04

## 2021-01-01 RX ADMIN — INSULIN LISPRO 15 UNITS: 100 INJECTION, SOLUTION INTRAVENOUS; SUBCUTANEOUS at 21:49

## 2021-01-01 RX ADMIN — MORPHINE SULFATE 2 MG: 2 INJECTION, SOLUTION INTRAMUSCULAR; INTRAVENOUS at 14:05

## 2021-01-01 RX ADMIN — OXYCODONE HYDROCHLORIDE 20 MG: 20 TABLET, FILM COATED, EXTENDED RELEASE ORAL at 22:37

## 2021-01-01 RX ADMIN — CLONAZEPAM 0.25 MG: 0.5 TABLET ORAL at 17:00

## 2021-01-01 RX ADMIN — LEVOTHYROXINE SODIUM 75 MCG: 0.07 TABLET ORAL at 06:15

## 2021-01-01 RX ADMIN — PANTOPRAZOLE SODIUM 40 MG: 40 TABLET, DELAYED RELEASE ORAL at 08:28

## 2021-01-01 RX ADMIN — GUAIFENESIN 600 MG: 600 TABLET ORAL at 20:36

## 2021-01-01 RX ADMIN — LORAZEPAM 0.5 MG: 2 INJECTION INTRAMUSCULAR; INTRAVENOUS at 17:51

## 2021-01-01 RX ADMIN — Medication 10 ML: at 14:20

## 2021-01-01 RX ADMIN — CLONAZEPAM 0.25 MG: 0.5 TABLET ORAL at 18:36

## 2021-01-01 RX ADMIN — PANTOPRAZOLE SODIUM 40 MG: 40 TABLET, DELAYED RELEASE ORAL at 16:09

## 2021-01-01 RX ADMIN — LORAZEPAM 0.5 MG: 2 INJECTION INTRAMUSCULAR; INTRAVENOUS at 19:52

## 2021-01-01 RX ADMIN — Medication 10 ML: at 07:05

## 2021-01-01 RX ADMIN — CLONAZEPAM 0.25 MG: 0.5 TABLET ORAL at 17:44

## 2021-01-01 RX ADMIN — MORPHINE SULFATE 2 MG: 2 INJECTION, SOLUTION INTRAMUSCULAR; INTRAVENOUS at 10:02

## 2021-01-01 RX ADMIN — OXYCODONE HYDROCHLORIDE AND ACETAMINOPHEN 1 TABLET: 5; 325 TABLET ORAL at 17:01

## 2021-01-01 RX ADMIN — INSULIN LISPRO 2 UNITS: 100 INJECTION, SOLUTION INTRAVENOUS; SUBCUTANEOUS at 12:31

## 2021-01-01 RX ADMIN — Medication 5 MG: at 22:12

## 2021-01-01 RX ADMIN — POLYETHYLENE GLYCOL 3350 17 G: 17 POWDER, FOR SOLUTION ORAL at 08:04

## 2021-01-01 RX ADMIN — IPRATROPIUM BROMIDE AND ALBUTEROL SULFATE 3 ML: .5; 3 SOLUTION RESPIRATORY (INHALATION) at 12:14

## 2021-01-01 RX ADMIN — ASPIRIN 81 MG: 81 TABLET, COATED ORAL at 08:23

## 2021-01-01 RX ADMIN — GUAIFENESIN 600 MG: 600 TABLET ORAL at 22:12

## 2021-01-01 RX ADMIN — INSULIN LISPRO 2 UNITS: 100 INJECTION, SOLUTION INTRAVENOUS; SUBCUTANEOUS at 17:29

## 2021-01-01 RX ADMIN — LORAZEPAM 0.5 MG: 2 INJECTION INTRAMUSCULAR; INTRAVENOUS at 17:04

## 2021-01-01 RX ADMIN — VANCOMYCIN HYDROCHLORIDE 1000 MG: 1 INJECTION, POWDER, LYOPHILIZED, FOR SOLUTION INTRAVENOUS at 21:45

## 2021-01-01 RX ADMIN — LORAZEPAM 0.5 MG: 2 INJECTION INTRAMUSCULAR; INTRAVENOUS at 14:31

## 2021-01-01 RX ADMIN — INSULIN LISPRO 4 UNITS: 100 INJECTION, SOLUTION INTRAVENOUS; SUBCUTANEOUS at 12:21

## 2021-01-01 RX ADMIN — ALUMINUM HYDROXIDE, MAGNESIUM HYDROXIDE, AND SIMETHICONE 30 ML: 200; 200; 20 SUSPENSION ORAL at 00:46

## 2021-01-01 RX ADMIN — BUMETANIDE 1 MG: 0.25 INJECTION INTRAMUSCULAR; INTRAVENOUS at 09:10

## 2021-01-01 RX ADMIN — GABAPENTIN 600 MG: 300 CAPSULE ORAL at 12:39

## 2021-01-01 RX ADMIN — HYDROMORPHONE HYDROCHLORIDE 1 MG: 1 INJECTION, SOLUTION INTRAMUSCULAR; INTRAVENOUS; SUBCUTANEOUS at 16:53

## 2021-01-01 RX ADMIN — IOPAMIDOL 50 ML: 755 INJECTION, SOLUTION INTRAVENOUS at 21:28

## 2021-01-01 RX ADMIN — INSULIN LISPRO 2 UNITS: 100 INJECTION, SOLUTION INTRAVENOUS; SUBCUTANEOUS at 09:20

## 2021-01-01 RX ADMIN — NYSTATIN 500000 UNITS: 100000 SUSPENSION ORAL at 17:44

## 2021-01-01 RX ADMIN — IPRATROPIUM BROMIDE AND ALBUTEROL SULFATE 3 ML: .5; 3 SOLUTION RESPIRATORY (INHALATION) at 19:45

## 2021-01-01 RX ADMIN — BACITRACIN 1 PACKET: 500 OINTMENT TOPICAL at 09:11

## 2021-01-01 RX ADMIN — ALPRAZOLAM 0.25 MG: 0.25 TABLET ORAL at 08:18

## 2021-01-01 RX ADMIN — DOCUSATE SODIUM 50MG AND SENNOSIDES 8.6MG 2 TABLET: 8.6; 5 TABLET, FILM COATED ORAL at 09:04

## 2021-01-01 RX ADMIN — Medication 10 ML: at 13:37

## 2021-01-01 RX ADMIN — ATORVASTATIN CALCIUM 80 MG: 20 TABLET, FILM COATED ORAL at 22:43

## 2021-01-01 RX ADMIN — INSULIN LISPRO 3 UNITS: 100 INJECTION, SOLUTION INTRAVENOUS; SUBCUTANEOUS at 17:15

## 2021-01-01 RX ADMIN — POTASSIUM BICARBONATE 40 MEQ: 782 TABLET, EFFERVESCENT ORAL at 12:05

## 2021-01-01 RX ADMIN — FLUOXETINE 10 MG: 10 CAPSULE ORAL at 17:01

## 2021-01-01 RX ADMIN — PREDNISONE 40 MG: 20 TABLET ORAL at 11:25

## 2021-01-01 RX ADMIN — GUAIFENESIN AND DEXTROMETHORPHAN 10 ML: 100; 10 SYRUP ORAL at 05:14

## 2021-01-01 RX ADMIN — IPRATROPIUM BROMIDE AND ALBUTEROL SULFATE 3 ML: .5; 2.5 SOLUTION RESPIRATORY (INHALATION) at 01:07

## 2021-01-01 RX ADMIN — OXYCODONE HYDROCHLORIDE 10 MG: 10 TABLET, FILM COATED, EXTENDED RELEASE ORAL at 10:01

## 2021-01-01 RX ADMIN — OXYCODONE HYDROCHLORIDE 10 MG: 10 TABLET, FILM COATED, EXTENDED RELEASE ORAL at 08:44

## 2021-01-01 RX ADMIN — ATORVASTATIN CALCIUM 80 MG: 20 TABLET, FILM COATED ORAL at 22:12

## 2021-01-01 RX ADMIN — LATANOPROST 1 DROP: 50 SOLUTION OPHTHALMIC at 20:57

## 2021-01-01 RX ADMIN — BUDESONIDE 500 MCG: 0.5 SUSPENSION RESPIRATORY (INHALATION) at 20:54

## 2021-01-01 RX ADMIN — ENOXAPARIN SODIUM 40 MG: 40 INJECTION SUBCUTANEOUS at 12:22

## 2021-01-01 RX ADMIN — LORAZEPAM 0.5 MG: 2 INJECTION INTRAMUSCULAR; INTRAVENOUS at 09:09

## 2021-01-01 RX ADMIN — PANTOPRAZOLE SODIUM 40 MG: 40 TABLET, DELAYED RELEASE ORAL at 05:09

## 2021-01-01 RX ADMIN — GABAPENTIN 600 MG: 300 CAPSULE ORAL at 08:18

## 2021-01-01 RX ADMIN — FUROSEMIDE 40 MG: 10 INJECTION, SOLUTION INTRAMUSCULAR; INTRAVENOUS at 02:45

## 2021-01-01 RX ADMIN — Medication 5 MG: at 21:38

## 2021-01-01 RX ADMIN — Medication 81 MG: at 09:23

## 2021-01-01 RX ADMIN — Medication 81 MG: at 08:11

## 2021-01-01 RX ADMIN — AZTREONAM 1 G: 2 INJECTION, POWDER, LYOPHILIZED, FOR SOLUTION INTRAMUSCULAR; INTRAVENOUS at 05:46

## 2021-01-01 RX ADMIN — MORPHINE SULFATE 2 MG: 2 INJECTION, SOLUTION INTRAMUSCULAR; INTRAVENOUS at 16:23

## 2021-01-01 RX ADMIN — Medication 10 ML: at 16:12

## 2021-01-01 RX ADMIN — Medication 10 ML: at 13:34

## 2021-01-01 RX ADMIN — NYSTATIN 500000 UNITS: 100000 SUSPENSION ORAL at 12:24

## 2021-01-01 RX ADMIN — OXYCODONE HYDROCHLORIDE AND ACETAMINOPHEN 1 TABLET: 5; 325 TABLET ORAL at 21:08

## 2021-01-01 RX ADMIN — Medication 10 ML: at 21:45

## 2021-01-01 RX ADMIN — METHYLPREDNISOLONE SODIUM SUCCINATE 40 MG: 40 INJECTION, POWDER, FOR SOLUTION INTRAMUSCULAR; INTRAVENOUS at 19:01

## 2021-01-01 RX ADMIN — LEVOTHYROXINE SODIUM 75 MCG: 0.07 TABLET ORAL at 09:00

## 2021-01-01 RX ADMIN — Medication 10 ML: at 21:20

## 2021-01-01 RX ADMIN — VANCOMYCIN HYDROCHLORIDE 750 MG: 750 INJECTION, POWDER, LYOPHILIZED, FOR SOLUTION INTRAVENOUS at 17:29

## 2021-01-01 RX ADMIN — METHYLPREDNISOLONE SODIUM SUCCINATE 40 MG: 40 INJECTION, POWDER, FOR SOLUTION INTRAMUSCULAR; INTRAVENOUS at 17:57

## 2021-01-01 RX ADMIN — VANCOMYCIN HYDROCHLORIDE 750 MG: 750 INJECTION, POWDER, LYOPHILIZED, FOR SOLUTION INTRAVENOUS at 17:51

## 2021-01-01 RX ADMIN — LORAZEPAM 0.5 MG: 2 INJECTION INTRAMUSCULAR; INTRAVENOUS at 01:33

## 2021-01-01 RX ADMIN — GUAIFENESIN AND DEXTROMETHORPHAN 10 ML: 100; 10 SYRUP ORAL at 01:13

## 2021-01-01 RX ADMIN — VANCOMYCIN HYDROCHLORIDE 1000 MG: 1 INJECTION, POWDER, LYOPHILIZED, FOR SOLUTION INTRAVENOUS at 10:48

## 2021-01-01 RX ADMIN — GUAIFENESIN AND DEXTROMETHORPHAN 10 ML: 100; 10 SYRUP ORAL at 07:05

## 2021-01-01 RX ADMIN — Medication 10 ML: at 21:41

## 2021-01-01 RX ADMIN — Medication 1 PACKET: at 13:52

## 2021-01-01 RX ADMIN — NYSTATIN 500000 UNITS: 100000 SUSPENSION ORAL at 10:11

## 2021-01-01 RX ADMIN — METHYLPREDNISOLONE SODIUM SUCCINATE 20 MG: 40 INJECTION, POWDER, FOR SOLUTION INTRAMUSCULAR; INTRAVENOUS at 10:47

## 2021-01-01 RX ADMIN — MORPHINE SULFATE 2 MG: 2 INJECTION, SOLUTION INTRAMUSCULAR; INTRAVENOUS at 12:16

## 2021-01-01 RX ADMIN — GABAPENTIN 600 MG: 300 CAPSULE ORAL at 13:09

## 2021-01-03 PROBLEM — E03.9 HYPOTHYROID: Status: ACTIVE | Noted: 2021-01-01

## 2021-01-03 PROBLEM — S22.39XA RIB FRACTURE: Status: ACTIVE | Noted: 2021-01-01

## 2021-01-03 PROBLEM — E11.9 TYPE II DIABETES MELLITUS (HCC): Status: ACTIVE | Noted: 2021-01-01

## 2021-01-03 PROBLEM — J44.1 COPD EXACERBATION (HCC): Status: ACTIVE | Noted: 2021-01-01

## 2021-01-03 PROBLEM — R65.10 SIRS (SYSTEMIC INFLAMMATORY RESPONSE SYNDROME) (HCC): Status: ACTIVE | Noted: 2021-01-01

## 2021-01-03 PROBLEM — J44.9 COPD (CHRONIC OBSTRUCTIVE PULMONARY DISEASE) (HCC): Status: ACTIVE | Noted: 2021-01-01

## 2021-01-03 PROBLEM — F41.9 ANXIETY: Status: ACTIVE | Noted: 2021-01-01

## 2021-01-03 PROBLEM — Z86.73 HISTORY OF CVA (CEREBROVASCULAR ACCIDENT): Status: ACTIVE | Noted: 2021-01-01

## 2021-01-03 PROBLEM — I70.90 ATHEROSCLEROSIS: Status: ACTIVE | Noted: 2021-01-01

## 2021-01-03 PROBLEM — R09.02 HYPOXIA: Status: ACTIVE | Noted: 2021-01-01

## 2021-01-03 PROBLEM — I10 HTN (HYPERTENSION): Status: ACTIVE | Noted: 2021-01-01

## 2021-01-03 NOTE — ED NOTES
12:02 PM 
 
Patient arrives with worsening shortness of breath, cough and right rib pain at the site of a known rib fracture. Patient has a known pneumonia and is on doxycycline and a prednisone pack currently. Per daughter patient was desatting at home via home oxygen monitor into the 80s.  95 at rest.  
 
I have evaluated the patient as the Provider in Triage. I have reviewed Her vital signs and the triage nurse assessment. I have talked with the patient and any available family and advised that I am the provider in triage and have ordered the appropriate study to initiate their work up based on the clinical presentation during my assessment. I have advised that the patient will be accommodated in the Main ED as soon as possible. I have also requested to contact the triage nurse or myself immediately if the patient experiences any changes in their condition during this brief waiting period.  
Vel Roman NP

## 2021-01-03 NOTE — H&P
[facility] [address], [place] [address] 
[phone] Hospitalist Admission Note NAME:  Carisa Gonzalez :   1940 MRN:  [id number] PCP:  Nancy Tiwari MD  
 
Date of Service/Time:  1/3/2021 3:43 PM 
 
 
  
Assessment / Plan:   
  
[de-identified] y.o. female with hx of HTN, DM, anxiety, hypothyroidism, CVA w/ residual L-sided weakness, recent rib fracture presenting with dyspnea, cough, ?PNA, admitted for further workup Cough / dyspnea: on interview, pt is a prior smoker, and she has underlying COPD and has O2 at home at night as needed. CT chest shows severe emphysema. I suspect COPD exacerbation as obvious wheezing was noted on exam. Start duo-nebs, IV steroids, This is all in the setting of recent rib fractures (acute right ninth and 10th rib fractures). Empiric CTX and doxy for now but de-escalate quickly. At risk for PNA. Send sputum culture, PNA workup (Legionella and Strep Pneumo urine ag, Mycoplasma IgM). Guaifenesin, nebs, pulmonary toilet. Will ask pulmonology to see and follow up post-discharge. SIRS (systemic inflammatory response syndrome): no fevers. Due to above. Tx as above Type II diabetes mellitus: appears controlled. Resume home Lantus but may need higher dose with steroids on board. Start SSI. Send A1c  
 
  HTN (hypertension): BP low/normal. HOLD lisinopril Anxiety: typically would avoid benzos in octogenarian however will resume home clonazepam to avoid withdrawal 
 
  Hypothyroidism: continue LT4 History of CVA: with residual L-sided weakness. Resume home Plavix and statin Presence of port: pt states she receives IVIG for \"low counts\". ?ITP. Will need to confirm hx with daughter Renal lesion: dedicated renal scan at some point Code Status: FULL Surrogate decision maker: daughter ED notes, lab results, and imaging studies reviewed. Total time spent with patient: 70 Minutes Time spent in the care of this patient included reviewing records, discussing with nursing, obtaining history and examining the patient, and discussing treatment plans, with >50% time spent counseling/coordinating care Risk of deterioration: High Care Plan discussed with: ED provider, Patient, Nursing Staff and >50% of time spent in counseling and coordination of care Discussed:  Care Plan and D/C Planning Prophylaxis:  Lovenox Disposition:  Home w/Family Subjective: CHIEF COMPLAINT: cough. TORO HISTORY OF PRESENT ILLNESS:    
Ms. Reuben Cutler is a [de-identified] y.o. female w/ hx of HTN, DM, anxiety, hypothyroidism, COPD, recent rib fracture presenting with dyspnea, cough. Started 2 weeks ago when she suffered rib fracture from fall. Has R-sided back pain, associated with SOB, worse with exertion. Went to [employer] and was diagnosed with pneumonia, started on doxy and prednisone yesteday. Per daughter, pts O2 sats drop with ambulation. No fevers. No known exposure to COVID. Recently moved here from [place]. ED workup was largely unremarkable. CT chest pending. Ms. Reuben Cutler is admitted for further evaluation and management. PMH: HTN, DM, anxiety, hypothyroidism Social History Tobacco Use  Smoking status: YES, ~15 pack years. Quit 30 years ago Substance Use Topics  Alcohol use: No  
  
 
Family History: family history of CV disease Allergies Allergen Reactions  Shellfish Derived Anaphylaxis Prior to Admission medications Medication Sig Start Date End Date Taking? Authorizing Provider  
latanoprost (XALATAN) 0.005 % ophthalmic solution Administer 1 Drop to both eyes nightly. Yes Other, MD Jann  
insulin glargine (Lantus Solostar U-100 Insulin) 100 unit/mL (3 mL) inpn 16 Units by SubCUTAneous route daily. Yes Other, MD Jann  
insulin lispro (HUMALOG U-100 INSULIN SC) by SubCUTAneous route.  Sliding scale   Yes Elicia, MD Jann  
 albuterol sulfate (PROVENTIL;VENTOLIN) 2.5 mg/0.5 mL nebu nebulizer solution 2.5 mg by Nebulization route two (2) times a day. Yes Other, MD Jann  
clonazePAM (KlonoPIN) 0.5 mg tablet Take 0.25 mg by mouth nightly as needed for Anxiety. Yes Other, MD Jann  
levothyroxine (Levo-T) 75 mcg tablet Take 75 mcg by mouth Daily (before breakfast). Yes Other, MD Jann  
tolterodine ER (DETROL LA) 4 mg ER capsule Take 4 mg by mouth daily. Yes Other, MD Jann  
atorvastatin (LIPITOR) 80 mg tablet Take 80 mg by mouth daily. Yes Elicia, MD Jann  
clopidogreL (PLAVIX) 75 mg tab Take 75 mg by mouth. Yes Elicia, MD Jann  
lisinopriL (PRINIVIL, ZESTRIL) 40 mg tablet Take 40 mg by mouth daily. Yes Elicia, MD Jann  
pantoprazole (PROTONIX) 40 mg tablet Take 40 mg by mouth daily. Yes Other, MD Jann  
predniSONE (DELTASONE) 5 mg tablet Take 5 mg by mouth. Yes Other, MD Jann  
gabapentin (NEURONTIN) 300 mg capsule Take 300 mg by mouth three (3) times daily. 2 cap am and 2 cap pm   Yes Elicia, MD Jann  
 
 
Review of Systems: 
(bold if positive, if negative) Gen:  fatigueEyes:  ENT:  CVS:  Pulm:  Cough, dyspnea,wheezingGI:  :  MS:  Skin:  Psych:  Endo:  Hem:  Renal:  Neuro:    
 
 
  
Objective: VITALS:   
Vital signs reviewed; most recent are: 
 
Visit Vitals BP (!) 115/53 Pulse 88 Temp 98.5 °F (36.9 °C) Resp 20 Ht 5' 4\" (1.626 m) Wt 56.7 kg (125 lb) SpO2 95% BMI 21.46 kg/m² SpO2 Readings from Last 6 Encounters:  
01/03/21 95% No intake or output data in the 24 hours ending 01/03/21 2373 Exam:  
 
Physical Exam: 
 
Gen:  Elderly, frail, chronically ill-appearing. NAD HEENT:  No scleral icterus, hearing intact to voice, moist mucous membranes Neck:  Supple, without masses. Thyroid non-tender Resp:  No accessory muscle use. Increased WOB. Scattered exp wheezing Card: RRR. Normal S1 and S2 without murmurs, rubs, or gallops. No peripheral lower extremity edema. No JVD. Peripheral pulses in tact. Abd:  Normoactive bowel sounds. Soft, non-tender, non-distended. No rebound, no guarding. No appreciable hepatosplenomegaly Musc:  No cyanosis or clubbing Skin:  No rashes or ulcers; turgor intact. R chest port in tact. Neuro:  Cranial nerves are grossly intact, no focal motor weakness, follows commands appropriately Psych:  Good insight, normal affect. Alert, oriented x 3. Answers questions appropriately Labs: 
 
Recent Labs 01/03/21 
1236 WBC 14.9* HGB 11.4* HCT 35.3  Recent Labs 01/03/21 
1236 * K 4.4  
 CO2 28 * BUN 21* CREA 0.82 CA 8.7 ALB 3.2* ALT 28 No components found for: [place] No results for input(s): PH, PCO2, PO2, HCO3, FIO2 in the last 72 hours. No results for input(s): INR, INREXT in the last 72 hours. No results found for: SDES No results found for: CULT All other current labs reviewed in the computer. Imaging/Studies: Xr Chest Pa Lat Result Date: 1/3/2021 IMPRESSION: No acute disease. Ct Chest Wo Cont Result Date: 1/3/2021 IMPRESSION: 1. Small right pleural effusion with adjacent atelectasis. Pleural effusion is decreased in size when compared to an MRI of 12/30/2020. 2. Background of severe emphysema. Right upper lobe postsurgical change. 3. Acute right ninth and 10th rib fractures. Multiple chronic vertebral compression fractures. 4. Coronary artery disease. Atherosclerotic disease in the thoracic aorta, without aneurysm. 5. Small indeterminant incompletely visualized right renal cortical lesion. Comparison with any prior imaging of the chest and abdomen obtained elsewhere is recommended. Imaging personally reviewed. ___________________________________________________ Attending Physician: Layla Vasquez MD

## 2021-01-03 NOTE — ED NOTES
TRANSFER - OUT REPORT: 
 
Verbal report given to WALLY Quiñonez(name) on Seth Cai  being transferred to [address] [place] [address](unit) for routine progression of care Report consisted of patients Situation, Background, Assessment and  
Recommendations(SBAR). Information from the following report(s) SBAR, ED Summary, Intake/Output, MAR, Med Rec Status and Cardiac Rhythm NSR was reviewed with the receiving nurse. Lines:    
 
Opportunity for questions and clarification was provided. Patient transported with: 
 Monitor Registered Nurse Visit Vitals BP (!) 140/61 (BP 1 Location: Right arm, BP Patient Position: At rest) Pulse 91 Temp 97.8 °F (36.6 °C) Resp 20 Ht 5' 4\" (1.626 m) Wt 56.7 kg (125 lb) SpO2 96% BMI 21.46 kg/m² Patient is ok to travel to the 5th floor without monitor per Dr. Bishop Krabbe.

## 2021-01-03 NOTE — ED TRIAGE NOTES
Pt here for right sided back pain with shortness of breath. Pt seen at [facility] and was diagnosed with penumonia. Per daughter, pts O2 sats drop with ambulation. Pt started on doxy and prednisone yesterday. Fractured rib 2 weeks ago.

## 2021-01-03 NOTE — ED PROVIDER NOTES
Woman presents with cough, shortness of breath, and right-sided rib pain. Patient fell 2 weeks ago and fractured 2 ribs on the right side. She has been doing pulmonary toilet with incentive spirometer and taking Percocet for pain control but has had worsening shortness of breath, cough, and wheezing. Patient was evaluated at [facility] yesterday and started on doxycycline for right-sided pneumonia but comes in after pulse ox at home was reading in the 80s with ambulation. She has been using albuterol to help with her wheezing and shortness of breath The history is provided by the patient and a relative. Cough The cough is non-productive. There has been no fever. Associated symptoms include chest pain, myalgias, shortness of breath and wheezing. Pertinent negatives include no chills and no vomiting. She has tried inhalers for the symptoms. The treatment provided no relief. No past medical history on file. No past surgical history on file. No family history on file. Social History Socioeconomic History  Marital status:  Spouse name: Not on file  Number of children: Not on file  Years of education: Not on file  Highest education level: Not on file Occupational History  Not on file Social Needs  Financial resource strain: Not on file  Food insecurity Worry: Not on file Inability: Not on file  Transportation needs Medical: Not on file Non-medical: Not on file Tobacco Use  Smoking status: Not on file Substance and Sexual Activity  Alcohol use: Not on file  Drug use: Not on file  Sexual activity: Not on file Lifestyle  Physical activity Days per week: Not on file Minutes per session: Not on file  Stress: Not on file Relationships  Social connections Talks on phone: Not on file Gets together: Not on file Attends [employer] service: Not on file Active member of club or organization: Not on file Attends meetings of clubs or organizations: Not on file Relationship status: Not on file  Intimate partner violence Fear of current or ex partner: Not on file Emotionally abused: Not on file Physically abused: Not on file Forced sexual activity: Not on file Other Topics Concern  Not on file Social History Narrative  Not on file ALLERGIES: Shellfish derived Review of Systems Constitutional: Positive for fatigue. Negative for chills and fever. Respiratory: Positive for cough, shortness of breath and wheezing. Cardiovascular: Positive for chest pain. Gastrointestinal: Negative for abdominal pain, constipation, diarrhea and vomiting. Musculoskeletal: Positive for myalgias. Neurological: Negative for dizziness and light-headedness. All other systems reviewed and are negative. Vitals:  
 01/03/21 1152 01/03/21 1216 BP: 135/82 Pulse: 94 Resp: 20 Temp: 98.5 °F (36.9 °C) SpO2: 95% 95% Weight: 56.7 kg (125 lb) Height: 5' 4\" (1.626 m) Physical Exam 
Vitals signs and nursing note reviewed. Constitutional:   
   Appearance: She is well-developed. HENT:  
   Head: Normocephalic and atraumatic. Eyes:  
   Pupils: Pupils are equal, round, and reactive to light. Neck: Musculoskeletal: Normal range of motion and neck supple. Cardiovascular:  
   Rate and Rhythm: Normal rate and regular rhythm. Pulmonary:  
   Effort: Pulmonary effort is normal.  
   Breath sounds: Wheezing present. Chest:  
   Chest wall: Tenderness present. Abdominal:  
   General: There is no distension. Palpations: Abdomen is soft. Tenderness: There is no abdominal tenderness. Skin: 
   General: Skin is warm and dry. Capillary Refill: Capillary refill takes less than 2 seconds. Neurological:  
   General: No focal deficit present. Mental Status: She is alert and oriented to person, place, and time. Psychiatric:     
   Mood and Affect: Mood normal.     
   Behavior: Behavior normal.  
 
  
 
MDM Number of Diagnoses or Management Options Dyspnea on exertion Rib pain on right side Diagnosis management comments: [de-identified] yr old w rib fx presenting with continued pain not controlled by percocet and worsening SOB and wheezing. No evidence of PNA, PTX, large pleural effusion. Procedures Perfect Serve Consult for Admission 2:45 PM 
 
ED Room Number: WX55/44 Patient Name and age:  Cheryl Beaver [de-identified] y.o.  female Working Diagnosis: 1. Dyspnea on exertion 2. Rib pain on right side COVID-19 Suspicion:  no 
Sepsis present:  no  Reassessment needed: no 
Code Status:  Full Code Readmission: no 
Isolation Requirements:  no 
Recommended Level of Care:  med/surg Department:[facility] ED - [phone] Other:  Needs pulmonary toilet

## 2021-01-04 NOTE — PROGRESS NOTES
Admission Medication Reconciliation: 
  
Information obtained from:   
Daughter via phone call  reviewed. RxQuery data available¹:  No 
 
Comments/Recommendations:  
Patient able to confirm name, , allergies, and preferred pharmacy Updated PTA medication list 
 
  
¹RxQuery pharmacy benefit data reflects medications filled and processed through the patient's insurance, however  
this data does NOT capture whether the medication was picked up or is currently being taken by the patient. Prior to Admission Medications Prescriptions Last Dose Informant Taking? B.infantis-B.ani-B.long-B.bifi (Probiotic 4X) 10-15 mg TbEC 1/3/2021 at am Child Yes Sig: Take 2 Caps by mouth three (3) times daily. BENEFIBER, GUAR GUM, PO 1/3/2021 at am Child Yes Sig: Take 1 Dose by mouth two (2) times a day. 1 dose - 2 teaspoons CLONAZEPAM PO 2021 at Unknown time Child Yes Sig: Take 0.25 mg by mouth every evening. Takes about 7:30 pm for anxiety and to avoid taking with her oxycodone/acetaminophen at bedtime  
albuterol sulfate (PROVENTIL;VENTOLIN) 2.5 mg/0.5 mL nebu nebulizer solution 1/3/2021 at Unknown time Child Yes Si.5 mg by Nebulization route two (2) times a day. atorvastatin (LIPITOR) 80 mg tablet 2021 at Unknown time Child Yes Sig: Take 80 mg by mouth nightly. calcium-cholecalciferol, d3, (CALCIUM 600 + D) 600-125 mg-unit tab 1/3/2021 at Unknown time Child Yes Sig: Take 1 Tab by mouth daily. clopidogreL (PLAVIX) 75 mg tab 1/3/2021 at Unknown time Child Yes Sig: Take 75 mg by mouth daily. fluticasone-umeclidinium-vilanterol (Trelegy Ellipta) 100-62.5-25 mcg inhaler 1/3/2021 at Unknown time Child Yes Sig: Take 1 Puff by inhalation daily. gabapentin (NEURONTIN) 300 mg capsule 1/3/2021 at Unknown time Child Yes Sig: Take 600 mg by mouth two (2) times a day. insulin glargine (Lantus Solostar U-100 Insulin) 100 unit/mL (3 mL) inpn 1/3/2021 at Unknown time Child Yes Si Units by SubCUTAneous route daily. insulin lispro (HUMALOG U-100 INSULIN SC) 1/3/2021 at Unknown time Child Yes Sig: by SubCUTAneous route Before breakfast, lunch, and dinner. Sliding scale   
latanoprost (XALATAN) 0.005 % ophthalmic solution 1/3/2021 at Unknown time Child Yes Sig: Administer 1 Drop to both eyes daily. levothyroxine (Levo-T) 75 mcg tablet 1/3/2021 at Unknown time Child Yes Sig: Take 75 mcg by mouth Daily (before breakfast). lisinopriL (PRINIVIL, ZESTRIL) 40 mg tablet 1/3/2021 at Unknown time Child Yes Sig: Take 40 mg by mouth daily. magnesium oxide (MAG-OX) 400 mg tablet 1/3/2021 at Unknown time Child Yes Sig: Take 400 mg by mouth daily. melatonin 5 mg tablet 2021 at Unknown time Child Yes Sig: Take 5 mg by mouth nightly. multivitamin (ONE A DAY) tablet 1/3/2021 at Unknown time Child Yes Sig: Take 1 Tab by mouth daily. oxyCODONE-acetaminophen (PERCOCET) 5-325 mg per tablet 1/3/2021 at am Child Yes Sig: Take 1 Tab by mouth three (3) times daily. 7 am, 2 pm, bedtime  
pantoprazole (PROTONIX) 40 mg tablet 1/3/2021 at Unknown time Child Yes Sig: Take 40 mg by mouth daily. predniSONE (DELTASONE) 5 mg tablet 1/3/2021 at Unknown time Child Yes Sig: Take 5 mg by mouth daily. tolterodine ER (DETROL LA) 4 mg ER capsule 1/3/2021 at Unknown time Child Yes Sig: Take 4 mg by mouth daily. Facility-Administered Medications: None Please contact the main inpatient pharmacy with any questions or concerns at [phone] and we will direct you to the clinical pharmacist covering this patient's care while in-house.   
Pita Christensen, [place], BCPS

## 2021-01-04 NOTE — PROGRESS NOTES
Discharge order noted. Patient states her daughter will be here at 2pm for discharge. AVS updated and printed for patient. Discharge medications reviewed with patient and appropriate educational materials and side effects teaching were provided. I have reviewed discharge instructions with the patient and the patient verbalized understanding. Leave smart education provided to patient and the patient verbalized understanding. AVS signed and given to patient.

## 2021-01-04 NOTE — PROGRESS NOTES
1/4/2021  CARE MANAGEMENT NOTE:  CM reviewed EMR for clinical updates. Pt was admitted with rib fxs, also with PNA. Dtr Augusto Arana ([phone]) is the primary family contact. RUR N/A Transition Plan of Care: 1. Orders received from MD re: home health PT/OT. Per dtr pt already has homecare services thru [facility]. CM faxed resumption order and AVS to aforementioned agency. 2.  Outpt f/u 3. Dtr will provide transportation home at 2 p.m today. No further needs voiced per my conversation with dtr today; full assessment not completed. Rashmi

## 2021-01-04 NOTE — PROGRESS NOTES
Attempted to schedule hospital follow up PCP appointment. Office will contact patient with appointment information as there are no availability for this week. Pending patient discharge. Shanelle Gallagher, Care Management Specialist.  
 
Received call back from PCP office. Hospital follow-up PCP transitional care appointment has been scheduled with Dr. Cindy Reed for Monday, 1/11/21 at 2:00 p.m. Pending patient discharge.   Shanelle Gallagher, Care Management Specialist.

## 2021-01-04 NOTE — PROGRESS NOTES
Bedside and Verbal shift change report given to Scotty Singh RN (oncoming nurse) by Tulio Guerin RN (offgoing nurse). Report included the following information SBAR, Intake/Output, MAR and Recent Results.

## 2021-01-04 NOTE — PROGRESS NOTES
Pharmacist Discharge Medication Reconciliation Discharge Provider:  Dr. Manuel Leal Discharge Medications: My Medications START taking these medications Instructions Each Dose to Equal Morning Noon Evening Bedtime  
albuterol-ipratropium 2.5 mg-0.5 mg/3 ml Nebu Commonly known as: DUO-NEB 
  
 3 mL by Nebulization route every four (4) hours as needed for Wheezing. 3 mL 
      
doxycycline 100 mg tablet Commonly known as: VIBRA-TABS Take 1 Tab by mouth two (2) times daily (with meals) for 5 days. 100 mg CHANGE how you take these medications Instructions Each Dose to Equal Morning Noon Evening Bedtime  
oxyCODONE-acetaminophen 5-325 mg per tablet Commonly known as: PERCOCET What changed:  
when to take this 
reasons to take this Take 1 Tab by mouth every four (4) hours as needed for Pain for up to 30 days. Max Daily Amount: 6 Tabs. 7 am, 2 pm, bedtime 1 Tab 
      
predniSONE 20 mg tablet Commonly known as: [address] What changed:  
medication strength 
how much to take 
how to take this 
when to take this 
additional instructions PLEASE TAKE 40mg x 3 days then 20mg x 3 days then 10mg x 3 days then resume 5 mg daily CONTINUE taking these medications Instructions Each Dose to Equal Morning Noon Evening Bedtime  
albuterol sulfate 2.5 mg/0.5 mL Nebu nebulizer solution Commonly known as: PROVENTIL;VENTOLIN 
  
 2.5 mg by Nebulization route two (2) times a day. 2.5 mg 
      
atorvastatin 80 mg tablet Commonly known as: LIPITOR Take 80 mg by mouth nightly. 80 mg BENEFIBER (GUAR GUM) PO Take 1 Dose by mouth two (2) times a day. 1 dose - 2 teaspoons 1 Dose CALCIUM 600 + D 600-125 mg-unit Tab Generic drug: calcium-cholecalciferol (d3) Take 1 Tab by mouth daily. 1 Tab CLONAZEPAM PO 
  
 Take 0.25 mg by mouth every evening. Takes about 7:30 pm for anxiety and to avoid taking with her oxycodone/acetaminophen at bedtime 0.25 mg 
      
clopidogreL 75 mg Tab Commonly known as: PLAVIX Take 75 mg by mouth daily. 75 mg 
      
gabapentin 300 mg capsule Commonly known as: NEURONTIN Take 600 mg by mouth two (2) times a day. 600 mg HUMALOG U-100 INSULIN SC 
  
 by SubCUTAneous route Before breakfast, lunch, and dinner. Sliding scale Lantus Solostar U-100 Insulin 100 unit/mL (3 mL) Inpn Generic drug: insulin glargine 16 Units by SubCUTAneous route daily. 16 Units 
      
latanoprost 0.005 % ophthalmic solution Commonly known as: Nancy Velazquez Administer 1 Drop to both eyes daily. 1 Drop Levo-T 75 mcg tablet Generic drug: levothyroxine Take 75 mcg by mouth Daily (before breakfast). 75 mcg 
      
lisinopriL 40 mg tablet Commonly known as: Pamalee Carmen Take 40 mg by mouth daily. 40 mg 
      
magnesium oxide 400 mg tablet Commonly known as: MAG-OX Take 400 mg by mouth daily. 400 mg 
      
melatonin 5 mg tablet Take 5 mg by mouth nightly. 5 mg 
      
multivitamin tablet Commonly known as: ONE A DAY Take 1 Tab by mouth daily. 1 Tab 
      
pantoprazole 40 mg tablet Commonly known as: PROTONIX Take 40 mg by mouth daily. 40 mg 
      
Probiotic 4X 10-15 mg Tbec Generic drug: B.infantis-B.ani-B.long-B.bifi Take 2 Caps by mouth three (3) times daily. 2 Cap 
      
tolterodine ER 4 mg ER capsule Commonly known as: Sophie Code Take 4 mg by mouth daily. 4 mg Trelegy Ellipta 100-62.5-25 mcg inhaler Generic drug: fluticasone-umeclidinium-vilanterol Take 1 Puff by inhalation daily. 1 Puff Where to Get Your Medications These medications were sent to [facility]  [address] [place] [address] Phone: [phone]  
predniSONE 20 mg tablet Information on where to get these meds will be given to you by the nurse or doctor. Ask your nurse or doctor about these medications 
albuterol-ipratropium 2.5 mg-0.5 mg/3 ml Nebu 
doxycycline 100 mg tablet 
oxyCODONE-acetaminophen 5-325 mg per tablet The patient's chart, MAR, and AVS were reviewed by 
 Seda Seo PHARMD, Contact: [phone]

## 2021-01-04 NOTE — DISCHARGE INSTRUCTIONS
HOSPITALIST DISCHARGE INSTRUCTIONS  NAME: Mery Carreon   :  1940   MRN:  [id number]     Date/Time:  2021 11:51 AM    ADMIT DATE: 1/3/2021     DISCHARGE DATE: 2021     ADMITTING DIAGNOSIS:  COPD exacerbation. No evidence of pneumonia    DISCHARGE DIAGNOSIS:  As above     MEDICATIONS:  PLEASE NOTE THAT WHILE YOU ARE RECOVERING PLEASE USE THE NEW NEBULIZER (DUONEBS) EVERY 4-6 HOURS AS NEEDED. YOU CAN STILL USE YOUR ALBUTEROL NEBULIZER TWICE DAILY AS WELL BUT PLEASE SPACE IT 4-6 HOURS FROM YOUR DUONEBS      · It is important that you take the medication exactly as they are prescribed. · Keep your medication in the bottles provided by the pharmacist and keep a list of the medication names, dosages, and times to be taken in your wallet. · Do not take other medications without consulting your doctor. Pain Management: per above medications    What to do at Home    Recommended diet:  Diabetic diet     Recommended activity: Activity as tolerated    If you experience any of the following symptoms then please call your primary care physician or return to the emergency room if you cannot get hold of your doctor:  Fever, chills, nausea, vomiting, diarrhea, change in mentation, falling, bleeding, shortness of breath, chest pian     Follow Up:  Dr. Samanta De La Rosa MD  you are to call and set up an appointment to see them in 2 weeks. Dr. Teddy Gama ([facility]) in 2-3 weeks   Christi Yañez  Physician  Specialty   Pulmonary Disease   Christi Yañez  Physician  Provider Summary    Title Resident? Provider type   MD No Physician   Primary Contact Information    Phone Fax E-mail Address   [phone] Not available [facility] [place] [address], [facility]         Information obtained by :  I understand that if any problems occur once I am at home I am to contact my physician. I understand and acknowledge receipt of the instructions indicated above. Physician's or R.N.'s Signature                                                                  Date/Time                                                                                                                                              Patient or Representative Signature                                                          Date/Time    [facility] Post Hospital/ED Visit Follow-Up Instructions/Information    You may have an in [facility] or may wish to contact [facility] to set-up a visit:    What are we? [facility] is an in-home urgent care service staffed with emergency trained medical teams. We come to your home in a vehicle stocked with medical supplies and technology. An ER physician is always available if needed. When? As a part of your hospital follow-up, an appointment has been/ or can be set up for us to come see you. Usually, this will be 24-72 hours after you leave the hospital or as needed. [facility] is open 7am-9pm, 7 days a week, 365 days a year, including holidays. Why? We know that you cannot always get to your doctor after being in the hospital and that your doctor is not always available when you need them. Once your workup is complete, we'll call in your prescriptions, update your family doctor, and handle billing with your insurance so you can focus on feeling better, faster without leaving home. How much? We accept most major health insurance plans, including Medicaid, Medicare, and Medicare Advantage [facility], [address] [place] [address], [facility], and [employer]. We also accept: credit, debit, health savings account (HSA), health reimbursement account (HRA) and flexible spending account (FSA) payments. [facility]'s prices compare to conventional urgent care facilities, but we bring the care to you.     How to reach us? Getting care is easy- use our mobile carlito ([facility]), website ([email or web address]) or call us [phone].

## 2021-01-04 NOTE — PROGRESS NOTES
Bedside and Verbal shift change report given to Cyrus Zaidi RN (oncoming nurse) by Ron Bee RN (offgoing nurse). Report included the following information SBAR, Kardex, Intake/Output, MAR, Accordion and Recent Results.

## 2021-01-04 NOTE — CONSULTS
Name: [facility]: [facility]  
: 1940 Admit Date: 1/3/2021 Phone: [phone]  Room: 9/01 PCP: Delvis Ventura MD  MRN: [id number] Date: 2021  Code: Full Code Chart and notes reviewed. Data reviewed. I review the patient's current medications in the medical record at each encounter. I have evaluated and examined the patient. HPI: 
 
10:12 AM    
 
History was obtained from patient. I was asked by Natalia Vargas MD to see Kassie Martinez in consultation for a chief complaint of COPD/emphysema. History of Present Illness: Ms. Junior Rodriguez is an [de-identified] woman with a history of COPD/emphysema, chronic respiratory failure with hypoxia, former tobacco use, HTN, DM, hypothyroidism, and anxiety who is admitted for a COPD exacerbation. She recently had a fall with a rib fracture and since that time has had increased shortness of breath and cough. Some wheezing. Cough is mostly dry. Has right sided back pain that is worse with deep breaths. She is chronically managed on Trelegy and prn abuterol inhalers/nebs. The nebs are most helpful and she uses them up to four times a day. She is also chronically on 10mg of prednisone. He prednisone was increased by [facility] on Saturday and give doxy, but did not improve and came in for evaluation. Ha O2 that she uses prn. Labs: WBC 14.1, cr 0.91, COVID negative; legionella/mycoplasma/strep pneumo pending Images: 
CT chest with severe diffuse centrilobular emphysema, r sided surgical changes, RLL volume loss and bronchiectasis, multiple chronic bilateral rib fractures and vertebral compression fracutres, small r effusion that has decreased in size PMH as above SH: tobacco, quit 30 yeas ago FH: none contributory to presenting complaint Social History Tobacco Use  Smoking status: Not on file Substance Use Topics  Alcohol use: Not on file Allergies Allergen Reactions  Shellfish Derived Anaphylaxis Current Facility-Administered Medications Medication Dose Route Frequency  lidocaine 4 % patch 2 Patch  2 Patch TransDERmal Q24H  
 insulin lispro (HUMALOG) injection   SubCUTAneous QID WITH MEALS  glucose chewable tablet 16 g  4 Tab Oral PRN  
 dextrose (D50W) injection syrg 12.5-25 g  12.5-25 g IntraVENous PRN  
 glucagon (GLUCAGEN) injection 1 mg  1 mg IntraMUSCular PRN  
 atorvastatin (LIPITOR) tablet 80 mg  80 mg Oral QHS  clopidogreL (PLAVIX) tablet 75 mg  75 mg Oral DAILY  insulin glargine (LANTUS) injection 16 Units  16 Units SubCUTAneous DAILY  latanoprost (XALATAN) 0.005 % ophthalmic solution 1 Drop  1 Drop Both Eyes DAILY  levothyroxine (SYNTHROID) tablet 75 mcg  75 mcg Oral ACB  pantoprazole (PROTONIX) tablet 40 mg  40 mg Oral ACB  enoxaparin (LOVENOX) injection 40 mg  40 mg SubCUTAneous Q24H  
 guaiFENesin ER (MUCINEX) tablet 600 mg  600 mg Oral Q12H  
 sodium chloride (NS) flush 5-40 mL  5-40 mL IntraVENous Q8H  
 sodium chloride (NS) flush 5-40 mL  5-40 mL IntraVENous PRN  
 acetaminophen (TYLENOL) tablet 650 mg  650 mg Oral Q6H PRN Or  
 acetaminophen (TYLENOL) suppository 650 mg  650 mg Rectal Q6H PRN  polyethylene glycol (MIRALAX) packet 17 g  17 g Oral DAILY PRN  
 cefTRIAXone (ROCEPHIN) 1 g in sterile water (preservative free) 10 mL IV syringe  1 g IntraVENous Q24H  
 doxycycline (VIBRA-TABS) tablet 100 mg  100 mg Oral BID WITH MEALS  
 albuterol-ipratropium (DUO-NEB) 2.5 MG-0.5 MG/3 ML  3 mL Nebulization Q6H RT  
 methylPREDNISolone (PF) (SOLU-MEDROL) injection 40 mg  40 mg IntraVENous Q6H  
 albuterol (PROVENTIL VENTOLIN) nebulizer solution 2.5 mg  2.5 mg Nebulization Q2H PRN  
 traMADoL (ULTRAM) tablet 25 mg  25 mg Oral Q6H PRN  
 naloxone (NARCAN) injection 0.4 mg  0.4 mg IntraVENous EVERY 2 MINUTES AS NEEDED  
 lactobac ac& pc-s.therm-b.anim (ZAKIYA Q/RISAQUAD)  1 Cap Oral DAILY  oxyCODONE-acetaminophen (PERCOCET) 5-325 mg per tablet 1 Tab  1 Tab Oral Q8H PRN  
 gabapentin (NEURONTIN) capsule 600 mg  600 mg Oral BID  clonazePAM (KlonoPIN) tablet 0.25 mg  0.25 mg Oral QHS  tolterodine ER (DETROL LA) capsule 4 mg (Patient Supplied)  4 mg Oral DAILY REVIEW OF SYSTEMS  
12 point ROS negative except as stated in the HPI. Physical Exam:  
Visit Vitals BP (!) 147/85 (BP 1 Location: Left arm, BP Patient Position: At rest;Sitting) Pulse 93 Temp 98.4 °F (36.9 °C) Resp 18 Ht 5' 4\" (1.626 m) Wt 56.7 kg (125 lb) SpO2 96% BMI 21.46 kg/m² General:  Alert, cooperative, no distress, appears stated age. Head:  Normocephalic, without obvious abnormality, atraumatic. Eyes:  Conjunctivae/corneas clear. Neck: Supple, symmetrical, trachea midline Lungs:   Clear to auscultation bilaterally but diminished Chest wall:  No tenderness or deformity. Heart:  Regular rate and rhythm, S1, S2 normal, no murmur, click, rub or gallop. Abdomen:   Soft, non-tender. Bowel sounds normal.  
Extremities: Extremities normal, atraumatic, no cyanosis or edema. Pulses: 2+ and symmetric all extremities. Skin: Skin color, texture, turgor normal. No rashes or lesions Neurologic: Grossly nonfocal  
 
 
Lab Results Component Value Date/Time Sodium 134 (L) 01/04/2021 12:35 AM  
 Potassium 4.2 01/04/2021 12:35 AM  
 Chloride 103 01/04/2021 12:35 AM  
 CO2 27 01/04/2021 12:35 AM  
 BUN 22 (H) 01/04/2021 12:35 AM  
 Creatinine 0.91 01/04/2021 12:35 AM  
 Glucose 197 (H) 01/04/2021 12:35 AM  
 Calcium 8.3 (L) 01/04/2021 12:35 AM  
 Magnesium 1.9 01/04/2021 12:35 AM  
 
 
Lab Results Component Value Date/Time WBC 14.1 (H) 01/04/2021 12:35 AM  
 HGB 10.8 (L) 01/04/2021 12:35 AM  
 PLATELET 693 64/57/6965 12:35 AM  
 MCV 82.6 01/04/2021 12:35 AM  
 
 
Lab Results Component Value Date/Time Alk.  phosphatase 86 01/04/2021 12:35 AM  
 Protein, total 6.8 01/04/2021 12:35 AM  
 Albumin 2.9 (L) 01/04/2021 12:35 AM  
 Globulin 3.9 01/04/2021 12:35 AM  
 
 
No results found for: IRON, FE, TIBC, IBCT, PSAT, FERR No results found for: SR, CRP, JAMAR, ANAIGG, RA, RPR, RPRT, VDRLT, VDRLS, TSH, TSHEXT No results found for: PH, PHI, PCO2, PCO2I, PO2, PO2I, HCO3, HCO3I, FIO2, FIO2I No results found for: CPK, RCK1, RCK2, RCK3, RCK4, CKNDX, CKND1, TROPT, TROIQ, BNPP, BNP No results found for: CULT No results found for: TOXA1, RPR, HBCM, HBSAG, HAAB, HCAB1, HAAT, G6PD, CRYAC, HIVGT, HIVR, HIV1, HIV12, HIVPC, HIVRPI No results found for: VANCT, CPK Lab Results Component Value Date/Time Color YELLOW/STRAW 01/03/2021 04:57 PM  
 Appearance CLEAR 01/03/2021 04:57 PM  
 pH (UA) 7.0 01/03/2021 04:57 PM  
 Protein Negative 01/03/2021 04:57 PM  
 Glucose Negative 01/03/2021 04:57 PM  
 Ketone Negative 01/03/2021 04:57 PM  
 Bilirubin Negative 01/03/2021 04:57 PM  
 Blood SMALL (A) 01/03/2021 04:57 PM  
 Urobilinogen 0.2 01/03/2021 04:57 PM  
 Nitrites Positive (A) 01/03/2021 04:57 PM  
 Leukocyte Esterase TRACE (A) 01/03/2021 04:57 PM  
 WBC 0-4 01/03/2021 04:57 PM  
 RBC 10-20 01/03/2021 04:57 PM  
 Bacteria Negative 01/03/2021 04:57 PM  
 
 
IMPRESSION 
· COPD/emphysema with exacerbation · History of tobacco use · Rib fractures PLAN 
· Goal sats 88%, currently on RA 
· IV steroids, can likely transition to PO taper tomorrow, taper slowly to home 10mg daily but will need to have ongoing conversations about continued use of this as an outpatient given compression fractures seen on imaging · Duonebs, will add pulmicort · Agree with doxy/ceftriaxone while PNA workup is pending; can complete a total of 5 days of doxy · Would benefit from outpatient PFTs and pulm follow-up if amenable Thank you for allowing us to participate in the care of this patient. We will be happy to follow along in his/her progress with you. Miguel Wellington MD

## 2021-01-05 NOTE — PROGRESS NOTES
1/5/2021   CARE MANAGEMENT (late entry)  CM was contacted by [facility] agency requesting an add on order for skilled nursing wound care. Order was faxed via [employer]. Rashmi

## 2021-02-08 NOTE — ED NOTES
Patient ambulated approx 20 feet. Reports SOB that is her baseline when ambulating as usual. O2 sats stayed between 93%-98%.

## 2021-02-08 NOTE — DISCHARGE INSTRUCTIONS
You were seen in the emergency department today for evaluation for low oxygen. While you were in the ER, you did not have any increased oxygen requirement and your chest xray was negative. Please follow up with your primary care doctor.

## 2021-02-08 NOTE — ED NOTES
Patient finished duo-neb. Given discharge instructions. Verbalized understanding. Wheeled out from ED.

## 2021-02-08 NOTE — ED PROVIDER NOTES
Date: 2/8/2021 Patient Name: Steven Tomas History of Presenting Illness Chief Complaint Patient presents with  Shortness of Breath History Provided By: Patient HPI: Steven Tomas, [de-identified] y.o. female with a past medical history of COPD presents to the ED for hypoxia. Patient states that she went to see her primary care doctor today for regular checkup as a follow-up for her COPD. Her doctor sent her in here because of concerns for hypoxia. Patient states that she is not on oxygen regularly and only uses it at night as needed. She denies any shortness of breath, cough, fever, chest pain, nausea or vomiting. She states that she was put on antibiotics last week for her lungs, but she had to stop it after 3 days because of severe diarrhea. She states that she has been doing her albuterol treatments 3 times a day as directed. She denies any other complaints. There are no other complaints, changes, or physical findings at this time. PCP: Antonio Gottron, MD 
 
No current facility-administered medications on file prior to encounter. Current Outpatient Medications on File Prior to Encounter Medication Sig Dispense Refill  predniSONE (DELTASONE) 20 mg tablet PLEASE TAKE 40mg x 3 days then 20mg x 3 days then 10mg x 3 days then resume 5 mg daily 45 Tab 0  
 albuterol-ipratropium (DUO-NEB) 2.5 mg-0.5 mg/3 ml nebu 3 mL by Nebulization route every four (4) hours as needed for Wheezing. 30 Nebule 0  
 latanoprost (XALATAN) 0.005 % ophthalmic solution Administer 1 Drop to both eyes daily.  insulin glargine (Lantus Solostar U-100 Insulin) 100 unit/mL (3 mL) inpn 16 Units by SubCUTAneous route daily.  insulin lispro (HUMALOG U-100 INSULIN SC) by SubCUTAneous route Before breakfast, lunch, and dinner. Sliding scale  albuterol sulfate (PROVENTIL;VENTOLIN) 2.5 mg/0.5 mL nebu nebulizer solution 2.5 mg by Nebulization route two (2) times a day.  CLONAZEPAM PO Take 0.25 mg by mouth every evening. Takes about 7:30 pm for anxiety and to avoid taking with her oxycodone/acetaminophen at bedtime  levothyroxine (Levo-T) 75 mcg tablet Take 75 mcg by mouth Daily (before breakfast).  tolterodine ER (DETROL LA) 4 mg ER capsule Take 4 mg by mouth daily.  atorvastatin (LIPITOR) 80 mg tablet Take 80 mg by mouth nightly.  clopidogreL (PLAVIX) 75 mg tab Take 75 mg by mouth daily.  lisinopriL (PRINIVIL, ZESTRIL) 40 mg tablet Take 40 mg by mouth daily.  pantoprazole (PROTONIX) 40 mg tablet Take 40 mg by mouth daily.  gabapentin (NEURONTIN) 300 mg capsule Take 600 mg by mouth two (2) times a day.  fluticasone-umeclidinium-vilanterol (Trelegy Ellipta) 100-62.5-25 mcg inhaler Take 1 Puff by inhalation daily.  melatonin 5 mg tablet Take 5 mg by mouth nightly.  calcium-cholecalciferol, d3, (CALCIUM 600 + D) 600-125 mg-unit tab Take 1 Tab by mouth daily.  multivitamin (ONE A DAY) tablet Take 1 Tab by mouth daily.  magnesium oxide (MAG-OX) 400 mg tablet Take 400 mg by mouth daily.  B.infantis-B.ani-B.long-B.bifi (Probiotic 4X) 10-15 mg TbEC Take 2 Caps by mouth three (3) times daily.  BENEFIBER, GUAR GUM, PO Take 1 Dose by mouth two (2) times a day. 1 dose - 2 teaspoons Past History Past Medical History: No past medical history on file. Past Surgical History: No past surgical history on file. Family History: No family history on file. Social History: 
Social History Tobacco Use  Smoking status: Not on file Substance Use Topics  Alcohol use: Not on file  Drug use: Not on file Allergies: Allergies Allergen Reactions  Shellfish Derived Anaphylaxis Review of Systems Review of Systems Constitutional: Negative for fatigue and fever. HENT: Negative. Eyes: Negative. Respiratory: Negative for shortness of breath and wheezing. Cardiovascular: Negative for chest pain and leg swelling. Gastrointestinal: Positive for diarrhea. Negative for abdominal pain, blood in stool, constipation, nausea and vomiting. Endocrine: Negative. Genitourinary: Negative for difficulty urinating and dysuria. Musculoskeletal: Negative. Skin: Negative for rash. Allergic/Immunologic: Negative. Neurological: Negative for weakness and numbness. Hematological: Negative. Psychiatric/Behavioral: Negative. Physical Exam  
Physical Exam 
Constitutional:   
   Appearance: She is well-developed. HENT:  
   Head: Normocephalic and atraumatic. Eyes:  
   Pupils: Pupils are equal, round, and reactive to light. Neck: Musculoskeletal: Normal range of motion. Vascular: No JVD. Trachea: No tracheal deviation. Cardiovascular:  
   Rate and Rhythm: Normal rate and regular rhythm. Heart sounds: Normal heart sounds. No murmur. No friction rub. No gallop. Pulmonary:  
   Effort: Pulmonary effort is normal.  
   Breath sounds: No stridor. No wheezing or rales. Comments: Mild scattered expiratory wheezes, good air movement. No increased work of breathing, speaking in full sentences. Abdominal:  
   General: Bowel sounds are normal. There is no distension. Palpations: Abdomen is soft. There is no mass. Tenderness: There is no abdominal tenderness. There is no guarding. Musculoskeletal: Normal range of motion. General: No tenderness. Skin: 
   General: Skin is warm and dry. Findings: No rash. Neurological:  
   Mental Status: She is alert and oriented to person, place, and time. Psychiatric:     
   Behavior: Behavior normal.     
   Thought Content: Thought content normal.     
   Judgment: Judgment normal.  
 
 
 
Diagnostic Study Results Labs - No results found for this or any previous visit (from the past 24 hour(s)). Radiologic Studies - No orders to display CT Results  (Last 48 hours) None CXR Results  (Last 48 hours) 02/08/21 1240  XR CHEST PA LAT Final result Impression:  No acute intrathoracic disease. Narrative:  Clinical history: hx of copd, poss hypoxia INDICATION:   hx of copd, poss hypoxia COMPARISON: 1/3/2021 FINDINGS:   
PA and lateral views of the chest are obtained. The cardiopericardial silhouette is within normal limits. The opinion. Chronic  
compression fractures. Port catheter on the right is stable. Chronic right rib  
deformities. Chronic pleural changes on the right. Osteopenia. Medical Decision Making I am the first provider for this patient. I reviewed the vital signs, available nursing notes, past medical history, past surgical history, family history and social history. Vital Signs-Reviewed the patient's vital signs. Patient Vitals for the past 12 hrs: 
 Temp Pulse Resp BP SpO2  
02/08/21 1055 97.3 °F (36.3 °C) 78 20 126/83 96 % Records Reviewed: Nursing Notes and Old Medical Records Provider Notes (Medical Decision Making):  
Patient is an 80-year-old female who was sent to the emergency department by her primary care doctor with a noted that her O2 saturations dropped to 80%. Patient is in no acute distress, speaking in full sentences and has no increased oxygen requirement at this time. She is satting 97 to 98% on room air at rest.  On ambulation, the nurse reports that her oxygen saturations dropped to 93% at the lowest.  Will get a chest x-ray to rule out acute process. ED Course as of Feb 09 2011 Mon Feb 08, 2021  
1253 Updated patient on her xray results. Pt continues to be 98% on RA with a good pleth. She states she did not have any more SOB on exertion than her baseline state. Will discharge home with PCP follow up. [CK] [id number] Spoke with Dr. Yu who stated that the patient had been having significant wheezing when walking down the hallway and her O2 sats had dropped to 84%. Reviewed work up with her here. Dr. Yu will call her to make a follow up appointment. Pt denies any complaints and states that she has her inhaler and nebulizer at home that she is comfortable taking. She feels ready for discharge.  
 [CK]  
  
ED Course User Index 
[CK] ImtiazShannon S, DO  
 
 
 
 
 
 
Disposition: 
DC- Adult Discharges: All of the diagnostic tests were reviewed and questions answered. Diagnosis, care plan and treatment options were discussed.  The patient understands the instructions and will follow up as directed. The patients results have been reviewed with them.  They have been counseled regarding their diagnosis.  The patient verbally convey understanding and agreement of the signs, symptoms, diagnosis, treatment and prognosis and additionally agrees to follow up as recommended with their PCP in 24 - 48 hours.  They also agree with the care-plan and convey that all of their questions have been answered.  I have also put together some discharge instructions for them that include: 1) educational information regarding their diagnosis, 2) how to care for their diagnosis at home, as well a 3) list of reasons why they would want to return to the ED prior to their follow-up appointment, should their condition change. 
 
 
DISCHARGE PLAN: 
1.  
Current Discharge Medication List  
  
 
2.  
Follow-up Information   
 Follow up With Specialties Details Why Contact Nikki Rose MD Family Medicine Schedule an appointment as soon as possible for a visit   [address] [facility] [address] [phone] 
  
 [facility] EMERGENCY DEPT Emergency Medicine  As needed, If symptoms worsen [id number] [facility] [address] [phone]  
  
 
3.  Return to ED if worse  
 
Diagnosis  
 
Clinical Impression:  
 1. History of COPD Attestations: 
 
Sim Bedolla, DO Please note that this dictation was completed with [employer], the computer voice recognition software. Quite often unanticipated grammatical, syntax, homophones, and other interpretive errors are inadvertently transcribed by the computer software. Please disregard these errors. Please excuse any errors that have escaped final proofreading. Thank you.

## 2021-02-08 NOTE — ED TRIAGE NOTES
Patient arrives to ED as a referral of Dr Dannielle Joyce for dyspnea with exertion and hypoxia Oxygen saturations in triage 97% with room air

## 2021-02-25 PROBLEM — R10.9 ABDOMINAL PAIN: Status: ACTIVE | Noted: 2021-01-01

## 2021-02-25 PROBLEM — R42 DIZZINESS: Status: ACTIVE | Noted: 2021-01-01

## 2021-02-25 PROBLEM — I26.99 ACUTE PULMONARY EMBOLISM (HCC): Status: ACTIVE | Noted: 2021-01-01

## 2021-02-25 PROBLEM — R07.9 CHEST PAIN: Status: ACTIVE | Noted: 2021-01-01

## 2021-02-25 NOTE — ED PROVIDER NOTES
Chief Complaint Patient presents with  Abdominal Pain  Nausea  Dizziness This is an 59-year-old female with diabetes, non-oxygen dependent COPD, hypothyroidism, prior stroke, she reports a remote history of receiving IVIG infusions for ITP although platelet count has normalized in recent years (still has a port in place however), presents today with multiple complaints including right-sided chest pain and generalized abdominal pain since yesterday. She says that after she had dinner she felt very lightheaded like she was about to pass out and developed chest discomfort, also had a headache and body aches, abdominal pain. No vomiting but she does feel nauseous. No diarrhea or rectal bleeding. No recent sick contacts that have been positive for or under investigation for COVID-19 infection, last time she was tested was about a month ago purportedly. She was hospitalized last month for dyspnea on exertion attributed to COPD exacerbation in the setting of a fall with associated rib fracture. No other systemic complaints. Symptoms are moderate in nature without alleviating factors. Past Medical History:  
Diagnosis Date  Arthritis  Asthma  Autoimmune disease ([facility]  Chronic obstructive pulmonary disease ([facility]  Diabetes ([facility]  Stroke ([facility] Past Surgical History:  
Procedure Laterality Date  HX ORTHOPAEDIC    
 HX VASCULAR ACCESS Family History:  
Problem Relation Age of Onset  No Known Problems Other   
     reviewed, patient did not know Social History Socioeconomic History  Marital status:  Spouse name: Not on file  Number of children: Not on file  Years of education: Not on file  Highest education level: Not on file Occupational History  Not on file Social Needs  Financial resource strain: Not on file  Food insecurity Worry: Not on file Inability: Not on file  Transportation needs Medical: Not on file Non-medical: Not on file Tobacco Use  Smoking status: Former Smoker Quit date: 1991 Years since quittin.1 Substance and Sexual Activity  Alcohol use: Never Frequency: Never  Drug use: Never  Sexual activity: Not on file Lifestyle  Physical activity Days per week: Not on file Minutes per session: Not on file  Stress: Not on file Relationships  Social connections Talks on phone: Not on file Gets together: Not on file Attends Qu[employer] service: Not on file Active member of club or organization: Not on file Attends meetings of clubs or organizations: Not on file Relationship status: Not on file  Intimate partner violence Fear of current or ex partner: Not on file Emotionally abused: Not on file Physically abused: Not on file Forced sexual activity: Not on file Other Topics Concern  Not on file Social History Narrative  Not on file ALLERGIES: Shellfish derived Review of Systems Constitutional: Negative for fever. HENT: Negative for congestion. Eyes: Negative for redness. Respiratory: Positive for shortness of breath. Cardiovascular: Positive for chest pain. Gastrointestinal: Positive for abdominal pain and nausea. Genitourinary: Negative for dysuria. Musculoskeletal: Positive for back pain. Skin: Negative for rash. Neurological: Positive for dizziness and light-headedness. Psychiatric/Behavioral: Negative for confusion. Vitals:  
 21 0803 21 1037 21 1500 21 1507 BP: (!) 195/87 (!) 197/74 Pulse: 81 80 80 Resp: 20 20 Temp: 98.1 °F (36.7 °C) 98.7 °F (37.1 °C) SpO2: 91% 92%  93% Weight:      
Height:      
      
 
Physical Exam 
General:  Awake and alert, NAD HEENT:  NC/AT, equal pupils, moist mucous membranes Neck:   Normal inspection, full range of motion Cardiac:  RRR, no murmurs Respiratory:  +Mildly diminished with expiratory wheezing, normal effort Abdomen:  Soft and non nondistended, +tender in the periumbilical region without guarding Extremities: Warm and well perfused, no peripheral edema Neuro:  +L sided weakness from prior stroke Skin:   +There is a port in place in the right anterior chest, site is clean/dry/intact RESULTS Recent Results (from the past 12 hour(s)) GLUCOSE, POC Collection Time: 02/26/21  8:05 AM  
Result Value Ref Range Glucose (POC) 107 (H) 65 - 100 mg/dL Performed by [facility]ella (PCT) GLUCOSE, POC Collection Time: 02/26/21 10:53 AM  
Result Value Ref Range Glucose (POC) 119 (H) 65 - 100 mg/dL Performed by [facility]at (PCT) DUPLEX CAROTID BILATERAL Collection Time: 02/26/21  1:37 PM  
Result Value Ref Range Right subclavian sys 45.5 cm/s RIGHT SUBCLAVIAN ARTERY D 0.00 cm/s Right cca dist sys 39.2 cm/s Right CCA dist villasenor 13.0 cm/s Right CCA prox sys 32.2 cm/s Right CCA prox villas[place]or 7.8 cm/s Right eca sys 45.8 cm/s RIGHT EXTERNAL CAROTID ARTERY D 0.00 cm/s Right ICA dist sys 47.4 cm/s Right ICA dist villasenor 22.2 cm/s Right ICA mid sys 48.2 cm/s Right ICA mid villasenor 15.8 cm/s Right ICA prox sys 52.7 cm/s Right ICA prox villasenor 15.3 cm/s Right vertebral sys 32.5 cm/s RIGHT VERTEBRAL ARTERY D 14.48 cm/s Right ICA/CCA sys 1.3 Left subclavian sys 108.1 cm/s LEFT SUBCLAVIAN ARTERY D 0.00 cm/s Left CCA dist sys 110.7 cm/s Left CCA dist villas[place]or 9.0 cm/s Left CCA prox sys 183.6 cm/s Left CCA prox villasenor 0.0 cm/s Left ECA sys 125.2 cm/s LEFT EXTERNAL CAROTID ARTERY D 0.00 cm/s Left ICA dist sys 122.5 cm/s Left ICA dist villasenor 6.0 cm/s Left ICA mid sys 105.9 cm/s Left ICA mid villasenor 6.0 cm/s Left ICA prox sys 148.5 cm/s Left ICA prox villasenor 16.0 cm/s Left vertebral sys 74.7 cm/s LEFT VERTEBRAL ARTERY D 13.60 cm/s Left ICA/CCA sys 1.34 GLUCOSE, POC  
 Collection Time: 02/26/21  3:49 PM  
Result Value Ref Range Glucose (POC) 160 (H) 65 - 100 mg/dL Performed by Adina Conklin (PCT) IMAGING 
Mri Brain Wo Cont Result Date: 2/26/2021 1. Tiny focus of acute infarction on the surface of the right precentral gyrus, consistent with acute infarction. No other areas of acute infarction. 2. Chronic right frontoparietal infarction with volume loss. Moderate cerebral white matter signal abnormality consistent with chronic small vessel ischemic change. Elizabeth Isbell Ct Head Wo Cont Result Date: 2/25/2021 1. No evidence of acute intracranial abnormality by this modality. 2. Encephalomalacia in the right hemisphere Cta Chest W Or W Wo Cont Result Date: 2/25/2021 Limited exam 1. Subsegmental pulmonary embolus to the medial aspect of the left lower lobe of possibly no clinical significance. 2. Splenomegaly. 3. Distended bladder. 4. Incidental findings as above. Ct Abd Pelv W Cont Result Date: 2/25/2021 Limited exam 1. Subsegmental pulmonary embolus to the medial aspect of the left lower lobe of possibly no clinical significance. 2. Splenomegaly. 3. Distended bladder. 4. Incidental findings as above. Us Abd Comp Result Date: 2/26/2021 Heterogeneous liver with hepatomegaly. Cholelithiasis Mild right hydronephrosis Very poor visualization of the left kidney. Xr Chest [facility] Result Date: 2/25/2021 Limited exam 1. Chronic appearing lung changes without definite acute process. Elizabeth Isbell Procedures - none unless documented below EKG as interpreted by me:  Normal sinus rhythm at a rate of 94, normal axis intervals, grossly no ST or T wave changes suggesting acute ischemia. ED course: Labs, EKG and imaging reviewed. Daughter reports that yesterday she was weak, dizzy, had a \"funny feeling\" in her head and this morning she developed right sided chest pain and abdominal pain, intense nausea. She has also been feeling increasingly short of breath which was attributed to COPD exacerbation by her visiting nurse and had her prednisone increased. CTA chest shows small subsegmental left lower lobe PE. Daughter was concerned about possible TIA as well given her episode yesterday, however could be secondary to her hyponatremia. Needs anticoagulation, evaluation by pulmonary, continued workup for CNS pathology, discussed with the hospitalist. 
 
 
Hospitalist Melba Serve for Admission 9:54 PM 
 
ED Room Number:   376/29 Patient Name and age:  Santo Urbina [de-identified] y.o.  female Working Diagnosis: 1. Acute pulmonary embolism without acute cor pulmonale, unspecified pulmonary embolism type ([address] 2. Dizziness 3. Acute nonintractable headache, unspecified headache type 4. Acute chest pain 5. Abdominal pain, generalized 6. Hyponatremia 7. Acute CVA (cerebrovascular accident) ([address] 8. Other acute pulmonary embolism without acute cor pulmonale ([address] 9. History of CVA (cerebrovascular accident) 10. Urinary tract infection without hematuria, site unspecified COVID suspicion:   Other(low suspicion but deserves screening, also was requested by her daughter) Code Status:    Full Code Readmission:    no 
Isolation Requirements:  yes Recommended Level of Care: telemetry Department:    [facility] ED - [phone] Other:     Presented with multiple complaints including dizziness yesterday and headache with nausea, today developed right sided chest and back pain, has been feeling increasingly short of breath which was attributed to her visiting nurse and had her prednisone increased. CTA chest shows small subsegmental left lower lobe PE. Daughter was concerned about possible TIA as well given her episode yesterday, however could be secondary to her hyponatremia.

## 2021-02-25 NOTE — ED TRIAGE NOTES
Pt reports abd pain, nausea, chest pain, back pain, dizzy/weak and \"heavy feeling\" that started last night.

## 2021-02-26 NOTE — PROGRESS NOTES
Reason for Admission:   Acute Pulmonary Embolism  
            
RUR Score:     31% 
          
Resources/supports as identified by patient/family:   Patient lives with her daughter and her . Supportive family. 
             
Top Challenges facing patient (as identified by patient/family and CM):   
                  
Finances/Medication cost?   Covered by ins.   Scripts @ [facility] on [address]  
           
Transportation?  Family  
           
Support system or lack thereof?  Supportive Family  
                  
Living arrangements?     Lives with daughter and her  in a 2 story home with patient's bedroom on 1st floor. 
     
Self-care/ADLs/Cognition?  Patient is fairly independent  With her ADL's, does not drive.  
       
Current Advanced Directive/Advance Care Plan:     Full, ACP on file 
                       
Plan for utilizing home health:    Patient is currently open to HH with [facility] PT/OT/RN 
              
Transition of Care Plan:      
 
Plan: 
1. Monitor patients response to treatment. 
2. Family to transport at discharge. 
3. Covid is neg. 
4. Pulmonary following. 
5. PT/OT eval pending 
6. Will need resumption Orders for HH at discharge. 
7. Case Management to follow. 
 
 
[facility] on [facility]. 
1st IM given to patient, patient did not sign due to covid r/o, copy on chart. 
 
 
Care Management Interventions 
PCP Verified by CM: Yes(DR Nikki Yu) 
Mode of Transport at Discharge: Other (see comment)(family ) 
Transition of Care Consult (CM Consult): Discharge Planning 
MyChart Signup: No 
Discharge Durable Medical Equipment: No 
Health Maintenance Reviewed: Yes 
Physical Therapy Consult: Yes 
Occupational Therapy Consult: Yes 
Speech Therapy Consult: No 
Current Support Network: Relative's Home 
Confirm Follow Up Transport: Family 
 Resource Information Provided?: No 
Discharge Location 
Discharge Placement: Home with home health 
 
 
CARINA Fisher

## 2021-02-26 NOTE — H&P
Abhinav Erwin [facility] 
[address] [facility], [place], [address] [place] [address] 
[phone] Admission History and Physical 
 
 
NAME:  Serg Montez :   1940 MRN:  [id number] PCP:  Sav Santoyo MD  
 
Date/Time of service:  2021  11:20 PM 
  
  
Subjective: CHIEF COMPLAINT: dizziness, chest pain, abd pain HISTORY OF PRESENT ILLNESS:    
The patient is a [de-identified] yo hx of HTN, DM, COPD, CVA w/ L hemiparesis, presented w/ multiple complaints, found to have an acute PE. The patient c/o dizzines for 2 days, associated with a vague abd pain, chest pain, and N/V. Denied cough, fevers, chills, diarrhea. Of note, the patient was admitted last month for COPD and right ribs fractures. In the ED, head CT was unremarkable. Chest CT showed LLL PE and a distended gallbladder. Allergies Allergen Reactions  Shellfish Derived Anaphylaxis Prior to Admission medications Medication Sig Start Date End Date Taking? Authorizing Provider  
predniSONE (DELTASONE) 20 mg tablet PLEASE TAKE 40mg x 3 days then 20mg x 3 days then 10mg x 3 days then resume 5 mg daily 21   Desi Morrow MD  
albuterol-ipratropium (DUO-NEB) 2.5 mg-0.5 mg/3 ml nebu 3 mL by Nebulization route every four (4) hours as needed for Wheezing. 21   Desi Morrow MD  
latanoprost (XALATAN) 0.005 % ophthalmic solution Administer 1 Drop to both eyes daily. Jann Rubi MD  
insulin glargine (Lantus Solostar U-100 Insulin) 100 unit/mL (3 mL) inpn 16 Units by SubCUTAneous route daily. Jann Rubi MD  
insulin lispro (HUMALOG U-100 INSULIN SC) by SubCUTAneous route Before breakfast, lunch, and dinner. Sliding scale     Jann Rubi MD  
albuterol sulfate (PROVENTIL;VENTOLIN) 2.5 mg/0.5 mL nebu nebulizer solution 2.5 mg by Nebulization route two (2) times a day.     Jann Rubi MD  
 CLONAZEPAM PO Take 0.25 mg by mouth every evening. Takes about 7:30 pm for anxiety and to avoid taking with her oxycodone/acetaminophen at bedtime    Jann Rubi MD  
levothyroxine (Levo-T) 75 mcg tablet Take 75 mcg by mouth Daily (before breakfast). Jann Rubi MD  
tolterodine ER (DETROL LA) 4 mg ER capsule Take 4 mg by mouth daily. Jann Rubi MD  
atorvastatin (LIPITOR) 80 mg tablet Take 80 mg by mouth nightly. Jann Rubi MD  
clopidogreL (PLAVIX) 75 mg tab Take 75 mg by mouth daily. Jann Rubi MD  
lisinopriL (PRINIVIL, ZESTRIL) 40 mg tablet Take 40 mg by mouth daily. Jann Rubi MD  
pantoprazole (PROTONIX) 40 mg tablet Take 40 mg by mouth daily. Jann Rubi MD  
gabapentin (NEURONTIN) 300 mg capsule Take 600 mg by mouth two (2) times a day. Jann Rubi MD  
fluticasone-umeclidinium-vilanterol (Trelegy Ellipta) 100-62.5-25 mcg inhaler Take 1 Puff by inhalation daily. Provider, Historical  
melatonin 5 mg tablet Take 5 mg by mouth nightly. Provider, Historical  
calcium-cholecalciferol, d3, (CALCIUM 600 + D) 600-125 mg-unit tab Take 1 Tab by mouth daily. Provider, Historical  
multivitamin (ONE A DAY) tablet Take 1 Tab by mouth daily. Provider, Historical  
magnesium oxide (MAG-OX) 400 mg tablet Take 400 mg by mouth daily. Provider, Historical  
B.infantis-B.ani-B.long-B.bifi (Probiotic 4X) 10-15 mg TbEC Take 2 Caps by mouth three (3) times daily. Provider, Historical  
BENEFIBER, GUAR GUM, PO Take 1 Dose by mouth two (2) times a day. 1 dose - 2 teaspoons    Provider, Historical  
 
 
Past Medical History:  
Diagnosis Date  Arthritis  Asthma  Autoimmune disease ([facility]  Chronic obstructive pulmonary disease ([facility]  Diabetes ([facility]  Stroke ([facility] Past Surgical History:  
Procedure Laterality Date  HX ORTHOPAEDIC    
 HX VASCULAR ACCESS Social History Tobacco Use  Smoking status: Former Smoker Quit date: 1/1/1991 Years since quittin.1 Substance Use Topics  Alcohol use: Never Frequency: Never Family History Problem Relation Age of Onset  No Known Problems Other   
     reviewed, patient did not know Review of Systems: 
(bold if positive, if negative) Gen:  Eyes:  ENT:  [facility]:   chest pain,dizziness, Pulm:  GI:  Abdominal pain, nausea, emesis,:  MS:  Skin:  Psych:  Endo:  Hem:  Renal:  Neuro:    
 
  
Objective: VITALS:   
Vital signs reviewed; most recent are: 
 
Visit Vitals BP (!) 140/70 (BP 1 Location: Right upper arm, BP Patient Position: At rest) Pulse 94 Temp 98 °F (36.7 °C) Resp 24 Ht 5' 4\" (1.626 m) Wt 57.6 kg (127 lb) SpO2 94% BMI 21.80 kg/m² SpO2 Readings from Last 6 Encounters:  
21 94% 21 96% 21 95% Intake/Output Summary (Last 24 hours) at 2021 2320 Last data filed at 2021 Gross per 24 hour Intake 500 ml Output  Net 500 ml Exam:  
 
Physical Exam: 
 
Gen:  Elderly, disheveled, frail, NAD HEENT:  Pink conjunctivae, PERRL, hearing intact to voice, moist mucous membranes Neck:  Supple, without masses, thyroid non-tender Resp:  No accessory muscle use, clear breath sounds without wheezes rales or rhonchi 
Card:  No murmurs, normal S1, S2 without thrills, bruits or peripheral edema Abd:  Soft, non-tender, non-distended, normoactive bowel sounds are present Lymph:  No cervical adenopathy Musc:  No cyanosis or clubbing Skin:  No rashes Neuro:  Cranial nerves 3-12 are grossly intact, chronic L hemiparesis, follows commands appropriately Psych:  Alert with poor insight. Oriented to person, place, and time Labs: 
 
Recent Labs  
  21 
1825 WBC 10.3 HGB 11.3* HCT 34.1*  
 Recent Labs  
  21 
1825 *  
K 4.1 CL 96* CO2 26 * BUN 12  
CREA 0.64 CA 8.1*  
MG 1.7 ALB 3.3* TBILI 0.5 ALT 39 Lab Results Component Value Date/Time Glucose (POC) 86 02/25/2021 09:46 PM  
 Glucose (POC) 65 02/25/2021 09:15 PM  
 
No results for input(s): PH, PCO2, PO2, HCO3, FIO2 in the last 72 hours. Recent Labs  
  02/25/21 
1825 INR 1.2* Radiology and EKG reviewed:   Chest CT reviewed **Old Records reviewed in [facility]** Assessment/Plan:   
  
Principal Problem: 
 
[de-identified] yo hx of HTN, DM, COPD, CVA w/ L hemiparesis, presented w/ multiple complaints, found to have an acute PE 
 
1) Chest pain/Acute pulmonary embolism: seen on chest CT. Will monitor on Tele. Obtain echo, LE dopplers. Start on lovenox. Consult Pulm 2) Dizziness: unclear etiology. Hx of CVA. Head CT unremarkable. Will obtain head MRI to r/o acute CVA. Consult Neuro 3) Abd pain/N/V: CT w/ distended gallbladder. Will check abd U/S. Start IVF, IV antiemetics prn 
 
4) COPD: stable. Will cont LABA/ICS, nebs prn 
 
5) HTN: cont lisinopril 6) DM type 2: check A1C. Cont Lantus, SSI 7) Hx of CVA: L hemiparesis. Cont plavix, statin Risk of deterioration: high Total time spent with patient: 79 Minutes **I personally saw and examined the patient during this time period** Care Plan discussed with: Patient, nursing Discussed:  Care Plan Prophylaxis:  Lovenox Probable Disposition:  SNF/LTC and HH PT, OT, RN 
        
___________________________________________________ Attending Physician: Constantin Monsalve MD

## 2021-02-26 NOTE — ROUTINE PROCESS
Bedside shift change report given to Ammy Ugarte (oncoming nurse) by Mendel Kayser (offgoing nurse). Report included the following information SBAR, Kardex, Intake/Output, MAR, Accordion and Recent Results.

## 2021-02-26 NOTE — PROGRESS NOTES
This patient was assisted with Intentional Toileting every 2 hours during this shift. Documentation of ambulation and output reflected on Flowsheet.

## 2021-02-26 NOTE — ROUTINE PROCESS
Report received from [facility] - B in ED. Will give primary RN Karishma Talbert) report.

## 2021-02-26 NOTE — PROGRESS NOTES
Problem: Self Care Deficits Care Plan (Adult) Goal: *Therapy Goal (Edit Goal, Insert Text) Description:  
FUNCTIONAL STATUS PRIOR TO ADMISSION: Patient was modified independent using a SB quad cane for functional mobility. Patient received minor assistance with basic ADL tasks- no more than Min A for LB ADL, Mod A for UB dressing, Min A for showers/washing hair. Toileted with Mod I.  
 
HOME SUPPORT: The patient lived with daughter who provides assistance. Patient reports outside caregiver 2 days per week Occupational Therapy Goals Initiated 2/26/2021 1. Patient will perform grooming in stand with modified independence within 7 day(s). 2.  Patient will perform tub transfer with Minimal assistance/guard assist within 7 day(s). 3.  Patient will perform lower body dressing (excluding donning socks) with minimal assistance/contact guard assist within 7 day(s). 4.  Patient will perform toilet transfers with modified independence within 7 day(s). 5.  Patient will perform all aspects of toileting with modified independence within 7 day(s). Outcome: Not Met OCCUPATIONAL THERAPY EVALUATION Patient: Sarmad Hidalgo (16 y.o. female) Date: 2/26/2021 Primary Diagnosis: Acute pulmonary embolism ([facility] [address] [I26.99] Precautions:   Fall ASSESSMENT Based on the objective data described below, the patient presents with minor decline in ADL performance from baseline due to back pain (reportedly from lying in bed), general weakness, and unsteady gait. Patient reports no stroke-like symptoms, no increased impairment to LUE hemiparesis (residual). Patient ambulated to bathroom and complete bathroom transfers at cane level with Min A, Mod A required for toileting to manage clothing. Patient typically toilets with Mod I, receives some assistance for bathing and dressing, though minor increase in level of assist currently required. Patient was anxious and tearful during session and required encouragement to participate initially. Feel patient will perform best in home environment, though does require increased assistance at this time Current Level of Function Impacting Discharge (ADLs/self-care): Min to Mod A for bathing, dressing, toileting Functional Outcome Measure: The patient scored Otley Elizabeth on the 28/66 outcome measure . Other factors to consider for discharge: family assistance available Patient will benefit from skilled therapy intervention to address the above noted impairments. PLAN : 
Recommendations and Planned Interventions: self care training, functional mobility training, therapeutic exercise, balance training, therapeutic activities, endurance activities, patient education, home safety training, and family training/education Frequency/Duration: Patient will be followed by occupational therapy 5 times a week to address goals. Recommendation for discharge: (in order for the patient to meet his/her long term goals) Occupational therapy at least 2 days/week in the home This discharge recommendation: A follow-up discussion with the attending provider and/or case management is planned IF patient discharges home will need the following DME: patient owns DME required for discharge SUBJECTIVE:  
 Patient stated my  left me in August to go to [employer]. And they just sold my house yesterday!   Patient tearful and seemed overwhelmed during session. OBJECTIVE DATA SUMMARY:  
HISTORY:  
Past Medical History:  
Diagnosis Date Arthritis Asthma Autoimmune disease ([facility] [address] Chronic obstructive pulmonary disease (HCC) Diabetes ([facility] [address] Stroke [facility]) Past Surgical History:  
Procedure Laterality Date HX ORTHOPAEDIC    
 HX VASCULAR ACCESS Expanded or extensive additional review of patient history:  
 
Home Situation Home Environment: Private residence One/Two Story Residence: Two story, live on 1st floor Living Alone: No 
Support Systems: Family member(s) Patient Expects to be Discharged to[de-identified] Private residence Current DME Used/Available at Home: Jean Bald, quad, Safety frame [employer], Shower chair Tub or Shower Type: Shower Hand dominance: Right EXAMINATION OF PERFORMANCE DEFICITS: 
Cognitive/Behavioral Status: 
Neurologic State: Alert Orientation Level: Oriented X4 Cognition: Follows commands Skin: intact Edema: none Hearing: Auditory Auditory Impairment: Hard of hearing, bilateral 
 
Vision/Perceptual:   
    
    
    
  
    
    
Corrective Lenses: Glasses Range of Motion: 
 
AROM: Generally decreased, functional 
  
  
  
 LUE non functional  
  
  
  
 
Strength: 
 
Strength: Generally decreased, functional  
 
  
  
  
  
 
Coordination: 
Coordination: Generally decreased, functional 
Fine Motor Skills-Upper: Left Impaired;Right Intact Gross Motor Skills-Upper: Left Impaired;Right Intact Tone & Sensation: 
 
Tone: Abnormal- LUE Sensation: Intact Balance: 
Sitting: Intact; Without support Standing: Impaired; Without support; With support Standing - Static: Good Standing - Dynamic : Fair Functional Mobility and Transfers for ADLs: 
Bed Mobility: 
Rolling: Minimum assistance Supine to Sit: Maximum assistance;Assist x1 Sit to Supine: Minimum assistance Scooting: Minimum assistance Transfers: 
Sit to Stand: Minimum assistance;Assist x1 Stand to Sit: Contact guard assistance Bed to Chair: Contact guard assistance;Assist x1 Toilet Transfer : Contact guard assistance Assistive Device : Terry Parker, quad ADL Assessment: 
Feeding: Setup Oral Facial Hygiene/Grooming: Stand-by assistance Bathing: Minimum assistance Upper Body Dressing: Moderate assistance Lower Body Dressing: Moderate assistance Toileting: Moderate assistance ADL Intervention and task modifications: 
  
 
   
 
  
Functional Measure:     Patient's LUE is baseline since previous CVA. Reports no change in function Fugl-Cameron Assessment of Motor Recovery after Stroke:  
 
Reflex Activity Flexors/Biceps/Fingers: None Extensors/Triceps: None Reflex Subtotal: 0 Volitional Movement Within Synergies Shoulder Retraction: None Shoulder Elevation: Partial 
Shoulder Abduction (90 degrees): None Shoulder External Rotation: Partial 
Elbow Flexion: Partial 
Forearm Supination: Partial 
Shoulder Adduction/Internal Rotation: Partial 
Elbow Extension: Partial 
Forearm Pronation: Partial 
Subtotal: 7 Volitional Movement Mixing Synergies Hand to Lumbar Spine: Partial 
Shoulder Flexion (0-90 degrees): Partial 
Pronation-Supination: Partial 
Subtotal: 3 Volitional Movement With Little or No Synergy Shoulder Abduction (0-90 degrees): Partial 
Shoulder Flexion ( degrees): Partial 
Pronation/Supination: Partial 
Subtotal : 3 Normal Reflex Activity Biceps, Triceps, Finger Flexors: Partial 
Subtotal : 1 Upper Extremity Total  
Upper Extremity Total: 14 Wrist 
Stability at 15 Degree Dorsiflexion: Partial 
Repeated Dorsiflexion/ Volar Flexion: Partial 
Stability at 15 Degree Dorsiflexion: Partial 
Repeated Dorsiflexion/ Volar Flexion: Partial 
Circumduction: Partial 
Wrist Total: 5 Hand 
Mass Flexion: Partial 
Mass Extension: Partial 
Grasp A: Partial 
Grasp B: Partial 
Grasp C: Partial 
Grasp D: Partial 
Grasp E: Partial 
Hand Total: 7 Coordination/Speed Tremor: None Dysmetria: Marked Time: >5s Coordination/Speed Total : 2 Total A-D Total A-D (Motor Function): 28/66 This is a reliable/valid measure of arm function after a neurological event. It has established value to characterize functional status and for measuring spontaneous and therapy-induced recovery; tests proximal and distal motor functions. Fugl-Cameron Assessment  UE scores recorded between five and 30 days post neurologic event can be used to predict UE recovery at six months post neurologic event. Severe = 0-21 points Moderately Severe = 22-33 points Moderate = 34-47 points Mild = 48-66 points YONY Lynne, GUERA Ocampo, & FUNMILAYO Boo (1992). Measurement of motor recovery after stroke: Outcome assessment and sample size requirements. Stroke, 23, pp. 1897-8446.  
-------------------------------------------------------------------------------------------------------------------------------------------------------------------- MCID: 
Stroke: Wilson Mckeon et al, 2001; n = 171; mean age 79 (6) years; assessed within 16 (12) days of stroke, Acute Stroke) FMA Motor Scores from Admission to Discharge 10 point increase in FMA Upper Extremity = 1.5 change in discharge FIM  
10 point increase in FMA Lower Extremity = 1.9 change in discharge FIM 
MDC:  
Stroke:  
Zurdo Mccarthy et al, 2008, n = 14, mean age = 59.9 (14.6) years, assessed on average 14 (6.5) months post stroke, Chronic Stroke) FMA = 5.2 points for the Upper Extremity portion of the assessment Occupational Therapy Evaluation Charge Determination History Examination Decision-Making MEDIUM Complexity : Expanded review of history including physical, cognitive and psychosocial  history  LOW Complexity : 1-3 performance deficits relating to physical, cognitive , or psychosocial skils that result in activity limitations and / or participation restrictions  LOW Complexity : No comorbidities that affect functional and no verbal or physical assistance needed to complete eval tasks Based on the above components, the patient evaluation is determined to be of the following complexity level: LOW Pain Rating: 
Back pain reported, pain meds provided prior to session Activity Tolerance:  
Fair After treatment patient left in no apparent distress:   
Sitting in chair, Call bell within reach, and Bed / chair alarm activated COMMUNICATION/EDUCATION:  
The patients plan of care was discussed with: Physical therapist and Registered nurse. Home safety education was provided and the patient/caregiver indicated understanding. This patients plan of care is appropriate for delegation to [facility]. Thank you for this referral. 
Ryan Go OT Time Calculation: 26 mins

## 2021-02-26 NOTE — PROGRESS NOTES
Abhinav Rip shara [place] [address] 
[address] [place] [address], [address] [place] [address] 
[phone] Medical Progress Note NAME: Taisha Wilson :  1940 MRM:  [id number] Date/Time: 2021 Assessment / Plan:  
 
[de-identified] yo F w/ hx of HTN, DM, COPD, CVA w/ L hemiparesis presenting w/ multiple complaints, found to have an acute PE and acute CVA Acute PE: subsegmental pulmonary embolus to the medial aspect of the left lower lobe of possibly no clinical significance. Daughter familiar with coumadin and NOAC. Agrees to proceed with use of Eliquis. Change therapeutic Lovenox to Eliquis. Acute CVA: does have of hx of CVA w/ L-sided weakness. Discussed with neurology. Agreed that concomitant use of Eliquis with Plavix is too high-risk for bleeding so changed Plavix to ASA while on anticoagulation. Checking P2Y12. Cont statin Dizziness: unclear etiology. ?related to acute CVA. Better today. PT/OT 
  
Abd pain/N/V: this also appears improved. RUQ US showed cholelithiasis, mild right hydronephrosis. Monitor for worsening of symptoms Pyuria: possible UTI. On empiric CTX pending UCx 
  
COPD: stable. No wheezing. bronchodilators PRN 
  
HTN: BP high. Add Norvasc. Cont lisinopril. Has IV labetalol and hydralazine PRN ordered 
  
DM type 2: borderline, A1c 6.4. Cont Lantus, SSI 
  
 
 
 
Total time spent:  35 minutes Time spent in the care of this patient including reviewing records, discussing with nursing and/or other providers on the treatment team, obtaining history and examining the patient, and discussing treatment plans. Care Plan discussed with: Patient, Nursing Staff and >50% of time spent in counseling and coordination of care Discussed:  Care Plan and D/C Planning Prophylaxis:  Eliquis Disposition:  Home w/Family Subjective: Chief Complaint:  Follow up CVA/PE Chart/notes/labs/studies reviewed, patient examined. Feels anxious. Agitated. Wants to go home. No fever. Objective:  
 
 
Vitals:  
 
  
Last 24hrs VS reviewed since prior progress note. Most recent are: 
 
Visit Vitals BP (!) 189/81 (BP 1 Location: Left arm, BP Patient Position: At rest) Pulse 88 Temp 98.5 °F (36.9 °C) Resp 20 Ht 5' 6\" (1.676 m) Wt 57.2 kg (126 lb) SpO2 93% BMI 20.34 kg/m² SpO2 Readings from Last 6 Encounters:  
02/26/21 93% 02/08/21 96% 01/04/21 95% Intake/Output Summary (Last 24 hours) at 2/26/2021 1638 Last data filed at 2/26/2021 1443 Gross per 24 hour Intake 980 ml Output 550 ml Net 430 ml Exam:  
 
Physical Exam: 
 
Gen:  Elderly, thin, chronically ill-appearing HEENT:  Atraumatic, normocephalic. Sclerae nonicteric, hearing intact to voice Neck:  Supple, without apparent masses. Resp:  No accessory muscle use, CTAB without wheezes, rales, or rhonchi 
Card: RRR, without m/r/g. No LE edema Abd:  +bowel sounds, soft, NTTP, nondistended. No HSM Neuro: Face symmetric, speech fluent, follows commands appropriately, generalized weakness. L-sided weakness Psych:  Alert, oriented to self and hospital. Fair insight Medications Reviewed: (see below) Lab Data Reviewed: (see below) 
 
______________________________________________________________________ Medications:  
 
Current Facility-Administered Medications Medication Dose Route Frequency  labetaloL (NORMODYNE;TRANDATE) injection 20 mg  20 mg IntraVENous Q4H PRN  
 hydrALAZINE (APRESOLINE) 20 mg/mL injection 20 mg  20 mg IntraVENous Q4H PRN  
 melatonin (rapid dissolve) tablet 2.5 mg  2.5 mg Oral QHS PRN  
 alum-mag hydroxide-simeth (MYLANTA) oral suspension 30 mL  30 mL Oral Q4H PRN  
 LORazepam (ATIVAN) injection 0.5 mg  0.5 mg IntraVENous Q6H PRN  
 oxyCODONE IR (ROXICODONE) tablet 5 mg  5 mg Oral Q4H PRN  
 oxyCODONE IR (ROXICODONE) tablet 10 mg  10 mg Oral Q4H PRN  
  hydrOXYzine (VISTARIL) 25 mg/mL injection 25 mg  25 mg IntraMUSCular Q6H PRN  
 albuterol (PROVENTIL HFA, VENTOLIN HFA, PROAIR HFA) inhaler 2 Puff  2 Puff Inhalation Q4H PRN  
 simethicone (MYLICON) tablet 80 mg  80 mg Oral QID PRN  
 HYDROmorphone (PF) (DILAUDID) injection 0.5 mg  0.5 mg IntraVENous Q4H PRN  
 budesonide-formoteroL (SYMBICORT) 160-4.5 mcg/actuation HFA inhaler 2 Puff  2 Puff Inhalation BID RT  
 ipratropium-albuterol (COMBIVENT RESPIMAT) 20 mcg-100 mcg inhalation spray  1 Puff Inhalation QID RT  
 predniSONE (DELTASONE) tablet 5 mg  5 mg Oral DAILY  naloxone (NARCAN) injection 0.4 mg  0.4 mg IntraVENous EVERY 2 MINUTES AS NEEDED  
 apixaban (ELIQUIS) tablet 10 mg  10 mg Oral BID  cefTRIAXone (ROCEPHIN) 1 g in sterile water (preservative free) 10 mL IV syringe  1 g IntraVENous Q24H  
 atorvastatin (LIPITOR) tablet 80 mg  80 mg Oral QHS  
 L.acidophilus-paracasei-S.thermophil-bifidobacter (RISAQUAD) 8 billion cell capsule  1 Cap Oral DAILY  gabapentin (NEURONTIN) capsule 600 mg  600 mg Oral BID  insulin glargine (LANTUS) injection 16 Units  16 Units SubCUTAneous DAILY  latanoprost (XALATAN) 0.005 % ophthalmic solution 1 Drop  1 Drop Both Eyes DAILY  levothyroxine (SYNTHROID) tablet 75 mcg  75 mcg Oral ACB  lisinopriL (PRINIVIL, ZESTRIL) tablet 40 mg  40 mg Oral DAILY  melatonin (rapid dissolve) tablet 5 mg  5 mg Oral QHS  pantoprazole (PROTONIX) tablet 40 mg  40 mg Oral DAILY  insulin lispro (HUMALOG) injection   SubCUTAneous AC&HS  
 glucose chewable tablet 16 g  4 Tab Oral PRN  
 dextrose (D50W) injection syrg 12.5-25 g  12.5-25 g IntraVENous PRN  
 glucagon (GLUCAGEN) injection 1 mg  1 mg IntraMUSCular PRN  
 0.9% sodium chloride infusion  75 mL/hr IntraVENous CONTINUOUS  
 sodium chloride (NS) flush 5-40 mL  5-40 mL IntraVENous Q8H  
 sodium chloride (NS) flush 5-40 mL  5-40 mL IntraVENous PRN  
  0.9% sodium chloride infusion 25 mL  25 mL IntraVENous PRN  
 acetaminophen (TYLENOL) tablet 650 mg  650 mg Oral Q6H PRN Or  
 acetaminophen (TYLENOL) suppository 650 mg  650 mg Rectal Q6H PRN  
 senna-docusate (PERICOLACE) 8.6-50 mg per tablet 1 Tab  1 Tab Oral BID  
 bisacodyL (DULCOLAX) suppository 10 mg  10 mg Rectal DAILY PRN  promethazine (PHENERGAN) tablet 12.5 mg  12.5 mg Oral Q6H PRN Or  
 ondansetron (ZOFRAN) injection 4 mg  4 mg IntraVENous Q6H PRN Lab Review:  
 
Recent Labs  
  02/26/21 
0355 02/25/21 
1825 WBC 9.2 10.3 HGB 11.3* 11.3* HCT 34.8* 34.1*  
 222 Recent Labs  
  02/26/21 
0355 02/25/21 
1825 * 130*  
K 4.0 4.1  96* CO2 26 26 GLU 96 179* BUN 10 12 CREA 0.62 0.64 CA 7.8* 8.1*  
MG 1.9 1.7 PHOS 3.7  --   
ALB 3.2* 3.3* ALT 37 39 INR  --  1.2* No components found for: [place] No results for input(s): PH, PCO2, PO2, HCO3, FIO2 in the last 72 hours. Recent Labs  
  02/25/21 
1825 INR 1.2* No results found for: SDES No results found for: CULT 
        
___________________________________________________ Attending Physician: Leonardo Smalls MD  
 
 Elbert Sewell

## 2021-02-26 NOTE — PROCEDURES
[facility]hinav Me[facility] [place] [facility] Electroencephalogram Report Procedure ID: SFA  Procedure Date: 02/26/2021 Patient Name: Steven Tomas YOB: 1940 Procedure Type: Routine Medical Record No: [id number] INDICATION:  dizziness, CVA STATE: Awake and drowsy . DESCRIPTION OF PROCEDURE: Electrodes were applied in accordance with the international 10-20 system of electrode placement. EEG was reviewed in both bipolar and referential montages. Description of Activity: 
During wakefulness, there is continuous runs of  8 Hz symmetric posterior alpha rhythm, that attenuate symmetrically with eye opening. Low voltage beta activity occurs symmetrically at the anterior head regions bilaterally. Right hemisphere intermittent 5-6 hertz slowing is seen. During drowsiness, there is attenuation of the alpha rhythm and low voltage theta activity occurs bilaterally. Intermittent photic stimulation was performed and did not induce posterior driving responses. No sharp or spike discharges, seizures or epileptiform discharges seen. No focal asymmetry. Clinical Interpretation: This EEG, performed during wakefulness and drowsiness is mildly abnormal. There is intermittent right hemisphere slowing as seen in structural lesions (previous CVA). There is no seizures or epileptiform discharges seen. .  
 
Medications: 
Current Facility-Administered Medications Medication Dose Route Frequency  labetaloL (NORMODYNE;TRANDATE) injection 20 mg  20 mg IntraVENous Q4H PRN  
 hydrALAZINE (APRESOLINE) 20 mg/mL injection 20 mg  20 mg IntraVENous Q4H PRN  
 melatonin (rapid dissolve) tablet 2.5 mg  2.5 mg Oral QHS PRN  
 alum-mag hydroxide-simeth (MYLANTA) oral suspension 30 mL  30 mL Oral Q4H PRN  
 LORazepam (ATIVAN) injection 0.5 mg  0.5 mg IntraVENous Q6H PRN  
 oxyCODONE IR (ROXICODONE) tablet 5 mg  5 mg Oral Q4H PRN  
 oxyCODONE IR (ROXICODONE) tablet 10 mg  10 mg Oral Q4H PRN  
  diphenhydrAMINE (BENADRYL) injection 25 mg  25 mg IntraVENous Q6H PRN  
 diphenhydrAMINE (BENADRYL) capsule 25 mg  25 mg Oral Q6H PRN  
 hydrOXYzine (VISTARIL) 25 mg/mL injection 25 mg  25 mg IntraMUSCular Q6H PRN  
 albuterol (PROVENTIL HFA, VENTOLIN HFA, PROAIR HFA) inhaler 2 Puff  2 Puff Inhalation Q4H PRN  
 simethicone (MYLICON) tablet 80 mg  80 mg Oral QID PRN  
 HYDROmorphone (PF) (DILAUDID) injection 0.5 mg  0.5 mg IntraVENous Q4H PRN  
 budesonide-formoteroL (SYMBICORT) 160-4.5 mcg/actuation HFA inhaler 2 Puff  2 Puff Inhalation BID RT  
 ipratropium-albuterol (COMBIVENT RESPIMAT) 20 mcg-100 mcg inhalation spray  1 Puff Inhalation QID RT  
 predniSONE (DELTASONE) tablet 5 mg  5 mg Oral DAILY  naloxone (NARCAN) injection 0.4 mg  0.4 mg IntraVENous EVERY 2 MINUTES AS NEEDED  
 [START ON 2/27/2021] aspirin delayed-release tablet 81 mg  81 mg Oral DAILY  cefTRIAXone (ROCEPHIN) 1 g in sterile water (preservative free) 10 mL IV syringe  1 g IntraVENous Q24H  
 enoxaparin (LOVENOX) injection 60 mg  60 mg SubCUTAneous Q12H  
 atorvastatin (LIPITOR) tablet 80 mg  80 mg Oral QHS  
 L.acidophilus-paracasei-S.thermophil-bifidobacter (RISAQUAD) 8 billion cell capsule  1 Cap Oral DAILY  gabapentin (NEURONTIN) capsule 600 mg  600 mg Oral BID  insulin glargine (LANTUS) injection 16 Units  16 Units SubCUTAneous DAILY  latanoprost (XALATAN) 0.005 % ophthalmic solution 1 Drop  1 Drop Both Eyes DAILY  levothyroxine (SYNTHROID) tablet 75 mcg  75 mcg Oral ACB  lisinopriL (PRINIVIL, ZESTRIL) tablet 40 mg  40 mg Oral DAILY  melatonin (rapid dissolve) tablet 5 mg  5 mg Oral QHS  pantoprazole (PROTONIX) tablet 40 mg  40 mg Oral DAILY  insulin lispro (HUMALOG) injection   SubCUTAneous AC&HS  
 glucose chewable tablet 16 g  4 Tab Oral PRN  
 dextrose (D50W) injection syrg 12.5-25 g  12.5-25 g IntraVENous PRN  
 glucagon (GLUCAGEN) injection 1 mg  1 mg IntraMUSCular PRN  
  0.9% sodium chloride infusion  75 mL/hr IntraVENous CONTINUOUS  
 sodium chloride (NS) flush 5-40 mL  5-40 mL IntraVENous Q8H  
 sodium chloride (NS) flush 5-40 mL  5-40 mL IntraVENous PRN  
 0.9% sodium chloride infusion 25 mL  25 mL IntraVENous PRN  
 acetaminophen (TYLENOL) tablet 650 mg  650 mg Oral Q6H PRN Or  
 acetaminophen (TYLENOL) suppository 650 mg  650 mg Rectal Q6H PRN  
 senna-docusate (PERICOLACE) 8.6-50 mg per tablet 1 Tab  1 Tab Oral BID  
 bisacodyL (DULCOLAX) suppository 10 mg  10 mg Rectal DAILY PRN  promethazine (PHENERGAN) tablet 12.5 mg  12.5 mg Oral Q6H PRN  Or  
 ondansetron (ZOFRAN) injection 4 mg  4 mg IntraVENous Q6H PRN

## 2021-02-26 NOTE — ED NOTES
Spoke with patient's daughter, Will Jennings [phone], who informed this nurse that patient had an episode of dizziness last night and suspects patient may have had a TIA, R lower back pain (that is different than patient's normal lower back pain) and is concerned that patient my have a UTI. Dr. Inderjit Van notified of this conversation.

## 2021-02-26 NOTE — CONSULTS
[facility] Name: Dorean Favre MRN: [id number] : 1940 Hospital: [address] Date: 2021 Impression Plan 1. Small PE- unlikely to be causing clinical sxs 2. Asthma- not in acute exacerbation 3. Abdominal pain 4. Hx of CVA · Suspect sxs were due to the emotional upset/life stressors yesterday. PE not causing clinical sxs nor not expected to cause any pulm HTN or heart strain. · Eliquis for 3-6 months · LE doppler results pending · Echo pending · CVA meds per Neurology · Will see as needed over the weekend. Please call with questions. Radiology ( personally reviewed) CTA chest: very small PE, some emphysema ABG No results for input(s): PHI, PO2I, PCO2I in the last 72 hours. Subjective Cc: PE 
 
79yo with PMHx of CVA with L hemiparesis presenting with abdominal pain and dizzyness. Found to have a subsegmental PE in LLL. CT also with splenomegally. Pt started on Eliquis today. Hemodynamically stable. States she feels fine today, although very sad since her house was sold yesterday by her family. In retrospect she thinks she felt badly yesterday due to the stress of the house sale. Denies fevers/chills. Review of Systems: A comprehensive review of systems was negative except for that written in the HPI. Past Medical History:  
Diagnosis Date  Arthritis  Asthma  Autoimmune disease ([facility] [address]  Chronic obstructive pulmonary disease ([facility] [address]  Diabetes ([facility] [address]  Stroke ([facility] [address] Past Surgical History:  
Procedure Laterality Date  HX ORTHOPAEDIC    
 HX VASCULAR ACCESS Prior to Admission medications Medication Sig Start Date End Date Taking? Authorizing Provider  
oxyCODONE ER Kar Oddi ER) 9 mg capsule Take 9 mg by mouth every twelve (12) hours.    Yes Provider, Historical  
 insulin lispro (HumaLOG KwikPen Insulin) 100 unit/mL kwikpen by SubCUTAneous route Before breakfast, lunch, and dinner. Sliding scale   Yes Provider, Historical  
predniSONE (DELTASONE) 5 mg tablet Take 5 mg by mouth daily. Yes Provider, Historical  
albuterol-ipratropium (DUO-NEB) 2.5 mg-0.5 mg/3 ml nebu 3 mL by Nebulization route every four (4) hours as needed for Wheezing. 1/4/21  Yes Mamta Blood MD  
latanoprost (XALATAN) 0.005 % ophthalmic solution Administer 1 Drop to both eyes daily. Yes Other, MD Jann  
insulin glargine (Lantus Solostar U-100 Insulin) 100 unit/mL (3 mL) inpn 16 Units by SubCUTAneous route daily. Yes Other, MD Jann  
CLONAZEPAM PO Take 0.25 mg by mouth every evening. ODT formulation   Yes Other, MD Jann  
levothyroxine (Levo-T) 75 mcg tablet Take 75 mcg by mouth Daily (before breakfast). Yes Other, MD Jann  
tolterodine ER (DETROL LA) 4 mg ER capsule Take 4 mg by mouth daily. Yes Other, MD Jann  
atorvastatin (LIPITOR) 80 mg tablet Take 80 mg by mouth nightly. Yes Other, MD Jann  
clopidogreL (PLAVIX) 75 mg tab Take 75 mg by mouth daily. Yes Other, MD Jann  
lisinopriL (PRINIVIL, ZESTRIL) 40 mg tablet Take 40 mg by mouth daily. Yes Other, MD Jann  
pantoprazole (PROTONIX) 40 mg tablet Take 40 mg by mouth daily. Yes Other, MD Jann  
gabapentin (NEURONTIN) 300 mg capsule Take 600 mg by mouth two (2) times a day. Yes Other, MD Jann  
fluticasone-umeclidinium-vilanterol (Trelegy Ellipta) 100-62.5-25 mcg inhaler Take 1 Puff by inhalation daily. Yes Provider, Historical  
melatonin 5 mg tablet Take 5 mg by mouth nightly. Yes Provider, Historical  
calcium-cholecalciferol, d3, (CALCIUM 600 + D) 600-125 mg-unit tab Take 1 Tab by mouth daily. Yes Provider, Historical  
multivitamin (ONE A DAY) tablet Take 1 Tab by mouth daily. Yes Provider, Historical  
magnesium oxide (MAG-OX) 400 mg tablet Take 400 mg by mouth daily.    Yes Provider, Historical  
 B.infantis-B.ani-B.long-B.bifi (Probiotic 4X) 10-15 mg TbEC Take 2 Caps by mouth three (3) times daily. Yes Provider, Historical  
BENEFIBER, GUAR GUM, PO Take 1 Dose by mouth two (2) times a day. 1 dose - 2 teaspoons   Yes Provider, Historical  
 
Current Facility-Administered Medications Medication Dose Route Frequency  budesonide-formoteroL (SYMBICORT) 160-4.5 mcg/actuation HFA inhaler 2 Puff  2 Puff Inhalation BID RT  
 ipratropium-albuterol (COMBIVENT RESPIMAT) 20 mcg-100 mcg inhalation spray  1 Puff Inhalation QID RT  
 predniSONE (DELTASONE) tablet 5 mg  5 mg Oral DAILY  apixaban (ELIQUIS) tablet 10 mg  10 mg Oral BID  cefTRIAXone (ROCEPHIN) 1 g in sterile water (preservative free) 10 mL IV syringe  1 g IntraVENous Q24H  
 atorvastatin (LIPITOR) tablet 80 mg  80 mg Oral QHS  
 L.acidophilus-paracasei-S.thermophil-bifidobacter (RISAQUAD) 8 billion cell capsule  1 Cap Oral DAILY  gabapentin (NEURONTIN) capsule 600 mg  600 mg Oral BID  insulin glargine (LANTUS) injection 16 Units  16 Units SubCUTAneous DAILY  latanoprost (XALATAN) 0.005 % ophthalmic solution 1 Drop  1 Drop Both Eyes DAILY  levothyroxine (SYNTHROID) tablet 75 mcg  75 mcg Oral ACB  lisinopriL (PRINIVIL, ZESTRIL) tablet 40 mg  40 mg Oral DAILY  melatonin (rapid dissolve) tablet 5 mg  5 mg Oral QHS  pantoprazole (PROTONIX) tablet 40 mg  40 mg Oral DAILY  insulin lispro (HUMALOG) injection   SubCUTAneous AC&HS  
 0.9% sodium chloride infusion  75 mL/hr IntraVENous CONTINUOUS  
 sodium chloride (NS) flush 5-40 mL  5-40 mL IntraVENous Q8H  
 senna-docusate (PERICOLACE) 8.6-50 mg per tablet 1 Tab  1 Tab Oral BID Allergies Allergen Reactions  Shellfish Derived Anaphylaxis Social History Tobacco Use  Smoking status: Former Smoker Quit date: 1991 Years since quittin.1 Substance Use Topics  Alcohol use: Never Frequency: Never Family History Problem Relation Age of Onset  No Known Problems Other   
     reviewed, patient did not know Laboratory: I have personally reviewed the critical care flowsheet and labs. Recent Labs  
  02/26/21 
0355 02/25/21 
1825 WBC 9.2 10.3 HGB 11.3* 11.3* HCT 34.8* 34.1*  
 222 Recent Labs  
  02/26/21 
0355 02/25/21 
1825 * 130*  
K 4.0 4.1  96* CO2 26 26 GLU 96 179* BUN 10 12 CREA 0.62 0.64 CA 7.8* 8.1*  
MG 1.9 1.7 PHOS 3.7  --   
ALB 3.2* 3.3* ALT 37 39 INR  --  1.2* Objective: Mode Rate Tidal Volume Pressure FiO2 PEEP Vital Signs:   
 TMAX(24) Intake/Output:  
Last shift:        
Last 3 shifts: 02/26 0701 - 02/26 1900 In: 480 [P.O.:480] Out: 550 [Urine:550]RRIOLAST3 Intake/Output Summary (Last 24 hours) at 2/26/2021 1519 Last data filed at 2/26/2021 1443 Gross per 24 hour Intake 980 ml Output 550 ml Net 430 ml EXAM:  
GENERAL: tearful, awake, alert, HEENT:  PERRL, EOMI, no alar flaring or epistaxis, oral mucosa moist without cyanosis, NECK:  no jugular vein distention, no retractions, no thyromegaly or masses, LUNGS: CTA, no w/r/r , HEART:  Regular rate and rhythm with no MGR; no edema is present, ABDOMEN:  soft with no tenderness, bowel sounds present, EXTREMITIES:  warm with no cyanosis, SKIN:  no jaundice or ecchymosis and NEUROLOGIC:  alert and oriented, grossly non-focal 
 
Nicholas Church MD 
[facility] [address]

## 2021-02-26 NOTE — PROGRESS NOTES
Bedside and Verbal shift change received from John Paul Roque RN (offgoing nurse) Report included the following information SBAR, Kardex, ED Summary and Recent Results.

## 2021-02-26 NOTE — PROGRESS NOTES
[facility]: MED RECONCILIATION Comments/Recommendations:  
Medication reconciliation completed by daughter over the phone. Patient discharged from [facility] on 1/4/21, only change in medication since discharge is pain regimen. Patient is now on Xtampza ER 9mg BID, this replaced oxycodone/APAP 5/325 TID. Daughter aware that Suyapa Browne is not on hospital formulary and is ok with substituting while inpatient. Patient last took medications yesterday morning prior to hospital arrival.  
Confirmed allergies and preferred pharmacy as [facility] on [address] Medications added:  
 
Suyapa Browne Medications removed: Albuterol neb Medications adjusted: 
 
Prednisone 5mg daily adjusted from taper Information obtained from: daughter, Rx query, [facility] , discharge summary [facility] 1/4/21 Allergies: Shellfish derived Prior to Admission Medications:  
 
Medication Documentation Review Audit Reviewed by Breezy Joaquin (Pharmacist) on 02/26/21 at 7787 Medication Sig Documenting Provider Last Dose Status Taking? albuterol-ipratropium (DUO-NEB) 2.5 mg-0.5 mg/3 ml nebu 3 mL by Nebulization route every four (4) hours as needed for Wheezing. Jennifer Perez MD  Active Yes  
atorvastatin (LIPITOR) 80 mg tablet Take 80 mg by mouth nightly. Jann Rubi MD 2/24/2021 PM Active Yes B.infantis-B.ani-B.long-B.bifi (Probiotic 4X) 10-15 mg TbEC Take 2 Caps by mouth three (3) times daily. Provider, Historical 2/25/2021 AM Active Yes BENEFIBER, GUAR GUM, PO Take 1 Dose by mouth two (2) times a day. 1 dose - 2 teaspoons Provider, Historical 2/26/2021 AM Active Yes  
calcium-cholecalciferol, d3, (CALCIUM 600 + D) 600-125 mg-unit tab Take 1 Tab by mouth daily. Provider, Historical 2/25/2021 AM Active Yes CLONAZEPAM PO Take 0.25 mg by mouth every evening. ODT formulation Jann Rubi MD 2/24/2021 PM Active Yes  
clopidogreL (PLAVIX) 75 mg tab Take 75 mg by mouth daily. Jann Rubi MD 2/25/2021 AM Active Yes fluticasone-umeclidinium-vilanterol (Trelegy Ellipta) 100-62.5-25 mcg inhaler Take 1 Puff by inhalation daily. Provider, Historical 2/25/2021 AM  Active Yes  
gabapentin (NEURONTIN) 300 mg capsule Take 600 mg by mouth two (2) times a day. Jann Rubi MD 2/25/2021 AM Active Yes  
insulin glargine (Lantus Solostar U-100 Insulin) 100 unit/mL (3 mL) inpn 16 Units by SubCUTAneous route daily. Jann Rubi MD 2/25/2021 AM Active Yes  
insulin lispro (HumaLOG KwikPen Insulin) 100 unit/mL kwikpen by SubCUTAneous route Before breakfast, lunch, and dinner. Sliding scale Provider, Historical  Active Yes  
latanoprost (XALATAN) 0.005 % ophthalmic solution Administer 1 Drop to both eyes daily. Jann Rubi MD 2/25/2021 AM Active Yes  
levothyroxine (Levo-T) 75 mcg tablet Take 75 mcg by mouth Daily (before breakfast). Jann Rubi MD 2/25/2021 AM Active Yes  
lisinopriL (PRINIVIL, ZESTRIL) 40 mg tablet Take 40 mg by mouth daily. Jann Rubi MD 2/25/2021 AM Active Yes  
magnesium oxide (MAG-OX) 400 mg tablet Take 400 mg by mouth daily. Provider, Historical 2/25/2021 AM Active Yes  
melatonin 5 mg tablet Take 5 mg by mouth nightly. Provider, Historical 2/24/2021 PM Active Yes  
multivitamin (ONE A DAY) tablet Take 1 Tab by mouth daily. Provider, Historical 2/25/2021 AM Active Yes  
oxyCODONE ER Patricia  ER) 9 mg capsule Take 9 mg by mouth every twelve (12) hours. Provider, Historical 2/25/2021 AM Active Yes  
pantoprazole (PROTONIX) 40 mg tablet Take 40 mg by mouth daily. Jann Rubi MD 2/25/2021 AM Active Yes  
predniSONE (DELTASONE) 5 mg tablet Take 5 mg by mouth daily. Provider, Historical 2/25/2021 AM Active Yes  
tolterodine ER (DETROL LA) 4 mg ER capsule Take 4 mg by mouth daily. Jann Rubi MD 2/25/2021 AM Active Yes Thank you, Kenneth Willingham, PharmD, BCPS   Contact: [phone]

## 2021-02-26 NOTE — ED NOTES
TRANSFER - OUT REPORT: 
 
Verbal report given to Ginger(name) on Susie Farmer  being transferred to [facility](unit) for routine progression of care Report consisted of patients Situation, Background, Assessment and  
Recommendations(SBAR). Information from the following report(s) SBAR, Kardex and ED Summary was reviewed with the receiving nurse. Lines:  
Venous Access Device power port 02/25/21 Upper chest (subclavicular area, right (Active) Opportunity for questions and clarification was provided. Patient transported with: 
 Registered Nurse

## 2021-02-26 NOTE — CONSULTS
NEUROLOGY CONSULT NOTE Patient ID: 
Taisha Wilson 
[id number] 
5 y.o. 
1940 Date of Consultation:  February 26, 2021 Referring Physician: Dr. Jamar Carlson Reason for Consultation:  dizziness History of Present Illness:  
 
Patient Active Problem List  
 Diagnosis Date Noted  Acute pulmonary embolism ([facility] 02/25/2021  Chest pain 02/25/2021  Dizziness 02/25/2021  Abdominal pain 02/25/2021  
 HTN (hypertension) 01/03/2021  Type II diabetes mellitus ([facility] 01/03/2021  Rib fracture 01/03/2021  Hypoxia 01/03/2021  SIRS (systemic inflammatory response syndrome) ([facility] 01/03/2021  Hypothyroid 01/03/2021  Anxiety 01/03/2021  COPD exacerbation ([facility] 01/03/2021  
 History of CVA (cerebrovascular accident) 01/03/2021 Past Medical History:  
Diagnosis Date  Arthritis  Asthma  Autoimmune disease ([facility]  Chronic obstructive pulmonary disease ([facility]  Diabetes ([facility]  Stroke ([facility] Past Surgical History:  
Procedure Laterality Date  HX ORTHOPAEDIC    
 HX VASCULAR ACCESS Prior to Admission medications Medication Sig Start Date End Date Taking? Authorizing Provider  
oxyCODONE ER Cates Formica ER) 9 mg capsule Take 9 mg by mouth every twelve (12) hours. Yes Provider, Historical  
insulin lispro (HumaLOG KwikPen Insulin) 100 unit/mL kwikpen by SubCUTAneous route Before breakfast, lunch, and dinner. Sliding scale   Yes Provider, Historical  
predniSONE (DELTASONE) 5 mg tablet Take 5 mg by mouth daily. Yes Provider, Historical  
albuterol-ipratropium (DUO-NEB) 2.5 mg-0.5 mg/3 ml nebu 3 mL by Nebulization route every four (4) hours as needed for Wheezing. 1/4/21  Yes Court Smiley MD  
latanoprost (XALATAN) 0.005 % ophthalmic solution Administer 1 Drop to both eyes daily. Yes Other, MD Jann  
insulin glargine (Lantus Solostar U-100 Insulin) 100 unit/mL (3 mL) inpn 16 Units by SubCUTAneous route daily.    Yes Other, MD Jann  
 CLONAZEPAM PO Take 0.25 mg by mouth every evening. ODT formulation   Yes Other, MD Jann  
levothyroxine (Levo-T) 75 mcg tablet Take 75 mcg by mouth Daily (before breakfast). Yes Other, MD Jann  
tolterodine ER (DETROL LA) 4 mg ER capsule Take 4 mg by mouth daily. Yes Other, MD Jann  
atorvastatin (LIPITOR) 80 mg tablet Take 80 mg by mouth nightly. Yes Other, MD Jann  
clopidogreL (PLAVIX) 75 mg tab Take 75 mg by mouth daily. Yes Other, MD Jann  
lisinopriL (PRINIVIL, ZESTRIL) 40 mg tablet Take 40 mg by mouth daily. Yes Other, MD aJnn  
pantoprazole (PROTONIX) 40 mg tablet Take 40 mg by mouth daily. Yes Other, MD Jann  
gabapentin (NEURONTIN) 300 mg capsule Take 600 mg by mouth two (2) times a day. Yes Other, MD Jann  
fluticasone-umeclidinium-vilanterol (Trelegy Ellipta) 100-62.5-25 mcg inhaler Take 1 Puff by inhalation daily. Yes Provider, Historical  
melatonin 5 mg tablet Take 5 mg by mouth nightly. Yes Provider, Historical  
calcium-cholecalciferol, d3, (CALCIUM 600 + D) 600-125 mg-unit tab Take 1 Tab by mouth daily. Yes Provider, Historical  
multivitamin (ONE A DAY) tablet Take 1 Tab by mouth daily. Yes Provider, Historical  
magnesium oxide (MAG-OX) 400 mg tablet Take 400 mg by mouth daily. Yes Provider, Historical  
B.infantis-B.ani-B.long-B.bifi (Probiotic 4X) 10-15 mg TbEC Take 2 Caps by mouth three (3) times daily. Yes Provider, Historical  
BENEFIBER, GUAR GUM, PO Take 1 Dose by mouth two (2) times a day. 1 dose - 2 teaspoons   Yes Provider, Historical  
 
Allergies Allergen Reactions  Shellfish Derived Anaphylaxis Social History Tobacco Use  Smoking status: Former Smoker Quit date: 1991 Years since quittin.1 Substance Use Topics  Alcohol use: Never Frequency: Never Family History Problem Relation Age of Onset  No Known Problems Other   
     reviewed, patient did not know Subjective: Anna Marie Wong is a [de-identified] y.o. female with history of hypertension, diabetes, COPD, CVA with residual left hemiparesis who was admitted from the ER for dizziness for the past 2 days associated with some vague abdominal pain, chest pain and nausea and vomiting. Patient complaining of right-sided chest pain and generalized abdominal pain a day prior to admission. When she was having dinner she felt lightheaded and was about to pass out with some chest discomfort headache and widespread aches. In the ER blood pressure was 140/70. Lab work-up revealed slightly low hemoglobin at 11.3, INR 1.2, hyponatremia (130), increased glucose, decreased calcium and decreased albumin. Elevated proBNP. Urinalysis positive for UTI. Chest x-ray revealed chronic appearing lung changes without definitive acute process. Head CT without contrast revealed right hemisphere encephalomalacia involving the right parietal occipital lobe. No acute process. CTA of the chest with and without contrast revealed subsegmental pulmonary embolus to the medial aspect of the left lower lobe. Distended gallbladder. When seen, patient reports no recurrence of her symptoms. Outside reports reviewed: ER records, radiology reports, lab reports. Review of Systems: 
 
Pertinent items are noted in HPI. Objective:  
 
Patient Vitals for the past 8 hrs: 
 BP Temp Pulse Resp SpO2  
02/26/21 0803 (!) 195/87 98.1 °F (36.7 °C) 81 20 91 % 02/26/21 0700   83    
02/26/21 0352 (!) 159/65  80    
02/26/21 0326 (!) 181/70 97.7 °F (36.5 °C) 88 20 93 % PHYSICAL EXAM: 
 
NEUROLOGICAL EXAM: 
 
Appearance: The patient is well developed, well nourished, provides a coherent history and is in no acute distress. Mental Status: Oriented to time, place and person. Fluent, no aphasia or dysarthria. Mood and affect appropriate. Cranial Nerves:   Intact visual fields. ALEXANDRA, EOM's full, no nystagmus, no ptosis. Facial sensation is normal. Corneal reflexes are intact. Decrease nasolabial fold left face. Hearing is normal bilaterally. Palate is midline with normal elevation. Sternocleidomastoid and trapezius muscles are normal. Tongue is midline. Motor:  5/5 strength right extremity. LUE spastic plegia with post stroke posturing and LLE 4/5. Increase tone LLE. Reflexes:   Left sided hyperreflexia. Downgoing toes. Sensory:   Normal to cold. Gait:  Not tested. At baseline walks using a 4 pronged cane. Tremor:   No tremor noted. Cerebellar:  Intact FTN/RADHA/HTS right side. Imaging CT Head: reviewed Lab Review Recent Results (from the past 24 hour(s)) SAMPLES BEING HELD Collection Time: 02/25/21  6:25 PM  
Result Value Ref Range SAMPLES BEING HELD [facility].   
 COMMENT Add-on orders for these samples will be processed based on acceptable specimen integrity and analyte stability, which may vary by analyte. CBC WITH AUTOMATED DIFF Collection Time: 02/25/21  6:25 PM  
Result Value Ref Range WBC 10.3 3.6 - 11.0 K/uL  
 RBC 4.27 3.80 - 5.20 M/uL  
 HGB 11.3 (L) 11.5 - 16.0 g/dL HCT 34.1 (L) 35.0 - 47.0 % MCV 79.9 (L) 80.0 - 99.0 FL  
 MCH 26.5 26.0 - 34.0 PG  
 MCHC 33.1 30.0 - 36.5 g/dL  
 RDW 15.7 (H) 11.5 - 14.5 % PLATELET 499 245 - 708 K/uL MPV 10.3 8.9 - 12.9 FL  
 NRBC 0.0 0  WBC ABSOLUTE NRBC 0.00 0.00 - 0.01 K/uL NEUTROPHILS 85 (H) 32 - 75 % LYMPHOCYTES 6 (L) 12 - 49 % MONOCYTES 5 5 - 13 % EOSINOPHILS 2 0 - 7 % BASOPHILS 1 0 - 1 % IMMATURE GRANULOCYTES 1 (H) 0.0 - 0.5 % ABS. NEUTROPHILS 8.8 (H) 1.8 - 8.0 K/UL  
 ABS. LYMPHOCYTES 0.6 (L) 0.8 - 3.5 K/UL  
 ABS. MONOCYTES 0.5 0.0 - 1.0 K/UL  
 ABS. EOSINOPHILS 0.2 0.0 - 0.4 K/UL  
 ABS. BASOPHILS 0.1 0.0 - 0.1 K/UL  
 ABS. IMM.  GRANS. 0.1 (H) 0.00 - 0.04 K/UL  
 DF SMEAR SCANNED    
 RBC COMMENTS MICROCYTOSIS 1+ 
    
 RBC COMMENTS OVALOCYTES PRESENT 
    
 RBC COMMENTS SCHISTOCYTES PRESENT 
    
METABOLIC PANEL, COMPREHENSIVE Collection Time: 02/25/21  6:25 PM  
Result Value Ref Range Sodium 130 (L) 136 - 145 mmol/L Potassium 4.1 3.5 - 5.1 mmol/L Chloride 96 (L) 97 - 108 mmol/L  
 CO2 26 21 - 32 mmol/L Anion gap 8 5 - 15 mmol/L Glucose 179 (H) 65 - 100 mg/dL BUN 12 6 - 20 MG/DL Creatinine 0.64 0.55 - 1.02 MG/DL  
 BUN/Creatinine ratio 19 12 - 20 GFR est AA >60 >60 ml/min/1.73m2 GFR est non-AA >60 >60 ml/min/1.73m2 Calcium 8.1 (L) 8.5 - 10.1 MG/DL Bilirubin, total 0.5 0.2 - 1.0 MG/DL  
 ALT (SGPT) 39 12 - 78 U/L  
 AST (SGOT) 30 15 - 37 U/L Alk. phosphatase 97 45 - 117 U/L Protein, total 6.4 6.4 - 8.2 g/dL Albumin 3.3 (L) 3.5 - 5.0 g/dL Globulin 3.1 2.0 - 4.0 g/dL A-G Ratio 1.1 1.1 - 2.2 MAGNESIUM Collection Time: 02/25/21  6:25 PM  
Result Value Ref Range Magnesium 1.7 1.6 - 2.4 mg/dL TROPONIN I Collection Time: 02/25/21  6:25 PM  
Result Value Ref Range Troponin-I, Qt. <0.05 <0.05 ng/mL NT-PRO BNP Collection Time: 02/25/21  6:25 PM  
Result Value Ref Range NT pro-BNP 1,822 (H) <450 PG/ML  
PROTHROMBIN TIME + INR Collection Time: 02/25/21  6:25 PM  
Result Value Ref Range INR 1.2 (H) 0.9 - 1.1 Prothrombin time 12.3 (H) 9.0 - 11.1 sec LIPASE Collection Time: 02/25/21  6:25 PM  
Result Value Ref Range Lipase 54 (L) 73 - 393 U/L  
URINALYSIS W/MICROSCOPIC Collection Time: 02/25/21  6:25 PM  
Result Value Ref Range Color YELLOW/STRAW Appearance CLEAR CLEAR Specific gravity 1.008 1.003 - 1.030    
 pH (UA) 7.0 5.0 - 8.0 Protein Negative NEG mg/dL Glucose Negative NEG mg/dL Ketone Negative NEG mg/dL Bilirubin Negative NEG Blood Negative NEG Urobilinogen 0.2 0.2 - 1.0 EU/dL Nitrites Negative NEG  Leukocyte Esterase MODERATE (A) NEG    
 WBC 5-10 0 - 4 /hpf  
 RBC 0-5 0 - 5 /hpf Epithelial cells FEW FEW /lpf Bacteria 1+ (A) NEG /hpf Hyaline cast 0-2 0 - 5 /lpf URINE CULTURE HOLD SAMPLE Collection Time: 02/25/21  6:25 PM  
 Specimen: Serum; Urine Result Value Ref Range Urine culture hold Urine on hold in Microbiology dept for 2 days. If unpreserved urine is submitted, it cannot be used for addtional testing after 24 hours, recollection will be required. MONONUCLEOSIS SCREEN Collection Time: 02/25/21  6:25 PM  
Result Value Ref Range Mononucleosis screen Negative NEG    
GLUCOSE, POC Collection Time: 02/25/21  9:15 PM  
Result Value Ref Range Glucose (POC) 65 65 - 100 mg/dL Performed by Bubba Shane (tech) GLUCOSE, POC Collection Time: 02/25/21  9:46 PM  
Result Value Ref Range Glucose (POC) 86 65 - 100 mg/dL Performed by Ran Blackburn SARS-COV-2 Collection Time: 02/25/21 11:06 PM  
Result Value Ref Range SARS-CoV-2 Please find results under separate order SARS-COV-2 Collection Time: 02/25/21 11:06 PM  
Result Value Ref Range SARS-CoV-2 Please find results under separate order COVID-19 RAPID TEST Collection Time: 02/25/21 11:06 PM  
Result Value Ref Range Specimen source Nasopharyngeal    
 COVID-19 rapid test Not detected NOTD    
TROPONIN I Collection Time: 02/26/21  3:55 AM  
Result Value Ref Range Troponin-I, Qt. <0.05 <0.05 ng/mL PHOSPHORUS Collection Time: 02/26/21  3:55 AM  
Result Value Ref Range Phosphorus 3.7 2.6 - 4.7 MG/DL  
METABOLIC PANEL, BASIC Collection Time: 02/26/21  3:55 AM  
Result Value Ref Range Sodium 134 (L) 136 - 145 mmol/L Potassium 4.0 3.5 - 5.1 mmol/L Chloride 101 97 - 108 mmol/L  
 CO2 26 21 - 32 mmol/L Anion gap 7 5 - 15 mmol/L Glucose 96 65 - 100 mg/dL BUN 10 6 - 20 MG/DL Creatinine 0.62 0.55 - 1.02 MG/DL  
 BUN/Creatinine ratio 16 12 - 20 GFR est AA >60 >60 ml/min/1.73m2  GFR est non-AA >60 >60 ml/min/1.73m2  
 Calcium 7.8 (L) 8.5 - 10.1 MG/DL  
HEPATIC FUNCTION PANEL  
 Collection Time: 02/26/21  3:55 AM  
Result Value Ref Range  
 Protein, total 6.3 (L) 6.4 - 8.2 g/dL  
 Albumin 3.2 (L) 3.5 - 5.0 g/dL  
 Globulin 3.1 2.0 - 4.0 g/dL  
 A-G Ratio 1.0 (L) 1.1 - 2.2    
 Bilirubin, total 0.5 0.2 - 1.0 MG/DL  
 Bilirubin, direct 0.1 0.0 - 0.2 MG/DL  
 Alk. phosphatase 86 45 - 117 U/L  
 AST (SGOT) 30 15 - 37 U/L  
 ALT (SGPT) 37 12 - 78 U/L  
MAGNESIUM  
 Collection Time: 02/26/21  3:55 AM  
Result Value Ref Range  
 Magnesium 1.9 1.6 - 2.4 mg/dL  
CBC W/O DIFF  
 Collection Time: 02/26/21  3:55 AM  
Result Value Ref Range  
 WBC 9.2 3.6 - 11.0 K/uL  
 RBC 4.31 3.80 - 5.20 M/uL  
 HGB 11.3 (L) 11.5 - 16.0 g/dL  
 HCT 34.8 (L) 35.0 - 47.0 %  
 MCV 80.7 80.0 - 99.0 FL  
 MCH 26.2 26.0 - 34.0 PG  
 MCHC 32.5 30.0 - 36.5 g/dL  
 RDW 15.9 (H) 11.5 - 14.5 %  
 PLATELET 240 150 - 400 K/uL  
 MPV 10.5 8.9 - 12.9 FL  
 NRBC 0.0 0  WBC  
 ABSOLUTE NRBC 0.00 0.00 - 0.01 K/uL  
GLUCOSE, POC  
 Collection Time: 02/26/21  8:05 AM  
Result Value Ref Range  
 Glucose (POC) 107 (H) 65 - 100 mg/dL  
 Performed by JOHNATHON NUNN (PCT)   
 
 
 
Assessment:  
Acute CVA 
Acute pulmonary embolus 
Left spastic hemiplegia 
UTI 
 
Plan:  
Neurological examination reveals residual mild flattening of the left nasolabial fold and spastic hemiparesis upper extremity greater than lower extremity with postop posturing.  Status post tiny acute infarct right precentral gyrus with no clinical correlate. 
 
Head CT without contrast revealed right frontal and parietal lobe encephalomalacia from previous stroke.  No acute intracranial abnormality.  Brain MRI without contrast revealed tiny focus of acute infarction right precentral gyrus.  Chronic right frontoparietal infarct with volume loss.  Moderate white matter disease. 
 
Carotid Doppler study was ordered. 
 
Lipid panel was ordered. 
 
 Echocardiogram is pending. Continue Plavix 75 mg daily for stroke prophylaxis. Will check for efficacy. Further intervention be done pending results of testing. Thank you for the consult. Spoke with her daughter and gave an update of her neurological status and treatment plan. This note was created using voice recognition software. Despite editing, there may be syntax errors.

## 2021-02-26 NOTE — PROGRESS NOTES
[de-identified] yo hx of HTN, DM, COPD, CVA w/ L hemiparesis, presented w/ multiple complaints, found to have an acute PE and acute CVA, both small and of unclear significance Discussed with neurology, appreciate Dr. Ministerio Caba. Given that the patient is already on anticoagulation, we will change Plavix to ASA. After 3-6 months of anticoagulation (or however long she ends up being on anticoagulation), we will change her antithrombotics to Plavix alone (if proven to be efficacious on PLT function test).

## 2021-02-27 NOTE — PROGRESS NOTES
Bedside and Verbal shift change report given to SUMMER RN (oncoming nurse) by KAREN / Willi Amezcua Rns  (offgoing nurse). Report included the following information SBAR, Kardex and Recent Results. .. This patient was assisted with Intentional Toileting every 2 hours during this shift. Documentation of ambulation and output reflected on Flowsheet. I have reviewed discharge instructions with the patient. The patient verbalized understanding. Marni Gupta Spoke with daughter regarding discharged. . 
 
 
1300- Discharged the pt via [facility].

## 2021-02-27 NOTE — PROGRESS NOTES
Disaster charting in effect. Bedside and Verbal shift change report given to Jonelle Mccabe RN and lidia Crawford nurse (oncoming nurse) by Esteban Harding RN (offgoing nurse). Report included the following information SBAR, Kardex, Intake/Output, MAR, Recent Results, Med Rec Status and Cardiac Rhythm NSR. Central line Type: accessed port-a-cath Central Line Insert Date: accessed 2/25/21 Reason Central Line Placed: home IVIG infusions Central Line Dressing Date: 2/25/21 Biopatch in place? Yes No: yes Tubing labeled and appropriate? Yes No: yes Alcohol caps on all open ports? Yes No: yes Last CHG bath (time&date): 2/26/21 @ 6023 Reviewed with provider and central line must stay in for the following reasons: home access Summary: had oxycodone at beginning of shift only for back pain. Patient requested Clonazepam 0.25 mg that she takes at home. Spoke to Dr. Danni Dyson at 2035 and it was ordered. Patient slept well. Bedside and Verbal shift change report given to Laura Connolly RN (oncoming nurse) by Jonelle Mccabe RN and lidia Crawford nurse (offgoing nurse). Report included the following information SBAR, Kardex, Intake/Output, MAR, Recent Results, Med Rec Status and Cardiac Rhythm NSR.

## 2021-02-27 NOTE — DISCHARGE INSTRUCTIONS
HOSPITALIST DISCHARGE INSTRUCTIONS  NAME: Frankey Proctor   :  1940   MRN:  [id number]     Date/Time:  2021 10:13 AM    ADMIT DATE: 2021     DISCHARGE DATE: 2021     PRINCIPAL DISCHARGE DIAGNOSES:  Acute CVA (stroke)  Acute PE (pulmonary embolism)    MEDICATIONS:  · It is important that you take the medication exactly as they are prescribed. Note the changes and additions to your medications. Be sure you understand these changes before you are discharged today. · Keep your medication in the bottles provided by the pharmacist and keep a list of the medication names, dosages, and times to be taken in your wallet. · Do not take other medications without consulting your doctor. Pain Management: per above medications    What to do at Home    Recommended diet:  Resume previous diet    Recommended activity: PT/OT per Home Health    If you experience any of the following symptoms then please call your primary care physician or return to the emergency room if you cannot get hold of your doctor:  Headache, dizziness, visual changes, slurred speech, facial droop, weakness, numbness, fever, chills, severe abdominal pain, nausea, vomiting, diarrhea, confusion, falling, bleeding, severe chest pain, shortness of breath, palpitations, or other severe concerning symptoms    Follow Up: Follow-up Information     Follow up With Specialties Details Why Contact Info    Joanna Green MD Neurology In 4 weeks [facility] Follow up Lisa  [facility]  [facility] [phone]      [facility]   [address]    [place] [facility], [address] [facility] In 3 days  [address] [place] [address]  [phone]              Information obtained by :  I understand that if any problems occur once I am at home I am to contact my physician.     I understand and acknowledge receipt of the instructions indicated above.                                                                                                                                           Physician's or R.N.'s Signature                                                                  Date/Time                                                                                                                                              Patient or Representative Signature                                                          Date/Time      DMV Transient Ischemic Attack and/or Cerebral Vascular Accident Policy   It is the policy of the Department of Motor Vehicles, based on guidance and recommendations from the Medical Advisory Board, that if a  suffers a Transient Ischemic Attack (TIA), the s privilege to operate a motor vehicle will be suspended for three months. The three-month suspension may be shortened for drivers who have suffered a TIA, provided that treatment has been provided mitigating the risk of reoccurrence and there is no impact on a s ability to operate a motor vehicle safely. These cases may be referred to the [address] for their guidance and recommendations. If a  suffers a Cerebral Vascular Accident (CVA), the s privilege to operate a motor vehicle will be suspended for six months. This six-month suspension period may be shortened if V receives information from the 's health care provider indicating that the  has fully recovered. These cases may be referred to the [address] for its guidance and recommendations. Following the six-month suspension, [facility] will request an evaluation with a certified  rehabilitation specialist if the  has suffered paralysis or cognitive changes due to the CVA.  If the s physical and cognitive information is not indicated in the medical report received by the agency, [facility] will request it from the health care provider.    ([email or web address]). Based on the information received about the s cognitive abilities, the  may also be subject to the [place]. The  is also required to furnish an updated Vision Report with an examination date that is not older than 90 days and occurs after the TIA/CVA, in accordance with [place] Code [place]2-311. DMV may impose additional requirements on the individual depending on the information received by the agency. [email or web address] asp

## 2021-02-27 NOTE — DISCHARGE SUMMARY
Physician Discharge Summary Patient ID: 
Kelly Wilson 
[id number] 
2 y.o. 
1940 Admit date: 2/25/2021 Discharge date: 2/27/2021 Admission Diagnoses: Acute pulmonary embolism ([facility] [I26.99] Principal Discharge Diagnoses:   
Acute PE Acute DVT 
 
OTHER PROBLEMS ADDRESSEDS Principal Diagnosis Acute pulmonary embolism ([facility] Principal Problem: 
  Acute pulmonary embolism ([facility] (2/25/2021) Active Problems: 
  History of CVA (cerebrovascular accident) (1/3/2021) Chest pain (2/25/2021) Dizziness (2/25/2021) Abdominal pain (2/25/2021) Patient Active Problem List  
Diagnosis Code  
 HTN (hypertension) I10  Type II diabetes mellitus ([facility] E11.9  Rib fracture S22.39XA  Hypoxia R09.02  
 SIRS (systemic inflammatory response syndrome) ([facility]) R65.10  Hypothyroid E03.9  Anxiety F41.9  
 COPD exacerbation ([facility] J44.1  History of CVA (cerebrovascular accident) Z80.78  
 Acute pulmonary embolism (HCC) I26.99  
 Chest pain R07.9  Dizziness R42  Abdominal pain R10.9 Hospital Course: Ms. Hamida Villafana is an [de-identified] yo F w/ hx of HTN, DM, COPD, CVA w/ L hemiparesis presenting w/ multiple complaints, found to have an acute PE and acute CVA 
  
Acute PE: subsegmental pulmonary embolus to the medial aspect of the left lower lobe. Daughter familiar with coumadin and NOAC. Agrees to proceed with use of Eliquis.   
  
Acute CVA: does have of hx of CVA w/ L-sided weakness. Discussed with neurology. Agreed that concomitant use of Eliquis with Plavix is too high-risk for bleeding so changed Plavix to ASA while on anticoagulation. P2Y12 did not show PLT inhibition. Cont statin 
  
Dizziness: unclear etiology. ?related to acute CVA. Resolved. PT/OT 
  
Abd pain/N/V: this also improved/resolved. RUQ US showed cholelithiasis, mild right hydronephrosis.  Monitor for worsening of symptoms 
  
 Pyuria: possible UTI, UCx pending at discharge. Treated with empiric CTX; discharged on Omnicef Addendum: 2/28 UCx resulted prelim 13K growth, insignificant. UA was bland. Called daughter to update per her request; she reports pt has hx of recurrent C. Diff requiring fecal transplant. Stop abx.  
  
COPD: stable. No wheezing. bronchodilators PRN 
  
HTN: BP high. Added Norvasc. Instructed daughter to record BPs and bring to PCP appt. Cont lisinopril 
  
DM type 2: borderline, A1c 6.4. Cont Lantus, SSI 
  
  
 
Pt discharged in improved and stable condition. Procedures performed: EEG - no seizure activity Imaging studies: see above Mri Brain Wo Cont Result Date: 2/26/2021 1. Tiny focus of acute infarction on the surface of the right precentral gyrus, consistent with acute infarction. No other areas of acute infarction. 2. Chronic right frontoparietal infarction with volume loss. Moderate cerebral white matter signal abnormality consistent with chronic small vessel ischemic change. Kamran Givens Ct Head Wo Cont Result Date: 2/25/2021 1. No evidence of acute intracranial abnormality by this modality. 2. Encephalomalacia in the right hemisphere Cta Chest W Or W Wo Cont Result Date: 2/25/2021 Limited exam 1. Subsegmental pulmonary embolus to the medial aspect of the left lower lobe of possibly no clinical significance. 2. Splenomegaly. 3. Distended bladder. 4. Incidental findings as above. Ct Abd Pelv W Cont Result Date: 2/25/2021 Limited exam 1. Subsegmental pulmonary embolus to the medial aspect of the left lower lobe of possibly no clinical significance. 2. Splenomegaly. 3. Distended bladder. 4. Incidental findings as above. Us Abd Comp Result Date: 2/26/2021 Heterogeneous liver with hepatomegaly. Cholelithiasis Mild right hydronephrosis Very poor visualization of the left kidney. Xr Chest [facility] Result Date: 2/25/2021 Limited exam 1. Chronic appearing lung changes without definite acute process. Suzy Harrison PCP: Nidia James MD 
 
Consults: Pulmonary/Intensive care and Neurology Discharge Exam: 
Patient Vitals for the past 24 hrs: 
 Temp Pulse Resp BP SpO2  
02/27/21 1134 97.9 °F (36.6 °C) 78 16 104/60 96 % 02/27/21 1126     98 % 02/27/21 1016  80 18 (!) 140/60 98 % 02/27/21 0817     95 % 02/27/21 0745  78     
02/27/21 0739 97.4 °F (36.3 °C) 66 18 (!) 135/58 94 % 02/27/21 0413 98.1 °F (36.7 °C) 71 19 (!) 121/54 90 % GEN: NAD 
CV: RRR 
RESP: CTAB 
ABD: NTTP NEURO: A&O x 3. Residual L weakness. Disposition: home Patient Instructions:  
Current Discharge Medication List  
  
START taking these medications Details  
amLODIPine (NORVASC) 5 mg tablet Take 1 Tab by mouth daily. Qty: 30 Tab, Refills: 0  
  
apixaban (ELIQUIS) 5 mg tablet Take 2 Tabs by mouth two (2) times a day for 5 days, THEN 1 Tab two (2) times a day for 25 days. For treatment of PE Qty: 70 Tab, Refills: 0  
  
cefdinir (OMNICEF) 300 mg capsule Take 1 Cap by mouth two (2) times a day for 3 days. Qty: 6 Cap, Refills: 0  
  
aspirin delayed-release 81 mg tablet Take 1 Tab by mouth daily. Qty: 30 Tab, Refills: 0 CONTINUE these medications which have NOT CHANGED Details  
oxyCODONE ER (Xtampza ER) 9 mg capsule Take 9 mg by mouth every twelve (12) hours. insulin lispro (HumaLOG KwikPen Insulin) 100 unit/mL kwikpen by SubCUTAneous route Before breakfast, lunch, and dinner. Sliding scale  
  
predniSONE (DELTASONE) 5 mg tablet Take 5 mg by mouth daily. albuterol-ipratropium (DUO-NEB) 2.5 mg-0.5 mg/3 ml nebu 3 mL by Nebulization route every four (4) hours as needed for Wheezing. Qty: 30 Nebule, Refills: 0  
  
latanoprost (XALATAN) 0.005 % ophthalmic solution Administer 1 Drop to both eyes daily. insulin glargine (Lantus Solostar U-100 Insulin) 100 unit/mL (3 mL) inpn 16 Units by SubCUTAneous route daily. CLONAZEPAM PO Take 0.25 mg by mouth every evening. ODT formulation  
  
levothyroxine (Levo-T) 75 mcg tablet Take 75 mcg by mouth Daily (before breakfast). tolterodine ER (DETROL LA) 4 mg ER capsule Take 4 mg by mouth daily. atorvastatin (LIPITOR) 80 mg tablet Take 80 mg by mouth nightly. lisinopriL (PRINIVIL, ZESTRIL) 40 mg tablet Take 40 mg by mouth daily. pantoprazole (PROTONIX) 40 mg tablet Take 40 mg by mouth daily. gabapentin (NEURONTIN) 300 mg capsule Take 600 mg by mouth two (2) times a day. fluticasone-umeclidinium-vilanterol (Trelegy Ellipta) 100-62.5-25 mcg inhaler Take 1 Puff by inhalation daily. melatonin 5 mg tablet Take 5 mg by mouth nightly. calcium-cholecalciferol, d3, (CALCIUM 600 + D) 600-125 mg-unit tab Take 1 Tab by mouth daily. multivitamin (ONE A DAY) tablet Take 1 Tab by mouth daily. magnesium oxide (MAG-OX) 400 mg tablet Take 400 mg by mouth daily. B.infantis-B.ani-B.long-B.bifi (Probiotic 4X) 10-15 mg TbEC Take 2 Caps by mouth three (3) times daily. BENEFIBER, GUAR GUM, PO Take 1 Dose by mouth two (2) times a day. 1 dose - 2 teaspoons STOP taking these medications  
  
 clopidogreL (PLAVIX) 75 mg tab Comments:  
Reason for Stopping:   
   
 albuterol sulfate (PROVENTIL;VENTOLIN) 2.5 mg/0.5 mL nebu nebulizer solution Comments:  
Reason for Stopping:   
   
  
 
 
Activity: See discharge instructions Diet: See discharge instructions Wound Care: See discharge instructions Follow-up Information Follow up With Specialties Details Why Contact Info Trino Huntley MD Neurology In 4 weeks Hospital Follow up Elina Wilson [address] [facility] [address] [phone] [facility]   [address] [place] [address] 
[address] [facility] [address] 
[phone] Iron Locke MD Family Medicine In 3 days  [address] [facility] [address] [place] [address] [facility] 
[phone] I spent 35 minutes on this discharge. Signed: 
Clarissa Santana MD 
2/27/2021 
10:14 AM 
 
CC: PCP Dr. Jorge Bricenoore

## 2021-02-27 NOTE — PROGRESS NOTES
9:52 AM 
Consult received for discharge planning. Assessment by care manager yesterday as well as handoff reviewed. Patient is currently opened to [facility] and will need resumption orders at discharge. Have sent therapy evaluations and history and physical to [address] [place] [address] for them to ready to resume at discharge. Family to transport at discharge. Care Management Interventions PCP Verified by CM: Yes(DR Kandis Hurley) Mode of Transport at Discharge: Other (see comment)(family ) Transition of Care Consult (CM Consult): Discharge Planning MyChart Signup: No 
Discharge Durable Medical Equipment: No 
Health Maintenance Reviewed: Yes Physical Therapy Consult: Yes Occupational Therapy Consult: Yes Speech Therapy Consult: No 
Current Support Network: Relative's Home Confirm Follow Up Transport: Family [employer] Information Provided?: No 
Discharge Location Discharge Placement: Home with home health

## 2021-03-02 PROBLEM — E87.1 HYPONATREMIA: Status: ACTIVE | Noted: 2021-01-01

## 2021-03-02 NOTE — ED PROVIDER NOTES
Date of Service: 
3/2/2021 Patient: 
Siddharth Amaya Chief Complaint: 
Chest Pain and Dizziness HPI: 
Siddharth Amaya is a [de-identified] y.o.  female who presents for evaluation of chest pain and dizziness. Patient apparently was just discharged from the hospital for stroke symptoms. Last night she started having some additional dizziness. Today she developed some intermittent chest pain but also felt like she was having some right-sided arm numbness and acute dizziness this afternoon hence the visit to the emergency department. Patient denies any type of current chest pain at the moment of my evaluation, no shortness of breath. No abdominal pain nausea vomiting diarrhea. No other acute Past Medical History:  
Diagnosis Date  Arthritis  Asthma  Autoimmune disease ([facility]  Chronic obstructive pulmonary disease ([facility]  Diabetes ([facility]  St[facility] Past Surgical History:  
Procedure Laterality Date  HX ORTHOPAEDIC    
 HX VASCULAR ACCESS Family History:  
Problem Relation Age of Onset  No Known Problems Other   
     reviewed, patient did not know Social History Socioeconomic History  Marital status:  Spouse name: Not on file  Number of children: Not on file  Years of education: Not on file  Highest education level: Not on file Occupational History  Not on file Social Needs  Financial resource strain: Not on file  Food insecurity Worry: Not on file Inability: Not on file  Transportation needs Medical: Not on file Non-medical: Not on file Tobacco Use  Smoking status: Former Smoker Quit date: 1991 Years since quittin.1 Substance and Sexual Activity  Alcohol use: Never Frequency: Never  Drug use: Never  Sexual activity: Not on file Lifestyle  Physical activity Days per week: Not on file Minutes per session: Not on file  Stress: Not on file Relationships  Social connections Talks on phone: Not on file Gets together: Not on file Attends Scientologist service: Not on file Active member of club or organization: Not on file Attends meetings of clubs or organizations: Not on file Relationship status: Not on file  Intimate partner violence Fear of current or ex partner: Not on file Emotionally abused: Not on file Physically abused: Not on file Forced sexual activity: Not on file Other Topics Concern  Not on file Social History Narrative  Not on file ALLERGIES: Shellfish derived Review of Systems Constitutional: Negative for fever. HENT: Negative for hearing loss. Eyes: Negative for visual disturbance. Respiratory: Negative for shortness of breath. Cardiovascular: Positive for chest pain. Gastrointestinal: Negative for abdominal pain. Genitourinary: Negative for flank pain. Musculoskeletal: Negative for back pain (chronic). Skin: Negative for rash. Neurological: Positive for dizziness, weakness and headaches. Negative for light-headedness. Psychiatric/Behavioral: Negative for confusion. Vitals:  
 03/02/21 1630 BP: 139/77 Pulse: 89 Resp: 18 SpO2: 96% Weight: 55.5 kg (122 lb 5.7 oz) Height: 5' 6\" (1.676 m) Physical Exam 
Vitals signs and nursing note reviewed. Constitutional:   
   Appearance: She is well-developed. HENT:  
   Head: Normocephalic and atraumatic. Eyes:  
   Pupils: Pupils are equal, round, and reactive to light. Neck: Musculoskeletal: Normal range of motion. Cardiovascular:  
   Rate and Rhythm: Normal rate. Heart sounds: Normal heart sounds. Heart sounds not distant. Pulmonary:  
   Effort: Pulmonary effort is normal. No tachypnea. Breath sounds: Normal breath sounds. No decreased breath sounds or wheezing. Chest:  
   Chest wall: No mass or tenderness. Abdominal:  
   Palpations: Abdomen is soft. Musculoskeletal: Normal range of motion. Right lower leg: She exhibits no tenderness. Left lower leg: She exhibits no tenderness. Skin: 
   General: Skin is warm. Capillary Refill: Capillary refill takes less than 2 seconds. Neurological:  
   Mental Status: She is alert. Comments:  
Mental Status:  Oriented to time, place and person. Cranial Nerves: Intact visual fields. Fundi are benign. PERRL, EOM's full and intact, no nystagmus, no ptosis. Facial sensation is normal. Facial movement is symmetric. Palate is midline with normal sternocleidomastoid and trapezius muscle strength. Tongue is midline. Motor:  5/5 strength in upper and lower proximal and distal muscles. Normal bulk and tone. Sensory:  Normal to light touch with slight decrease in RUE Cerebellar:  Normal finger-to-nose and heal to shin. Negative Romberg. MDM Number of Diagnoses or Management Options Amount and/or Complexity of Data Reviewed Decide to obtain previous medical records or to obtain history from someone other than the patient: yes ED Course as of Mar 02 1919 Tue Mar 02, 2021  
1811 Sodium(!): 126 [GG] ED Course User Index [GG] Patricia Neumann DO  
 
VITAL SIGNS: 
Patient Vitals for the past 4 hrs: 
 Pulse Resp BP SpO2  
03/02/21 1815    96 % 03/02/21 1800 85 18 (!) 167/74 95 % 03/02/21 1745 89 25 (!) 146/56   
03/02/21 1730 86 24 136/85   
03/02/21 1715 84 15 (!) 154/63 94 % 03/02/21 1630 89 18 139/77 96 % LABS: 
Recent Results (from the past 6 hour(s)) GLUCOSE, POC Collection Time: 03/02/21  4:34 PM  
Result Value Ref Range Glucose (POC) 168 (H) 65 - 100 mg/dL Performed by Roblse Li   
CBC WITH AUTOMATED DIFF Collection Time: 03/02/21  5:12 PM  
Result Value Ref Range WBC 8.4 3.6 - 11.0 K/uL  
 RBC 3.92 3.80 - 5.20 M/uL  
 HGB 10.2 (L) 11.5 - 16.0 g/dL HCT 31.5 (L) 35.0 - 47.0 %  MCV 80.4 80.0 - 99.0 FL  
 MCH 26.0 26.0 - 34.0 PG  
 MCHC 32.4 30.0 - 36.5 g/dL  
 RDW 15.9 (H) 11.5 - 14.5 % PLATELET 797 716 - 251 K/uL MPV 9.8 8.9 - 12.9 FL  
 NRBC 0.0 0  WBC ABSOLUTE NRBC 0.00 0.00 - 0.01 K/uL NEUTROPHILS 84 (H) 32 - 75 % LYMPHOCYTES 7 (L) 12 - 49 % MONOCYTES 6 5 - 13 % EOSINOPHILS 2 0 - 7 % BASOPHILS 0 0 - 1 % IMMATURE GRANULOCYTES 1 (H) 0.0 - 0.5 % ABS. NEUTROPHILS 7.0 1.8 - 8.0 K/UL  
 ABS. LYMPHOCYTES 0.6 (L) 0.8 - 3.5 K/UL  
 ABS. MONOCYTES 0.5 0.0 - 1.0 K/UL  
 ABS. EOSINOPHILS 0.2 0.0 - 0.4 K/UL  
 ABS. BASOPHILS 0.0 0.0 - 0.1 K/UL  
 ABS. IMM. GRANS. 0.1 (H) 0.00 - 0.04 K/UL  
 DF SMEAR SCANNED    
 RBC COMMENTS ANISOCYTOSIS 1+ 
    
 RBC COMMENTS MICROCYTOSIS 
2+ METABOLIC PANEL, COMPREHENSIVE Collection Time: 03/02/21  5:12 PM  
Result Value Ref Range Sodium 126 (L) 136 - 145 mmol/L Potassium 3.8 3.5 - 5.1 mmol/L Chloride 94 (L) 97 - 108 mmol/L  
 CO2 25 21 - 32 mmol/L Anion gap 7 5 - 15 mmol/L Glucose 139 (H) 65 - 100 mg/dL BUN 12 6 - 20 MG/DL Creatinine 0.72 0.55 - 1.02 MG/DL  
 BUN/Creatinine ratio 17 12 - 20 GFR est AA >60 >60 ml/min/1.73m2 GFR est non-AA >60 >60 ml/min/1.73m2 Calcium 7.4 (L) 8.5 - 10.1 MG/DL Bilirubin, total 0.4 0.2 - 1.0 MG/DL  
 ALT (SGPT) 36 12 - 78 U/L  
 AST (SGOT) 35 15 - 37 U/L Alk. phosphatase 79 45 - 117 U/L Protein, total 5.8 (L) 6.4 - 8.2 g/dL Albumin 3.0 (L) 3.5 - 5.0 g/dL Globulin 2.8 2.0 - 4.0 g/dL A-G Ratio 1.1 1.1 - 2.2 PROTHROMBIN TIME + INR Collection Time: 03/02/21  5:12 PM  
Result Value Ref Range INR 1.3 (H) 0.9 - 1.1 Prothrombin time 13.5 (H) 9.0 - 11.1 sec TROPONIN I Collection Time: 03/02/21  5:12 PM  
Result Value Ref Range Troponin-I, Qt. <0.05 <0.05 ng/mL SAMPLES BEING HELD Collection Time: 03/02/21  5:12 PM  
Result Value Ref Range SAMPLES BEING HELD 1SST,1RED,1DRK GRN   
 COMMENT Add-on orders for these samples will be processed based on acceptable specimen integrity and analyte stability, which may vary by analyte. IMAGING: 
XR CHEST PORT Final Result Known interstitial fibrosis. No superimposed process identified at  
this time. CTA CODE NEURO HEAD AND NECK W CONT  
  
  
CT CODE NEURO PERF W CBF  
  
  
CT CODE NEURO HEAD WO CONTRAST Final Result Stable old watershed and deep white matter infarcts. No acute process identified  
by noncontrast CT Medications During Visit: 
Medications  
iopamidoL (ISOVUE-370) 76 % injection 100 mL (has no administration in time range) iopamidoL (ISOVUE-370) 76 % injection 50 mL (has no administration in time range) morphine injection 2 mg (has no administration in time range) iopamidoL (ISOVUE-370) 76 % injection (140 mL  Given 3/2/21 1746) iopamidoL (ISOVUE-370) 76 % injection (140 mL  Given 3/2/21 1659) HYDROcodone-acetaminophen (NORCO) 5-325 mg per tablet 1 Tab (1 Tab Oral Given 3/2/21 1824) DECISION MAKING: 
Jena Pascual is a [de-identified] y.o. female who comes in as above. Patient is made a stroke alert upon arrival however she is not a candidate for TPA given the recent start of Xarelto. This time patient will remain in the hospital for additional work-up of her stroke symptoms and will be worked up for her cardiac chest pain. Patient is agreeable to staying at the time of disposition is hemodynamically stable and agreeable to stay. IMPRESSION: 
1. Hyponatremia 2. Chest pain, unspecified type 3. Stroke-like symptoms DISPOSITION: 
Admitted Procedures Perfect Serve Consult for Admission 7:19 PM 
 
ED Room Number: ER09/09 Patient Name and age:  Jena Pascual [de-identified] y.o.  female Working Diagnosis: 1. Hyponatremia 2. Chest pain, unspecified type 3. Stroke-like symptoms COVID-19 Suspicion:  no 
Sepsis present:  no  Reassessment needed: no 
Code Status:  Full Code Readmission: yes Isolation Requirements:  no 
Recommended Level of Care:  telemetry Department:[facility] ED - [phone] Other:  Came in for CP and then had some new onset right sided symptoms. Stroke alert called. Negative work up. Neuro asks for admission, MRI, etc. Also, Na 126 and has had provlems with this in the past. Family has spoken with her. agreeable to stay.

## 2021-03-02 NOTE — ED TRIAGE NOTES
Patient presents to ED for c/o chest pressure, nausea, and dizziness. Reports that she was just discharged on Saturday after being diagnosed with an ischemic stroke. Reports

## 2021-03-02 NOTE — PROGRESS NOTES
Spiritual Care Assessment/Progress Note [address] 
 
 
NAME: Vanesa Hankins      MRN: [id number] AGE: [de-identified] y.o. SEX: female Mormon Affiliation: Gale Olmedo Language: English  
 
3/2/2021     Total Time (in minutes): 10 Spiritual Assessment begun in [facility] EMERGENCY DEPT through conversation with: 
  
    []Patient        [] Family    [] Friend(s) Reason for Consult: Other (comment)(Code Stroke) Spiritual beliefs: (Please include comment if needed) 
   [] Identifies with a jennifer tradition:     
   [] Supported by a jennifer community:        
   [] Claims no spiritual orientation:       
   [] Seeking spiritual identity:            
   [] Adheres to an individual form of spirituality:       
   [x] Not able to assess:                   
 
    
Identified resources for coping:  
   [] Prayer                           
   [] Music                  [] Guided Imagery 
   [] Family/friends                 [] Pet visits [] Devotional reading                         [x] Unknown 
   [] Other:                                   
 
 
Interventions offered during this visit: (See comments for more details) Patient Interventions: Initial visit(Attempted) Plan of Care: 
 
 [] Support spiritual and/or cultural needs  
 [] Support AMD and/or advance care planning process    
 [] Support grieving process 
 [] Coordinate Rites and/or Rituals  
 [] Coordination with community clergy [] No spiritual needs identified at this time 
 [] Detailed Plan of Care below (See Comments)  [] Make referral to Music Therapy 
[] Make referral to Pet Therapy    
[] Make referral to Addiction services 
[] Make referral to [facility] 
[] Make referral to Spiritual Care Partner 
[] No future visits requested       
[x] Follow up upon further referrals Comments: Responded to Code Stroke in the ER. Miss Carmencita Enrique was taken for tests, no family present at this time.   Advised nurse to contact [facility] for any further referrals. Visited by: Gatito Abbott, MS., [address] ([id number])

## 2021-03-03 PROBLEM — Z86.73 HX OF COMPLETED STROKE: Status: ACTIVE | Noted: 2021-01-01

## 2021-03-03 PROBLEM — R07.9 CHEST PAIN: Status: RESOLVED | Noted: 2021-01-01 | Resolved: 2021-01-01

## 2021-03-03 PROBLEM — R09.02 HYPOXIA: Status: RESOLVED | Noted: 2021-01-01 | Resolved: 2021-01-01

## 2021-03-03 PROBLEM — J44.1 COPD EXACERBATION (HCC): Status: RESOLVED | Noted: 2021-01-01 | Resolved: 2021-01-01

## 2021-03-03 PROBLEM — S22.39XA RIB FRACTURE: Status: RESOLVED | Noted: 2021-01-01 | Resolved: 2021-01-01

## 2021-03-03 PROBLEM — R65.10 SIRS (SYSTEMIC INFLAMMATORY RESPONSE SYNDROME) (HCC): Status: RESOLVED | Noted: 2021-01-01 | Resolved: 2021-01-01

## 2021-03-03 PROBLEM — R42 DIZZY: Status: ACTIVE | Noted: 2021-01-01

## 2021-03-03 NOTE — PROGRESS NOTES
Problem: Mobility Impaired (Adult and Pediatric) Goal: *Acute Goals and Plan of Care (Insert Text) Description: FUNCTIONAL STATUS PRIOR TO ADMISSION: Patient was Min A  using a SB quad cane for functional mobility. Patient required moderate assistance for basic and instrumental ADLs. HOME SUPPORT PRIOR TO ADMISSION: The patient lived with dtr and PEACE and had home care aide for bathing 2xwk. Physical Therapy Goals Initiated 3/3/2021 1. Patient will move from supine to sit and sit to supine  in bed with modified independence within 7 day(s). 2.  Patient will transfer from bed to chair and chair to bed with minimal assistance/contact guard assist using the least restrictive device within 7 day(s). 3.  Patient will perform sit to stand with minimal assistance/contact guard assist within 7 day(s). 4.  Patient will ambulate with minimal assistance/contact guard assist for 150 feet with the least restrictive device within 7 day(s). 5.  Patient will ascend/descend 4 stairs with 2 handrail(s) with minimal assistance/contact guard assist within 7 day(s). 3/3/2021 1236 by José Mina, PT Outcome: Progressing Towards Goal 
3/3/2021 1156 by José Mina, PT Outcome: Progressing Towards Goal 
 PHYSICAL THERAPY EVALUATION Patient: Octavia Osuna (21 y.o. female) Date: 3/3/2021 Primary Diagnosis: Hyponatremia [E87.1] Dizzy [R42] Precautions: hx of CVA with LHP    
 
ASSESSMENT Based on the objective data described below, the patient presents with admission due to c/o dizziness. Pt with hx of CVA with L HP affecting UE/LE. Pt with recent move from [place] 3mos ago to live with dtr and PEACE. She had home care aide 2xwk for bathing and walked with [employer] and assistance PTA. She is A&Ox4. Pt stating dizziness has resolved at this time. CT negative for new infarct. She is received sitting up in chair demonstrating flexor synergy in LUE, although able to use with RW.  Gait of 20' needing Min A with SBQC, yet only CGA with RW. Returned to chair in NAD and VSS. RW ordered via CM. HHPT recommended for gait and balance progression. Current Level of Function Impacting Discharge (mobility/balance): Min A/fair Functional Outcome Measure: The patient scored 55/100 on the Barthel outcome measure which is indicative of 40-59% functional impairment. Other factors to consider for discharge: per above Patient will benefit from skilled therapy intervention to address the above noted impairments. PLAN : 
Recommendations and Planned Interventions: bed mobility training, transfer training, gait training, therapeutic exercises, neuromuscular re-education, patient and family training/education, and therapeutic activities Frequency/Duration: Patient will be followed by physical therapy:  5 times a week to address goals. Recommendation for discharge: (in order for the patient to meet his/her long term goals) Physical therapy at least 2 days/week in the home This discharge recommendation: 
Has been made in collaboration with the attending provider and/or case management IF patient discharges home will need the following DME: rolling walker SUBJECTIVE:  
Patient stated I like the RW. OBJECTIVE DATA SUMMARY:  
HISTORY:   
Past Medical History:  
Diagnosis Date Anxiety and depression Arthritis Chronic obstructive pulmonary disease (HCC) Chronic pain DM type 2 causing vascular disease ([facility] [address] GERD (gastroesophageal reflux disease) Glaucoma HTN (hypertension) Hx of completed stroke 2021 Hypothyroid Incontinence Neuropathy Polypharmacy Past Surgical History:  
Procedure Laterality Date HX ORTHOPAEDIC    
 HX VASCULAR ACCESS Personal factors and/or comorbidities impacting plan of care: see above and below Home Situation Home Environment: Private residence # Steps to Enter: 4 Rails to Enter: Yes Hand Rails : Bilateral 
One/Two Story Residence: Two story, live on 1st floor Living Alone: No 
Support Systems: Family member(s)(lives with dtr; home care aide 2xwk; and PEACE) Patient Expects to be Discharged to[de-identified] Private residence Current DME Used/Available at Home: [employer] Doctor, quad, Shower chair, Grab bars Tub or Shower Type: Tub/Shower combination EXAMINATION/PRESENTATION/DECISION MAKING:  
Critical Behavior: 
Neurologic State: Alert Orientation Level: Oriented X4 Cognition: Follows commands Hearing: 
  
Skin:  IV 
Edema: WNL Range Of Motion: 
AROM: Generally decreased, functional(decreased L UE sh flex/elbow-wrist ext d/t old CVA) Strength:   
Strength: Generally decreased, functional(LUE/LE) Tone & Sensation:  
Tone: Abnormal(hypertonic LUE flexor synergy) Sensation: Impaired(LUE/LE) Coordination: 
Coordination: Generally decreased, functional(LUE/LE) Vision:  
Corrective Lenses: Glasses Functional Mobility: 
Bed Mobility: 
  
  
  
  
Transfers: 
Sit to Stand: Minimum assistance;Assist x1 Stand to Sit: Contact guard assistance Balance:  
Sitting: Intact Standing: With support Ambulation/Gait Training: 
Distance (ft): 40 Feet (ft) Assistive Device: Walker, rolling;Gait belt Ambulation - Level of Assistance: Minimal assistance;Contact guard assistance Gait Abnormalities: Path deviations Base of Support: Narrowed Speed/[place]: Slow Step Length: Left shortened;Right shortened Functional Measure: 
Barthel Index: 
 
Bathin Bladder: 10 Bowels: 10 
Groomin Dressin Feeding: 10 Mobility: 0 Stairs: 0 Toilet Use: 5 Transfer (Bed to Chair and Back): 10 Total: 55/100 The Barthel ADL Index: Guidelines 1. The index should be used as a record of what a patient does, not as a record of what a patient could do. 2. The main aim is to establish degree of independence from any help, physical or verbal, however minor and for whatever reason. 3. The need for supervision renders the patient not independent. 4. A patient's performance should be established using the best available evidence. Asking the patient, friends/relatives and nurses are the usual sources, but direct observation and common sense are also important. However direct testing is not needed. 5. Usually the patient's performance over the preceding 24-48 hours is important, but occasionally longer periods will be relevant. 6. Middle categories imply that the patient supplies over 50 per cent of the effort. 7. Use of aids to be independent is allowed. Radha Lepe., Barthel, D.W. (2362). Functional evaluation: the Barthel Index. [facility] (14)2. PRECIOUS Kate, Farheen Wilson, Dar Conley., [place], [address] [place] [address] (). Measuring the change indisability after inpatient rehabilitation; comparison of the responsiveness of the Barthel Index and Functional [place] Measure. Journal of Neurology, Neurosurgery, and Psychiatry, 66(4), 758-631. FELIX Bran.ROHIT, AILYN Solano, & Bernice Murcia MIRINA (2004.) Assessment of post-stroke quality of life in cost-effectiveness studies: The usefulness of the Barthel Index and the EuroQoL-5D. [facility], 13, [phone] Physical Therapy Evaluation Charge Determination History Examination Presentation Decision-Making MEDIUM  Complexity : 1-2 comorbidities / personal factors will impact the outcome/ POC  MEDIUM Complexity : 3 Standardized tests and measures addressing body structure, function, activity limitation and / or participation in recreation  MEDIUM Complexity : Evolving with changing characteristics  Other outcome measures barthel  MEDIUM Based on the above components, the patient evaluation is determined to be of the following complexity level: MEDIUM Pain Ratin/10 Activity Tolerance:  
Good After treatment patient left in no apparent distress:  
Sitting in chair and Call bell within reach COMMUNICATION/EDUCATION:  
The patients plan of care was discussed with: Occupational therapist and Registered nurse. Fall prevention education was provided and the patient/caregiver indicated understanding., Patient/family have participated as able in goal setting and plan of care. , and Patient/family agree to work toward stated goals and plan of care. Thank you for this referral. 
Dae Perez, PT Time Calculation: 34 mins

## 2021-03-03 NOTE — CONSULTS
30 day loop monitor ordered for palpitations/Cryptogenic CVA Monitor to be placed prior to discharge today (after lunch) Reviewed rationale for monitor with the patient and she is agreeable to wear Patient anticoagulated for acute PE Follow up with Dr Jaime Prudent Future Appointments Date Time Provider Oscar Maloney 3/11/2021 12:00 PM Perri Cuevas  [facility] BS AMB  
4/13/2021 10:00 AM Segundo Ford MD [facility] BS AMB Telemetry reviewed - NSR pcc PVCs

## 2021-03-03 NOTE — PROGRESS NOTES
Attempted to schedule hospital follow up PCP appointment. Office will contact patient with follow up appointment information after the review of  the Discharge Summary. Mike Staton, Care Management Specialist.  
 
Received call back, hospital follow-up PCP transitional care appointment has been scheduled with Dr. Arjun Desai for Tuesday, 3/16/21 at 3:30 p.m. Pending patient discharge.   Mike Staton, Care Management Specialist.

## 2021-03-03 NOTE — DISCHARGE INSTRUCTIONS
Patient Discharge Instructions    Frankey Proctor / [id number] : 1940    Admitted 3/2/2021 Discharged: 3/3/2021     Primary Diagnoses  Problem List as of 3/3/2021 Date Reviewed: 3/3/2021           Hx of completed stroke   * (Principal) Dizzy   DM type 2 causing vascular disease ([facility]   Chronic obstructive pulmonary disease (HCC)   Arthritis   Chronic pain   Anxiety and depression   Glaucoma   Neuropathy   Polypharmacy   Incontinence   GERD (gastroesophageal reflux disease)   Hyponatremia   Acute pulmonary embolism (HCC)   Dizziness   Abdominal pain   HTN (hypertension)   Type II diabetes mellitus ([facility]   Hypothyroid   Anxiety   History of CVA (cerebrovascular accident)          Take Home Medications     · It is important that you take the medication exactly as they are prescribed. · Keep your medication in the bottles provided by the pharmacist and keep a list of the medication names, dosages, and times to be taken in your wallet. · Do not take other medications without consulting your doctor. What to do at Home    Recommended diet: Regular Diet, Increase noncaffeinated fluids and encourage salt    Recommended activity: PT/OT per Home Health    If you experience worse symptoms, please follow up with your PCP. Follow-up with your PCP in a few weeks        Information obtained by :  I understand that if any problems occur once I am at home I am to contact my physician. I understand and acknowledge receipt of the instructions indicated above.                                                                                                                                            Physician's or R.N.'s Signature                                                                  Date/Time                                                                                                                                              Patient or Representative Signature Date/Time

## 2021-03-03 NOTE — DISCHARGE SUMMARY
Physician Discharge Summary     Patient ID:  Kelly Wilson  [id number]  [de-identified] y.o.  1940    Admit date: 3/2/2021    Discharge date of service and time: 3/3/2021    Admission Diagnoses: Hyponatremia [E87.1]  Dizzy [R42]    Discharge Diagnoses:    Principal Diagnosis   [address] Course and other diagnoses  Dizzy - POA, mild, likely the result of polypharmacy (sanctura, gabapentin, clonazepam, oxycodone, ACE, melatonin), deconditioning, anxiety and recent CVA. Repeat MRI unchanged. Vitals stable. Cardiac workup normal.  PT eval, but already getting HH PT. DC home if okay with neurology.     Hyponatremia - POA, mild and this is a chronic issue for her. PCP to monitor. Increase salt intake.     History of CVA (cerebrovascular accident) - Dx last visit. Neurology consult. Resume statin, ASA, add lavix     Acute pulmonary embolism - Dx on last visit. PE is miniscule. LE dopplers were negative. She has no pulmonary symptoms. I will stop anticoagulation in thios dizzy elderly woman with high fall risk. Already on ASA, but add plavix for CVA     DM type 2 causing vascular disease - Diabetic diet and counseling. SSI per protocol. Continue home . Check A1c.     Chronic pain / Arthritis - She is on chronic oxycodone, contraindicated in elderly. She should be on tylenol instead.     Anxiety and depression / Polypharmacy - She is on clonazepam, contraindicated in elderly. Would benefit form counseling and SSRI instead as outpatient. Continue melatonin     HTN (hypertension) - continue Lisinopril, but PCP to consider changing to BB if continues to be dizzy     Chronic obstructive pulmonary disease - Resume trelegy at [facility] for brovana, pulmicort here. Prn duonebs.  She is on chronic prednisone     Hypothyroid - Continue synthroid     Glaucoma - Xalatan     Neuropathy - Gabapentin     Incontinence - She takes sanctura     GERD (gastroesophageal reflux disease) - PPI     PCP: Sav Santoyo MD    Consults: Neurology    Significant Diagnostic Studies: See Hospital Course    Discharged home in improved condition. Discharge Exam:  /63 (BP 1 Location: Left upper arm, BP Patient Position: At rest)   Pulse 80   Temp 97.7 °F (36.5 °C)   Resp 20   Ht 5' 4\" (1.626 m)   Wt 55.5 kg (122 lb 5.7 oz)   SpO2 90%   BMI 21.00 kg/m²      Gen:  Thin, frail, in no acute distress  HEENT:  Pink conjunctivae, PERRL, hearing intact to voice, moist mucous membranes  Neck:  Supple, without masses, thyroid non-tender  Resp:  No accessory muscle use, clear breath sounds without wheezes rales or rhonchi  Card:  No murmurs, normal S1, S2 without thrills, bruits or peripheral edema  Abd:  Soft, non-tender, non-distended, normoactive bowel sounds are present, no mass  Lymph:  No cervical or inguinal adenopathy  Musc:  No cyanosis or clubbing  Skin:  No rashes or ulcers, skin turgor is good  Neuro:  Cranial nerves are grossly intact, general motor weakness, follows commands   Psych:  Good insight, oriented to person, place and time, alert    Patient Instructions:   Current Discharge Medication List      START taking these medications    Details   clopidogreL (Plavix) 75 mg tab Take 1 Tab by mouth daily for 30 days. Qty: 30 Tab, Refills: 0         CONTINUE these medications which have NOT CHANGED    Details   aspirin delayed-release 81 mg tablet Take 1 Tab by mouth daily. Qty: 30 Tab, Refills: 0      oxyCODONE ER (Xtampza ER) 9 mg capsule Take 9 mg by mouth every twelve (12) hours. insulin lispro (HumaLOG KwikPen Insulin) 100 unit/mL kwikpen by SubCUTAneous route Before breakfast, lunch, and dinner. Sliding scale      predniSONE (DELTASONE) 5 mg tablet Take 5 mg by mouth daily. albuterol-ipratropium (DUO-NEB) 2.5 mg-0.5 mg/3 ml nebu 3 mL by Nebulization route every four (4) hours as needed for Wheezing.   Qty: 30 Nebule, Refills: 0      latanoprost (XALATAN) 0.005 % ophthalmic solution Administer 1 Drop to both eyes daily. insulin glargine (Lantus Solostar U-100 Insulin) 100 unit/mL (3 mL) inpn 16 Units by SubCUTAneous route daily. CLONAZEPAM PO Take 0.25 mg by mouth every evening. ODT formulation      levothyroxine (Levo-T) 75 mcg tablet Take 75 mcg by mouth Daily (before breakfast). tolterodine ER (DETROL LA) 4 mg ER capsule Take 4 mg by mouth daily. atorvastatin (LIPITOR) 80 mg tablet Take 80 mg by mouth nightly. lisinopriL (PRINIVIL, ZESTRIL) 40 mg tablet Take 40 mg by mouth daily. pantoprazole (PROTONIX) 40 mg tablet Take 40 mg by mouth daily. gabapentin (NEURONTIN) 300 mg capsule Take 600 mg by mouth two (2) times a day. fluticasone-umeclidinium-vilanterol (Trelegy Ellipta) 100-62.5-25 mcg inhaler Take 1 Puff by inhalation daily. melatonin 5 mg tablet Take 5 mg by mouth nightly. calcium-cholecalciferol, d3, (CALCIUM 600 + D) 600-125 mg-unit tab Take 1 Tab by mouth daily. multivitamin (ONE A DAY) tablet Take 1 Tab by mouth daily. magnesium oxide (MAG-OX) 400 mg tablet Take 400 mg by mouth daily. B.infantis-B.ani-B.long-B.bifi (Probiotic 4X) 10-15 mg TbEC Take 2 Caps by mouth two (2) times a day. BENEFIBER, GUAR GUM, PO Take 1 Dose by mouth two (2) times a day. 1 dose - 2 teaspoons         STOP taking these medications       apixaban (ELIQUIS) 5 mg tablet Comments:   Reason for Stopping:          Activity: Activity as tolerated and no driving for today  Diet: Regular Diet and increase salt intake  Wound Care: None needed    Follow-up with your PCP in a few days.   Follow-up tests/labs - none    Signed:  Yury Nielsen MD  3/3/2021  9:47 AM

## 2021-03-03 NOTE — PROGRESS NOTES
Bedside and Verbal shift change report given to WALLY Alves (oncoming nurse) by Teresa Ballesteros RN (offgoing nurse). Report included the following information SBAR, Kardex, Intake/Output, MAR and Recent Results.

## 2021-03-03 NOTE — PROGRESS NOTES
3/3/2021 
1:31 PM 
CM met w/ pt in person to complete assessment, CM wore mask and goggles at all times. Charted demographics verified. Reason for Admission:   OBS for Dizziness Pt is [de-identified] yo  female who presents to [facility]  Ed c/o dizziness and abdominal pain PMHx: HTN,COPD, reve=cent PE on Eliquis, CVA w/ L hemiparesis, was admitted 2/27 for acute infarct and new PE         
RUR Score:     N/A pt is OBS  
 
PCP: First and Last name: Tripp Balderas MD 
 Name of Practice: 
 Are you a current patient: Yes/No:Yes Approximate date of last visit: 30 days Can you do a virtual visit with your PCP: NO 
          
Resources/supports as identified by patient/family:   Pt lives w/ daughter Nidia Yun and son in law, also has an aide Monday, Thursday 9 AM to 1 PM 
             
Top Challenges facing patient (as identified by patient/family and CM): Finances/Medication cost?    [facility], [facility]    
           
Transportation? Pt relies on family for transport Support system or lack thereof? Pt lives w/ Dtr Nidia Yun and son in law Living arrangements? Pt lives w/ Dtr Nidia Yun and son in law, 2 story home w/ 4 entry steps and GF bed/bath Self-care/ADLs/Cognition? Pt is ambulatory w/ cane, family or ryan assists w/ ADLs Current Advanced Directive/Advance Care Plan:   
FULL Code Maico Mariano [phone] Healthcare Decision Maker:  
Click here to complete [address] including selection of the Healthcare Decision Maker Relationship (ie \"Primary\") Plan for utilizing home health: pt open to [facility] for PT,OT, resumption order sent in [employer],    
DME: raised toilet seat, cane, shower chair Transition of Care Plan: 1. Hospital admission OB for medical management 2. Neurology consult 3. PT, OT evals 4. MC to follow through for treatment/response 5. CM educated pt on OBS status, State and DEV[place]ES OBS letters signed, copy to pt and beside chart 6. DC when stable to home w/ [facility] SEUN, NORMAS sent in [employer] 7. Outpatient f/u PCP, cardiology 8. Daughter Benjy Reich will transport Care Management Interventions PCP Verified by CM: Evette Lozada MD; last visit 30 days) Palliative Care Criteria Met (RRAT>21 & CHF Dx)?: No 
Mode of Transport at Discharge: Self(Family) Transition of Care Consult (CM Consult): Home Health [address] [place] [address] Road: No 
[facility] Patient already serviced by other home care/hospice agency Physical Therapy Consult: Yes Occupational Therapy Consult: Yes Speech Therapy Consult: No 
Current Support Network: Relative's Home, Own Home(pt lvies w/ Dtr and PEACE in pvt residence, at baseline is ambulatory w/ cane, famiy assista w/ ADLs and transport) Confirm Follow Up Transport: Family The Plan for Transition of Care is Related to the Following Treatment Goals : [address] [place] [address] Britt The Patient and/or Patient Representative was Provided with a Choice of Provider and Agrees with the Discharge Plan?: Yes Name of the Patient Representative Who was Provided with a Choice of Provider and Agrees with the Discharge Plan: Rose Mary He Dermott of Choice List was Provided with Basic Dialogue that Supports the Patient's Individualized Plan of Care/Goals, Treatment Preferences and Shares the Quality Data Associated with the Providers?: (S) Yes Discharge Location Discharge Placement: Home with home health CARINA Knox

## 2021-03-03 NOTE — CONSULTS
NEPHROLOGY CONSULT NOTE Patient: Jus Olivarez MRN: [id number]  PCP: Nayana Garrison MD  
:     1940  Age:   [de-identified] y.o. Sex:  female Referring physician: Aileen Rangel MD 
 
 
REASON FOR CONSULT: 
Hyponatremia HPI: 
Jus Olivarez is a [de-identified] y.o. female  with a history of hypertension, diabetes, COPD, CVA with left-sided hemiparesis, recent pulmonary embolism on Eliquis who presented to the emergency department with a complaint of abdominal pain nausea dizziness and heart palpitation. In the emergency department code stroke was called and had extensive work-up. Chemistry shows serum sodium level is 126. Patient denies drinking excessive fluid or taking fluid medication. Patient was started on normal saline at the rate of 50 cc/h. Nephrology is contacted for the management of acute hyponatremia. Repeat chemistry shows serum sodium level is 133. Sodium level up seven-point less than 12 hours. Patient denies any complaints. Review of Systems Positive for headache and weakness Past Medical History:  
Diagnosis Date  Anxiety and depression  Arthritis  Chronic obstructive pulmonary disease ([address]  Chronic pain  DM type 2 causing vascular disease [address]  GERD (gastroesophageal reflux disease)  Glaucoma   
 HTN (hypertension)  Hx of completed stroke   Hypothyroid  Incontinence  Neuropathy  Polypharmacy Past Surgical History:  
Procedure Laterality Date  HX ORTHOPAEDIC    
 HX VASCULAR ACCESS Allergies Allergen Reactions  Shellfish Derived Anaphylaxis Current Facility-Administered Medications Medication Dose Route Frequency  arformoteroL (BROVANA) neb solution 15 mcg  15 mcg Nebulization BID RT  
 budesonide (PULMICORT) 500 mcg/2 ml nebulizer suspension  500 mcg Nebulization BID RT  
  latanoprost (XALATAN) 0.005 % ophthalmic solution 1 Drop  1 Drop Both Eyes DAILY  lisinopriL (PRINIVIL, ZESTRIL) tablet 40 mg  40 mg Oral DAILY  magnesium oxide (MAG-OX) tablet 400 mg  400 mg Oral DAILY  therapeutic multivitamin (THERAGRAN) tablet 1 Tab  1 Tab Oral DAILY  0.45% sodium chloride infusion  75 mL/hr IntraVENous CONTINUOUS  
 sodium chloride (NS) flush 5-40 mL  5-40 mL IntraVENous Q8H  
 sodium chloride (NS) flush 5-40 mL  5-40 mL IntraVENous PRN  
 acetaminophen (TYLENOL) tablet 650 mg  650 mg Oral Q6H PRN  polyethylene glycol (MIRALAX) packet 17 g  17 g Oral DAILY PRN  promethazine (PHENERGAN) tablet 12.5 mg  12.5 mg Oral Q6H PRN Or  
 ondansetron (ZOFRAN) injection 4 mg  4 mg IntraVENous Q6H PRN  
 insulin lispro (HUMALOG) injection   SubCUTAneous QID WITH MEALS  glucose chewable tablet 16 g  4 Tab Oral PRN  
 dextrose (D50W) injection syrg 12.5-25 g  12.5-25 g IntraVENous PRN  
 glucagon (GLUCAGEN) injection 1 mg  1 mg IntraMUSCular PRN  
 albuterol-ipratropium (DUO-NEB) 2.5 MG-0.5 MG/3 ML  3 mL Nebulization Q4H PRN  
 aspirin delayed-release tablet 81 mg  81 mg Oral DAILY  atorvastatin (LIPITOR) tablet 80 mg  80 mg Oral QHS  trospium (SANCTURA) tablet 20 mg  20 mg Oral DAILY  predniSONE (DELTASONE) tablet 5 mg  5 mg Oral DAILY  pantoprazole (PROTONIX) tablet 40 mg  40 mg Oral DAILY  melatonin (rapid dissolve) tablet 5 mg  5 mg Oral QHS  levothyroxine (SYNTHROID) tablet 75 mcg  75 mcg Oral ACB  insulin glargine (LANTUS) injection 16 Units  16 Units SubCUTAneous DAILY  gabapentin (NEURONTIN) capsule 600 mg  600 mg Oral BID  
 apixaban (ELIQUIS) tablet 10 mg  10 mg Oral BID  [START ON 3/4/2021] apixaban (ELIQUIS) tablet 5 mg  5 mg Oral BID  clonazePAM (KlonoPIN) tablet 0.25 mg  0.25 mg Oral QPM  
 oxyCODONE ER (OxyCONTIN) tablet 10 mg  10 mg Oral Q12H Family History Problem Relation Age of Onset  No Known Problems Other reviewed, patient did not know Social History Socioeconomic History  Marital status:  Spouse name: Not on file  Number of children: Not on file  Years of education: Not on file  Highest education level: Not on file Occupational History  Not on file Social Needs  Financial resource strain: Not on file  Food insecurity Worry: Not on file Inability: Not on file  Transportation needs Medical: Not on file Non-medical: Not on file Tobacco Use  Smoking status: Former Smoker Quit date: 1991 Years since quittin.1 Substance and Sexual Activity  Alcohol use: Never Frequency: Never  Drug use: Never  Sexual activity: Not on file Lifestyle  Physical activity Days per week: Not on file Minutes per session: Not on file  Stress: Not on file Relationships  Social connections Talks on phone: Not on file Gets together: Not on file Attends Cheondoism service: Not on file Active member of club or organization: Not on file Attends meetings of clubs or organizations: Not on file Relationship status: Not on file  Intimate partner violence Fear of current or ex partner: Not on file Emotionally abused: Not on file Physically abused: Not on file Forced sexual activity: Not on file Other Topics Concern  Not on file Social History Narrative  Not on file Vitals:  
 21 9312 21 0440 21 0701 21 6915 BP:  134/69  129/63 Pulse: 77 67 78 80 Resp:  16  20 Temp:  97.7 °F (36.5 °C)  97.7 °F (36.5 °C) SpO2:  95%  90% Weight:      
Height:      
 
 
 
Intake/Output Summary (Last 24 hours) at 3/3/2021 0915 Last data filed at 3/3/2021 5715 Gross per 24 hour Intake 240 ml Output  Net 240 ml PHYSICAL EXAM: 
GENERAL : Lying down in bed with no acute distress HEENT: AT NC PEERLA NECK: Supple no JVP CVS: S1 S2 RRR, no murmur or gallops heard RS: CTABL, no rhonchi,or wheezing heard ABDOMEN: soft NT ND positive BS EXTREMITY: No edema clubbing or cyanosis, pedal pulse + NEUROLOGY: AAA X3, no focal deficit or asterixis LABS/STUDIES: 
BMP:  
Lab Results Component Value Date/Time  (L) 03/03/2021 02:42 AM  
 K 4.2 03/03/2021 02:42 AM  
  03/03/2021 02:42 AM  
 CO2 25 03/03/2021 02:42 AM  
 AGAP 8 03/03/2021 02:42 AM  
  (H) 03/03/2021 02:42 AM  
 BUN 10 03/03/2021 02:42 AM  
 CREA 0.74 03/03/2021 02:42 AM  
 GFRAA >60 03/03/2021 02:42 AM  
 GFRNA >60 03/03/2021 02:42 AM  
  
 
CBC:   
Lab Results Component Value Date/Time WBC 8.8 03/03/2021 02:42 AM  
 HGB 10.7 (L) 03/03/2021 02:42 AM  
 HCT 32.9 (L) 03/03/2021 02:42 AM  
  03/03/2021 02:42 AM  
 
 
No results found for: MCACR, MCA1, MCA2, MCA3, MCAU, MCAU2, MCALPOCT Lab Results Component Value Date/Time Color YELLOW/STRAW 03/02/2021 08:35 PM  
 Appearance CLEAR 03/02/2021 08:35 PM  
 Specific gravity 1.023 03/02/2021 08:35 PM  
 pH (UA) 7.5 03/02/2021 08:35 PM  
 Protein Negative 03/02/2021 08:35 PM  
 Glucose Negative 03/02/2021 08:35 PM  
 Ketone Negative 03/02/2021 08:35 PM  
 Bilirubin Negative 03/02/2021 08:35 PM  
 Urobilinogen 0.2 03/02/2021 08:35 PM  
 Nitrites Negative 03/02/2021 08:35 PM  
 Leukocyte Esterase SMALL (A) 03/02/2021 08:35 PM  
 Epithelial cells FEW 03/02/2021 08:35 PM  
 Bacteria Negative 03/02/2021 08:35 PM  
 WBC 0-4 03/02/2021 08:35 PM  
 RBC 0-5 03/02/2021 08:35 PM  
 
Serum osmolality 272, urine sodium 79 and urine osmolality is 301 ASSESSMENT/PLAN: 
 
Hypoosmolar hyponatremia due to hypovolemia. Serum sodium rapidly improved with gentle IV normal saline 126>133 in 10 hr 
Change IV fluid to half-normal saline at the rate of 75 cc/h to slow down the correction Reviewed urine and serum osmolalities. Goal of the sodium correction 6 to 8 mEq in 24 hours Violetta Madsen MD 
 3/3/2021 [facility]: 
[email or web address] Karla Fry office: 
[address], [facility] [address] [place], [address] [place] [address] Phone: [phone] Fax :     [phone] [place] office: 
[address] [facility], [address] Phone - [phone] Fax - [phone]

## 2021-03-03 NOTE — CONSULTS
[facility] Inpatient Neurology Consult Name:   Shantanu Villafana     
:   1940 MRN:    [id number] Admission Date:  3/2/2021 Consult Date:  21 HISTORY OF PRESENT ILLNESS:  
 
This is a [de-identified] y.o. female with PMHx DMT2, Anxiety, Depression, COPD, Chronic painn,GERD, Glaucoma, HTN, Recent stroke (2021), remote stroke (left hemiparesis), Hypothyroid, Peripheral neuropathy, polypharmacy. Pt recently admitted ( to 21) for Acute pulmonary embolism, acute DVT. Brain MRI during that admission showed \"tiny focus of acute infarction right precentral gyrus. Chronic right frontoparietal infarct with volume loss. Moderate white matter disease. \" per Neurology consult note by Dr Franklin Dey. EEG done during that admission showed intermittent slowing in the right hemisphere consistent with her previous CVA. No seizure discharges were seen. Carotid doppler showed < 50% ICA stenosis on both sides; left vertebral flow was antegrade and right vertebral flow was \"bidirectional, suggestive of possible subclavian steal.\"  Due to Acute PE, patient was started on Eliquis. Patient returned to ER last night for c/o abdominal pain and nausea, dizziness, palpitations, dyspnea, no associated syncope. CT head was repeated in ER, no change compared to prior. She was found to have hyponatremia (sodium 126) and admitted for stroke evaluation and evaluation of abdominal pain/ nausea/ palpitations. MRI Brain (3-3-2021): 1. nchanged tiny acute infract in right superior lateral frontal lobe. No new areas of acute infarction. 2. Unchanged generalized parenchymal volume loss and mild to moderate chronic microvascular ischemic disease. Unchanged chronic infarcts in the right frontoparietal lobe and right temporo-occipital lobe. CTA Head/ neck and CT Perfusion:  Moderate atherosclerosis in extracranial ICAs/ CCAs/ cervical ICAs without hemodynamically significant stenosis. Severe disease of proximal right subclavian artery with 50% stenosis. Vertebral arteries patent. Moderate atherosclerosis of bilateral cavernous ICAs, intracranial arteries are patent, no aneurysm, no LVO. IMPRESSION: 1. No acute vascular abnormality, no large vessel occlusion. Normal perfusion. 2. The right brachiocephalic artery stent is only partially visualized on this examination. The visualized part of the stent is patent. 3. 50% stenosis of the proximal right subclavian artery. 4. Emphysema. ROS: as above Allergies: Allergies Allergen Reactions  Shellfish Derived Anaphylaxis PMHx:  
Past Medical History:  
Diagnosis Date  Anxiety and depression  Arthritis  Chronic obstructive pulmonary disease [address]  Chronic pain  DM type 2 causing vascular disease ([facility] [address] [facility]  GERD (gastroesophageal reflux disease)  Glaucoma   
 HTN (hypertension)  Hx of completed stroke 2021  Hypothyroid  Incontinence  Neuropathy  Polypharmacy PSHx:  has a past surgical history that includes hx orthopaedic and hx vascular access. SocHx:  reports that she quit smoking about 30 years ago. She does not have any smokeless tobacco history on file. She reports that she does not drink alcohol or use drugs. FHx: family history includes No Known Problems in an other family member. Outpatient Meds: 
Current Outpatient Medications Medication Instructions  albuterol-ipratropium (DUO-NEB) 2.5 mg-0.5 mg/3 ml nebu 3 mL, Nebulization, EVERY 4 HOURS AS NEEDED  
 apixaban (ELIQUIS) 5 mg tablet Take 2 Tabs by mouth two (2) times a day for 5 days, THEN 1 Tab two (2) times a day for 25 days. For treatment of PE  
 aspirin delayed-release 81 mg, Oral, DAILY  atorvastatin (LIPITOR) 80 mg, Oral, EVERY BEDTIME  
  B.infantis-B.ani-B.long-B.bifi (Probiotic 4X) 10-15 mg TbEC 2 Caps, Oral, 2 TIMES DAILY  BENEFIBER, GUAR GUM, PO 1 Dose, Oral, 2 TIMES DAILY, 1 dose - 2 teaspoons  calcium-cholecalciferol, d3, (CALCIUM 600 + D) 600-125 mg-unit tab 1 Tab, Oral, DAILY  CLONAZEPAM PO 0.25 mg, Oral, EVERY EVENING, ODT formulation  clopidogreL (PLAVIX) 75 mg, Oral, DAILY  fluticasone-umeclidinium-vilanterol (Trelegy Ellipta) 100-62.5-25 mcg inhaler 1 Puff, Inhalation, DAILY  gabapentin (NEURONTIN) 600 mg, Oral, 2 TIMES DAILY  insulin lispro (HumaLOG KwikPen Insulin) 100 unit/mL kwikpen SubCUTAneous, 3 TIMES DAILY BEFORE MEALS, Sliding scale  Lantus Solostar U-100 Insulin 16 Units, SubCUTAneous, DAILY  latanoprost (XALATAN) 0.005 % ophthalmic solution 1 Drop, Both Eyes, DAILY  levothyroxine (LEVO-T) 75 mcg, Oral, DAILY BEFORE BREAKFAST  lisinopriL (PRINIVIL, ZESTRIL) 40 mg, Oral, DAILY  magnesium oxide (MAG-OX) 400 mg, Oral, DAILY  melatonin 5 mg, Oral, EVERY BEDTIME  multivitamin (ONE A DAY) tablet 1 Tab, Oral, DAILY  oxyCODONE ER (XTAMPZA ER) 9 mg, Oral, EVERY 12 HOURS  pantoprazole (PROTONIX) 40 mg, Oral, DAILY  predniSONE (DELTASONE) 5 mg, Oral, DAILY  tolterodine ER (DETROL LA) 4 mg, Oral, DAILY Inpatient Meds:  
Current Facility-Administered Medications Medication Dose Route Frequency Provider Last Rate Last Admin  arformoteroL (BROVANA) neb solution 15 mcg  15 mcg Nebulization BID RT Sherif Hoffman MD      
 budesonide (PULMICORT) 500 mcg/2 ml nebulizer suspension  500 mcg Nebulization BID RT Shreif Hoffman MD      
 latanoprost (XALATAN) 0.005 % ophthalmic solution 1 Drop  1 Drop Both Eyes DAILY Sherif Hoffman MD   1 Drop at 03/03/21 0080  lisinopriL (PRINIVIL, ZESTRIL) tablet 40 mg  40 mg Oral DAILY Sherif Hoffman MD   40 mg at 03/03/21 7245  magnesium oxide (MAG-OX) tablet 400 mg  400 mg Oral DAILY Parish Ojeda MD   400 mg at 03/03/21 1014  therapeutic multivitamin (THERAGRAN) tablet 1 Tab  1 Tab Oral DAILY Parish Ojeda MD   1 Tab at 03/03/21 0907  
 0.45% sodium chloride infusion  75 mL/hr IntraVENous CONTINUOUS Dorothy Garza MD 75 mL/hr at 03/03/21 0858 75 mL/hr at 03/03/21 4330  sodium chloride (NS) flush 5-40 mL  5-40 mL IntraVENous Q8H Jamil, Joyceann Lanes, DO   10 mL at 03/03/21 0549  
 sodium chloride (NS) flush 5-40 mL  5-40 mL IntraVENous PRN Kallie Maxin, DO      
 acetaminophen (TYLENOL) tablet 650 mg  650 mg Oral Q6H PRN Cicero Maxin, DO   650 mg at 03/03/21 7241  polyethylene glycol (MIRALAX) packet 17 g  17 g Oral DAILY PRN Kallie Maxin, DO      
 promethazine (PHENERGAN) tablet 12.5 mg  12.5 mg Oral Q6H PRN Cicero Maxin, DO Or  
 ondansetron (ZOFRAN) injection 4 mg  4 mg IntraVENous Q6H PRN Kallie Maxin, DO      
 insulin lispro (HUMALOG) injection   SubCUTAneous QID WITH MEALS Kallie Maxin, DO   Stopped at 03/02/21 2336  
 glucose chewable tablet 16 g  4 Tab Oral PRN Cicero Maxin, DO      
 dextrose (D50W) injection syrg 12.5-25 g  12.5-25 g IntraVENous PRN Nat Beckwith,       
 glucagon (GLUCAGEN) injection 1 mg  1 mg IntraMUSCular PRN Cicero Maxin, DO      
 albuterol-ipratropium (DUO-NEB) 2.5 MG-0.5 MG/3 ML  3 mL Nebulization Q4H PRN Kallie Maxin, DO      
 aspirin delayed-release tablet 81 mg  81 mg Oral DAILY Kallie Maxin, DO   81 mg at 03/03/21 0859  
 atorvastatin (LIPITOR) tablet 80 mg  80 mg Oral QHS Kallie Maxin, DO   80 mg at 03/02/21 2334  trospium (SANCTURA) tablet 20 mg  20 mg Oral DAILY Kallie Maxin, DO   20 mg at 03/03/21 0900  predniSONE (DELTASONE) tablet 5 mg  5 mg Oral DAILY Kallie Maxin, DO   5 mg at 03/03/21 0241  pantoprazole (PROTONIX) tablet 40 mg  40 mg Oral DAILY Cicero Maxin, DO   40 mg at 03/03/21 5825  melatonin (rapid dissolve) tablet 5 mg  5 mg Oral QHS Tang, Nat, DO   5 mg at 03/02/21 2333  levothyroxine (SYNTHROID) tablet 75 mcg  75 mcg Oral ACB Cainadlupe Marinaman, DO   75 mcg at 03/03/21 8160  insulin glargine (LANTUS) injection 16 Units  16 Units SubCUTAneous DAILY Cainadlupe Ghazala, DO   16 Units at 03/03/21 0858  
 gabapentin (NEURONTIN) capsule 600 mg  600 mg Oral BID Cainadlupe Marinaman, DO   600 mg at 03/03/21 3878  apixaban (ELIQUIS) tablet 10 mg  10 mg Oral BID Cainadlupe Marinaman, DO   10 mg at 03/03/21 0900  [START ON 3/4/2021] apixaban (ELIQUIS) tablet 5 mg  5 mg Oral BID Tang, Nat, DO      
 clonazePAM (KlonoPIN) tablet 0.25 mg  0.25 mg Oral QPM Manoj Pederson MD   0.25 mg at 03/02/21 2334  oxyCODONE ER (OxyCONTIN) tablet 10 mg  10 mg Oral Q12H Cianadlupe Ghazala, DO   10 mg at 03/02/21 2334 PHYSICAL EXAM 
 
Patient Vitals for the past 4 hrs: 
 Temp Pulse Resp BP SpO2  
03/03/21 0719 97.7 °F (36.5 °C) 80 20 129/63 90 % 03/03/21 0701  78    Awake, resting in bedside chair, PT in room about to work with patient Pt is fully conversant, normal hearing, normal speech EOMI, face symmetric, VF normal bilaterally Normal shrug on right; reduced shrug on left (d/t left hemiparesis) Intact LT in all exts 5/5 strength on right side LUE with spasticity and 4 to 4+/5 strength LLE with 4/5 strenght DTRs: trace throughout Gait; not examined Labs/ Radiology I reviewed the following labs and radiology studies; 
 
CBC (mild low H/H, 10/ 31), Na 126 (now up to 132-133) GFR and Cr normal 
INR 1.3, TSH 2.37 UA: small amount of LE Assessment/ Plan ICD-10-CM ICD-9-CM 1. Hyponatremia  E87.1 276.1 2. Chest pain, unspecified type  R07.9 786.50 3. Stroke-like symptoms  R29.90 781.99   
 
 
[de-identified] y.o. female with abdominal pain, nausea (no vomiting, no diarrhea), palpitations, dizziness, hyponatremia (resolved) No ongoing dizziness MRI Brain (-) for new stroke No LVO or aneurysm on CTA Head/ Neck Discussed with Dr Ian Pitts. Pt on Eliquis for 1 small/ tiny PE noted during last admission. He had concerns about anticoagulation on this patient who is at fall risk (due to left hemiparesis from remote stroke). I discussed with him that from a Neurologic standpoint, I would only recommend anticoagulation if patient had stroke and a history of Atrial Fibrillation, which she has not been shown to have. From a stroke standpoint, Dual Antiplatelet Therapy with Plavix 75 mg/day and ASA 81 mg/ day is indicated to reduce chance of future TIA/ CVA. Continue Atorvastatin 80 mg QHS. For palpations, consulted Cardiology to set up 30 day Holter EKG. No additional neurology workup planned; will so. Follow up with Dr Jammie Zarate on 3- in Neurology CLinic Thank you for asking the Neurology Service to evaluate Promise Willams. Signed By: Kip Dove MD   
 March 3, 2021 normal latch/lips widely flanged

## 2021-03-03 NOTE — PROGRESS NOTES
[facility] Medical Progress Note NAME: Dilia Mcneal :  1940 MRM:  [id number] Date/Time of service 3/3/2021  8:10 AM    
 
  
Assessment and Plan:  
 
Dizzy - POA, mild, likely the result of polypharmacy (sanctura, gabapentin, clonazepam, oxycodone, ACE, melatonin), deconditioning, anxiety and recent CVA. Repeat MRI unchanged. Vitals stable. Cardiac workup normal.  PT eval, but already getting HH PT. DC home if okay with neurology. Hyponatremia - POA, mild and this is a chronic issue for her. PCP to monitor. Increase salt intake. History of CVA (cerebrovascular accident) - Dx last visit. Neurology consult. Resume statin, ASA Acute pulmonary embolism - Dx on last visit. PE is miniscule. LE dopplers were negative. She has no pulmonary symptoms. I will stop anticoagulation in thios dizzy elderly woman with high fall risk. Already on ASA, but consider aggrenox or other agents for CVA 
 
DM type 2 causing vascular disease - Diabetic diet and counseling. SSI per protocol. Continue home . Check A1c. Chronic pain / Arthritis - She is on chronic oxycodone, contraindicated in elderly. She should be on tylenol instead. Anxiety and depression / Polypharmacy - She is on clonazepam, contraindicated in elderly. Would benefit form counseling and SSRI instead as outpatient. Continue melatonin HTN (hypertension) - continue Lisinopril Chronic obstructive pulmonary disease - Resume trelegy at [facility] for brovana pulmicort here. Prn kam. She is on chronic prednisone Hypothyroid - Continue synthroid Glaucoma - Xalatan Neuropathy - Gabapentin Incontinence - She takes sanctura GERD (gastroesophageal reflux disease) - PPI Subjective: Chief Complaint:  dizzy ROS: 
(bold if positive, if negative) Tolerating PT  Tolerating Diet Objective:  
 
Last 24hrs VS reviewed since prior progress note. Most recent are: 
 
Visit Vitals BP 129/63 (BP 1 Location: Left upper arm, BP Patient Position: At rest) Pulse 80 Temp 97.7 °F (36.5 °C) Resp 20 Ht 5' 4\" (1.626 m) Wt 55.5 kg (122 lb 5.7 oz) SpO2 90% BMI 21.00 kg/m² SpO2 Readings from Last 6 Encounters:  
03/03/21 90% 02/27/21 96% 02/08/21 96% 01/04/21 95% No intake or output data in the 24 hours ending 03/03/21 0817 Physical Exam: 
 
Gen:  Thin, frail, in no acute distress HEENT:  Pink conjunctivae, PERRL, hearing intact to voice, moist mucous membranes Neck:  Supple, without masses, thyroid non-tender Resp:  No accessory muscle use, clear breath sounds without wheezes rales or rhonchi 
Card:  No murmurs, normal S1, S2 without thrills, bruits or peripheral edema Abd:  Soft, non-tender, non-distended, normoactive bowel sounds are present, no mass Lymph:  No cervical or inguinal adenopathy Musc:  No cyanosis or clubbing Skin:  No rashes or ulcers, skin turgor is good Neuro:  Cranial nerves are grossly intact, general motor weakness, follows commands Psych:  Good insight, oriented to person, place and time, alert Telemetry reviewed:   normal sinus rhythm 
__________________________________________________________________ Medications Reviewed: (see below) Medications:  
 
Current Facility-Administered Medications Medication Dose Route Frequency  0.9% sodium chloride infusion  50 mL/hr IntraVENous CONTINUOUS  
 arformoteroL (BROVANA) neb solution 15 mcg  15 mcg Nebulization BID RT  
 budesonide (PULMICORT) 500 mcg/2 ml nebulizer suspension  500 mcg Nebulization BID RT  
 latanoprost (XALATAN) 0.005 % ophthalmic solution 1 Drop  1 Drop Both Eyes DAILY  lisinopriL (PRINIVIL, ZESTRIL) tablet 40 mg  40 mg Oral DAILY  magnesium oxide (MAG-OX) tablet 400 mg  400 mg Oral DAILY  multivitamin (ONE A DAY) tablet 1 Tab  1 Tab Oral DAILY  sodium chloride (NS) flush 5-40 mL  5-40 mL IntraVENous Q8H  
 sodium chloride (NS) flush 5-40 mL  5-40 mL IntraVENous PRN  
 acetaminophen (TYLENOL) tablet 650 mg  650 mg Oral Q6H PRN  polyethylene glycol (MIRALAX) packet 17 g  17 g Oral DAILY PRN  promethazine (PHENERGAN) tablet 12.5 mg  12.5 mg Oral Q6H PRN Or  
 ondansetron (ZOFRAN) injection 4 mg  4 mg IntraVENous Q6H PRN  
 insulin lispro (HUMALOG) injection   SubCUTAneous QID WITH MEALS  glucose chewable tablet 16 g  4 Tab Oral PRN  
 dextrose (D50W) injection syrg 12.5-25 g  12.5-25 g IntraVENous PRN  
 glucagon (GLUCAGEN) injection 1 mg  1 mg IntraMUSCular PRN  
 albuterol-ipratropium (DUO-NEB) 2.5 MG-0.5 MG/3 ML  3 mL Nebulization Q4H PRN  
 aspirin delayed-release tablet 81 mg  81 mg Oral DAILY  atorvastatin (LIPITOR) tablet 80 mg  80 mg Oral QHS  trospium (SANCTURA) tablet 20 mg  20 mg Oral DAILY  predniSONE (DELTASONE) tablet 5 mg  5 mg Oral DAILY  pantoprazole (PROTONIX) tablet 40 mg  40 mg Oral DAILY  melatonin (rapid dissolve) tablet 5 mg  5 mg Oral QHS  levothyroxine (SYNTHROID) tablet 75 mcg  75 mcg Oral ACB  insulin glargine (LANTUS) injection 16 Units  16 Units SubCUTAneous DAILY  gabapentin (NEURONTIN) capsule 600 mg  600 mg Oral BID  
 apixaban (ELIQUIS) tablet 10 mg  10 mg Oral BID  [START ON 3/4/2021] apixaban (ELIQUIS) tablet 5 mg  5 mg Oral BID  clonazePAM (KlonoPIN) tablet 0.25 mg  0.25 mg Oral QPM  
 oxyCODONE ER (OxyCONTIN) tablet 10 mg  10 mg Oral Q12H Lab Data Reviewed: (see below) Lab Review:  
 
Recent Labs 03/03/21 
0242 03/02/21 
1712 WBC 8.8 8.4 HGB 10.7* 10.2* HCT 32.9* 31.5*  284 Recent Labs 03/03/21 
0242 03/02/21 2231 03/02/21 
1712 * 133* 126*  
K 4.2 4.2 3.8  101 94* CO2 25 26 25 * 110* 139* BUN 10 11 12 CREA 0.74 0.69 0.72  
CA 7.8* 7.7* 7.4* MG 1.9  --   --   
ALB 3.0*  --  3.0* TBILI 0.5  --  0.4 ALT 37  --  36 INR  --   --  1.3* Lab Results Component Value Date/Time  Glucose (POC) 105 (H) 03/03/2021 06:45 AM  
 Glucose (POC) 115 (H) 03/02/2021 11:05 PM  
 Glucose (POC) 168 (H) 03/02/2021 04:34 PM  
 Glucose (POC) 151 (H) 02/27/2021 11:31 AM  
 Glucose (POC) 89 02/27/2021 07:35 AM  
 
No results for input(s): PH, PCO2, PO2, HCO3, FIO2 in the last 72 hours. Recent Labs 03/02/21 
1712 INR 1.3* All Micro Results None Other pertinent lab: none Total time spent with patient: 30 Minutes I personally reviewed chart, notes, data and current medications in the medical record. I have personally examined and treated the patient at bedside during this period. Care Plan discussed with: Patient, Care Manager, Nursing Staff, Consultant/Specialist and >50% of time spent in counseling and coordination of care Discussed:  Care Plan and D/C Planning Prophylaxis:  H2B/PPI and Eliquis Disposition:   PT, OT, RN 
        
___________________________________________________ Attending Physician: Gen Zuniga MD

## 2021-03-03 NOTE — H&P
[facility] [place] [facility] 
[address] [facility], [address] [place] [address] 
[phone] Admission History and Physical 
 
 
NAME:  Brandon Hodges :   1940 MRN:  [id number] PCP:  Vesta Tran MD  
 
Date/Time of service:  3/2/2021  8:14 PM 
  
  
Subjective: CHIEF COMPLAINT: Dizziness HISTORY OF PRESENT ILLNESS:    
Ms. Eric York is a [de-identified] y.o.  female with a hx of HTN, DM, COPD, recent PE on eliquis, CVA w/ L hemiparesis who is admitted with hyponatremia and for stroke work up. Ms. Eric York was recently discharged from [facility] on the  after being admitted for a acute infarct and new PE. She states that yesterday she began to experience abdominal pain with nausea. Then, today she began to experience dizziness and heart palpitations with no associated syncope. ER documentation states that she had chest pain; however, during our encounter she states that she was not experiencing chest pain but rather heart palpitations. She also endorses shortness of breath and back pain but states these are chronic. Upon her arrival to the ED today, a code stroke was called due to her new onset dizziness since discharge. CT head findings were unremarkable for any acute changes. Allergies Allergen Reactions  Shellfish Derived Anaphylaxis Prior to Admission medications Medication Sig Start Date End Date Taking? Authorizing Provider  
amLODIPine (NORVASC) 5 mg tablet Take 1 Tab by mouth daily. 21   Kia Cohen MD  
apixaban (ELIQUIS) 5 mg tablet Take 2 Tabs by mouth two (2) times a day for 5 days, THEN 1 Tab two (2) times a day for 25 days. For treatment of PE 2/27/21 3/29/21  Kia Cohne MD  
cefdinir (OMNICEF) 300 mg capsule Take 1 Cap by mouth two (2) times a day for 3 days. 2/27/21 3/2/21  Kia Cohen MD  
aspirin delayed-release 81 mg tablet Take 1 Tab by mouth daily.  21   Kia Cohen MD  
oxyCODONE ER Latosha Butler ER) 9 mg capsule Take 9 mg by mouth every twelve (12) hours. Provider, Historical  
insulin lispro (HumaLOG KwikPen Insulin) 100 unit/mL kwikpen by SubCUTAneous route Before breakfast, lunch, and dinner. Sliding scale    Provider, Historical  
predniSONE (DELTASONE) 5 mg tablet Take 5 mg by mouth daily. Provider, Historical  
albuterol-ipratropium (DUO-NEB) 2.5 mg-0.5 mg/3 ml nebu 3 mL by Nebulization route every four (4) hours as needed for Wheezing. 1/4/21   Chestine MD Smith  
latanoprost (XALATAN) 0.005 % ophthalmic solution Administer 1 Drop to both eyes daily. Jann Rubi MD  
insulin glargine (Lantus Solostar U-100 Insulin) 100 unit/mL (3 mL) inpn 16 Units by SubCUTAneous route daily. Jann Rubi MD  
CLONAZEPAM PO Take 0.25 mg by mouth every evening. ODT formulation    Jann Rubi MD  
levothyroxine (Levo-T) 75 mcg tablet Take 75 mcg by mouth Daily (before breakfast). Jann Rubi MD  
tolterodine ER (DETROL LA) 4 mg ER capsule Take 4 mg by mouth daily. Jann Rubi MD  
atorvastatin (LIPITOR) 80 mg tablet Take 80 mg by mouth nightly. Jann Rubi MD  
lisinopriL (PRINIVIL, ZESTRIL) 40 mg tablet Take 40 mg by mouth daily. Jann Rubi MD  
pantoprazole (PROTONIX) 40 mg tablet Take 40 mg by mouth daily. Jann Rubi MD  
gabapentin (NEURONTIN) 300 mg capsule Take 600 mg by mouth two (2) times a day. Jann Rubi MD  
fluticasone-umeclidinium-vilanterol (Trelegy Ellipta) 100-62.5-25 mcg inhaler Take 1 Puff by inhalation daily. Provider, Historical  
melatonin 5 mg tablet Take 5 mg by mouth nightly. Provider, Historical  
calcium-cholecalciferol, d3, (CALCIUM 600 + D) 600-125 mg-unit tab Take 1 Tab by mouth daily. Provider, Historical  
multivitamin (ONE A DAY) tablet Take 1 Tab by mouth daily. Provider, Historical  
magnesium oxide (MAG-OX) 400 mg tablet Take 400 mg by mouth daily.     Provider, Historical  
B.infantis-B.ani-B.long-B.bifi (Probiotic 4X) 10-15 mg TbEC Take 2 Caps by mouth three (3) times daily. Provider, Historical  
BENEFIBER, GUAR GUM, PO Take 1 Dose by mouth two (2) times a day. 1 dose - 2 teaspoons    Provider, Historical  
 
 
Past Medical History:  
Diagnosis Date  Arthritis  Asthma  Autoimmune disease ([facility]  Chronic obstructive pulmonary disease ([facility]  Diabetes ([facility]  Stroke ([facility] Past Surgical History:  
Procedure Laterality Date  HX ORTHOPAEDIC    
 HX VASCULAR ACCESS Social History Tobacco Use  Smoking status: Former Smoker Quit date: 1991 Years since quittin.1 Substance Use Topics  Alcohol use: Never Frequency: Never Family History Problem Relation Age of Onset  No Known Problems Other   
     reviewed, patient did not know Review of Systems: 
(bold if positive, if negative) Gen:   chills, fatigueEyes:  ENT:  CVS:  PalpitationsdizzinessPulm:  dyspneaGI:  Abdominal pain, nausea,:   MS:  Skin:  Psych:  Endo:  Hem:  Renal:  Neuro:    
 
 
  
Objective: VITALS:   
Vital signs reviewed; most recent are: 
 
Visit Vitals BP (!) 151/59 Pulse 80 Resp 15 Ht 5' 4\" (1.626 m) Wt 55.5 kg (122 lb 5.7 oz) SpO2 95% BMI 21.00 kg/m² SpO2 Readings from Last 6 Encounters:  
21 95% 21 96% 21 96% 21 95% No intake or output data in the 24 hours ending 21 Exam:  
 
Physical Exam: 
 
Gen:  Elderly, frail, NAD HEENT:  Pink conjunctivae, PERRL, hearing intact to voice Neck:  Supple, without masses, thyroid non-tender Resp:  No accessory muscle use, slight L sided wheeze Card:  No murmurs, normal S1, S2 without thrills, bruits or peripheral edema Abd:  Soft, non-tender, non-distended, normoactive bowel sounds are present Lymph:  No cervical adenopathy Musc:  No cyanosis or clubbing Skin:  No rashes Neuro:  Cranial nerves 3-12 are grossly intact, chronic L hemiparesis, follows commands appropriately Psych:  Oriented to person, place, and time. Fair insight Labs: 
 
Recent Labs 03/02/21 
1712 WBC 8.4 HGB 10.2* HCT 31.5*  Recent Labs 03/02/21 
1712 *  
K 3.8 CL 94* CO2 25 * BUN 12  
CREA 0.72  
CA 7.4* ALB 3.0* TBILI 0.4 ALT 36 Lab Results Component Value Date/Time Glucose (POC) 168 (H) 03/02/2021 04:34 PM  
 Glucose (POC) 151 (H) 02/27/2021 11:31 AM  
 
No results for input(s): PH, PCO2, PO2, HCO3, FIO2 in the last 72 hours. Recent Labs 03/02/21 1712 INR 1.3* Radiology and EKG reviewed: Sinus with PVCs. Chest x-ray with interstitial fibrosis. CT head and CTA no acute findings. Old Records reviewed in [facility] Assessment/Plan:   
  
[de-identified] y.o. hx of HTN, DM, COPD, recent PE on eliquis, CVA w/ L hemiparesis who is admitted with hyponatremia and for stroke work up 
  
Hyponatremia: Possibly due to IVVD. Obtain TSH, urine sodium, serum and urine osmolarity. 1 L normal saline bolusx1 with repeat sodium. BMP Q6. consult nephrology. Dizziness: Possibly due to hyponatremia. Rule out acute CVA. History of CVA x2 with most recent CVA 2/2021. Head CT on arrival unremarkable for any acute findings. Obtain MRI head to rule out acute CVA. Consult neurology. Heart palpitations: Trend troponins. Check TSH. Consider cardiology consult for outpatient Holter monitor. Abdominal pain/nausea: due to UTI? Check CT abdomen pelvis wo. Recent abdominal ultrasound unremarkable for any gallbladder pathology. Check UA and urine cx; recent urine cx with enterococcus. IV  antiemetics as needed. Acute pulmonary embolism: Diagnosed last month. Continue Eliquis as no sign of hemorrhage on CT head. 
 
 COPD: stable. Will cont LABA/ICS, nebs prn 
  
HTN: hold home meds to allow for permissive hypertension 
  
DM type 2: check A1C. Cont Lantus, SSI 
  
Hx of CVA: L hemiparesis. Continue aspirin and statin. Risk of deterioration: high Total time spent with patient: 48 Minutes **I personally saw and examined the patient during this time period** Care Plan discussed with: Patient and Nursing Staff Discussed:  Code Status and Care Plan Prophylaxis:  eliquis Probable Disposition:  Home w/Family 
        
___________________________________________________ Attending Physician: Tierney Ro DO

## 2021-03-03 NOTE — PROGRESS NOTES
[facility]: MED RECONCILIATION Information obtained from:  
Patient's daughter Willi Osmond Daughter   [phone] Comments: 
Previous med rec was done on 2/26/21 Patient last discharged on 2/28/21 Daughter confirms patient starting Eliquis + Aspirin and stopping Plavix as indtructed on discharge. She did not start Amlodipine and Cefdinir as ordered on discharged due to BP was ok at home and to avoid risk of C.diff from atbx. Eliquis: 3/3/21 is the last day for 10 mg BID dosing. Then from 3/4/21, 5 mg BID dosing will start. [place]: It is not formulary. Daughter is okay with using formulary alternative such as Oxycontin. Xtampza 9 mg = 10 mg Oxycodone Hcl Detrol LA: It is not formulary. Formulary option - Trospium 20 mg daily Allergies: Shellfish derived Prior to Admission Medications:  
 
Medication Documentation Review Audit Reviewed by OTIS LlamasD (Pharmacist) on 03/02/21 at 2147 Medication Sig Documenting Provider Last Dose Status Taking? albuterol-ipratropium (DUO-NEB) 2.5 mg-0.5 mg/3 ml nebu 3 mL by Nebulization route every four (4) hours as needed for Wheezing. Alfredo Ruby MD 3/2/2021 Active Yes  
apixaban (ELIQUIS) 5 mg tablet Take 2 Tabs by mouth two (2) times a day for 5 days, THEN 1 Tab two (2) times a day for 25 days. For treatment of PE Mary Beth Escalante MD 3/2/2021 AM Active Yes  
aspirin delayed-release 81 mg tablet Take 1 Tab by mouth daily. Mary Beth Escalante MD 3/2/2021 AM Active Yes  
atorvastatin (LIPITOR) 80 mg tablet Take 80 mg by mouth nightly. Jann Rubi MD 3/1/2021 Active Yes B.infantis-B.ani-B.long-B.bifi (Probiotic 4X) 10-15 mg TbEC Take 2 Caps by mouth two (2) times a day. Provider, Historical  Active Yes BENEFIBER, GUAR GUM, PO Take 1 Dose by mouth two (2) times a day. 1 dose - 2 teaspoons Provider, Historical  Active Yes calcium-cholecalciferol, d3, (CALCIUM 600 + D) 600-125 mg-unit tab Take 1 Tab by mouth daily. Provider, Historical 3/2/2021 Active Yes CLONAZEPAM PO Take 0.25 mg by mouth every evening. ODT formulation Jann Rubi MD 3/1/2021 Active Yes  
fluticasone-umeclidinium-vilanterol (Trelegy Ellipta) 100-62.5-25 mcg inhaler Take 1 Puff by inhalation daily. Provider, Historical 3/2/2021 Active Yes  
gabapentin (NEURONTIN) 300 mg capsule Take 600 mg by mouth two (2) times a day. Jann Rubi MD 3/2/2021 AM Active Yes  
insulin glargine (Lantus Solostar U-100 Insulin) 100 unit/mL (3 mL) inpn 16 Units by SubCUTAneous route daily. Jann Rubi MD 3/2/2021 AM Active Yes  
insulin lispro (HumaLOG KwikPen Insulin) 100 unit/mL kwikpen by SubCUTAneous route Before breakfast, lunch, and dinner. Sliding scale Provider, Historical  Active Yes  
latanoprost (XALATAN) 0.005 % ophthalmic solution Administer 1 Drop to both eyes daily. Jann Rubi MD 3/2/2021 Active Yes  
levothyroxine (Levo-T) 75 mcg tablet Take 75 mcg by mouth Daily (before breakfast). Jann Rubi MD 3/2/2021 Active Yes  
lisinopriL (PRINIVIL, ZESTRIL) 40 mg tablet Take 40 mg by mouth daily. Jann Rubi MD 3/2/2021 Active Yes  
magnesium oxide (MAG-OX) 400 mg tablet Take 400 mg by mouth daily. Provider, Historical 3/2/2021 Active Yes  
melatonin 5 mg tablet Take 5 mg by mouth nightly. Provider, Historical 3/1/2021 Active Yes  
multivitamin (ONE A DAY) tablet Take 1 Tab by mouth daily. Provider, Historical 3/2/2021 Active Yes  
oxyCODONE ER Kem Beecham ER) 9 mg capsule Take 9 mg by mouth every twelve (12) hours. Provider, Historical 3/2/2021 0730 Active Yes  
pantoprazole (PROTONIX) 40 mg tablet Take 40 mg by mouth daily. Jann Ruib MD 3/2/2021 Active Yes  
predniSONE (DELTASONE) 5 mg tablet Take 5 mg by mouth daily. Provider, Historical 3/2/2021 Active Yes  
tolterodine ER (DETROL LA) 4 mg ER capsule Take 4 mg by mouth daily. Other, MD Jann 3/2/2021 Active Yes [address] [facility]   Contact: [phone]

## 2021-03-04 NOTE — PROGRESS NOTES
Patient contacted regarding recent discharge and COVID-19 risk. Discussed COVID-19 related testing which was not done at this time. Test results were not done. Patient informed of results, if available? yes Outreach made within 2 business days of discharge: Yes Care Transition Nurse/ Ambulatory Care Manager/ LPN Care Coordinator contacted the family by telephone to perform post discharge assessment. Verified name and  with family as identifiers. Patient has following risk factors of: COPD, asthma, diabetes and hospital admission to [facility], discharged on 3/3/2021. CTN/ACM/LPN reviewed discharge instructions, medical action plan and red flags related to discharge diagnosis. Reviewed and educated them on any new and changed medications related to discharge diagnosis. Advised obtaining a 90-day supply of all daily and as-needed medications. Advance Care Planning:  
Does patient have an Advance Directive: not reviewed with family member Education provided regarding infection prevention, and signs and symptoms of COVID-19 and when to seek medical attention with family who verbalized understanding. Discussed exposure protocols and quarantine from [address] [place] [address] Hwy you at higher risk for severe illness  and given an opportunity for questions and concerns. The family agrees to contact the COVID-19 hotline [phone] or PCP office for questions related to their healthcare. CTN/ACM/LPN provided contact information for future reference. From CDC: Are you at higher risk for severe illness?  Wash your hands often.  Avoid close contact (6 feet, which is about two arm lengths) with people who are sick.  Put distance between yourself and other people if COVID-19 is spreading in your community.  Clean and disinfect frequently touched surfaces.  Avoid all cruise travel and non-essential air travel.  
 Call your healthcare professional if you have concerns about COVID-19 and your underlying condition or if you are sick. For more information on steps you can take to protect yourself, see CDC's How to Protect Yourself Patient/family/caregiver given information for [employer] and agrees to enroll not offered as I spoke with son in law, not on HIPAA Plan for follow-up call in 7-14 days based on severity of symptoms and risk factors. Called pt to follow up on discharge. Pt's son in law answered the telephone. He reports that pt is doing well and has hospital prescribed medication and has follow up appts. Provided pt's son in law with [employer] telephone numbers and my contact information. No medical information provided to son in law. Will call in 2 weeks.

## 2021-03-05 NOTE — TELEPHONE ENCOUNTER
Patients daughter is calling as the patient was seen by Deny Ryan in the hospital and scheduled a follow up in 5 week with Dr. Pastor Rey to go over the holter results. The patient already has a cardiologists and they would like to have the holter results sent to her cardiologists instead of Dr. Pastor Rey. Please advise.     Phone: [phone] Radha Ply Daughter of pt)    Patients current Cardiologists is Name is Dr. Keys  at [facility] [phone]

## 2021-03-08 PROBLEM — R47.81 SLURRED SPEECH: Status: ACTIVE | Noted: 2021-01-01

## 2021-03-08 PROBLEM — G93.41 ACUTE METABOLIC ENCEPHALOPATHY: Status: ACTIVE | Noted: 2021-01-01
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
## 2021-03-08 PROBLEM — R47.1 DYSARTHRIA: Status: ACTIVE | Noted: 2021-01-01

## 2021-03-08 PROBLEM — S51.012A ELBOW LACERATION, LEFT, INITIAL ENCOUNTER: Status: ACTIVE | Noted: 2021-01-01

## 2021-03-08 NOTE — PROGRESS NOTES
Problem: Mobility Impaired (Adult and Pediatric) Goal: *Acute Goals and Plan of Care (Insert Text) Description: FUNCTIONAL STATUS PRIOR TO ADMISSION: Patient was modified independent using a SB quad cane for functional mobility. HOME SUPPORT PRIOR TO ADMISSION: The patient lived with daughter and son-in-law. Physical Therapy Goals Initiated 3/8/2021 1. Patient will move from supine to sit and sit to supine  in bed with supervision/set-up within 7 day(s). 2.  Patient will transfer from bed to chair and chair to bed with supervision/set-up using the least restrictive device within 7 day(s). 3.  Patient will perform sit to stand with supervision/set-up within 7 day(s). 4.  Patient will ambulate with supervision/set-up for 150 feet with the least restrictive device within 7 day(s). 5.  Patient will ascend/descend 4 stairs with 2 handrail(s) with minimal assistance/contact guard assist within 7 day(s). Outcome: Progressing Towards Goal 
Note: PHYSICAL THERAPY EVALUATION Patient: Baldemar Guzman (57 y.o. female) Date: 3/8/2021 Primary Diagnosis: Slurred speech [R47.81] Hyponatremia [E87.1] Dysarthria [R47.1] Fall from bed [W06. Randall Nissen Elbow laceration, left, initial encounter [S51.012A] Precautions:   WBAT, Fall ASSESSMENT Based on the objective data described below, the patient presents with decreased strength, endurance, increased confusion and overall mobility following admission for hyponatremia. Patient also s/p fall with elbow laceration and s/p stiches to Left elbow- currently wrapped in coban. Patient is overall CGA-MIN A for all upright activity. She reports calf cramping with ambulation- nursing notified. She has increased dyspnea on exertion on room air but sats stable 97% with activity and 100% at rest.  Patient return to supine. She is tangential and requires re-direction, but is re-directable and calm. Previous admission with history of CVA. Blayne Ernandez Current Level of Function Impacting Discharge (mobility/balance): CGA-MIN A with RW 
 
Functional Outcome Measure: The patient scored Total: 50/100 on the Barthel Index which is indicative of 50% impaired ability to care for basic self needs/dependency on others. Other factors to consider for discharge:  
  
Patient will benefit from skilled therapy intervention to address the above noted impairments. PLAN : 
Recommendations and Planned Interventions: bed mobility training, transfer training, gait training, therapeutic exercises, and therapeutic activities Frequency/Duration: Patient will be followed by physical therapy:  5 times a week to address goals. Recommendation for discharge: (in order for the patient to meet his/her long term goals) Physical therapy at least 2 days/week in the home AND ensure assist and/or supervision for safety with transfers and all upright activity This discharge recommendation: A follow-up discussion with the attending provider and/or case management is planned IF patient discharges home will need the following DME: to be determined (TBD) SUBJECTIVE:  
Patient stated i need to go to the bathroom.  OBJECTIVE DATA SUMMARY:  
HISTORY:   
Past Medical History:  
Diagnosis Date Anxiety and depression Arthritis Chronic obstructive pulmonary disease (HCC) Chronic pain DM type 2 causing vascular disease ([facility] [address] GERD (gastroesophageal reflux disease) Glaucoma HTN (hypertension) Hx of completed stroke 2021 Hypothyroid Incontinence Neuropathy Polypharmacy Past Surgical History:  
Procedure Laterality Date HX ORTHOPAEDIC    
 HX VASCULAR ACCESS Personal factors and/or comorbidities impacting plan of care: see above Home Situation Home Environment: Private residence One/Two Story Residence: One story Living Alone: No 
Support Systems: Family member(s) Patient Expects to be Discharged to[de-identified] Private residence Current DME Used/Available at Home: Marvelyn Europe, quad, Walker, rolling EXAMINATION/PRESENTATION/DECISION MAKING:  
Critical Behavior: 
Neurologic State: Alert, Eyes open spontaneously Orientation Level: Oriented to person, Oriented to place, Oriented to situation(Oriented to year after asked 2x.) Cognition: Follows commands, Memory loss, Impulsive, Poor safety awareness Range Of Motion: 
AROM: Generally decreased, functional 
  
  
  
PROM: Generally decreased, functional 
  
  
  
Strength:   
Strength: Generally decreased, functional 
  
  
  
  
  
  
Tone & Sensation:  
  
  
  
  
  
  
  
  
  
   
Coordination: 
Coordination: Generally decreased, functional 
Vision:  
  
Functional Mobility: 
Bed Mobility: 
Rolling: Minimum assistance Supine to Sit: Minimum assistance Sit to Supine: Moderate assistance Transfers: 
Sit to Stand: Minimum assistance Stand to Sit: Minimum assistance Bed to Chair: Minimum assistance Balance:  
  
Ambulation/Gait Training: 
Distance (ft): 20 Feet (ft) Assistive Device: Walker, rolling;Gait belt Ambulation - Level of Assistance: Contact guard assistance Gait Description (WDL): Exceptions to [facility] Gait Abnormalities: Decreased step clearance; Step to gait Functional Measure: 
Barthel Index: 
 
Bathin Bladder: 10 Bowels: 10 
Groomin Dressin Feedin Mobility: 5 Stairs: 0 Toilet Use: 5 Transfer (Bed to Chair and Back): 10 Total: 50/100 The Barthel ADL Index: Guidelines 1. The index should be used as a record of what a patient does, not as a record of what a patient could do. 2. The main aim is to establish degree of independence from any help, physical or verbal, however minor and for whatever reason. 3. The need for supervision renders the patient not independent. 4. A patient's performance should be established using the best available evidence. Asking the patient, friends/relatives and nurses are the usual sources, but direct observation and common sense are also important. However direct testing is not needed. 5. Usually the patient's performance over the preceding 24-48 hours is important, but occasionally longer periods will be relevant. 6. Middle categories imply that the patient supplies over 50 per cent of the effort. 7. Use of aids to be independent is allowed. Cindy Moura., Barthel, D.W. (9939). Functional evaluation: the Barthel Index. [address] [facility] (14)2. Haven Tyson carlos PRECIOUS Can, Tenzin Figueroa., Kar Maguire., [place], [address] [place] [address] Ave (1999). Measuring the change indisability after inpatient rehabilitation; comparison of the responsiveness of the Barthel Index and Functional [place] Measure. Journal of Neurology, Neurosurgery, and Psychiatry, 66(4), 427-815. Royal Alvarado, N.[place].A, AILYN Solano, & Krys Lancaster MSTEPHEN. (2004.) Assessment of post-stroke quality of life in cost-effectiveness studies: The usefulness of the Barthel Index and the EuroQoL-5D. [facility], 13, 641-17 Physical Therapy Evaluation Charge Determination History Examination Presentation Decision-Making HIGH Complexity :3+ comorbidities / personal factors will impact the outcome/ POC  HIGH Complexity : 4+ Standardized tests and measures addressing body structure, function, activity limitation and / or participation in recreation  HIGH Complexity : Unstable and unpredictable characteristics  Other outcome measures barthel index  HIGH Based on the above components, the patient evaluation is determined to be of the following complexity level: HIGH Pain Rating: 
None noted Activity Tolerance:  
Fair After treatment patient left in no apparent distress:  
Supine in bed, Call bell within reach, Bed / chair alarm activated, and Side rails x 3 
 
 COMMUNICATION/EDUCATION:  
The patients plan of care was discussed with: Occupational therapist and Registered nurse. Fall prevention education was provided and the patient/caregiver indicated understanding., Patient/family have participated as able in goal setting and plan of care. , and Patient/family agree to work toward stated goals and plan of care. Thank you for this referral. 
Hudson Mistry, PT, DPT Time Calculation: 20 mins

## 2021-03-08 NOTE — ED NOTES
TRANSFER - OUT REPORT: 
 
Verbal report given to [facility] RN (name) on Baldemar Guzman  being transferred to [facility] 320 (unit) for routine progression of care Report consisted of patients Situation, Background, Assessment and  
Recommendations(SBAR). Information from the following report(s) SBAR, Kardex, ED Summary, Intake/Output, Recent Results and Cardiac Rhythm NSR was reviewed with the receiving nurse. Lines:  
Venous Access Device power port 02/25/21 Upper chest (subclavicular area, right (Active) Central Line Being Utilized Yes 03/08/21 4580 Criteria for Appropriate Use Limited/no vessel suitable for conventional peripheral access 03/08/21 0909 Site Assessment Clean, dry, & intact 03/08/21 8840 Date of Last Dressing Change 03/07/21 03/08/21 7184 Dressing Status Clean, dry, & intact 03/08/21 4729 Dressing Type Transparent 03/08/21 0909 Positive Blood Return (Medial Site) Yes 03/08/21 3778 Action Taken (Medial Site) Alcohol 03/08/21 3109 Alcohol Cap Used Yes 03/08/21 1295 Opportunity for questions and clarification was provided. Patient transported with: 
 Monitor Tech Notified pt's daughter Jimmy Mendez that pt will be tx to room 320.

## 2021-03-08 NOTE — CONSULTS
NEPHROLOGY CONSULT NOTE Patient: Georgette Blankenship MRN: [id number]  PCP: Nasra Boyd MD  
:     1940  Age:   [de-identified] y.o. Sex:  female Referring physician: Maria Ines Engel DO 
 
 
REASON FOR CONSULT: 
Hyponatremia HPI: 
Georgette Blankenship is a [de-identified] y.o. female with a history of chronic hyponatremia, chronic COPD, hypertension, diabetes, anxiety who presented to the emergency department with the complaint of left elbow pain after having an episode of fall from the bed. In the emergency department she has a routine lab work which shows her serum sodium level is 120. Nephrology is contacted for the management of hyponatremia. Patient denies taking any new antipsychotic medication, NSAIDs or diuretics. She denies any headache or weakness. Review of Systems Pain in the elbow Past Medical History:  
Diagnosis Date  Anxiety and depression  Arthritis  Chronic obstructive pulmonary disease ([facility] [address]  Chronic pain  DM type 2 causing vascular disease ([facility] [address]  GERD (gastroesophageal reflux disease)  Glaucoma   
 HTN (hypertension)  Hx of completed stroke   Hypothyroid  Incontinence  Neuropathy  Polypharmacy Past Surgical History:  
Procedure Laterality Date  HX ORTHOPAEDIC    
 HX VASCULAR ACCESS Allergies Allergen Reactions  Shellfish Derived Anaphylaxis Current Facility-Administered Medications Medication Dose Route Frequency  bacitracin 500 unit/gram packet 1 Packet  1 Packet Topical TID  acetaminophen (TYLENOL) tablet 650 mg  650 mg Oral ONCE  
 glucose chewable tablet 16 g  4 Tab Oral PRN  
 dextrose (D50W) injection syrg 12.5-25 g  25-50 mL IntraVENous PRN  
 glucagon (GLUCAGEN) injection 1 mg  1 mg IntraMUSCular PRN  
 ondansetron (ZOFRAN) injection 4 mg  4 mg IntraVENous Q6H PRN  
  aspirin chewable tablet 81 mg  81 mg Oral DAILY  atorvastatin (LIPITOR) tablet 80 mg  80 mg Oral QHS  labetaloL (NORMODYNE;TRANDATE) 20 mg/4 mL (5 mg/mL) injection 5 mg  5 mg IntraVENous Q10MIN PRN  
 bisacodyL (DULCOLAX) suppository 10 mg  10 mg Rectal DAILY PRN  
 acetaminophen (TYLENOL) tablet 650 mg  650 mg Oral Q4H PRN Or  
 acetaminophen (TYLENOL) solution 650 mg  650 mg Per NG tube Q4H PRN Or  
 acetaminophen (TYLENOL) suppository 650 mg  650 mg Rectal Q4H PRN  
 insulin lispro (HUMALOG) injection   SubCUTAneous AC&HS Current Outpatient Medications Medication Sig  clopidogreL (Plavix) 75 mg tab Take 1 Tab by mouth daily for 30 days.  aspirin delayed-release 81 mg tablet Take 1 Tab by mouth daily.  oxyCODONE ER (Xtampza ER) 9 mg capsule Take 9 mg by mouth every twelve (12) hours.  insulin lispro (HumaLOG KwikPen Insulin) 100 unit/mL kwikpen by SubCUTAneous route Before breakfast, lunch, and dinner. Sliding scale  predniSONE (DELTASONE) 5 mg tablet Take 5 mg by mouth daily.  albuterol-ipratropium (DUO-NEB) 2.5 mg-0.5 mg/3 ml nebu 3 mL by Nebulization route every four (4) hours as needed for Wheezing.  latanoprost (XALATAN) 0.005 % ophthalmic solution Administer 1 Drop to both eyes daily.  insulin glargine (Lantus Solostar U-100 Insulin) 100 unit/mL (3 mL) inpn 16 Units by SubCUTAneous route daily.  CLONAZEPAM PO Take 0.25 mg by mouth every evening. ODT formulation  levothyroxine (Levo-T) 75 mcg tablet Take 75 mcg by mouth Daily (before breakfast).  tolterodine ER (DETROL LA) 4 mg ER capsule Take 4 mg by mouth daily.  atorvastatin (LIPITOR) 80 mg tablet Take 80 mg by mouth nightly.  lisinopriL (PRINIVIL, ZESTRIL) 40 mg tablet Take 40 mg by mouth daily.  pantoprazole (PROTONIX) 40 mg tablet Take 40 mg by mouth daily.  gabapentin (NEURONTIN) 300 mg capsule Take 600 mg by mouth two (2) times a day.  fluticasone-umeclidinium-vilanterol (Trelegy Ellipta) 100-62.5-25 mcg inhaler Take 1 Puff by inhalation daily.  melatonin 5 mg tablet Take 5 mg by mouth nightly.  calcium-cholecalciferol, d3, (CALCIUM 600 + D) 600-125 mg-unit tab Take 1 Tab by mouth daily.  multivitamin (ONE A DAY) tablet Take 1 Tab by mouth daily.  magnesium oxide (MAG-OX) 400 mg tablet Take 400 mg by mouth daily.  B.infantis-B.ani-B.long-B.bifi (Probiotic 4X) 10-15 mg TbEC Take 2 Caps by mouth two (2) times a day.  BENEFIBER, GUAR GUM, PO Take 1 Dose by mouth two (2) times a day. 1 dose - 2 teaspoons Family History Problem Relation Age of Onset  No Known Problems Other   
     reviewed, patient did not know Social History Socioeconomic History  Marital status:  Spouse name: Not on file  Number of children: Not on file  Years of education: Not on file  Highest education level: Not on file Occupational History  Not on file Social Needs  Financial resource strain: Not on file  Food insecurity Worry: Not on file Inability: Not on file  Transportation needs Medical: Not on file Non-medical: Not on file Tobacco Use  Smoking status: Former Smoker Quit date: 1991 Years since quittin.2 Substance and Sexual Activity  Alcohol use: Never Frequency: Never  Drug use: Never  Sexual activity: Not on file Lifestyle  Physical activity Days per week: Not on file Minutes per session: Not on file  Stress: Not on file Relationships  Social connections Talks on phone: Not on file Gets together: Not on file Attends Orthodox service: Not on file Active member of club or organization: Not on file Attends meetings of clubs or organizations: Not on file Relationship status: Not on file  Intimate partner violence Fear of current or ex partner: Not on file Emotionally abused: Not on file Physically abused: Not on file Forced sexual activity: Not on file Other Topics Concern  Not on file Social History Narrative  Not on file Vitals:  
 03/08/21 0530 03/08/21 0545 03/08/21 0700 03/08/21 8267 BP: (!) 135/39 (!) 146/55 (!) 173/72 (!) 150/67 Pulse: 75 76 79 79 Resp: 17 16  18 Temp:    98 °F (36.7 °C) SpO2: 95% 94% 97% 100% Weight:      
Height:      
 
 
 
Intake/Output Summary (Last 24 hours) at 3/8/2021 1017 Last data filed at 3/8/2021 4221 Gross per 24 hour Intake  Output 300 ml Net -300 ml PHYSICAL EXAM: 
GENERAL : Chronically ill looking female lying down in bed with no acute distress HEENT: AT NC PEERLA NECK: Supple no JVP 
CVS: S1 S2 RRR, no murmur or gallops heard RS: CTABL, no rhonchi,or wheezing heard ABDOMEN: soft NT ND positive BS EXTREMITY: No edema clubbing or cyanosis, pedal pulse + NEUROLOGY: AAA X3, no focal deficit or asterixis LABS/STUDIES: 
BMP:  
Lab Results Component Value Date/Time  (L) 03/08/2021 01:19 AM  
 K 4.9 03/08/2021 01:19 AM  
 CL 90 (L) 03/08/2021 01:19 AM  
 CO2 21 03/08/2021 01:19 AM  
 AGAP 9 03/08/2021 01:19 AM  
  03/08/2021 01:19 AM  
 BUN 11 03/08/2021 01:19 AM  
 CREA 0.66 03/08/2021 01:19 AM  
 GFRAA >60 03/08/2021 01:19 AM  
 GFRNA >60 03/08/2021 01:19 AM  
  
 
CBC:   
Lab Results Component Value Date/Time WBC 11.2 (H) 03/08/2021 01:19 AM  
 HGB 11.3 (L) 03/08/2021 01:19 AM  
 HCT 33.3 (L) 03/08/2021 01:19 AM  
  03/08/2021 01:19 AM  
 
 
No results found for: MCACR, MCA1, MCA2, MCA3, MCAU, MCAU2, MCALPOCT Lab Results Component Value Date/Time  Color YELLOW/STRAW 03/08/2021 01:44 AM  
 Appearance CLEAR 03/08/2021 01:44 AM  
 Specific gravity 1.006 03/08/2021 01:44 AM  
 pH (UA) 7.0 03/08/2021 01:44 AM  
 Protein Negative 03/08/2021 01:44 AM  
 Glucose Negative 03/08/2021 01:44 AM  
 Ketone Negative 03/08/2021 01:44 AM  
 Bilirubin Negative 03/08/2021 01:44 AM  
 Urobilinogen 0.2 03/08/2021 01:44 AM  
 Nitrites Negative 03/08/2021 01:44 AM  
 Leukocyte Esterase LARGE (A) 03/08/2021 01:44 AM  
 Epithelial cells FEW 03/08/2021 01:44 AM  
 Bacteria Negative 03/08/2021 01:44 AM  
 WBC 0-4 03/08/2021 01:44 AM  
 RBC 5-10 03/08/2021 01:44 AM  
 
 
 
ASSESSMENT/PLAN: 
 
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 Hypertension Plan: 
Check urine, serum osmolality, urine sodium Fluid restriction 1 L Check uric acid, BMP every 4 hours Stop IV fluid Goal of sodium correction 6 to 8 mmol in 24 hours If she has very high urine osmolarity then we will give tolvaptan No urgent indication for hypertonic saline Corey Samaniego MD 
3/8/2021 [facility]: 
[email or web address] [place] office: 
[address], [address] [place], [address] [place] [address] Phone: [phone] Fax :     [phone] [place] office: 
[address] [facility] [place], [address] Phone - [phone] Fax - [phone]

## 2021-03-08 NOTE — TELEPHONE ENCOUNTER
Pt had loop applied in hospital.  Informed hosp to send all reports to Dr Debora Winn but we will still be signing Dr. Kev Willams in [facility] EKG verified she understands & making note.

## 2021-03-08 NOTE — ED TRIAGE NOTES
Pt reports she fell out of bed \"just a little while ago\" and hit her arm. Denies hitting her head or LOC. Hx of stroke with left sided deficit. Takes aspirin and plavix. Large skin tear noted to left arm. Denies any new weakness or numbness/tingling. Per pt's daughter, pt has new onset slurred speech on the ride here. Recently had an ischemic stroke. Code stroke level 1 called and pt taken immediately to CT scan.

## 2021-03-08 NOTE — H&P
[facility] [address] [facility], [facility] [address] 
[phone] Hospitalist Admission Note NAME: Nelda Esqueda :  1940 MRN:  [id number] Date/Time:  3/8/2021 6:51 AM 
 
Patient PCP: Kristopher Ramires MD 
 
Emergency Contact:   
Extended Emergency Contact Information Primary Emergency Contact: Kell Ruvalcaba Home Phone: [phone] Relation: Daughter  needed? No   
 
Code: Full Code Isolation Precautions: There are currently no Active Isolations Subjective: CHIEF COMPLAINT: Fall HISTORY OF PRESENT ILLNESS:    
Ms. Gordo Meyer is a [de-identified] y.o. female with history that includes anxiety, diabetes type 2, chronic pain, recent PE on Eliquis, and recent admission for dizziness likely due to polypharmacy and also had hyponatremia presents today after falling from her bed as she tried to get up where she hit her left elbow which eventually required suturing. According to the ER physician the daughter said that she found her on the floor and that her speech was slurred but neither the ER physician or myself noticed that. This triggered a code stroke where teleneurology did not recommend TPA only stroke work-up. In addition patient's work-up revealed a sodium of 120 which is quite a bit lower than her usual hyponatremia. When I examined the patient I was not able to obtain a history from her due to confusion. She is not aware as to why she is here but she does know that she fell at home. She denies chest pain or shortness of breath. I did not notice any dysarthria or slurred speech as noted above and no neurological deficits otherwise. Allergies Allergen Reactions  Shellfish Derived Anaphylaxis Prior to Admission medications Medication Sig Start Date End Date Taking? Authorizing Provider  
clopidogreL (Plavix) 75 mg tab Take 1 Tab by mouth daily for 30 days.  3/3/21 4/2/21  Yessica Jackson MD  
 aspirin delayed-release 81 mg tablet Take 1 Tab by mouth daily. 2/27/21   Jeramy Tam MD  
oxyCODONE ER [facility]) 9 mg capsule Take 9 mg by mouth every twelve (12) hours. Provider, Historical  
insulin lispro (HumaLOG KwikPen Insulin) 100 unit/mL kwikpen by SubCUTAneous route Before breakfast, lunch, and dinner. Sliding scale    Provider, Historical  
predniSONE (DELTASONE) 5 mg tablet Take 5 mg by mouth daily. Provider, Historical  
albuterol-ipratropium (DUO-NEB) 2.5 mg-0.5 mg/3 ml nebu 3 mL by Nebulization route every four (4) hours as needed for Wheezing. 1/4/21   Judy Mueller MD  
latanoprost (XALATAN) 0.005 % ophthalmic solution Administer 1 Drop to both eyes daily. Jann Rubi MD  
insulin glargine (Lantus Solostar U-100 Insulin) 100 unit/mL (3 mL) inpn 16 Units by SubCUTAneous route daily. Jann Rubi MD  
CLONAZEPAM PO Take 0.25 mg by mouth every evening. ODT formulation    Jann Rubi MD  
levothyroxine (Levo-T) 75 mcg tablet Take 75 mcg by mouth Daily (before breakfast). Jann Rubi MD  
tolterodine ER (DETROL LA) 4 mg ER capsule Take 4 mg by mouth daily. Jann Rubi MD  
atorvastatin (LIPITOR) 80 mg tablet Take 80 mg by mouth nightly. Jann Rubi MD  
lisinopriL (PRINIVIL, ZESTRIL) 40 mg tablet Take 40 mg by mouth daily. Jann Rubi MD  
pantoprazole (PROTONIX) 40 mg tablet Take 40 mg by mouth daily. Jann Rubi MD  
gabapentin (NEURONTIN) 300 mg capsule Take 600 mg by mouth two (2) times a day. Jann Rubi MD  
fluticasone-umeclidinium-vilanterol (Trelegy Ellipta) 100-62.5-25 mcg inhaler Take 1 Puff by inhalation daily. Provider, Historical  
melatonin 5 mg tablet Take 5 mg by mouth nightly. Provider, Historical  
calcium-cholecalciferol, d3, (CALCIUM 600 + D) 600-125 mg-unit tab Take 1 Tab by mouth daily. Provider, Historical  
multivitamin (ONE A DAY) tablet Take 1 Tab by mouth daily.     Provider, Historical  
 magnesium oxide (MAG-OX) 400 mg tablet Take 400 mg by mouth daily. Provider, Historical  
B.infantis-B.ani-B.long-B.bifi (Probiotic 4X) 10-15 mg TbEC Take 2 Caps by mouth two (2) times a day. Provider, Historical  
BENEFIBER, GUAR GUM, PO Take 1 Dose by mouth two (2) times a day. 1 dose - 2 teaspoons    Provider, Historical  
 
 
Past Medical History:  
Diagnosis Date  Anxiety and depression  Arthritis  Chronic obstructive pulmonary disease ([facility] [facility]  Chronic pain  DM type 2 causing vascular disease ([facility] [address] [facility]  GERD (gastroesophageal reflux disease)  Glaucoma   
 HTN (hypertension)  Hx of completed stroke   Hypothyroid  Incontinence  Neuropathy  Polypharmacy Past Surgical History:  
Procedure Laterality Date  HX ORTHOPAEDIC    
 HX VASCULAR ACCESS Social History Tobacco Use  Smoking status: Former Smoker Quit date: 1991 Years since quittin.2 Substance Use Topics  Alcohol use: Never Frequency: Never Family History Problem Relation Age of Onset  No Known Problems Other   
     reviewed, patient did not know [address] and medications were reviewed. Review of Systems (14 point ROS): 
(bold if positive, if negative) Gen:    , , , Eyes:  , , ENT:   , CVS:   , , , , Pulm:  , , GI:       , , , MS:     Pain left elbow due to fall and lacerationPsy:    anxiety, Joel:   , , , , , , , Try but was unable to obtain the remainder of the review of system because the patient is confused and just kept saying that she did not know. Objective:   
 
Visit Vitals BP (!) 146/55 Pulse 76 Temp 97.9 °F (36.6 °C) Resp 16 Ht 5' 4\" (1.626 m) Wt 58.6 kg (129 lb 3 oz) SpO2 94% BMI 22.18 kg/m² Exam:  
 
Physical Exam: 
 
General:  Alert, very anxious and confused. Head: Normocephalic, atraumatic Eyes: PERRL and EOMI sclera clear ENT: Lips, mucosa, and tongue normal.  
Neck: supple, no tenderness Lungs: CTA with good BS and normal effort Heart: S1-S2, RRR Abd: SNTBS(+) Ext: no cyanosis, no edema. Left elbow with laceration approximately 6 cm status post approximation Skin: Skin color, texture, turgor normal. No rashes or lesions Neuro: Alert and oriented x only to self and partly to the situation but not location or date. Psych:anxious but cooperative Labs: 
 
Recent Labs 03/08/21 0119 WBC 11.2* HGB 11.3* HCT 33.3*  
 Recent Labs 03/08/21 0119 *  
K 4.9 CL 90* CO2 21  BUN 11  
CREA 0.66  
CA 7.9*  
MG 1.7 ALB 3.2* TBILI 0.6 ALT 52 Lab Results Component Value Date/Time Glucose (POC) 124 (H) 03/08/2021 12:46 AM  
 Glucose (POC) 207 (H) 03/03/2021 11:24 AM  
 
Recent Labs 03/08/21 0119 INR 1.3* Radiology and EKG reviewed:    
Xr Elbow Lt Min 3 V Result Date: 3/8/2021 No acute abnormality. Chronic radial neck deformity is noted. Ct Spine Cerv Wo Cont Result Date: 3/8/2021 No acute abnormality. Ct Code Neuro Head Wo Contrast 
 
Result Date: 3/8/2021 No acute intracranial abnormality. Stable chronic infarcts and periventricular white matter disease. The ED was notified. Reviewed the imaging interpretation by radiology. **Chart reviewed in [facility]** Assessment/Plan:   
 
Acute metabolic encephalopathy (1/2/4257) / Slurred speech (3/8/2021) / Dysarthria (3/8/2021): Very likely due to hyponatremia. However code stroke was called and at this point we will proceed with the stroke admission order protocol. Neurology consulted. But I think that we would need to correct her hyponatremia to correct her altered mental status. Hyponatremia (3/2/2021):  Hyponatremia work-up: Urine sodium, urine osmolarity, serum osmolality, and uric acid. BMP checks every 6-8 hours. IV fluid replacement with goal correction of 0.2 to 0.3 mEq/h or 6 to 8 mEq in 24 hours to avoid central pontine myelinolysis. Consult nephrology. Fall from bed (3/8/2021) / Elbow laceration, left, initial encounter (3/8/2021): Status post laceration. Fall precautions. Wound care with dressing changes. Body mass index is 22.18 kg/m².: 18.5 - 24.9 Normal weight Risk of deterioration: high Total time spent with patient: 79 Minutes **I personally saw and examined the patient during this time period** Discussed:  Code Status and Care Plan discussed with:  [x]Patient  []Family  []Care Giver  [x]ED Doc  [x]RN  []Specialist  []Care Manager Prophylaxis:  SCD's 
 
Probable Disposition:  Home w/Family Patient was seen:  After Midnight 3/8/2021  
 
 
        
___________________________________________________ Admitting Physician: Billy Ba MD

## 2021-03-08 NOTE — PROGRESS NOTES
Problem: Self Care Deficits Care Plan (Adult) Goal: *Acute Goals and Plan of Care (Insert Text) Description:  
FUNCTIONAL STATUS PRIOR TO ADMISSION: Patient was modified independent using a SB quad cane for functional mobility. Patient reported her daughter assists her with bathing and dressing when she needs it HOME SUPPORT: The patient lived with her daughter. Occupational Therapy Goals Initiated 3/8/2021 1. Patient will perform grooming with supervision/set-up standing at the sink within 7 day(s). 2.  Patient will perform upper body dressing and lower body dressing with supervision/set-up within 7 day(s). 3.  Patient will perform toilet transfers with supervision/set-up within 7 day(s). 4.  Patient will perform all aspects of toileting with supervision/set-up within 7 day(s). 5.  Patient will participate in upper extremity therapeutic exercise/activities with supervision/set-up for 10 minutes within 7 day(s). Outcome: Not Met OCCUPATIONAL THERAPY EVALUATION Patient: Lionel Braswell (23 y.o. female) Date: 3/8/2021 Primary Diagnosis: Slurred speech [R47.81] Hyponatremia [E87.1] Dysarthria [R47.1] Fall from bed [W06. Alden Bijou Elbow laceration, left, initial encounter [S51.012A] Precautions:   Fall, Skin ASSESSMENT Based on the objective data described below, the patient presents with complaint of feeling bad and sick to her stomach. Left UE with coban in place and patient guarding in elbow flexion. Decreased ability to perform basic ADL's at this time. Demonstrated ability to reach LE's to doff slippers in bed, reports having assist at home from her daughter for ADL's. Decreased hearing making communication difficult and does not have hearing aids. Current Level of Function Impacting Discharge (ADLs/self-care): max assist LE ADL's, mod assist UE ADL's, max assist toileting Functional Outcome Measure: The patient scored 45/100 on the Barthel Index outcome measure Other factors to consider for discharge:  
  
Patient will benefit from skilled therapy intervention to address the above noted impairments. PLAN : 
Recommendations and Planned Interventions: self care training, functional mobility training, therapeutic exercise, balance training, therapeutic activities, endurance activities, patient education, home safety training, and family training/education Frequency/Duration: Patient will be followed by occupational therapy 5 times a week to address goals. Recommendation for discharge: (in order for the patient to meet his/her long term goals) Occupational therapy at least 2 days/week in the home AND ensure assist and/or supervision for safety with ADL's This discharge recommendation: 
Has not yet been discussed the attending provider and/or case management IF patient discharges home will need the following DME: none SUBJECTIVE:  
Patient stated I just feel so bad.  OBJECTIVE DATA SUMMARY:  
HISTORY:  
Past Medical History:  
Diagnosis Date Anxiety and depression Arthritis Chronic obstructive pulmonary disease (HCC) Chronic pain DM type 2 causing vascular disease ([facility] [address] GERD (gastroesophageal reflux disease) Glaucoma HTN (hypertension) Hx of completed stroke 2021 Hypothyroid Incontinence Neuropathy Polypharmacy Past Surgical History:  
Procedure Laterality Date HX ORTHOPAEDIC    
 HX VASCULAR ACCESS Expanded or extensive additional review of patient history:  
 
Home Situation Home Environment: Private residence One/Two Story Residence: One story Living Alone: No 
Support Systems: Family member(s) Patient Expects to be Discharged to[de-identified] Private residence Current DME Used/Available at Home: [address] [facility] [place], quad, Walker, rolling Hand dominance: Right EXAMINATION OF PERFORMANCE DEFICITS: 
Cognitive/Behavioral Status: 
Neurologic State: Alert Orientation Level: Oriented to person;Oriented to place;Oriented to situation;Disoriented to time Cognition: Follows commands; Appropriate for age attention/concentration(limited by decreased hearing) Perception: Appears intact Perseveration: Perseverates during conversation Safety/Judgement: Awareness of environment; Fall prevention; Insight into deficits Skin: left UE with dressing and coban in place, thin skin Edema: minimal left UE Hearing: Auditory Auditory Impairment: Hard of hearing, bilateral, Hearing aid(s) Hearing Aids/Status: Does not own Vision/Perceptual:   
    
    
    
  
    
    
Corrective Lenses: Glasses Range of Motion: 
AROM: Generally decreased, functional 
PROM: Generally decreased, functional(decreased left elbow extension) Strength: 
Strength: Generally decreased, functional 
  
  
  
  
 
Coordination: 
Coordination: Generally decreased, functional 
    
Gross Motor Skills-Upper: Left Impaired;Right Intact Balance: 
Sitting: Impaired Functional Mobility and Transfers for ADLs: 
Bed Mobility: 
Rolling: Minimum assistance; Additional time;Assist x1 Supine to Sit: Minimum assistance;Assist x1 Sit to Supine: Moderate assistance Transfers: 
Sit to Stand: Minimum assistance Stand to Sit: Contact guard assistance Bed to Chair: Minimum assistance Bathroom Mobility: Dependent/total assistance Toilet Transfer : Minimum assistance(stand pivot) ADL Assessment: 
Feeding: Supervision;Setup Oral Facial Hygiene/Grooming: Setup;Supervision Bathing: Maximum assistance Upper Body Dressing: Moderate assistance Lower Body Dressing: Moderate assistance;Maximum assistance Toileting: Maximum assistance ADL Intervention and task modifications: Instructed on use of call bell and phone Cognitive Retraining Safety/Judgement: Awareness of environment; Fall prevention; Insight into deficits Therapeutic Exercise: Instructed to perform bilateral ankle pumps in the bed Functional Measure: 
Barthel Index: 
 
Bathin Bladder: 5 Bowels: 10 
Groomin Dressin Feedin Mobility: 5 Stairs: 0 Toilet Use: 5 Transfer (Bed to Chair and Back): 10 Total: 45/100 The Barthel ADL Index: Guidelines 1. The index should be used as a record of what a patient does, not as a record of what a patient could do. 2. The main aim is to establish degree of independence from any help, physical or verbal, however minor and for whatever reason. 3. The need for supervision renders the patient not independent. 4. A patient's performance should be established using the best available evidence. Asking the patient, friends/relatives and nurses are the usual sources, but direct observation and common sense are also important. However direct testing is not needed. 5. Usually the patient's performance over the preceding 24-48 hours is important, but occasionally longer periods will be relevant. 6. Middle categories imply that the patient supplies over 50 per cent of the effort. 7. Use of aids to be independent is allowed. Princess Dueñas., Barthel, D.W. (). Functional evaluation: the Barthel Index. [facility] (14)2. Meera Barboza, PRECIOUS, Rajat Pascal., Gwen Harrison., [place], [address] [place] [address] (). Measuring the change indisability after inpatient rehabilitation; comparison of the responsiveness of the Barthel Index and Functional Hematite Measure. Journal of Neurology, Neurosurgery, and Psychiatry, 66(4), 074-404. GISELA French, AILYN Solano, & Ana Lockett MWhitleyA. (2004.) Assessment of post-stroke quality of life in cost-effectiveness studies: The usefulness of the Barthel Index and the EuroQoL-5D. [facility], 13, 349-97 Occupational Therapy Evaluation Charge Determination History Examination Decision-Making LOW Complexity : Brief history review  MEDIUM Complexity : 3-5 performance deficits relating to physical, cognitive , or psychosocial skils that result in activity limitations and / or participation restrictions MEDIUM Complexity : Patient may present with comorbidities that affect occupational performnce. Miniml to moderate modification of tasks or assistance (eg, physical or verbal ) with assesment(s) is necessary to enable patient to complete evaluation Based on the above components, the patient evaluation is determined to be of the following complexity level: LOW Pain Rating: 
Complaint of abdominal pain, RN aware, asking for pain med Activity Tolerance:  
Poor After treatment patient left in no apparent distress:   
Supine in bed, Heels elevated for pressure relief, Call bell within reach, and Side rails x 3 
 
COMMUNICATION/EDUCATION:  
The patients plan of care was discussed with: Physical therapist and Registered nurse. Home safety education was provided and the patient/caregiver indicated understanding. and Patient/family have participated as able in goal setting and plan of care. This patients plan of care is appropriate for delegation to [facility]. Thank you for this referral. 
Yesica Jacome OTR/L Time Calculation: 18 mins

## 2021-03-08 NOTE — ED NOTES
Bedside and Verbal shift change report given to Maddie RN (oncoming nurse) by Natalie Moreno RN (offgoing nurse). Report included the following information SBAR, Kardex, ED Summary, Intake/Output, Recent Results and Cardiac Rhythm Pt not on monitor. Pt calling out, was assisted back to bed. Pt c/o thirst, given small sip of drink.

## 2021-03-08 NOTE — ED NOTES
Bedside and Verbal shift change report given to Jackson Mayberry (oncoming nurse) by Tracee eGller (offgoing nurse). Report included the following information SBAR, ED Summary, MAR and Recent Results.

## 2021-03-08 NOTE — ED NOTES
Bedside and Verbal shift change report given to [employer] (oncoming nurse) by Taya Neves (offgoing nurse). Report included the following information SBAR, Kardex, ED Summary, [facility] and Recent Results.

## 2021-03-08 NOTE — CONSULTS
[facility]: [address] [facility] [address] [facility] [phone] Name:   [facility] Medical record #: [id number] Admission Date: 3/8/2021 Who Consulted: Dr. Army Seymour Reason for Consult:  Eval and treat stroke HISTORY OF PRESENT ILLNESS:  
 
This is a [de-identified] y.o. female who is admitted for fall. Ms. Edward Patel presented to the ED after falling out of bed and hitting her elbow. Her daughter noted at the time that she had slurred speech. Upon admission she had left sided weakness, but her speech was clear. Her admission NA was 120. The Neurology Service is asked to evaluate for new stroke. SHe was last seen by our service on 3/3/2021 by Dr. Sherine Sweeney for dizziness. Neurologic History: 
2-25 to 2-27-21:  for Acute pulmonary embolism, acute DVT. Brain MRI during that admission showed \"tiny focus of acute infarction right precentral gyrus.  Chronic right frontoparietal infarct with volume loss.  Moderate white matter disease. \" per Neurology consult note by Dr Sourav Thompson. EEG done during that admission showed intermittent slowing in the right hemisphere consistent with her previous CVA. No seizure discharges were seen. Carotid doppler showed < 50% ICA stenosis on both sides; left vertebral flow was antegrade and right vertebral flow was \"bidirectional, suggestive of possible subclavian steal.\"  Due to Acute PE, patient was started on Eliquis. 3-3-2021:  Seen by Dr. Sherine Sweeney for dizziness and discussion of anticoagulant. Recommended DAPT. Eliquis stopped. 3-3-21 Exam by Dr. Sherine Sweeney Awake, resting in bedside chair, PT in room about to work with patient Pt is fully conversant, normal hearing, normal speech EOMI, face symmetric, VF normal bilaterally Normal shrug on right; reduced shrug on left (d/t left hemiparesis) Intact LT in all exts 5/5 strength on right side LUE with spasticity and 4 to 4+/5 strength LLE with 4/5 strenght DTRs: trace throughout Gait; not examined 
  
 
 
Neuro-imaging:  
 
CT Head: No acute intracranial abnormality. Stable chronic infarcts and periventricular 
white matter disease. EKG: normal EKG, normal sinus rhythm, unchanged from previous tracings, normal sinus rhythm. Care Plan discussed with: 
Patient x Family RN Care Manager Consultant/Specialist:    
 
 
Thank you for allowing the Neurology Service the pleasure of participating in the care of your patient. This patient will be discussed with my collaborating care team physician,  Dr. Luis Miguel Farmer and he may have further recommendations regarding this patient's care Liza Jc, [facility] 
==================== Attending Attestation:  
 
 
NEUROLOGY NOTE ADDENDUM: 
 
3/8/2021 I have reviewed the documentation provided by the nurse practitioner, discussed her findings, clinical impression, and the proposed management plans with regards to this patient's encounter. I have personally performed a face to face diagnostic evaluation on this patient. My findings are as follows: 
 
 
More Talamantes is a [de-identified] y.o. female who  has a past medical history of Anxiety and depression, Arthritis, Chronic obstructive pulmonary disease ([address] Chronic pain, DM type 2 causing vascular disease ([address] GERD (gastroesophageal reflux disease), Glaucoma, HTN (hypertension), completed stroke (2021), Hypothyroid, Incontinence, Neuropathy, and Polypharmacy. who presents for evaluation of dizziness. Patient has history of stroke with residual L Hemiplegia with flexion contracture. On ASA 81 every day Patient apparently fell out of bed and was noted to have slurred speech. Patien talso complaining of nausea and \"not feeling well\". Exam: 
Visit Vitals BP (!) 150/67 (BP 1 Location: Right upper arm, BP Patient Position: At rest) Pulse 79 Temp 98 °F (36.7 °C) Resp 18 Ht 5' 4\" (1.626 m) Wt 58.6 kg (129 lb 3 oz) SpO2 100% BMI 22.18 kg/m² Gen: Awake, alert, follows commands Complaining of nausea Knows she is in the hospital 
Does not know the president Able to name Motor function: L UE 3/5 with flexion contracture Rest is 5/5 Sensory: intact to LT, PP and JPS DTRs ++ in all extremities, (-) Babinski Good FTN and HTS Gait: Deferred Assessment Dizziness and nausea, possible due to hyponatremia History of R sided stroke with flexion contracture Plan 1) MRI brain to look for new stroke 2) Treat hyponatremia 3) Continue ASA 81 every day for now. WIll hold off Plavix (Patient is a non-responder). Will consider adding Brilinta if MRI brain shows a stroke Thank you for the consultation. Jessica Mei MD 
Diplomate, American Board of Psychiatry and Neurology Diplomate, Neuromuscular Medicine Diplomate, [address] [place] [address] Board of Electrodiagnostic Medicine Assessment/Plan: 1. Transient Ischemic Attack, r/o Stroke: · Continue ASA. Will stop Plavix since she is a non-responder and if MRI brain shows no stroke · 2/27/2021 P2Y12 is 246 · Neurochecks:  Every 4 hours · Blood Sugar Goal:  140-180 · BP Goal: Less than 180/105 for 24 hours · Nursing to do stroke/TIA education · Telemetry for at least 24 hours Stroke work up 2/26/2021 · A1C:  6.4 · LDL:  26 
· TTE:  EF 60-65% · MRI: 
· Carotid vascular imaging: no stenosis on carotid duplex Risk factors for stroke include:  DM, HTN, CAD, HLD, physical inactivity · Discussed with patient · Discussed signs/symptoms of stroke and when to call 911 2. Mobility:  
· Has been/not been OOB. · PT/OT to eval for rehab 3. Diet:   
· Does not need SLP, passed STAND 4. VTE Prophylaxes: · Per Primary team   
  
 
 
 Review of Systems: 10 point ROS was performed. Pertinent positives listed in HPI. Negative ROS is as follows. Pt denies: angina, palpitations, paresthesias, weakness, vision loss, slurred speech, aphasia, confusion, fever, chills, falls, headache, diplopia, back pain, neck pain, prior episodes of vertigo, hallucinations, new medications or dosage changes. Allergies: Allergies Allergen Reactions  Shellfish Derived Anaphylaxis Outpatient Meds No current facility-administered medications on file prior to encounter. Current Outpatient Medications on File Prior to Encounter Medication Sig Dispense Refill  clopidogreL (Plavix) 75 mg tab Take 1 Tab by mouth daily for 30 days. 30 Tab 0  
 aspirin delayed-release 81 mg tablet Take 1 Tab by mouth daily. 30 Tab 0  
 oxyCODONE ER (Xtampza ER) 9 mg capsule Take 9 mg by mouth every twelve (12) hours.  insulin lispro (HumaLOG KwikPen Insulin) 100 unit/mL kwikpen by SubCUTAneous route Before breakfast, lunch, and dinner. Sliding scale  predniSONE (DELTASONE) 5 mg tablet Take 5 mg by mouth daily.  albuterol-ipratropium (DUO-NEB) 2.5 mg-0.5 mg/3 ml nebu 3 mL by Nebulization route every four (4) hours as needed for Wheezing. 30 Nebule 0  
 latanoprost (XALATAN) 0.005 % ophthalmic solution Administer 1 Drop to both eyes daily.  insulin glargine (Lantus Solostar U-100 Insulin) 100 unit/mL (3 mL) inpn 16 Units by SubCUTAneous route daily.  CLONAZEPAM PO Take 0.25 mg by mouth every evening. ODT formulation  levothyroxine (Levo-T) 75 mcg tablet Take 75 mcg by mouth Daily (before breakfast).  tolterodine ER (DETROL LA) 4 mg ER capsule Take 4 mg by mouth daily.  atorvastatin (LIPITOR) 80 mg tablet Take 80 mg by mouth nightly.  lisinopriL (PRINIVIL, ZESTRIL) 40 mg tablet Take 40 mg by mouth daily.  pantoprazole (PROTONIX) 40 mg tablet Take 40 mg by mouth daily.  gabapentin (NEURONTIN) 300 mg capsule Take 600 mg by mouth two (2) times a day.  fluticasone-umeclidinium-vilanterol (Trelegy Ellipta) 100-62.5-25 mcg inhaler Take 1 Puff by inhalation daily.  melatonin 5 mg tablet Take 5 mg by mouth nightly.  calcium-cholecalciferol, d3, (CALCIUM 600 + D) 600-125 mg-unit tab Take 1 Tab by mouth daily.  multivitamin (ONE A DAY) tablet Take 1 Tab by mouth daily.  magnesium oxide (MAG-OX) 400 mg tablet Take 400 mg by mouth daily.  B.infantis-B.ani-B.long-B.bifi (Probiotic 4X) 10-15 mg TbEC Take 2 Caps by mouth two (2) times a day.  BENEFIBER, GUAR GUM, PO Take 1 Dose by mouth two (2) times a day. 1 dose - 2 teaspoons Inpatient Meds Current Facility-Administered Medications Medication Dose Route Frequency Provider Last Rate Last Admin  bacitracin 500 unit/gram packet 1 Packet  1 Packet Topical TID Kumar Corado MD   1 Packet at 03/08/21 1621  acetaminophen (TYLENOL) tablet 650 mg  650 mg Oral ONCE Kumar Corado MD      
 glucose chewable tablet 16 g  4 Tab Oral PRN Dany Yusuf MD      
 dextrose (D50W) injection syrg 12.5-25 g  25-50 mL IntraVENous PRN Dany Yusuf MD      
 glucagon [facility]) injection 1 mg  1 mg IntraMUSCular PRN Dany Yusuf MD      
 ondansetron [facility]) injection 4 mg  4 mg IntraVENous Q6H PRN Dany Yusuf MD      
 aspirin chewable tablet 81 mg  81 mg Oral DAILY Dany Yusuf MD      
 atorvastatin (LIPITOR) tablet 80 mg  80 mg Oral QHS Dany Yusuf MD      
 labetaloL (NORMODYNE;TRANDATE) 20 mg/4 mL (5 mg/mL) injection 5 mg  5 mg IntraVENous Q10MIN PRN Dany Yusuf MD      
 bisacodyL (DULCOLAX) suppository 10 mg  10 mg Rectal DAILY PRN Dany Yusuf MD      
 acetaminophen (TYLENOL) tablet 650 mg  650 mg Oral Q4H PRN Dany Yusuf MD      
 Or  
  acetaminophen (TYLENOL) solution 650 mg  650 mg Per NG tube Q4H PRN Camelia Katz MD      
 Or  
 acetaminophen (TYLENOL) suppository 650 mg  650 mg Rectal Q4H PRN Camelia Katz MD      
 insulin lispro (HUMALOG) injection   SubCUTAneous AC&HS Camelia Katz MD      
 
Current Outpatient Medications Medication Sig Dispense Refill  clopidogreL (Plavix) 75 mg tab Take 1 Tab by mouth daily for 30 days. 30 Tab 0  
 aspirin delayed-release 81 mg tablet Take 1 Tab by mouth daily. 30 Tab 0  
 oxyCODONE ER (Xtampza ER) 9 mg capsule Take 9 mg by mouth every twelve (12) hours.  insulin lispro (HumaLOG KwikPen Insulin) 100 unit/mL kwikpen by SubCUTAneous route Before breakfast, lunch, and dinner. Sliding scale  predniSONE (DELTASONE) 5 mg tablet Take 5 mg by mouth daily.  albuterol-ipratropium (DUO-NEB) 2.5 mg-0.5 mg/3 ml nebu 3 mL by Nebulization route every four (4) hours as needed for Wheezing. 30 Nebule 0  
 latanoprost (XALATAN) 0.005 % ophthalmic solution Administer 1 Drop to both eyes daily.  insulin glargine (Lantus Solostar U-100 Insulin) 100 unit/mL (3 mL) inpn 16 Units by SubCUTAneous route daily.  CLONAZEPAM PO Take 0.25 mg by mouth every evening. ODT formulation  levothyroxine (Levo-T) 75 mcg tablet Take 75 mcg by mouth Daily (before breakfast).  tolterodine ER (DETROL LA) 4 mg ER capsule Take 4 mg by mouth daily.  atorvastatin (LIPITOR) 80 mg tablet Take 80 mg by mouth nightly.  lisinopriL (PRINIVIL, ZESTRIL) 40 mg tablet Take 40 mg by mouth daily.  pantoprazole (PROTONIX) 40 mg tablet Take 40 mg by mouth daily.  gabapentin (NEURONTIN) 300 mg capsule Take 600 mg by mouth two (2) times a day.  fluticasone-umeclidinium-vilanterol (Trelegy Ellipta) 100-62.5-25 mcg inhaler Take 1 Puff by inhalation daily.  melatonin 5 mg tablet Take 5 mg by mouth nightly.  calcium-cholecalciferol, d3, (CALCIUM 600 + D) 600-125 mg-unit tab Take 1 Tab by mouth daily.  multivitamin (ONE A DAY) tablet Take 1 Tab by mouth daily.  magnesium oxide (MAG-OX) 400 mg tablet Take 400 mg by mouth daily.  B.infantis-B.ani-B.long-B.bifi (Probiotic 4X) 10-15 mg TbEC Take 2 Caps by mouth two (2) times a day.  BENEFIBER, GUAR GUM, PO Take 1 Dose by mouth two (2) times a day. 1 dose - 2 teaspoons Past Medical History:  
Diagnosis Date  Anxiety and depression  Arthritis  Chronic obstructive pulmonary disease [address]  Chronic pain  DM type 2 causing vascular disease ([facility]  GERD (gastroesophageal reflux disease)  Glaucoma   
 HTN (hypertension)  Hx of completed stroke 2021  Hypothyroid  Incontinence  Neuropathy  Polypharmacy Past Surgical History:  
Procedure Laterality Date  HX ORTHOPAEDIC    
 HX VASCULAR ACCESS    
 
 
family history includes No Known Problems in an other family member. reports that she quit smoking about 30 years ago. She does not have any smokeless tobacco history on file. She reports that she does not drink alcohol or use drugs. Lab Results (last 24 hrs) Recent Results (from the past 24 hour(s)) GLUCOSE, POC Collection Time: 03/08/21 12:46 AM  
Result Value Ref Range Glucose (POC) 124 (H) 65 - 100 mg/dL Performed by [facility]   
CBC WITH AUTOMATED DIFF Collection Time: 03/08/21  1:19 AM  
Result Value Ref Range WBC 11.2 (H) 3.6 - 11.0 K/uL  
 RBC 4.32 3.80 - 5.20 M/uL  
 HGB 11.3 (L) 11.5 - 16.0 g/dL HCT 33.3 (L) 35.0 - 47.0 % MCV 77.1 (L) 80.0 - 99.0 FL  
 MCH 26.2 26.0 - 34.0 PG  
 MCHC 33.9 30.0 - 36.5 g/dL  
 RDW 15.4 (H) 11.5 - 14.5 % PLATELET 984 492 - 141 K/uL MPV 9.8 8.9 - 12.9 FL  
 NRBC 0.0 0  WBC ABSOLUTE NRBC 0.00 0.00 - 0.01 K/uL NEUTROPHILS 81 (H) 32 - 75 % LYMPHOCYTES 8 (L) 12 - 49 % MONOCYTES 7 5 - 13 % EOSINOPHILS 3 0 - 7 % BASOPHILS 1 0 - 1 % IMMATURE GRANULOCYTES 1 (H) 0.0 - 0.5 % ABS. NEUTROPHILS 9.1 (H) 1.8 - 8.0 K/UL  
 ABS. LYMPHOCYTES 0.9 0.8 - 3.5 K/UL  
 ABS. MONOCYTES 0.8 0.0 - 1.0 K/UL  
 ABS. EOSINOPHILS 0.3 0.0 - 0.4 K/UL  
 ABS. BASOPHILS 0.1 0.0 - 0.1 K/UL  
 ABS. IMM. GRANS. 0.1 (H) 0.00 - 0.04 K/UL  
 DF AUTOMATED METABOLIC PANEL, COMPREHENSIVE Collection Time: 03/08/21  1:19 AM  
Result Value Ref Range Sodium 120 (L) 136 - 145 mmol/L Potassium 4.9 3.5 - 5.1 mmol/L Chloride 90 (L) 97 - 108 mmol/L  
 CO2 21 21 - 32 mmol/L Anion gap 9 5 - 15 mmol/L Glucose 100 65 - 100 mg/dL BUN 11 6 - 20 MG/DL Creatinine 0.66 0.55 - 1.02 MG/DL  
 BUN/Creatinine ratio 17 12 - 20 GFR est AA >60 >60 ml/min/1.73m2 GFR est non-AA >60 >60 ml/min/1.73m2 Calcium 7.9 (L) 8.5 - 10.1 MG/DL Bilirubin, total 0.6 0.2 - 1.0 MG/DL  
 ALT (SGPT) 52 12 - 78 U/L  
 AST (SGOT) 98 (H) 15 - 37 U/L Alk. phosphatase 92 45 - 117 U/L Protein, total 6.6 6.4 - 8.2 g/dL Albumin 3.2 (L) 3.5 - 5.0 g/dL Globulin 3.4 2.0 - 4.0 g/dL A-G Ratio 0.9 (L) 1.1 - 2.2 PTT Collection Time: 03/08/21  1:19 AM  
Result Value Ref Range aPTT 31.3 (H) 22.1 - 31.0 sec  
 aPTT, therapeutic range     58.0 - 77.0 SECS  
PROTHROMBIN TIME + INR Collection Time: 03/08/21  1:19 AM  
Result Value Ref Range INR 1.3 (H) 0.9 - 1.1 Prothrombin time 13.6 (H) 9.0 - 11.1 sec MAGNESIUM Collection Time: 03/08/21  1:19 AM  
Result Value Ref Range Magnesium 1.7 1.6 - 2.4 mg/dL SAMPLES BEING HELD Collection Time: 03/08/21  1:19 AM  
Result Value Ref Range SAMPLES BEING HELD 1PNK COMMENT Add-on orders for these samples will be processed based on acceptable specimen integrity and analyte stability, which may vary by analyte. URINALYSIS W/MICROSCOPIC Collection Time: 03/08/21  1:44 AM  
Result Value Ref Range Color YELLOW/STRAW  Appearance CLEAR CLEAR    
 Specific gravity 1.006 1.003 - 1.030    
 pH (UA) 7.0 5.0 - 8.0 Protein Negative NEG mg/dL Glucose Negative NEG mg/dL Ketone Negative NEG mg/dL Bilirubin Negative NEG Blood SMALL (A) NEG Urobilinogen 0.2 0.2 - 1.0 EU/dL Nitrites Negative NEG Leukocyte Esterase LARGE (A) NEG    
 WBC 0-4 0 - 4 /hpf  
 RBC 5-10 0 - 5 /hpf Epithelial cells FEW FEW /lpf  Bacteria Negative NEG /hpf

## 2021-03-08 NOTE — TELEPHONE ENCOUNTER
Verified patient with two patient identifiers. Spoke with patient's daughter and would like all rhythm strips forward to .

## 2021-03-08 NOTE — PROGRESS NOTES
SPEECH PATHOLOGY BEDSIDE SWALLOW EVALUATION/DISCHARGE Patient: Vargas Luna (59 y.o. female) Date: 3/8/2021 Primary Diagnosis: Slurred speech [R47.81] Hyponatremia [E87.1] Dysarthria [R47.1] Fall from bed [W06. Husam Goes Elbow laceration, left, initial encounter [S51.012A] Precautions: aspiration  WBAT, Fall ASSESSMENT : 
Based on the objective data described below, the patient presents with functional swalllow. No dysarthria at this time. Did note mild vocal hoarseness. NO  Aphasia. Able to communicate wants and needs at this time. Admitted 3-8-21 with fall OOB, dysarthria per daughter. CXR: known interstitial fibrosis. PMh: old L weakness, anxiety and depression, DM, chronic pain,  Compression fx, PE, dizzyness, polypharmacy, hyponatremia, COPD, GERD, glaucoma,  CVA 2/21/21 with tiny R supr fontal CVA plus moderate WM dz. PMH:  Old R fronto-parietal and R temporal-occiptal CVAs. . 
Skilled acute therapy provided by a speech-language pathologist is not indicated at this time. PLAN : 
Recommendations: 
Ok to continue regular diet, thin liquids. She has a fluid restriction per MD. Discharge Recommendations: None SUBJECTIVE:  
Patient stated I can't eat this (spaghetti); it's making me sick to my stomach. OBJECTIVE:  
 
Past Medical History:  
Diagnosis Date  Anxiety and depression  Arthritis  Chronic obstructive pulmonary disease ([address]  Chronic pain  DM type 2 causing vascular disease ([address]  GERD (gastroesophageal reflux disease)  Glaucoma   
 HTN (hypertension)  Hx of completed stroke 2021  Hypothyroid  Incontinence  Neuropathy  Polypharmacy Past Surgical History:  
Procedure Laterality Date  HX ORTHOPAEDIC    
 HX VASCULAR ACCESS Prior Level of Function/Home Situation:  
Home Situation Home Environment: Private residence One/Two Story Residence: One story Living Alone: No 
Support Systems: Family member(s) Patient Expects to be Discharged to[de-identified] Private residence Current DME Used/Available at Home: [address], [place], quad, Walker, rolling Diet prior to admission: regular, thins Current Diet:  Regular, thins Cognitive and Communication Status: 
Neurologic State: Alert, Eyes open spontaneously Orientation Level: Oriented to person, Oriented to place, Disoriented to situation, Disoriented to time Cognition: (perseverative on needs-water, bathroom, hospitalization) Perseveration: Perseverates during conversation Safety/Judgement: Decreased awareness of need for safety, Decreased insight into deficits Oral Assessment: 
Oral Assessment Labial: Left droop(mild) Dentition: Limited;Natural(appeared to be missing many on R top, but no partial plate available) Oral Hygiene: mildly dry and sticky. She is on 800ML fluid restriction Lingual: No impairment Mandible: No impairment P.O. Trials: 
Patient Position: upright in bed Vocal quality prior to P.O.: (mildly hoarse) Consistency Presented: Solid; Thin liquid(PB and crackers and 1/4 cup of water) How Presented: Self-fed/presented;Spoon;Straw ORAL PHASE:  
Bolus Acceptance: No impairment Bolus Formation/Control: No impairment Patient with mild R oral leakage of solids without awareness. Suspect premorbid, as she had R arm contraction. Propulsion: No impairment Oral Residue: (mild solid oral residue on teeth) PHARYNGEAL PHASE:  
Initiation of Swallow: No impairment Laryngeal Elevation: Functional 
Aspiration Signs/Symptoms: None Pharyngeal Phase Characteristics: No impairment, issues, or problems NOMS:  
The NOMS functional outcome measure was used to quantify this patient's level of swallowing impairment. Based on the NOMS, the patient was determined to be at level 7 for swallow function NOMS Swallowing Levels: 
Level 1 (CN): NPO Level 2 (CM): NPO but takes consistency in therapy Level 3 (CL): Takes less than 50% of nutrition p.o. and continues with nonoral feedings; and/or safe with mod cues; and/or max diet restriction Level 4 (CK): Safe swallow but needs mod cues; and/or mod diet restriction; and/or still requires some nonoral feeding/supplements Level 5 (CJ): Safe swallow with min diet restriction; and/or needs min cues Level 6 (CI): Independent with p.o.; rare cues; usually self cues; may need to avoid some foods or needs extra time Level 7 (CH): Independent for all p.o. АНДРЕЙ. (2003). National Outcomes Measurement System (NOMS): Adult Speech-Language Pathology User's Guide. Pain: 
Pain Scale 1: Numeric (0 - 10) Pain Intensity 1: 9 Pain Location 1: Back; Abdomen;Head;Generalized After treatment:  
Patient left in no apparent distress in bed and Nursing notified COMMUNICATION/EDUCATION:  
Patient was educated regarding her deficit(s) of minimal oral dysphagia. as this relates to her diagnosis of h/o CVA. She demonstrated Good understanding as evidenced by discussion. Alisha Mccarthy The patient's plan of care including recommendations, planned interventions, and recommended diet changes were discussed with: Registered nurse. Thank you for this referral. 
ALISON East Time Calculation: 15 mins

## 2021-03-08 NOTE — ED PROVIDER NOTES
HPI  
43-year-old female presents after fall. Daughter states patient fell out of bed hitting her left elbow. In route to the ER daughter noticed patient with slurred speech. She was admitted recently to the hospital for a stroke. She takes aspirin and Plavix. Patient has baseline left-sided weakness. Denies head injury, loss of consciousness, hip pain. Past Medical History:  
Diagnosis Date  Anxiety and depression  Arthritis  Chronic obstructive pulmonary disease ([facility]  Chronic pain  DM type 2 causing vascular disease ([facility] [address] [facility]  GERD (gastroesophageal reflux disease)  Glaucoma   
 HTN (hypertension)  Hx of completed stroke   Hypothyroid  Incontinence  Neuropathy  Polypharmacy Past Surgical History:  
Procedure Laterality Date  HX ORTHOPAEDIC    
 HX VASCULAR ACCESS Family History:  
Problem Relation Age of Onset  No Known Problems Other   
     reviewed, patient did not know Social History Socioeconomic History  Marital status:  Spouse name: Not on file  Number of children: Not on file  Years of education: Not on file  Highest education level: Not on file Occupational History  Not on file Social Needs  Financial resource strain: Not on file  Food insecurity Worry: Not on file Inability: Not on file  Transportation needs Medical: Not on file Non-medical: Not on file Tobacco Use  Smoking status: Former Smoker Quit date: 1991 Years since quittin.2 Substance and Sexual Activity  Alcohol use: Never Frequency: Never  Drug use: Never  Sexual activity: Not on file Lifestyle  Physical activity Days per week: Not on file Minutes per session: Not on file  Stress: Not on file Relationships  Social connections Talks on phone: Not on file Gets together: Not on file Attends [employer] service: Not on file Active member of club or organization: Not on file Attends meetings of clubs or organizations: Not on file Relationship status: Not on file  Intimate partner violence Fear of current or ex partner: Not on file Emotionally abused: Not on file Physically abused: Not on file Forced sexual activity: Not on file Other Topics Concern  Not on file Social History Narrative  Not on file ALLERGIES: Shellfish derived Review of Systems Constitutional: Negative for chills and fever. HENT: Negative for congestion. Respiratory: Negative for cough and shortness of breath. Cardiovascular: Negative for chest pain. Gastrointestinal: Negative for abdominal pain, constipation, diarrhea, nausea and vomiting. Genitourinary: Negative for dysuria. Musculoskeletal: Negative for neck pain and neck stiffness. Skin: Positive for wound. Neurological: Positive for speech difficulty and weakness. Negative for numbness and headaches. All other systems reviewed and are negative. Vitals:  
 03/08/21 0040 Resp: 18 Weight: 55.5 kg (122 lb 5.7 oz) Height: 5' 4\" (1.626 m) Physical Exam  
Physical Examination: General appearance - alert, well appearing, and in no distress, oriented to person, place, and time and normal appearing weight Eyes - pupils equal and reactive, extraocular eye movements intact Neck - supple, no significant adenopathy Chest - clear to auscultation, no wheezes, rales or rhonchi, symmetric air entry Heart - normal rate, regular rhythm, normal S1, S2, no murmurs, rubs, clicks or gallops Abdomen - soft, nontender, nondistended, no masses or organomegaly Back exam - full range of motion, no tenderness, palpable spasm or pain on motion Neurological - alert, oriented, normal speech, baseline left-sided weakness per patient Musculoskeletal - no joint tenderness, deformity or swelling Extremities - peripheral pulses normal, no pedal edema, no clubbing or cyanosis, 8 cm laceration/skin tear over left elbow with mild bleeding, neurovascularly intact, wound explored to base does not appear to enter into elbow joint Skin - normal coloration and turgor, no rashes, no suspicious skin lesions noted MDM Number of Diagnoses or Management Options Amount and/or Complexity of Data Reviewed Clinical lab tests: ordered and reviewed Tests in the radiology section of CPT®: ordered and reviewed Decide to obtain previous medical records or to obtain history from someone other than the patient: yes Obtain history from someone other than the patient: yes (Daughter) Review and summarize past medical records: yes Discuss the patient with other providers: yes (Teleneurology, hospitalist) Independent visualization of images, tracings, or specimens: yes Patient Progress Patient progress: improved WOUND REPAIR Date/Time: 3/8/2021 3:36 AM 
Performed by: attendingPreparation: skin prepped with Betadine and sterile field established Pre-procedure re-eval: Immediately prior to the procedure, the patient was reevaluated and found suitable for the planned procedure and any planned medications. Time out: Immediately prior to the procedure a time out was called to verify the correct patient, procedure, equipment, staff and marking as appropriate. Tabatha Cardona Location details: left elbow Wound length:2.6 - 7.5 cm Anesthesia: 
Local Anesthetic: lidocaine 1% with epinephrine Anesthetic total: 3 mL Foreign bodies: no foreign bodies Irrigation solution: saline Irrigation method: syringe Debridement: none Skin closure: 4-0 nylon Number of sutures: 7 Technique: simple and interrupted Approximation: close Dressing: 4x4, antibiotic ointment and pressure dressing Patient tolerance: patient tolerated the procedure well with no immediate complications My total time at bedside, performing this procedure was 16-30 minutes. ED EKG interpretation: 
Rhythm: normal sinus rhythm; and regular . Rate (approx.): 81; Axis: normal; P wave: normal; QRS interval: prolonged; ST/T wave: non-specific changes; RBBB EKG documented by Isidro Farley MD 
 
12:53 AM 
Discussed with Dr. Alma Samaniego, teleneurology. Perfect Serve Consult for Admission 2:58 AM 
 
ED Room Number: ER04/04 Patient Name and age:  Mathew Monaco [de-identified] y.o.  female Working Diagnosis: 1. Fall, initial encounter 2. Elbow laceration, left, initial encounter 3. Dysarthria 4. Hyponatremia COVID-19 Suspicion:  no 
Sepsis present:  no  Reassessment needed: no 
Code Status:   
Readmission: no 
Isolation Requirements:  no 
Recommended Level of Care:  telemetry Department:[facility] ED - [phone]

## 2021-03-08 NOTE — ED NOTES
Notified MD Beckwith of pt's continued c/o nausea/HA, unchanged complaint since Tylenol/zofran admin. Pt experiences periodic confusion & repetitiveness. MD Beckwith to re order PTA medications as appropriate, med rec has been completed.

## 2021-03-08 NOTE — PROGRESS NOTES
3/8/2021 
9:31 AM 
CM completed assessment w/ Dtr Tam Painting (TELLO) [phone] via phone Reason for Readmission:    Hyponatremia, slurred speech, fall 2/25-2/28/21 admission Acute PE, Acute CVA Dc to home w/  [facility] SEUN 
3/3/21 OBS for Dizziness [facility] Pt is [de-identified] yo  female who presents to [facility] ED from home following fall out of bed, pt had slurred speech en route PMHx:  
HTN, COPD, recent PE on Eliquis, CVA w/ L hemiparesis, RUR Score/Risk Level: 26 % Moderate Risk PCP: First and Last name:  Tylor Mariee MD 
 Name of Practice:  
 Are you a current patient: Yes/No: Yes Approximate date of last visit: > 30 days Can you participate in a virtual visit with your PCP: Yes Is a Care Conference indicated: NO 
 
 
Did you attend your follow up appointment (s): If not, why not: YES 
PCP 3/16/21 Cardiology 3/5/21 Resources/supports as identified by patient/family:   Pt lives w/ Dtr Mayme Bank and [employer] they assist pt w/ care and transportation Top Challenges facing patient (as identified by patient/family and CM): Finances/Medication cost?     Tatiana Figueroa, [facility] 
Transportation    Family Support system or lack thereof? Pt has supportive family also has pvt aide who assists w/ bathing 2 days/week Living arrangements? Pt lives w/ daughter Tam Painting and son in law in 2 story home, w/ 4 entry steps, pt has a GF bed/abth Self-care/ADLs/Cognition? Family or aide assists pt w/ ADLs Current Advanced Directive/Advance Care Plan:   
 FULL Code [facility] [phone] Plan for utilizing home health:   PT, OT, SLP evals pending Pt is open to [facility] for SN,PT,OT Transition of Care Plan:    Based on readmission, the patient's previous Plan of Care 
 has been evaluated and/or modified. The current Transition of Care Plan is: 1. Hospital admission for medical management 2.  Nephrology consult 3. Neurology consult 4. PT, OT, SLP hill 5. CM to follow through for treatment/response 6. ACP planning, pt declined to complete on this admission 7. DC when stable to home w/ HH  
8. Outpatient f/u PCP, specialists 9. Dtr Floydfroy Perks will transport Care Management Interventions PCP Verified by CM: Barbara Dominguez MD, visit > 30 days) Palliative Care Criteria Met (RRAT>21 & CHF Dx)?: Yes 
Palliative Consult Recommended?: Yes Mode of Transport at Discharge: Self(Daughter) Transition of Care Consult (CM Consult): Discharge Planning Physical Therapy Consult: Yes Occupational Therapy Consult: Yes Speech Therapy Consult: Yes Current Support Network: Other(pt lives w/ daughter and [employer] in pvt residence, ambualtory w/ cane, family assists w/ ADLs and transport) Confirm Follow Up Transport: Family Discharge Location Discharge Placement: Home with home health Readmission Assessment Number of days since last admission?: 8-30 days Previous disposition: Home with Home Health Who is being interviewed?: Caregiver(Daughter Manning Mcburney) What was the patient's/caregiver's perception as to why they think they needed to return back to the hospital?: Other (Comment)(Fall) Did you visit your Primary Care Physician after you left the hospital, before you returned this time?: No 
Why weren't you able to visit your PCP?: Other (Comment)(appt 3/16/21) Did you see a specialist, such as Cardiac, Pulmonary, Orthopedic Physician, etc. after you left the hospital?: Yes(cardiology 3/5/21) Who advised the patient to return to the hospital?: Self-referral 
Does the patient report anything that got in the way of taking their medications?: No 
CARINA Mccord

## 2021-03-09 NOTE — PROGRESS NOTES
Problem: Mobility Impaired (Adult and Pediatric) Goal: *Acute Goals and Plan of Care (Insert Text) Description: FUNCTIONAL STATUS PRIOR TO ADMISSION: Patient was modified independent using a SB quad cane for functional mobility. HOME SUPPORT PRIOR TO ADMISSION: The patient lived with daughter and son-in-law. Physical Therapy Goals Initiated 3/8/2021 1. Patient will move from supine to sit and sit to supine  in bed with supervision/set-up within 7 day(s). 2.  Patient will transfer from bed to chair and chair to bed with supervision/set-up using the least restrictive device within 7 day(s). 3.  Patient will perform sit to stand with supervision/set-up within 7 day(s). 4.  Patient will ambulate with supervision/set-up for 150 feet with the least restrictive device within 7 day(s). 5.  Patient will ascend/descend 4 stairs with 2 handrail(s) with minimal assistance/contact guard assist within 7 day(s). Outcome: Progressing Towards Goal 
Note: PHYSICAL THERAPY TREATMENT Patient: Kary Polanco (86 y.o. female) Date: 3/9/2021 Diagnosis: Slurred speech [R47.81] Hyponatremia [E87.1] Dysarthria [R47.1] Fall from bed [W06. Darline Soda Elbow laceration, left, initial encounter [S51.012A] <principal problem not specified> Precautions: Fall, Skin Chart, physical therapy assessment, plan of care and goals were reviewed. ASSESSMENT Patient continues with skilled PT services and slowly progressing towards goals. Min/mod A for bed mobility, decreased use of LUE. Min A for tranfers and gait training using SBQC. One LOB requiring Mod A for steadying. Will need assistance from family for safety with mobility tasks Current Level of Function Impacting Discharge (mobility/balance): Min A Other factors to consider for discharge: PLAN : 
Patient continues to benefit from skilled intervention to address the above impairments. Continue treatment per established plan of care. to address goals. Recommendation for discharge: (in order for the patient to meet his/her long term goals) Physical therapy at least 2 days/week in the home AND ensure assist and/or supervision for safety with gait and trasnfers. This discharge recommendation: 
Has been made in collaboration with the attending provider and/or case management IF patient discharges home will need the following DME: patient owns DME required for discharge SUBJECTIVE:  
Patient stated I have gotten my breakfast. OBJECTIVE DATA SUMMARY:  
Critical Behavior: 
Neurologic State: Alert, Eyes open spontaneously Orientation Level: Oriented to place, Oriented to person, Oriented to situation, Oriented to time Cognition: Follows commands Safety/Judgement: Awareness of environment, Fall prevention, Insight into deficits Functional Mobility Training: 
Bed Mobility: 
Rolling: Minimum assistance; Additional time;Assist x1 Supine to Sit: Minimum assistance;Assist x1;Additional time Transfers: 
Sit to Stand: Minimum assistance Stand to Sit: Contact guard assistance;Minimum assistance(control descent) Balance: 
Sitting: Intact; With support Standing: Impaired; With support Standing - Static: Constant support; Fair 
Standing - Dynamic : Fair Ambulation/Gait Training: 
Distance (ft): 50 Feet (ft) Assistive Device: Cane, quad;Gait belt Ambulation - Level of Assistance: Minimal assistance;Assist x1;Additional time Gait Abnormalities: Decreased step clearance; Step to gait Stairs: Therapeutic Exercises: LUE gentle ROM, seated thera ex for BLE Pain Rating: 
 
 
Activity Tolerance:  
Good After treatment patient left in no apparent distress:  
Sitting in chair, Call bell within reach, and Bed / chair alarm activated COMMUNICATION/COLLABORATION:  
The patients plan of care was discussed with: Registered nurse. Nikki Matthews Calculation: 35 mins

## 2021-03-09 NOTE — PROGRESS NOTES
Bedside and Verbal shift change report given to Indigo Castillo RN (oncoming nurse) by Fabio Angulo RN (offgoing nurse). Report included the following information SBAR, Kardex, Intake/Output, MAR, Recent Results, Med Rec Status and Cardiac Rhythm NSR. Central line Type: portacath right chest 
Central Line Insert Date: accessed 3/7/21 Reason Central Line Placed: poor venous access Central Line Dressing Date: 3/7/21 Bi[address] in [address]? Yes No: yes Tubing labeled and appropriate? Yes No: yes Alcohol caps on all open ports? Yes No: yes Last CHG bath (time&date): 3/8/21 @ 1600 Reviewed with provider and central line at must stay in for the following reasons: poor venous access. [address] [facility] [address] Dinh Solares to discuss patient's increasing hypotension. Patient incidentally getting Q15 BP's and they are decreasing. Patient has mostly been 492'U-771'Q sytolic since arrival in ED with a few higher BP's. BP at shift start was 126/62. As patient has been resting BP has continued to decreased (see flowsheet.) BP's reading 60's/40's asleep then 90's/50's when patient aroused and BP retaken. Dr. Dinh Solares asked me to call Dr. Nicki Mercado who is in house. 2214 Called Dr. Nicki Mercado and relayed above information regarding decreased BP. Albumin 50g ordered. 2303 Albumin 50g started. 2330 Bedside and Verbal shift change report given to Jennifer Cabrera RN (oncoming nurse) by Indigo Castillo RN (offgoing nurse). Report included the following information SBAR, Kardex, Intake/Output, MAR, Recent Results, Med Rec Status and Cardiac Rhythm NSR to sinus aria.

## 2021-03-09 NOTE — PROGRESS NOTES
Bedside and Verbal shift change report given to Christen Quintana (oncoming nurse) by Luther Aggarwal (offgoing nurse). Report included the following information SBAR, Kardex, ED Summary, Intake/Output, Recent Results, Med Rec Status and Cardiac Rhythm NSR. This patient was assisted with Intentional Toileting every 2 hours during this shift. Documentation of ambulation and output reflected on Flowsheet. Patient Vitals for the past 4 hrs: 
 Temp Pulse Resp BP SpO2  
03/09/21 0030    (!) 77/43   
03/09/21 0000  68 18 (!) 73/39 96 % 03/08/21 2330  75 27 (!) 110/58 97 % 03/08/21 2318  72     
03/08/21 2315 97.7 °F (36.5 °C) 75 16 95/63 95 % 03/08/21 2248  77  101/62   
03/08/21 2206  68  (!) 90/41   
03/08/21 2200    (!) 65/41   
03/08/21 2134  69  (!) 92/55   
03/08/21 2115    (!) 64/41   
03/08/21 2100  73  (!) 110/55   
  
 
0015: Albumen 50 g completed; B/P continues to remain low with Map at 51. 
 
00:28 Dr. Meli Farr notified of pt low B/P. Dr. Harley Sanon to place pt in trendelenburg position; if B/P does not respond, place pt on C-Pap. 
 
0055: Placed pt in Trendelenburg, pt b/p responding with MAP greater than 65. Pt c/o headache; refusing to remain in trendelenburg position. 0400:  Dr. Harley Sanon Saman drip if pt b/p continues to remain low. Transferred pt to stepdown per Dr. Manish Dumont. Visit Vitals /61 Pulse 81 Temp 97.7 °F (36.5 °C) Resp 15 Ht 5' 4\" (1.626 m) Wt 58.6 kg (129 lb 3 oz) SpO2 95% BMI 22.18 kg/m² 0430: Bedside and Verbal shift change report given to Natalie Ambrosio RN (oncoming nurse) by Christen Quintana (offgoing nurse). Report included the following information SBAR, Kardex, ED Summary, Intake/Output, Recent Results, Med Rec Status and Cardiac Rhythm NSR.

## 2021-03-09 NOTE — PROGRESS NOTES
[facility] 
[address] [facility], [address] [place] [address] 
[phone] Medical Progress Note NAME: Misbah Dove :  1940 MRM:  [id number] Date of service: 3/9/2021  7:53 AM 
 
  
Assessment and Plan: 1. Acute metabolic encephalopathy (5/3/3276) / Slurred speech (3/8/2021) / Dysarthria (3/8/2021): Very likely due to hyponatremia. MRI of the brain: without acute finding. Neurology evaluation appreciated.   
  
2. Hyponatremia. Possible SAIDH. High urine sodium. On fluid restriction. Evaluated by nephrology. 
  
3. Fall from bed (3/8/2021) / Elbow laceration, left, initial encounter (3/8/2021): Fall precautions. Wound care with dressing changes. 4.  DM. Continue home insulin and cover with SSI 5. Hypothyroidism. On synthroid. Subjective: Chief Complaint[de-identified] Patient was seen and examined as a follow up for hyponatremia. Chart was reviewed. denies slurred speech. ROS: 
(bold if positive, if negative) Tolerating PT  Tolerating Diet Objective:  
 
Last 24hrs VS reviewed since prior progress note. Most recent are: 
 
Visit Vitals BP (!) 104/50 Pulse 78 Temp 97.6 °F (36.4 °C) Resp 16 Ht 5' 4\" (1.626 m) Wt 58.6 kg (129 lb 3 oz) SpO2 97% BMI 22.18 kg/m² SpO2 Readings from Last 6 Encounters:  
21 97% 21 95% 21 96% 21 96% 21 95% Intake/Output Summary (Last 24 hours) at 3/9/2021 5071 Last data filed at 3/9/2021 0200 Gross per 24 hour Intake 1020 ml Output 1250 ml Net -230 ml Physical Exam: 
 
Gen:  Well-developed, well-nourished, in no acute distress HEENT:  Pink conjunctivae, PERRL, hearing intact to voice, moist mucous membranes Neck:  Supple, without masses, thyroid non-tender Resp:  No accessory muscle use, clear breath sounds without wheezes rales or rhonchi 
Card:  No murmurs, normal S1, S2 without thrills, bruits or peripheral edema Abd:  Soft, non-tender, non-distended, normoactive bowel sounds are present, no palpable organomegaly and no detectable hernias Lymph:  No cervical or inguinal adenopathy Musc:  No cyanosis or clubbing Skin:  No rashes or ulcers, skin turgor is good Neuro:  Cranial nerves are grossly intact, no focal motor weakness, follows commands appropriately Psych:  Good insight, oriented to person, place and time, alert 
__________________________________________________________________ Medications Reviewed: (see below) Medications:  
 
Current Facility-Administered Medications Medication Dose Route Frequency  PHENYLephrine (SHARLA-SYNEPHRINE) 30 mg in 0.9% sodium chloride 250 mL infusion   mcg/min IntraVENous TITRATE  
 0.9% sodium chloride infusion  100 mL/hr IntraVENous CONTINUOUS  
 bacitracin 500 unit/gram packet 1 Packet  1 Packet Topical TID  glucose chewable tablet 16 g  4 Tab Oral PRN  
 dextrose (D50W) injection syrg 12.5-25 g  25-50 mL IntraVENous PRN  
 glucagon (GLUCAGEN) injection 1 mg  1 mg IntraMUSCular PRN  
 ondansetron (ZOFRAN) injection 4 mg  4 mg IntraVENous Q6H PRN  
 aspirin chewable tablet 81 mg  81 mg Oral DAILY  atorvastatin (LIPITOR) tablet 80 mg  80 mg Oral QHS  labetaloL (NORMODYNE;TRANDATE) 20 mg/4 mL (5 mg/mL) injection 5 mg  5 mg IntraVENous Q10MIN PRN  
 bisacodyL (DULCOLAX) suppository 10 mg  10 mg Rectal DAILY PRN  
 acetaminophen (TYLENOL) tablet 650 mg  650 mg Oral Q4H PRN Or  
 acetaminophen (TYLENOL) solution 650 mg  650 mg Per NG tube Q4H PRN Or  
 acetaminophen (TYLENOL) suppository 650 mg  650 mg Rectal Q4H PRN  
 insulin lispro (HUMALOG) injection   SubCUTAneous AC&HS  
 gabapentin (NEURONTIN) capsule 600 mg  600 mg Oral BID  insulin glargine (LANTUS) injection 16 Units  16 Units SubCUTAneous DAILY  predniSONE (DELTASONE) tablet 5 mg  5 mg Oral DAILY  levothyroxine (SYNTHROID) tablet 75 mcg  75 mcg Oral ACB  pantoprazole (PROTONIX) tablet 40 mg  40 mg Oral DAILY  clonazePAM (KlonoPIN) tablet 0.25 mg  0.25 mg Oral QPM  
 oxyCODONE ER (OxyCONTIN) tablet 10 mg  10 mg Oral Q12H  
 trospium (SANCTURA) tablet 20 mg  20 mg Oral ACL  arformoteroL (BROVANA) neb solution 15 mcg  15 mcg Nebulization BID RT And  
 budesonide (PULMICORT) 500 mcg/2 ml nebulizer suspension  500 mcg Nebulization BID RT  
 ipratropium (ATROVENT) 0.02 % nebulizer solution 0.5 mg  0.5 mg Nebulization Q8H RT  
 cefTRIAXone (ROCEPHIN) 1 g in sterile water (preservative free) 10 mL IV syringe  1 g IntraVENous Q24H  
 albumin human 25% (BUMINATE) solution 50 g  50 g IntraVENous Q6H Lab Data Reviewed: (see below) Lab Review:  
 
Recent Labs 03/09/21 
1226 03/08/21 
5050 WBC 8.5 11.2* HGB 10.0* 11.3* HCT 30.2* 33.3*  
 399 Recent Labs 03/09/21 
7458 03/08/21 2020 03/08/21 
1311 03/08/21 
0119 03/08/21 
0119 * 123* 122*   < > 120*  
K 3.9 4.4 4.0   < > 4.9 CL 94* 90* 91*   < > 90* CO2 25 23 24   < > 21 * 175* 85   < > 100 BUN 9 8 8   < > 11  
CREA 0.67 0.63 0.51*   < > 0.66  
CA 7.8* 7.5* 8.0*   < > 7.9*  
MG 1.8  --   --   --  1.7 PHOS 3.3  --   --   --   --   
ALB  --   --   --   --  3.2* TBILI  --   --   --   --  0.6 ALT  --   --   --   --  52 INR 1.2*  --   --   --  1.3*  
 < > = values in this interval not displayed. Lab Results Component Value Date/Time Glucose (POC) 129 (H) 03/09/2021 07:50 AM  
 Glucose (POC) 136 (H) 03/08/2021 10:39 PM  
 Glucose (POC) 125 (H) 03/08/2021 04:04 PM  
 Glucose (POC) 149 (H) 03/08/2021 03:18 PM  
 Glucose (POC) 89 03/08/2021 01:08 PM  
 
No results for input(s): PH, PCO2, PO2, HCO3, FIO2 in the last 72 hours. Recent Labs 03/09/21 
3244 03/08/21 
4871 INR 1.2* 1.3* All Micro Results Procedure Component Value Units Date/Time Carol Merrill [[id number]] Collected: 03/08/21 2020 Order Status: Completed Specimen: Urine from Clean catch Updated: 03/08/21 4617 I have reviewed notes of prior 24hr. Other pertinent lab: Total time spent with patient: 28 I personally reviewed chart, notes, data and current medications in the medical record. I have personally examined and treated the patient at bedside during this period. Care Plan discussed with: Patient, Nursing Staff and >50% of time spent in counseling and coordination of care Discussed:  Care Plan Prophylaxis:  SCD's Disposition:  Home w/Family 
        
___________________________________________________ Attending Physician: Leigh Parker MD

## 2021-03-09 NOTE — PROGRESS NOTES
NEPHROLOGY PROGRESS NOTE 
  
 
 
NAME:Ayaka Cox XZL:[id number] KWJ:[birth year] Chief complaints: f/u Hyponatremia Subjective: resting comfortable 24 hr interval history:  had episode of hypotension Objective: 
Vitals:  
 03/09/21 1166 03/09/21 0631 03/09/21 6673 03/09/21 8304 BP: (!) 78/40 (!) 104/50  (!) 166/92 Pulse: 68 77 78 73 Resp: 18 16  16 Temp:    97.8 °F (36.6 °C) SpO2:    96% Weight:      
Height:      
 
 
Intake/Output Summary (Last 24 hours) at 3/9/2021 1595 Last data filed at 3/9/2021 0500 Gross per 24 hour Intake 1020 ml Output 1550 ml Net -530 ml PHYSICAL EXAM: 
GENERAL : Chronically ill looking female lying down in bed with no acute distress HEENT: AT NC PEERLA NECK: Supple no JVP 
CVS: S1 S2 RRR, no murmur or gallops heard RS: CTABL, no rhonchi,or wheezing heard ABDOMEN: soft NT ND positive BS EXTREMITY: No edema clubbing or cyanosis, pedal pulse + NEUROLOGY: AAA X3, no focal deficit or asterixis Labs: CBC:   
Lab Results Component Value Date/Time WBC 8.5 03/09/2021 03:23 AM  
 HGB 10.0 (L) 03/09/2021 03:23 AM  
 HCT 30.2 (L) 03/09/2021 03:23 AM  
  03/09/2021 03:23 AM  
 
BMP:  
Lab Results Component Value Date/Time  (L) 03/09/2021 03:23 AM  
 K 3.9 03/09/2021 03:23 AM  
 CL 94 (L) 03/09/2021 03:23 AM  
 CO2 25 03/09/2021 03:23 AM  
 AGAP 7 03/09/2021 03:23 AM  
  (H) 03/09/2021 03:23 AM  
 BUN 9 03/09/2021 03:23 AM  
 CREA 0.67 03/09/2021 03:23 AM  
 GFRAA >60 03/09/2021 03:23 AM  
 GFRNA >60 03/09/2021 03:23 AM  
  
 
Lab Results Component Value Date/Time  Color YELLOW/STRAW 03/08/2021 01:44 AM  
 Appearance CLEAR 03/08/2021 01:44 AM  
 Specific gravity 1.006 03/08/2021 01:44 AM  
 pH (UA) 7.0 03/08/2021 01:44 AM  
 Protein Negative 03/08/2021 01:44 AM  
 Glucose Negative 03/08/2021 01:44 AM  
 Ketone Negative 03/08/2021 01:44 AM  
 Bilirubin Negative 03/08/2021 01:44 AM  
 Urobilinogen 0.2 03/08/2021 01:44 AM  
 Nitrites Negative 03/08/2021 01:44 AM  
 Leukocyte Esterase LARGE (A) 03/08/2021 01:44 AM  
 Epithelial cells FEW 03/08/2021 01:44 AM  
 Bacteria Negative 03/08/2021 01:44 AM  
 WBC 0-4 03/08/2021 01:44 AM  
 RBC 5-10 03/08/2021 01:44 AM  
 
 
Imaging study: 
 
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 Hypertension Plan: 
U na 62 U osm 225 S osm 250 Give Urea 30 gm daily Fluid restriction 800 cc BMP Q6HR Goal NA correction 6-8 mmol in 24 hr 
 
 
Care Plan discussed with: 
 Medical Team 
 
Ashif Reyes Stands, MD 
3/9/2021 [facility]: 
[email or web address] [place] office: 
[address] [place] [address], [address] [place], [address] [place] [address] Phone: [phone] Fax :     [phone] [place] office: 
[address] [facility], [address] [facility] Phone - [phone] Fax - [phone]

## 2021-03-09 NOTE — PROGRESS NOTES
Physician Progress Note PATIENTAiyana Rich 
CSN #:                  [id number] :                       1940 ADMIT DATE:       3/8/2021 12:44 AM 
DISCH DATE: 
RESPONDING 
PROVIDER #:        Karen Turcios MD 
 
 
 
 
QUERY TEXT: 
 
Pt admitted with hyponatremia. 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 \" If possible, please document in progress notes and discharge summary the cause of hyponatremia if known. The medical record reflects the following: 
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 \" 
Treatment: Fluid restriction 1 L, order to Check urine, serum osmolality, urine sodium Options provided: 
-- Hyponaremia due to SIADH 
-- Hyponatremia unrelated to SIADH 
-- Other - I will add my own diagnosis -- Disagree - Not applicable / Not valid -- Disagree - Clinically unable to determine / Unknown 
-- Refer to Clinical Documentation Reviewer PROVIDER RESPONSE TEXT: 
 
This patient has Hyponatremia due to SIADH.  
 
Query created by: Jerilyn Good on 3/9/2021 1:18 PM 
 
 
Electronically signed by:  Karen Turcios MD 3/9/2021 1:35 PM

## 2021-03-09 NOTE — PROGRESS NOTES
Occupational Therapy Note: 
Chart reviewed, spoke with nursing, and patient cleared for OT tx. Patient received in the chair eating declining OT tx. Encouraged use of L UE as gross stabilizer for feeding however patient minimally receptive. When stating OT would return later on, patient requesting deferral d/t fatigue today. Will continue to follow.     
Jaylin Pope, OTR/L

## 2021-03-09 NOTE — PROGRESS NOTES
Care Management follow up Patient admitted for slurred speech, hyponatremia. RUR score 32%/ high risk Current status Patient continues to require medical management including albumin IV, IV antibiotics, jay-synephrine drip. Transition of Care Plan 1. Monitor patient status and response to treatment. 2. Medical management continues. 3. 1st IMM letter signed, copy to patient. 4. PT/OT recommending home care follow up. Patient is open to [facility] for nursing, PT, OT -  
      NEED resumption of home care order. 1:00pm 
Resumption of care order received, sent to [address] [place] [address] via [employer]. Patient ACCEPTED by [address] [place] [address]. 5. CM to monitor progress and response to treatment. Ludivina Crooks, RN, MSN/Care manager

## 2021-03-09 NOTE — PROGRESS NOTES
Bedside shift change report [facility] by Yinka Monet (offgoing nurse). Report included the following information SBAR, Kardex, ED Summary, Intake/Output, MAR, Recent Results and Cardiac Rhythm NSR.  
 
1600 Bedside shift change report given to Zuri Jansen (oncoming nurse) by Sarika Morgan (offgoing nurse). Report included the following information SBAR, Kardex, ED Summary, Intake/Output, MAR, Recent Results and Cardiac Rhythm NSR.

## 2021-03-09 NOTE — WOUND CARE
Wound Consult:  New Patient Visit. Chart reviewed. Consulted for skin tear left elbow/forearm. Spoke with patients nurses at bedside. Patient is resting on a Padmini bed with foam mattress. She fell PTA sustaining injury. Patient is alert and oriented x 4. Assessment: 
Left elbow/forearm - elbow with ~ 5 x 0.3 cm skin tear with sutures pulling skin together but not completely approximated with moist red base between; then more distally/posterior is a skin tear without flap 3 x 2 x 0.1 cm, moist red wound bed deep dermal to full thickness. Has ecchymosis in surrounding skin. Treatment: 
Maxsorb AG silver with foam dressing to posterior skin tear. Bacitracin and Maxsorb AG to suture line tear with tape to secure. Wound Recommendations: 
Maxsorb AG silver with foam dressing to posterior skin tear change every 3 days. Bacitracin and Maxsorb AG to suture line tear with tape to secure daily. Continue to monitor nutrition, pain, and skin risk scale, and skin assessment. Plan: Will hand off to Dr. Taqueria Mercado regarding findings and propose orders for treatment. We will continue to reassess routinely and as needed. Pansy Romberg, RN,[facility], Wound / Ostomy Department Wound Healing Office [phone]

## 2021-03-09 NOTE — PROGRESS NOTES
[facility] Progress Note Patient ID: 
Kee Araiza 
[id number] 
8 y.o. 
1940 Subjective:  
History: 
Kee Araiza is a [de-identified] y.o. female who  has a past medical history of Anxiety and depression, Arthritis, Chronic obstructive pulmonary disease ([facility] [address] Chronic pain, DM type 2 causing vascular disease ([facility] [address] GERD (gastroesophageal reflux disease), Glaucoma, HTN (hypertension), completed stroke (), Hypothyroid, Incontinence, Neuropathy, and Polypharmacy. who presents for evaluation of dizziness. 
  
Patient has history of stroke with residual L Hemiplegia with flexion contracture. On ASA 81 every day. Patient apparently fell out of bed and was noted to have slurred speech. Patient also complaining of nausea and \"not feeling well\".  
  
Last Na is now 126 with patient feeling better today. ROS: 
Per HPI- 
Otherwise the remainder of the review of system was negative Social Hx: 
Social History Socioeconomic History  Marital status:  Spouse name: Not on file  Number of children: Not on file  Years of education: Not on file  Highest education level: Not on file Tobacco Use  Smoking status: Former Smoker Quit date: 1991 Years since quittin.2 Substance and Sexual Activity  Alcohol use: Never Frequency: Never  Drug use: Never Meds: 
No current facility-administered medications on file prior to encounter. Current Outpatient Medications on File Prior to Encounter Medication Sig Dispense Refill  albuterol (PROVENTIL HFA, VENTOLIN HFA, PROAIR HFA) 90 mcg/actuation inhaler Take 1 Puff by inhalation every six (6) hours as needed for Wheezing.  furosemide (LASIX) 20 mg tablet Take 20 mg by mouth daily as needed (edema). Requires only sparingly, daughter reports only 2x in past 6 months  sodium chloride 1 gram tablet Take 1 g by mouth every Monday, Wednesday, Friday.  clopidogreL (Plavix) 75 mg tab Take 1 Tab by mouth daily for 30 days. 30 Tab 0  
 aspirin delayed-release 81 mg tablet Take 1 Tab by mouth daily. 30 Tab 0  
 oxyCODONE ER (Xtampza ER) 9 mg capsule Take 9 mg by mouth every twelve (12) hours.  insulin lispro (HumaLOG KwikPen Insulin) 100 unit/mL kwikpen by SubCUTAneous route Before breakfast, lunch, and dinner. Sliding scale  predniSONE (DELTASONE) 5 mg tablet Take 5 mg by mouth daily.  latanoprost (XALATAN) 0.005 % ophthalmic solution Administer 1 Drop to both eyes daily.  insulin glargine (Lantus Solostar U-100 Insulin) 100 unit/mL (3 mL) inpn 16 Units by SubCUTAneous route daily.  CLONAZEPAM PO Take 0.25 mg by mouth every evening. ODT formulation  levothyroxine (Levo-T) 75 mcg tablet Take 75 mcg by mouth Daily (before breakfast).  tolterodine ER (DETROL LA) 4 mg ER capsule Take 4 mg by mouth daily.  atorvastatin (LIPITOR) 80 mg tablet Take 80 mg by mouth nightly.  lisinopriL (PRINIVIL, ZESTRIL) 40 mg tablet Take 40 mg by mouth daily.  pantoprazole (PROTONIX) 40 mg tablet Take 40 mg by mouth daily.  gabapentin (NEURONTIN) 300 mg capsule Take 600 mg by mouth two (2) times a day.  fluticasone-umeclidinium-vilanterol (Trelegy Ellipta) 100-62.5-25 mcg inhaler Take 1 Puff by inhalation daily.  melatonin 5 mg tablet Take 5 mg by mouth nightly.  calcium-cholecalciferol, d3, (CALCIUM 600 + D) 600-125 mg-unit tab Take 1 Tab by mouth daily.  multivitamin (ONE A DAY) tablet Take 1 Tab by mouth daily.  magnesium oxide (MAG-OX) 400 mg tablet Take 400 mg by mouth daily.  B.infantis-B.ani-B.long-B.bifi (Probiotic 4X) 10-15 mg TbEC Take 2 Caps by mouth two (2) times a day.  BENEFIBER, GUAR GUM, PO Take 1 Dose by mouth two (2) times a day. 1 dose - 2 teaspoons Imaging: CT Results (recent): 
[place] [facility] [place]21 CT SPINE CERV WO CONT Narrative EXAM:  CT C-spine without contrast 
 
INDICATION: Fall. COMPARISON: CT 3/2/2021 TECHNIQUE: Thin section axial noncontrast CT of the cervical spine with coronal 
and sagittal reformats. CT dose reduction was achieved through use of a 
standardized protocol tailored for this examination and automatic exposure 
control for dose modulation. FINDINGS:  
There is no acute fracture or subluxation. Vertebral body heights and 
intervertebral disc spaces are maintained. Overall mild diffuse facet arthrosis 
is again noted. There is no spinal canal or foraminal stenosis. There is no 
abnormality in alignment. The paraspinal soft tissues are stable. Centrilobular 
emphysema is again noted. Impression No acute abnormality. MRI Results (recent): 
Results from Hospital Encounter encounter on 03/08/21 MRI BRAIN WO CONT Narrative CLINICAL HISTORY: dysarthria. INDICATION: dysarthria. COMPARISON: 3/2/2020 TECHNIQUE: MR examination of the brain includes axial and sagittal T1, coronal 
T2, axial T2, axial FLAIR, axial gradient echo, axial DWI. CONTRAST: None FINDINGS:  
Subacute infarct in the right superior lateral frontal lobe. Stable T2 shine 
through left posterior frontal lobe 4-23. Unchanged generalized parenchymal 
volume loss with commensurate dilation of the sulci and ventricular system. Unchanged chronic infarct in the right frontoparietal lobe and right 
temporo-occipital lobe. Unchanged scattered periventricular deep white matter T2/FLAIR hyperintensities, consistent with mild to moderate chronic 
microvascular ischemic disease. There is no acute hemorrhage, extra-axial fluid 
collection, or mass effect. There is no cerebellar tonsillar herniation. Expected arterial flow-voids are present. Status post FESS with bilateral maxillary antrostomies. Mild mucosal thickening 
in the bilateral ethmoidal air cells. Trace bilateral mastoid effusions. Left vertebral artery is dominant. IACs are symmetric in size. No significant osseous 
or scalp lesions are identified. Facet arthropathy in the cervical spine. Impression No evidence of acute infarction. No intracranial mass or hemorrhage. Moderate microvascular ischemic disease. Chronic infarcts in the right frontoparietal lobe and right temporo-occipital 
lobe. IR Results (recent): No results found for this or any previous visit. VAS/US Results (recent): No results found for this or any previous visit. Reviewed records in [facility] tab today Lab Review Admission on 03/08/2021 Component Date Value Ref Range Status  Ventricular Rate 03/08/2021 81  BPM Preliminary  Atrial Rate 03/08/2021 81  BPM Preliminary  P-R Interval 03/08/2021 182  ms Preliminary  QRS Duration 03/08/2021 132  ms Preliminary  Q-T Interval 03/08/2021 394  ms Preliminary  QTC Calculation (Bezet) 03/08/2021 457  ms Preliminary  Calculated P Axis 03/08/2021 56  degrees Preliminary  Calculated T Axis 03/08/2021 15  degrees Preliminary  Diagnosis 03/08/2021    Preliminary Value:Normal sinus rhythm Right bundle branch block Abnormal ECG When compared with ECG of 02-MAR-2021 15:52, 
premature ventricular complexes are no longer present  WBC 03/08/2021 11.2* 3.6 - 11.0 K/uL Final  
 RBC 03/08/2021 4.32  3.80 - 5.20 M/uL Final  
 HGB 03/08/2021 11.3* 11.5 - 16.0 g/dL Final  
 HCT 03/08/2021 33.3* 35.0 - 47.0 % Final  
 MCV 03/08/2021 77.1* 80.0 - 99.0 FL Final  
 INVESTIGATED PER DELTA CHECK PROTOCOL  MCH 03/08/2021 26.2  26.0 - 34.0 PG Final  
 MCHC 03/08/2021 33.9  30.0 - 36.5 g/dL Final  
 RDW 03/08/2021 15.4* 11.5 - 14.5 % Final  
 PLATELET 91/10/4968 772  150 - 400 K/uL Final  
 MPV 03/08/2021 9.8  8.9 - 12.9 FL Final  
 NRBC 03/08/2021 0.0  0  WBC Final  
 ABSOLUTE NRBC 03/08/2021 0.00  0.00 - 0.01 K/uL Final  
  NEUTROPHILS 03/08/2021 81* 32 - 75 % Final  
 LYMPHOCYTES 03/08/2021 8* 12 - 49 % Final  
 MONOCYTES 03/08/2021 7  5 - 13 % Final  
 EOSINOPHILS 03/08/2021 3  0 - 7 % Final  
 BASOPHILS 03/08/2021 1  0 - 1 % Final  
 IMMATURE GRANULOCYTES 03/08/2021 1* 0.0 - 0.5 % Final  
 ABS. NEUTROPHILS 03/08/2021 9.1* 1.8 - 8.0 K/UL Final  
 ABS. LYMPHOCYTES 03/08/2021 0.9  0.8 - 3.5 K/UL Final  
 ABS. MONOCYTES 03/08/2021 0.8  0.0 - 1.0 K/UL Final  
 ABS. EOSINOPHILS 03/08/2021 0.3  0.0 - 0.4 K/UL Final  
 ABS. BASOPHILS 03/08/2021 0.1  0.0 - 0.1 K/UL Final  
 ABS. IMM. GRANS. 03/08/2021 0.1* 0.00 - 0.04 K/UL Final  
 DF 03/08/2021 AUTOMATED    Final  
 Sodium 03/08/2021 120* 136 - 145 mmol/L Final  
 Potassium 03/08/2021 4.9  3.5 - 5.1 mmol/L Final  
 SPECIMEN HEMOLYZED, RESULTS MAY BE AFFECTED  Chloride 03/08/2021 90* 97 - 108 mmol/L Final  
 CO2 03/08/2021 21  21 - 32 mmol/L Final  
 Anion gap 03/08/2021 9  5 - 15 mmol/L Final  
 Glucose 03/08/2021 100  65 - 100 mg/dL Final  
 BUN 03/08/2021 11  6 - 20 MG/DL Final  
 Creatinine 03/08/2021 0.66  0.55 - 1.02 MG/DL Final  
 BUN/Creatinine ratio 03/08/2021 17  12 - 20   Final  
 GFR est AA 03/08/2021 >60  >60 ml/min/1.73m2 Final  
 GFR est non-AA 03/08/2021 >60  >60 ml/min/1.73m2 Final  
 Calcium 03/08/2021 7.9* 8.5 - 10.1 MG/DL Final  
 Bilirubin, total 03/08/2021 0.6  0.2 - 1.0 MG/DL Final  
 ALT (SGPT) 03/08/2021 52  12 - 78 U/L Final  
 AST (SGOT) 03/08/2021 98* 15 - 37 U/L Final  
 SPECIMEN HEMOLYZED, RESULTS MAY BE AFFECTED  Alk.  phosphatase 03/08/2021 92  45 - 117 U/L Final  
 Protein, total 03/08/2021 6.6  6.4 - 8.2 g/dL Final  
 Albumin 03/08/2021 3.2* 3.5 - 5.0 g/dL Final  
 Globulin 03/08/2021 3.4  2.0 - 4.0 g/dL Final  
 A-G Ratio 03/08/2021 0.9* 1.1 - 2.2   Final  
 aPTT 03/08/2021 31.3* 22.1 - 31.0 sec Final  
 In addition to factor deficiency, monitoring heparin therapy, etc., evaluation of a prolonged aPTT result should include consideration of preanalytic variables such as heparin flush contamination, specimen integrity issues, etc.  
 aPTT, therapeutic range 03/08/2021      58.0 - 77.0 SECS Final  
 INR 03/08/2021 1.3* 0.9 - 1.1   Final  
 A single therapeutic range for Vit K antagonists may not be optimal for all indications - see June, 2008 issue of Chest, American College [employer] Evidence-Based Clinical Practice Guidelines, 8th Edition.  Prothrombin time 03/08/2021 13.6* 9.0 - 11.1 sec Final  
 Magnesium 03/08/2021 1.7  1.6 - 2.4 mg/dL Final  
 SPECIMEN HEMOLYZED, RESULTS MAY BE AFFECTED  Color 03/08/2021 YELLOW/STRAW    Final  
 Color Reference Range: Straw, Yellow or Dark Yellow  Appearance 03/08/2021 CLEAR  CLEAR   Final  
 Specific gravity 03/08/2021 1.006  1.003 - 1.030   Final  
 pH (UA) 03/08/2021 7.0  5.0 - 8.0   Final  
 Protein 03/08/2021 Negative  NEG mg/dL Final  
 Glucose 03/08/2021 Negative  NEG mg/dL Final  
 Ketone 03/08/2021 Negative  NEG mg/dL Final  
 Bilirubin 03/08/2021 Negative  NEG   Final  
 Blood 03/08/2021 SMALL* NEG   Final  
 Urobilinogen 03/08/2021 0.2  0.2 - 1.0 EU/dL Final  
 Nitrites 03/08/2021 Negative  NEG   Final  
 Leukocyte Esterase 03/08/2021 LARGE* NEG   Final  
 WBC 03/08/2021 0-4  0 - 4 /hpf Final  
 RBC 03/08/2021 5-10  0 - 5 /hpf Final  
 Epithelial cells 03/08/2021 FEW  FEW /lpf Final  
 Epithelial cell category consists of squamous cells and /or transitional urothelial cells. Renal tubular cells, if present, are separately identified as such.  Bacteria 03/08/2021 Negative  NEG /hpf Final  
 Glucose (POC) 03/08/2021 124* 65 - 100 mg/dL Final  
 Comment: (NOTE) The Accu-Chek Inform II glucometer is not FDA cleared for critically  
ill patient use.  A study was performed validating the equivalence of  
 glucometer and clinical laboratory results on this patient  
population. Despite the study, use of glucometers with capillary  
specimens from critically ill patients, regardless of their location,  
makes the test high complexity and requires the performing individual  
to comply with CLIA requirements more stringent than those for waived  
testing in the hospital setting. Critical thinking skills are  
necessary to determine a potentially critically ill patients status  
prior to using a glucometer.  Performed by 2021 Phyu Ei Ei   Final  
 SAMPLES BEING HELD 2021 1PNK   Final  
 COMMENT 2021 Add-on orders for these samples will be processed based on acceptable specimen integrity and analyte stability, which may vary by analyte. Final  
 Sodium,urine random 2021 62  MMOL/L Final  
 No reference range has been established.  Osmolality, serum/plasma 2021 251  mOsm/kg H2O Final  
 Comment: (NOTE) Reference range for serum/plasma osmolality: 
 may be as low as 266 mOsm/kg H2O Child/adult:  275-295 mOsm/kg H2O 
>60 years:    280-301 mOsm/kg H2O Based on Manufactures' recommendations and the literature.  TSH 2021 3.75* 0.36 - 3.74 uIU/mL Final  
 Comment:     
Due to TSH heterogeneity, both structurally and degree of glycosylation, monoclonal antibodies used in the TSH assay may not accurately quantitate TSH. Therefore, this result should be correlated with clinical findings as well as with other assessments of thyroid function, e.g., free T4, free T3. 
  
 Uric acid 2021 3.0  2.6 - 6.0 MG/DL Final  
 Osmolality,urine 2021 225  MOSM/kg H2O Final  
 Comment: (NOTE) Reference range for urine osmolality Random:  mOsm/kg H2O depending on fluid intake. Random: >850 mOsm/kg H2O after 12 hour fluid restriction. 24 hour: 300-900 mOsm/kg H2O Based on Manufactures' recommendations and the literature.  Sodium 03/08/2021 122* 136 - 145 mmol/L Final  
 Potassium 03/08/2021 4.0  3.5 - 5.1 mmol/L Final  
 Chloride 03/08/2021 92* 97 - 108 mmol/L Final  
 CO2 03/08/2021 23  21 - 32 mmol/L Final  
 Anion gap 03/08/2021 7  5 - 15 mmol/L Final  
 Glucose 03/08/2021 98  65 - 100 mg/dL Final  
 BUN 03/08/2021 8  6 - 20 MG/DL Final  
 Creatinine 03/08/2021 0.53* 0.55 - 1.02 MG/DL Final  
 BUN/Creatinine ratio 03/08/2021 15  12 - 20   Final  
 GFR est AA 03/08/2021 >60  >60 ml/min/1.73m2 Final  
 GFR est non-AA 03/08/2021 >60  >60 ml/min/1.73m2 Final  
 Calcium 03/08/2021 8.3* 8.5 - 10.1 MG/DL Final  
 Glucose (POC) 03/08/2021 85  65 - 100 mg/dL Final  
 Comment: (NOTE) The Accu-Chek Inform II glucometer is not FDA cleared for critically  
ill patient use. A study was performed validating the equivalence of  
glucometer and clinical laboratory results on this patient  
population. Despite the study, use of glucometers with capillary  
specimens from critically ill patients, regardless of their location,  
makes the test high complexity and requires the performing individual  
to comply with CLIA requirements more stringent than those for waived  
testing in the hospital setting. Critical thinking skills are  
necessary to determine a potentially critically ill patients status  
prior to using a glucometer.  Performed by 03/08/2021 AMOL RIVAS   Final  
 SAMPLES BEING HELD 03/08/2021 1YELLOW TOP URINE, 1 GREY TOP URINE   Corrected  COMMENT 03/08/2021 Add-on orders for these samples will be processed based on acceptable specimen integrity and analyte stability, which may vary by analyte.     Final  
 Sodium 03/08/2021 122* 136 - 145 mmol/L Final  
 Potassium 03/08/2021 4.0  3.5 - 5.1 mmol/L Final  
 Chloride 03/08/2021 91* 97 - 108 mmol/L Final  
 CO2 03/08/2021 24  21 - 32 mmol/L Final  
 Anion gap 03/08/2021 7  5 - 15 mmol/L Final  
 Glucose 03/08/2021 85  65 - 100 mg/dL Final  
  BUN 03/08/2021 8  6 - 20 MG/DL Final  
 Creatinine 03/08/2021 0.51* 0.55 - 1.02 MG/DL Final  
 BUN/Creatinine ratio 03/08/2021 16  12 - 20   Final  
 GFR est AA 03/08/2021 >60  >60 ml/min/1.73m2 Final  
 GFR est non-AA 03/08/2021 >60  >60 ml/min/1.73m2 Final  
 Calcium 03/08/2021 8.0* 8.5 - 10.1 MG/DL Final  
 Sodium 03/08/2021 123* 136 - 145 mmol/L Final  
 Potassium 03/08/2021 4.4  3.5 - 5.1 mmol/L Final  
 Chloride 03/08/2021 90* 97 - 108 mmol/L Final  
 CO2 03/08/2021 23  21 - 32 mmol/L Final  
 Anion gap 03/08/2021 10  5 - 15 mmol/L Final  
 Glucose 03/08/2021 175* 65 - 100 mg/dL Final  
 BUN 03/08/2021 8  6 - 20 MG/DL Final  
 Creatinine 03/08/2021 0.63  0.55 - 1.02 MG/DL Final  
 BUN/Creatinine ratio 03/08/2021 13  12 - 20   Final  
 GFR est AA 03/08/2021 >60  >60 ml/min/1.73m2 Final  
 GFR est non-AA 03/08/2021 >60  >60 ml/min/1.73m2 Final  
 Calcium 03/08/2021 7.5* 8.5 - 10.1 MG/DL Final  
 Glucose (POC) 03/08/2021 89  65 - 100 mg/dL Final  
 Comment: (NOTE) The Accu-Chek Inform II glucometer is not FDA cleared for critically  
ill patient use. A study was performed validating the equivalence of  
glucometer and clinical laboratory results on this patient  
population. Despite the study, use of glucometers with capillary  
specimens from critically ill patients, regardless of their location,  
makes the test high complexity and requires the performing individual  
to comply with CLIA requirements more stringent than those for waived  
testing in the hospital setting. Critical thinking skills are  
necessary to determine a potentially critically ill patients status  
prior to using a glucometer.  Performed by 03/08/2021 AMOL RIVAS   Final  
 Glucose (POC) 03/08/2021 149* 65 - 100 mg/dL Final  
 Comment: (NOTE) The Accu-Chek Inform II glucometer is not FDA cleared for critically  
ill patient use.  A study was performed validating the equivalence of  
 glucometer and clinical laboratory results on this patient  
population. Despite the study, use of glucometers with capillary  
specimens from critically ill patients, regardless of their location,  
makes the test high complexity and requires the performing individual  
to comply with CLIA requirements more stringent than those for waived  
testing in the hospital setting. Critical thinking skills are  
necessary to determine a potentially critically ill patients status  
prior to using a glucometer.  Performed by 03/08/2021 AMOL RIVAS   Final  
 Glucose (POC) 03/08/2021 125* 65 - 100 mg/dL Final  
 Comment: (NOTE) The Accu-Chek Inform II glucometer is not FDA cleared for critically  
ill patient use. A study was performed validating the equivalence of  
glucometer and clinical laboratory results on this patient  
population. Despite the study, use of glucometers with capillary  
specimens from critically ill patients, regardless of their location,  
makes the test high complexity and requires the performing individual  
to comply with CLIA requirements more stringent than those for waived  
testing in the hospital setting. Critical thinking skills are  
necessary to determine a potentially critically ill patients status  
prior to using a glucometer.  Performed by 03/08/2021 Tianna Garnett (PCT)   Final  
 T4, Total 03/08/2021 9.6  4.8 - 13.9 ug/dL Final  
 Glucose (POC) 03/08/2021 136* 65 - 100 mg/dL Final  
 Comment: (NOTE) The Accu-Chek Inform II glucometer is not FDA cleared for critically  
ill patient use. A study was performed validating the equivalence of  
glucometer and clinical laboratory results on this patient  
population.  Despite the study, use of glucometers with capillary  
specimens from critically ill patients, regardless of their location,  
makes the test high complexity and requires the performing individual  
 to comply with CLIA requirements more stringent than those for waived  
testing in the hospital setting. Critical thinking skills are  
necessary to determine a potentially critically ill patients status  
prior to using a glucometer.  Performed by 03/08/2021 Zandra Katz (PCT)   Final  
 WBC 03/09/2021 8.5  3.6 - 11.0 K/uL Final  
 RBC 03/09/2021 3.86  3.80 - 5.20 M/uL Final  
 HGB 03/09/2021 10.0* 11.5 - 16.0 g/dL Final  
 HCT 03/09/2021 30.2* 35.0 - 47.0 % Final  
 MCV 03/09/2021 78.2* 80.0 - 99.0 FL Final  
 MCH 03/09/2021 25.9* 26.0 - 34.0 PG Final  
 MCHC 03/09/2021 33.1  30.0 - 36.5 g/dL Final  
 RDW 03/09/2021 15.4* 11.5 - 14.5 % Final  
 PLATELET 72/82/9418 648  150 - 400 K/uL Final  
 MPV 03/09/2021 9.4  8.9 - 12.9 FL Final  
 NRBC 03/09/2021 0.0  0  WBC Final  
 ABSOLUTE NRBC 03/09/2021 0.00  0.00 - 0.01 K/uL Final  
 INR 03/09/2021 1.2* 0.9 - 1.1   Final  
 A single therapeutic range for Vit K antagonists may not be optimal for all indications - see June, 2008 issue of Chest, American College [employer] Chest [employer]icians Evidence-Based Clinical Practice Guidelines, 8th Edition.   
 Prothrombin time 03/09/2021 12.8* 9.0 - 11.1 sec Final  
 aPTT 03/09/2021 28.6  22.1 - 31.0 sec Final  
 In addition to factor deficiency, monitoring heparin therapy, etc., evaluation of a prolonged aPTT result should include consideration of preanalytic variables such as heparin flush contamination, specimen integrity issues, etc.  
 aPTT, therapeutic range 03/09/2021      58.0 - 77.0 SECS Final  
 Sodium 03/09/2021 126* 136 - 145 mmol/L Final  
 Potassium 03/09/2021 3.9  3.5 - 5.1 mmol/L Final  
 Chloride 03/09/2021 94* 97 - 108 mmol/L Final  
 CO2 03/09/2021 25  21 - 32 mmol/L Final  
 Anion gap 03/09/2021 7  5 - 15 mmol/L Final  
 Glucose 03/09/2021 117* 65 - 100 mg/dL Final  
 BUN 03/09/2021 9  6 - 20 MG/DL Final  
 Creatinine 03/09/2021 0.67  0.55 - 1.02 MG/DL Final  
  BUN/Creatinine ratio 03/09/2021 13  12 - 20   Final  
 GFR est AA 03/09/2021 >60  >60 ml/min/1.73m2 Final  
 GFR est non-AA 03/09/2021 >60  >60 ml/min/1.73m2 Final  
 Calcium 03/09/2021 7.8* 8.5 - 10.1 MG/DL Final  
 Magnesium 03/09/2021 1.8  1.6 - 2.4 mg/dL Final  
 Phosphorus 03/09/2021 3.3  2.6 - 4.7 MG/DL Final  
 Homocysteine, plasma 03/09/2021 4.2  3.7 - 13.9 umol/L Final  
 SAMPLES BEING HELD 03/09/2021 1pst   Final  
 COMMENT 03/09/2021 Add-on orders for these samples will be processed based on acceptable specimen integrity and analyte stability, which may vary by analyte. Final  
 Glucose (POC) 03/09/2021 129* 65 - 100 mg/dL Final  
 Comment: (NOTE) The Accu-Chek Inform II glucometer is not FDA cleared for critically  
ill patient use. A study was performed validating the equivalence of  
glucometer and clinical laboratory results on this patient  
population. Despite the study, use of glucometers with capillary  
specimens from critically ill patients, regardless of their location,  
makes the test high complexity and requires the performing individual  
to comply with CLIA requirements more stringent than those for waived  
testing in the hospital setting. Critical thinking skills are  
necessary to determine a potentially critically ill patients status  
prior to using a glucometer.  Performed by [id number] Domi Rubin   Final  
Admission on 03/02/2021, Discharged on 03/03/2021 Component Date Value Ref Range Status  Ventricular Rate 03/02/2021 94  BPM Final  
 Atrial Rate 03/02/2021 94  BPM Final  
 P-R Interval 03/02/2021 174  ms Final  
 QRS Duration 03/02/2021 130  ms Final  
 Q-T Interval 03/02/2021 366  ms Final  
 QTC Calculation (Bezet) 03/02/2021 457  ms Final  
 Calculated P Axis 03/02/2021 70  degrees Final  
 Calculated R Axis 03/02/2021 2  degrees Final  
 Calculated T Axis 03/02/2021 2  degrees Final  
 Diagnosis 03/02/2021    Final  
 Value: Sinus rhythm with occasional premature ventricular complexes Right bundle branch block Abnormal ECG When compared with ECG of 25-FEB-2021 17:27, 
premature ventricular complexes are now present 
aberrant conduction is no longer present Confirmed by Gisselle Waldron M.D., Tsering Grimm ([id number]) on 3/3/2021 4:30:43 PM 
  
 WBC 03/02/2021 8.4  3.6 - 11.0 K/uL Final  
 RBC 03/02/2021 3.92  3.80 - 5.20 M/uL Final  
 HGB 03/02/2021 10.2* 11.5 - 16.0 g/dL Final  
 HCT 03/02/2021 31.5* 35.0 - 47.0 % Final  
 MCV 03/02/2021 80.4  80.0 - 99.0 FL Final  
 MCH 03/02/2021 26.0  26.0 - 34.0 PG Final  
 MCHC 03/02/2021 32.4  30.0 - 36.5 g/dL Final  
 RDW 03/02/2021 15.9* 11.5 - 14.5 % Final  
 PLATELET 19/83/4574 663  150 - 400 K/uL Final  
 MPV 03/02/2021 9.8  8.9 - 12.9 FL Final  
 NRBC 03/02/2021 0.0  0  WBC Final  
 ABSOLUTE NRBC 03/02/2021 0.00  0.00 - 0.01 K/uL Final  
 NEUTROPHILS 03/02/2021 84* 32 - 75 % Final  
 LYMPHOCYTES 03/02/2021 7* 12 - 49 % Final  
 MONOCYTES 03/02/2021 6  5 - 13 % Final  
 EOSINOPHILS 03/02/2021 2  0 - 7 % Final  
 BASOPHILS 03/02/2021 0  0 - 1 % Final  
 IMMATURE GRANULOCYTES 03/02/2021 1* 0.0 - 0.5 % Final  
 ABS. NEUTROPHILS 03/02/2021 7.0  1.8 - 8.0 K/UL Final  
 ABS. LYMPHOCYTES 03/02/2021 0.6* 0.8 - 3.5 K/UL Final  
 ABS. MONOCYTES 03/02/2021 0.5  0.0 - 1.0 K/UL Final  
 ABS. EOSINOPHILS 03/02/2021 0.2  0.0 - 0.4 K/UL Final  
 ABS. BASOPHILS 03/02/2021 0.0  0.0 - 0.1 K/UL Final  
 ABS. IMM. GRANS. 03/02/2021 0.1* 0.00 - 0.04 K/UL Final  
 DF 03/02/2021 SMEAR SCANNED    Final  
 RBC COMMENTS 03/02/2021     Final  
                 Value:ANISOCYTOSIS 
1+  RBC COMMENTS 03/02/2021     Final  
                 Value:MICROCYTOSIS 2+  Sodium 03/02/2021 126* 136 - 145 mmol/L Final  
 Potassium 03/02/2021 3.8  3.5 - 5.1 mmol/L Final  
 Chloride 03/02/2021 94* 97 - 108 mmol/L Final  
 CO2 03/02/2021 25  21 - 32 mmol/L Final  
  Anion gap 03/02/2021 7  5 - 15 mmol/L Final  
 Glucose 03/02/2021 139* 65 - 100 mg/dL Final  
 BUN 03/02/2021 12  6 - 20 MG/DL Final  
 Creatinine 03/02/2021 0.72  0.55 - 1.02 MG/DL Final  
 BUN/Creatinine ratio 03/02/2021 17  12 - 20   Final  
 GFR est AA 03/02/2021 >60  >60 ml/min/1.73m2 Final  
 GFR est non-AA 03/02/2021 >60  >60 ml/min/1.73m2 Final  
 Calcium 03/02/2021 7.4* 8.5 - 10.1 MG/DL Final  
 Bilirubin, total 03/02/2021 0.4  0.2 - 1.0 MG/DL Final  
 ALT (SGPT) 03/02/2021 36  12 - 78 U/L Final  
 AST (SGOT) 03/02/2021 35  15 - 37 U/L Final  
 Alk. phosphatase 03/02/2021 79  45 - 117 U/L Final  
 Protein, total 03/02/2021 5.8* 6.4 - 8.2 g/dL Final  
 Albumin 03/02/2021 3.0* 3.5 - 5.0 g/dL Final  
 Globulin 03/02/2021 2.8  2.0 - 4.0 g/dL Final  
 A-G Ratio 03/02/2021 1.1  1.1 - 2.2   Final  
 INR 03/02/2021 1.3* 0.9 - 1.1   Final  
 A single therapeutic range for Vit K antagonists may not be optimal for all indications - see June, 2008 issue of Chest, [employer] Evidence-Based Clinical Practice Guidelines, 8th Edition.  Prothrombin time 03/02/2021 13.5* 9.0 - 11.1 sec Final  
 Troponin-I, Qt. 03/02/2021 <0.05  <0.05 ng/mL Final  
 Glucose (POC) 03/02/2021 168* 65 - 100 mg/dL Final  
 Comment: (NOTE) The Accu-Chek Inform II glucometer is not FDA cleared for critically  
ill patient use. A study was performed validating the equivalence of  
glucometer and clinical laboratory results on this patient  
population. Despite the study, use of glucometers with capillary  
specimens from critically ill patients, regardless of their location,  
makes the test high complexity and requires the performing individual  
to comply with CLIA requirements more stringent than those for waived  
testing in the hospital setting. Critical thinking skills are  
necessary to determine a potentially critically ill patients status  
prior to using a glucometer.  Performed by 03/02/2021 LANCE BUTT   Final  
 SAMPLES BEING HELD 03/02/2021 1SST,1RED,1DRK GRN   Final  
 COMMENT 03/02/2021 Add-on orders for these samples will be processed based on acceptable specimen integrity and analyte stability, which may vary by analyte. Final  
 Troponin-I, Qt. 03/02/2021 <0.05  <0.05 ng/mL Final  
 TSH 03/02/2021 2.37  0.36 - 3.74 uIU/mL Final  
 Comment:     
Due to TSH heterogeneity, both structurally and degree of glycosylation, monoclonal antibodies used in the TSH assay may not accurately quantitate TSH. Therefore, this result should be correlated with clinical findings as well as with other assessments of thyroid function, e.g., free T4, free T3. 
  
 Color 03/02/2021 YELLOW/STRAW    Final  
 Color Reference Range: Straw, Yellow or Dark Yellow  Appearance 03/02/2021 CLEAR  CLEAR   Final  
 Specific gravity 03/02/2021 1.023  1.003 - 1.030   Final  
 pH (UA) 03/02/2021 7.5  5.0 - 8.0   Final  
 Protein 03/02/2021 Negative  NEG mg/dL Final  
 Glucose 03/02/2021 Negative  NEG mg/dL Final  
 Ketone 03/02/2021 Negative  NEG mg/dL Final  
 Bilirubin 03/02/2021 Negative  NEG   Final  
 Blood 03/02/2021 Negative  NEG   Final  
 Urobilinogen 03/02/2021 0.2  0.2 - 1.0 EU/dL Final  
 Nitrites 03/02/2021 Negative  NEG   Final  
 Leukocyte Esterase 03/02/2021 SMALL* NEG   Final  
 WBC 03/02/2021 0-4  0 - 4 /hpf Final  
 RBC 03/02/2021 0-5  0 - 5 /hpf Final  
 Epithelial cells 03/02/2021 FEW  FEW /lpf Final  
 Epithelial cell category consists of squamous cells and /or transitional urothelial cells. Renal tubular cells, if present, are separately identified as such.  Bacteria 03/02/2021 Negative  NEG /hpf Final  
 UA:UC IF INDICATED 03/02/2021 CULTURE NOT INDICATED BY UA RESULT  CNI   Final  
 Sodium,urine random 03/02/2021 79  MMOL/L Final  
 No reference range has been established.  Osmolality,urine 03/02/2021 301  MOSM/kg H2O Final  
 Comment: (NOTE) Reference range for urine osmolality Random:  mOsm/kg H2O depending on fluid intake. Random: >850 mOsm/kg H2O after 12 hour fluid restriction. 24 hour: 300-900 mOsm/kg H2O Based on Manufactures' recommendations and the literature.  Osmolality, serum/plasma 2021 272  mOsm/kg H2O Final  
 Comment: (NOTE) Reference range for serum/plasma osmolality: 
 may be as low as 266 mOsm/kg H2O Child/adult:  275-295 mOsm/kg H2O 
>60 years:    280-301 mOsm/kg H2O Based on Manufactures' recommendations and the literature.  
  
 Sodium 2021 133* 136 - 145 mmol/L Final  
 Potassium 2021 4.2  3.5 - 5.1 mmol/L Final  
 Chloride 2021 101  97 - 108 mmol/L Final  
 CO2 2021 26  21 - 32 mmol/L Final  
 Anion gap 2021 6  5 - 15 mmol/L Final  
 Glucose 2021 110* 65 - 100 mg/dL Final  
 BUN 2021 11  6 - 20 MG/DL Final  
 Creatinine 2021 0.69  0.55 - 1.02 MG/DL Final  
 BUN/Creatinine ratio 2021 16  12 - 20   Final  
 GFR est AA 2021 >60  >60 ml/min/1.73m2 Final  
 GFR est non-AA 2021 >60  >60 ml/min/1.73m2 Final  
 Calcium 2021 7.7* 8.5 - 10.1 MG/DL Final  
 Sodium 2021 133* 136 - 145 mmol/L Final  
 Potassium 2021 4.2  3.5 - 5.1 mmol/L Final  
 Chloride 2021 100  97 - 108 mmol/L Final  
 CO2 2021 25  21 - 32 mmol/L Final  
 Anion gap 2021 8  5 - 15 mmol/L Final  
 Glucose 2021 129* 65 - 100 mg/dL Final  
 BUN 2021 10  6 - 20 MG/DL Final  
 Creatinine 2021 0.74  0.55 - 1.02 MG/DL Final  
 BUN/Creatinine ratio 2021 14  12 - 20   Final  
 GFR est AA 2021 >60  >60 ml/min/1.73m2 Final  
 GFR est non-AA 2021 >60  >60 ml/min/1.73m2 Final  
 Calcium 2021 7.8* 8.5 - 10.1 MG/DL Final  
 Bilirubin, total 2021 0.5  0.2 - 1.0 MG/DL Final  
 ALT (SGPT) 2021 37  12 - 78 U/L Final  
 AST (SGOT) 2021 35  15 - 37 U/L Final  
  Alk. phosphatase 03/03/2021 74  45 - 117 U/L Final  
 Protein, total 03/03/2021 6.0* 6.4 - 8.2 g/dL Final  
 Albumin 03/03/2021 3.0* 3.5 - 5.0 g/dL Final  
 Globulin 03/03/2021 3.0  2.0 - 4.0 g/dL Final  
 A-G Ratio 03/03/2021 1.0* 1.1 - 2.2   Final  
 Magnesium 03/03/2021 1.9  1.6 - 2.4 mg/dL Final  
 WBC 03/03/2021 8.8  3.6 - 11.0 K/uL Final  
 RBC 03/03/2021 4.04  3.80 - 5.20 M/uL Final  
 HGB 03/03/2021 10.7* 11.5 - 16.0 g/dL Final  
 HCT 03/03/2021 32.9* 35.0 - 47.0 % Final  
 MCV 03/03/2021 81.4  80.0 - 99.0 FL Final  
 MCH 03/03/2021 26.5  26.0 - 34.0 PG Final  
 MCHC 03/03/2021 32.5  30.0 - 36.5 g/dL Final  
 RDW 03/03/2021 15.9* 11.5 - 14.5 % Final  
 PLATELET 38/71/8876 793  150 - 400 K/uL Final  
 MPV 03/03/2021 9.7  8.9 - 12.9 FL Final  
 NRBC 03/03/2021 0.0  0  WBC Final  
 ABSOLUTE NRBC 03/03/2021 0.00  0.00 - 0.01 K/uL Final  
 NEUTROPHILS 03/03/2021 76* 32 - 75 % Final  
 LYMPHOCYTES 03/03/2021 10* 12 - 49 % Final  
 MONOCYTES 03/03/2021 7  5 - 13 % Final  
 EOSINOPHILS 03/03/2021 5  0 - 7 % Final  
 BASOPHILS 03/03/2021 1  0 - 1 % Final  
 IMMATURE GRANULOCYTES 03/03/2021 1* 0.0 - 0.5 % Final  
 ABS. NEUTROPHILS 03/03/2021 6.8  1.8 - 8.0 K/UL Final  
 ABS. LYMPHOCYTES 03/03/2021 0.9  0.8 - 3.5 K/UL Final  
 ABS. MONOCYTES 03/03/2021 0.6  0.0 - 1.0 K/UL Final  
 ABS. EOSINOPHILS 03/03/2021 0.5* 0.0 - 0.4 K/UL Final  
 ABS. BASOPHILS 03/03/2021 0.1  0.0 - 0.1 K/UL Final  
 ABS. IMM. GRANS. 03/03/2021 0.1* 0.00 - 0.04 K/UL Final  
 DF 03/03/2021 AUTOMATED    Final  
 Troponin-I, Qt. 03/03/2021 <0.05  <0.05 ng/mL Final  
 Glucose (POC) 03/02/2021 115* 65 - 100 mg/dL Final  
 Comment: (NOTE) The Accu-Chek Inform II glucometer is not FDA cleared for critically  
ill patient use. A study was performed validating the equivalence of  
glucometer and clinical laboratory results on this patient  
population. Despite the study, use of glucometers with capillary specimens from critically ill patients, regardless of their location,  
makes the test high complexity and requires the performing individual  
to comply with CLIA requirements more stringent than those for waived  
testing in the hospital setting. Critical thinking skills are  
necessary to determine a potentially critically ill patients status  
prior to using a glucometer.  Performed by 03/02/2021 Lorrene Cordia   Final  
 Glucose (POC) 03/03/2021 105* 65 - 100 mg/dL Final  
 Comment: (NOTE) The Accu-Chek Inform II glucometer is not FDA cleared for critically  
ill patient use. A study was performed validating the equivalence of  
glucometer and clinical laboratory results on this patient  
population. Despite the study, use of glucometers with capillary  
specimens from critically ill patients, regardless of their location,  
makes the test high complexity and requires the performing individual  
to comply with CLIA requirements more stringent than those for waived  
testing in the hospital setting. Critical thinking skills are  
necessary to determine a potentially critically ill patients status  
prior to using a glucometer.  Performed by 03/03/2021 Lorrene Cordia   Final  
 Glucose (POC) 03/03/2021 207* 65 - 100 mg/dL Final  
 Comment: (NOTE) The Accu-Chek Inform II glucometer is not FDA cleared for critically  
ill patient use. A study was performed validating the equivalence of  
glucometer and clinical laboratory results on this patient  
population. Despite the study, use of glucometers with capillary  
specimens from critically ill patients, regardless of their location,  
makes the test high complexity and requires the performing individual  
to comply with CLIA requirements more stringent than those for waived  
testing in the hospital setting. Critical thinking skills are  
necessary to determine a potentially critically ill patients status  
prior to using a glucometer.  Performed by 03/03/2021 Manchester Henrique   Final  
No results displayed because visit has over 200 results. Admission on 01/03/2021, Discharged on 01/04/2021 Component Date Value Ref Range Status  WBC 01/03/2021 14.9* 3.6 - 11.0 K/uL Final  
 RBC 01/03/2021 4.31  3.80 - 5.20 M/uL Final  
 HGB 01/03/2021 11.4* 11.5 - 16.0 g/dL Final  
 HCT 01/03/2021 35.3  35.0 - 47.0 % Final  
 MCV 01/03/2021 81.9  80.0 - 99.0 FL Final  
 MCH 01/03/2021 26.5  26.0 - 34.0 PG Final  
 MCHC 01/03/2021 32.3  30.0 - 36.5 g/dL Final  
 RDW 01/03/2021 14.3  11.5 - 14.5 % Final  
 PLATELET 45/15/3521 107  150 - 400 K/uL Final  
 MPV 01/03/2021 9.9  8.9 - 12.9 FL Final  
 NRBC 01/03/2021 0.0  0  WBC Final  
 ABSOLUTE NRBC 01/03/2021 0.00  0.00 - 0.01 K/uL Final  
 NEUTROPHILS 01/03/2021 95* 32 - 75 % Final  
 LYMPHOCYTES 01/03/2021 2* 12 - 49 % Final  
 MONOCYTES 01/03/2021 2* 5 - 13 % Final  
 EOSINOPHILS 01/03/2021 0  0 - 7 % Final  
 BASOPHILS 01/03/2021 0  0 - 1 % Final  
 IMMATURE GRANULOCYTES 01/03/2021 1* 0.0 - 0.5 % Final  
 ABS. NEUTROPHILS 01/03/2021 14.2* 1.8 - 8.0 K/UL Final  
 ABS. LYMPHOCYTES 01/03/2021 0.3* 0.8 - 3.5 K/UL Final  
 ABS. MONOCYTES 01/03/2021 0.3  0.0 - 1.0 K/UL Final  
 ABS. EOSINOPHILS 01/03/2021 0.0  0.0 - 0.4 K/UL Final  
 ABS. BASOPHILS 01/03/2021 0.0  0.0 - 0.1 K/UL Final  
 ABS. IMM. GRANS. 01/03/2021 0.1* 0.00 - 0.04 K/UL Final  
 DF 01/03/2021 SMEAR SCANNED    Final  
 RBC COMMENTS 01/03/2021     Final  
                 Value:OVALOCYTES 1+  WBC COMMENTS 01/03/2021 VACUOLATED POLYS    Final  
 PRESENT  
 Sodium 01/03/2021 131* 136 - 145 mmol/L Final  
 Potassium 01/03/2021 4.4  3.5 - 5.1 mmol/L Final  
 Chloride 01/03/2021 101  97 - 108 mmol/L Final  
 CO2 01/03/2021 28  21 - 32 mmol/L Final  
 Anion gap 01/03/2021 2* 5 - 15 mmol/L Final  
 Glucose 01/03/2021 147* 65 - 100 mg/dL Final  
 BUN 01/03/2021 21* 6 - 20 MG/DL Final  
  Creatinine 01/03/2021 0.82  0.55 - 1.02 MG/DL Final  
 BUN/Creatinine ratio 01/03/2021 26* 12 - 20   Final  
 GFR est AA 01/03/2021 >60  >60 ml/min/1.73m2 Final  
 GFR est non-AA 01/03/2021 >60  >60 ml/min/1.73m2 Final  
 Estimated GFR is calculated using the IDMS-traceable Modification of Diet in Renal Disease (MDRD) Study equation, reported for both  Americans (GFRAA) and non- Americans (GFRNA), and normalized to 1.73m2 body surface area. The physician must decide which value applies to the patient.  Calcium 01/03/2021 8.7  8.5 - 10.1 MG/DL Final  
 Bilirubin, total 01/03/2021 0.5  0.2 - 1.0 MG/DL Final  
 ALT (SGPT) 01/03/2021 28  12 - 78 U/L Final  
 AST (SGOT) 01/03/2021 30  15 - 37 U/L Final  
 Alk. phosphatase 01/03/2021 91  45 - 117 U/L Final  
 Protein, total 01/03/2021 7.4  6.4 - 8.2 g/dL Final  
 Albumin 01/03/2021 3.2* 3.5 - 5.0 g/dL Final  
 Globulin 01/03/2021 4.2* 2.0 - 4.0 g/dL Final  
 A-G Ratio 01/03/2021 0.8* 1.1 - 2.2   Final  
 SAMPLES BEING HELD 01/03/2021 1RD,1BL,1SST   Final  
 COMMENT 01/03/2021 Add-on orders for these samples will be processed based on acceptable specimen integrity and analyte stability, which may vary by analyte. Final  
 D-dimer 01/03/2021 0.74* 0.00 - 0.65 mg/L FEU Final  
 Comment: (NOTE) The combination of a low pre-test probability based on Wells criteria 
and a D-Dimer result below the cutoff value of 0.5 mg/L increases the  
negative predictive value for DVT to %.  Specimen source 01/03/2021 Nasopharyngeal    Final  
 Specimen source 01/03/2021 Nasopharyngeal    Final  
 COVID-19 rapid test 01/03/2021 Not detected  NOTD   Final  
 Comment: Rapid Abbott ID Now Rapid NAAT:  The specimen is NEGATIVE for SARS-CoV-2, the novel coronavirus associated with COVID-19. Negative results should be treated as presumptive and, if inconsistent with clinical signs and symptoms or necessary for patient management, should be tested with an alternative molecular assay. Negative results do not preclude SARS-CoV-2 infection and should not be used as the sole basis for patient management decisions. This test has been authorized by the FDA under an Emergency Use Authorization (EUA) for use by authorized laboratories. Fact sheet for Healthcare Providers: kstattoo.com Fact sheet for Patients: kstattoo.com Methodology: Isothermal Nucleic Acid Amplification  Specimen type 01/03/2021 NP Swab    Final  
 Health status 01/03/2021 Symptomatic Testing    Final  
 Procalcitonin 01/03/2021 0.16  ng/mL Final  
 Comment:     
Suspected Sepsis: 
<0.50 ng/mL Low likelihood of sepsis. 0.50-2.00 ng/mL Increased likelihood of sepsis. Antibiotics encouraged. >2.00 ng/mL High risk of sepsis/shock. Antibiotics strongly encouraged. Suspected Lower Resp Tract Infections: 
<0.24 ng/mL Low likelihood of bacterial infection. >0.24 ng/mL Increased likelihood of bacterial infection. Antibiotics encouraged. With successful antibiotic therapy, PCT levels should decrease rapidly. (Half-life of 24 to 36 hours) Procalcitonin values from samples collected within the first 6 hours of systemic infection may still be low. Retesting may be indicated. Values from day 1 and day 4 can be entered into the Change in Procalcitonin Calculator (www.[email or web address]-pct-calculator. com) to determine the patient's Mortality Risk Prognosis. In healthy neonates, plasma Procalcitonin (PCT) concentrations increase gradually after birth, reaching peak values at about 24 hours of age then decrease to normal valu  
                        es below 0.5 ng/mL by 48-72 hours of age.  
  
 Source 01/03/2021 URINE    Final  
  L pneumophila S1 Ag, urine 01/03/2021 Negative  Negative   Final  
 Comment: (NOTE) Presumptive negative for L. pneumophila serogroup 1 antigen in urine, 
suggesting no recent or current infection. Legionnaires' disease 
cannot be ruled out since other serogroups and species may also 
cause disease. Performed At: [address] [id number] Darlin Metcalf MD [place]:[id number]  Source 01/03/2021 URINE    Final  
 Specimen 01/03/2021 Urine   Final  
 Streptococcus pneumoniae Ag 01/03/2021 Negative  Negative   Final  
 Fluid culture 01/03/2021 Not indicated. Final  
 Organism ID 01/03/2021 Not indicated. Final  
 Please note 01/03/2021 Comment    Final  
 Comment: (NOTE) College of [employer]ologists standards require a culture to be 
performed on CSF specimens submitted for bacterial antigen testing. (CAP [id number]) Urine specimens will not be cultured. Performed At: [address] [id number] Darlin Metcalf MD :[id number]  Color 01/03/2021 YELLOW/STRAW    Final  
 Color Reference Range: Straw, Yellow or Dark Yellow  Appearance 01/03/2021 CLEAR  CLEAR   Final  
 Specific gravity 01/03/2021 1.007  1.003 - 1.030   Final  
 pH (UA) 01/03/2021 7.0  5.0 - 8.0   Final  
 Protein 01/03/2021 Negative  NEG mg/dL Final  
 Glucose 01/03/2021 Negative  NEG mg/dL Final  
 Ketone 01/03/2021 Negative  NEG mg/dL Final  
 Bilirubin 01/03/2021 Negative  NEG   Final  
 Blood 01/03/2021 SMALL* NEG   Final  
 Urobilinogen 01/03/2021 0.2  0.2 - 1.0 EU/dL Final  
 Nitrites 01/03/2021 Positive* NEG   Final  
 Leukocyte Esterase 01/03/2021 TRACE* NEG   Final  
 WBC 01/03/2021 0-4  0 - 4 /hpf Final  
 RBC 01/03/2021 10-20  0 - 5 /hpf Final  
 Epithelial cells 01/03/2021 FEW  FEW /lpf Final  
 Epithelial cell category consists of squamous cells and /or transitional urothelial cells. Renal tubular cells, if present, are separately identified as such.  Bacteria 01/03/2021 Negative  NEG /hpf Final  
 UA:UC IF INDICATED 01/03/2021 CULTURE NOT INDICATED BY UA RESULT  CNI   Final  
 Hyaline cast 01/03/2021 0-2  0 - 5 /lpf Final  
 Ventricular Rate 01/03/2021 87  BPM Final  
 Atrial Rate 01/03/2021 87  BPM Final  
 P-R Interval 01/03/2021 172  ms Final  
 QRS Duration 01/03/2021 124  ms Final  
 Q-T Interval 01/03/2021 384  ms Final  
 QTC Calculation (Bezet) 01/03/2021 462  ms Final  
 Calculated P Axis 01/03/2021 28  degrees Final  
 Calculated R Axis 01/03/2021 15  degrees Final  
 Calculated T Axis 01/03/2021 5  degrees Final  
 Diagnosis 01/03/2021    Final  
                 Value:Normal sinus rhythm Right bundle branch block Abnormal ECG No previous ECGs available Confirmed by Ebonie Giordano MD, [id number] [id number]) on 1/4/2021 9:44:08 AM 
  
 Glucose (POC) 01/03/2021 130* 65 - 100 mg/dL Final  
 Comment: (NOTE) The Accu-Chek Inform II glucometer is not FDA cleared for critically  
ill patient use. A study was performed validating the equivalence of  
glucometer and clinical laboratory results on this patient  
population. Despite the study, use of glucometers with capillary  
specimens from critically ill patients, regardless of their location,  
makes the test high complexity and requires the performing individual  
to comply with CLIA requirements more stringent than those for waived  
testing in the hospital setting. Critical thinking skills are  
necessary to determine a potentially critically ill patients status  
prior to using a glucometer.  Performed by 01/03/2021 Paloma Katz   Final  
 Glucose (POC) 01/03/2021 153* 65 - 100 mg/dL Final  
 Comment: (NOTE) The Accu-Chek Inform II glucometer is not FDA cleared for critically ill patient use. A study was performed validating the equivalence of  
glucometer and clinical laboratory results on this patient  
population. Despite the study, use of glucometers with capillary  
specimens from critically ill patients, regardless of their location,  
makes the test high complexity and requires the performing individual  
to comply with CLIA requirements more stringent than those for waived  
testing in the hospital setting. Critical thinking skills are  
necessary to determine a potentially critically ill patients status  
prior to using a glucometer.  Performed by 01/03/2021 Kamari Zarate   Final  
 M. pneumoniae Ab, IgG 01/04/2021 266* 0 - 99 U/mL Final  
 Comment: (NOTE) Negative:           <100 Indeterminate: 100 - 320 Positive:           >320 The reference interval established is intended as a 
baseline only. Values >100 may indicate a recent 
infection with Mycoplasma pneumoniae and need to be 
confirmed either by a positive IgM result and/or an 
additional specimen drawn 2-4 weeks later showing a 
significant increase in antibody levels.  M. pneumoniae Ab, IgM 01/04/2021 <770  0 - 769 U/mL Final  
 Comment: (NOTE) Negative            <770 Clinically significant amount of M. pneumoniae antibody 
not detected. Low Positive   770 - 950 M. pneumoniae specific IgM presumptively detected. It 
is recommended that another sample be collected 1-2 
weeks later to assure reactivity. Positive            >950 Highly significant amount of M. pneumoniae specific IgM antibody detected. Performed At: [address] [id number] French Alejandro MD [id number]  Hemoglobin A1c 01/04/2021 6.3* 4.0 - 5.6 % Final  
 Comment: NEW METHOD 
PLEASE NOTE NEW REFERENCE RANGE 
(NOTE) HbA1C Interpretive Ranges <5.7              Normal 
5.7 - 6.4         Consider Prediabetes >6.5              Consider Diabetes  Est. average glucose 01/04/2021 134  mg/dL Final  
 Sodium 01/04/2021 134* 136 - 145 mmol/L Final  
 Potassium 01/04/2021 4.2  3.5 - 5.1 mmol/L Final  
 Chloride 01/04/2021 103  97 - 108 mmol/L Final  
 CO2 01/04/2021 27  21 - 32 mmol/L Final  
 Anion gap 01/04/2021 4* 5 - 15 mmol/L Final  
 Glucose 01/04/2021 197* 65 - 100 mg/dL Final  
 BUN 01/04/2021 22* 6 - 20 MG/DL Final  
 Creatinine 01/04/2021 0.91  0.55 - 1.02 MG/DL Final  
 BUN/Creatinine ratio 01/04/2021 24* 12 - 20   Final  
 GFR est AA 01/04/2021 >60  >60 ml/min/1.73m2 Final  
 GFR est non-AA 01/04/2021 59* >60 ml/min/1.73m2 Final  
 Calcium 01/04/2021 8.3* 8.5 - 10.1 MG/DL Final  
 Bilirubin, total 01/04/2021 0.4  0.2 - 1.0 MG/DL Final  
 ALT (SGPT) 01/04/2021 26  12 - 78 U/L Final  
 AST (SGOT) 01/04/2021 26  15 - 37 U/L Final  
 Alk.  phosphatase 01/04/2021 86  45 - 117 U/L Final  
 Protein, total 01/04/2021 6.8  6.4 - 8.2 g/dL Final  
 Albumin 01/04/2021 2.9* 3.5 - 5.0 g/dL Final  
 Globulin 01/04/2021 3.9  2.0 - 4.0 g/dL Final  
 A-G Ratio 01/04/2021 0.7* 1.1 - 2.2   Final  
 Magnesium 01/04/2021 1.9  1.6 - 2.4 mg/dL Final  
 WBC 01/04/2021 14.1* 3.6 - 11.0 K/uL Final  
 RBC 01/04/2021 4.09  3.80 - 5.20 M/uL Final  
 HGB 01/04/2021 10.8* 11.5 - 16.0 g/dL Final  
 HCT 01/04/2021 33.8* 35.0 - 47.0 % Final  
 MCV 01/04/2021 82.6  80.0 - 99.0 FL Final  
 MCH 01/04/2021 26.4  26.0 - 34.0 PG Final  
 MCHC 01/04/2021 32.0  30.0 - 36.5 g/dL Final  
 RDW 01/04/2021 14.3  11.5 - 14.5 % Final  
 PLATELET 94/93/5704 912  150 - 400 K/uL Final  
 MPV 01/04/2021 9.4  8.9 - 12.9 FL Final  
 NRBC 01/04/2021 0.0  0  WBC Final  
 ABSOLUTE NRBC 01/04/2021 0.00  0.00 - 0.01 K/uL Final  
 NEUTROPHILS 01/04/2021 95* 32 - 75 % Final  
 LYMPHOCYTES 01/04/2021 3* 12 - 49 % Final  
  MONOCYTES 01/04/2021 1* 5 - 13 % Final  
 EOSINOPHILS 01/04/2021 0  0 - 7 % Final  
 BASOPHILS 01/04/2021 0  0 - 1 % Final  
 IMMATURE GRANULOCYTES 01/04/2021 1* 0.0 - 0.5 % Final  
 ABS. NEUTROPHILS 01/04/2021 13.5* 1.8 - 8.0 K/UL Final  
 ABS. LYMPHOCYTES 01/04/2021 0.4* 0.8 - 3.5 K/UL Final  
 ABS. MONOCYTES 01/04/2021 0.1  0.0 - 1.0 K/UL Final  
 ABS. EOSINOPHILS 01/04/2021 0.0  0.0 - 0.4 K/UL Final  
 ABS. BASOPHILS 01/04/2021 0.0  0.0 - 0.1 K/UL Final  
 ABS. IMM. GRANS. 01/04/2021 0.1* 0.00 - 0.04 K/UL Final  
 DF 01/04/2021 SMEAR SCANNED    Final  
 RBC COMMENTS 01/04/2021     Final  
                 Value:OVALOCYTES PRESENT  RBC COMMENTS 01/04/2021     Final  
                 Value:MICROCYTOSIS 
1+  Glucose (POC) 01/04/2021 197* 65 - 100 mg/dL Final  
 Comment: (NOTE) The Accu-Chek Inform II glucometer is not FDA cleared for critically  
ill patient use. A study was performed validating the equivalence of  
glucometer and clinical laboratory results on this patient  
population. Despite the study, use of glucometers with capillary  
specimens from critically ill patients, regardless of their location,  
makes the test high complexity and requires the performing individual  
to comply with CLIA requirements more stringent than those for waived  
testing in the hospital setting. Critical thinking skills are  
necessary to determine a potentially critically ill patients status  
prior to using a glucometer.  Performed by 01/04/2021 Dell Gonsales (CON)   Final  
 Glucose (POC) 01/04/2021 240* 65 - 100 mg/dL Final  
 Comment: (NOTE) The Accu-Chek Inform II glucometer is not FDA cleared for critically  
ill patient use. A study was performed validating the equivalence of  
glucometer and clinical laboratory results on this patient  
population.  Despite the study, use of glucometers with capillary  
specimens from critically ill patients, regardless of their location,  
 makes the test high complexity and requires the performing individual  
to comply with CLIA requirements more stringent than those for waived  
testing in the hospital setting. Critical thinking skills are  
necessary to determine a potentially critically ill patients status  
prior to using a glucometer.  Performed by 01/04/2021 Marie Donnelly (PCT)   Final  
 
 
 
Objective:  
 
 
Exam: 
Visit Vitals BP (!) 166/92 (BP 1 Location: Right arm, BP Patient Position: At rest) Pulse 73 Temp 97.8 °F (36.6 °C) Resp 16 Ht 5' 4\" (1.626 m) Wt 58.6 kg (129 lb 3 oz) SpO2 96% BMI 22.18 kg/m² Gen: Awake, alert, follows commands Complaining of nausea Knows she is in the hospital 
Does not know the president Able to name Motor function: L UE 3/5 with flexion contracture Rest is 5/5 Sensory: intact to LT, PP and JPS DTRs ++ in all extremities, (-) Babinski Good FTN and HTS Gait: Deferred Assessment: 1. Fall, initial encounter 2. Elbow laceration, left, initial encounter 3. Dysarthria 4. Hyponatremia 5. Slurred speech 6. 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 
Lobe stroke with flexion contracture Recent MRI brain no acute stroke  
 
Plan: 1. Discussed with patient MRI brain did not show a new stroke 2. Treat hyponatremia 3. Continue ASA 81 every day for now. (Patient is Plavix non-responder) 4. Continue Lipitor 80 mg every day Please call for questions 35 mins of time spent, 50% of which was spent on counseling and coordination of care. Charly Leal MD 
Diplomate, American Board of Psychiatry and Neurology Diplomate, Neuromuscular Medicine Diplomate, [address] [place] [address] Board of Electrodiagnostic Medicine

## 2021-03-09 NOTE — PROGRESS NOTES
0310-Pts BP soft. MAP <65. Notified Dr. Jia Ordoñez. 5233- Requested by Dr. Jia Ordoñez to reach back out to Dr. Franklyn Kruse regarding BP's. 56- Notified Dr. Franklyn Kruse, BP's still fluctuating without much improvement. Notified MD that pt on fluid. Orders for 250 mL bolus. Will notify MD if no changes. 3993- Received call back from Dr. Franklyn Kruse, D/c'd orders for fluid bolus. Orders to start pt on jay gtt and upgrade pt to stepdown. 0400-Bedside and Verbal shift change report given to [facility]  (oncoming nurse) by Mat Bright (offgoing nurse). Report included the following information SBAR, Kardex, Intake/Output, MAR, Accordion, Recent Results and Med Rec Status. 0522- Orders to hold albumin per Dr. Jia Ordoñez. 0630- Dr. Franklyn Kruse came to see patient. Notified that jay was never initiated due to pressures flucuating. MD order to keep jay ordered and remain on stepdown. Verbal order to give pt 250mL fluid bolus. Stroke Education provided to patient and the following topics were discussed 1. Patients personal risk factors for stroke are prior stroke 2. Warning signs of Stroke: * Sudden numbness or weakness of the face, arm or leg, especially on one side of The body * Sudden confusion, trouble speaking or understanding * Sudden trouble seeing in one or both eyes * Sudden trouble walking, dizziness, loss of balance or coordination * Sudden severe headache with no known cause 3. Importance of activation Emergency Medical Services ( 9-1-1 ) immediately if experience any warning signs of stroke. 4. Be sure and schedule a follow-up appointment with your primary care doctor or any specialists as instructed. 5. You must take medicine every day to treat your risk factors for stroke. Be sure to take your medicines exactly as your doctor tells you: no more, no less. Know what your medicines are for , what they do.   Anti-thrombotics /anticoagulants can help prevent strokes. You are taking the following medicine(s)  Lipitor 6. Smoking and second-hand smoke greatly increase your risk of stroke, cardiovascular disease and death. Smoking history never 7. Information provided was [facility] Stroke Education Binder, Stroke Handouts or Verbal Education 8. Documentation of teaching completed in Patient Education Activity and on Care Plan with teaching response noted? yes 
 
0700-Bedside and Verbal shift change report given to [employer] (oncoming nurse) by Benson Whitfield (offgoing nurse). Report included the following information SBAR, Kardex, Intake/Output, MAR, Accordion, Recent Results and Med Rec Status.

## 2021-03-10 PROBLEM — R47.1 DYSARTHRIA: Status: RESOLVED | Noted: 2021-01-01 | Resolved: 2021-01-01

## 2021-03-10 PROBLEM — R47.81 SLURRED SPEECH: Status: RESOLVED | Noted: 2021-01-01 | Resolved: 2021-01-01
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
## 2021-03-10 NOTE — PROGRESS NOTES
Hospital follow-up PCP transitional care appointment has been scheduled with Dr. Barry Milton for Tuesday, 3/16/21 at 3:30 p.m. Pending patient discharge.   Cece Mckoy, Care Management Specialist.

## 2021-03-10 NOTE — PROGRESS NOTES
0730 Bedside and Verbal shift change report given to Joy Salcido RN (oncoming nurse) by Parvin Kulkarni RN (offgoing nurse). Report included the following information SBAR and Kardex. This patient was assisted with Intentional Toileting every 2 hours during this shift. Documentation of ambulation and output reflected on Flowsheet. [facility] discharge instructions with daughter Walker Close, verbalized understanding, no questions. Patient left with belongings and discharge instructions.

## 2021-03-10 NOTE — DISCHARGE INSTRUCTIONS
ACUTE DIAGNOSES:  Slurred speech [R47.81]  Hyponatremia [E87.1]  Dysarthria [R47.1]  Fall from bed [W06. XXXA]  Elbow laceration, left, initial encounter [S51.012A]    CHRONIC MEDICAL DIAGNOSES:  Problem List as of 3/10/2021 Date Reviewed: 3/3/2021          Codes Class Noted - Resolved    Hx of completed stroke ICD-10-CM: Z86.73  ICD-9-CM: V12.54  2021 - Present        Elbow laceration, left, initial encounter ICD-10-CM: S51.012A  ICD-9-CM: 881.01  3/8/2021 - Present        Acute metabolic encephalopathy [id number]: G93.41  ICD-9-CM: 348.31  3/8/2021 - Present        Dizzy ICD-10-CM: R42  ICD-9-CM: 780.4  3/3/2021 - Present        DM type 2 causing vascular disease ([facility] ICD-10-CM: E11.59  ICD-9-CM: 250.70, 443.81  Unknown - Present        Chronic obstructive pulmonary disease ([facility] ICD-10-CM: J44.9  ICD-9-CM: 797  Unknown - Present        Arthritis ICD-10-CM: M19.90  ICD-9-CM: 716.90  Unknown - Present        Chronic pain ICD-10-CM: G89.29  ICD-9-CM: 338.29  Unknown - Present        Anxiety and depression ICD-10-CM: F41.9, F32.9  ICD-9-CM: 300.00, 311  Unknown - Present        Glaucoma ICD-10-CM: H40.9  ICD-9-CM: 365.9  Unknown - Present        Neuropathy ICD-10-CM: G62.9  ICD-9-CM: 355.9  Unknown - Present        Polypharmacy ICD-10-CM: K72.788  ICD-9-CM: V58.69  Unknown - Present        Incontinence ICD-10-CM: R32  ICD-9-CM: 788.30  Unknown - Present        GERD (gastroesophageal reflux disease) ICD-10-CM: K21.9  ICD-9-CM: 530.81  Unknown - Present        Hyponatremia ICD-10-CM: E87.1  ICD-9-CM: 276.1  3/2/2021 - Present        Acute pulmonary embolism ([facility] ICD-10-CM: I26.99  ICD-9-CM: 415.19  2/25/2021 - Present        Dizziness ICD-10-CM: R42  ICD-9-CM: 780.4  2/25/2021 - Present        Abdominal pain ICD-10-CM: R10.9  ICD-9-CM: 789.00  2/25/2021 - Present        HTN (hypertension) ICD-10-CM: I10  ICD-9-CM: 401.9  1/3/2021 - Present        Type II diabetes mellitus ([facility] ICD-10-CM: E11.9  ICD-9-CM: 250.00 1/3/2021 - Present        Hypothyroid ICD-10-CM: E03.9  ICD-9-CM: 244.9  1/3/2021 - Present        Anxiety ICD-10-CM: F41.9  ICD-9-CM: 300.00  1/3/2021 - Present        History of CVA (cerebrovascular accident) ICD-10-CM: Z86.73  ICD-9-CM: V12.54  1/3/2021 - Present        RESOLVED: Slurred speech ICD-10-CM: R47.81  ICD-9-CM: 784.59  3/8/2021 - 3/10/2021        RESOLVED: Fall from bed ICD-10-CM: W06. Freddy Hiro  ICD-9-CM: E884.4  3/8/2021 - 3/10/2021        RESOLVED: Dysarthria ICD-10-CM: R47.1  ICD-9-CM: 784.51  3/8/2021 - 3/10/2021        RESOLVED: Chest pain ICD-10-CM: R07.9  ICD-9-CM: 786.50  2/25/2021 - 3/3/2021        RESOLVED: Rib fracture ICD-10-CM: S22.39XA  ICD-9-CM: 807.00  1/3/2021 - 3/3/2021        RESOLVED: Hypoxia ICD-10-CM: R09.02  ICD-9-CM: 799.02  1/3/2021 - 3/3/2021        RESOLVED: SIRS (systemic inflammatory response syndrome) ([facility] ICD-10-CM: R65.10  ICD-9-CM: 995.90  1/3/2021 - 3/3/2021        RESOLVED: COPD exacerbation ([facility] ICD-10-CM: J44.1  ICD-9-CM: 491.21  1/3/2021 - 3/3/2021              DISCHARGE MEDICATIONS:          · It is important that you take the medication exactly as they are prescribed. · Keep your medication in the bottles provided by the pharmacist and keep a list of the medication names, dosages, and times to be taken in your wallet. · Do not take other medications without consulting your doctor. DIET:  Cardiac Diet  Fluid restriction to 1 liter/ day    ACTIVITY: Activity as tolerated    ADDITIONAL INFORMATION: If you experience any of the following symptoms then please call your primary care physician or return to the emergency room if you cannot get hold of your doctor: Fever, chills, nausea, vomiting, diarrhea, change in mentation, falling, bleeding, shortness of breath. FOLLOW UP CARE:  Dr. Rik Jolly MD  you are to call and set up an appointment to see them in 5 days. Follow-up with nephrology, Dr Bernice Kim       Information obtained by :   I understand that if any problems occur once I am at home I am to contact my physician. I understand and acknowledge receipt of the instructions indicated above.                                                                                                                                            Physician's or R.N.'s Signature                                                                  Date/Time                                                                                                                                              Patient or Representative Signature                                                          Date/Time

## 2021-03-10 NOTE — PROGRESS NOTES
Discharge to home today. Patient to be transported by daughter Brea Gasca. [address] to follow for nursing, PT, OT. Care Management Interventions PCP Verified by CM: Juan Carlos De Leon MD, visit > 30 days) Palliative Care Criteria Met (RRAT>21 & CHF Dx)?: Yes 
Palliative Consult Recommended?: Yes Mode of Transport at Discharge: Self(Daughter) Transition of Care Consult (CM Consult): Home Health [address] [place] [address] Road: No 
Reason Outside [facility]: Patient already serviced by other home care/hospice agency Physical Therapy Consult: Yes Occupational Therapy Consult: Yes Speech Therapy Consult: Yes Current Support Network: Lives with Caregiver Confirm Follow Up Transport: Family Discharge Location Discharge Placement: Home with home health Thiago Wellington RN, MSN/Care manager [phone]

## 2021-03-10 NOTE — PROGRESS NOTES
Problem: Self Care Deficits Care Plan (Adult) Goal: *Acute Goals and Plan of Care (Insert Text) Description:  
FUNCTIONAL STATUS PRIOR TO ADMISSION: Patient was modified independent using a SB quad cane for functional mobility. Patient reported her daughter assists her with bathing and dressing when she needs it HOME SUPPORT: The patient lived with her daughter. Occupational Therapy Goals Initiated 3/8/2021 1. Patient will perform grooming with supervision/set-up standing at the sink within 7 day(s). 2.  Patient will perform upper body dressing and lower body dressing with supervision/set-up within 7 day(s). 3.  Patient will perform toilet transfers with supervision/set-up within 7 day(s). 4.  Patient will perform all aspects of toileting with supervision/set-up within 7 day(s). 5.  Patient will participate in upper extremity therapeutic exercise/activities with supervision/set-up for 10 minutes within 7 day(s). Outcome: Progressing Towards Goal 
 OCCUPATIONAL THERAPY TREATMENT Patient: Baldemar Guzman (5 y.o. female) Date: 3/10/2021 Diagnosis: Slurred speech [R47.81] Hyponatremia [E87.1] Dysarthria [R47.1] Fall from bed [W06. Randall Nissen Elbow laceration, left, initial encounter [S51.012A] <principal problem not specified> Precautions: Fall, Skin Chart, occupational therapy assessment, plan of care, and goals were reviewed. ASSESSMENT Patient continues with skilled OT services and is progressing towards goals. Ms. Michelle Garcia was agreeable to activity. She required min A for toilet transfers and transfer to the recliner. She required min A for toileting and LB dressing and engaged in grooming from the recliner with setup. She continues to guard L UE with tasks. Education provided regarding adaptive ADL techniques, safe transfer techniques, and energy conservation techniques. Patient anticipates discharge home today.    
 
Current Level of Function Impacting Discharge (ADLs): Patient required min A for transfers. She requires min A for LB dressing and toileting and setup for grooming tasks. PLAN : 
Patient continues to benefit from skilled intervention to address the above impairments. Continue treatment per established plan of care. to address goals. Recommend with staff:  
Recommend patient be OOB to chair as frequently as tolerated; Goal of 3x/day for all meals for 60 minutes at a time. For toileting needs, recommend transfers to/from bathroom. Encourage patient involvement in personal care as able. Encourage exercises frequently throughout the day. Recommend next OT session: Continue towards set goals. Recommendation for discharge: (in order for the patient to meet his/her long term goals) Occupational therapy at least 2 days/week in the home AND ensure assist and/or supervision for safety. This discharge recommendation: 
Has been made in collaboration with the attending provider and/or case management IF patient discharges home will need the following DME: none SUBJECTIVE:  
Patient agreeable to OT evaluation. OBJECTIVE DATA SUMMARY:  
Cognitive/Behavioral Status: 
Neurologic State: Alert Orientation Level: Oriented X4 Cognition: Follows commands; Impaired decision making; Impulsive;Poor safety awareness Perception: Appears intact Perseveration: No perseveration noted Safety/Judgement: Awareness of environment Functional Mobility and Transfers for ADLs: 
Bed Mobility: 
Rolling: Minimum assistance;Assist x1;Additional time Supine to Sit: Minimum assistance;Assist x1;Additional time Sit to Supine: (pt remained up at end of tx) Transfers: 
Sit to Stand: Minimum assistance; Additional time;Assist x1 Functional Transfers Toilet Transfer : Minimum assistance;Assist x1;Additional time Bed to Chair: Minimum assistance;Assist x1;Additional time Balance: 
Sitting: Intact Standing: Impaired Standing - Static: Good Standing - Dynamic : Fair ADL Intervention: 
Grooming Grooming Assistance: Set-up Position Performed: Seated in chair Washing Face: Set-up Washing Hands: Set-up Lower Body Dressing Assistance Dressing Assistance: Minimum assistance Socks: Minimum assistance Position Performed: Seated in chair Cues: Verbal cues provided Toileting Toileting Assistance: Minimum assistance Bladder Hygiene: Minimum assistance Bowel Hygiene: Minimum assistance Clothing Management: Minimum assistance Cues: Verbal cues provided Cognitive Retraining Safety/Judgement: Awareness of environment Activity Tolerance:  
Good After treatment patient left in no apparent distress:  
Sitting in chair, Call bell within reach, and Bed / chair alarm activated COMMUNICATION/COLLABORATION:  
The patients plan of care was discussed with: Registered nurse and patient . Arthur Pro, OTR/L Time Calculation: 38 mins

## 2021-03-10 NOTE — PROGRESS NOTES
Bedside and Verbal shift change report given to Luis Fletcher RN (oncoming nurse) by Collin Fregoso RN (offgoing nurse). Report included the following information SBAR, Kardex, Intake/Output, MAR, Accordion and Recent Results. Bedside and Verbal shift change report given to [facility], RN (oncoming nurse) by Luis Fletcher RN (offgoing nurse). Report included the following information SBAR, Kardex, Intake/Output, MAR, Accordion and Recent Results.

## 2021-03-10 NOTE — PROGRESS NOTES
Abhinav Erwin [facility] [place] [facility] 
[address] 
[phone] Medical Progress Note NAME: Frances Fatima :  1940 MRM:  [id number] Date of service: 3/10/2021  7:53 AM 
 
  
Assessment and Plan: 1. Acute metabolic encephalopathy (9/3/3092) / Slurred speech (3/8/2021) / Dysarthria (3/8/2021): resolved. Very likely due to hyponatremia. MRI of the brain: without acute finding. Neurology evaluation appreciated.   
  
2. Hyponatremia due to [facility]. High urine sodium. On fluid restriction. Continue urea. Evaluated by nephrology. 
  
3. Fall from bed (3/8/2021) / Elbow laceration, left, initial encounter (3/8/2021): Fall precautions. Wound care with dressing changes. Needs home PT/OT 4.  DM. Continue home insulin and cover with SSI 5. Hypothyroidism. On synthroid. Subjective: Chief Complaint[de-identified] Patient was seen and examined as a follow up for hyponatremia. Chart was reviewed. wants to go home ROS: 
(bold if positive, if negative) Tolerating PT  Tolerating Diet Objective:  
 
Last 24hrs VS reviewed since prior progress note. Most recent are: 
 
Visit Vitals BP (!) 167/64 (BP 1 Location: Left arm, BP Patient Position: At rest) Pulse 79 Temp 97.4 °F (36.3 °C) Resp 16 Ht 5' 4\" (1.626 m) Wt 58.6 kg (129 lb 3 oz) SpO2 96% BMI 22.18 kg/m² SpO2 Readings from Last 6 Encounters:  
03/10/21 96% 21 95% 21 96% 21 96% 21 95% Intake/Output Summary (Last 24 hours) at 3/10/2021 7240 Last data filed at 3/9/2021 2221 Gross per 24 hour Intake 750 ml Output 1850 ml Net -1100 ml Physical Exam: 
 
Gen:  Well-developed, well-nourished, in no acute distress HEENT:  Pink conjunctivae, PERRL, hearing intact to voice, moist mucous membranes Neck:  Supple, without masses, thyroid non-tender Resp:  No accessory muscle use, clear breath sounds without wheezes rales or rhonchi Card:  No murmurs, normal S1, S2 without thrills, bruits or peripheral edema Abd:  Soft, non-tender, non-distended, normoactive bowel sounds are present, no palpable organomegaly and no detectable hernias Lymph:  No cervical or inguinal adenopathy Musc:  No cyanosis or clubbing Skin:  No rashes or ulcers, skin turgor is good Neuro:  Cranial nerves are grossly intact, no focal motor weakness, follows commands appropriately Psych:  Good insight, oriented to person, place and time, alert 
__________________________________________________________________ Medications Reviewed: (see below) Medications:  
 
Current Facility-Administered Medications Medication Dose Route Frequency  urea (URE-NA) 15 gram packet 2 Packet  2 Packet Oral DAILY  bacitracin 500 unit/gram packet 1 Packet  1 Packet Topical DAILY  glucose chewable tablet 16 g  4 Tab Oral PRN  
 dextrose (D50W) injection syrg 12.5-25 g  25-50 mL IntraVENous PRN  
 glucagon (GLUCAGEN) injection 1 mg  1 mg IntraMUSCular PRN  
 ondansetron (ZOFRAN) injection 4 mg  4 mg IntraVENous Q6H PRN  
 aspirin chewable tablet 81 mg  81 mg Oral DAILY  atorvastatin (LIPITOR) tablet 80 mg  80 mg Oral QHS  labetaloL (NORMODYNE;TRANDATE) 20 mg/4 mL (5 mg/mL) injection 5 mg  5 mg IntraVENous Q10MIN PRN  
 bisacodyL (DULCOLAX) suppository 10 mg  10 mg Rectal DAILY PRN  
 acetaminophen (TYLENOL) tablet 650 mg  650 mg Oral Q4H PRN Or  
 acetaminophen (TYLENOL) solution 650 mg  650 mg Per NG tube Q4H PRN Or  
 acetaminophen (TYLENOL) suppository 650 mg  650 mg Rectal Q4H PRN  
 insulin lispro (HUMALOG) injection   SubCUTAneous AC&HS  
 gabapentin (NEURONTIN) capsule 600 mg  600 mg Oral BID  insulin glargine (LANTUS) injection 16 Units  16 Units SubCUTAneous DAILY  predniSONE (DELTASONE) tablet 5 mg  5 mg Oral DAILY  levothyroxine (SYNTHROID) tablet 75 mcg  75 mcg Oral ACB  pantoprazole (PROTONIX) tablet 40 mg  40 mg Oral DAILY  clonazePAM (KlonoPIN) tablet 0.25 mg  0.25 mg Oral QPM  
 oxyCODONE ER (OxyCONTIN) tablet 10 mg  10 mg Oral Q12H  
 trospium (SANCTURA) tablet 20 mg  20 mg Oral ACL  arformoteroL (BROVANA) neb solution 15 mcg  15 mcg Nebulization BID RT And  
 budesonide (PULMICORT) 500 mcg/2 ml nebulizer suspension  500 mcg Nebulization BID RT  
 ipratropium (ATROVENT) 0.02 % nebulizer solution 0.5 mg  0.5 mg Nebulization Q8H RT  
 cefTRIAXone (ROCEPHIN) 1 g in sterile water (preservative free) 10 mL IV syringe  1 g IntraVENous Q24H Lab Data Reviewed: (see below) Lab Review:  
 
Recent Labs  
  03/10/21 
0315 03/09/21 
0323 03/08/21 
0119 WBC 7.8 8.5 11.2* HGB 10.8* 10.0* 11.3* HCT 33.7* 30.2* 33.3*  
* 358 399 Recent Labs  
  03/10/21 
0315 03/09/21 
1020 03/09/21 0323 03/08/21 
0119 03/08/21 
0119 * 134* 126*   < > 120*  
K 3.8 3.7 3.9   < > 4.9  96* 94*   < > 90* CO2 27 22 25   < > 21 * 123* 117*   < > 100 BUN 28* 9 9   < > 11  
CREA 0.69 0.54* 0.67   < > 0.66  
CA 8.0* 7.9* 7.8*   < > 7.9*  
MG  --   --  1.8  --  1.7 PHOS 3.6  --  3.3  --   --   
ALB 3.8  --   --   --  3.2* TBILI  --   --   --   --  0.6 ALT  --   --   --   --  52 INR  --   --  1.2*  --  1.3*  
 < > = values in this interval not displayed. Lab Results Component Value Date/Time Glucose (POC) 91 03/10/2021 08:09 AM  
 Glucose (POC) 164 (H) 03/09/2021 10:21 PM  
 Glucose (POC) 138 (H) 03/09/2021 04:38 PM  
 Glucose (POC) 107 (H) 03/09/2021 12:04 PM  
 Glucose (POC) 129 (H) 03/09/2021 07:50 AM  
 
No results for input(s): PH, PCO2, PO2, HCO3, FIO2 in the last 72 hours. Recent Labs 03/09/21 
9042 03/08/21 
9382 INR 1.2* 1.3* All Micro Results Procedure Component Value Units Date/Time Carol Merrill [[id number]] Collected: 03/08/21 2020 Order Status: Completed Specimen: Urine from Clean catch Updated: 03/09/21 1512 Special Requests: NO SPECIAL REQUESTS Culture result: No growth (<1,000 CFU/ML) I have reviewed notes of prior 24hr. Other pertinent lab: Total time spent with patient: 28 I personally reviewed chart, notes, data and current medications in the medical record. I have personally examined and treated the patient at bedside during this period. Care Plan discussed with: Patient, Nursing Staff and >50% of time spent in counseling and coordination of care Discussed:  Care Plan Prophylaxis:  SCD's Disposition:  Home w/Family 
        
___________________________________________________ Attending Physician: Yves Morales MD

## 2021-03-10 NOTE — PROGRESS NOTES
NEPHROLOGY PROGRESS NOTE 
  
 
 
NAME:Ayaka [facility]:[id number] KRI:[birth year] Chief complaints: f/u Hyponatremia Subjective: Feeling better 24 hr interval history:  Objective: 
Vitals:  
 03/09/21 2334 03/10/21 0303 03/10/21 0700 03/10/21 0805 BP: 116/61 113/63  (!) 167/64 Pulse: 83 76 74 79 Resp: 16 17  16 Temp: 97.4 °F (36.3 °C) 97.5 °F (36.4 °C)  97.4 °F (36.3 °C) SpO2: 92% 100%  96% Weight:      
Height:      
 
 
Intake/Output Summary (Last 24 hours) at 3/10/2021 0332 Last data filed at 3/10/2021 6702 Gross per 24 hour Intake 750 ml Output 2050 ml Net -1300 ml PHYSICAL EXAM: 
GENERAL : Chronically ill looking female lying down in bed with no acute distress HEENT: AT NC PEERLA NECK: Supple no JVP 
CVS: S1 S2 RRR, no murmur or gallops heard RS: CTABL, no rhonchi,or wheezing heard ABDOMEN: soft NT ND positive BS EXTREMITY: No edema clubbing or cyanosis, pedal pulse + NEUROLOGY: AAA X3, no focal deficit or asterixis Labs: CBC:   
Lab Results Component Value Date/Time WBC 7.8 03/10/2021 03:15 AM  
 HGB 10.8 (L) 03/10/2021 03:15 AM  
 HCT 33.7 (L) 03/10/2021 03:15 AM  
  (H) 03/10/2021 03:15 AM  
 
BMP:  
Lab Results Component Value Date/Time  (L) 03/10/2021 03:15 AM  
 K 3.8 03/10/2021 03:15 AM  
  03/10/2021 03:15 AM  
 CO2 27 03/10/2021 03:15 AM  
 AGAP 5 03/10/2021 03:15 AM  
  (H) 03/10/2021 03:15 AM  
 BUN 28 (H) 03/10/2021 03:15 AM  
 CREA 0.69 03/10/2021 03:15 AM  
 GFRAA >60 03/10/2021 03:15 AM  
 GFRNA >60 03/10/2021 03:15 AM  
  
 
Lab Results Component Value Date/Time  Color YELLOW/STRAW 03/08/2021 01:44 AM  
 Appearance CLEAR 03/08/2021 01:44 AM  
 Specific gravity 1.006 03/08/2021 01:44 AM  
 pH (UA) 7.0 03/08/2021 01:44 AM  
 Protein Negative 03/08/2021 01:44 AM  
 Glucose Negative 03/08/2021 01:44 AM  
 Ketone Negative 03/08/2021 01:44 AM  
 Bilirubin Negative 03/08/2021 01:44 AM Urobilinogen 0.2 03/08/2021 01:44 AM  
 Nitrites Negative 03/08/2021 01:44 AM  
 Leukocyte Esterase LARGE (A) 03/08/2021 01:44 AM  
 Epithelial cells FEW 03/08/2021 01:44 AM  
 Bacteria Negative 03/08/2021 01:44 AM  
 WBC 0-4 03/08/2021 01:44 AM  
 RBC 5-10 03/08/2021 01:44 AM  
 
 
Imaging study: 
 
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 Improving nicely Hypertension Plan: 
U na 62 U osm 225 S osm 250 Continue Urena 30 gm daily Fluid restriction 800 cc Please DC from stand point of view Please Dc with Urena 30 gm daily Fluid restriction 1L Plan with discussed with Daughter Randy Wilkerson. Care Plan discussed with: 
 Medical Team 
 
Dorothy Kent MD 
3/10/2021 [facility]: 
www.[email or web address]. [employer] Www[email or web address].[employer] Shayan Jarrett office: 
[address], [address] Phone: [phone] Fax :     [phone] [place] office: 
[address] Phone - [phone] Fax - [phone]

## 2021-03-10 NOTE — DISCHARGE SUMMARY
Hospitalist Discharge Summary Patient ID:   
Apolinar Reston 
[id number] 
18 y.o. 
1940 Admit date of service: 3/8/2021 Discharge date of service: 3/10/2021 Admission Diagnoses: Slurred speech [R47.81] Hyponatremia [E87.1] Dysarthria [R47.1] Fall from bed [W06. Yoselin Brand Elbow laceration, left, initial encounter [S51.012A] Chronic Diagnoses:   
Problem List as of 3/10/2021 Date Reviewed: 3/3/2021 Codes Class Noted - Resolved Hx of completed stroke ICD-10-CM: Z86.73 
ICD-9-CM: V12.54  2021 - Present Elbow laceration, left, initial encounter ICD-10-CM: S51.012A ICD-9-CM: 881.01  3/8/2021 - Present Acute metabolic encephalopathy IXY-60-NP: G93.41 
ICD-9-CM: 348.31  3/8/2021 - Present Dizzy ICD-10-CM: R44 ICD-9-CM: 780.4  3/3/2021 - Present DM type 2 causing vascular disease ([facility] ICD-10-CM: E11.59 
ICD-9-CM: 250.70, 443.81  Unknown - Present Chronic obstructive pulmonary disease (HCC) ICD-10-CM: J44.9 ICD-9-CM: 496  Unknown - Present Arthritis ICD-10-CM: M19.90 ICD-9-CM: 716.90  Unknown - Present Chronic pain ICD-10-CM: G89.29 ICD-9-CM: 338.29  Unknown - Present Anxiety and depression ICD-10-CM: F41.9, F32.9 ICD-9-CM: 300.00, 311  Unknown - Present Glaucoma ICD-10-CM: H40.9 ICD-9-CM: 365.9  Unknown - Present Neuropathy ICD-10-CM: G62.9 ICD-9-CM: 355.9  Unknown - Present Polypharmacy ICD-10-CM: J19.393 ICD-9-CM: V58.69  Unknown - Present Incontinence ICD-10-CM: R32 
ICD-9-CM: 788.30  Unknown - Present GERD (gastroesophageal reflux disease) ICD-10-CM: K21.9 ICD-9-CM: 530.81  Unknown - Present Hyponatremia ICD-10-CM: E87.1 ICD-9-CM: 276.1  3/2/2021 - Present Acute pulmonary embolism ([facility] ICD-10-CM: I26.99 
ICD-9-CM: 415.19  2/25/2021 - Present Dizziness ICD-10-CM: N34 ICD-9-CM: 780.4  2/25/2021 - Present Abdominal pain ICD-10-CM: R10.9 ICD-9-CM: 789.00  2/25/2021 - Present HTN (hypertension) ICD-10-CM: I10 
ICD-9-CM: 401.9  1/3/2021 - Present Type II diabetes mellitus ([facility] ICD-10-CM: E11.9 ICD-9-CM: 250.00  1/3/2021 - Present Hypothyroid ICD-10-CM: E03.9 ICD-9-CM: 244.9  1/3/2021 - Present Anxiety ICD-10-CM: F41.9 ICD-9-CM: 300.00  1/3/2021 - Present History of CVA (cerebrovascular accident) ICD-10-CM: Z86.73 
ICD-9-CM: V12.54  1/3/2021 - Present RESOLVED: Slurred speech ICD-10-CM: R47.81 ICD-9-CM: 784.59  3/8/2021 - 3/10/2021 RESOLVED: Fall from bed ICD-10-CM: W06. Jennavan Potts ICD-9-CM: X489.3  3/8/2021 - 3/10/2021 RESOLVED: Dysarthria ICD-10-CM: R47.1 ICD-9-CM: 784.51  3/8/2021 - 3/10/2021 RESOLVED: Chest pain ICD-10-CM: R07.9 ICD-9-CM: 786.50  2/25/2021 - 3/3/2021 RESOLVED: Rib fracture ICD-10-CM: S22.39XA ICD-9-CM: 807.00  1/3/2021 - 3/3/2021 RESOLVED: Hypoxia ICD-10-CM: R09.02 
ICD-9-CM: 799.02  1/3/2021 - 3/3/2021 RESOLVED: SIRS (systemic inflammatory response syndrome) (HCC) ICD-10-CM: R65.10 ICD-9-CM: 995.90  1/3/2021 - 3/3/2021 RESOLVED: COPD exacerbation ([facility] ICD-10-CM: J44.1 ICD-9-CM: 491.21  1/3/2021 - 3/3/2021 Discharge Medications:  
Current Discharge Medication List  
  
START taking these medications Details  
urea (URE-NA) 15 gram packet Take 1 Packet by mouth daily. Qty: 30 Packet, Refills: 0 CONTINUE these medications which have NOT CHANGED Details  
albuterol (PROVENTIL HFA, VENTOLIN HFA, PROAIR HFA) 90 mcg/actuation inhaler Take 1 Puff by inhalation every six (6) hours as needed for Wheezing. furosemide (LASIX) 20 mg tablet Take 20 mg by mouth daily as needed (edema). Requires only sparingly, daughter reports only 2x in past 6 months  
  
clopidogreL (Plavix) 75 mg tab Take 1 Tab by mouth daily for 30 days. Qty: 30 Tab, Refills: 0  
  
aspirin delayed-release 81 mg tablet Take 1 Tab by mouth daily. Qty: 30 Tab, Refills: 0 oxyCODONE ER (Xtampza ER) 9 mg capsule Take 9 mg by mouth every twelve (12) hours. insulin lispro (HumaLOG KwikPen Insulin) 100 unit/mL kwikpen by SubCUTAneous route Before breakfast, lunch, and dinner. Sliding scale  
  
predniSONE (DELTASONE) 5 mg tablet Take 5 mg by mouth daily. latanoprost (XALATAN) 0.005 % ophthalmic solution Administer 1 Drop to both eyes daily. insulin glargine (Lantus Solostar U-100 Insulin) 100 unit/mL (3 mL) inpn 16 Units by SubCUTAneous route daily. CLONAZEPAM PO Take 0.25 mg by mouth every evening. ODT formulation  
  
levothyroxine (Levo-T) 75 mcg tablet Take 75 mcg by mouth Daily (before breakfast). tolterodine ER (DETROL LA) 4 mg ER capsule Take 4 mg by mouth daily. atorvastatin (LIPITOR) 80 mg tablet Take 80 mg by mouth nightly. lisinopriL (PRINIVIL, ZESTRIL) 40 mg tablet Take 40 mg by mouth daily. pantoprazole (PROTONIX) 40 mg tablet Take 40 mg by mouth daily. gabapentin (NEURONTIN) 300 mg capsule Take 600 mg by mouth two (2) times a day. fluticasone-umeclidinium-vilanterol (Trelegy Ellipta) 100-62.5-25 mcg inhaler Take 1 Puff by inhalation daily. melatonin 5 mg tablet Take 5 mg by mouth nightly. calcium-cholecalciferol, d3, (CALCIUM 600 + D) 600-125 mg-unit tab Take 1 Tab by mouth daily. multivitamin (ONE A DAY) tablet Take 1 Tab by mouth daily. magnesium oxide (MAG-OX) 400 mg tablet Take 400 mg by mouth daily. B.infantis-B.ani-B.long-B.bifi (Probiotic 4X) 10-15 mg TbEC Take 2 Caps by mouth two (2) times a day. BENEFIBER, GUAR GUM, PO Take 1 Dose by mouth two (2) times a day. 1 dose - 2 teaspoons STOP taking these medications  
  
 sodium chloride 1 gram tablet Comments:  
Reason for Stopping: Follow up Care: 1. Kristopher Ramires MD in 1-2 weeks 2. Nephrology Diet:  Cardiac Diet Disposition: 
Home. Advanced Directive: 
 
Discharge Exam: 
See today's note. CONSULTATIONS: Nephrology and Neurology Significant Diagnostic Studies:  
Recent Labs  
  03/10/21 
0315 03/09/21 
0323 WBC 7.8 8.5 HGB 10.8* 10.0* HCT 33.7* 30.2* * 358 Recent Labs  
  03/10/21 
0315 03/09/21 
1020 03/09/21 
3394 03/08/21 
7411 03/08/21 
5497 03/08/21 
3463 * 134* 126*   < > 122* 120*  
K 3.8 3.7 3.9   < > 4.0 4.9  96* 94*   < > 92* 90* CO2 27 22 25   < > 23 21 BUN 28* 9 9   < > 8 11 CREA 0.69 0.54* 0.67   < > 0.53* 0.66 * 123* 117*   < > 98 100 CA 8.0* 7.9* 7.8*   < > 8.3* 7.9*  
MG  --   --  1.8  --   --  1.7 PHOS 3.6  --  3.3  --   --   --   
URICA  --   --   --   --  3.0  --   
 < > = values in this interval not displayed. Recent Labs  
  03/10/21 
0315 03/08/21 
0119 ALT  --  52  
AP  --  92  
TBILI  --  0.6 TP  --  6.6 ALB 3.8 3.2*  
GLOB  --  3.4 Recent Labs 03/09/21 
4021 03/08/21 
9429 INR 1.2* 1.3* PTP 12.8* 13.6* APTT 28.6 31.3* No results for input(s): FE, TIBC, PSAT, FERR in the last 72 hours. No results for input(s): PH, PCO2, PO2 in the last 72 hours. No results for input(s): CPK, CKMB in the last 72 hours. No lab exists for component: TROPONINI Lab Results Component Value Date/Time Glucose (POC) 91 03/10/2021 08:09 AM  
 Glucose (POC) 164 (H) 03/09/2021 10:21 PM  
 Glucose (POC) 138 (H) 03/09/2021 04:38 PM  
 Glucose (POC) 107 (H) 03/09/2021 12:04 PM  
 Glucose (POC) 129 (H) 03/09/2021 07:50 AM  
 
 
 
 
 
HOSPITAL COURSE & TREATMENT RENDERED:  
1. Acute metabolic encephalopathy (5/0/6334) / AMFLEOC speech (3/8/2021) / Dysarthria (3/8/2021): resolved. Very likely due to hyponatremia. MRI of the brain: without acute finding. Neurology evaluation appreciated.   
  
2.  Hyponatremia due to [facility]. High urine sodium. Improved. On fluid restriction. Continue urea. Follow up with nephrology. 
  
3.   Fall from bed (3/8/2021) / Elbow laceration, left, initial encounter (3/8/2021): Fall precautions.  Wound care with dressing changes. Needs home PT/OT 4.  DM. Continue home insulin and cover with SSI  
  
5. Hypothyroidism. On synthroid. Discharged in improved condition. Spent 35 minutes Signed: 
Dee Linares MD 
3/10/2021 
8:54 AM

## 2021-03-10 NOTE — PROGRESS NOTES
Shift Change: 
 
Bedside and Verbal shift change report given to Rita (oncoming nurse) by Mushtaq Hutchins (offgoing nurse). Report included the following information SBAR, Kardex, and Recent Results. This patient was assisted with Intentional Toileting every 2 hours during this shift. Documentation of ambulation and output reflected on Flowsheet. 2154: Bedside and Verbal shift change report given to [address]  (oncoming nurse) by Pierce Meza (offgoing nurse). Report included the following information SBAR, Kardex, Intake/Output and MAR.

## 2021-03-23 NOTE — PATIENT INSTRUCTIONS
RESULT POLICY If we have ordered testing for you, know that; \"NO NEWS IS GOOD NEWS! \" It is our policy that we no longer call patients with results, nor do we  give test results over the phone. We schedule follow up appointments so that your results can be discussed in person. This allows you to address any questions you have regarding the results. If you choose to go to an imaging center outside of General acute hospital, it is your responsibility to bring imaging report and disc to follow up appointment. If something of concern is revealed on your test, we will contact you to discuss the matter and if needed schedule a sooner follow up appointment. Additionally, results may be found by using the My Chart feature and one of our patient service representatives at the  can give you instructions on how to access this feature to utilize our electronic medical record system. Thank you for your understanding. PRESCRIPTION REFILL POLICY [facility] Statement to Patients April 1, 2014 In an effort to ensure the large volume of patient prescription refills is processed in the most efficient and expeditious manner, we are asking our patients to assist us by calling your Pharmacy for all prescription refills, this will include also your  Mail Order Pharmacy. The pharmacy will contact our office electronically to continue the refill process. Please do not wait until the last minute to call your pharmacy. We need at least 48 hours (2days) to fill prescriptions. We also encourage you to call your pharmacy before going to  your prescription to make sure it is ready. With regard to controlled substance prescription refill requests (narcotic refills) that need to be picked up at our office, we ask your cooperation by providing us with at least 72 hours (3days) notice that you will need a refill.  
 
We will not refill narcotic prescription refill requests after 4:00pm on any weekday, Monday through Thursday, or after 2:00pm on Fridays, or on the weekends. We encourage everyone to explore another way of getting your prescription refill request processed using [employer], our patient web portal through our electronic medical record system. [employer]it is an efficient and effective way to communicate your medication request directly to the office and  downloadable as an carlito on your smart phone . [employer]it also features a review functionality that allows you to view your medication list as well as leave messages for your physician. Are you ready to get connected? If so please review the attatched instructions or speak to any of our staff to get you set up right away! Thank you so much for your cooperation. Should you have any questions please contact our Practice Administrator.

## 2021-03-23 NOTE — PROGRESS NOTES
Chief Complaint Patient presents with  
[facility] Follow Up  Cerebrovascular Accident PMH stroke 5 yrs ago that affected left side.   Daughter states she has noticed incr weakness to left since this last cva

## 2021-03-23 NOTE — PROGRESS NOTES
[facility] and [address] at [facility] at [address] [facility] [address] [place], [address] [place] [address] [address] [facility], [address] [place] [address]  
[phone] Chief Complaint Patient presents with  
[facility] Follow Up  Cerebrovascular Accident Current Outpatient Medications Medication Sig Dispense Refill  urea (URE-NA) 15 gram packet Take 1 Packet by mouth daily. 30 Packet 0  
 albuterol (PROVENTIL HFA, VENTOLIN HFA, PROAIR HFA) 90 mcg/actuation inhaler Take 1 Puff by inhalation every six (6) hours as needed for Wheezing.  clopidogreL (Plavix) 75 mg tab Take 1 Tab by mouth daily for 30 days. 30 Tab 0  
 aspirin delayed-release 81 mg tablet Take 1 Tab by mouth daily. 30 Tab 0  
 oxyCODONE ER (Xtampza ER) 9 mg capsule Take 9 mg by mouth every twelve (12) hours.  insulin lispro (HumaLOG KwikPen Insulin) 100 unit/mL kwikpen by SubCUTAneous route Before breakfast, lunch, and dinner. Sliding scale  predniSONE (DELTASONE) 5 mg tablet Take 5 mg by mouth daily.  latanoprost (XALATAN) 0.005 % ophthalmic solution Administer 1 Drop to both eyes daily.  insulin glargine (Lantus Solostar U-100 Insulin) 100 unit/mL (3 mL) inpn 16 Units by SubCUTAneous route daily.  CLONAZEPAM PO Take 0.25 mg by mouth every evening. ODT formulation  levothyroxine (Levo-T) 75 mcg tablet Take 75 mcg by mouth Daily (before breakfast).  tolterodine ER (DETROL LA) 4 mg ER capsule Take 4 mg by mouth daily.  atorvastatin (LIPITOR) 80 mg tablet Take 80 mg by mouth nightly.  pantoprazole (PROTONIX) 40 mg tablet Take 40 mg by mouth daily.  gabapentin (NEURONTIN) 300 mg capsule Take 600 mg by mouth two (2) times a day.  fluticasone-umeclidinium-vilanterol (Trelegy Ellipta) 100-62.5-25 mcg inhaler Take 1 Puff by inhalation daily.     
 melatonin 5 mg tablet Take 5 mg by mouth nightly.  calcium-cholecalciferol, d3, (CALCIUM 600 + D) 600-125 mg-unit tab Take 1 Tab by mouth daily.  multivitamin (ONE A DAY) tablet Take 1 Tab by mouth daily.  magnesium oxide (MAG-OX) 400 mg tablet Take 400 mg by mouth daily.  B.infantis-B.ani-B.long-B.bifi (Probiotic 4X) 10-15 mg TbEC Take 2 Caps by mouth two (2) times a day.  BENEFIBER, GUAR GUM, PO Take 1 Dose by mouth two (2) times a day. 1 dose - 2 teaspoons  furosemide (LASIX) 20 mg tablet Take 20 mg by mouth daily as needed (edema). Requires only sparingly, daughter reports only 2x in past 6 months  lisinopriL (PRINIVIL, ZESTRIL) 40 mg tablet Take 40 mg by mouth daily. Allergies Allergen Reactions  Shellfish Derived Anaphylaxis Social History Tobacco Use  Smoking status: Former Smoker Quit date: 1991 Years since quittin.2  Smokeless tobacco: Never Used Substance Use Topics  Alcohol use: Never Frequency: Never  Drug use: Never Patient comes in follow-up after recent stay at [facility] where she was seen by my partner Dr. Constantine Gore. She has history of stroke with left hemiplegia and a flexion contracture. She was on aspirin 81 mg daily. She fell out of bed and was noted to have slurred speech. She was evaluated for question of new stroke. She had an MRI of the brain and I personally reviewed that film today. That demonstrated no evidence of new stroke. She was continued on aspirin daily. She is a known Plavix nonresponder. She was continued on Lipitor 80 mg daily. She had hyponatremia. She did have an acute stroke on a previous hospitalization about 2 weeks earlier on   a tiny right precentral gyrus infarct. Complete stroke work-up entailed MRI of the brain  Tiny focus of acute infarct right precentral gyrus line CTA head/neck CT perfusion  no significant stenosis of the intracranial arteries or carotids MRI of the brain March 2--same infarct seen February 25 MRI brain brain March 8--no acute finding P2 Y 12 February 27 246 while on Plavix LDL February 27 26 Cardiogram February 26--ejection fraction 60-65% with left ventricular diastolic dysfunction Discharge notes indicates she was to be sent home with home health She has been getting home health She has been seeing nephrology and is fluid restricted Sodium doing better Still having issue with some anxiety Offer anxiety lytics secondary to her hyponatremia Having some right finger numbness first 3 digits primarily She reports having carpal tunnel surgery in the past 
We discussed the P2 Y 12 result and her daughter indicates the hospitalist told her that the patient should be on both Plavix and aspirin and we had a good discussion regarding why that is not necessary Examination Visit Vitals BP (!) 146/55 (BP 1 Location: Left upper arm, BP Patient Position: Sitting, BP Cuff Size: Adult) Pulse 87 Temp 98.1 °F (36.7 °C) Resp 16 Ht 5' 4\" (1.626 m) Wt 58.5 kg (129 lb) SpO2 96% BMI 22.14 kg/m² She is a very pleasant elderly lady. She is awake alert and oriented. Hard of hearing. She is able to hear appropriately if your voice is raised. She has stable left spastic elvis-. No Tinel's on the right. Impression/Plan Cerebrovascular disease status post stroke Left-sided hemiparesis Symptoms suggestive of carpal tunnel right Plavix nonresponder On a statin On aspirin Continue with risk factor modification Stop Plavix Continue aspirin 81 mg 
Continue statin Splint to see if it helps her right upper extremity symptoms and if not get EMG Continue home health therapy Discussed with renal and appropriate anxiety lytic in the setting of hyponatremia then discussed with PCP Follow with me in about 4 months Pearson Peabody, MD 
 
40 minutes today in chart/study review, orders, discussion, examination This note was created using voice recognition software. Despite editing, there may be syntax errors.

## 2021-03-31 NOTE — TELEPHONE ENCOUNTER
----- Message from April Service sent at 3/31/2021 11:27 AM EDT -----  Regarding: Dr. Claudine Morris first and last name: Armand Mesa with [facility]    Reason for call: Obtain pt notes    Callback required yes/no and why: Yes, to obtain pt notes    Best contact number(s): [phone]    Details to clarify the request: Office is requesting most recent pt notes from Dr. Margie Lucas pt appt 3/31/21 at 3:00pm. Notes can be faxed to [phone]

## 2021-05-09 NOTE — ED TRIAGE NOTES
Pt arrives via EMS with mask c/o SOB at home. Hx of COPD, family reported pt becoming more winded walking short distances, states O2 sats at 85%. EMS placed on 2L, increased to 93% which family reports is baseline. Hx of PEs, taken off eliquis earlier this month. Patient also reports cough, very congested. Per EMS, patient also had fall on Friday, patient states \"I just slipped out of bed. \"  Hx of stoke, L sided deficits.

## 2021-05-10 PROBLEM — R06.89 ACUTE RESPIRATORY INSUFFICIENCY: Status: ACTIVE | Noted: 2021-01-01

## 2021-05-10 PROBLEM — D64.9 ANEMIA: Status: ACTIVE | Noted: 2021-01-01

## 2021-05-10 PROBLEM — J44.1 COPD WITH ACUTE EXACERBATION (HCC): Status: ACTIVE | Noted: 2021-01-01

## 2021-05-10 PROBLEM — J18.9 CAP (COMMUNITY ACQUIRED PNEUMONIA): Status: ACTIVE | Noted: 2021-01-01

## 2021-05-10 PROBLEM — J96.01 ACUTE RESPIRATORY FAILURE WITH HYPOXIA (HCC): Status: ACTIVE | Noted: 2021-01-01

## 2021-05-10 PROBLEM — J18.9 PNEUMONIA OF BOTH LUNGS DUE TO INFECTIOUS ORGANISM: Status: ACTIVE | Noted: 2021-01-01

## 2021-05-10 NOTE — ED NOTES
TRANSFER - OUT REPORT: 
 
Verbal report given to [address] (name) on [facility]  being transferred to 4th floor (unit) for routine progression of care Report consisted of patients Situation, Background, Assessment and  
Recommendations(SBAR). Information from the following report(s) SBAR, ED Summary, [facility] and Recent Results was reviewed with the receiving nurse. Lines:  
Venous Access Device power port 02/25/21 Upper chest (subclavicular area, right (Active) Opportunity for questions and clarification was provided. Patient transported with: 
 Monitor Registered Nurse Tech

## 2021-05-10 NOTE — PROGRESS NOTES
Physician Progress Note Jess Dc 
CSN #:                  [id number] :                       1940 ADMIT DATE:       2021 7:40 PM 
[address] [place] DATE: 
RESPONDING 
PROVIDER #:        Constanza Nash MD 
 
 
 
 
QUERY TEXT: 
 
Dear Hospitalist Team, Patient admitted with COPD exacerbation/PNA. It's noted documentation of acute respiratory failure in the H&P on . In order to support the diagnosis of acute respiratory failure, please include additional clinical indicators in your documentation. Or please document if the diagnosis of acute respiratory failure has been ruled out after further study. The medical record reflects the following: 
 
Risk Factors: 80 Yr F admitted with PNA; COPD exacerbation Clinical Indicators: Patient arrived via EMS with c/o SOB. Per EMS patient was 85% on RA placed on NC in route. Upon arrival to the ED per Dr. Javier Laws H&P states, In ER she was found to be wheezing with her sats as low as 87%. Per documented vital signs the patient has been on room air since first admission documented set of vital signs with RR 16-22. No documentation of respiratory distress. No ABG obtained. Treatment: Frequent vital signs/monitoring. Thank you, Robbie Melo RN, [facility], [address] Acute Respiratory Failure Clinical Indicators per [employer] Training Guide and Quick Reference Guide: 
pO2 < 60 mmHg or SpO2 (pulse oximetry) < 91% breathing room air 
pCO2 > 50 and pH < 7.35 
P/F ratio (pO2 / FIO2) < 300 
pO2 decrease or pCO2 increase by 10 mmHg from baseline (if known) Supplemental oxygen of 40% or more Presence of respiratory distress, tachypnea, dyspnea, shortness of breath, wheezing Unable to speak in complete sentences Use of accessory muscles to breathe Extreme anxiety and feeling of impending doom Tripod position Confusion/altered mental status/obtunded Options provided: 
-- Acute Respiratory Failure POA as evidenced by, Please document evidence. -- Acute Respiratory Failure POA ruled out after study -- Other - I will add my own diagnosis -- Disagree - Not applicable / Not valid -- Disagree - Clinically unable to determine / Unknown 
-- Refer to Clinical Documentation Reviewer PROVIDER RESPONSE TEXT: 
 
This patient is in acute respiratory failure POA as evidenced by New hypoxemia and COPD exacerbation Query created by:  Ivette Mandel on 5/10/2021 2:26 PM 
 
 
Electronically signed by:  Lisa Mcnair MD 5/10/2021 2:28 PM

## 2021-05-10 NOTE — PROGRESS NOTES
Physician Progress Note Simone Arellano 
CSN #:                  [id number] :                       1940 ADMIT DATE:       2021 7:40 PM 
[address] [place] DATE: 
RESPONDING 
PROVIDER #:        Tomeka Calero MD 
 
 
 
 
QUERY TEXT: 
 
Dear Hospitalist Team, 
Pt admitted with Acute COPD exacerbation and PNA and has pulmonary embolism documented within the H&P and CTA Chest results. If possible, please document in progress notes and discharge summary if you are evaluating and/or treating any of the following: The medical record reflects the following: 
 
Risk Factors: 80 Yr F admitted with COPD exacerbation/PNA Clinical Indicators: Patient arrived via EMS with c/o increased SOB. Patient noted to have CTA Chest revealing Unchanged segmental/subsegmental embolus medial left lower lobe. No new pulmonary embolus. Severe emphysema, with development of patchy bibasilar airspace disease, diffuse bronchial wall thickening, and small pleural effusions. Per the patient's H&P states,  PE for which she was taken off eliquis earlier this month presents with worsening shortness of breath for the past week and found to be hypoxic with sats dropping 84% on RA. Pharmacist progress note on 5/10 states, Reviewed recent subsegmental PE and recent apixaban use. Per family patient only took eliquis for 10 days and this was discontinued shortly after. MD elected to continue aspirin monotherapy and hold anticoagulation at this time d/t small nature of PE and hyponatremia/fall risk. Treatment: CTA Chest, frequent monitoring/vital signs, aspirin 81 mg PO daily. Thank you, Nikole Nguyen RN, [facility], [place] Options provided: 
-- Acute pulmonary embolism POA 
-- Recurrent acute pulmonary embolism POA 
-- Chronic pulmonary embolism POA 
-- Other - I will add my own diagnosis -- Disagree - Not applicable / Not valid -- Disagree - Clinically unable to determine / Unknown 
-- Refer to Clinical Documentation Reviewer PROVIDER RESPONSE TEXT: 
 
This patient has a chronic pulmonary embolism POA. Query created by:  Juany Anderson on 5/10/2021 2:34 PM 
 
 
Electronically signed by:  Jacob Andrea MD 5/10/2021 3:36 PM

## 2021-05-10 NOTE — PROGRESS NOTES
[facility] Pharmacy Dosing Services: 5/10/21 Pharmacist Renal Dosing Progress Note for levaquin Physician Dr. Coon Coad The following medication: levaquin was automatically dose-adjusted per [facility] P&T Committee Protocol, with respect to renal function. Consult provided for this   80 y.o. , female , for the indication of CAP. Pt Weight:  
Wt Readings from Last 1 Encounters:  
05/09/21 58.5 kg (129 lb) Previous Regimen Levaquin 750mg PO every 24 hours Serum Creatinine Lab Results Component Value Date/Time Creatinine 0.85 05/09/2021 08:21 PM  
   
Creatinine Clearance Estimated Creatinine Clearance: 44.8 mL/min (based on SCr of 0.85 mg/dL). BUN Lab Results Component Value Date/Time BUN 34 (H) 05/09/2021 08:21 PM  
   
Dosage changed to:  levaquin 750mg PO every 48 hours for CrCl <50mL/min Additional notes: 
 
 
Pharmacy to continue to monitor patient daily. Will make dosage adjustments based upon changing renal function. Signed Cresencio Swann. Contact information:  [phone]

## 2021-05-10 NOTE — PROGRESS NOTES
Patient called me into room, was red, shaking, dry heaving and feeling anxious. Rapid response team came. Administered protonix and phenergan. Patient is resting in bed and states that she is feeling better. VSS. safecare completed.

## 2021-05-10 NOTE — PROGRESS NOTES
[facility]: MED RECONCILIATION Comments/Recommendations:  
Reviewed PTA medications with patient's daughter who provides an accurate and updated medication list. Family reports sodium level has been well controlled with fluid restriction and scheduled urea- Na 138 on admission with addition of low dose fluoxetine. Reviewed recent subsegmental PE and recent apixaban use. Per family patient only took eliquis for ~10 days and this was discontinued shortly after. MD elected to continue aspirin monotherapy and hold anticoagulation at this time d/t small nature of PE and hyponatremia/fall risk. Medications added:  
 
Fluoxetine 10 mg daily Oxycodone 5 mg BID (0100 and 1300) Mucinex 600 mg BID Albuterol nebulizer (2-3 times daily usually)- Pt reports duonebs make her \"jittery\" but tolerates plain albuterol Medications removed: 
 
Lisinopril 40 mg daily Medications adjusted: 
 
Benefiber once daily Information obtained from: Patient's daughter, Home medication list, Chart review (Multiple ADTs past 60 days) Allergies: Shellfish derived Prior to Admission Medications:  
 
Medication Documentation Review Audit Reviewed by Helga Schafer PHARMD (Pharmacist) on 05/10/21 at 6555 Medication Sig Documenting Provider Last Dose Status Taking? albuterol (PROVENTIL HFA, VENTOLIN HFA, PROAIR HFA) 90 mcg/actuation inhaler Take 1 Puff by inhalation every six (6) hours as needed for Wheezing. Provider, Historical 4/10/2021 Active Yes  
albuterol sulfate (PROVENTIL;VENTOLIN) 2.5 mg/0.5 mL nebu nebulizer solution 2.5 mg by Nebulization route every six (6) hours as needed for Wheezing or Shortness of Breath (Generally uses 2-3 times daily). Provider, Historical 5/8/2021 Active Yes  
aspirin delayed-release 81 mg tablet Take 1 Tab by mouth daily. Mariano Baltazar MD 5/8/2021 Active Yes  
atorvastatin (LIPITOR) 80 mg tablet Take 80 mg by mouth nightly.  Jann Rubi MD 5/8/2021 Active Yes B.infantis-B.ani-B.long-B.bifi (Probiotic 4X) 10-15 mg TbEC Take 2 Caps by mouth two (2) times a day. Provider, Historical 5/8/2021 Active Yes BENEFIBER, GUAR GUM, PO Take 1 Dose by mouth daily. 1 dose - 2 teaspoons  Provider, Historical 5/8/2021 Active Yes  
calcium-cholecalciferol, D3, (CALTRATE 600+D) tablet Take 1 Tab by mouth daily (with breakfast). Provider, Historical 5/8/2021 Active Yes  
clonazePAM (KlonoPIN) 0.25 mg TbDi Take 0.25 mg by mouth every evening. ODT Provider, Historical 5/8/2021 Active Yes FLUoxetine (PROzac) 10 mg capsule Take 10 mg by mouth every evening. Provider, Historical 5/8/2021 Active Yes  
fluticasone-umeclidinium-vilanterol (Trelegy Ellipta) 100-62.5-25 mcg inhaler Take 1 Puff by inhalation daily. Provider, Historical 5/8/2021 Active Yes  
furosemide (LASIX) 20 mg tablet Take 20 mg by mouth daily as needed (edema). Requires only sparingly, daughter reports only 2x in past 6 months Provider, Historical 4/10/2021 Active Yes  
gabapentin (NEURONTIN) 300 mg capsule Take 600 mg by mouth two (2) times a day. OtherJann MD 5/8/2021 Active Yes  
guaiFENesin ER (Mucinex) 600 mg ER tablet Take 600 mg by mouth two (2) times a day. Provider, Historical 5/8/2021 Active Yes  
insulin glargine (Lantus Solostar U-100 Insulin) 100 unit/mL (3 mL) inpn 16 Units by SubCUTAneous route every morning. Jann Rubi MD 5/8/2021 Active Yes  
insulin lispro (HumaLOG KwikPen Insulin) 100 unit/mL kwikpen by SubCUTAneous route Before breakfast, lunch, and dinner. Sliding scale 2-12 units. Generally uses 2 units if needed. Provider, Historical 5/3/2021 Active Yes  
latanoprost (XALATAN) 0.005 % ophthalmic solution Administer 1 Drop to both eyes nightly. Jann Rubi MD 5/8/2021 Active Yes  
levothyroxine (Levo-T) 75 mcg tablet Take 75 mcg by mouth Daily (before breakfast). Other, MD Jann 5/3/2021 Unknown time Active Yes  
magnesium oxide (MAG-OX) 400 mg tablet Take 400 mg by mouth daily.  Provider, Historical 5/8/2021 Active Yes  
melatonin 5 mg tablet Take 5 mg by mouth nightly. Provider, Historical 5/8/2021 Active Yes  
multivitamin, tx-iron-ca-min (THERA-M w/ IRON) 9 mg iron-400 mcg tab tablet Take 1 Tab by mouth daily. Provider, Historical 5/8/2021 Active Yes  
oxyCODONE ER Dorla Howell ER) 9 mg capsule Take 9 mg by mouth every twelve (12) hours. Xtampza 0700 and 1900 Provider, Historical 5/8/2021 Active Yes  
oxyCODONE IR (ROXICODONE) 5 mg immediate release tablet Take 5 mg by mouth two (2) times a day. Oxycodone 5 mg 0100 and 1300 Provider, Historical 5/8/2021 Active Yes  
pantoprazole (PROTONIX) 40 mg tablet Take 40 mg by mouth daily. Jann Rubi MD 5/8/2021 Active Yes  
predniSONE (DELTASONE) 5 mg tablet Take 5 mg by mouth daily. Provider, Historical 5/8/2021 Active Yes  
tolterodine ER (DETROL LA) 4 mg ER capsule Take 4 mg by mouth daily. Jann Rubi MD 5/8/2021 Active Yes  
urea (URE-NA) 15 gram packet Take 1 Packet by mouth daily. Saranya Jimenez MD 5/8/2021 Active Yes ThanksParveen, PHARMD   Contact: [phone]

## 2021-05-10 NOTE — H&P
[address] 
[phone] Hospitalist Admission Note NAME: Bhumi Hunt :  1940 MRN:  [id number] Date/Time:  2021 11:31 PM 
 
Patient PCP: Ángel Cole MD 
 
Emergency Contact:   
Extended Emergency Contact Information Primary Emergency Contact: Debi Olivera Home Phone: [phone] Relation: Daughter  needed? No   
 
Code: Full Code Isolation Precautions: There are currently no Active Isolations Subjective: CHIEF COMPLAINT: SOB HISTORY OF PRESENT ILLNESS:    
Ms. Cathlyn Severs is a 80 y.o. female with history that includes COPD, CVA with residual left sided deficits and PE for which she was taken off eliquis earlier this month presents with worsening shortness of breath for the past week and found to be hypoxic with sats dropping 84% on RA. SOB has been constant worsening on exertion with moderate-severe with a productive cough. Associated with no other symptoms, shortness of breath. she is unaware of sick contacts. Fully vaccinated for CoV-19 early April. [place]ared[place] In ER she was found to be wheezing with her sats as low as 87%. CT of the chest was done showing stable subsegmental PE and bilateral pneumonia. Patient denies vomiting. Also found to have worsening anemia where her baseline is 10-11.3 based on previous lab draws it was 8.2 today. She denies active bleeding melanotic stools, hematochezia, or hematuria. Fecal occult blood test done in the ER was negative. Also found to have a BNP elevated at 1350. Allergies Allergen Reactions  Shellfish Derived Anaphylaxis Prior to Admission medications Medication Sig Start Date End Date Taking? Authorizing Provider  
urea (URE-NA) 15 gram packet Take 1 Packet by mouth daily.  3/10/21   Rani De León MD  
albuterol (PROVENTIL HFA, VENTOLIN HFA, PROAIR HFA) 90 mcg/actuation inhaler Take 1 Puff by inhalation every six (6) hours as needed for Wheezing. Provider, Historical  
furosemide (LASIX) 20 mg tablet Take 20 mg by mouth daily as needed (edema). Requires only sparingly, daughter reports only 2x in past 6 months    Provider, Historical  
aspirin delayed-release 81 mg tablet Take 1 Tab by mouth daily. 2/27/21   Qamar Dacosta MD  
oxyCODONE ER Zheng Miguelina ER) 9 mg capsule Take 9 mg by mouth every twelve (12) hours. Provider, Historical  
insulin lispro (HumaLOG KwikPen Insulin) 100 unit/mL kwikpen by SubCUTAneous route Before breakfast, lunch, and dinner. Sliding scale    Provider, Historical  
predniSONE (DELTASONE) 5 mg tablet Take 5 mg by mouth daily. Provider, Historical  
latanoprost (XALATAN) 0.005 % ophthalmic solution Administer 1 Drop to both eyes daily. Jann Rubi MD  
insulin glargine (Lantus Solostar U-100 Insulin) 100 unit/mL (3 mL) inpn 16 Units by SubCUTAneous route daily. Jann Rubi MD  
CLONAZEPAM PO Take 0.25 mg by mouth every evening. ODT formulation    Jann Rubi MD  
levothyroxine (Levo-T) 75 mcg tablet Take 75 mcg by mouth Daily (before breakfast). Jann Rubi MD  
tolterodine ER (DETROL LA) 4 mg ER capsule Take 4 mg by mouth daily. Jann Rubi MD  
atorvastatin (LIPITOR) 80 mg tablet Take 80 mg by mouth nightly. Jann Rubi MD  
lisinopriL (PRINIVIL, ZESTRIL) 40 mg tablet Take 40 mg by mouth daily. Jann Rubi MD  
pantoprazole (PROTONIX) 40 mg tablet Take 40 mg by mouth daily. Jann Rubi MD  
gabapentin (NEURONTIN) 300 mg capsule Take 600 mg by mouth two (2) times a day. Jann Rubi MD  
fluticasone-umeclidinium-vilanterol (Trelegy Ellipta) 100-62.5-25 mcg inhaler Take 1 Puff by inhalation daily. Provider, Historical  
melatonin 5 mg tablet Take 5 mg by mouth nightly. Provider, Historical  
calcium-cholecalciferol, d3, (CALCIUM 600 + D) 600-125 mg-unit tab Take 1 Tab by mouth daily.     Provider, Historical  
multivitamin (ONE A DAY) tablet Take 1 Tab by mouth daily. Provider, Historical  
magnesium oxide (MAG-OX) 400 mg tablet Take 400 mg by mouth daily. Provider, Historical  
B.infantis-B.ani-B.long-B.bifi (Probiotic 4X) 10-15 mg TbEC Take 2 Caps by mouth two (2) times a day. Provider, Historical  
BENEFIBER, GUAR GUM, PO Take 1 Dose by mouth two (2) times a day. 1 dose - 2 teaspoons    Provider, Historical  
 
 
Past Medical History:  
Diagnosis Date  Anxiety and depression  Arthritis  Chronic obstructive pulmonary disease ([facility] [address]  Chronic pain  [facility]  GERD (gastroesophageal reflux disease)  Glaucoma   
 HTN (hypertension)  Hx of completed stroke 2021  Hypothyroid  Incontinence  Neuropathy  Polypharmacy Past Surgical History:  
Procedure Laterality Date  HX ORTHOPAEDIC    
 HX VASCULAR ACCESS Social History Tobacco Use  Smoking status: Former Smoker Quit date: 1991 Years since quittin.3  Smokeless tobacco: Never Used Substance Use Topics  Alcohol use: Never Frequency: Never Family History Problem Relation Age of Onset  No Known Problems Other   
     reviewed, patient did not know  Diabetes Mother  Heart Attack Mother  Cancer Father PMSFH was reviewed. Review of Systems (14 point ROS): 
(bold if positive, if negative) Gen:   , , fever, chills, fatigue, Eyes:  , , ENT:   , , , , CVS:   , , , , , , TORO, Pulm:  Cough, shortness of breath, greenish sputum, , , wheezingGI:       , , , , , , , :     , , , , MS:     , , , Skin:   , , , , Psy:    , , , , , , Endo: , , , Hem:  , , , Jose:   , , , , Joel:   , , , , , , ,   
 
  
Objective:   
 
Visit Vitals BP (!) 109/53 Pulse 97 Temp (P) 98.7 °F (37.1 °C) Resp 22 Ht 5' 4\" (1.626 m) Wt 58.5 kg (129 lb) SpO2 96% BMI 22.14 kg/m² Exam:  
 
Physical Exam: 
 
General: alert, well appearing, and in no distress while on oxygen Head: Normocephalic, atraumatic Eyes: PERRL and EOMI sclera clear ENT: Lips, mucosa, and tongue normal.  
Neck: supple, no tenderness Lungs: rales bilaterally, rhonchi and scattered wheezes bilaterally Heart: S1-S2, RRR Abd: SNTBS(+) Ext: no cyanosis, no edema Pulses: 2+ and symmetric Skin: Skin color, texture, turgor normal. No rashes or lesions Neuro:  alert, oriented x 3, no defects noted in general exam. 
Psych: Not anxious, cooperative, normal affect Labs: 
 
Recent Labs 05/09/21 2021 WBC 10.8 HGB 8.2* HCT 26.6*  
 Recent Labs 05/09/21 2021   
K 4.0  
 CO2 29 * BUN 34* CREA 0.85 CA 7.7* ALB 2.9* TBILI 0.3 ALT 40 Lab Results Component Value Date/Time Glucose (POC) 193 (H) 03/10/2021 11:40 AM  
 Glucose (POC) 91 03/10/2021 08:09 AM  
 
Radiology and EKG reviewed:    
 
Cta Chest W Or W Wo Cont Result Date: 5/9/2021 1. Unchanged segmental/subsegmental embolus medial left lower lobe. No new pulmonary embolus. 2. Severe emphysema, with development of patchy bibasilar airspace disease, diffuse bronchial wall thickening, and small pleural effusions. 3. Splenomegaly. I personally reviewed and interpreted the imaging studies and agree with official reading. **Chart reviewed in [facility]** Assessment/Plan:   
  
Pneumonia of both lungs due to infectious organism / COPD with acute exacerbation / Acute respiratory failure with hypoxia:  Will admit for further workup and treatment of this very ill patient and high risk of decompensation due to respiratory compromise/hypoxia and pneumonia. COVID-19 order set with DROPLET PLUS precautions. Antibiotic patient was started on Zosyn and Levaquin in ER and will continue with that for the time being. Zinc, Vit. C, Vit. D, pepcid or PPI. Bronchodilator(s), Solumedrol for hypoxia and/or resp. distress. Maintain O2 sats at least 92-94% oxygen per protocol. Supportive care, incentive spirometry. Follow inflammatory markers and renal function. Consider pulmonary consult if not improving. Anemia: Anemia work-up to include repeat FOBT and iron studies. Type II diabetes mellitus:  Monitor blood glucose levels with insulin sliding scale coverage along with basal insulin. Monitor for complications. A1C. Diabetic diet. Hypothyroid:  TSH and continue home dose. Body mass index is 22.14 kg/m².: 18.5 - 24.9 Normal weight Risk of deterioration: high Discussed:  Code Status and Care Plan discussed with:  [x]Patient  []Family  []Care Giver  [x]ED Doc  [x]RN  []Specialist  []Care Manager Prophylaxis:  SCD's 
 
Probable disposition:  Home w/Family Date of service:    5/9/2021  
 
 
        
___________________________________________________ Admitting Physician: Aleisha Hines MD

## 2021-05-10 NOTE — PROGRESS NOTES
Problem: Mobility Impaired (Adult and Pediatric) Goal: *Acute Goals and Plan of Care (Insert Text) Description: FUNCTIONAL STATUS PRIOR TO ADMISSION: Patient was modified independent using a SB quad cane for functional mobility. Pt requires supervision with tub transfers. HOME SUPPORT PRIOR TO ADMISSION: The patient lived with daughter and son in law who assist as needed. Physical Therapy Goals Initiated 5/10/2021 1. Patient will move from supine to sit and sit to supine , scoot up and down, and roll side to side in bed with modified independence within 7 day(s). 2.  Patient will transfer from bed to chair and chair to bed with modified independence using the least restrictive device within 7 day(s). 3.  Patient will perform sit to stand with modified independence within 7 day(s). 4.  Patient will ambulate with modified independence for 150 feet with the least restrictive device within 7 day(s). 5.  Patient will ascend/descend 3 stairs with 1 handrail(s) with supervision/set-up within 7 day(s). Outcome: Progressing Towards Goal 
 PHYSICAL THERAPY EVALUATION Patient: Rliey Garcia (61 y.o. female) Date: 5/10/2021 Primary Diagnosis: Pneumonia of both lungs due to infectious organism [J18.9] CAP (community acquired pneumonia) [J18.9] Acute respiratory insufficiency [R06.89] Precautions:     
 
ASSESSMENT Based on the objective data described below, the patient presents with generalized weakness, baseline residual L sided weakness from CVA 5 years ago, impaired balance, decreased activity tolerance and functional mobility below baseline following admission for pneumonia. Pt requires 2L/min supplemental O2 to maintain saturations >90%. Overall CGA-Min A to ambulate short distance into hallway with RW. Reporting increased LE fatigue and requests to turn around after 20ft. Verbal cueing to keep LLE inside walker support. CGA to safely manage RW and clothing in bathroom.  Left up in chair at end of session. Educated on IS use and PLBing. Pt would benefit from continued HHPT and use of rollator/RW at home. Pulse Oximetry Assessment 96% at rest on room air 84% while ambulating on room air 97% at rest on 2LPM 
92% while ambulating on 2LPM 
 
 
Current Level of Function Impacting Discharge (mobility/balance): Min A at most for safety with RW 
 
Functional Outcome Measure: The patient scored 12/28 on the Tinetti outcome measure which is indicative of high fall risk. Other factors to consider for discharge: will need home O2 Patient will benefit from skilled therapy intervention to address the above noted impairments. PLAN : 
Recommendations and Planned Interventions: bed mobility training, transfer training, gait training, therapeutic exercises, neuromuscular re-education, patient and family training/education, and therapeutic activities Frequency/Duration: Patient will be followed by physical therapy:  5 times a week to address goals. Recommendation for discharge: (in order for the patient to meet his/her long term goals) Physical therapy at least 2 days/week in the home This discharge recommendation: 
Has been made in collaboration with the attending provider and/or case management IF patient discharges home will need the following DME: portable oxygen and rollator SUBJECTIVE:  
Patient stated Valeriano Roland never leave me alone at home. I always have help.  OBJECTIVE DATA SUMMARY:  
HISTORY:   
Past Medical History:  
Diagnosis Date Anxiety and depression Arthritis Chronic obstructive pulmonary disease (HCC) Chronic pain DM type 2 causing vascular disease ([facility] [address] GERD (gastroesophageal reflux disease) Glaucoma HTN (hypertension) Hx of completed stroke 2017, 2021 Hypothyroid Incontinence Neuropathy Polypharmacy Past Surgical History:  
Procedure Laterality Date  HX ORTHOPAEDIC    
 HX VASCULAR ACCESS    
 
 Personal factors and/or comorbidities impacting plan of care: COPD, HTN, CVA, pneumonia, diabetes Home Situation Home Environment: (P) Private residence # Steps to Enter: (P) 3 Rails to Enter: (P) Yes One/Two Story Residence: (P) One story Living Alone: (P) No 
Support Systems: (P) Child(darrick)(daughter and son in law) Patient Expects to be Discharged to[de-identified] (P) Private residence Current DME Used/Available at Home: (P) Cane, straight, Shower chair, Grab bars, Raised toilet seat, Nebulizer Tub or Shower Type: (P) Tub EXAMINATION/PRESENTATION/DECISION MAKING:  
Critical Behavior: 
Neurologic State: (P) Alert Orientation Level: (P) Oriented X4 Cognition: Appropriate decision making, Appropriate for age attention/concentration, Appropriate safety awareness, Follows commands Hearing: Auditory Auditory Impairment: Hard of hearing, bilateral 
Skin:   
Edema:  
Range Of Motion: 
AROM: Generally decreased, functional(L sided limited d/t CVA) PROM: Generally decreased, functional 
  
  
  
Strength:   
Strength: Generally decreased, functional(baseline L sided wekaness d/t CVA) Tone & Sensation:  
Tone: Abnormal 
  
  
  
  
  
  
  
  
   
Coordination: 
Coordination: Generally decreased, functional 
Vision:  
  
Functional Mobility: 
Bed Mobility: 
Rolling: Contact guard assistance Supine to Sit: Contact guard assistance Transfers: 
Sit to Stand: Minimum assistance Stand to Sit: Minimum assistance Bed to Chair: Minimum assistance Balance:  
Sitting: Intact Standing: Impaired Standing - Static: Fair;Constant support Standing - Dynamic : Fair;Constant support Ambulation/Gait Training: 
Distance (ft): 50 Feet (ft) Assistive Device: Gait belt;Walker, rolling Ambulation - Level of Assistance: Contact guard assistance;Minimal assistance Gait Abnormalities: Antalgic;Decreased step clearance; Altered arm swing; Foot drop Base of Support: Widened;Center of gravity altered Stance: Left decreased Speed/[place]: Slow;Pace decreased (<100 feet/min) Step Length: Left shortened Swing Pattern: Left asymmetrical 
  
  
 
 
 
Functional Measure: 
Tinetti test: 
 
Sitting Balance: 1 Arises: 1 Attempts to Rise: 1 Immediate Standing Balance: 1 Standing Balance: 1 Nudged: 0 Eyes Closed: 0 Turn 360 Degrees - Continuous/Discontinuous: 0 Turn 360 Degrees - Steady/Unsteady: 0 Sitting Down: 1 Balance Score: 6 Balance total score Indication of Gait: 1 
R Step Length/Height: 1 L Step Length/Height: 1 
R Foot Clearance: 1 L Foot Clearance: 1 Step Symmetry: 0 Step Continuity: 0 Path: 1 Trunk: 0 Walking Time: 0 Gait Score: 6 Gait total score Total Score: 12/28 Overall total score Tinetti Tool Score Risk of Falls 
<19 = High Fall Risk 19-24 = Moderate Fall Risk 25-28 = Low Fall Risk Tinetti ME. Performance-Oriented Assessment of Mobility Problems in Elderly Patients. [facility]; [id number]. (Scoring Description: PT Bulletin Feb. 10, 1993) Older adults: Tye Meckel et al, 2009; n = [address] [employer] elderly evaluated with ABC, VLAD, ADL, and IADL) · Mean VLAD score for males aged 69-68 years = 26.21(3.40) · Mean VLAD score for females age 69-68 years = 25.16(4.30) · Mean VLAD score for males over 80 years = 23.29(6.02) · Mean VLAD score for females over 80 years = 17.20(8.32) Physical Therapy Evaluation Charge Determination History Examination Presentation Decision-Making Medium MEDIUM Complexity : 3 Standardized tests and measures addressing body structure, function, activity limitation and / or participation in recreation  LOW Complexity : Stable, uncomplicated  Other outcome measures Tinetti  LOW Based on the above components, the patient evaluation is determined to be of the following complexity level: LOW Pain Rating: 
none Activity Tolerance:  
Fair, desaturates with exertion and requires oxygen, and requires rest breaks After treatment patient left in no apparent distress:  
Sitting in chair, Call bell within reach, and Caregiver / family present COMMUNICATION/EDUCATION:  
The patients plan of care was discussed with: Registered nurse. Fall prevention education was provided and the patient/caregiver indicated understanding., Patient/family have participated as able in goal setting and plan of care. , and Patient/family agree to work toward stated goals and plan of care. Thank you for this referral. 
Perley Holter, PT Time Calculation: 35 mins

## 2021-05-10 NOTE — PROGRESS NOTES
Pharmacy changed levaquin to 750 mg IV q48H, for Crcl of 45 ml/min, per protocol. Will follow daily.

## 2021-05-10 NOTE — PROGRESS NOTES
Spiritual Care Assessment/Progress Note [address] [place] [address] Dr 
 
 
NAME: Yeny Mackey      MRN: [id number] AGE: 80 y.o. SEX: female Jew Affiliation: Truptichika Philip Language: [place] 5/10/2021     Total Time (in minutes): 7 Spiritual Assessment begun in [facility] 4[facility] POST SURG ORT 2 through conversation with: 
  
    []Patient        [] Family    [] Friend(s) Reason for Consult: Rapid response team  
 
Spiritual beliefs: (Please include comment if needed) 
   [] Identifies with a jennifer tradition:     
   [] Supported by a jennifer community:        
   [] Claims no spiritual orientation:       
   [] Seeking spiritual identity:            
   [] Adheres to an individual form of spirituality:       
   [] Not able to assess:                   
 
    
Identified resources for coping:  
   [] Prayer                           
   [] Music                  [] Guided Imagery 
   [] Family/friends                 [] Pet visits [] Devotional reading                         [] Unknown 
   [] Other:                                          
 
 
Interventions offered during this visit: (See comments for more details) Patient Interventions: Other (comment)(Attempt) Plan of Care: 
 
 [] Support spiritual and/or cultural needs  
 [] Support AMD and/or advance care planning process    
 [] Support grieving process 
 [] Coordinate Rites and/or Rituals  
 [] Coordination with community clergy [] No spiritual needs identified at this time 
 [] Detailed Plan of Care below (See Comments)  [] Make referral to Music Therapy 
[] Make referral to Pet Therapy    
[] Make referral to Addiction services 
[] Make referral to [facility] 
[] Make referral to Spiritual Care Partner 
[] No future visits requested       
[] Follow up upon further referrals Comments:   responded to code RRT on 4 Post Surg unit. Staff were attending to pt, Miss More Teixeira.   Spiritual care is stilll available for support upon referrals. Visited by: Shorty Crane.  Paging Service: [phone])

## 2021-05-10 NOTE — PROGRESS NOTES
Problem: Self Care Deficits Care Plan (Adult) Goal: *Acute Goals and Plan of Care (Insert Text) Description: FUNCTIONAL STATUS PRIOR TO ADMISSION: Patient was modified independent using a SB quad cane for functional mobility. Pt requires supervision with tub transfers. She is mostly independent with ADLs and family assists with IADLs. HOME SUPPORT PRIOR TO ADMISSION: The patient lived with daughter and son in law who assist as needed. Occupational Therapy Goals Initiated 5/10/2021 1. Patient will perform upper body dressing and bathing with modified independence within 7 day(s). 2.  Patient will perform lower body dressing and bathing with supervision/set-up within 7 day(s). 3.  Patient will perform toilet transfers with modified independence within 7 day(s). 4.  Patient will perform all aspects of toileting with modified independence within 7 day(s). 5.  Patient will participate in upper extremity therapeutic exercise/activities with modified independence for 10 minutes within 7 day(s). 6.  Patient will utilize energy conservation techniques during functional activities with verbal cues within 7 day(s). Outcome: Progressing Towards Goal 
 OCCUPATIONAL THERAPY EVALUATION Patient: Alejandro Hernandez (49 y.o. female) Date: 5/10/2021 Primary Diagnosis: Pneumonia of both lungs due to infectious organism [J18.9] CAP (community acquired pneumonia) [J18.9] Acute respiratory insufficiency [R06.89] Precautions: fall ASSESSMENT Based on the objective data described below, the patient presents with decreased activity tolerance, generalized weakness, impaired balance, and decreased endurance following admission on 5/9 for PNA with c/o SOB. Patient with premorbid L side weakness residual from previous CVA. She was received and remained on 2L O2 with SpO2 remaining >90% on supplemental O2. Patient was educated on energy conservation, pacing, and pursed lip breathing techniques. Patient performed transfers with up to min A needed and cues form improved transfer technique. Patient engaged in ADLs with fair tolerance; Increased assistance needed for LB and standing ADLs. Patient would benefit from continued skilled OT to progress towards goals and improve overall independence. Current Level of Function Impacting Discharge (ADLs/self-care): Patient required CGA to min A for functional mobility. Patient was independent with feeding, setup for grooming, and min A required for all other ADLs (UB/LB dressing + bathing and toileting). Functional Outcome Measure: The patient scored Total: 50/100 on the Barthel Index outcome measure. Patient will benefit from skilled therapy intervention to address the above noted impairments. PLAN : 
Recommendations and Planned Interventions: self care training, functional mobility training, therapeutic exercise, balance training, therapeutic activities, endurance activities, patient education, home safety training, and family training/education Frequency/Duration: Patient will be followed by occupational therapy 3 times a week to address goals. Recommendation for discharge: (in order for the patient to meet his/her long term goals) Occupational therapy at least 2 days/week in the home This discharge recommendation: 
Has been made in collaboration with the attending provider and/or case management IF patient discharges home will need the following DME: none SUBJECTIVE:  
Patient was agreeable to OT evaluation. OBJECTIVE DATA SUMMARY:  
HISTORY:  
Past Medical History:  
Diagnosis Date  Anxiety and depression  Arthritis  Chronic obstructive pulmonary disease ([facility] [address]  Chronic pain  DM type 2 causing vascular disease ([facility] [address]  GERD (gastroesophageal reflux disease)  Glaucoma   
 HTN (hypertension)  Hx of completed stroke 2017, 2021  Hypothyroid  Incontinence  Neuropathy  Polypharmacy Past Surgical History:  
Procedure Laterality Date  HX ORTHOPAEDIC    
 HX VASCULAR ACCESS Expanded or extensive additional review of patient history:  
 
Home Situation Home Environment: Private residence # Steps to Enter: 3 Rails to Enter: Yes One/Two Story Residence: One story Living Alone: No 
Support Systems: Child(darrick)(daughter and son in law) Patient Expects to be Discharged to[de-identified] Private residence Current DME Used/Available at Home: Cane, straight, Shower chair, Grab bars, Raised toilet seat, Nebulizer Tub or Shower Type: Tub Hand dominance: Right EXAMINATION OF PERFORMANCE DEFICITS: 
Cognitive/Behavioral Status: 
Neurologic State: Alert Orientation Level: Oriented X4 Cognition: Appropriate decision making; Appropriate safety awareness; Appropriate for age attention/concentration Perception: Appears intact Perseveration: No perseveration noted Safety/Judgement: Awareness of environment Skin: Intact in the uppers Edema: None noted in the uppers Hearing: Auditory Auditory Impairment: Hard of hearing, bilateral 
 
Vision/Perceptual:   
Tracking: Able to track stimulus in all quadrants w/o difficulty Diplopia: No   
Acuity: Within Defined Limits Range of Motion: 
AROM: Generally decreased, functional(L sided limited d/t CVA) PROM: Generally decreased, functional 
 
Strength: 
Strength: Generally decreased, functional(baseline L sided wekaness d/t CVA) Coordination: 
Fine Motor Skills-Upper: Left Intact; Right Intact Gross Motor Skills-Upper: Left Intact; Right Intact Tone & Sensation: 
Tone: normal 
Sensation: intact Balance: 
Sitting: Intact Standing: Impaired Standing - Static: Good Standing - Dynamic : Fair Functional Mobility and Transfers for ADLs: 
Bed Mobility: 
Rolling: Contact guard assistance Supine to Sit: Contact guard assistance Sit to Supine: Contact guard assistance Scooting: Contact guard assistance Transfers: 
Sit to Stand: Minimum assistance Stand to Sit: Minimum assistance Bed to Chair: Minimum assistance Bathroom Mobility: Minimum assistance Toilet Transfer : Minimum assistance ADL Assessment: 
Feeding: Independent Oral Facial Hygiene/Grooming: Setup Bathing: Minimum assistance Upper Body Dressing: Minimum assistance Lower Body Dressing: Minimum assistance Toileting: Minimum assistance Cognitive Retraining Safety/Judgement: Awareness of environment Patient instructed and indicated understanding energy conservation techniques to increase independence and safety during all ADLs for end goal of returning home. Patient encouraged to use energy conservation techniques during ADLs to increase participation in life activities patient prefers, to ensure more frequent good days. If having a bad day, evaluate tasks completed day before and re-plan how to save energy to complete same task. Patient confirmed understanding of energy conservation techniques and demonstrated understanding during activity. Provided verbal cuing for education for breathing techniques including pursed lip breathing techniques (PLB) and circulatory breathing. Additionally, patient was educated on energy conservation techniques, including how they specifically apply to functional activities; This includes pacing, rest breaks, and planning. Patient with good verbal understanding however needing additional cuing through out tasks to use techniques. Additional time needed during tasks. Functional Measure: 
Barthel Index: 
 
Bathin Bladder: 10 Bowels: 10 
Groomin Dressin Feeding: 10 Mobility: 0 Stairs: 0 Toilet Use: 5 Transfer (Bed to Chair and Back): 10 Total: 50/100 The Barthel ADL Index: Guidelines 1. The index should be used as a record of what a patient does, not as a record of what a patient could do.  
2. The main aim is to establish degree of independence from any help, physical or verbal, however minor and for whatever reason. 3. The need for supervision renders the patient not independent. 4. A patient's performance should be established using the best available evidence. Asking the patient, friends/relatives and nurses are the usual sources, but direct observation and common sense are also important. However direct testing is not needed. 5. Usually the patient's performance over the preceding 24-48 hours is important, but occasionally longer periods will be relevant. 6. Middle categories imply that the patient supplies over 50 per cent of the effort. 7. Use of aids to be independent is allowed. Jody Edwards., Barthel, D.W. ([id number]). Functional evaluation: the Barthel Index. [address] [facility] (14)2. PRECIOUS Trevizo, Merline Burns., Ruby Quevedo., Pascale Enriquez, [address] [place] [address] Omid (1999). Measuring the change indisability after inpatient rehabilitation; comparison of the responsiveness of the Barthel Index and Functional [place] Measure. Journal of Neurology, Neurosurgery, and Psychiatry, 66(4), 846-755. Pat Mann, N.J.A, AILYN Solano, & Shelby Austin MSTEPHEN. (2004.) Assessment of post-stroke quality of life in cost-effectiveness studies: The usefulness of the Barthel Index and the EuroQoL-5D. [facility], 13, 807-80 Occupational Therapy Evaluation Charge Determination History Examination Decision-Making LOW Complexity : Brief history review  LOW Complexity : 1-3 performance deficits relating to physical, cognitive , or psychosocial skils that result in activity limitations and / or participation restrictions  LOW Complexity : No comorbidities that affect functional and no verbal or physical assistance needed to complete eval tasks Based on the above components, the patient evaluation is determined to be of the following complexity level: LOW Activity Tolerance:  
Good After treatment patient left in no apparent distress:   
Supine in bed, Call bell within reach, and Bed / chair alarm activated COMMUNICATION/EDUCATION:  
The patients plan of care was discussed with: Physical therapist, Registered nurse, and patient . Home safety education was provided and the patient/caregiver indicated understanding., Patient/family have participated as able in goal setting and plan of care. , and Patient/family agree to work toward stated goals and plan of care. This patients plan of care is appropriate for delegation to TALYA. Thank you for this referral. 
Arnold Spurling, OTR/L Time Calculation: 35 mins

## 2021-05-10 NOTE — PROGRESS NOTES
CM received referral for rolling walker vs rollator. CM met with Pt. Pt states that she prefers rollator. Pt agreeable to using [facility]. Pt signed patient choice letter for [facility]. CM reviewed PT note which indicates possible need for home O2 upon discharge. Pt states that she has a home O2 concentrator that she brought from [place] when she moved. Pt is not sure what company supplied it. Pt asked CM to speak with her daughter Nahomi Carpenter ([phone] or [phone]). CM spoke with [employer]. [employer] confirms that Pt has a home O2 concentrator. Nahomi Carpenter states that it was through [address]. [employer] in agreement with using [employer] if Pt need home O2 upon discharge. CM spoke with Rita Nash from [address]. Rita Nash confirms that account can be transferred from [place] office to [facility]. Rita Nash asked that medicals be sent to [facility] in [facility]. Rita Nash states they no longer provide walkers or rollators. CM sent referral for rollator to [facility] via [employer]. CM waiting on response.  
 
_____________________________________ YONNY Barker - Care Management 5/10/2021 5:04 PM

## 2021-05-10 NOTE — PROGRESS NOTES
Reason for Admission:  SOB- PNA. RUR Score: 34% HIGH. Pt has had several hospitlizations over the last 6 months:  
· 1/3/2021- Rib FXS (Inpatient stay) · 2/25/2021- Acute Pulmonary Embolism (Inpatient stay) · 3/2/2021- Dizziness (Observation stay) · 3/8/2021- Hyponatremia, slurred speech and fall (Inpatient stay) PCP: First and Last name:  Ryan Serrano MD 
 Name of Practice:  [address] [place] [address]) Are you a current patient: Yes/No: Yes Approximate date of last visit: Last month Can you do a virtual visit with your PCP:  Yes with family's assistance Resources/supports as identified by patient/family: Limited family support system but dtr states they are doing all they can. Top Challenges facing patient (as identified by patient/family and CM): Finances/Medication cost?  Pt's dtr confirmed insurance coverage for [employer]- CM inquired about applying for secondary insurance/Medicaid, pt's dtr declined stating pt does not qualify and they are able to pay for anything pt's insurance cannot get. Transportation? Pt's family drives her as needed, no problems identified. Support system or lack thereof? Limited family support but the family that is involved is very supportive. Living arrangements? Lives with dtr and family- no problems identified. Self-care/ADLs/Cognition? AOX4 but does require assistance with all ADLs due to PMH. Current Advanced Directive/Advance Care Plan:  Full Code- no AMD on file. Pt's dtr states she has MPOA paperwork at home, will bring a copy up next time she is home, states she is MPOA. Healthcare Decision Maker:  Updated to reflect information obtained.  
 
Payor Source Payor: BLUE CROSS MEDICARE / Plan[place] PPO / Product Type: Managed Care Medicare /  
                       
Plan for utilizing home health:  Pt is currently open with [facility] (RN and PT) through [facility]. Pt's family wishes to resume at time of discharge, pt will need JOEY orders at time of discharge. [facility] is aware pt is in the hospital and can accept back at d/c- they were the ones who advised pt to come to the hospital.            
 
Transition of Care Plan:               
Pt is a 80year old,  female, admitted with PNA. CM met with pt and pt's dtr at [facility]- tech assisting with pt's care. CM spoke with pt's dtr, confirmed address, emergency contact and PCP. Pt lives with her daughter Yoanna Contreras in a two level home, 4 steps to enter. Pt lives on the first floor, no need to go upstairs. Pt utilizes a cane and requires assistance with all ADLs/IADLs due to PMH of stroke. Pt's family drives her as needed and assists with all daily tasks. Pt's dtr states they have a private duty caregiver (independent, not through an agency) in the home- M-W-F from 9:00 AM-1:00 PM but are going to be increasing the hours due to pt's decline. Pt's dtr will drive pt home at time of discharge and as needed. Pt and pt's dtr state no questions or concerns at this time, floor CM updated and will continue to follow and assist as needed. Care Management Interventions PCP Verified by CM: Yes Mode of Transport at Discharge: Self Transition of Care Consult (CM Consult): Home Health [address]: No 
Reason Outside [facility]: Patient already serviced by other home care/hospice agency [facility] Signup: No 
Discharge Durable Medical Equipment: No 
Health Maintenance Reviewed: Yes Physical Therapy Consult: Yes Occupational Therapy Consult: Yes Speech Therapy Consult: No 
Current Support Network: Family Lives [facility] Confirm Follow Up Transport: Family The Patient and/or Patient Representative was Provided with a Choice of Provider and Agrees with the Discharge Plan?: Yes Name of the Patient Representative Who was Provided with a Choice of Provider and Agrees with the Discharge Plan: Spoke with pt's dtr at bedside [place] of Choice List was Provided with Basic Dialogue that Supports the Patient's Individualized Plan of Care/Goals, Treatment Preferences and Shares the Quality Data Associated with the Providers?: Yes Discharge Location Discharge Placement: Home with home health NAN Chopra, CM [facility]

## 2021-05-10 NOTE — ED PROVIDER NOTES
Patient is an 59-year-old female present emergency department shortness of breath. Patient called EMS because she was having worsening shortness of breath upon arrival patient noted to be hypoxic at 85% on room air EMS placed patient on 2 L nasal cannula with improvement to 93%. Patient states that she has a history of PEs as well as COPD. Patient does not require oxygen at home. On chart review patient does have a history of left-sided CVA, left-sided sub segmental PE. She has noticed increased sputum production with a \"wet cough\". Past Medical History:  
Diagnosis Date  Anxiety and depression  Arthritis  Chronic obstructive pulmonary disease ([facility]  Chronic pain  DM type 2 causing vascular disease ([facility]  GERD (gastroesophageal reflux disease)  Glaucoma   
 HTN (hypertension)  Hx of completed stroke 2021  Hypothyroid  Incontinence  Neuropathy  Polypharmacy Past Surgical History:  
Procedure Laterality Date  HX ORTHOPAEDIC    
 HX VASCULAR ACCESS Family History:  
Problem Relation Age of Onset  No Known Problems Other   
     reviewed, patient did not know  Diabetes Mother  Heart Attack Mother  Cancer Father Social History Socioeconomic History  Marital status:  Spouse name: Not on file  Number of children: Not on file  Years of education: Not on file  Highest education level: Not on file Occupational History  Not on file Social Needs  Financial resource strain: Not on file  Food insecurity Worry: Not on file Inability: Not on file  Transportation needs Medical: Not on file Non-medical: Not on file Tobacco Use  Smoking status: Former Smoker Quit date: 1991 Years since quittin.3  Smokeless tobacco: Never Used Substance and Sexual Activity  Alcohol use: Never Frequency: Never  Drug use: Never  Sexual activity: Not on file Lifestyle  Physical activity Days per week: Not on file Minutes per session: Not on file  Stress: Not on file Relationships  Social connections Talks on phone: Not on file Gets together: Not on file Attends Scientology service: Not on file Active member of club or organization: Not on file Attends meetings of clubs or organizations: Not on file Relationship status: Not on file  Intimate partner violence Fear of current or ex partner: Not on file Emotionally abused: Not on file Physically abused: Not on file Forced sexual activity: Not on file Other Topics Concern  Not on file Social History Narrative  Not on file ALLERGIES: Shellfish derived Review of Systems Constitutional: Positive for fatigue. Respiratory: Positive for cough, chest tightness, shortness of breath and wheezing. All other systems reviewed and are negative. Vitals:  
 05/09/21 2015 05/09/21 2030 05/09/21 2045 05/09/21 2100 BP: (!) 119/57 115/67 (!) 111/52 (!) 109/53 Pulse:      
Resp:      
Temp:      
SpO2: 95% 96% 96% 96% Weight:      
Height:      
      
 
Physical Exam 
Vitals signs and nursing note reviewed. Constitutional:   
   Appearance: She is ill-appearing. HENT:  
   Head: Normocephalic and atraumatic. Cardiovascular:  
   Rate and Rhythm: Normal rate and regular rhythm. Pulmonary:  
   Effort: Tachypnea present. Breath sounds: Examination of the right-upper field reveals wheezing. Examination of the left-upper field reveals wheezing. Examination of the right-middle field reveals wheezing. Examination of the left-middle field reveals wheezing. Wheezing present. Abdominal:  
   General: Bowel sounds are normal.  
   Palpations: Abdomen is soft. Musculoskeletal: Normal range of motion. Skin: 
   General: Skin is warm and dry. Coloration: Skin is pale.   
Neurological:  
   General: No focal deficit present. Mental Status: She is alert and oriented to person, place, and time. Psychiatric:     
   Mood and Affect: Mood normal.     
   Behavior: Behavior normal.  
 
  
 
MDM Number of Diagnoses or Management Options Community acquired pneumonia, unspecified laterality Iron deficiency anemia, unspecified iron deficiency anemia type Other chronic pulmonary embolism without acute cor pulmonale (HCC) Diagnosis management comments: A/P: COPD exacerbation, community-acquired pneumonia. 30-year-old female presenting with likely COPD exacerbation versus community-acquired pneumonia. Labs show approximately 2 g drop in hemoglobin since prior lab results. Concern for occult GI bleed will send Hemoccult as well. Will start patient on antibiotics patient require admission due to hypoxia. Amount and/or Complexity of Data Reviewed Clinical lab tests: ordered and reviewed Tests in the radiology section of CPT®: reviewed and ordered Procedures Perfect Serve Consult for Admission 10:19 PM 
 
ED Room Number: SZ66/67 Patient Name and age:  Simeon Ansari 80 y.o.  female Working Diagnosis: 1. Iron deficiency anemia, unspecified iron deficiency anemia type 2. Community acquired pneumonia, unspecified laterality 3. 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 
Sepsis present:  no  Reassessment needed: no 
Code Status:  Full Code Readmission: no 
Isolation Requirements:  no 
Recommended Level of Care:  telemetry Department:[facility] ED - [phone] Other: Total critical care time spent exclusive of procedures:  44 hrs

## 2021-05-11 PROBLEM — I27.82 CHRONIC PULMONARY EMBOLISM (HCC): Status: ACTIVE | Noted: 2021-01-01

## 2021-05-11 PROBLEM — J96.21 ACUTE ON CHRONIC RESPIRATORY FAILURE WITH HYPOXIA (HCC): Status: ACTIVE | Noted: 2021-01-01

## 2021-05-11 NOTE — PROGRESS NOTES
5/11/2021 1:42 PM [facility] can supply \"deluxe rollator\" to pt but for pt to have hand breaks and 4 wheels it will be a $50 up charge. Other option is a \"standard rollator\" that has a seat and 2 wheels. Spoke with pt's daughter regarding deluxe rollator vs standard rollator. Pt's daughter is aware pt would be safer with the standard rollator and agreeable to this being ordered. CM sent message to [facility] with [address] [place] [address] and requested pt's standard rollator to be delivered to [facility] on 5/12.  
 
5/11/2021  9:27 AM [facility] does not have rollators in stock. CM sent order to [facility] at [facility]. Confirmed with [facility] they have rollators in stock. CM will follow. YONNY Amaro 
 
RUR:  37% Risk Level: []Low []Moderate [x]High Value-based purchasing: [] Yes [x] No 
Bundle patient: [] Yes [x] No 
 Specify:  
 
Transition of care plan: 1. Admitted for acute hypoxemic respiratory failure. 2. Home with family and resume home health through [facility] at discharge. 3. Rollator ordered through [address] [place] [address]. 
4. Outpatient follow-up. 5. Pt's family to transport

## 2021-05-11 NOTE — ADT AUTH CERT NOTES
[address]  
  
FACILITY NPI : [id number] FACILITY TAX ID :  
  
[address] 
[facility] 4M POST SURG ORT 2 
[address] [facility] [place] [address] [phone] 
  
  
   
Patient Name :Ama Gutierrez  : 1940 (81 yrs) MRN : [id number] 
  
Patient Mailing Address [address] [[address]] , [id number]        
  
  . 
  
   
Insurance Plan Payor: BLUE CROSS MEDICARE / Plan: [place] PPO / Product Type: Managed Care Medicare /  
  
Primary Coverage Subscriber ID : [id number] 
  
Secondary Coverage:  N/A 
  
Secondary Subscriber ID :   
   
Current Patient Class : INPATIENT Admit Date : 5/10/2021 ER ADMISSION 
  
REQUESTED LEVEL OF CARE: INPATIENT [101] Diagnosis : Pneumonia of both lungs due to infectious organism;CAP (community acquired pneumonia); Acute respiratory insufficiency 
                    
  
ICD10 Code : Pneumonia of both lungs due to infectious organism [J18.9] CAP (community acquired pneumonia) [J18.9] Acute respiratory insufficiency [R06.89] 
  
Current Room and Bed 425/01 
  
Admitting and Attending Info: 
Admitting Provider : Jasmyn Mistry MD   NPI: [id number] Admitting Provider Phone. [phone] Admitting Provider Address: [address] [place] [address] 
  
Attending Provider Zenia Yeung MD   [id number] Attending Provider Address:  [address] [place] [address] 
  
Attending Provider Phone: Attending phys phone: [phone]

## 2021-05-11 NOTE — PROGRESS NOTES
[facility] [address] 
[address] [facility], [address] [place] [address] 
[phone] Medical Progress Note NAME:         Kee Araiza :        1940 MRM:        [id number] Date of service: 2021 Subjective: Patient has been seen and examined as a follow up for multiple medical issues. Chart, labs, diagnostics reviewed. Has a mild cough. No fever or chills. No nausea or vomiting. No new symptoms Objective: 
 
Vital Signs: 
 
Visit Vitals BP (!) 142/75 (BP 1 Location: Left arm, BP Patient Position: At rest) Pulse (!) 58 Temp 97.4 °F (36.3 °C) Resp 16 Ht 5' 4\" (1.626 m) Wt 58.5 kg (129 lb) SpO2 98% BMI 22.14 kg/m² Intake/Output Summary (Last 24 hours) at 2021 1138 Last data filed at 2021 8126 Gross per 24 hour Intake 2133 ml Output  Net 2133 ml Physical Examination: 
 
General:   Weak and ill looking, not in any acute distress Eyes:   pink conjunctivae, PERRLA with no discharge. ENT:   no ottorrhea or rhinorrhea with dry mucous membranes Neck: no masses, thyroid non-tender and trachea central. 
Pulm:  clear breath sounds without crackles or wheezes Card:  has regular and normal S1, S2 without thrills, bruits or peripheral edema Abd:  Soft, non-tender, non-distended, normoactive bowel sounds Musc:  No cyanosis, clubbing, atrophy or deformities. Skin:  No rashes, bruising or ulcers. Neuro: Awake and alert. Generally a non focal exam. Follows commands appropriately Psych:  Has a good insight and is oriented x 3 Current Facility-Administered Medications Medication Dose Route Frequency  0.9% sodium chloride infusion  100 mL/hr IntraVENous CONTINUOUS  
 sodium chloride (NS) flush 5-40 mL  5-40 mL IntraVENous Q8H  
 sodium chloride (NS) flush 5-40 mL  5-40 mL IntraVENous PRN  
 acetaminophen (TYLENOL) tablet 650 mg  650 mg Oral Q6H PRN  Or  
 acetaminophen (TYLENOL) suppository 650 mg  650 mg Rectal Q6H PRN  polyethylene glycol (MIRALAX) packet 17 g  17 g Oral DAILY PRN  
 bisacodyL (DULCOLAX) suppository 10 mg  10 mg Rectal DAILY PRN  
 ondansetron (ZOFRAN) injection 4 mg  4 mg IntraVENous Q6H PRN  
 aspirin delayed-release tablet 81 mg  81 mg Oral DAILY  atorvastatin (LIPITOR) tablet 80 mg  80 mg Oral QHS  clonazePAM (KlonoPIN) tablet 0.25 mg  0.25 mg Oral QHS  levothyroxine (SYNTHROID) tablet 75 mcg  75 mcg Oral ACB  magnesium oxide (MAG-OX) tablet 400 mg  400 mg Oral DAILY  furosemide (LASIX) tablet 20 mg  20 mg Oral DAILY PRN  
 glucose chewable tablet 16 g  4 Tab Oral PRN  
 glucagon (GLUCAGEN) injection 1 mg  1 mg IntraMUSCular PRN  predniSONE (DELTASONE) tablet 40 mg  40 mg Oral DAILY WITH BREAKFAST  levoFLOXacin (LEVAQUIN) tablet 750 mg  750 mg Oral Q48H  
 melatonin (rapid dissolve) tablet 5 mg  5 mg Oral QHS  gabapentin (NEURONTIN) capsule 600 mg  600 mg Oral BID  
 oxyCODONE ER (OxyCONTIN) tablet 10 mg  10 mg Oral Q12H  
 albuterol-ipratropium (DUO-NEB) 2.5 MG-0.5 MG/3 ML  3 mL Nebulization QID RT  
 FLUoxetine (PROzac) capsule 10 mg  10 mg Oral QPM  
 latanoprost (XALATAN) 0.005 % ophthalmic solution 1 Drop  1 Drop Both Eyes QHS  oxyCODONE IR (ROXICODONE) tablet 5 mg  5 mg Oral BID PRN  
 urea (URE-NA) 15 gram packet 1 Packet  1 Packet Oral DAILY  pantoprazole (PROTONIX) 40 mg in 0.9% sodium chloride 10 mL injection  40 mg IntraVENous Q12H  
 insulin glargine (LANTUS) injection 20 Units  20 Units SubCUTAneous DAILY  dextrose (D50W) injection syrg 12.5-25 g  25-50 mL IntraVENous PRN  
 insulin lispro (HUMALOG) injection   SubCUTAneous AC&HS Laboratory data and review: 
 
Recent Labs 05/11/21 0147 05/09/21 2021 WBC 14.5* 10.8 HGB 9.9* 8.2* HCT 31.9* 26.6*  
* 308 Recent Labs 05/11/21 0147 05/09/21 2021  138  
K 3.8 4.0  
* 105 CO2 27 29 GLU 98 153* BUN 30* 34* CREA 0.71 0.85 CA 8.0* 7.7* ALB 2.7* 2.9* ALT 34 40 No components found for: [facility]: Imaging studies have been reviewed Assessment and Plan: 
 
Acute Hypoxemic Respiratory Failure POA: usually on 2 l/min at night and currently on RA. Triggered by COPD exacerbation, ?pneumonia, old small PE. Continue supplemental oxygen as needed and treat trigger COPD with acute exacerbation POA: CTA showed severe emphysema. Has an old unchanged PE. Likely has bibasilar airspace disease with bronchial wall thickening. Rapid COVID-19 neg. Continue Levofloxacin, Prednisone, Duoneb and ambulate as needed. Consult Pulm DM type 2 POA: A1c is 6.7%. Blood glucose stable. Continue Lantus, SSi per protocol. DM diet Anxiety: stable. Chronic. Continue Fluoxetine, Clonazepam  
 
Chronic Pain: On oxycodone ER 9mg BID as outpt with Oxy 5 IR as needed Anemia: Slightly down from baseline. Hgb stable. No further testing Weakness + Falls: Good support from home as lives with daughter. Cont PT/OT Total time spent for the patient's care: 35 Minutes Care Plan discussed with: Patient, Family, Care Manager and Nursing Staff Discussed:  Care Plan and D/C Planning Prophylaxis:  SCD's Anticipated Disposition:   PT, OT, RN 
        
___________________________________________________ Attending Physician:   Jordi Ba MD

## 2021-05-11 NOTE — ADT AUTH CERT NOTES
[address]  
  
FACILITY NPI : [id number] FACILITY TAX ID :  
  
[address] 
SF[facility] 4M POST SURG ORT 2 
[address] [facility] [id number] [phone] 
  
  
   
Patient Name :Valencia Alvarez  : 1940 (81 yrs) MRN : [id number] 
  
Patient Mailing Address [address] [47] , [id number]        
  
  . 
  
   
Insurance Plan Payor: BLUE CROSS MEDICARE / Plan: [employer] [place] MEDIC[employer] PPO / Product Type: Managed Care Medicare /  
  
Primary Coverage Subscriber ID : [id number] 
  
Secondary Coverage:  N/A 
  
Secondary Subscriber ID :   
   
Current Patient Class : INPATIENT Admit Date : 5/10/2021 ER ADMISSION 
  
REQUESTED LEVEL OF CARE: INPATIENT [101] Diagnosis : Pneumonia of both lungs due to infectious organism;CAP (community acquired pneumonia); Acute respiratory insufficiency 
                    
  
ICD10 Code : Pneumonia of both lungs due to infectious organism [J18.9] CAP (community acquired pneumonia) [J18.9] Acute respiratory insufficiency [R06.89] 
  
Current Room and Bed 425/01 
  
Admitting and Attending Info: 
Admitting Provider : Armani Schneider MD   NPI: [id number] Admitting Provider Phone. [phone] Admitting Provider Address: [address] [place] [address] [id number] 
  
Attending Provider Sharona Bruno MD   [id number] Attending Provider Address:  [address] [place] [address] 
  
Attending Provider Phone: Attending john phone: [phone] 
  
   
 
Utilization Reviews 
 
  
Pneumonia - Care Day 2 (2021) by Marcelo Olvera 
 
  
Review Entered Review Status 2021 15:40 Completed  
  
Criteria Review Care Day: 2 Care Date: 5/11/2021 Level of Care: Telemetry Guideline Day 2 Level Of Care (X) Floor 5/11/2021 15:40:14 EDT by Tawana De Los Santos Clinical Status   
(X) * No CO2 retention or acidosis 5/11/2021 15:40:15 EDT by Jarad Ludwig   
  Labs: WBC 14.5, HGB 9.9, HCT 31.9, Plat 405, Ch 111, BUN 30/Cr 0.71   
(X) * No requirement for mechanical ventilation 5/11/2021 15:40:15 EDT by Jarad Ludwig   
  nasal cannula   
(X) * Hypotension absent 5/11/2021 15:40:15 EDT by Jarad Ludwig   
  HR 52, 58    18    108/57, 97/58   
(X) * Afebrile or fever improved 5/11/2021 15:40:15 EDT by Jarad Ludwig   
  T 97.7   
(X) * No hypoxia on room air or oxygenation improved 5/11/2021 15:40:15 EDT by Jarad Ludwig   
  Activity Tolerance:  
Fair, desaturates with exertion and requires oxygen, requires rest breaks, and observed SOB with activity 96% RA   
(X) * Mental status improved or at baseline 5/11/2021 15:40:15 EDT by Mich Quevedo Awake and alert. Activity   
(X) * Increased activity 5/11/2021 15:40:15 EDT by Jarad Ludwig   
  SCDs, Cardiac monitor, Cough& Deep breathe, Elevate HOB, IS, I&Os, SLP/ PT/ OT therapy PT:  Pt received supine in bed anxious and reporting feeling very weak today. On 3L/min supplemental O2 and maintains saturations during activity this day. Routes   
(X) Usual diet 5/11/2021 15:40:15 EDT by Jarad Ludwig   
  Cardiac diet Interventions (X) Incentive spirometry 5/11/2021 15:40:15 EDT by Jarad Ludwig   
  Duoneb qid,   
(X) Pulse oximetry 5/11/2021 15:40:15 EDT by Jarad Ludwig   
  OXIMETRY, SPOT CHECK   
(X) Head of bed at 30 degrees 5/11/2021 15:40:15 EDT by Jarad Ludwig   
  ELEVATE HEAD OF BED   
(X) Possible oxygen 5/11/2021 15:40:15 EDT by Jarad Ludwig   
  RT--WEAN PER RT OXYGEN PROTOCOL Medications (X) IV or oral antibiotics 5/11/2021 15:40:15 EDT by Judith Arriaga 750mg PO q48, Prednisone 40mg PO daily,  Insulin ac&hs, Zofran 4mg IV x1, Oxycodone 10mg PO q12, Oxycodone 5mg PO bid, Protonix 40mg IV q12 * Milestone Additional Notes 05/11/21 - IP Progress Note:  
Patient has been seen and examined as a follow up for multiple medical issues. Chart, labs, diagnostics reviewed. Has a mild cough. No fever or chills. No nausea or vomiting. No new symptoms Physical Examination:  
General:   Weak and ill looking, not in any acute distress   
Eyes:   pink conjunctivae, PERRLA with no discharge. ENT: Caresse Peat or rhinorrhea with dry mucous membranes Neck: no masses, thyroid non-tender and trachea central.  
Pulm:  clear breath sounds without crackles or wheezes Card:  has regular and normal S1, S2 without thrills, bruits or peripheral edema Abd:  Soft, non-tender, non-distended, normoactive bowel sounds Musc:  No cyanosis, clubbing, atrophy or deformities. Skin:  No rashes, bruising or ulcers. Neuro: Awake and alert. Generally a non focal exam. Follows commands appropriately Psych:  Has a good insight and is oriented x 3 Assessment and Plan:  
Acute Hypoxemic Respiratory Failure POA: usually on 2 l/min at night and currently on RA. Triggered by COPD exacerbation, ?pneumonia, old small PE. Continue supplemental oxygen as needed and treat trigger   
   
COPD with acute exacerbation POA: CTA showed severe emphysema. Has an old unchanged PE. Likely has bibasilar airspace disease with bronchial wall thickening. Rapid COVID-19 neg. Continue Levofloxacin, Prednisone, Duoneb and ambulate as needed. Consult Pulm   
   
DM type 2 POA: A1c is 6.7%. Blood glucose stable. Continue Lantus, SSi per protocol. DM diet  
   
Anxiety: stable. Chronic.  Continue Fluoxetine, Clonazepam   
   
Chronic Pain: On oxycodone ER 9mg BID as outpt with Oxy 5 IR as needed  
   
Anemia: Slightly down from baseline. Hgb stable. No further testing   
   
Weakness + Falls: Good support from home as lives with daughter. Cont PT/OT   
   
  
  
Covid 19 Results by Violette Azul 
 
  
Review Entered Review Status 5/11/2021 09:10 In Primary  
  
Criteria Review COVID-19 rapid test   Not detected  
  
Is the illness suspected to be related to the Coronavirus (COVID-19)? No  
Has the member been tested for the COVID-19? Yes If Yes, what are the results of the COVID-19? Negative What is the severity of the members condition (i.e. Isolation, Ventilator use)? O2 nasal cannula 
   
  
Pneumonia - Care Day 1 (5/10/2021) by Violette Azul 
 
  
Review Entered Review Status 5/11/2021 09:03 Completed  
  
Criteria Review Care Day: 1 Care Date: 5/10/2021 Level of Care: Telemetry Guideline Day 1 Level Of Care (X) ICU [D] or floor 5/11/2021 09:03:44 EDT by Bennett Blood ( ) Readmission Risk Assessment 5/11/2021 09:03:44 EDT by Violette Azul   
  RUR Score: 34% HIGH. Pt has had several hospitlizations over the last 6 months:  
1/3/2021- Rib FXS (Inpatient stay) 2/25/2021- Acute Pulmonary Embolism (Inpatient stay) 3/2/2021- Dizziness (Observation stay) 3/8/2021- Hyponatremia, slurred (INP Clinical Status   
(X) * Clinical Indications met [E] 5/11/2021 09:03:44 EDT by Violette Azul   
  Pneumonia of both lungs due to infectious organism / COPD with acute exacerbation / Acute respiratory failure with hypoxia Room Air sats 87%, 84%, requiring supplemental O2 via nasal cannula T 98.1   78   97/54    22    87% RA >   
(X) Possible Fever, dyspnea, purulent sputum, pleuritic pain, Altered mental status 5/11/2021 09:03:44 EDT by Violette Azul   
  SOB has been constant worsening on exertion with moderate-severe with a productive cough, wheezing O2 sats at 85%.  EMS placed on 2L, increased to 93% Patient does not require oxygen at home Activity (X) Activity as tolerated 5/11/2021 09:03:44 EDT by Wiliam Mowers As tolerated w/ assist, SCDs PT--EVAL, DEVISE PLAN OF CARE AND TREAT 
OT--EVAL, DEVISE PLAN OF CARE AND TREAT Routes (X) Possible IV fluids 5/11/2021 09:03:44 EDT by Stephon Tucker   
  NS 100mL/hr INTAKE AND OUTPUT   
(X) Liquid or usual diet 5/11/2021 09:03:44 EDT by Stephon Tucker   
  Cardiac diet SLP--BEDSIDE SWALLOW EVAL AND TREAT Interventions (X) WBC   
5/11/2021 09:03:44 EDT by Liza Szymanski   
  WBC 10.8, HGB 8.2, HCT 26.6, Plat 3.8, Glucose 153, BUN 34/Cr 0.85, Ca 7.7, Albumin 2.9, NT pro-BNP 1,350, Folate 40.4 (X) Possible ABG or oximetry 5/11/2021 09:03:44 EDT by Alberto Lainez   
(X) Possible respiratory therapy 5/11/2021 09:03:44 EDT by Tello Rg DEEP BREATHE 
CT:  Segmental/subsegmental embolus medial left lower lobe. Severe emphysema, with development of patchy bibasilar airspace disease, 
diffuse bronchial wall thickening, and small pleural effusions Splenomegaly (X) Probable oxygen 5/11/2021 09:03:44 EDT by Capps, [address] per minute: 2; Indications for O2 therapy: RESPIRATORY DISTRESS Titrate rate up to 4 L / minute to maintain oxygen saturation at 92% or greater (X) Incentive spirometry 5/11/2021 09:03:44 EDT by Deysi Verdugo. [address]   
(X) Head of bed at 30 degrees 5/11/2021 09:03:44 EDT by Stephon Tucker   
  ELEVATE HEAD OF BED Medications (X) IV antibiotics 5/11/2021 09:03:44 EDT by Stephon Tucker   
  Levaquin 750mg IV q48, Zosyn 3.375mg IV q8,  acetaminophen 650mg PO q6prn x1, Duoneb qid, lispro SS qac &hs, Zofran 4mg IV x1, Oxycodone 10mg PO q12, oxycodone 5mg PO bid, solumedrol 125mg IV x1 then 40mg IV q6, phenergan 12.5mg IV x1   
* Milestone Additional Notes 05/10/21 - IP  
  
ED Course: 26-year-old female present emergency department shortness of breath.  Patient called EMS because she was having worsening shortness of breath upon arrival patient noted to be hypoxic at 85% on room air EMS placed patient on 2 L nasal cannula with improvement to 93%. Patient states that she has a history of PEs as well as COPD. Patient does not require oxygen at home. On chart review patient does have a history of left-sided CVA, left-sided sub segmental PE. She has noticed increased sputum production with a \"wet cough\". Positive for fatigue. Positive for cough, chest tightness, shortness of breath and wheezing. Tachypnea present.  Breath sounds: Examination of the right-upper field reveals wheezing. Examination of the left-upper field reveals wheezing. Examination of the right-middle field reveals wheezing. Examination of the left-middle field reveals wheezing. Wheezing present. Coloration: Skin is pale Meds: Zosyn, Levaquin, acetaminophen, solumedrol HPI-  
80 y.o. female with history that includes COPD, CVA with residual left sided deficits and PE for which she was taken off eliquis earlier this month presents with worsening shortness of breath for the past week and found to be hypoxic with sats dropping 84% on RA.  SOB has been constant worsening on exertion with moderate-severe with a productive cough.  Associated with no other symptoms, shortness of breath.  she is unaware of sick contacts.  Fully vaccinated for CoV-19 early April. .  
   
In ER she was found to be wheezing with her sats as low as 87%.  CT of the chest was done showing stable subsegmental PE and bilateral pneumonia.  Patient denies vomiting.  Also found to have worsening anemia where her baseline is 10-11.3 based on previous lab draws it was 8.2 today.  She denies active bleeding melanotic stools, hematochezia, or hematuria.  Fecal occult blood test done in the ER was negative.  Also found to have a BNP elevated at 1350. Physical Exam:  
General: alert, well appearing, and in no distress while on oxygen Head: Normocephalic, atraumatic Eyes: PERRL and EOMI sclera clear ENT: Lips, mucosa, and tongue normal.   
Neck: supple, no tenderness Lungs: rales bilaterally, rhonchi and scattered wheezes bilaterally Heart: S1-S2, RRR Abd: SNTBS(+) Ext: no cyanosis, no edema    
Pulses: 2+ and symmetric Skin: Skin color, texture, turgor normal. No rashes or lesions Neuro:  alert, oriented x 3, no defects noted in general exam.  
Psych: Not anxious, cooperative, normal affect Assessment/Plan:   
  
Pneumonia of both lungs due to infectious organism / COPD with acute exacerbation / Acute respiratory failure with hypoxia:    
Will admit for further workup and treatment of this very ill patient and high risk of decompensation due to respiratory compromise/hypoxia and pneumonia.  COVID-19 order set with DROPLET PLUS precautions.     Antibiotic patient was started on Zosyn and Levaquin in ER and will continue with that for the time being.  Zinc, Vit. C, Vit. D, pepcid or PPI.  Bronchodilator(s), Solumedrol for hypoxia and/or resp. distress.     Maintain O2 sats at least 92-94% oxygen per protocol.  Supportive care, incentive spirometry.     Follow inflammatory markers and renal function.  Consider pulmonary consult if not improving.   
   
Anemia: Anemia work-up to include repeat FOBT and iron studies.  
   
Type II diabetes mellitus:  Monitor blood glucose levels with insulin sliding scale coverage along with basal insulin.  Monitor for complications.  N0J.  Diabetic diet.  
   
Hypothyroid:  TSH and continue home dose.  
  
  
Pneumonia - Clinical Indications for Admission to Inpatient Care by Hitesh Martinez 
 
  
Review Entered Review Status 5/11/2021 08:48 Completed  
  
Criteria Review Clinical Indications for Admission to Inpatient Care Most Recent : Hitesh Martinez Most Recent Date: 5/11/2021 08:48:33 EDT   
(X) Admission is indicated for  1 or more  of the following  [A] [B] (1) (2) (5) (6) (7):   
   (X) Hypoxemia   
   5/11/2021 08:48:33 EDT by Kirstin Jolly  found to be hypoxic with sats dropping 84% on RA.   
   (X) Moderate-risk-category or high-risk-category patient [C] (Pneumonia Severity Index class IV   
   or V, or CURB-65 score of 3 or greater)   
   5/11/2021 08:48:33 EDT by Maggie Baron   
     PSI Score 121 Class: IV -81 yo HCT 26.6, BUN 34, Pleaural effusions, Hx: CVA   
   (X) Respiratory findings (eg, Tachypnea) that do not respond to observation care treatment   
   5/11/2021 08:48:33 EDT by Franco Juan has been constant worsening on exertion with moderate-severe with a productive cough.  she was found to be wheezing with her sats as low as 87%   
   (X) Immunocompromised patient (eg, AIDS, chronic steroid use) at moderate or high risk based on   
   clinical evaluation   
   5/11/2021 08:48:33 EDT by Maggie Baron   
     Hx: COPD Home medication prednisone 5mg PO daily

## 2021-05-11 NOTE — PROGRESS NOTES
Problem: Dysphagia (Adult) Goal: *Acute Goals and Plan of Care (Insert Text) Description: Swallowing goals initiated 5-11-21: 
1) tolerate regular diet, thins without s/s aspiration by 5-14-21 Outcome: Progressing Towards Goal 
 SPEECH LANGUAGE PATHOLOGY BEDSIDE SWALLOW EVALUATION Patient: Bhumi Hunt (47 y.o. female) Date: 5/11/2021 Primary Diagnosis: Pneumonia of both lungs due to infectious organism [J18.9] CAP (community acquired pneumonia) [J18.9] Acute respiratory insufficiency [R06.89] Precautions: aspiration ASSESSMENT : 
Based on the objective data described below, the patient presents with aspiration risks due to h/o COPD(rapid, shallow breathing) that may lead due to difficulty cessating respiration for deglutition. Her (B) PNA may also be related to post prandial aspiration of vomiting recently. Also h/o multiple R CVAs. . This is her third admission this year. Admitted 5-9-21 with SOB, hypoxia, N&V. Dx with anemia and PNA. Chest CT: (B) patchy airspace disease. Bal Paredes PMH: on home O2, 2L,  COPD, PEs, R  frontal CVA with L deficits, (new tiny R frontal in March 2021, but also old CVA in 2016 involving R frontoparietal, R temporal occipital.),  DM, GERd, glaucoma, HTN, +h/o tobacco. . 
 
Patient will benefit from skilled intervention to address the above impairments. Patients rehabilitation potential is considered to be Fair PLAN : 
Recommendations and Planned Interventions: 
Continue diet as tolerated. She may benefit from MBS to r/o aspiration as an etiology of PNA. Frequency/Duration: Patient will be followed by speech-language pathology 2 times a week to address goals. Discharge Recommendations: To Be Determined SUBJECTIVE:  
Patient stated I don't like 1% milk; I like 2% milk\". OBJECTIVE:  
 
Past Medical History:  
Diagnosis Date Anxiety and depression Arthritis Chronic obstructive pulmonary disease (HCC) Chronic pain  DM type 2 causing vascular disease ([facility] [address] GERD (gastroesophageal reflux disease) Glaucoma HTN (hypertension) Hx of completed stroke 2017, 2021 Hypothyroid Incontinence Neuropathy Polypharmacy Past Surgical History:  
Procedure Laterality Date HX ORTHOPAEDIC    
 HX VASCULAR ACCESS Prior Level of Function/Home Situation:  
Home Situation Home Environment: Private residence # Steps to Enter: 3 Rails to Enter: Yes One/Two Story Residence: One story Living Alone: No 
Support Systems: Child(darrick)(daughter and son in law) Patient Expects to be Discharged to[de-identified] Private residence Current DME Used/Available at Home: Cane, straight, Shower chair, Grab bars, Raised toilet seat, Nebulizer Tub or Shower Type: Tub Diet prior to admission: regular, thins Current Diet:  regular, thins. She passed the STANd Cognitive and Communication Status: 
Neurologic State: Alert Orientation Level: Oriented to person, Oriented to place, Oriented to situation Cognition: Impaired decision making, Impulsive, Follows commands Perception: Appears intact Perseveration: No perseveration noted Safety/Judgement: Decreased insight into deficits Oral Assessment: 
Oral Assessment Labial: No impairment Dentition: Natural;Limited Oral Hygiene: [facility] Lingual: No impairment Mandible: No impairment P.O. Trials: 
Patient Position: upright in bed Vocal quality prior to P.O.: No impairment Consistency Presented: Thin liquid; Solid How Presented: Self-fed/presented;Spoon;Straw;Successive swallows ORAL PHASE:  
Bolus Acceptance: No impairment Bolus Formation/Control: No impairment Propulsion: No impairment Oral Residue: None PHARYNGEAL PHASE:  
Initiation of Swallow: (swallowing palpation difficult. may be weak. Also may have difficulty cessating respiration for degluttition due to COPD with rapid, shallow breathing) NOMS:  
The NOMS functional outcome measure was used to quantify this patient's level of swallowing impairment. Based on the NOMS, the patient was determined to be at level 6 for swallow function NOMS Swallowing Levels: 
Level 1 (CN): NPO Level 2 (CM): NPO but takes consistency in therapy Level 3 (CL): Takes less than 50% of nutrition p.o. and continues with nonoral feedings; and/or safe with mod cues; and/or max diet restriction Level 4 (CK): Safe swallow but needs mod cues; and/or mod diet restriction; and/or still requires some nonoral feeding/supplements Level 5 (CJ): Safe swallow with min diet restriction; and/or needs min cues Level 6 (CI): Independent with p.o.; rare cues; usually self cues; may need to avoid some foods or needs extra time Level 7 (CH): Independent for all p.o. АНДРЕЙ. (2003). National Outcomes Measurement System (NOMS): Adult Speech-Language Pathology User's Guide. Pain: 
Pain Scale 1: Numeric (0 - 10) Pain Intensity 1: 10 
Pain Location 1: Back After treatment:  
Patient left in no apparent distress in bed and Nursing notified COMMUNICATION/EDUCATION:  
Patient was educated regarding her deficit(s) of POTENTIAL DYSPHAGIA  as this relates to her diagnosis of PNA. She demonstrated Fair understanding as evidenced by perseveration on other things. Tami Banuelos The patient's plan of care including recommendations, planned interventions, and recommended diet changes were discussed with:  none . Patient/family have participated as able in goal setting and plan of care. Patient/family agree to work toward stated goals and plan of care. Thank you for this referral. 
Virgie Mccray, SLP Time Calculation: 15 mins

## 2021-05-11 NOTE — CONSULTS
Name: [facility]: [facility]  
: 1940 Admit Date: 2021 Phone: [phone]  Room: [facility]/01 PCP: Eldon Bosworth, MD  MRN: [id number] Date: 2021  Code: Full Code Chart and notes reviewed. Data reviewed. I review the patient's current medications in the medical record at each encounter. I have evaluated and examined the patient. HPI: 
 
3:32 PM    
 
History was obtained from patient. I was asked by Marycruz Rucker MD to see Seth Cai in consultation for a chief complaint of . History of Present Illness: Ms. Ildefonso Mckeon is an 81yo woman with a history of COPD/emphysema, PE, chronic respiratory failure with hypoxia (2L at night), former tobacco use, HTN, DM, hypothyroidism, and anxiety admitted for pneumonia and a COPD exacerbation. She described progressive shortness of breath on exertion. In the ED she found to be hypoxic to 87% on RA and with CT with bilateral pneumonia. She has been negative for COVID. She reports feeling better on antibiotics and neb treatments. At home she uses her Trelegy daily and her nebs three times daily. She has trouble sleeping if she gets her nebs after 6pm and would like her current nebs spaced out so that she doesn't have to get so many as it is making her too jittery. Labs: WBC 14.5, cr 0.71, COVD negative, PNA labs pending Images reviewed: CTA 2021: stable LLL PE, no new PE's, severe emphysema; patchy bibasilar airspace disease, small effusions Past Medical History:  
Diagnosis Date  Anxiety and depression  Arthritis  Chronic obstructive pulmonary disease ([address]  Chronic pain  DM type 2 causing vascular disease ([address]  GERD (gastroesophageal reflux disease)  Glaucoma   
 HTN (hypertension)  Hx of completed stroke 2021  Hypothyroid  Incontinence  Neuropathy  Polypharmacy Past Surgical History:  
Procedure Laterality Date  HX ORTHOPAEDIC    
 HX VASCULAR ACCESS Family History Problem Relation Age of Onset  No Known Problems Other   
     reviewed, patient did not know  Diabetes Mother  Heart Attack Mother  Cancer Father Social History Tobacco Use  Smoking status: Former Smoker Quit date: 1991 Years since quittin.3  Smokeless tobacco: Never Used Substance Use Topics  Alcohol use: Never Frequency: Never Allergies Allergen Reactions  Shellfish Derived Anaphylaxis Current Facility-Administered Medications Medication Dose Route Frequency  sodium chloride (NS) flush 5-40 mL  5-40 mL IntraVENous Q8H  
 sodium chloride (NS) flush 5-40 mL  5-40 mL IntraVENous PRN  
 acetaminophen (TYLENOL) tablet 650 mg  650 mg Oral Q6H PRN Or  
 acetaminophen (TYLENOL) suppository 650 mg  650 mg Rectal Q6H PRN  polyethylene glycol (MIRALAX) packet 17 g  17 g Oral DAILY PRN  
 bisacodyL (DULCOLAX) suppository 10 mg  10 mg Rectal DAILY PRN  
 ondansetron (ZOFRAN) injection 4 mg  4 mg IntraVENous Q6H PRN  
 aspirin delayed-release tablet 81 mg  81 mg Oral DAILY  atorvastatin (LIPITOR) tablet 80 mg  80 mg Oral QHS  clonazePAM (KlonoPIN) tablet 0.25 mg  0.25 mg Oral QHS  levothyroxine (SYNTHROID) tablet 75 mcg  75 mcg Oral ACB  magnesium oxide (MAG-OX) tablet 400 mg  400 mg Oral DAILY  furosemide (LASIX) tablet 20 mg  20 mg Oral DAILY PRN  
 glucose chewable tablet 16 g  4 Tab Oral PRN  
 glucagon (GLUCAGEN) injection 1 mg  1 mg IntraMUSCular PRN  predniSONE (DELTASONE) tablet 40 mg  40 mg Oral DAILY WITH BREAKFAST  levoFLOXacin (LEVAQUIN) tablet 750 mg  750 mg Oral Q48H  
 melatonin (rapid dissolve) tablet 5 mg  5 mg Oral QHS  gabapentin (NEURONTIN) capsule 600 mg  600 mg Oral BID  
 oxyCODONE ER (OxyCONTIN) tablet 10 mg  10 mg Oral Q12H  
 albuterol-ipratropium (DUO-NEB) 2.5 MG-0.5 MG/3 ML  3 mL Nebulization QID RT  
 FLUoxetine (PROzac) capsule 10 mg  10 mg Oral QPM  
 latanoprost (XALATAN) 0.005 % ophthalmic solution 1 Drop  1 Drop Both Eyes QHS  oxyCODONE IR (ROXICODONE) tablet 5 mg  5 mg Oral BID PRN  
 urea (URE-NA) 15 gram packet 1 Packet  1 Packet Oral DAILY  pantoprazole (PROTONIX) 40 mg in 0.9% sodium chloride 10 mL injection  40 mg IntraVENous Q12H  
 insulin glargine (LANTUS) injection 20 Units  20 Units SubCUTAneous DAILY  dextrose (D50W) injection syrg 12.5-25 g  25-50 mL IntraVENous PRN  
 insulin lispro (HUMALOG) injection   SubCUTAneous AC&HS  
 
 
 
REVIEW OF SYSTEMS  
12 point ROS negative except as stated in the HPI. Physical Exam:  
Visit Vitals BP (!) 97/58 (BP 1 Location: Left arm, BP Patient Position: At rest) Pulse 90 Temp 97.7 °F (36.5 °C) Resp 16 Ht 5' 4\" (1.626 m) Wt 58.5 kg (129 lb) SpO2 96% BMI 22.14 kg/m² General:  Alert, cooperative, no distress, appears stated age. Frail. Head:  Normocephalic, without obvious abnormality, atraumatic. Eyes:  Conjunctivae/corneas clear. Nose: Nares normal. Septum midline. Mucosa normal.   
Throat: Lips, mucosa, and tongue normal.   
Neck: Supple, symmetrical, trachea midline Lungs:   Clear to auscultation bilaterally but diminished Chest wall:  No tenderness or deformity. Heart:  Regular rate and rhythm, S1, S2 normal, no murmur, click, rub or gallop. Abdomen:   Soft, non-tender. Bowel sounds normal.   
Extremities: Extremities normal, atraumatic, no cyanosis or edema. Pulses: 2+ and symmetric all extremities. Skin: Skin color, texture, turgor normal. No rashes or lesions Neurologic: Grossly nonfocal  
 
 
Lab Results Component Value Date/Time  Sodium 143 05/11/2021 01:47 AM  
 Potassium 3.8 05/11/2021 01:47 AM  
 Chloride 111 (H) 05/11/2021 01:47 AM  
 CO2 27 05/11/2021 01:47 AM  
 BUN 30 (H) 05/11/2021 01:47 AM  
 Creatinine 0.71 05/11/2021 01:47 AM  
 Glucose 98 05/11/2021 01:47 AM  
 Calcium 8.0 (L) 05/11/2021 01:47 AM  
 Magnesium 1.8 03/09/2021 03:23 AM  
 Phosphorus 3.6 03/10/2021 03:15 AM  
 
 
Lab Results Component Value Date/Time WBC 14.5 (H) 05/11/2021 01:47 AM  
 HGB 9.9 (L) 05/11/2021 01:47 AM  
 PLATELET 084 (H) 40/58/7980 01:47 AM  
 MCV 83.7 05/11/2021 01:47 AM  
 
 
Lab Results Component Value Date/Time INR 1.2 (H) 03/09/2021 03:23 AM  
 aPTT 28.6 03/09/2021 03:23 AM  
 Alk. phosphatase 65 05/11/2021 01:47 AM  
 Protein, total 6.1 (L) 05/11/2021 01:47 AM  
 Albumin 2.7 (L) 05/11/2021 01:47 AM  
 Globulin 3.4 05/11/2021 01:47 AM  
 
 
Lab Results Component Value Date/Time Iron 16 (L) 05/09/2021 08:21 PM  
 TIBC 278 05/09/2021 08:21 PM  
 Iron % saturation 6 (L) 05/09/2021 08:21 PM  
 
 
Lab Results Component Value Date/Time TSH 3.75 (H) 03/08/2021 09:25 AM  
  
 
No results found for: PH, PHI, PCO2, PCO2I, PO2, PO2I, HCO3, HCO3I, FIO2, FIO2I Lab Results Component Value Date/Time Troponin-I, Qt. <0.05 05/09/2021 08:21 PM  
  
 
Lab Results Component Value Date/Time Culture result: No growth (<1,000 CFU/ML) 03/08/2021 08:20 PM  
 Culture result: ENTEROCOCCUS FAECALIS (A) 02/25/2021 06:25 PM  
 
 
No results found for: TOXA1, RPR, HBCM, HBSAG, HAAB, HCAB1, HAAT, G6PD, CRYAC, HIVGT, HIVR, HIV1, HIV12, HIVPC, HIVRPI No results found for: VANCT, CPK Lab Results Component Value Date/Time Color YELLOW/STRAW 03/08/2021 01:44 AM  
 Appearance CLEAR 03/08/2021 01:44 AM  
 pH (UA) 7.0 03/08/2021 01:44 AM  
 Protein Negative 03/08/2021 01:44 AM  
 Glucose Negative 03/08/2021 01:44 AM  
 Ketone Negative 03/08/2021 01:44 AM  
 Bilirubin Negative 03/08/2021 01:44 AM  
 Blood SMALL (A) 03/08/2021 01:44 AM  
 Urobilinogen 0.2 03/08/2021 01:44 AM  
 Nitrites Negative 03/08/2021 01:44 AM  
 Leukocyte Esterase LARGE (A) 03/08/2021 01:44 AM  
 WBC 0-4 03/08/2021 01:44 AM  
 RBC 5-10 03/08/2021 01:44 AM  
 Bacteria Negative 03/08/2021 01:44 AM  
 
 
IMPRESSION 
· Pneumonia · Acute on chronic respiratory failure with hypoxia · COPD exacerbation · Chronic PE 
 
PLAN 
· Goal sats 88% or higher, wean O2 as tolerated, wears 2L at night chronically · Will need exercise oximetry closer to discharge · Levaquin, can transition to PO to complete a course at discharge · PO steroids, will need to taper off · Will make duonebs q8 while awake and add prn albuterol · Anticoagulation previously stopped due to multiple falls · SCDs Thank you for allowing us to participate in the care of this patient. We will be happy to follow along in his/her progress with you.  
 
Sheri Mayo MD

## 2021-05-11 NOTE — PROGRESS NOTES
Problem: Mobility Impaired (Adult and Pediatric) Goal: *Acute Goals and Plan of Care (Insert Text) Description: FUNCTIONAL STATUS PRIOR TO ADMISSION: Patient was modified independent using a SB quad cane for functional mobility. Pt requires supervision with tub transfers. HOME SUPPORT PRIOR TO ADMISSION: The patient lived with daughter and son in law who assist as needed. Physical Therapy Goals Initiated 5/10/2021 1. Patient will move from supine to sit and sit to supine , scoot up and down, and roll side to side in bed with modified independence within 7 day(s). 2.  Patient will transfer from bed to chair and chair to bed with modified independence using the least restrictive device within 7 day(s). 3.  Patient will perform sit to stand with modified independence within 7 day(s). 4.  Patient will ambulate with modified independence for 150 feet with the least restrictive device within 7 day(s). 5.  Patient will ascend/descend 3 stairs with 1 handrail(s) with supervision/set-up within 7 day(s). Outcome: Progressing Towards Goal 
 PHYSICAL THERAPY TREATMENT Patient: Lionel Braswell (15 y.o. female) Date: 5/11/2021 Diagnosis: Pneumonia of both lungs due to infectious organism [J18.9] CAP (community acquired pneumonia) [J18.9] Acute respiratory insufficiency [R06.89] <principal problem not specified> Precautions:   
Chart, physical therapy assessment, plan of care and goals were reviewed. ASSESSMENT Patient continues with skilled PT services and is progressing towards goals. Pt received supine in bed anxious and reporting feeling very weak today. On 3L/min supplemental O2 and maintains saturations during activity this day. Utilized rollator for mobility as daughter prefers this DME for home use. Pt is more stable with RW though with practice and family supervision would be appropriate for home use.  Verbal and manual cueing for locking of breaks and maintain close proximity inside rollator frame. Pt requires up to [address] for steadying with turning and sit?stand from rollator. Two seated rest breaks to ambulate a total of 80ft. Would benefit from continued rollator education and activity progression. Up in chair at end of session. Current Level of Function Impacting Discharge (mobility/balance): Min A for transfers Other factors to consider for discharge: will require family supervision with mobility PLAN : 
Patient continues to benefit from skilled intervention to address the above impairments. Continue treatment per established plan of care. to address goals. Recommendation for discharge: (in order for the patient to meet his/her long term goals) Physical therapy at least 2 days/week in the home AND ensure assist and/or supervision for safety with ambulation and standing ADLs This discharge recommendation: 
Has been made in collaboration with the attending provider and/or case management IF patient discharges home will need the following DME: rollator SUBJECTIVE:  
Patient stated I am so happy you came back. Thank you so much.  OBJECTIVE DATA SUMMARY:  
Critical Behavior: 
Neurologic State: Alert Orientation Level: Oriented to person, Oriented to place, Oriented to situation Cognition: Impaired decision making, Impulsive, Follows commands Safety/Judgement: Decreased insight into deficits Functional Mobility Training: 
Bed Mobility: 
  
Supine to Sit: Minimum assistance Transfers: 
Sit to Stand: Minimum assistance;Contact guard assistance(depending on surface) Stand to Sit: Contact guard assistance Balance: 
Sitting: Intact Standing: Impaired; With support Standing - Static: Constant support; Fair 
Standing - Dynamic : Fair;Constant support Ambulation/Gait Training: 
Distance (ft): 40 Feet (ft)(additional 20' (X2) with seated rest break) Assistive Device: Walker, rollator;Gait belt Ambulation - Level of Assistance: Contact guard assistance;Minimal assistance Gait Abnormalities: Hemiplegic;Decreased step clearance Base of Support: Narrowed Speed/[place]: Slow Step Length: Left shortened Swing Pattern: Left asymmetrical 
  
  
 
 
Activity Tolerance:  
Fair, desaturates with exertion and requires oxygen, requires rest breaks, and observed SOB with activity After treatment patient left in no apparent distress:  
Sitting in chair and Call bell within reach COMMUNICATION/COLLABORATION:  
The patients plan of care was discussed with: Registered nurse. Laurie Reed, PT Time Calculation: 32 mins

## 2021-05-12 NOTE — PROGRESS NOTES
Name: [facility]: [address]  
: 1940 Admit Date: 2021 Phone: [phone]  Room: 425/01 PCP: Shane Montero MD  MRN: [id number] Date: 2021  Code: Full Code Chart and notes reviewed. Data reviewed. I review the patient's current medications in the medical record at each encounter. I have evaluated and examined the patient. History of Present Illness: Ms. Yeni Zheng is an 79yo woman with a history of COPD/emphysema, PE, chronic respiratory failure with hypoxia (2L at night), former tobacco use, HTN, DM, hypothyroidism, and anxiety admitted for pneumonia and a COPD exacerbation. She described progressive shortness of breath on exertion. In the ED she found to be hypoxic to 87% on RA and with CT with bilateral pneumonia. She has been negative for COVID. She reports feeling better on antibiotics and neb treatments. At home she uses her Trelegy daily and her nebs three times daily. She has trouble sleeping if she gets her nebs after 6pm and would like her current nebs spaced out so that she doesn't have to get so many as it is making her too jittery. Labs: WBC 14.5, cr 0.71, COVD negative, PNA labs pending Images reviewed: CTA 2021: stable LLL PE, no new PE's, severe emphysema; patchy bibasilar airspace disease, small effusions Overnight events: 
Feels short of breath and wheezing. Coughing up green sputum. Past Medical History:  
Diagnosis Date  Anxiety and depression  Arthritis  Chronic obstructive pulmonary disease [address] [address]  Chronic pain  DM type 2 causing vascular disease ([facility] [address]  GERD (gastroesophageal reflux disease)  Glaucoma   
 HTN (hypertension)  Hx of completed stroke 2021  Hypothyroid  Incontinence  Neuropathy  Polypharmacy Past Surgical History:  
Procedure Laterality Date  HX ORTHOPAEDIC    
 HX VASCULAR ACCESS Family History Problem Relation Age of Onset  No Known Problems Other   
     reviewed, patient did not know  Diabetes Mother  Heart Attack Mother  Cancer Father Social History Tobacco Use  Smoking status: Former Smoker Quit date: 1991 Years since quittin.3  Smokeless tobacco: Never Used Substance Use Topics  Alcohol use: Never Frequency: Never Allergies Allergen Reactions  Shellfish Derived Anaphylaxis Current Facility-Administered Medications Medication Dose Route Frequency  methylPREDNISolone (PF) (SOLU-MEDROL) injection 60 mg  60 mg IntraVENous Q8H  
 albuterol-ipratropium (DUO-NEB) 2.5 MG-0.5 MG/3 ML  3 mL Nebulization Q8H RT  
 albuterol (PROVENTIL VENTOLIN) nebulizer solution 2.5 mg  2.5 mg Nebulization Q6H PRN  
 sodium chloride (NS) flush 5-40 mL  5-40 mL IntraVENous Q8H  
 sodium chloride (NS) flush 5-40 mL  5-40 mL IntraVENous PRN  
 acetaminophen (TYLENOL) tablet 650 mg  650 mg Oral Q6H PRN Or  
 acetaminophen (TYLENOL) suppository 650 mg  650 mg Rectal Q6H PRN  polyethylene glycol (MIRALAX) packet 17 g  17 g Oral DAILY PRN  
 bisacodyL (DULCOLAX) suppository 10 mg  10 mg Rectal DAILY PRN  
 ondansetron (ZOFRAN) injection 4 mg  4 mg IntraVENous Q6H PRN  
 aspirin delayed-release tablet 81 mg  81 mg Oral DAILY  atorvastatin (LIPITOR) tablet 80 mg  80 mg Oral QHS  clonazePAM (KlonoPIN) tablet 0.25 mg  0.25 mg Oral QHS  levothyroxine (SYNTHROID) tablet 75 mcg  75 mcg Oral ACB  magnesium oxide (MAG-OX) tablet 400 mg  400 mg Oral DAILY  furosemide (LASIX) tablet 20 mg  20 mg Oral DAILY PRN  
 glucose chewable tablet 16 g  4 Tab Oral PRN  
 glucagon (GLUCAGEN) injection 1 mg  1 mg IntraMUSCular PRN  
 levoFLOXacin (LEVAQUIN) tablet 750 mg  750 mg Oral Q48H  
 melatonin (rapid dissolve) tablet 5 mg  5 mg Oral QHS  gabapentin (NEURONTIN) capsule 600 mg  600 mg Oral BID  
 oxyCODONE ER (OxyCONTIN) tablet 10 mg  10 mg Oral Q12H  
 FLUoxetine (PROzac) capsule 10 mg  10 mg Oral QPM  
 latanoprost (XALATAN) 0.005 % ophthalmic solution 1 Drop  1 Drop Both Eyes QHS  oxyCODONE IR (ROXICODONE) tablet 5 mg  5 mg Oral BID PRN  
 urea (URE-NA) 15 gram packet 1 Packet  1 Packet Oral DAILY  pantoprazole (PROTONIX) 40 mg in 0.9% sodium chloride 10 mL injection  40 mg IntraVENous Q12H  
 insulin glargine (LANTUS) injection 20 Units  20 Units SubCUTAneous DAILY  dextrose (D50W) injection syrg 12.5-25 g  25-50 mL IntraVENous PRN  
 insulin lispro (HUMALOG) injection   SubCUTAneous AC&HS  
 
 
 
REVIEW OF SYSTEMS  
12 point ROS negative except as stated in the HPI. Physical Exam:  
Visit Vitals /68 (BP 1 Location: Right upper arm, BP Patient Position: At rest) Pulse 88 Temp 97.6 °F (36.4 °C) Resp 16 Ht 5' 4\" (1.626 m) Wt 58.5 kg (129 lb) SpO2 96% BMI 22.14 kg/m² General:  Alert, cooperative, no distress, appears stated age. Frail. Head:  Normocephalic, without obvious abnormality, atraumatic. Eyes:  Conjunctivae/corneas clear. Nose: Nares normal. Septum midline. Mucosa normal.   
Throat: Lips, mucosa, and tongue normal.   
Neck: Supple, symmetrical, trachea midline Lungs:   Wheezing bilaterally with poor airmovement Chest wall:  No tenderness or deformity. Heart:  Regular rate and rhythm, S1, S2 normal, no murmur, click, rub or gallop. Abdomen:   Soft, non-tender. Bowel sounds normal.   
Extremities: Extremities normal, atraumatic, no cyanosis or edema. Pulses: 2+ and symmetric all extremities. Skin: Skin color, texture, turgor normal. No rashes or lesions Neurologic: Grossly nonfocal  
 
 
Lab Results Component Value Date/Time  Sodium 143 05/11/2021 01:47 AM  
 Potassium 3.8 05/11/2021 01:47 AM  
 Chloride 111 (H) 05/11/2021 01:47 AM  
 CO2 27 05/11/2021 01:47 AM  
 BUN 30 (H) 05/11/2021 01:47 AM  
 Creatinine 0.71 05/11/2021 01:47 AM  
 Glucose 98 05/11/2021 01:47 AM  
 Calcium 8.0 (L) 05/11/2021 01:47 AM  
 Magnesium 1.8 03/09/2021 03:23 AM  
 Phosphorus 3.6 03/10/2021 03:15 AM  
 
 
Lab Results Component Value Date/Time WBC 14.5 (H) 05/11/2021 01:47 AM  
 HGB 9.9 (L) 05/11/2021 01:47 AM  
 PLATELET 209 (H) 06/72/7922 01:47 AM  
 MCV 83.7 05/11/2021 01:47 AM  
 
 
Lab Results Component Value Date/Time INR 1.2 (H) 03/09/2021 03:23 AM  
 aPTT 28.6 03/09/2021 03:23 AM  
 Alk. phosphatase 65 05/11/2021 01:47 AM  
 Protein, total 6.1 (L) 05/11/2021 01:47 AM  
 Albumin 2.7 (L) 05/11/2021 01:47 AM  
 Globulin 3.4 05/11/2021 01:47 AM  
 
 
Lab Results Component Value Date/Time Iron 16 (L) 05/09/2021 08:21 PM  
 TIBC 278 05/09/2021 08:21 PM  
 Iron % saturation 6 (L) 05/09/2021 08:21 PM  
 
 
Lab Results Component Value Date/Time TSH 3.75 (H) 03/08/2021 09:25 AM  
  
 
No results found for: PH, PHI, PCO2, PCO2I, PO2, PO2I, HCO3, HCO3I, FIO2, FIO2I Lab Results Component Value Date/Time Troponin-I, Qt. <0.05 05/09/2021 08:21 PM  
  
 
Lab Results Component Value Date/Time Culture result: No growth (<1,000 CFU/ML) 03/08/2021 08:20 PM  
 Culture result: ENTEROCOCCUS FAECALIS (A) 02/25/2021 06:25 PM  
 
 
No results found for: TOXA1, RPR, HBCM, HBSAG, HAAB, HCAB1, HAAT, G6PD, CRYAC, HIVGT, HIVR, HIV1, HIV12, HIVPC, HIVRPI No results found for: VANCT, CPK Lab Results Component Value Date/Time Color YELLOW/STRAW 03/08/2021 01:44 AM  
 Appearance CLEAR 03/08/2021 01:44 AM  
 pH (UA) 7.0 03/08/2021 01:44 AM  
 Protein Negative 03/08/2021 01:44 AM  
 Glucose Negative 03/08/2021 01:44 AM  
 Ketone Negative 03/08/2021 01:44 AM  
 Bilirubin Negative 03/08/2021 01:44 AM  
 Blood SMALL (A) 03/08/2021 01:44 AM  
 Urobilinogen 0.2 03/08/2021 01:44 AM  
 Nitrites Negative 03/08/2021 01:44 AM  
 Leukocyte Esterase LARGE (A) 03/08/2021 01:44 AM  
 WBC 0-4 03/08/2021 01:44 AM  
 RBC 5-10 03/08/2021 01:44 AM  
 Bacteria Negative 03/08/2021 01:44 AM  
 
 
IMPRESSION 
· Pneumonia · Acute on chronic respiratory failure with hypoxia · COPD exacerbation · Chronic PE 
 
PLAN 
· Goal sats 88% or higher, wean O2 as tolerated, wears 2L at night chronically · Will need exercise oximetry closer to discharge · Levaquin, can transition to PO to complete a course at discharge · Changed PO steroids to IV steroids since still with a lot of wheezing and poor airmovement · Sputum cx ordered · Continueduonebs q8 while awake and prn albuterol · Anticoagulation previously stopped due to multiple falls · SCDs · Updated daughter Gareth House over the phone Rikki Dennison MD

## 2021-05-12 NOTE — PROGRESS NOTES
5/12/2021 11:27 AM Confirmed with [facility] at Health system,[facility], pt's rollator will be delivered to pt today. 5/12/2021 9:20 AM Per MD, pt will not discharge today. Pulm starting pt on iv steroids. CM will continue to follow for needs. Message sent to [facility] at [facility] regarding delivery of pt's standard rollator. YONNY Block  
 
RUR:  37% Risk Level: []? Low []? Moderate [x]? High Value-based purchasing: []? Yes [x]? No 
Bundle patient: []? Yes [x]? No 
            Specify:  
  
Transition of care plan: 1. Admitted for acute hypoxemic respiratory failure. Starting on iv steroids 2. Home with family and resume home health through [facility] at discharge. 3. Rollator ordered through Health system,[facility]. 
4. Outpatient follow-up. 5. Pt's family to transport

## 2021-05-12 NOTE — PROGRESS NOTES
Problem: Mobility Impaired (Adult and Pediatric) Goal: *Acute Goals and Plan of Care (Insert Text) Description: FUNCTIONAL STATUS PRIOR TO ADMISSION: Patient was modified independent using a SB quad cane for functional mobility. Pt requires supervision with tub transfers. HOME SUPPORT PRIOR TO ADMISSION: The patient lived with daughter and son in law who assist as needed. Physical Therapy Goals Initiated 5/10/2021 1. Patient will move from supine to sit and sit to supine , scoot up and down, and roll side to side in bed with modified independence within 7 day(s). 2.  Patient will transfer from bed to chair and chair to bed with modified independence using the least restrictive device within 7 day(s). 3.  Patient will perform sit to stand with modified independence within 7 day(s). 4.  Patient will ambulate with modified independence for 150 feet with the least restrictive device within 7 day(s). 5.  Patient will ascend/descend 3 stairs with 1 handrail(s) with supervision/set-up within 7 day(s). Outcome: Progressing Towards Goal 
Note: PHYSICAL THERAPY TREATMENT Patient: Nacho Herrera (77 y.o. female) Date: 5/12/2021 Diagnosis: Pneumonia of both lungs due to infectious organism [J18.9] CAP (community acquired pneumonia) [J18.9] Acute respiratory insufficiency [R06.89] <principal problem not specified> Precautions:   
Chart, physical therapy assessment, plan of care and goals were reviewed. ASSESSMENT Patient continues with skilled PT services and progressing towards goals. Using 2L supplemental O2 throughout session O2 sat 95-90% with activity, increased dyspnea noted pt requiring one seated rest during gait training using RW with Min A, occasional verbal cues to keep LLE within frame of AD. Pt returned to chair. Current Level of Function Impacting Discharge (mobility/balance): Min A Other factors to consider for discharge: PLAN : 
Patient continues to benefit from skilled intervention to address the above impairments. Continue treatment per established plan of care. to address goals. Recommendation for discharge: (in order for the patient to meet his/her long term goals) Physical therapy at least 2 days/week in the home AND ensure assist and/or supervision for safety with mobility This discharge recommendation: 
Has been made in collaboration with the attending provider and/or case management IF patient discharges home will need the following DME: rollator - ordered SUBJECTIVE:  
Patient stated this bed is terrible.  OBJECTIVE DATA SUMMARY:  
Critical Behavior: 
Neurologic State: Alert Orientation Level: Oriented X4 Cognition: Follows commands Safety/Judgement: Decreased insight into deficits Functional Mobility Training: 
Bed Mobility: 
  
Supine to Sit: Minimum assistance; Additional time;Assist x1 Transfers: 
Sit to Stand: Minimum assistance Stand to Sit: Contact guard assistance;Assist x1 Balance: 
Sitting: Intact Standing: Impaired; With support Standing - Static: Constant support; Fair 
Standing - Dynamic : Fair Ambulation/Gait Training: 
Distance (ft): 60 Feet (ft) Assistive Device: Gait belt;Walker, rollator Ambulation - Level of Assistance: Minimal assistance Gait Abnormalities: Hemiplegic;Decreased step clearance; Step to gait Base of Support: Narrowed Speed/Namrata: Slow Swing Pattern: Left asymmetrical 
  
  
  
  
  
Stairs: Therapeutic Exercises:  
 
Pain Rating: 
Denies pain Activity Tolerance:  
Good and observed SOB with activity After treatment patient left in no apparent distress:  
Sitting in chair and Call bell within reach COMMUNICATION/COLLABORATION:  
The patients plan of care was discussed with: Registered nurseWhitley Roa Time Calculation: 29 mins

## 2021-05-12 NOTE — PROGRESS NOTES
[facility] [place] [facility] 
[address] [facility], [place], [id number] [place] [address] 
[phone] Medical Progress Note NAME:         Riley Garcia :        1940 MRM:        [id number] Date of service: 2021 Subjective: Patient has been seen and examined as a follow up for multiple medical issues. Chart, labs, diagnostics reviewed. Has more SOB and wheezing today. Cough. No nausea or vomiting Objective: 
 
Vital Signs: 
 
Visit Vitals /68 (BP 1 Location: Right upper arm, BP Patient Position: At rest) Pulse 88 Temp 97.6 °F (36.4 °C) Resp 16 Ht 5' 4\" (1.626 m) Wt 58.5 kg (129 lb) SpO2 96% BMI 22.14 kg/m² Intake/Output Summary (Last 24 hours) at 2021 9139 Last data filed at 2021 2184 Gross per 24 hour Intake 600 ml Output  Net 600 ml Physical Examination: 
 
General:   Weak and ill looking, not in any acute distress Eyes:   pink conjunctivae, PERRLA with no discharge. ENT:   no ottorrhea or rhinorrhea with dry mucous membranes Neck: no masses, thyroid non-tender and trachea central. 
Pulm:  decreased breath sounds without crackles. ++ wheezes Card:  has regular and normal S1, S2 without thrills, bruits or peripheral edema Abd:  Soft, non-tender, non-distended, normoactive bowel sounds Musc:  No cyanosis, clubbing, atrophy or deformities. Skin:  No rashes, bruising or ulcers. Neuro: Awake and alert. 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 Psych:  Has a good insight and is oriented x 3 Current Facility-Administered Medications Medication Dose Route Frequency  methylPREDNISolone (PF) (SOLU-MEDROL) injection 60 mg  60 mg IntraVENous Q8H  
 albuterol-ipratropium (DUO-NEB) 2.5 MG-0.5 MG/3 ML  3 mL Nebulization Q8H RT  
 albuterol (PROVENTIL VENTOLIN) nebulizer solution 2.5 mg  2.5 mg Nebulization Q6H PRN  
 sodium chloride (NS) flush 5-40 mL  5-40 mL IntraVENous Q8H  
 sodium chloride (NS) flush 5-40 mL  5-40 mL IntraVENous PRN  
 acetaminophen (TYLENOL) tablet 650 mg  650 mg Oral Q6H PRN Or  
 acetaminophen (TYLENOL) suppository 650 mg  650 mg Rectal Q6H PRN  polyethylene glycol (MIRALAX) packet 17 g  17 g Oral DAILY PRN  
 bisacodyL (DULCOLAX) suppository 10 mg  10 mg Rectal DAILY PRN  
 ondansetron (ZOFRAN) injection 4 mg  4 mg IntraVENous Q6H PRN  
 aspirin delayed-release tablet 81 mg  81 mg Oral DAILY  atorvastatin (LIPITOR) tablet 80 mg  80 mg Oral QHS  clonazePAM (KlonoPIN) tablet 0.25 mg  0.25 mg Oral QHS  levothyroxine (SYNTHROID) tablet 75 mcg  75 mcg Oral ACB  magnesium oxide (MAG-OX) tablet 400 mg  400 mg Oral DAILY  furosemide (LASIX) tablet 20 mg  20 mg Oral DAILY PRN  
 glucose chewable tablet 16 g  4 Tab Oral PRN  
 glucagon (GLUCAGEN) injection 1 mg  1 mg IntraMUSCular PRN  
 levoFLOXacin (LEVAQUIN) tablet 750 mg  750 mg Oral Q48H  
 melatonin (rapid dissolve) tablet 5 mg  5 mg Oral QHS  gabapentin (NEURONTIN) capsule 600 mg  600 mg Oral BID  
 oxyCODONE ER (OxyCONTIN) tablet 10 mg  10 mg Oral Q12H  
 FLUoxetine (PROzac) capsule 10 mg  10 mg Oral QPM  
 latanoprost (XALATAN) 0.005 % ophthalmic solution 1 Drop  1 Drop Both Eyes QHS  oxyCODONE IR (ROXICODONE) tablet 5 mg  5 mg Oral BID PRN  
 urea (URE-NA) 15 gram packet 1 Packet  1 Packet Oral DAILY  pantoprazole (PROTONIX) 40 mg in 0.9% sodium chloride 10 mL injection  40 mg IntraVENous Q12H  
 insulin glargine (LANTUS) injection 20 Units  20 Units SubCUTAneous DAILY  dextrose (D50W) injection syrg 12.5-25 g  25-50 mL IntraVENous PRN  
 insulin lispro (HUMALOG) injection   SubCUTAneous AC&HS Laboratory data and review: 
 
Recent Labs 05/11/21 0147 05/09/21 2021 WBC 14.5* 10.8 HGB 9.9* 8.2* HCT 31.9* 26.6*  
* 308 Recent Labs 05/11/21 
0147 05/09/21 
2021  138  
K 3.8 4.0  
* 105 CO2 27 29 GLU 98 153*  
BUN 30* 34* CREA 0.71 0.85 CA 8.0* 7.7* ALB 2.7* 2.9* ALT 34 40 No components found for: [place] [facility]: Imaging studies have been reviewed Assessment and Plan: COPD with acute exacerbation / Pneumonia POA: CTA showed severe emphysema. Has an old unchanged PE. Likely has bibasilar airspace disease with bronchial wall thickening. Rapid COVID-19 neg. Continue Levofloxacin, IV solumedrol, Duoneb and ambulate as needed. Pulmonology following. Acute Hypoxemic Respiratory Failure POA: Triggered by COPD exacerbation and pneumonia, old small PE. Continue supplemental oxygen as needed and wean as tolerated DM type 2 POA: A1c is 6.7%. Blood glucose stable. Continue Lantus, SSi per protocol. DM diet Anxiety: stable. Chronic. Continue Fluoxetine, Clonazepam  
 
Chronic Pain: Continue current pain regimen with oxycodone ER, Oxy  IR PRN Anemia: Slightly down from baseline. Hgb stable. No further testing Weakness + Falls: Good support from home as lives with daughter. Cont PT/OT Total time spent for the patient's care: 35 Minutes Care Plan discussed with: Patient, Family, Care Manager and Nursing Staff Discussed:  Care Plan and D/C Planning Prophylaxis:  SCD's Anticipated Disposition:   PT, OT, RN 
        
___________________________________________________ Attending Physician:   Myriam Shepard MD

## 2021-05-12 NOTE — PROGRESS NOTES
Spoke with pulmonary. Chest CT shows no need for MBS. SPoke with Dr. Qian Hayden. [phone] Danna Rivas for discharge.

## 2021-05-12 NOTE — ROUTINE PROCESS
SPEECH LANGUAGE PATHOLOGY DYSPHAGIA TREATMENT/DISCHARGE Patient: Kary Polanco (43 y.o. female) Date: 5/12/2021 Diagnosis: Pneumonia of both lungs due to infectious organism [J18.9] CAP (community acquired pneumonia) [J18.9] Acute respiratory insufficiency [R06.89] <principal problem not specified> Precautions: aspiratino ASSESSMENT: 
Patient with functional swallow. Diet downgraded to dysphagia 2 due to c/o unable to chew solid foods well. PLAN: 
Continue dysphagia 2, thins. Patient will be discharged from [facility] skilled speech therapy at this time. Rationale for discharge: 
Goals achieved Discharge Recommendations:  None SUBJECTIVE:  
Patient stated This is good (dysphagia 2), I can eat this. OBJECTIVE:  
Cognitive and Communication Status: 
Neurologic State: Alert Orientation Level: Oriented to person, Oriented to place Cognition: Impaired decision making, Impulsive, Memory loss(trying to walk around room by herself) Perception: Appears intact Perseveration: No perseveration noted Safety/Judgement: Decreased insight into deficits Dysphagia Treatment: 
Oral Assessment: P.O. Trials: 
Patient Position: upright in bed Vocal quality prior to P.O.: No impairment Consistency Presented: Thin liquid; Solid How Presented: Self-fed/presented;Spoon;Straw;Successive swallows ORAL PHASE:  
Bolus Acceptance: No impairment Bolus Formation/Control: Impaired(reducec chew) Propulsion: No impairment Oral Residue: None Patient only ate the pudding on her tray. SHe c/o unable to chew sandwich bread and meat. Asked If she wanted to try minced diet. She agreed. When minced diet arrived, she could eat minced meat and beans well. PHARYNGEAL PHASE:  
Initiation of Swallow: No impairment Laryngeal Elevation: Functional 
Aspiration Signs/Symptoms: None Exercises: 
Laryngeal Exercises: NOMS:  
The NOMS functional outcome measure was used to quantify this patient's level of swallowing impairment. Based on the NOMS, the patient was determined to be at level 6 for swallow function NOMS Swallowing Levels: 
Level 1 (CN): NPO Level 2 (CM): NPO but takes consistency in therapy Level 3 (CL): Takes less than 50% of nutrition p.o. and continues with nonoral feedings; and/or safe with mod cues; and/or max diet restriction Level 4 (CK): Safe swallow but needs mod cues; and/or mod diet restriction; and/or still requires some nonoral feeding/supplements Level 5 (CJ): Safe swallow with min diet restriction; and/or needs min cues Level 6 (CI): Independent with p.o.; rare cues; usually self cues; may need to avoid some foods or needs extra time Level 7 (CH): Independent for all p.o. АНДРЕЙ. (2003). National Outcomes Measurement System (NOMS): Adult Speech-Language Pathology User's Guide. Pain: After treatment:  
Patient left in no apparent distress in bed and Nursing notified COMMUNICATION/EDUCATION:  
Patient was educated regarding her deficit(s) of DYSPHAGIA  as this relates to her diagnosis of PNA. She demonstrated Good understanding as evidenced by discussion. Jose Martin Marti The patient's plan of care including recommendations, planned interventions, and recommended diet changes were discussed with: Registered nurse. Yesenia Crawford, SLP Time Calculation: 10 mins

## 2021-05-12 NOTE — PROGRESS NOTES
Problem: Falls - Risk of 
Goal: *Absence of Falls Description: Document Nery Goodrich Fall Risk and appropriate interventions in the flowsheet. Outcome: Progressing Towards Goal 
Note: Fall Risk Interventions: 
Mobility Interventions: Bed/chair exit alarm Mentation Interventions: Increase mobility Medication Interventions: Bed/chair exit alarm Elimination Interventions: Call light in reach History of Falls Interventions: Bed/chair exit alarm

## 2021-05-12 NOTE — PROGRESS NOTES
Physician Progress Note Fouzia Bobo 
CSN #:                  [id number] :                       1940 ADMIT DATE:       2021 7:40 PM 
[address] [place] DATE: 
RESPONDING 
PROVIDER #:        Lela Harry MD 
 
 
 
 
QUERY TEXT: 
 
Dear Hospitalist Team, 
Pt admitted with COPD exacerbation and possible aspiration PNA and has acute hypoxic respiratory failure documented within the H&P and subsequent progress notes. Per progress note on 5/10, patient wears chronic home O2 of 2L at night. If possible, please document in progress notes and discharge summary further specificity regarding the type and acuity of respiratory failure: The medical record reflects the following: 
 
Risk Factors: 80 Yr F admitted with COPD exacerbation; noted Chronic home O2 use of 2L at night Clinical Indicators:Patient arrived via EMS with c/o SOB. Per EMS patient was 85% on RA placed on NC in route. Upon arrival to the ED per Dr. Ashely Maki H&P states, In ER she was found to be wheezing with her sats as low as 87%. Per documented vital signs the patient has been on room air since first admission documented set of vital signs with RR 16-22. No documentation of respiratory distress. No ABG obtained. CDI CV of acute hypoxic respiratory failure sent with response received confirming diagnosis, however, it's noted in progress note on 5/10, #Acute Hypoxemic Respiratory Failure: Wears 2L nc at night, but has a requirement for such now. Diffuse wheezing on exam c/w COPD exacerbation. Per documented vital signs within the medical record the patient has had no supplemental oxygen during admission. Case management note on  states, CM reviewed PT note which indicates possible need for home O2 upon discharge. 5/10 progress note by PT Enrrique Melo states the following pulse oximetry assessment: 96% at rest on room air, 84% while ambulating on room air, 97% at rest on 2LPM and 92% while ambulating on 2LPM. Treatment: Respiratory consult, CM, PT consult and frequent monitoring and vital signs. Thank you, Alexsander Smith RN, [facility] Options provided: 
-- Acute respiratory failure with hypoxia 
-- Acute respiratory failure with hypercapnia 
-- Chronic respiratory failure with hypoxia -- Chronic respiratory failure with hypercapnia 
-- Acute on chronic respiratory failure with hypoxia 
-- Acute on chronic respiratory failure with hypercapnia 
-- Other - I will add my own diagnosis -- Disagree - Not applicable / Not valid -- Disagree - Clinically unable to determine / Unknown 
-- Refer to Clinical Documentation Reviewer PROVIDER RESPONSE TEXT: 
 
This patient is in acute on chronic respiratory failure with hypoxia. Query created by: Patricia Heredia on 5/11/2021 8:30 AM 
 
 
QUERY TEXT: 
 
Dear Hospitalist Team, Patient admitted with Acute respiratory failure. Documentation reflects Bilateral PNA within the H&P dated 5/9 and within pulmonary progress notes. If possible, please document in the progress notes and discharge summary if Pneumonia was: The medical record reflects the following: 
 
Risk Factors: 80 Yr F admitted with Acute hypoxic respiratory failure; chronic 2L O2 at night Clinical Indicators: Patient arrives to the ED with c/o increased SOB and fatigue. Work up in the ED revealed 85% on RA placed on supplemental oxygen at 2L. CTA Chest: Unchanged segmental/subsegmental embolus medial left lower lobe. No new pulmonary embolus. Severe emphysema, with development of patchy bibasilar airspace disease, diffuse bronchial wall thickening, and small pleural effusions. H&P states, Pneumonia of both lungs due to infectious organism / COPD with acute exacerbation / Acute respiratory failure with hypoxia:  Will admit for further workup and treatment of this very ill patient and high risk of decompensation due to respiratory compromise/hypoxia and pneumonia.  Pulmonary progress note on 5/11 [facility], admitted for pneumonia and a COPD exacerbation. Pneumonia. Levaquin, can transition to PO to complete a course at discharge. Patient is currently receiving Levaquin 750 mg PO Q 48 hrs. Treatment: CBC/BMP, CTA Chest, Pulmonary consult, frequent vital signs/monitoring with supplemental oxygen as needed and Levaquin 750 mg PO daily Q 48 hrs. Thank you, Florence Polanco RN, [place], [address] [place] [address] Options provided: 
-- Pneumonia POA confirmed after study -- Pneumonia POA ruled out after study -- Other - I will add my own diagnosis -- Disagree - Not applicable / Not valid -- Disagree - Clinically unable to determine / Unknown 
-- Refer to Clinical Documentation Reviewer PROVIDER RESPONSE TEXT: 
 
Pneumonia POA confirmed after study. Query created by:  Kiki Chambers on 5/12/2021 9:21 AM 
 
 
Electronically signed by:  Nery Myrick MD 5/12/2021 5:12 PM

## 2021-05-13 NOTE — PROGRESS NOTES
Problem: Mobility Impaired (Adult and Pediatric) Goal: *Acute Goals and Plan of Care (Insert Text) Description: FUNCTIONAL STATUS PRIOR TO ADMISSION: Patient was modified independent using a SB quad cane for functional mobility. Pt requires supervision with tub transfers. HOME SUPPORT PRIOR TO ADMISSION: The patient lived with daughter and son in law who assist as needed. Physical Therapy Goals Initiated 5/10/2021 1. Patient will move from supine to sit and sit to supine , scoot up and down, and roll side to side in bed with modified independence within 7 day(s). 2.  Patient will transfer from bed to chair and chair to bed with modified independence using the least restrictive device within 7 day(s). 3.  Patient will perform sit to stand with modified independence within 7 day(s). 4.  Patient will ambulate with modified independence for 150 feet with the least restrictive device within 7 day(s). 5.  Patient will ascend/descend 3 stairs with 1 handrail(s) with supervision/set-up within 7 day(s). Outcome: Progressing Towards Goal 
Note: PHYSICAL THERAPY TREATMENT Patient: Peter Sanchez (1 y.o. female) Date: 5/13/2021 Diagnosis: Pneumonia of both lungs due to infectious organism [J18.9] CAP (community acquired pneumonia) [J18.9] Acute respiratory insufficiency [R06.89] <principal problem not specified> Precautions:   
Chart, physical therapy assessment, plan of care and goals were reviewed. ASSESSMENT Patient continues with skilled PT services and slowly progressing towards goals. Pt received exiting BR without assistance on room air O2 sat 82% with noted dyspnea, reviewed PLB and placed on 1L supplemental O2. Min A for gait training using two-wheeled rollator, frequent verbal cues for LLE step length and within frame of device. O2 sat 93% throughout activity using 1L. For safety recommend use of RW at this time vs two wheeled rollator.  
Documentation for home O2: 
  
ROOM AIR AT REST 
 O2 SATS 90 HR 
109 ROOM AIR WITH ACTIVITY 02 SATS 82 HR 
117  
(1    ) LITERS OF O2 WITH ACTIVITY O2 SATS 
93 HR 
112  
(1   )LITERS OF 02 PATIENT LEFT COMFORTABLY SITTING/SUPINE 02 SATS 96 HR 
110 Current Level of Function Impacting Discharge (mobility/balance): Min A Other factors to consider for discharge: new O2 requirement PLAN : 
Patient continues to benefit from skilled intervention to address the above impairments. Continue treatment per established plan of care. to address goals. Recommendation for discharge: (in order for the patient to meet his/her long term goals) Physical therapy at least 2 days/week in the home AND ensure assist and/or supervision for safety with mobility This discharge recommendation: 
Has been made in collaboration with the attending provider and/or case management IF patient discharges home will need the following DME: rolling walker SUBJECTIVE:  
Patient stated Frances Teague is ok with my duaghter.  OBJECTIVE DATA SUMMARY:  
Critical Behavior: 
Neurologic State: Alert Orientation Level: Oriented X4 Cognition: Impaired decision making Safety/Judgement: Decreased insight into deficits Functional Mobility Training: 
Bed Mobility: 
  
  
Sit to Supine: Contact guard assistance Transfers: 
Sit to Stand: Minimum assistance Stand to Sit: Contact guard assistance Balance: 
Sitting: Intact Standing: With support; Impaired Standing - Static: Constant support; Fair 
Standing - Dynamic : Fair Ambulation/Gait Training: 
Distance (ft): 80 Feet (ft)(two seated rest breaks) Assistive Device: Gait belt;Walker, rollator Ambulation - Level of Assistance: Minimal assistance;Assist x1;Additional time Gait Abnormalities: Hemiplegic;Decreased step clearance Base of Support: Narrowed Speed/Namrata: Slow Swing Pattern: Left symmetrical 
  
  
  
  
  
Stairs:  
  
  
   
 
Therapeutic Exercises:  
 
Pain Rating: 
Denies pain Activity Tolerance:  
Good, desaturates with exertion and requires oxygen, requires rest breaks, and observed SOB with activity After treatment patient left in no apparent distress:  
Supine in bed, Call bell within reach, and Bed / chair alarm activated COMMUNICATION/COLLABORATION:  
The patients plan of care was discussed with: Registered nurse. Jerzy Jefferson Time Calculation: 30 mins

## 2021-05-13 NOTE — DISCHARGE SUMMARY
Abhinav Erwin shara [place] [address] 
7890 [facility], [address] [place] [address] Tel: [phone] Physician Discharge Summary Patient ID:    Peter Sanchez Age:              80 y.o.    : 1940 MRN:             [id number] PCP: Alvaro Bynum MD  
 
Date of Admission: 2021 Date of Discharge:  2021 Principal admission Diagnosis: 
 Pneumonia of both lungs due to infectious organism [J18.9] CAP (community acquired pneumonia) [J18.9] Acute respiratory insufficiency [R06.89] Discharge Diagnoses: Active Problems: 
  Pneumonia (5/10/2021) COPD with acute exacerbation ([address] (5/10/2021) Acute on chronic respiratory failure with hypoxia ([address] (5/10/2021) Chronic pulmonary embolism ([address] (2021) Type II diabetes mellitus ([address] (1/3/2021) Hypothyroid (1/3/2021) History of CVA (cerebrovascular accident) (1/3/2021) DM (diabetes mellitus), type 2 ([address] Anxiety and depression GERD (gastroesophageal reflux disease) Anemia (5/10/2021) Hospital Course: Ms. Denzel Carpenter is a 80 y.o. admitted to [facility] and treated for the following: COPD with acute exacerbation / Pneumonia POA: CTA showed severe emphysema. Has an old unchanged PE. Likely has bibasilar airspace disease with bronchial wall thickening. Rapid COVID-19 neg. She has remained stable and has expressed her desire to go home. Continue Levofloxacin, Prednisone taper and home bronchodilators. Follow up with Pulmonology    
  
Acute Hypoxemic Respiratory Failure POA: Triggered by COPD exacerbation and pneumonia, old small PE. Continue supplemental oxygen as needed and wean as tolerated. She requires home oxygen with ambulation and arrangements for supplies have been made.  
  
DM type 2 POA: A1c is 6.7%. Blood glucose stable. Continue home insulin regimen. DM diet 
  
Anxiety: stable. Chronic.  Continue Fluoxetine, Clonazepam  
  
Chronic Pain: Continue home Oxycodone   
  
Anemia: Slightly down from baseline. Hgb stable. No further testing  
  
Weakness + Falls: Good support from home as lives with daughter. Cont PT/OT Discharge Exam:   
Visit Vitals /70 Pulse 97 Temp 97.9 °F (36.6 °C) Resp 17 Ht 5' 4\" (1.626 m) Wt 58.5 kg (129 lb) SpO2 95% BMI 22.14 kg/m² Patient has been seen and examined. General: well looking and in no acute distress Pulm: clear breath sounds without wheezes Card: no murmurs, normal S1, S2 without thrills, bruits Abd:    soft, non-tender, normoactive bowel sounds Skin: no rashes and skin turgor is good Neuro: awake, alert and has a non focal  
 
Activity: Activity as tolerated Diet: Diabetic Diet Current Discharge Medication List  
  
START taking these medications Details  
levoFLOXacin (LEVAQUIN) 750 mg tablet Take 1 Tab by mouth every fourty-eight (48) hours for 1 dose. To be taken 5/15/21 Qty: 1 Tab, Refills: 0 CONTINUE these medications which have CHANGED Details  
predniSONE (DELTASONE) 20 mg tablet Take 60 mg by mouth daily (with breakfast) for 3 days, THEN 40 mg daily (with breakfast) for 3 days, THEN 20 mg daily (with breakfast) for 3 days. Qty: 18 Tab, Refills: 0 CONTINUE these medications which have NOT CHANGED Details  
albuterol sulfate (PROVENTIL;VENTOLIN) 2.5 mg/0.5 mL nebu nebulizer solution 2.5 mg by Nebulization route every six (6) hours as needed for Wheezing or Shortness of Breath (Generally uses 2-3 times daily). calcium-cholecalciferol, D3, (CALTRATE 600+D) tablet Take 1 Tab by mouth daily (with breakfast). clonazePAM (KlonoPIN) 0.25 mg TbDi Take 0.25 mg by mouth every evening. ODT  
  
oxyCODONE IR (ROXICODONE) 5 mg immediate release tablet Take 5 mg by mouth two (2) times a day. Oxycodone 5 mg 0100 and 1300 FLUoxetine (PROzac) 10 mg capsule Take 10 mg by mouth every evening.   
  
guaiFENesin ER (Mucinex) 600 mg ER tablet Take 600 mg by mouth two (2) times a day. urea (URE-NA) 15 gram packet Take 1 Packet by mouth daily. Qty: 30 Packet, Refills: 0  
  
albuterol (PROVENTIL HFA, VENTOLIN HFA, PROAIR HFA) 90 mcg/actuation inhaler Take 1 Puff by inhalation every six (6) hours as needed for Wheezing. furosemide (LASIX) 20 mg tablet Take 20 mg by mouth daily as needed (edema). Requires only sparingly, daughter reports only 2x in past 6 months  
  
aspirin delayed-release 81 mg tablet Take 1 Tab by mouth daily. Qty: 30 Tab, Refills: 0  
  
oxyCODONE ER (Xtampza ER) 9 mg capsule Take 9 mg by mouth every twelve (12) hours. Xtampza 0700 and 1900  
  
insulin lispro (HumaLOG KwikPen Insulin) 100 unit/mL kwikpen by SubCUTAneous route Before breakfast, lunch, and dinner. Sliding scale 2-12 units. Generally uses 2 units if needed. latanoprost (XALATAN) 0.005 % ophthalmic solution Administer 1 Drop to both eyes nightly. insulin glargine (Lantus Solostar U-100 Insulin) 100 unit/mL (3 mL) inpn 16 Units by SubCUTAneous route every morning. levothyroxine (Levo-T) 75 mcg tablet Take 75 mcg by mouth Daily (before breakfast). tolterodine ER (DETROL LA) 4 mg ER capsule Take 4 mg by mouth daily. atorvastatin (LIPITOR) 80 mg tablet Take 80 mg by mouth nightly. pantoprazole (PROTONIX) 40 mg tablet Take 40 mg by mouth daily. gabapentin (NEURONTIN) 300 mg capsule Take 600 mg by mouth two (2) times a day. fluticasone-umeclidinium-vilanterol (Trelegy Ellipta) 100-62.5-25 mcg inhaler Take 1 Puff by inhalation daily. melatonin 5 mg tablet Take 5 mg by mouth nightly.  
  
magnesium oxide (MAG-OX) 400 mg tablet Take 400 mg by mouth daily. B.infantis-B.ani-B.long-B.bifi (Probiotic 4X) 10-15 mg TbEC Take 2 Caps by mouth two (2) times a day. BENEFIBER, GUAR GUM, PO Take 1 Dose by mouth daily. 1 dose - 2 teaspoons STOP taking these medications  
  
 multivitamin, tx-iron-ca-min (THERA-M w/ IRON) 9 mg iron-400 mcg tab tablet Comments:  
Reason for Stopping: Follow-up Information Follow up With Specialties Details Why Contact Info [facility] , If you haven't recieved a phone call within 24, hours. Please contact the agency directly. [phone] [facility]  [phone] [facility]  Urgent Care that comes to your home. They will be calling you to schedule to see you at home on 5/14. [address] [facility] 
[address] [place] [id number] [phone] [address] [place], [place] [address] Floor West DME Services  [facility] [address] [place] [address]. [address] [place] [address] 
[phone] Cristiana Roberts MD Family Medicine Call to schedule follow up and review ASAP [address] [facility] [address] [place] [address] 
[phone] Francesca Gibson MD Pulmonary Disease  Call to confirm follow up with lung doctor  [address] [facility] [address] [facility] [address] 
[phone] Follow-up tests or labs: As noted above if any. Discharge Condition: Stable Disposition: home Time taken to arrange discharge:  35 minutes. Signed: 
Jose Angel Parmar MD     [facility] 5/13/2021   12:53 PM

## 2021-05-13 NOTE — PROGRESS NOTES
Abhinav Dobbselsen [facility] [place] [address] 
380 [facility], [address] [place] [address] 
[phone] Medical Progress Note NAME:         Deedee Baer :        1940 MRM:        [id number] Date of service: 2021 Subjective: Patient has been seen and examined as a follow up for multiple medical issues. Chart, labs, diagnostics reviewed. She feels a whole lot better with less SOB. No cough or fever. No chills. Needing much less oxygen Objective: 
 
Vital Signs: 
 
Visit Vitals BP (!) 144/71 Pulse 87 Temp 97.3 °F (36.3 °C) Resp 18 Ht 5' 4\" (1.626 m) Wt 58.5 kg (129 lb) SpO2 94% BMI 22.14 kg/m² Intake/Output Summary (Last 24 hours) at 2021 4062 Last data filed at 2021 0430 Gross per 24 hour Intake 1120 ml Output  Net 1120 ml Physical Examination: 
 
General:   Weak and ill looking, not in any acute distress Eyes:   pink conjunctivae, PERRLA with no discharge. ENT:   no ottorrhea or rhinorrhea with dry mucous membranes Pulm:  decreased breath sounds without crackles. Less wheezes Card:  has regular and normal S1, S2 without thrills, bruits Abd:  Soft, non-tender, non-distended, normoactive bowel sounds Musc:  No cyanosis, clubbing, atrophy or deformities. Skin:  No rashes, bruising or ulcers. Neuro: Awake and alert. 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 Psych:  Has a good insight and is oriented x 3 Current Facility-Administered Medications Medication Dose Route Frequency  predniSONE (DELTASONE) tablet 60 mg  60 mg Oral DAILY WITH BREAKFAST  [START ON 2021] predniSONE (DELTASONE) tablet 40 mg  40 mg Oral DAILY WITH BREAKFAST  [START ON 2021] predniSONE (DELTASONE) tablet 20 mg  20 mg Oral DAILY WITH BREAKFAST  arformoteroL (BROVANA) neb solution 15 mcg  15 mcg Nebulization BID RT  
 budesonide (PULMICORT) 500 mcg/2 ml nebulizer suspension  500 mcg Nebulization BID RT  
 albuterol-ipratropium (DUO-NEB) 2.5 MG-0.5 MG/3 ML  3 mL Nebulization Q8H RT  
 albuterol (PROVENTIL VENTOLIN) nebulizer solution 2.5 mg  2.5 mg Nebulization Q6H PRN  
 sodium chloride (NS) flush 5-40 mL  5-40 mL IntraVENous Q8H  
 sodium chloride (NS) flush 5-40 mL  5-40 mL IntraVENous PRN  
 acetaminophen (TYLENOL) tablet 650 mg  650 mg Oral Q6H PRN Or  
 acetaminophen (TYLENOL) suppository 650 mg  650 mg Rectal Q6H PRN  polyethylene glycol (MIRALAX) packet 17 g  17 g Oral DAILY PRN  
 bisacodyL (DULCOLAX) suppository 10 mg  10 mg Rectal DAILY PRN  
 ondansetron (ZOFRAN) injection 4 mg  4 mg IntraVENous Q6H PRN  
 aspirin delayed-release tablet 81 mg  81 mg Oral DAILY  atorvastatin (LIPITOR) tablet 80 mg  80 mg Oral QHS  clonazePAM (KlonoPIN) tablet 0.25 mg  0.25 mg Oral QHS  levothyroxine (SYNTHROID) tablet 75 mcg  75 mcg Oral ACB  magnesium oxide (MAG-OX) tablet 400 mg  400 mg Oral DAILY  furosemide (LASIX) tablet 20 mg  20 mg Oral DAILY PRN  
 glucose chewable tablet 16 g  4 Tab Oral PRN  
 glucagon (GLUCAGEN) injection 1 mg  1 mg IntraMUSCular PRN  
 levoFLOXacin (LEVAQUIN) tablet 750 mg  750 mg Oral Q48H  
 melatonin (rapid dissolve) tablet 5 mg  5 mg Oral QHS  gabapentin (NEURONTIN) capsule 600 mg  600 mg Oral BID  
 oxyCODONE ER (OxyCONTIN) tablet 10 mg  10 mg Oral Q12H  
 FLUoxetine (PROzac) capsule 10 mg  10 mg Oral QPM  
 latanoprost (XALATAN) 0.005 % ophthalmic solution 1 Drop  1 Drop Both Eyes QHS  oxyCODONE IR (ROXICODONE) tablet 5 mg  5 mg Oral BID PRN  
 urea (URE-NA) 15 gram packet 1 Packet  1 Packet Oral DAILY  pantoprazole (PROTONIX) 40 mg in 0.9% sodium chloride 10 mL injection  40 mg IntraVENous Q12H  
 insulin glargine (LANTUS) injection 20 Units  20 Units SubCUTAneous DAILY  dextrose (D50W) injection syrg 12.5-25 g  25-50 mL IntraVENous PRN  
 insulin lispro (HUMALOG) injection   SubCUTAneous AC&HS Laboratory data and review: 
 
Recent Labs 05/11/21 
0147 WBC 14.5* HGB 9.9*  
HCT 31.9*  
* Recent Labs 05/11/21 
0147   
K 3.8 * CO2 27 GLU 98 BUN 30* CREA 0.71 CA 8.0* ALB 2.7* ALT 34 No components found for: [place] [facility]: Imaging studies have been reviewed Assessment and Plan: COPD with acute exacerbation / Pneumonia POA: CTA showed severe emphysema. Has an old unchanged PE. Likely has bibasilar airspace disease with bronchial wall thickening. Rapid COVID-19 neg. Better today with less wheezing. Continue Levofloxacin, Prednisone taper, Pulmicort, Brovana and Duoneb PRN. Appreciate Pulmonology consult. Ambulate as tolerated Acute Hypoxemic Respiratory Failure POA: Triggered by COPD exacerbation and pneumonia, old small PE. Continue supplemental oxygen as needed and wean as tolerated. Assess for home oxygen need prior to discharge DM type 2 POA: A1c is 6.7%. Blood glucose stable. Continue Lantus, SSi per protocol. DM diet Anxiety: stable. Chronic. Continue Fluoxetine, Clonazepam  
 
Chronic Pain: Continue current pain regimen with oxycodone ER, Oxy  IR PRN Anemia: Slightly down from baseline. Hgb stable. No further testing Weakness + Falls: Good support from home as lives with daughter. Cont PT/OT Total time spent for the patient's care: 30  Minutes Care Plan discussed with: Patient, Family, Care Manager and Nursing Staff Discussed:  Care Plan and D/C Planning Prophylaxis:  SCD's Anticipated Disposition:  [facility] PT, OT, RN 
        
___________________________________________________ Attending Physician:   Margarita Sol MD

## 2021-05-13 NOTE — PROGRESS NOTES
Problem: Falls - Risk of 
Goal: *Absence of Falls Description: Document Gregory Lusby Fall Risk and appropriate interventions in the flowsheet. Outcome: Progressing Towards Goal 
Note: Fall Risk Interventions: 
Mobility Interventions: Bed/chair exit alarm Mentation Interventions: Adequate sleep, hydration, pain control Medication Interventions: Bed/chair exit alarm Elimination Interventions: Bed/chair exit alarm History of Falls Interventions: Bed/chair exit alarm

## 2021-05-13 NOTE — PROGRESS NOTES
5/13/2021 2:41 PM AVS was sent to [address] [place] [address] via [employer]. 5/13/2021  12:40 PM Pt has been cleared for discharge. [facility] has delivered pt's O2. CM called and spoke with pt's daughter, confirmed discharge. Pt's daughter had no questions or concerns. Pt's daughter will be to [facility] to transport pt home. CM exchanged pt's rollator for a rw. CM confirmed this with Sara at [address] [place] [address] in room for pt to take home. CM called and attempted to schedule PCP appt for pt, PCP office is closed for the day. CM called and scheduled pt with [facility] appt for 5/14. Appt added to avs.  
[address] [place] [address] liaison was notified of pt's discharge home today. Pt's RN is aware pt's daughter will be to [facility]. 5/13/2021 11:17 AM Pt will need 1L of O2, order received. Also per PT, pt will be safest with a rolling walker. CM called and spoke with pt's daughter, relayed need for O2 and rw. Pt's daughter agreeable to rw being ordered and rollator returned. Pt's daughter confirmed pt's nocturnal O2 is through [facility]. O2 order and referral sent to [facility] via fax at [phone]. CM called and notified Dilia Webb at [facility] of O2 referral and pt's discharge today. CM will follow up.  
 
5/13/2021 9:54 AM Spoke with Chen ARCINIEGA and pt's attending. Pt's discharge is pending walking oximetry and labs. Pt has been delivered standard rollator through [facility]. 
Met with pt and also called and spoke with pt's daughter Byron Jackson. Both are aware and agreeable for discharge possible today pending labs and walking oximetry. CM will follow. YONNY Medina  
 
RUR:  37% Risk Level: []? ?Low []? ? Moderate [x]? ? High Value-based purchasing: []?? Yes [x]? ? No 
Bundle patient: []?? Yes [x]? ? No 
            Specify:  
  
Transition of care plan: 1. Admitted for acute hypoxemic respiratory failure. Discharge today. 2. Home with family and resume home health through [facility] at discharge.  
3. O2 ordered through [address]. 4. RW ordered through [facility]. 
5. Outpatient follow-up. 6. Pt's family to transport Care Management Interventions PCP Verified by CM: Yes Mode of Transport at Discharge: Self Transition of Care Consult (CM Consult): Home Health [address]: No 
Reason Outside [place]: Patient already serviced by other home care/hospice agency MyChart Signup: No 
Discharge Durable Medical Equipment: Yes(O2 through [facility] through [facility] ) Health Maintenance Reviewed: Yes Physical Therapy Consult: Yes Occupational Therapy Consult: Yes Speech Therapy Consult: No 
Current Support Network: Family [facility] Confirm Follow Up Transport: Family The Patient and/or Patient Representative was Provided with a Choice of Provider and Agrees with the Discharge Plan?: Yes Name of the Patient Representative Who was Provided with a Choice of Provider and Agrees with the Discharge Plan: Spoke with pt's dtr at bedside [place] of Choice List was Provided with Basic Dialogue that Supports the Patient's Individualized Plan of Care/Goals, Treatment Preferences and Shares the Quality Data Associated with the Providers?: Yes Discharge Location Discharge Placement: Home with home health

## 2021-05-13 NOTE — PROGRESS NOTES
Problem: Self Care Deficits Care Plan (Adult) Goal: *Acute Goals and Plan of Care (Insert Text) Description: FUNCTIONAL STATUS PRIOR TO ADMISSION: Patient was modified independent using a SB quad cane for functional mobility. Pt requires supervision with tub transfers. She is mostly independent with ADLs and family assists with IADLs. HOME SUPPORT PRIOR TO ADMISSION: The patient lived with daughter and son in law who assist as needed. Occupational Therapy Goals Initiated 5/10/2021 1. Patient will perform upper body dressing and bathing with modified independence within 7 day(s). 2.  Patient will perform lower body dressing and bathing with supervision/set-up within 7 day(s). 3.  Patient will perform toilet transfers with modified independence within 7 day(s). 4.  Patient will perform all aspects of toileting with modified independence within 7 day(s). 5.  Patient will participate in upper extremity therapeutic exercise/activities with modified independence for 10 minutes within 7 day(s). 6.  Patient will utilize energy conservation techniques during functional activities with verbal cues within 7 day(s). Outcome: Progressing Towards Goal 
OCCUPATIONAL THERAPY TREATMENT Patient: Bhumi Hunt (20 y.o. female) Date: 5/13/2021 Diagnosis: Pneumonia of both lungs due to infectious organism [J18.9] CAP (community acquired pneumonia) [J18.9] Acute respiratory insufficiency [R06.89] <principal problem not specified> Precautions:   
Chart, occupational therapy assessment, plan of care, and goals were reviewed. ASSESSMENT Patient continues with skilled OT services and is progressing towards goals. Patient received supine in bed. She is pleasant and agreeable. Patient to EOB with CGA. Once seated she requires min assist to don boot style slippers prior to standing. Min assist for task. Sit to stand with min assist.  Patient with posterior lean upon initial stand.   She requires use of RW and min assist for transfer. She is able to manage clothing - lowering pants over hips and pulling over hips with R hand as patient with residual limitations from prior CVA and minimal use of LUE. Patient returned to supine at end of session. Noted SOB but O2 stable on 1L O2 via nasal cannula. Current Level of Function Impacting Discharge (ADLs): Other factors to consider for discharge: lives with daughter PLAN : 
Patient continues to benefit from skilled intervention to address the above impairments. Continue treatment per established plan of care to address goals. Recommend with staff:  
 
Recommend next OT session: continue goals Recommendation for discharge: (in order for the patient to meet his/her long term goals) Occupational therapy at least 2 days/week in the home and assist from family This discharge recommendation: 
Has been made in collaboration with the attending provider and/or case management IF patient discharges home will need the following DME: none SUBJECTIVE:  
Patient stated Im so angel to have them.  OBJECTIVE DATA SUMMARY:  
Cognitive/Behavioral Status: 
Neurologic State: Alert Orientation Level: Oriented X4 Cognition: Impaired decision making; Follows commands Perception: Appears intact Perseveration: No perseveration noted Safety/Judgement: Awareness of environment Functional Mobility and Transfers for ADLs: 
Bed Mobility: 
Sit to Supine: Contact guard assistance(bed modified) Transfers: 
Sit to Stand: Minimum assistance Functional Transfers Bathroom Mobility: Minimum assistance Toilet Transfer : Minimum assistance Cues: Physical assistance;Verbal cues provided Balance: 
Sitting: Intact Standing: With support Standing - Static: Constant support Standing - Dynamic : Fair ADL Intervention: 
  
 
Grooming Position Performed: Standing Washing Hands: Minimum assistance Lower Body Dressing Assistance Slip on Shoes with Back: Minimum assistance Position Performed: Seated edge of bed Cues: Verbal cues provided;Physical assistance Toileting Toileting Assistance: Minimum assistance Bladder Hygiene: Stand-by assistance(seated ) Clothing Management: Minimum assistance(for balance in standing) Cues: Physical assistance for pants down;Physical assistance for pants up;Verbal cues provided Adaptive Equipment: Walker;Grab bars Cognitive Retraining Safety/Judgement: Awareness of environment Therapeutic Exercises:  
 
Pain: 
 
Activity Tolerance:  
Fair, desaturates with exertion and requires oxygen, and observed SOB with activity After treatment patient left in no apparent distress:  
Supine in bed, Call bell within reach, Bed / chair alarm activated, and Side rails x 3 
 
COMMUNICATION/COLLABORATION:  
The patients plan of care was discussed with: Physical therapy assistant and Registered nurse. FATOUMATA Black/JOSELINE Time Calculation: 28 mins

## 2021-05-13 NOTE — DISCHARGE INSTRUCTIONS
HOSPITALIST DISCHARGE INSTRUCTIONS    NAME: Claus Zepeda   :  1940   MRN:  [id number]     Date:     2021    ADMIT DATE: 2021     DISCHARGE DATE: 2021     PRINCIPAL ADMITTING DIAGNOSIS:  Pneumonia of both lungs due to infectious organism [J18.9]  CAP (community acquired pneumonia) [J18.9]  Acute respiratory insufficiency [R06.89]    DISCHARGE DIAGNOSES:  Active Problems:    Pneumonia (5/10/2021)    COPD with acute exacerbation ([address] (5/10/2021)    Acute on chronic respiratory failure with hypoxia ([address] (5/10/2021)    Type II diabetes mellitus ([facility] (1/3/2021)    Hypothyroid (1/3/2021)    History of CVA (cerebrovascular accident) (1/3/2021)    DM (diabetes mellitus), type 2 ([address]     Anxiety and depression     GERD (gastroesophageal reflux disease)     Anemia (5/10/2021)    Chronic pulmonary embolism ([facility] (2021)    MEDICATIONS:    · It is important that medications are taken exactly as they are prescribed on the discharge medication instructions and keep them your  in the bottles provided by the pharmacist.   · Keep a list of the medication names, dosages, and times to be taken at all times. · Do not take other medications without consulting your doctor. · Resume taking your Prednisone 5 mg daily on 21 AFTER completing the taper  · Resume taking your vitamins 21    Recommended diet:  Diabetic Diet    Recommended activity: Activity as tolerated    Post discharge care:    Notify follow up health care provider or return to the emergency department if you cannot get hold of your doctor if you feel worse or experience symptoms similar to those that brought you to hospital    Follow-up Information     Follow up With Specialties Details Why [address] , If you haven't recieved a phone call within 24, hours. Please contact the agency directly.  [phone] [facility]  [phone]    [facility]  Urgent Care that comes to your home. They will be calling you to schedule to see you at home on 5/14. [address] [facility]  Suite [facility]  [address]. [place] [address] [phone]    Delvis Ventura MD Family Medicine Call to schedule follow up and review  [address]  [phone]      Brayan Brito MD Pulmonary Disease  Call to confirm follow up with lung doctor  [address] [id number] [address]  [phone]            Information obtained by :  I understand that if any problems occur once I am at home I am to contact my physician and I understand and acknowledge receipt of the instructions indicated above.                                                                                                                                            Physician's or R.N.'s Signature                                                                  Date/Time                                                                                                                                              Patient or Representative Signature                                                          Date/Time

## 2021-05-13 NOTE — PROGRESS NOTES
Name: [facility]: Bailey Shah  
: 1940 Admit Date: 2021 Phone: [phone]  Room: 425/01 PCP: Nancy Tiwari MD  MRN: [id number] Date: 2021  Code: Full Code Chart and notes reviewed. Data reviewed. I review the patient's current medications in the medical record at each encounter. I have evaluated and examined the patient. History of Present Illness: Ms. Radha Hammond is an 79yo woman with a history of COPD/emphysema, PE, chronic respiratory failure with hypoxia (2L at night), former tobacco use, HTN, DM, hypothyroidism, and anxiety admitted for pneumonia and a COPD exacerbation. She described progressive shortness of breath on exertion. In the ED she found to be hypoxic to 87% on RA and with CT with bilateral pneumonia. She has been negative for COVID. She reports feeling better on antibiotics and neb treatments. At home she uses her Trelegy daily and her nebs three times daily. She has trouble sleeping if she gets her nebs after 6pm and would like her current nebs spaced out so that she doesn't have to get so many as it is making her too jittery. Labs: WBC 14.5, cr 0.71, COVD negative, PNA labs pending Images reviewed: CTA 2021: stable LLL PE, no new PE's, severe emphysema; patchy bibasilar airspace disease, small effusions Interval history Afebrile BP stable Sats 94% on RA - was also no RA overnight Strep pneumo/legionella neg ROS: Feeling better this morning. Concerned about her morning meds and her walker. Denies SOB on RA. Past Medical History:  
Diagnosis Date  Anxiety and depression  Arthritis  Chronic obstructive pulmonary disease ([address]  Chronic pain  DM type 2 causing vascular disease ([address]  GERD (gastroesophageal reflux disease)  Glaucoma   
 HTN (hypertension)  Hx of completed stroke 2021  Hypothyroid  Incontinence  Neuropathy  Polypharmacy Past Surgical History: Procedure Laterality Date  HX ORTHOPAEDIC    
 HX VASCULAR ACCESS Family History Problem Relation Age of Onset  No Known Problems Other   
     reviewed, patient did not know  Diabetes Mother  Heart Attack Mother  Cancer Father Social History Tobacco Use  Smoking status: Former Smoker Quit date: 1991 Years since quittin.3  Smokeless tobacco: Never Used Substance Use Topics  Alcohol use: Never Frequency: Never Allergies Allergen Reactions  Shellfish Derived Anaphylaxis Current Facility-Administered Medications Medication Dose Route Frequency  methylPREDNISolone (PF) (SOLU-MEDROL) injection 60 mg  60 mg IntraVENous Q8H  
 arformoteroL (BROVANA) neb solution 15 mcg  15 mcg Nebulization BID RT  
 budesonide (PULMICORT) 500 mcg/2 ml nebulizer suspension  500 mcg Nebulization BID RT  
 albuterol-ipratropium (DUO-NEB) 2.5 MG-0.5 MG/3 ML  3 mL Nebulization Q8H RT  
 albuterol (PROVENTIL VENTOLIN) nebulizer solution 2.5 mg  2.5 mg Nebulization Q6H PRN  
 sodium chloride (NS) flush 5-40 mL  5-40 mL IntraVENous Q8H  
 sodium chloride (NS) flush 5-40 mL  5-40 mL IntraVENous PRN  
 acetaminophen (TYLENOL) tablet 650 mg  650 mg Oral Q6H PRN Or  
 acetaminophen (TYLENOL) suppository 650 mg  650 mg Rectal Q6H PRN  polyethylene glycol (MIRALAX) packet 17 g  17 g Oral DAILY PRN  
 bisacodyL (DULCOLAX) suppository 10 mg  10 mg Rectal DAILY PRN  
 ondansetron (ZOFRAN) injection 4 mg  4 mg IntraVENous Q6H PRN  
 aspirin delayed-release tablet 81 mg  81 mg Oral DAILY  atorvastatin (LIPITOR) tablet 80 mg  80 mg Oral QHS  clonazePAM (KlonoPIN) tablet 0.25 mg  0.25 mg Oral QHS  levothyroxine (SYNTHROID) tablet 75 mcg  75 mcg Oral ACB  magnesium oxide (MAG-OX) tablet 400 mg  400 mg Oral DAILY  furosemide (LASIX) tablet 20 mg  20 mg Oral DAILY PRN  
 glucose chewable tablet 16 g  4 Tab Oral PRN  
 glucagon (GLUCAGEN) injection 1 mg  1 mg IntraMUSCular PRN  
 levoFLOXacin (LEVAQUIN) tablet 750 mg  750 mg Oral Q48H  
 melatonin (rapid dissolve) tablet 5 mg  5 mg Oral QHS  gabapentin (NEURONTIN) capsule 600 mg  600 mg Oral BID  
 oxyCODONE ER (OxyCONTIN) tablet 10 mg  10 mg Oral Q12H  
 FLUoxetine (PROzac) capsule 10 mg  10 mg Oral QPM  
 latanoprost (XALATAN) 0.005 % ophthalmic solution 1 Drop  1 Drop Both Eyes QHS  oxyCODONE IR (ROXICODONE) tablet 5 mg  5 mg Oral BID PRN  
 urea (URE-NA) 15 gram packet 1 Packet  1 Packet Oral DAILY  pantoprazole (PROTONIX) 40 mg in 0.9% sodium chloride 10 mL injection  40 mg IntraVENous Q12H  
 insulin glargine (LANTUS) injection 20 Units  20 Units SubCUTAneous DAILY  dextrose (D50W) injection syrg 12.5-25 g  25-50 mL IntraVENous PRN  
 insulin lispro (HUMALOG) injection   SubCUTAneous AC&HS  
 
 
 
REVIEW OF SYSTEMS  
12 point ROS negative except as stated in the HPI. Physical Exam:  
Visit Vitals BP (!) 144/71 Pulse 87 Temp 97.3 °F (36.3 °C) Resp 18 Ht 5' 4\" (1.626 m) Wt 58.5 kg (129 lb) SpO2 94% BMI 22.14 kg/m² General:  Alert, cooperative, no distress, appears stated age. Frail. Head:  Normocephalic, without obvious abnormality, atraumatic. Eyes:  Conjunctivae/corneas clear. Nose: Nares normal. Septum midline. Mucosa normal.   
Throat: Lips, mucosa, and tongue normal.   
Neck: Supple, symmetrical, trachea midline Lungs:   Moving air well, mildly coarse breath sounds, resp nonlabored Chest wall:  No tenderness or deformity. Heart:  Regular rate and rhythm, S1, S2 normal, no murmur, click, rub or gallop. Abdomen:   Soft, non-tender. Bowel sounds normal.   
Extremities: Extremities normal, atraumatic, no cyanosis or edema. Pulses: 2+ and symmetric all extremities. Skin: Skin color, texture, turgor normal. No rashes or lesions Lymph nodes: No cervical, supraclavicular lymphadenopathy Neurologic: Grossly nonfocal  
 
 Lab Results Component Value Date/Time Sodium 143 05/11/2021 01:47 AM  
 Potassium 3.8 05/11/2021 01:47 AM  
 Chloride 111 (H) 05/11/2021 01:47 AM  
 CO2 27 05/11/2021 01:47 AM  
 BUN 30 (H) 05/11/2021 01:47 AM  
 Creatinine 0.71 05/11/2021 01:47 AM  
 Glucose 98 05/11/2021 01:47 AM  
 Calcium 8.0 (L) 05/11/2021 01:47 AM  
 Magnesium 1.8 03/09/2021 03:23 AM  
 Phosphorus 3.6 03/10/2021 03:15 AM  
 
 
Lab Results Component Value Date/Time WBC 14.5 (H) 05/11/2021 01:47 AM  
 HGB 9.9 (L) 05/11/2021 01:47 AM  
 PLATELET 935 (H) 50/11/7512 01:47 AM  
 MCV 83.7 05/11/2021 01:47 AM  
 
 
Lab Results Component Value Date/Time INR 1.2 (H) 03/09/2021 03:23 AM  
 aPTT 28.6 03/09/2021 03:23 AM  
 Alk. phosphatase 65 05/11/2021 01:47 AM  
 Protein, total 6.1 (L) 05/11/2021 01:47 AM  
 Albumin 2.7 (L) 05/11/2021 01:47 AM  
 Globulin 3.4 05/11/2021 01:47 AM  
 
 
Lab Results Component Value Date/Time Iron 16 (L) 05/09/2021 08:21 PM  
 TIBC 278 05/09/2021 08:21 PM  
 Iron % saturation 6 (L) 05/09/2021 08:21 PM  
 
 
Lab Results Component Value Date/Time TSH 3.75 (H) 03/08/2021 09:25 AM  
  
 
No results found for: PH, PHI, PCO2, PCO2I, PO2, PO2I, HCO3, HCO3I, FIO2, FIO2I Lab Results Component Value Date/Time Troponin-I, Qt. <0.05 05/09/2021 08:21 PM  
  
 
Lab Results Component Value Date/Time Culture result: No growth (<1,000 CFU/ML) 03/08/2021 08:20 PM  
 Culture result: ENTEROCOCCUS FAECALIS (A) 02/25/2021 06:25 PM  
 
 
No results found for: TOXA1, RPR, HBCM, HBSAG, HAAB, HCAB1, HAAT, G6PD, CRYAC, HIVGT, HIVR, HIV1, HIV12, HIVPC, HIVRPI No results found for: VANCT, CPK Lab Results Component Value Date/Time  Color YELLOW/STRAW 03/08/2021 01:44 AM  
 Appearance CLEAR 03/08/2021 01:44 AM  
 pH (UA) 7.0 03/08/2021 01:44 AM  
 Protein Negative 03/08/2021 01:44 AM  
 Glucose Negative 03/08/2021 01:44 AM  
 Ketone Negative 03/08/2021 01:44 AM  
 Bilirubin Negative 03/08/2021 01:44 AM  
 Blood SMALL (A) 03/08/2021 01:44 AM  
 Urobilinogen 0.2 03/08/2021 01:44 AM  
 Nitrites Negative 03/08/2021 01:44 AM  
 Leukocyte Esterase LARGE (A) 03/08/2021 01:44 AM  
 WBC 0-4 03/08/2021 01:44 AM  
 RBC 5-10 03/08/2021 01:44 AM  
 Bacteria Negative 03/08/2021 01:44 AM  
 
 
IMPRESSION 
· Pneumonia · Acute on chronic respiratory failure with hypoxia · COPD exacerbation · Chronic PE 
 
PLAN 
· Goal sats 88% or higher, wean O2 as tolerated, wears 2L at night chronically · Check exercise oximetry today · Levaquin · Prednisone taper · Brovana/Pulmicort nebs, Duonebs q8 while awake, and prn albuterol · Anticoagulation previously stopped due to multiple falls · SCDs · Dysphagia diet per Speech · Dispo: potentially home today or tomorrow pending O2 assessment Cristhian Brunson, [address]

## 2021-05-19 PROBLEM — J18.9 PNEUMONIA OF BOTH LUNGS DUE TO INFECTIOUS ORGANISM: Status: ACTIVE | Noted: 2021-01-01
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
## 2021-05-19 PROBLEM — J18.9 HAP (HOSPITAL-ACQUIRED PNEUMONIA): Status: ACTIVE | Noted: 2021-01-01

## 2021-05-19 NOTE — ED PROVIDER NOTES
Please note that this dictation was completed with [employer], the computer voice recognition software.  Quite often unanticipated grammatical, syntax, homophones, and other interpretive errors are inadvertently transcribed by the computer software.  Please disregard these errors.  Please excuse any errors that have escaped final proofreading. 80-year-old female past medical history markable for anxiety, depression, arthritis, COPD, chronic pain, diabetes type 2, GERD, hypertension, history of a completed stroke, hypothyroidism, incontinence, neuropathy, and polypharmacy with a recent hospitalization at this facility from May 9 2 May 13 for anemia COPD exacerbation her chart review presents the ER by EMS from home for \"been placed on home oxygen. Thought it was doing okay just got home 2 days ago. Last night at 1 AM woke up was very short of breath wheezing. Patient states she no longer smokes but did smoke approximately 25 years ago. Patient states that since then gave her some prednisone and some nebulized treatments. Which \"helped a little bit. \"  Patient states through the day her breathing is gradually worsened and she was forced to come to the ER today for further evaluation secondary to increased shortness of breath and being hypoxic on her home oxygen. She also endorses some diffuse chest pain which is a pressure-like sensation that also started 1 AM and has been constant since that time. She denies previous episodes of this chest pain has not vomited has not broken into sweats. She endorses a productive cough but states she has had that since last week. She denies overt fevers chills other current systemic complaints. Social/ PSH reviewed in EMR 
 
EMR Chart Reviewed Past Medical History:  
Diagnosis Date  Anxiety and depression  Arthritis  Chronic obstructive pulmonary disease [facility]  Chronic pain  DM type 2 causing vascular disease ([facility]  GERD (gastroesophageal reflux disease)  Glaucoma   
 HTN (hypertension)  Hx of completed stroke 2021  Hypothyroid  Incontinence  Neuropathy  Polypharmacy Past Surgical History:  
Procedure Laterality Date  HX ORTHOPAEDIC    
 HX VASCULAR ACCESS Family History:  
Problem Relation Age of Onset  No Known Problems Other   
     reviewed, patient did not know  Diabetes Mother  Heart Attack Mother  Cancer Father Social History Socioeconomic History  Marital status:  Spouse name: Not on file  Number of children: Not on file  Years of education: Not on file  Highest education level: Not on file Occupational History  Not on file Tobacco Use  Smoking status: Former Smoker Quit date: 1991 Years since quittin.4  Smokeless tobacco: Never Used Vaping Use  Vaping Use: Never used Substance and Sexual Activity  Alcohol use: Never  Drug use: Never  Sexual activity: Not on file Other Topics Concern  Not on file Social History Narrative  Not on file Social Determinants of Health Financial Resource Strain:  Difficulty of Paying Living Expenses:   
Food Insecurity:  Worried About [address] in the Last Year:   
[address] in the Last Year:   
Transportation Needs:   
 Lack of Transportation (Medical):  Lack of Transportation (Non-Medical): Physical Activity:   
 Days of Exercise per Week:  Minutes of Exercise per Session:   
Stress:  Feeling of Stress :   
Social Connections:  Frequency of Communication with Friends and Family:  Frequency of Social Gatherings with Friends and Family:  Attends Buddhist Services:  Active Member of Clubs or Organizations:  Attends Club or Organization Meetings:  Marital Status: Intimate Partner Violence:  Fear of Current or Ex-Partner:  Emotionally Abused:   
 Physically Abused:   
 Sexually Abused: ALLERGIES: Shellfish derived Review of Systems Constitutional: Negative for chills, diaphoresis and fever. HENT: Negative for drooling, trouble swallowing and voice change. Eyes: Negative for visual disturbance. Respiratory: Positive for cough, shortness of breath and wheezing. Cardiovascular: Positive for chest pain. Negative for palpitations and leg swelling. Gastrointestinal: Negative for abdominal pain, constipation, diarrhea, nausea and vomiting. Genitourinary: Negative for dysuria. Musculoskeletal: Negative for back pain. Neurological: Negative for dizziness, facial asymmetry and speech difficulty. All other systems reviewed and are negative. Vitals:  
 05/19/21 1556 BP: (!) 184/71 Pulse: 98 Resp: 16 Temp: 98.6 °F (37 °C) SpO2: 96% Weight: 58.5 kg (129 lb) Physical Exam 
Vitals and nursing note reviewed. Constitutional:   
   General: She is not in acute distress. Appearance: Normal appearance. She is well-developed. She is not ill-appearing, toxic-appearing or diaphoretic. Comments: NAD, AxOx4, speaking in complete sentences On nasal cannula audibly gurgling and she takes breaths. HENT:  
   Head: Normocephalic and atraumatic. Right Ear: External ear normal.  
   Left Ear: External ear normal.  
Eyes:  
   General: No scleral icterus. Right eye: No discharge. Left eye: No discharge. Extraocular Movements: Extraocular movements intact. Conjunctiva/sclera: Conjunctivae normal.  
   Pupils: Pupils are equal, round, and reactive to light. Neck:  
   Vascular: No JVD. Trachea: No tracheal deviation. Cardiovascular:  
   Rate and Rhythm: Normal rate and regular rhythm. Pulses: Normal pulses. Heart sounds: Normal heart sounds. No murmur heard. No friction rub. No gallop. Pulmonary:  
   Effort: No respiratory distress. Breath sounds: No stridor. Wheezing and rales present. No rhonchi. Comments: Noted increased throat effort for respirations audible gurgles. Chest:  
   Chest wall: No tenderness. Abdominal:  
   General: Bowel sounds are normal.  
   Palpations: Abdomen is soft. Tenderness: There is no abdominal tenderness. There is no guarding or rebound. Comments: nttp Genitourinary: 
   Vagina: No vaginal discharge. Musculoskeletal:     
   General: Signs of injury present. No swelling, tenderness or deformity. Normal range of motion. Cervical back: Normal range of motion and neck supple. No rigidity or tenderness. Right lower leg: Edema present. Left lower leg: Edema present. Comments: Patient did note to have several bandages about wounds bilateral lower extremities and upper extremities. Skin: 
   General: Skin is warm and dry. Capillary Refill: Capillary refill takes less than 2 seconds. Coloration: Skin is not jaundiced or pale. Findings: Bruising present. No erythema or rash. Neurological:  
   General: No focal deficit present. Mental Status: She is alert and oriented to person, place, and time. Cranial Nerves: No cranial nerve deficit. Sensory: No sensory deficit. Motor: No weakness or abnormal muscle tone. Coordination: Coordination normal.  
   Gait: Gait normal.  
   Deep Tendon Reflexes: Reflexes normal.  
Psychiatric:     
   Behavior: Behavior normal.     
   Thought Content: Thought content normal.  
 
  
 
MDM Number of Diagnoses or Management Options Risk of Complications, Morbidity, and/or Mortality Presenting problems: high Diagnostic procedures: moderate Management options: moderate General comments: Total critical care time spent exclusive of procedures:  45 min Patient Progress Patient progress: improved Procedures Chief Complaint Patient presents with  Wheezing 4:19 PM 
The patients presenting problems have been discussed, and they are in agreement with the care plan formulated and outlined with them. I have encouraged them to ask questions as they arise throughout their visit. MEDICATIONS GIVEN: 
Medications  
dexamethasone (PF) (DECADRON) 10 mg/mL injection 10 mg (has no administration in time range)  
albuterol-ipratropium (DUO-NEB) 2.5 MG-0.5 MG/3 ML (has no administration in time range) And  
albuterol-ipratropium (DUO-NEB) 2.5 MG-0.5 MG/3 ML (has no administration in time range) And  
albuterol-ipratropium (DUO-NEB) 2.5 MG-0.5 MG/3 ML (has no administration in time range) LABS REVIEWED: 
Labs Reviewed TROPONIN I  
URINALYSIS W/MICROSCOPIC  
LIPASE METABOLIC PANEL, COMPREHENSIVE  
CBC WITH AUTOMATED DIFF  
SAMPLES BEING HELD  
NT-PRO BNP  
 
 
RADIOLOGY RESULTS: 
The following have been ordered and reviewed: 
_____________________________________________________________________ 
_____________________________________________________________________ EKG interpretation:  
Rhythm: normal sinus rhythm; and regular . Rate (approx.): 98; Axis: normal; P wave: normal; QRS interval: normal ; ST/T wave: normal; Negative acute significant segmental elevations/ unchanged compared to study dated 05/10/2021 PROCEDURES: 
 
 
 
CONSULTATIONS:  
 
 
PROGRESS NOTES: 
 
 
DIAGNOSIS: 
 
1. HAP (hospital-acquired pneumonia) 2. Bandemia PLAN: 
1-on BIPAP/ started abx;  
 
 
ED COURSE: The patients hospital course has been uncomplicated. 4:39 PM 
'Breathing better now'; on BIPAP;  
 
7:38 PM 
Discussed results w/ Pt; she agrees w/ abx/ re-admission;  
 
 Perfect Serve Consult for Admission 7:44 PM 
 
ED Room Number: ZX83/60 Patient Name and age:  Chris Oropeza 80 y.o.  female Working Diagnosis: 1. HAP (hospital-acquired pneumonia) 2. Bandemia COVID-19 Suspicion:  no 
Sepsis present:  no  Reassessment needed: yes Code Status:  Full Code Readmission: yes Isolation Requirements:  no 
Recommended Level of Care: TELEMETRY Department:[facility] - [phone] Other:  STARTED VANCO/ Cefepime/ WBC = 47;

## 2021-05-20 NOTE — ACP (ADVANCE CARE PLANNING)
Advance Care Planning Advance Care Planning Activator (Inpatient) Conversation Note Date of ACP Conversation: 05/20/21 Conversation Conducted with:   Patient with Decision Making Capacity ACP Activator: Marien Siemens, RN Health Care Decision Maker: 
 
Current Designated Health Care Decision Maker:  
  Primary Decision Maker: Marva Ortega - Daughter - [phone] Secondary Decision Maker: Fahad Cruz - Son - [phone] Click here to complete [address] including selection of the Healthcare Decision Maker Relationship (ie \"Primary\") Care Preferences Ventilation: \"If you were in your present state of health and suddenly became very ill and were unable to breathe on your own, what would your preference be about the use of a ventilator (breathing machine) if it were available to you? \" If patient would desire the use of a ventilator (breathing machine), answer \"yes\", if not \"no\":yes \"If your health worsens and it becomes clear that your chance of recovery is unlikely, what would your preference be about the use of a ventilator (breathing machine) if it were available to you? \" Would the patient desire the use of a ventilator (breathing machine)? NO Resuscitation \"CPR works best to restart the heart when there is a sudden event, like a heart attack, in someone who is otherwise healthy. Unfortunately, CPR does not typically restart the heart for people who have serious health conditions or who are very sick. \" \"In the event your heart stopped as a result of an underlying serious health condition, would you want attempts to be made to restart your heart (answer \"yes\" for attempt to resuscitate) or would you prefer a natural death (answer \"no\" for do not attempt to resuscitate)? \" yes 
 
 
[] Yes  [] No   Educated Patient / Juancarlos Broom regarding differences between Advance Directives and portable DNR orders.  
 
Length of ACP Conversation in minutes:  10 minutes Conversation Outcomes: 
[x] ACP discussion completed 
[] Existing advance directive reviewed with patient; no changes to patient's previously recorded wishes 
 
 [] New Advance Directive completed 
 [] Portable Do Not Resuscitate prepared for Provider review and signature 
[] POLST/POST/MOLST/MOST prepared for Provider review and signature Follow-up plan:   
[] Schedule follow-up conversation to continue planning 
[x] Referred individual to Provider for additional questions/concerns  
[] Advised patient/agent/surrogate to review completed ACP document and update if needed with changes in condition, patient preferences or care setting  
 
[] This note routed to one or more involved healthcare providers Sandra Vivar, RN, MSN/Care manager [phone]

## 2021-05-20 NOTE — PROGRESS NOTES
[address] Dosing Services: Antimicrobial Stewardship Progress Note Consult for antibiotic dosing of Vancomycin by Dr. Dao Corral Pharmacist reviewed antibiotic appropriateness for 80year old , female  for indication of HAP Day of Therapy 1 Plan: 
Vancomycin therapy: LD: 1500mg x1 MD: 750mg every 12 hrs Dose calculated to approximate a therapeutic trough of 15-20 mcg/mL. Last trough level Date Dose & Interval Measured (mcg/mL) Extrapolated (mcg/mL) ? ? ? ?  
? ? ? ?  
? ? ? ? Plan for level:  
Order prior to 4th dose (not done yet) Pharmacy to follow daily and will make changes to dose and/or frequency based on clinical status. Other Antimicrobial 
(not dosed by pharmacist) Cefepime 2gm IV every 12 hrs Cultures 5/19: Urine Serum Creatinine Lab Results Component Value Date/Time Creatinine 0.72 05/19/2021 04:26 PM  
   
Creatinine Clearance Estimated Creatinine Clearance: 52.9 mL/min (based on SCr of 0.72 mg/dL). Procalcitonin Lab Results Component Value Date/Time Procalcitonin <0.05 05/10/2021 03:18 AM  
 
  
Temp 98.6 °F (37 °C) WBC Lab Results Component Value Date/Time WBC 47.0 (H) 05/19/2021 04:26 PM  
   
H/H Lab Results Component Value Date/Time HGB 11.8 05/19/2021 04:26 PM  
  
Platelets Lab Results Component Value Date/Time PLATELET 406 (H) 74/72/3462 04:26 PM  
 
  
 
 
Pharmacist: Signed Glo Patricia

## 2021-05-20 NOTE — ROUTINE PROCESS
TRANSFER - IN REPORT: 
 
Verbal report received from Martha(name) on Crista Caceres  being received from ED(unit) for routine progression of care Report consisted of patients Situation, Background, Assessment and  
Recommendations(SBAR). Information from the following report(s) SBAR, Kardex, Intake/Output, MAR, Recent Results and Cardiac Rhythm sinus was reviewed with the receiving nurse. Opportunity for questions and clarification was provided. Assessment completed upon patients arrival to unit and care assumed.

## 2021-05-20 NOTE — PROGRESS NOTES
Abhinav Erwin [facility] [address] 
[address] [facility], [address] [place] [address] 
[phone] Medical Progress Note NAME: Uma Dominguez :  1940 MRM:  [id number] Date/Time: 2021  3:51 PM 
 
 
 
  
Assessment / Plan: #Sepsis due to Pneumonia: Technically HAP. Did pursue CT abd/pelvis this afternoon as leukocytosis is rather profound. This showed severe stenosis of bilateral common iliac arteries, but pt has no leg pain and lactate is normal. There is also e/o cirrhosis, but minimal ascites so I have low c/f SBP. Has been fluid responsive and not required pressors - Vanc/cefepime  -  
 - Methylpred to pred tmr 
 - SLT swallow study 
 - Continue to monitor fluid responsiveness 
 - UOP and MAPs to goal 
 
#AHRF: PNA and COPD exac 
 - wean as tolerated #Multiple fractures: CT today showed multiple fractures of ribs, pubic rami, thoracic spine, etc. She is on oxycodone for chronic pain. Ultimately, with her repeat admissions and suggestion of multiple falls, do feel [address] discussion is appropriate so will seek palliative assistance. She does have good support from home and lives with daughter, but do feel she is having a decline #Nausea/vomiting: With epigastric discomfort. Has been on several courses of steroids so c/f gastritis. CT w/o perf, but was considered with leukocytosis. - IV PPI BID for now, can transition to PO #Bilateral common iliac stenosis: She has had multiple falls, but not sure if this is contributing. She does not complaint of leg pain, lactate normal 
 - Consider vascular input #COPD exac: 2/2 above - Steroids as above - scheduled nebs #Cirrhosis: noted on imaging. She is not presently decompensated. With regards to her overall picture, this is a worrisome diagnosis and etiology is not clear. Will have palliative assist with GOC. Could consider hepatitides, etc.  
 
#DM: A1c is 6.7%. Blood glucose stable. Continue home insulin regimen. DM diet Care Plan discussed with: Patient Discussed:  Care Plan Prophylaxis:  Lovenox Disposition:  Home w/Family 
        
___________________________________________________ Attending Physician: Ian Blood MD  
 
  
Subjective: Chief Complaint: This morning with nausea, abdominal pain. Cough, no sob ROS: 
(bold if positive, if negative) Tolerating PT  Tolerating Diet Objective:  
 
 
Vitals:  
 
 
  
Last 24hrs VS reviewed since prior progress note. Most recent are: 
 
Visit Vitals BP (!) 138/59 (BP 1 Location: Right upper arm, BP Patient Position: At rest) Pulse 90 Temp 97.8 °F (36.6 °C) Resp 19 Ht 5' 4\" (1.626 m) Wt 59.1 kg (130 lb 3.2 oz) SpO2 99% BMI 22.35 kg/m² SpO2 Readings from Last 6 Encounters:  
05/20/21 99% 05/13/21 97% 03/23/21 96% 03/10/21 95% 03/03/21 95% 02/27/21 96% O2 Flow Rate (L/min): 3 l/min Intake/Output Summary (Last 24 hours) at 5/20/2021 1551 Last data filed at 5/20/2021 1355 Gross per 24 hour Intake 1555 ml Output 1601 ml Net -46 ml Exam:  
 
Physical Exam: 
 
Gen:  Well-developed, well-nourished, in no acute distress HEENT:  Pink conjunctivae, PERRL, hearing intact to voice, moist mucous membranes Neck:  Supple, without masses, thyroid non-tender Resp:  No accessory muscle use, clear breath sounds without wheezes rales or rhonchi 
Card:  No murmurs, normal S1, S2 without thrills, bruits or peripheral edema Abd:  Soft, non-tender, non-distended, normoactive bowel sounds are present Musc:  No cyanosis or clubbing Skin:  No rashes or ulcers, skin turgor is good Neuro:  Cranial nerves 3-12 are grossly intact,  strength is 5/5 bilaterally and dorsi / plantarflexion is 5/5 bilaterally, follows commands appropriately Psych:  Good insight, oriented to person, place and time, alert Medications Reviewed: (see below) Lab Data Reviewed: (see below) ______________________________________________________________________ Medications:  
 
Current Facility-Administered Medications Medication Dose Route Frequency  hydrALAZINE (APRESOLINE) 20 mg/mL injection 10 mg  10 mg IntraVENous Q6H PRN  
 methylPREDNISolone (PF) (SOLU-MEDROL) injection 40 mg  40 mg IntraVENous Q6H  
 atorvastatin (LIPITOR) tablet 80 mg  80 mg Oral QHS  aspirin delayed-release tablet 81 mg  81 mg Oral DAILY  clonazePAM (KlonoPIN) tablet 0.25 mg  0.25 mg Oral QPM  
 levothyroxine (SYNTHROID) tablet 75 mcg  75 mcg Oral ACB  meclizine (ANTIVERT) tablet 12.5 mg  12.5 mg Oral Q6H PRN  pantoprazole (PROTONIX) 40 mg in 0.9% sodium chloride 10 mL injection  40 mg IntraVENous Q12H  
 [START ON 5/21/2021] Vancomycin TROUGH @0830 on 5/21/2021   Other ONCE  
 cefepime (MAXIPIME) 2 g in sterile water (preservative free) 10 mL IV syringe  2 g IntraVENous Q12H  
 glucose chewable tablet 16 g  4 Tablet Oral PRN  
 dextrose (D50W) injection syrg 12.5-25 g  25-50 mL IntraVENous PRN  
 glucagon (GLUCAGEN) injection 1 mg  1 mg IntraMUSCular PRN  
 0.9% sodium chloride infusion  100 mL/hr IntraVENous CONTINUOUS  
 sodium chloride (NS) flush 5-40 mL  5-40 mL IntraVENous Q8H  
 sodium chloride (NS) flush 5-40 mL  5-40 mL IntraVENous PRN  
 acetaminophen (TYLENOL) tablet 650 mg  650 mg Oral Q6H PRN Or  
 acetaminophen (TYLENOL) suppository 650 mg  650 mg Rectal Q6H PRN  polyethylene glycol (MIRALAX) packet 17 g  17 g Oral DAILY PRN  
 bisacodyL (DULCOLAX) suppository 10 mg  10 mg Rectal DAILY PRN  
 ondansetron (ZOFRAN) injection 4 mg  4 mg IntraVENous Q6H PRN  
 insulin glargine (LANTUS) injection 12 Units  0.2 Units/kg SubCUTAneous QHS  insulin lispro (HUMALOG) injection   SubCUTAneous AC&HS  
 melatonin (rapid dissolve) tablet 5 mg  5 mg Oral QHS PRN  
 alum-mag hydroxide-simeth (MYLANTA) oral suspension 30 mL  30 mL Oral Q4H PRN  
 LORazepam (ATIVAN) injection 0.5 mg 0.5 mg IntraVENous Q6H PRN  
 oxyCODONE IR (ROXICODONE) tablet 5 mg  5 mg Oral Q4H PRN  
 morphine injection 2 mg  2 mg IntraVENous Q4H PRN  
 diphenhydrAMINE (BENADRYL) injection 25 mg  25 mg IntraVENous Q6H PRN  
 albuterol (PROVENTIL VENTOLIN) nebulizer solution 2.5 mg  2.5 mg Nebulization Q4H PRN  
 simethicone (MYLICON) tablet 80 mg  80 mg Oral QID PRN  
 albuterol-ipratropium (DUO-NEB) 2.5 MG-0.5 MG/3 ML  3 mL Nebulization Q6H RT  
 vancomycin (VANCOCIN) 750 mg in 0.9% sodium chloride 250 mL (VIAL-MATE)  750 mg IntraVENous Q12H Lab Review:  
 
Recent Labs  
  05/20/21 
0323 05/19/21 
1626 WBC 32.5* 47.0* HGB 9.9* 11.8 HCT 31.2* 38.6  499* Recent Labs  
  05/20/21 
0323 05/19/21 
1626  136  
K 4.4 4.4  103 CO2 27 28 * 140* BUN 30* 40* CREA 0.65 0.72  
CA 7.7* 7.9* ALB 2.4* 3.1* ALT 49 69 No components found for: [place]

## 2021-05-20 NOTE — PROGRESS NOTES
Reason for Readmission:     Shortness of breath, hospital acquired pneumonia. Hs COPD, CVA with left sided weakness, anxiety, depression, GERD, diabetes, hypothyroid. RUR Score/Risk Level:     42%/ high risk PCP: First and Last name:   Dr. Saadia Landeros Name of Practice:  
 Are you a current patient: Yes/No:  yes Approximate date of last visit:   1 week ago post hospitalization Can you participate in a virtual visit with your PCP: yes Is a Care Conference indicated:    no 
 
 
Did you attend your follow up appointment (s): If not, why not:  yes Resources/supports as identified by patient/family:   Good family support, lives with daughter who is her primary caregiver. Top Challenges facing patient (as identified by patient/family and CM): Finances/Medication cost?     [employer] Transportation      Per daughter Support system or lack thereof? Good family support Living arrangements? Lives with daughter Self-care/ADLs/Cognition? Requires some assist with care needs. Current Advanced Directive/Advance Care Plan:   Daughter Pablo Jones is primary decision maker [phone] Plan for utilizing home health:   Open to [facility] home health. Has a home aide 3 days per week from 9am - 1pm. 
DMR - home oxygen at 1L/min per [employer]. Has walker, cane, grab bar, and wheelchair. Transition of Care Plan:    Based on readmission, the patient's previous Plan of Care 
 has been evaluated and/or modified. The current Transition of Care Plan is:      
Chart reviewed, demographics verified. CM role and follow up discussed. Met with patient at bedside, face mask and goggles on. Patient lives with her daughter and son in law. Patient lives in a one story home with 4 steps with railing to enter home and bed/bath on main level. Patient has prescription drug coverage, uses [facility]  pharmacy on [address].  
Patient requires some assistance with her care. Her daughter provides transportation for her. Current status: 
Patient currently requiring medical management including oxygen at 3Lnc, IV antibiotics, and IV decadron. PLAN: 
1. Monitor patient response to treatment and recommendations. 2. Medical management continues. 3. Patient is appropriate for home care resumption of care - NEED ORDER. 4. Patient transport home per daughter at discharge. 5. CM to monitor clinical progress and disposition recommendations. Readmission Assessment Number of days since last admission?: 1-7 days Previous disposition: Home with Home Health Who is being interviewed?: Patient What was the patient's/caregiver's perception as to why they think they needed to return back to the hospital?: Other (Comment) (increased shortness of breath) Did you visit your Primary Care Physician after you left the hospital, before you returned this time?: Yes Did you see a specialist, such as Cardiac, Pulmonary, Orthopedic Physician, etc. after you left the hospital?: No 
Who advised the patient to return to the hospital?: Self-referral 
Does the patient report anything that got in the way of taking their medications?: No 
In our efforts to provide the best possible care to you and others like you, can you think of anything that we could have done to help you after you left the hospital the first time, so that you might not have needed to return so soon?:  (resume care as prior to admission) Care Management Interventions PCP Verified by CM: Yes (Dr. Sherif Dickinson) Transition of Care Consult (CM Consult): Home Health, Discharge Planning [facility] - INPATIENT: No 
Reason Outside [place]: Patient already serviced by other home care/hospice agency Current Support Network: Lives with Caregiver Confirm Follow Up Transport: Family The Plan for Transition of Care is Related to the Following Treatment Goals : return home at DC Discharge Location Discharge Placement: Home with home health Cynthia Storm RN, MSN, Care manager

## 2021-05-20 NOTE — H&P
[facility] [address] [facility] [address] 
[phone] Hospitalist Admission Note NAME: Mary Choi :  1940 MRN:  [id number] Date/Time:  2021 8:08 PM 
 
Patient PCP: Romayne Marshal, MD 
 
Emergency Contact:   
Extended Emergency Contact Information Primary Emergency Contact: Carlos Landrum Home Phone: [phone] Relation: Daughter  needed? No 
Secondary Emergency Contact: Leidy Domínguez Home Phone: [phone] Relation: Son   
 
Code: Full Code Isolation Precautions: There are currently no Active Isolations Subjective: CHIEF COMPLAINT: Shortness of breath HISTORY OF PRESENT ILLNESS:    
Ms. Joon Ramirez is a 80 y.o. female with history that includes CVA with residual left-sided weakness, COPD, and PE earlier this month for which she was taken off Eliquis due to hemoptysis and is well-known to me from previous admissions including 9 days ago for pneumonia and COPD exacerbation was discharged on  and was reportedly feeling better at that time reports that by Saturday 5/15 she was having gradually worsening shortness of breath which became severe and has been constant associated with generalized weakness subjective chills but no fever. She had been discharged on antibiotics and prednisone. In the ER patient was found to have what appears to be worsening left lower lobe pneumonia along with the patchy bilateral infiltrates she had last time. During my exam she had rhonchi and slight wheezing. She was started on IV antibiotics to cover for HAP in the ER. Allergies Allergen Reactions  Shellfish Derived Anaphylaxis Prior to Admission medications Medication Sig Start Date End Date Taking?  Authorizing Provider  
predniSONE (DELTASONE) 20 mg tablet Take 60 mg by mouth daily (with breakfast) for 3 days, THEN 40 mg daily (with breakfast) for 3 days, THEN 20 mg daily (with breakfast) for 3 days. 5/20/21 5/29/21  Darrell Rubio MD  
albuterol sulfate (PROVENTIL;VENTOLIN) 2.5 mg/0.5 mL nebu nebulizer solution 2.5 mg by Nebulization route every six (6) hours as needed for Wheezing or Shortness of Breath (Generally uses 2-3 times daily). Provider, Historical  
calcium-cholecalciferol, D3, (CALTRATE 600+D) tablet Take 1 Tab by mouth daily (with breakfast). Provider, Historical  
clonazePAM (KlonoPIN) 0.25 mg TbDi Take 0.25 mg by mouth every evening. ODT    Provider, Historical  
oxyCODONE IR (ROXICODONE) 5 mg immediate release tablet Take 5 mg by mouth two (2) times a day. Oxycodone 5 mg 0100 and 1300    Provider, Historical  
FLUoxetine (PROzac) 10 mg capsule Take 10 mg by mouth every evening. Provider, Historical  
guaiFENesin ER (Mucinex) 600 mg ER tablet Take 600 mg by mouth two (2) times a day. Provider, Historical  
urea (URE-NA) 15 gram packet Take 1 Packet by mouth daily. 3/10/21   Stefanie Zhong MD  
albuterol (PROVENTIL HFA, VENTOLIN HFA, PROAIR HFA) 90 mcg/actuation inhaler Take 1 Puff by inhalation every six (6) hours as needed for Wheezing. Provider, Historical  
furosemide (LASIX) 20 mg tablet Take 20 mg by mouth daily as needed (edema). Requires only sparingly, daughter reports only 2x in past 6 months    Provider, Historical  
aspirin delayed-release 81 mg tablet Take 1 Tab by mouth daily. 2/27/21   Lamonte Eric MD  
oxyCODONE ER [place] ER) 9 mg capsule Take 9 mg by mouth every twelve (12) hours. Xtampza 0700 and 1900    Provider, Historical  
insulin lispro (HumaLOG KwikPen Insulin) 100 unit/mL kwikpen by SubCUTAneous route Before breakfast, lunch, and dinner. Sliding scale 2-12 units. Generally uses 2 units if needed. Provider, Historical  
latanoprost (XALATAN) 0.005 % ophthalmic solution Administer 1 Drop to both eyes nightly.     Other, MD Jann  
insulin glargine (Lantus Solostar U-100 Insulin) 100 unit/mL (3 mL) inpn 16 Units by SubCUTAneous route every morning. Jann Rubi MD  
levothyroxine (Levo-T) 75 mcg tablet Take 75 mcg by mouth Daily (before breakfast). Jann Rubi MD  
tolterodine ER (DETROL LA) 4 mg ER capsule Take 4 mg by mouth daily. Jann Rubi MD  
atorvastatin (LIPITOR) 80 mg tablet Take 80 mg by mouth nightly. Jann Rubi MD  
pantoprazole (PROTONIX) 40 mg tablet Take 40 mg by mouth daily. Jann Rubi MD  
gabapentin (NEURONTIN) 300 mg capsule Take 600 mg by mouth two (2) times a day. Jann Rubi MD  
fluticasone-umeclidinium-vilanterol (Trelegy Ellipta) 100-62.5-25 mcg inhaler Take 1 Puff by inhalation daily. Provider, Historical  
melatonin 5 mg tablet Take 5 mg by mouth nightly. Provider, Historical  
magnesium oxide (MAG-OX) 400 mg tablet Take 400 mg by mouth daily. Provider, Historical  
B.infantis-B.ani-B.long-B.bifi (Probiotic 4X) 10-15 mg TbEC Take 2 Caps by mouth two (2) times a day. Provider, Historical  
BENEFIBER, GUAR GUM, PO Take 1 Dose by mouth daily. 1 dose - 2 teaspoons     Provider, Historical  
 
 
Past Medical History:  
Diagnosis Date  Anxiety and depression  Arthritis  Chronic obstructive pulmonary disease ([facility] [address]  Chronic pain  DM type 2 causing vascular disease ([facility] [address]  GERD (gastroesophageal reflux disease)  Glaucoma   
 HTN (hypertension)  Hx of completed stroke ,   Hypothyroid  Incontinence  Neuropathy  Polypharmacy Past Surgical History:  
Procedure Laterality Date  HX ORTHOPAEDIC    
 HX VASCULAR ACCESS Social History Tobacco Use  Smoking status: Former Smoker Quit date: 1991 Years since quittin.4  Smokeless tobacco: Never Used Substance Use Topics  Alcohol use: Never Family History Problem Relation Age of Onset  No Known Problems Other   
     reviewed, patient did not know  Diabetes Mother  Heart Attack Mother  Cancer Father PMSFH was reviewed.  
 
Review of Systems (14 point ROS): 
(bold if positive, if negative) Gen:   , , fever, chills, fatigue, , Eyes:  , , ENT:   , , , , CVS:   , , , , , , TORO, Pulm:  Cough, shortness of breath, , , , GI:       , , , , , , , :     , , , , MS:     , , , Skin:   , , , , Psy:    , , , , , , Endo: , , , Hem:  , , , Jose:   , , , , Joel:   , , , , , , ,   
 
  
Objective:   
 
Visit Vitals BP (!) 120/99 Pulse 100 Temp 98.6 °F (37 °C) Resp 27 Wt 58.5 kg (129 lb) SpO2 95% BMI 22.14 kg/m² Exam:  
 
Physical Exam: 
 
General: ill-appearing Head: Normocephalic, atraumatic Eyes: PERRL and EOMI sclera clear ENT: Lips, mucosa, and tongue normal.  
Neck: supple, no tenderness Lungs: rhonchi, wheezes and mild increase in work breathing. Heart: S1-S2, tachycardic Abd: SNTBS(+) Ext: no cyanosis, no edema Pulses: 2+ and symmetric Skin: Skin color, texture, turgor normal. No rashes or lesions Neuro:  alert, oriented x 3, no defects noted in general exam. 
Psych: Not anxious, cooperative, normal affect Labs: 
 
Recent Labs 05/19/21 
1626 WBC 47.0*  
HGB 11.8 HCT 38.6 * Recent Labs 05/19/21 
1626   
K 4.4  
 CO2 28 * BUN 40* CREA 0.72  
CA 7.9* ALB 3.1* TBILI 1.2* ALT 69 Lab Results Component Value Date/Time Glucose (POC) 112 05/13/2021 11:26 AM  
 Glucose (POC) 217 (H) 05/13/2021 06:40 AM  
 
 
Radiology and EKG reviewed: CTA CHEST W OR W WO CONT Result Date: 5/19/2021 No evidence pulmonary embolism. Small left pleural effusion. Worsening left lower lobe interstitial and patchy airspace consolidation. I personally reviewed and interpreted the imaging studies and agree with official reading. **Chart reviewed in [facility]** Assessment/Plan:   
 
HAP / Pneumonia of both lungs due to infectious organism / Acute respiratory insufficiency: Admit patient for treatment of what appears to be worsening pneumonia versus possible HAP.  Continue with broad-spectrum antibiotics and consult pharmacy for vancomycin dosing. IV corticosteroids, bronchodilators scheduled and as needed. HTN: Awaiting med rec to be performed in the meantime as needed hydralazine. Type II diabetes mellitus: Glucose monitoring with insulin sliding scale coverage. Diabetic diet. Hypothyroid: Continue with home dose levothyroxine. Body mass index is 22.14 kg/m².: 18.5 - 24.9 Normal weight Risk of deterioration: high Discussed:  Code Status and Care Plan discussed with:  [x]Patient  []Family  []Care Giver  [x]ED Doc  [x]RN  []Specialist  []Care Manager Prophylaxis:  SCD's and H2B/PPI Probable disposition:  Home w/Family Date of service:    5/19/2021  
 
 
        
___________________________________________________ Admitting Physician: Fely Correa MD

## 2021-05-20 NOTE — ROUTINE PROCESS
TRANSFER - OUT REPORT: 
 
Verbal report given to Corine Orta (name) on Dannie Grimes  being transferred to [facility] (unit) for routine progression of care Report consisted of patients Situation, Background, Assessment and  
Recommendations(SBAR). Information from the following report(s) SBAR, Kardex, ED Summary, MAR, Recent Results and Cardiac Rhythm NSR was reviewed with the receiving nurse. Lines:  
Venous Access Device power port 02/25/21 Upper chest (subclavicular area, right (Active) Opportunity for questions and clarification was provided. Patient transported with: 
 Monitor O2 @ 3 liters Registered Nurse

## 2021-05-20 NOTE — PROGRESS NOTES
Physician Progress Note      Kylie Oh  [facility] #:                  [id number]  :                       1940  ADMIT DATE:       2021 3:52 PM  [address] [place] DATE:  RESPONDING  PROVIDER #:        Evon Monet MD          QUERY TEXT:    Dear Hospitalist Team,  Pt admitted with hospital acquired Pneumonia. Pt noted to have home oxygen of 2L O2 at baseline via nasal cannula for COPD. It's noted in the H&P, Acute respiratory insufficiency. If possible, please document in the progress notes and discharge summary if you are evaluating and/or treating any of the following: The medical record reflects the following:    Risk Factors: 80 Yr F admitted with HAP; recent admission for COPD exacerbation; Home oxygen use of 2L O2 at baseline    Clinical Indicators: Patient arrives to the ED with c/o sudden SOB and wheezing with recent admission/discharge from hospital. Work up in the ED revealed cough, SOB, wheezing, CP, audibly gurgling when trying to take a breath, wheezing/rales and BLE swelling. WBC 47.0 with CTA Chest revealing no evidence pulmonary embolism. Small left pleural effusion. Worsening left lower lobe interstitial and patchy airspace consolidation. Progress note by WALLY Banks states, Increased work of breathing since last night, on home O2@ 2L. Per ED MD Mateusz Reed progress note states,  recent hospitalization at this facility from May 9 2 May 13 for anemia COPD exacerbation her chart review presents the ER by EMS from home for \"been placed on home oxygen. Thought it was doing okay just got home 2 days ago. H&P states, HAP / Pneumonia of both lungs due to infectious organism / Acute respiratory insufficiency. Per documented vital signs patient with RR 32 requiring BIPAP on admission for four hours before transitioning to 3-4 L via NC. Treatment: CBC/BMP, CTA  Chest, frequent vital signs/monitoring and supplemental oxygen as needed.     Thank you,  Naz Mendoza RN, CRCR, [facility]  [phone]  Options provided:  -- Chronic respiratory failure with hypoxia  -- Chronic respiratory failure with hypercapnia  -- Acute on chronic respiratory failure with hypoxia  -- Acute on chronic respiratory failure with hypercapnia  -- Other - I will add my own diagnosis  -- Disagree - Not applicable / Not valid  -- Disagree - Clinically unable to determine / Unknown  -- Refer to Clinical Documentation Reviewer    PROVIDER RESPONSE TEXT:    This patient is in acute on chronic respiratory failure with hypoxia. Query created by: Araceli Raman on 5/20/2021 11:46 AM      QUERY TEXT:    Dear Hospitalist Team,  Pt admitted with Hospital Acquired Pneumonia. Pt noted to have SIRS on admission of WBC:47.0 HR  RR 32. If possible, please document in the progress notes and discharge summary if you are evaluating and /or treating any of the following: The medical record reflects the following:    Risk Factors: 80 Yr F admitted with Hospital Acquired Pneumonia; Recent admission/discharge for COPD exacerbation    Clinical Indicators: Patient arrives to the ED with c/o increased SOB and wheezing. Upon arrival to the ED patient noted to have a RR 32+ 92% RA being placed on BIPAP. Continued work up revealed SIRS of WBC:47.0 HR  RR 32 with CTA Chest revealing No evidence pulmonary embolism. Small left pleural effusion. Worsening left lower lobe interstitial and patchy airspace consolidation. Patient is receiving NS IVF at 100 ML/HR, Maxipime 2 G IV Q 12 hrs and Vancomycin 750 mg IV Q 12 hrs. Treatment: Daily CBC/BMP, CTA Chest, frequent monitoring/vital signs, supplemental oxygen as needed,  NS IVF at 100 ML/HR, Maxipime 2 G IV Q 12 hrs and Vancomycin 750 mg IV Q 12 hrs.     Thank you,  Gisselle Nolan RN, [place], [address] [place] [address]  Options provided:  -- Sepsis, present on admission  -- No Sepsis, Hospital Acquired Pneumonia only  -- Sepsis was ruled out  -- Other - I will add my own diagnosis  -- Disagree - Not applicable / Not valid  -- Disagree - Clinically unable to determine / Unknown  -- Refer to Clinical Documentation Reviewer    PROVIDER RESPONSE TEXT:    This patient has sepsis which was present on admission. Query created by:  Rand Oliver on 5/20/2021 11:54 AM      Electronically signed by:  Mauricio Mota MD 5/20/2021 12:21 PM

## 2021-05-20 NOTE — WOUND CARE
Wound Consult:  New consult Visit. Chart reviewed. Consulted for skin tear and red sacrum. Spoke with patients nurse,  Neva Gilbert RN. [facility]. Heels off loaded with pillows at end of visit. Atif ross stated she recently fell and that is why she has bruising. Patient is awake alert, complains of nausea, nurse notified, Dr Whitley Haskins at bedside ; requires 1 assist to move side to side in bed. Edouard score 18. Assessment:all wounds POA Right Varg[place]se- 2x0.3x0.1cm abrasion partial thickness pink, no drainage Left oiwg3j0.3x0.1-skin tear partial thickness 20% flap red, slight bloody drainage. Left lower arm-3x1. 4x0.1cm skin tear,partial flap, red, slight serous sanguinous drainage. Bilateral heels boggy and blanchable red Ecchymosis right posterior knee area and right posterior thigh. Sacrum/buttocks slight blanchable pink- sacral foam dressing applied for protection Treatment: 
Right and left  shins- cleansed with NSS applied duoderm(hydrocolloid Left lower arm skin tear- petroleum gauze and covered with foam dressing Sacral foam to sacrum for protection. Heels offloaded with pillows. Wound Recommendations: 
Right and left shin wounds- cleanse with NSS and cover with duoderm Left forearm- cleanse with NSS - cover skin tear with small piece of petroleum gauze and foam dressing. Sacral dressing to sacrum for protection Skin Care / PI Prevention Recommendations: 1. Minimize friction/shear: minimize layers of linen/pads under patient. 2. Off load pressure/reposition:   turn and reposition approximately every 2 hours; float heels with pillows or use off loading heel boots; waffle cushion for sitting; position wedge. 3. Manage Moisture - keep skin folds dry; incontinence skin care with incontinence wipes; Zinc paste barrier ointment; appropriate sized briefs  ; purewick in use to help contain urine.  
4. Continue to monitor nutrition, pain, and skin risk scale, and skin assessment. Plan: 
Spoke with  regarding findings and proposed orders for treatment. We will continue to reassess  and as needed. Please re-consult should concerns arise despite continued skin/PI prevention measures. [facility], Wound / Ostomy Department Wound Healing Office [phone]

## 2021-05-20 NOTE — PROGRESS NOTES
Problem: Dysphagia (Adult) Goal: *Acute Goals and Plan of Care (Insert Text) Description: Swallowing goals initiated 5-20-21: 
1)  tolerate dysphagia 2, thins without s/s aspiration by 5-23-21 Outcome: Progressing Towards Goal 
 SPEECH LANGUAGE PATHOLOGY BEDSIDE SWALLOW EVALUATION Patient: Jose R Walker (13 y.o. female) Date: 5/20/2021 Primary Diagnosis: HAP (hospital-acquired pneumonia) [J18.9, Y95] Pneumonia of both lungs due to infectious organism [J18.9] Precautions: aspiration ASSESSMENT : 
Based on the objective data described below, the patient presents with functional swallow, but reduced chew. ADmitted with hemoptysis, SOB, chest pain. Chest CT:  increased interstitial and patchy airspace dz in LLL 
PMH:  COPD , A+D,  arthritis, chronic pain, DM, GERd, HTN, CVa, hypothyroid,  polypharmacy, +h/o tobacco.   
 
 
Patient will benefit from skilled intervention to address the above impairments. Patients rehabilitation potential is considered to be Good PLAN : 
Recommendations and Planned Interventions: 
Ok to continue prior recommended diet of dysphagia 2, thins after ABD CT ; 
MBS if pulmonary recommends. Frequency/Duration: Patient will be followed by speech-language pathology 2 times a week to address goals. Discharge Recommendations: To Be Determined SUBJECTIVE:  
Patient stated I have to drink this whole thing? .-contrast bottle. OBJECTIVE:  
 
Past Medical History:  
Diagnosis Date Anxiety and depression Arthritis Chronic obstructive pulmonary disease (HCC) Chronic pain DM type 2 causing vascular disease ([facility] [address] GERD (gastroesophageal reflux disease) Glaucoma HTN (hypertension) Hx of completed stroke 2017, 2021 Hypothyroid Incontinence Neuropathy Polypharmacy Past Surgical History:  
Procedure Laterality Date HX ORTHOPAEDIC    
 HX VASCULAR ACCESS Prior Level of Function/Home Situation:  
Home Situation Home Environment: Private residence One/Two Story Residence: Other (Comment) Living Alone: No 
Support Systems: Family member(s) Patient Expects to be Discharged to[de-identified] Private residence Current DME Used/Available at Home: Theodor Mode Diet prior to admission: regular, thins Current Diet:  regular, thins Cognitive and Communication Status: 
Neurologic State: Alert Orientation Level: Oriented to person, Oriented to place Cognition: Impaired decision making, Memory loss Perception: Appears intact Oral Assessment: P.O. Trials: 
Patient Position: upright in bed Vocal quality prior to P.O.: No impairment Consistency Presented: Thin liquid (unable to asses other items, as she has to drink contrast for abd CT) ORAL PHASE:  
Bolus Acceptance: No impairment Bolus Formation/Control: Impaired (h/o reduced abiltiy to chew due to missing teeth) Type of Impairment: Mastication Propulsion: Delayed (# of seconds) Oral Residue: None PHARYNGEAL PHASE:  
Initiation of Swallow: No impairment Laryngeal Elevation: Functional 
Aspiration Signs/Symptoms: None NOMS:  
The NOMS functional outcome measure was used to quantify this patient's level of swallowing impairment. Based on the NOMS, the patient was determined to be at level dysphagia for swallow function NOMS Swallowing Levels: 
Level 1 (CN): NPO Level 2 (CM): NPO but takes consistency in therapy Level 3 (CL): Takes less than 50% of nutrition p.o. and continues with nonoral feedings; and/or safe with mod cues; and/or max diet restriction Level 4 (CK): Safe swallow but needs mod cues; and/or mod diet restriction; and/or still requires some nonoral feeding/supplements Level 5 (CJ): Safe swallow with min diet restriction; and/or needs min cues Level 6 (CI): Independent with p.o.; rare cues; usually self cues; may need to avoid some foods or needs extra time Level 7 (CH): Independent for all p.o. АНДРЕЙ. (2003).  National Outcomes Measurement System (NOMS): Adult Speech-Language Pathology User's Guide. Pain: 
Pain Scale 1: Numeric (0 - 10) Pain Intensity 1: 4 Pain Location 1: Back After treatment:  
Patient left in no apparent distress in bed and Nursing notified COMMUNICATION/EDUCATION:  
Patient was educated regarding her deficit(s) of dysphagia as this relates to her diagnosis of PNA. She demonstrated Fair understanding as evidenced by discussion. Spoke with son as well. . 
 
The patient's plan of care including recommendations, planned interventions, and recommended diet changes were discussed with: Registered nurse. Patient/family have participated as able in goal setting and plan of care. Patient/family agree to work toward stated goals and plan of care. Thank you for this referral. 
ALISON Miller Time Calculation: 15 mins

## 2021-05-21 NOTE — PROGRESS NOTES
Bedside and Verbal shift change report given to [address] Telles RN (oncoming nurse) by Jaymie Loco RN (offgoing nurse). Report included the following information SBAR, Kardex, ED Summary, MAR, Recent Results and Cardiac Rhythm SR. This patient was assisted with Intentional Toileting every 2 hours during this shift. Documentation of ambulation and output reflected on Flowsheet. Bedside and Verbal shift change report given to Parth Gardiner RN (oncoming nurse) by [address], RN (offgoing nurse).  Report included the following information SBAR, Kardex, ED Summary, MAR, Recent Results and Cardiac Rhythm SR.

## 2021-05-21 NOTE — CONSULTS
Palliative Medicine Consult Lisandro: [phone]) Patient Name: Mani Cortes YOB: 1940 Date of Initial Consult: 2021 Reason for Consult: [address] discussion Requesting Provider: Dr. Rebeca Cotto Primary Care Physician: Jerman Heller MD 
 
 SUMMARY:  
Mani Cortes is a 80 y.o. 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 Patient presented to ER w/ cc of worsening SOB and new onset of chills over past couple of days. Patient had just been discharged home with antibiotics, on  from hospitalization for CAP. Patient was admitted on 2021 from [facility] with a diagnosis of Sepsis and hypoxia  2/2 Worsening PNA . This is the patients 6th hospitalization since 2021, Current medical issues leading to Palliative Medicine involvement include: GOC discussion Psychosocial hx: Patient from [place], she is ,  of 48 years  2020. Had 4 children, 2 daughters . She moved to [place] on 2020 to live with daughter Lizzie Turk and her . Patients son, Cholo Esparza lives in [place] with his family, however he has come to [place] to help assist with patients care. Patient has multiple grandchildren. Her jennifer is important to her. Patient stated she is worried about her sons health and is trying to protect him from worrying about her. PALLIATIVE DIAGNOSES:  
1. Hypoxia 2. Chest congestion 3. Pain 4. Anxiety 5. Advanced Care Planning discussion 6. DNR discussion 7. Goals of Care Discussion PLAN:  
1. Patient was seen, no family at bedside. 2. Introduced myself, patient very anxious, complaining of back pain, requesting both pain medicine and something for anxiety.  
3. Anxiety-uncontrolled, chronic. Likely multifactorial etiology, including complicated grief s/p death of  in August 2020, as well as acute anxiety related to health and hospitalization and uncontrolled pain. · Daughter stated that patient has tried multiple different medications to manage anxiety, but unsuccessful · She has current order for lorazepam 0.5 mg IV every 6 hours as needed, she has used x2 today. · I recommend continuing with the current lorazepam PRN order, however I am hopeful anxiety will decrease after adjustments made to pain medication regimen, see below 4. Pain-uncontrolled, rated 10 out of 10 back and chest pain. Etiology chronic back pain 2/2 history of multiple compression fractures. Chest and rib pain secondary to multiple fractures. ·  reviewed, patient has been on Xtampza 9mg every 12 hours with percocet 5/325 twice daily as needed. · Current regimen is oxycodone IR 5 mg orally every 4 hours as needed, used x3 over the past 24 hours. As well as morphine 2 mg IV every 4 hours as needed, used x1. · I asked patient's nurse Breana Coy RN to give a dose of morphine. I reassess patient approximately 20 minutes after Brenda administered the morphine, patient much calmer reporting decreased pain. And was notably less anxious appearing. · I scheduled Percocet 5/325 every 6 hours, with parameters to hold if decreased level of responsiveness, hypoxia, SBP less than 90, respiration less than 12 and HR less than 60 bpm. 
5. Constipation-controlled, LBM 5/20. Patient at high risk for developing constipation due to opioid use, decreased intake and decreased activity. Now on scheduled Percocet, therefore I scheduled senna S, 1 tab orally every day. Patient also has order for MiraLAX daily as needed. Recommend administering MiraLAX if no bowel movement x2 days. 6. Hypoxia-unresolved. Etiology includes worsening PNA in setting of COPD and pulmonary fibrosis as well as small pleural effusion. Patient's baseline was no supplemental O2 at home, until last hospitalization when she received order to use oxygen at night. 7. Advance care planning discussion-no AMD in the EMR. Patient stated that she has completed an AMD in the past, designated her daughter, Gwen Box, to be her healthcare decision-maker. 8. DNR discussion-patient had full CODE STATUS. Spoke to patient regarding benefits versus burdens of CPR in the setting of advanced age and multiple comorbidities. Patient stated that she knows that she does not want to be intubated, otherwise deferred to her daughter Claire Varma. I did speak with Claire Varma, who stated that the patient does not want CPR and that she had completed a durable DNR, which is at home. Order for DNR CODE STATUS placed. 9. Goals of care discussion-daughter Claire Varma stated that they will likely need SNF or inpatient rehab at discharge. Family needs patient to get stronger so that she will be able to manage bathing dressing toileting and ambulation in order for her daughter and son-in-law to continue caring for her in their home. Daughter is beginning to explore other options should the patient not regain her strength and independence. 10. Daughter plans to be at bedside by 10 AM tomorrow Saturday 5/22. 11.  Initial consult note routed to primary continuity provider and/or primary health care team members 12. Communicated plan of care with: Palliative IDT, [facility] Team, Cheryl Blanco RN, unit  Judy Maxwell and attending Dr. Ferdinand Baron GOALS OF CARE / TREATMENT PREFERENCES:  
 
GOALS OF CARE: 
Patient/Health Care Proxy Stated Goals: Cure TREATMENT PREFERENCES:  
Code Status: DNR Advance Care Planning: 
[x] [facility] Interdisciplinary Team has updated the ACP Navigator with Ivetnhariadnan and Patient Capacity Primary Decision Maker: Sara Gonsalves - Daughter - [phone] Secondary Decision Maker: Joan Botello - Son - [phone] Advance Care Planning 5/19/2021 Patient's Healthcare Decision Maker is: - Confirm Advance Directive Yes, on file Patient Would Like to Complete Advance Directive - Medical Interventions: Full interventions Other Instructions: Other: As far as possible, the palliative care team has discussed with patient / health care proxy about goals of care / treatment preferences for patient. HISTORY:  
 
History obtained from: medical records/nurse/patient CHIEF COMPLAINT: Im in pain and I need something for anxiety HPI/SUBJECTIVE: The patient is:  
[x] Verbal and participatory [] Non-participatory due to:  
  
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 Albumin 2.3 Clinical Pain Assessment (nonverbal scale for severity on nonverbal patients):  
Clinical Pain Assessment Severity: 10 Location: chest and back Character: unknown Duration: unknown Effect: unknown Factors: unknown Frequency: unknown Duration: for how long has pt been experiencing pain (e.g., 2 days, 1 month, years) Frequency: how often pain is an issue (e.g., several times per day, once every few days, constant) FUNCTIONAL ASSESSMENT:  
 
Palliative Performance Scale (PPS): PPS: 40 PSYCHOSOCIAL/SPIRITUAL SCREENING:  
 
Palliative IDT has assessed this patient for cultural preferences / practices and a referral made as appropriate to needs (Cultural Services, Patient Advocacy, Ethics, etc.) Any spiritual / Samaritan concerns: 
[] Yes /  [x] No 
 
Caregiver Burnout: 
[] Yes /  [] No /  [x] No Caregiver Present Anticipatory grief assessment:  
[x] Normal  / [] Maladaptive ESAS Anxiety: Anxiety: 8 ESAS Depression:    
 
 
 REVIEW OF SYSTEMS:  
 
Positive and pertinent negative findings in ROS are noted above in HPI. The following systems were [x] reviewed / [] unable to be reviewed as noted in HPI Other findings are noted below. Systems: constitutional, ears/nose/mouth/throat, respiratory, gastrointestinal, genitourinary, musculoskeletal, integumentary, neurologic, psychiatric, endocrine. Positive findings noted below. Modified ESAS Completed by: provider Fatigue: 7 Drowsiness: 0 Pain: 10 Anxiety: 8 Anorexia: 5 Dyspnea: 2 Constipation: No  
  Stool Occurrence(s): 1 PHYSICAL EXAM:  
 
From RN flowsheet: 
Wt Readings from Last 3 Encounters:  
05/20/21 130 lb 3.2 oz (59.1 kg) 05/09/21 129 lb (58.5 kg) 03/23/21 129 lb (58.5 kg) Blood pressure (!) 156/74, pulse 88, temperature 98.2 °F (36.8 °C), resp. rate 16, height 5' 4\" (1.626 m), weight 130 lb 3.2 oz (59.1 kg), SpO2 94 %. Pain Scale 1: Numeric (0 - 10) Pain Intensity 1: 3 Pain Onset 1: chronic Pain Location 1: Back Pain Orientation 1: Posterior Pain Description 1: Aching Pain Intervention(s) 1: Medication (see MAR) Last bowel movement, if known:  
 
Constitutional: appears stated age, adequately nourished, awake, anxious, tearful, mod distress Eyes: pupils equal, anicteric ENMT: no nasal discharge, dry mucous membranes Cardiovascular: regular rhythm, distal pulses intact Respiratory: breathing not labored, symmetric, frequent +productive congested cough, coarse breath sounds. Gastrointestinal: soft non-tender, +bowel sounds Musculoskeletal: Left wrist/hand contracture, no tenderness to palpation Skin: warm, dry Neurologic: alert and oriented,  following commands, Left upper arm/hand limited movement, moving all extremities Psychiatric: anxious affect, no hallucinations Other: 
 
 
 HISTORY:  
 
Active Problems: 
  HTN (hypertension) (1/3/2021)   Type II diabetes mellitus ([facility] (1/3/2021) Hypothyroid (1/3/2021) Acute respiratory insufficiency (5/10/2021) HAP (hospital-acquired pneumonia) (2021) Pneumonia of both lungs due to infectious organism (2021) Past Medical History:  
Diagnosis Date  Anxiety and depression  Arthritis  Chronic obstructive pulmonary disease ([facility]  Chronic pain  DM type 2 causing vascular disease ([facility]  GERD (gastroesophageal reflux disease)  Glaucoma   
 HTN (hypertension)  Hx of completed stroke 2021  Hypothyroid  Incontinence  Neuropathy  Polypharmacy Past Surgical History:  
Procedure Laterality Date  HX ORTHOPAEDIC    
 HX VASCULAR ACCESS Family History Problem Relation Age of Onset  No Known Problems Other   
     reviewed, patient did not know  Diabetes Mother  Heart Attack Mother  Cancer Father History reviewed, no pertinent family history. Social History Tobacco Use  Smoking status: Former Smoker Quit date: 1991 Years since quittin.4  Smokeless tobacco: Never Used Substance Use Topics  Alcohol use: Never Allergies Allergen Reactions  Shellfish Derived Anaphylaxis Current Facility-Administered Medications Medication Dose Route Frequency  vancomycin (VANCOCIN) 1,250 mg in 0.9% sodium chloride 250 mL (VIAL-MATE)  1,250 mg IntraVENous Q12H  
 guaiFENesin (ROBITUSSIN) 100 mg/5 mL oral liquid 200 mg  200 mg Oral Q6H  
 oxyCODONE-acetaminophen (PERCOCET) 5-325 mg per tablet 1 Tablet  1 Tablet Oral Q6H  
 [START ON 2021] senna-docusate (PERICOLACE) 8.6-50 mg per tablet 1 Tablet  1 Tablet Oral DAILY  predniSONE (DELTASONE) tablet 40 mg  40 mg Oral BID WITH MEALS  
 hydrALAZINE (APRESOLINE) 20 mg/mL injection 10 mg  10 mg IntraVENous Q6H PRN  
 atorvastatin (LIPITOR) tablet 80 mg  80 mg Oral QHS  aspirin delayed-release tablet 81 mg  81 mg Oral DAILY  clonazePAM (KlonoPIN) tablet 0.25 mg  0.25 mg Oral QPM  
 levothyroxine (SYNTHROID) tablet 75 mcg  75 mcg Oral ACB  meclizine (ANTIVERT) tablet 12.5 mg  12.5 mg Oral Q6H PRN  pantoprazole (PROTONIX) 40 mg in 0.9% sodium chloride 10 mL injection  40 mg IntraVENous Q12H  cefepime (MAXIPIME) 2 g in sterile water (preservative free) 10 mL IV syringe  2 g IntraVENous Q12H  
 glucose chewable tablet 16 g  4 Tablet Oral PRN  
 dextrose (D50W) injection syrg 12.5-25 g  25-50 mL IntraVENous PRN  
 glucagon (GLUCAGEN) injection 1 mg  1 mg IntraMUSCular PRN  
 0.9% sodium chloride infusion  100 mL/hr IntraVENous CONTINUOUS  
 sodium chloride (NS) flush 5-40 mL  5-40 mL IntraVENous Q8H  
 sodium chloride (NS) flush 5-40 mL  5-40 mL IntraVENous PRN  
 acetaminophen (TYLENOL) tablet 650 mg  650 mg Oral Q6H PRN Or  
 acetaminophen (TYLENOL) suppository 650 mg  650 mg Rectal Q6H PRN  polyethylene glycol (MIRALAX) packet 17 g  17 g Oral DAILY PRN  
 bisacodyL (DULCOLAX) suppository 10 mg  10 mg Rectal DAILY PRN  
 ondansetron (ZOFRAN) injection 4 mg  4 mg IntraVENous Q6H PRN  
 insulin glargine (LANTUS) injection 12 Units  0.2 Units/kg SubCUTAneous QHS  insulin lispro (HUMALOG) injection   SubCUTAneous AC&HS  
 melatonin (rapid dissolve) tablet 5 mg  5 mg Oral QHS PRN  
 alum-mag hydroxide-simeth (MYLANTA) oral suspension 30 mL  30 mL Oral Q4H PRN  
 LORazepam (ATIVAN) injection 0.5 mg  0.5 mg IntraVENous Q6H PRN  
 oxyCODONE IR (ROXICODONE) tablet 5 mg  5 mg Oral Q4H PRN  
 morphine injection 2 mg  2 mg IntraVENous Q4H PRN  
 diphenhydrAMINE (BENADRYL) injection 25 mg  25 mg IntraVENous Q6H PRN  
 albuterol (PROVENTIL VENTOLIN) nebulizer solution 2.5 mg  2.5 mg Nebulization Q4H PRN  
 simethicone (MYLICON) tablet 80 mg  80 mg Oral QID PRN  
 albuterol-ipratropium (DUO-NEB) 2.5 MG-0.5 MG/3 ML  3 mL Nebulization Q6H RT  
 
 
 
 LAB AND IMAGING FINDINGS:  
 
Lab Results Component Value Date/Time  WBC 28.5 (H) 05/21/2021 09:10 AM  
 HGB 10.3 (L) 05/21/2021 09:10 AM  
 PLATELET 122 (H) 98/87/4479 09:10 AM  
 
Lab Results Component Value Date/Time Sodium 139 05/21/2021 09:10 AM  
 Potassium 4.6 05/21/2021 09:10 AM  
 Chloride 107 05/21/2021 09:10 AM  
 CO2 26 05/21/2021 09:10 AM  
 BUN 18 05/21/2021 09:10 AM  
 Creatinine 0.56 05/21/2021 09:10 AM  
 Calcium 7.6 (L) 05/21/2021 09:10 AM  
 Magnesium 1.8 03/09/2021 03:23 AM  
 Phosphorus 3.6 03/10/2021 03:15 AM  
  
Lab Results Component Value Date/Time Alk. phosphatase 73 05/21/2021 09:10 AM  
 Protein, total 5.2 (L) 05/21/2021 09:10 AM  
 Albumin 2.3 (L) 05/21/2021 09:10 AM  
 Globulin 2.9 05/21/2021 09:10 AM  
 
Lab Results Component Value Date/Time INR 1.2 (H) 03/09/2021 03:23 AM  
 Prothrombin time 12.8 (H) 03/09/2021 03:23 AM  
 aPTT 28.6 03/09/2021 03:23 AM  
  
Lab Results Component Value Date/Time Iron 16 (L) 05/09/2021 08:21 PM  
 TIBC 278 05/09/2021 08:21 PM  
 Iron % saturation 6 (L) 05/09/2021 08:21 PM  
  
No results found for: PH, PCO2, PO2 No components found for: [place] No results found for: CPK, CKMB Total time: 100min Counseling / coordination time, spent as noted above: 80 
> 50% counseling / coordination?: yes Prolonged service was provided for  [x]30 min   []75 min in face to face time in the presence of the patient, spent as noted above. Time Start/END: 1100- 1145 Time Start/ End:  
Note: this can only be billed with 43430 (initial) or 77385 (follow up). If multiple start / stop times, list each separately.

## 2021-05-21 NOTE — PROGRESS NOTES
Spiritual Care Assessment/Progress Note [address] 
 
 
NAME: Crista Caceres      MRN: [id number] AGE: 80 y.o. SEX: female Restorationism Affiliation: Valeria Goodman Language: [place] 5/21/2021     Total Time (in minutes): 14 Spiritual Assessment begun in SF 3 PRO CARE TELE 2 through conversation with: 
  
    [x]Patient        [] Family    [] Friend(s) Reason for Consult: Palliative Care, Initial/Spiritual Assessment Spiritual beliefs: (Please include comment if needed) [x] Identifies with a jennifer tradition:     
   [] Supported by a jennifer community:        
   [] Claims no spiritual orientation:       
   [] Seeking spiritual identity:            
   [] Adheres to an individual form of spirituality:       
   [] Not able to assess:                   
 
    
Identified resources for coping:  
   [] Prayer                           
   [] Music                  [] Guided Imagery [x] Family/friends                 [] Pet visits [] Devotional reading                         [] Unknown 
   [] Other:                                          
 
 
Interventions offered during this visit: (See comments for more details) Patient Interventions: Catharsis/review of pertinent events in supportive environment, Coping skills reviewed/reinforced, Affirmation of jennifer, End of life issues discussed Plan of Care: 
 
 [] Support spiritual and/or cultural needs  
 [] Support AMD and/or advance care planning process    
 [] Support grieving process 
 [] Coordinate Rites and/or Rituals  
 [] Coordination with community clergy [] No spiritual needs identified at this time 
 [] Detailed Plan of Care below (See Comments)  [] Make referral to Music Therapy 
[] Make referral to Pet Therapy    
[] Make referral to Addiction services 
[] Make referral to [facility] 
[] Make referral to Spiritual Care Partner 
[] No future visits requested       
[x] Follow up upon further referrals Comments:  visited Mrs. Cox for a palliative care initial spiritual assessment on the [facility] unit. Mrs. Alisa Kraus was awake, alert, and lying in bed when the  came into the room. She greeted the  warmly and briefly made eye contact, but kept her eyes closed for most of the conversation. Mrs. Alisa Kraus shared that she was feeling poorly and then shared that she felt that she was dying. She shared that she felt God was calling her home and that she would be with him shortly.  inquired if she was afraid and Mrs. Cox quickly said that she was not and reaffirmed that God is a good God. She did express hesitancy in informing her son and daughter of how she is feeling as she is afraid that this news would negatively affect their health.  continued to be a supportive presence as Mrs. Cox engaged in reflection and storytelling.  inquired how she could best support Mrs. Cox at this time and she requested  assist her in contacting her nurse.  provided assistance. She expressed no additional needs at this time. Consulted with her nurse. 's are available for further support upon referral 
Joelle Sandoval. [facility].  Paging Service: [phone])

## 2021-05-21 NOTE — PROGRESS NOTES
[address] Dosing Services: Antimicrobial Stewardship Progress Note Consult for antibiotic dosing of Vancomycin by Dr. Monalisa Anderson Pharmacist reviewed antibiotic appropriateness for 79 yo female for indication of HAP Day of Therapy: 3 Ht Readings from Last 1 Encounters:  
05/20/21 162.6 cm (64\") Wt Readings from Last 1 Encounters:  
05/20/21 59.1 kg (130 lb 3.2 oz) Vancomycin therapy: 
Current maintenance dose: 750(mg) every 12 hours Dose calculated to approximate a therapeutic trough of 15-20 mcg/mL. Last trough level: 10.7 mcg/ml drawn 11.63 hrs post dose, extrapolates to a trough of ~10.34 mcg/ml. Level is subtherapeutic Plan for level / Adjustment in Therapy: Will change Vancomycin to 1250 mg IV q12hr Dose administration notes:   Doses given appropriately as scheduled Date Dose & Interval Measured (mcg/mL) Extrapolated (mcg/mL) ? 5/21 ? 750 mg IV q12hr ? 10.7 ?10.34  
? ? ? ?  
? ? ? ? Other Antimicrobial  
(not dosed by pharmacist) Cefepime 2 gm IV q12hr Cultures none Serum Creatinine Lab Results Component Value Date/Time Creatinine 0.56 05/21/2021 09:10 AM  
  
  
Creatinine Clearance Estimated Creatinine Clearance: 54.4 mL/min (by C-G formula based on SCr of 0.56 mg/dL). Temp Temp: 97.7 °F (36.5 °C) WBC Lab Results Component Value Date/Time WBC 28.5 (H) 05/21/2021 09:10 AM  
 
  
H/H Lab Results Component Value Date/Time HGB 10.3 (L) 05/21/2021 09:10 AM  
 
  
Platelets Lab Results Component Value Date/Time PLATELET 255 (H) 36/37/8883 09:10 AM  
 
  
 
Pharmacist Zeny Schulz Contact information: [phone]

## 2021-05-21 NOTE — PROGRESS NOTES
Transition of Care Plan - RUR 39% (high risk): 1. CM to follow and monitor patient response to treatment and recommendations. 2. Patient is a readmission. She has good family support and caregivers 3/day week. Home O2 through [address]. 3. Anticipate discharge home when stable with resumption of home health provided by [facility]. Will need home health resumption order prior to discharge. 4. Family will provide transport at discharge. Sahil Seay LCSW

## 2021-05-21 NOTE — PROGRESS NOTES
Speech Pathology:  Chart reviewed and discussed with RN who reports limited intake but tolerated medication with thin liquids. Attempted to see patient for dysphagia treatment however patient reporting 10/10 pain and \"need(s) something for nerves. \"  RN aware and medication administered. PO trials and dysphagia treatment deferred. Thank you.  
 
Rohith Hall, SLP

## 2021-05-21 NOTE — PROGRESS NOTES
Problem: Dysphagia (Adult) Goal: *Acute Goals and Plan of Care (Insert Text) Description: Swallowing goals initiated 5-20-21: 
1)  tolerate dysphagia 2, thins without s/s aspiration by 5-23-21 Outcome: Progressing Towards Goal 
  
SPEECH LANGUAGE PATHOLOGY DYSPHAGIA TREATMENT Patient: Stefani Rausch (45 y.o. female) Date: 5/21/2021 Diagnosis: HAP (hospital-acquired pneumonia) [J18.9, Y95] Pneumonia of both lungs due to infectious organism [J18.9] <principal problem not specified> Precautions:    
 
ASSESSMENT: 
Patient demonstrated slow but complete mastication of solids. No oral residue noted. Patient demonstrated strong coughing prior to PO trials and x 1 while chewing solids. Given bedside presentation feel patient is safe to continue mechanical soft diet, thin liquids. PLAN: 
Recommendations and Planned Interventions: 
Mechanical soft diet Thin liquids Sit up for all PO Patient continues to benefit from skilled intervention to address the above impairments. Continue treatment per established plan of care. Discharge Recommendations: To Be Determined SUBJECTIVE:  
Patient stated If I could have some help that would be great. RN reports limited intake OBJECTIVE:  
Cognitive and Communication Status: 
Neurologic State: Alert Orientation Level: Oriented to person, Oriented to place, Disoriented to time, Disoriented to situation Cognition: Impaired decision making, Follows commands Perception: Appears intact Dysphagia Treatment: 
Oral Assessment: P.O. Trials: 
Patient Position: Upright in bed Vocal quality prior to P.O.:   
Consistency Presented: Solid; Thin liquid How Presented: Self-fed/presented;Straw Bolus Acceptance: No impairment Bolus Formation/Control: Impaired Type of Impairment: Delayed;Mastication Propulsion: No impairment Oral Residue: None Initiation of Swallow: No impairment Laryngeal Elevation: Functional 
Aspiration Signs/Symptoms: Delayed cough/throat clear After treatment:  
Patient left in no apparent distress in bed, Call bell within reach, and Nursing notified COMMUNICATION/EDUCATION:  
Patient was educated regarding role of SLP, diet and POC. Patient verbalized understanding The patient's plan of care including recommendations, planned interventions, and recommended diet changes were discussed with: Registered nurse. ALISON Orr Time Calculation: 20 mins

## 2021-05-21 NOTE — PROGRESS NOTES
Abhinav Dobbselsen [facility] 
[address] [facility], [address] [place] [address] 
[phone] Medical Progress Note NAME: Guillermo Agrawal :  1940 MRM:  [id number] Date/Time: 2021  3:51 PM 
 
 
 
  
Assessment / Plan: #Sepsis due to Pneumonia: Technically HAP. CT abd/pelvis showed severe stenosis of bilateral common iliac arteries, but pt has no leg pain and lactate is normal. There is also e/o cirrhosis, but minimal ascites so I have low c/f SBP. Has been fluid responsive and not required pressors - Vanc/cefepime  -  
 - Methylpred to pred tmr 
 - SLT swallow study 
 - Continue to monitor fluid responsiveness 
 - UOP and MAPs to goal 
 
#AHRF: PNA and COPD exac 
 - wean as tolerated 
 -oxygen at baseline #Multiple fractures: CT today showed multiple fractures of ribs, pubic rami, thoracic spine, etc. She is on oxycodone for chronic pain. Ultimately, with her repeat admissions and suggestion of multiple falls, palliative care is following. She does have good support from home and lives with daughter, but do feel she is having a decline #Nausea/vomiting: With epigastric discomfort. Has been on several courses of steroids so c/f gastritis. - IV PPI BID for now, can transition to PO #Bilateral common iliac stenosis: She has had multiple falls, but not sure if this is contributing. She does not complaint of leg pain, lactate normal 
 - Consider vascular input if she develops symptoms #COPD exac: 2/2 above - Steroids as above - scheduled nebs #Cirrhosis: noted on imaging. She is not presently decompensated. With regards to her overall picture, this is a worrisome diagnosis and etiology is not clear. Will have palliative assist with GOC. Could consider hepatitides, etc.  
 
#DM: A1c is 6.7%. Blood glucose stable. Continue home insulin regimen. DM diet Care Plan discussed with: Patient Discussed:  Care Plan Prophylaxis:  Lovenox Disposition:  Home w/Family 
        
___________________________________________________ Attending Physician: Rogelio Dalton MD  
 
  
Subjective: Chief Complaint: This morning with nausea, abdominal pain. Cough, no sob ROS: 
(bold if positive, if negative) Tolerating PT  Tolerating Diet Objective:  
 
 
Vitals:  
 
 
  
Last 24hrs VS reviewed since prior progress note. Most recent are: 
 
Visit Vitals BP (!) 156/74 (BP 1 Location: Right upper arm, BP Patient Position: At rest) Pulse 88 Temp 98.2 °F (36.8 °C) Resp 16 Ht 5' 4\" (1.626 m) Wt 59.1 kg (130 lb 3.2 oz) SpO2 94% BMI 22.35 kg/m² SpO2 Readings from Last 6 Encounters:  
05/21/21 94% 05/13/21 97% 03/23/21 96% 03/10/21 95% 03/03/21 95% 02/27/21 96% O2 Flow Rate (L/min): 4 l/min Intake/Output Summary (Last 24 hours) at 5/21/2021 1551 Last data filed at 5/21/2021 1345 Gross per 24 hour Intake 200 ml Output 1100 ml Net -900 ml Exam:  
 
Physical Exam: 
 
Gen:  Well-developed, well-nourished, in no acute distress HEENT:  Pink conjunctivae, PERRL, hearing intact to voice, moist mucous membranes Neck:  Supple, without masses, thyroid non-tender Resp:  No accessory muscle use, clear breath sounds without wheezes rales or rhonchi 
Card:  No murmurs, normal S1, S2 without thrills, bruits or peripheral edema Abd:  Soft, non-tender, non-distended, normoactive bowel sounds are present Musc:  No cyanosis or clubbing Skin:  No rashes or ulcers, skin turgor is good Neuro:  Cranial nerves 3-12 are grossly intact,  strength is 5/5 bilaterally and dorsi / plantarflexion is 5/5 bilaterally, follows commands appropriately Psych:  Good insight, oriented to person, place and time, alert Medications Reviewed: (see below) Lab Data Reviewed: (see below) 
 
______________________________________________________________________ Medications:  
 
Current Facility-Administered Medications Medication Dose Route Frequency  vancomycin (VANCOCIN) 1,250 mg in 0.9% sodium chloride 250 mL (VIAL-MATE)  1,250 mg IntraVENous Q12H  
 guaiFENesin (ROBITUSSIN) 100 mg/5 mL oral liquid 200 mg  200 mg Oral Q6H  
 oxyCODONE-acetaminophen (PERCOCET) 5-325 mg per tablet 1 Tablet  1 Tablet Oral Q6H  
 [START ON 5/22/2021] senna-docusate (PERICOLACE) 8.6-50 mg per tablet 1 Tablet  1 Tablet Oral DAILY  hydrALAZINE (APRESOLINE) 20 mg/mL injection 10 mg  10 mg IntraVENous Q6H PRN  
 methylPREDNISolone (PF) (SOLU-MEDROL) injection 40 mg  40 mg IntraVENous Q6H  
 atorvastatin (LIPITOR) tablet 80 mg  80 mg Oral QHS  aspirin delayed-release tablet 81 mg  81 mg Oral DAILY  clonazePAM (KlonoPIN) tablet 0.25 mg  0.25 mg Oral QPM  
 levothyroxine (SYNTHROID) tablet 75 mcg  75 mcg Oral ACB  meclizine (ANTIVERT) tablet 12.5 mg  12.5 mg Oral Q6H PRN  pantoprazole (PROTONIX) 40 mg in 0.9% sodium chloride 10 mL injection  40 mg IntraVENous Q12H  cefepime (MAXIPIME) 2 g in sterile water (preservative free) 10 mL IV syringe  2 g IntraVENous Q12H  
 glucose chewable tablet 16 g  4 Tablet Oral PRN  
 dextrose (D50W) injection syrg 12.5-25 g  25-50 mL IntraVENous PRN  
 glucagon (GLUCAGEN) injection 1 mg  1 mg IntraMUSCular PRN  
 0.9% sodium chloride infusion  100 mL/hr IntraVENous CONTINUOUS  
 sodium chloride (NS) flush 5-40 mL  5-40 mL IntraVENous Q8H  
 sodium chloride (NS) flush 5-40 mL  5-40 mL IntraVENous PRN  
 acetaminophen (TYLENOL) tablet 650 mg  650 mg Oral Q6H PRN Or  
 acetaminophen (TYLENOL) suppository 650 mg  650 mg Rectal Q6H PRN  polyethylene glycol (MIRALAX) packet 17 g  17 g Oral DAILY PRN  
 bisacodyL (DULCOLAX) suppository 10 mg  10 mg Rectal DAILY PRN  
 ondansetron (ZOFRAN) injection 4 mg  4 mg IntraVENous Q6H PRN  
 insulin glargine (LANTUS) injection 12 Units  0.2 Units/kg SubCUTAneous QHS  insulin lispro (HUMALOG) injection   SubCUTAneous AC&HS  
 melatonin (rapid dissolve) tablet 5 mg  5 mg Oral QHS PRN  
 alum-mag hydroxide-simeth (MYLANTA) oral suspension 30 mL  30 mL Oral Q4H PRN  
 LORazepam (ATIVAN) injection 0.5 mg  0.5 mg IntraVENous Q6H PRN  
 oxyCODONE IR (ROXICODONE) tablet 5 mg  5 mg Oral Q4H PRN  
 morphine injection 2 mg  2 mg IntraVENous Q4H PRN  
 diphenhydrAMINE (BENADRYL) injection 25 mg  25 mg IntraVENous Q6H PRN  
 albuterol (PROVENTIL VENTOLIN) nebulizer solution 2.5 mg  2.5 mg Nebulization Q4H PRN  
 simethicone (MYLICON) tablet 80 mg  80 mg Oral QID PRN  
 albuterol-ipratropium (DUO-NEB) 2.5 MG-0.5 MG/3 ML  3 mL Nebulization Q6H RT Lab Review:  
 
Recent Labs  
  05/21/21 
0910 05/20/21 
0323 05/19/21 
1626 WBC 28.5* 32.5* 47.0*  
HGB 10.3* 9.9* 11.8 HCT 33.9* 31.2* 38.6 * 321 499* Recent Labs  
  05/21/21 
0910 05/20/21 
0323 05/19/21 
1626  137 136  
K 4.6 4.4 4.4  106 103 CO2 26 27 28 * 194* 140* BUN 18 30* 40* CREA 0.56 0.65 0.72  
CA 7.6* 7.7* 7.9* ALB 2.3* 2.4* 3.1* ALT 47 49 69 No components found for: [place]

## 2021-05-22 NOTE — PROGRESS NOTES
Bedside and Verbal shift change report given to Brandon Anguiano RN (oncoming nurse) by Virgen Washington RN (offgoing nurse). Report included the following information SBAR, Kardex, ED Summary, MAR, Recent Results and Cardiac Rhythm SR. This patient was assisted with Intentional Toileting every 2 hours during this shift. Documentation of ambulation and output reflected on Flowsheet. Bedside and Verbal shift change report given to Victor Manuel Al RN (oncoming nurse) by Brandon Anguiano RN (offgoing nurse).  Report included the following information SBAR, Kardex, ED Summary, MAR, Recent Results and Cardiac Rhythm SR.

## 2021-05-22 NOTE — PROGRESS NOTES
Problem: Diabetes Self-Management Goal: *Disease process and treatment process Description: Define diabetes and identify own type of diabetes; list 3 options for treating diabetes. Outcome: Progressing Towards Goal 
Goal: *Incorporating nutritional management into lifestyle Description: Describe effect of type, amount and timing of food on blood glucose; list 3 methods for planning meals. Outcome: Progressing Towards Goal 
Goal: *Incorporating physical activity into lifestyle Description: State effect of exercise on blood glucose levels. Outcome: Progressing Towards Goal 
Goal: *Developing strategies to promote health/change behavior Description: Define the ABC's of diabetes; identify appropriate screenings, schedule and personal plan for screenings. Outcome: Progressing Towards Goal 
Goal: *Using medications safely Description: State effect of diabetes medications on diabetes; name diabetes medication taking, action and side effects. Outcome: Progressing Towards Goal 
Goal: *Monitoring blood glucose, interpreting and using results Description: Identify recommended blood glucose targets  and personal targets. Outcome: Progressing Towards Goal 
Goal: *Prevention, detection, treatment of acute complications Description: List symptoms of hyper- and hypoglycemia; describe how to treat low blood sugar and actions for lowering  high blood glucose level. Outcome: Progressing Towards Goal 
Goal: *Prevention, detection and treatment of chronic complications Description: Define the natural course of diabetes and describe the relationship of blood glucose levels to long term complications of diabetes. Outcome: Progressing Towards Goal 
Goal: *Developing strategies to address psychosocial issues Description: Describe feelings about living with diabetes; identify support needed and support network Outcome: Progressing Towards Goal 
Goal: *Insulin pump training Outcome: Progressing Towards Goal 
Goal: *Sick day guidelines Outcome: Progressing Towards Goal 
Goal: *Patient Specific Goal (EDIT GOAL, INSERT TEXT) Outcome: Progressing Towards Goal 
  
Problem: Patient Education: Go to Patient Education Activity Goal: Patient/Family Education Outcome: Progressing Towards Goal 
  
Problem: Falls - Risk of 
Goal: *Absence of Falls Description: Document Shar Felipeamalia Fall Risk and appropriate interventions in the flowsheet. Outcome: Progressing Towards Goal 
Note: Fall Risk Interventions: 
Mobility Interventions: Bed/chair exit alarm, Communicate number of staff needed for ambulation/transfer, Patient to call before getting OOB Mentation Interventions: Adequate sleep, hydration, pain control, Bed/chair exit alarm, Eyeglasses and hearing aids, Increase mobility, More frequent rounding Medication Interventions: Bed/chair exit alarm, Evaluate medications/consider consulting pharmacy, Teach patient to arise slowly Elimination Interventions: Bed/chair exit alarm, Call light in reach, Patient to call for help with toileting needs, Stay With Me (per policy) History of Falls Interventions: Bed/chair exit alarm, Door open when patient unattended Problem: Patient Education: Go to Patient Education Activity Goal: Patient/Family Education Outcome: Progressing Towards Goal 
  
Problem: Pressure Injury - Risk of 
Goal: *Prevention of pressure injury Description: Document Edouard Scale and appropriate interventions in the flowsheet. Outcome: Progressing Towards Goal 
Note: Pressure Injury Interventions: 
Sensory Interventions: Assess changes in LOC, Assess need for specialty bed, Check visual cues for pain, Keep linens dry and wrinkle-free, Minimize linen layers Moisture Interventions: Absorbent underpads, Apply protective barrier, creams and emollients, Check for incontinence Q2 hours and as needed Activity Interventions: Assess need for specialty bed, Increase time out of bed, Pressure redistribution bed/mattress(bed type) Mobility Interventions: HOB 30 degrees or less, Pressure redistribution bed/mattress (bed type) Nutrition Interventions: Document food/fluid/supplement intake Friction and Shear Interventions: Apply protective barrier, creams and emollients, Foam dressings/transparent film/skin sealants, HOB 30 degrees or less Problem: Patient Education: Go to Patient Education Activity Goal: Patient/Family Education Outcome: Progressing Towards Goal 
  
Problem: Patient Education: Go to Patient Education Activity Goal: Patient/Family Education Outcome: Progressing Towards Goal

## 2021-05-22 NOTE — PROGRESS NOTES
Abhinav Erwin [facility] 79 
[address] [facility], [place], [address] [place] [address] 
[phone] Medical Progress Note NAME: Kai Sol :  1940 MRM:  [id number] Date/Time: 2021  3:51 PM 
 
 
 
  
Assessment / Plan: #Sepsis due to Pneumonia: Technically HAP. CT abd/pelvis showed severe stenosis of bilateral common iliac arteries, but pt has no leg pain and lactate is normal. There is also e/o cirrhosis, but minimal ascites so I have low c/f SBP. Has been fluid responsive and not required pressors - Vanc/cefepime  -  
 - taper prednisone 
 -SLP following 
 - Continue to monitor fluid responsiveness 
 - UOP and MAPs to goal 
 
#AHRF: PNA and COPD exac 
 - wean as tolerated 
 -oxygen at baseline #Multiple fractures: CT today showed multiple fractures of ribs, pubic rami, thoracic spine, etc. She is on oxycodone for chronic pain. Ultimately, with her repeat admissions and suggestion of multiple falls, palliative care is following. She does have good support from home and lives with daughter, but do feel she is having a decline #Nausea/vomiting: With epigastric discomfort. Has been on several courses of steroids so c/f gastritis. - IV PPI BID for now, can transition to PO #Bilateral common iliac stenosis: She has had multiple falls, but not sure if this is contributing. She does not complaint of leg pain, lactate normal 
 - Consider vascular input if she develops symptoms #COPD exac: 2/2 above - Steroids as above - scheduled nebs #Cirrhosis: noted on imaging. She is not presently decompensated. With regards to her overall picture, this is a worrisome diagnosis and etiology is not clear. Will have palliative assist with GOC. Could consider hepatitides, etc.  
 
#DM: A1c is 6.7%. Blood glucose stable. Continue home insulin regimen. DM diet Care Plan discussed with: Patient Discussed:  Care Plan Prophylaxis:  Lovenox Disposition: Home w/Family 
        
___________________________________________________ Attending Physician: Christiano Beck MD  
 
  
Subjective: Chief Complaint: This morning with nausea, abdominal pain. Cough, no sob ROS: 
(bold if positive, if negative) Tolerating PT  Tolerating Diet Objective:  
 
 
Vitals:  
 
 
  
Last 24hrs VS reviewed since prior progress note. Most recent are: 
 
Visit Vitals BP (!) 179/83 (BP 1 Location: Right arm, BP Patient Position: At rest) Pulse 96 Temp 97.9 °F (36.6 °C) Resp 16 Ht 5' 4\" (1.626 m) Wt 59.1 kg (130 lb 4.8 oz) SpO2 95% BMI 22.37 kg/m² SpO2 Readings from Last 6 Encounters:  
05/22/21 95% 05/13/21 97% 03/23/21 96% 03/10/21 95% 03/03/21 95% 02/27/21 96% O2 Flow Rate (L/min): 2 l/min Intake/Output Summary (Last 24 hours) at 5/22/2021 1518 Last data filed at 5/22/2021 1316 Gross per 24 hour Intake 1260 ml Output 840 ml Net 420 ml Exam:  
 
Physical Exam: 
 
Gen:  Well-developed, well-nourished, in no acute distress HEENT:  Pink conjunctivae, PERRL, hearing intact to voice, moist mucous membranes Neck:  Supple, without masses, thyroid non-tender Resp:  No accessory muscle use, clear breath sounds without wheezes rales or rhonchi 
Card:  No murmurs, normal S1, S2 without thrills, bruits or peripheral edema Abd:  Soft, non-tender, non-distended, normoactive bowel sounds are present Musc:  No cyanosis or clubbing Skin:  No rashes or ulcers, skin turgor is good Neuro:  Cranial nerves 3-12 are grossly intact,  strength is 5/5 bilaterally and dorsi / plantarflexion is 5/5 bilaterally, follows commands appropriately Psych:  Good insight, oriented to person, place and time, alert Medications Reviewed: (see below) Lab Data Reviewed: (see below) 
 
______________________________________________________________________ Medications:  
 
Current Facility-Administered Medications Medication Dose Route Frequency  [START ON 5/23/2021] Vancomycin TROUGH @0830 on 5/23/2021   Other ONCE  
 morphine injection 2 mg  2 mg IntraVENous Q3H PRN  
 FLUoxetine (PROzac) capsule 10 mg  10 mg Oral QPM  
 . PHARMACY TO SUBSTITUTE PER PROTOCOL (Reordered from: fluticasone-umeclidinium-vilanterol (Trelegy Ellipta) 100-62.5-25 mcg inhaler)    Per Protocol  gabapentin (NEURONTIN) capsule 600 mg  600 mg Oral BID  
 guaiFENesin ER (MUCINEX) tablet 600 mg  600 mg Oral BID  latanoprost (XALATAN) 0.005 % ophthalmic solution 1 Drop  1 Drop Both Eyes QHS  melatonin tablet 4.5 mg  4.5 mg Oral QHS  vancomycin (VANCOCIN) 1,250 mg in 0.9% sodium chloride 250 mL (VIAL-MATE)  1,250 mg IntraVENous Q12H  
 oxyCODONE-acetaminophen (PERCOCET) 5-325 mg per tablet 1 Tablet  1 Tablet Oral Q6H  
 senna-docusate (PERICOLACE) 8.6-50 mg per tablet 1 Tablet  1 Tablet Oral DAILY  predniSONE (DELTASONE) tablet 40 mg  40 mg Oral BID WITH MEALS  
 hydrALAZINE (APRESOLINE) 20 mg/mL injection 10 mg  10 mg IntraVENous Q6H PRN  
 atorvastatin (LIPITOR) tablet 80 mg  80 mg Oral QHS  aspirin delayed-release tablet 81 mg  81 mg Oral DAILY  clonazePAM (KlonoPIN) tablet 0.25 mg  0.25 mg Oral QPM  
 levothyroxine (SYNTHROID) tablet 75 mcg  75 mcg Oral ACB  meclizine (ANTIVERT) tablet 12.5 mg  12.5 mg Oral Q6H PRN  pantoprazole (PROTONIX) 40 mg in 0.9% sodium chloride 10 mL injection  40 mg IntraVENous Q12H  cefepime (MAXIPIME) 2 g in sterile water (preservative free) 10 mL IV syringe  2 g IntraVENous Q12H  
 glucose chewable tablet 16 g  4 Tablet Oral PRN  
 dextrose (D50W) injection syrg 12.5-25 g  25-50 mL IntraVENous PRN  
 glucagon (GLUCAGEN) injection 1 mg  1 mg IntraMUSCular PRN  
 0.9% sodium chloride infusion  100 mL/hr IntraVENous CONTINUOUS  
 sodium chloride (NS) flush 5-40 mL  5-40 mL IntraVENous Q8H  
 sodium chloride (NS) flush 5-40 mL  5-40 mL IntraVENous PRN  
 acetaminophen (TYLENOL) tablet 650 mg  650 mg Oral Q6H PRN Or  
 acetaminophen (TYLENOL) suppository 650 mg  650 mg Rectal Q6H PRN  polyethylene glycol (MIRALAX) packet 17 g  17 g Oral DAILY PRN  
 bisacodyL (DULCOLAX) suppository 10 mg  10 mg Rectal DAILY PRN  
 ondansetron (ZOFRAN) injection 4 mg  4 mg IntraVENous Q6H PRN  
 insulin glargine (LANTUS) injection 12 Units  0.2 Units/kg SubCUTAneous QHS  insulin lispro (HUMALOG) injection   SubCUTAneous AC&HS  
 melatonin (rapid dissolve) tablet 5 mg  5 mg Oral QHS PRN  
 alum-mag hydroxide-simeth (MYLANTA) oral suspension 30 mL  30 mL Oral Q4H PRN  
 LORazepam (ATIVAN) injection 0.5 mg  0.5 mg IntraVENous Q6H PRN  
 oxyCODONE IR (ROXICODONE) tablet 5 mg  5 mg Oral Q4H PRN  
 diphenhydrAMINE (BENADRYL) injection 25 mg  25 mg IntraVENous Q6H PRN  
 albuterol (PROVENTIL VENTOLIN) nebulizer solution 2.5 mg  2.5 mg Nebulization Q4H PRN  
 simethicone (MYLICON) tablet 80 mg  80 mg Oral QID PRN  
 albuterol-ipratropium (DUO-NEB) 2.5 MG-0.5 MG/3 ML  3 mL Nebulization Q6H RT Lab Review:  
 
Recent Labs  
  05/22/21 
0336 05/21/21 
0910 05/20/21 
5979 WBC 21.5* 28.5* 32.5* HGB 10.2* 10.3* 9.9*  
HCT 33.2* 33.9* 31.2*  
* 422* 321 Recent Labs  
  05/22/21 
0336 05/21/21 
0910 05/20/21 
0323 05/19/21 
1626  139 137 136  
K 4.0 4.6 4.4 4.4  107 106 103 CO2 26 26 27 28 * 234* 194* 140* BUN 20 18 30* 40* CREA 0.49* 0.56 0.65 0.72  
CA 7.4* 7.6* 7.7* 7.9* ALB  --  2.3* 2.4* 3.1* ALT  --  47 49 69 No components found for: [place]

## 2021-05-23 NOTE — PROGRESS NOTES
[address] Dosing Services: Antimicrobial Stewardship Progress Note Consult for antibiotic dosing of Vancomycin by Dr. Celina Becerril Pharmacist reviewed antibiotic appropriateness for 79 yo female for indication of HAP Day of Therapy: 5/14 Ht Readings from Last 1 Encounters:  
05/20/21 162.6 cm (64\") Wt Readings from Last 1 Encounters:  
05/23/21 61.2 kg (135 lb) Vancomycin therapy: 
Current maintenance dose: 1250(mg) every 12 hours Dose calculated to approximate a therapeutic trough of 15-20 mcg/mL. Last trough level: trough of 20.6 mcg/ml this am, drawn appropriately, slightly supratherapeutic Plan for level / Adjustment in Therapy: Will reduce dose slightly to Vancomycin 1000 mg IV q12hr and recheck trough Dose administration notes:   Doses given appropriately as scheduled PCT = 0.62 Date Dose & Interval Measured (mcg/mL) Extrapolated (mcg/mL) ? 5/21 ? 750 mg IV q12hr ? 10.7 ?10.34  
?5/23 ?1250 mg IV q12hr ?20.6 ?19.75  
? ? ? ? Other Antimicrobial  
(not dosed by pharmacist) Cefepime 2 gm IV q12hr x14 days Cultures none Serum Creatinine Lab Results Component Value Date/Time Creatinine 0.51 (L) 05/23/2021 03:58 AM  
  
  
Creatinine Clearance Estimated Creatinine Clearance: 54.4 mL/min (A) (by C-G formula based on SCr of 0.51 mg/dL (L)). Temp Temp: 97.5 °F (36.4 °C) WBC Lab Results Component Value Date/Time WBC 16.2 (H) 05/23/2021 03:58 AM  
 
  
H/H Lab Results Component Value Date/Time HGB 10.3 (L) 05/23/2021 03:58 AM  
 
  
Platelets Lab Results Component Value Date/Time PLATELET 040 (H) 71/50/3737 03:58 AM  
 
  
 
Pharmacist [address] [place] [address] information: [phone]

## 2021-05-23 NOTE — PROGRESS NOTES
Bedside and Verbal shift change report given to [address], RN (oncoming nurse) by Hari Lucas RN (offgoing nurse). Report included the following information SBAR, Kardex, ED Summary, MAR, Recent Results and Cardiac Rhythm SR. This patient was assisted with Intentional Toileting every 2 hours during this shift. Documentation of ambulation and output reflected on Flowsheet. Bedside and Verbal shift change report given to Hari Lucas RN (oncoming nurse) by [address], RN (offgoing nurse).  Report included the following information SBAR, Kardex, ED Summary, MAR, Recent Results and Cardiac Rhythm SR.

## 2021-05-23 NOTE — PROGRESS NOTES
[facility] Dosing Services: IV to PO Conversion The pharmacist has determined that this patient meets P & T approved criteria for conversion from IV to oral therapy for the following medication:Pantoprazole 40 mg IV BID The pharmacist has written the following order for the patient: Pantoprazole 40 mg po BID The pharmacist will continue to monitor the patient's status and advise the physician if conversion back to IV therapy is recommended. Signed Tracy Davidson Contact information:  [phone]

## 2021-05-23 NOTE — PROGRESS NOTES
Problem: Mobility Impaired (Adult and Pediatric) Goal: *Acute Goals and Plan of Care (Insert Text) 5/23/2021 1529 by Joel Sanchez PT Outcome: Progressing Towards Goal 
5/23/2021 1528 by Joel Sanchez, PT Note: FUNCTIONAL STATUS PRIOR TO ADMISSION: Patient was modified independent using a walker and single point cane for functional mobility. HOME SUPPORT PRIOR TO ADMISSION: The patient lived with daughter, also has caregivers 3 x per wk. Physical Therapy Goals Initiated 5/23/2021 1. Patient will move from supine to sit and sit to supine  in bed with supervision/set-up within 7 day(s). 2.  Patient will transfer from bed to chair and chair to bed with supervision/set-up using the least restrictive device within 7 day(s). 3.  Patient will perform sit to stand with supervision/set-up within 7 day(s). 4.  Patient will ambulate with supervision/set-up for 50 feet with the least restrictive device within 7 day(s). PHYSICAL THERAPY EVALUATION Patient: Xuan Sweeney (18 y.o. female) Date: 5/23/2021 Primary Diagnosis: HAP (hospital-acquired pneumonia) [J18.9, Y95] Pneumonia of both lungs due to infectious organism [J18.9] Precautions: falls ASSESSMENT Based on the objective data described below, the patient presents with decreased strength, decreased balance, and decreased mobility. Has a history of falls. At baseline she walks with a cane or her rolling walker. She has a history of multiple falls with fractured ribs, pubic rami and t-spine. Her last fall was two weeks ago. Performed transfer training to side of bed and gait training with the rolling walker to the chair. Patient with productive cough with mobility. Sputum red/brown, nursing and MD aware. Granddaughter present at end of Evaluation. on 2 L/min 99 HR 95%  at rest  
100 HR 89% after transferring to chair 99 HR 94% after 2 mins sitting.  
 
Current Level of Function Impacting Discharge (mobility/balance): min assist with gait, limited distance Functional Outcome Measure: The patient scored 20 out of 28 on the Tinetti outcome measure which is indicative of moderate fall risk. Other factors to consider for discharge: has home oxygen Patient will benefit from skilled therapy intervention to address the above noted impairments. PLAN : 
Recommendations and Planned Interventions: bed mobility training, transfer training, gait training, and therapeutic exercises Frequency/Duration: Patient will be followed by physical therapy:  5 times a week to address goals. Recommendation for discharge: (in order for the patient to meet his/her long term goals) Physical therapy at least 2 days/week in the home This discharge recommendation: 
Has not yet been discussed the attending provider and/or case management IF patient discharges home will need the following DME: patient owns DME required for discharge SUBJECTIVE:  
Patient stated I need something for anxiety.  OBJECTIVE DATA SUMMARY:  
HISTORY:   
Past Medical History:  
Diagnosis Date Anxiety and depression Arthritis Chronic obstructive pulmonary disease (HCC) Chronic pain DM type 2 causing vascular disease ([facility] [address] GERD (gastroesophageal reflux disease) Glaucoma HTN (hypertension) Hx of completed stroke 2017, 2021 Hypothyroid Incontinence Neuropathy Polypharmacy Past Surgical History:  
Procedure Laterality Date HX ORTHOPAEDIC    
 HX VASCULAR ACCESS Personal factors and/or comorbidities impacting plan of care:  
 
Home Situation Home Environment: Private residence One/Two Story Residence: Other (Comment) Living Alone: No 
Support Systems: Family member(s) Patient Expects to be Discharged to[de-identified] Private residence Current DME Used/Available at Home: [id number] EXAMINATION/PRESENTATION/DECISION MAKING:  
Critical Behavior: 
Neurologic State: Alert Orientation Level: Oriented X4 Cognition: Decreased attention/concentration Hearing: Auditory Auditory Impairment: Hard of hearing, bilateral 
 
Range Of Motion: 
AROM: Within functional limits PROM: Within functional limits Strength:   
Strength: Generally decreased, functional 
  
  
  
  
  
  
Tone & Sensation:  
Tone: Normal 
  
  
  
  
  
  
  
  
   
Coordination: 
Coordination: Within functional limits Vision:  
  
Functional Mobility: 
Bed Mobility: 
  
Supine to Sit: Moderate assistance;Assist x1;Additional time Transfers: 
Sit to Stand: Contact guard assistance Stand to Sit: Contact guard assistance;Assist x1;Additional time Balance:  
Sitting: Intact Standing: Intact; With support Ambulation/Gait Training: 
Distance (ft): 2 Feet (ft) Assistive Device: Walker, rolling;Gait belt Ambulation - Level of Assistance: Minimal assistance;Assist x1;Additional time Functional Measure: 
Tinetti test: 
 
Sitting Balance: 1 Arises: 1 Attempts to Rise: 2 Immediate Standing Balance: 1 Standing Balance: 1 Nudged: 2 Eyes Closed: 1 Turn 360 Degrees - Continuous/Discontinuous: 1 Turn 360 Degrees - Steady/Unsteady: 1 Sitting Down: 1 Balance Score: 12 Balance total score Indication of Gait: 1 
R Step Length/Height: 1 L Step Length/Height: 1 
R Foot Clearance: 1 L Foot Clearance: 1 Step Symmetry: 1 Step Continuity: 1 Path: 1 Trunk: 0 Walking Time: 0 Gait Score: 8 Gait total score Total Score: 20/28 Overall total score Tinetti Tool Score Risk of Falls 
<19 = High Fall Risk 19-24 = Moderate Fall Risk 25-28 = Low Fall Risk Tinetti ME. Performance-Oriented Assessment of Mobility Problems in Elderly Patients. [address] 66; [id number]. (Scoring Description: PT Bulletin Feb. 10, 1993) Older adults: Viviane Perez et al, 2009; n = 1601 [facility] elderly evaluated with ABC, VLAD, ADL, and IADL) · Mean VLAD score for males aged 69-68 years = 26.21(3.40) · Mean VLAD score for females age 69-68 years = 25.16(4.30) · Mean VLAD score for males over 80 years = 23.29(6.02) · Mean VLAD score for females over 80 years = 17.20(8.32) Physical Therapy Evaluation Charge Determination History Examination Presentation Decision-Making LOW Complexity : Zero comorbidities / personal factors that will impact the outcome / POC LOW Complexity : 1-2 Standardized tests and measures addressing body structure, function, activity limitation and / or participation in recreation  LOW Complexity : Stable, uncomplicated  LOW Complexity : FOTO score of  Based on the above components, the patient evaluation is determined to be of the following complexity level: LOW Pain Rating: 
Reports pain is improved after receiving pain meds Activity Tolerance:  
Good After treatment patient left in no apparent distress:  
Sitting in chair, Call bell within reach, Bed / chair alarm activated, and Caregiver / family present COMMUNICATION/EDUCATION:  
The patients plan of care was discussed with: Registered nurse, Physician, and pt's granddaughter . Fall prevention education was provided and the patient/caregiver indicated understanding., Patient/family have participated as able in goal setting and plan of care. , and Patient/family agree to work toward stated goals and plan of care. Thank you for this referral. 
Judith Ramos, PT Time Calculation: 30 mins

## 2021-05-23 NOTE — PROGRESS NOTES
Abhinav Erwin [facility] [address] 
[address] [facility], [place], [address] [place] [address] 
[phone] Medical Progress Note NAME: Ozzie Engel :  1940 MRM:  [id number] Date/Time: 2021  3:51 PM 
 
 
 
  
Assessment / Plan: #Sepsis due to Pneumonia: Technically HAP. CT abd/pelvis showed severe stenosis of bilateral common iliac arteries, but pt has no leg pain and lactate is normal. There is also e/o cirrhosis, but minimal ascites so I have low c/f SBP. Has been fluid responsive and not required pressors - Vanc/cefepime  -  to complete a 7 day course 
 - taper prednisone 
 -SLP following 
 - Continue to monitor fluid responsiveness 
 - UOP and MAPs to goal 
 
#AHRF: PNA and COPD exac  
 - wean as tolerated 
 -was discharged on oxygen after previous admission but does not use oxygen at baseline #Multiple fractures: CT today showed multiple fractures of ribs, pubic rami, thoracic spine, etc. She is on oxycodone for chronic pain. Ultimately, with her repeat admissions and suggestion of multiple falls, palliative care is following. She does have good support from home and lives with daughter, but do feel she is having a decline #Nausea/vomiting: With epigastric discomfort. Has been on several courses of steroids so c/f gastritis. - IV PPI BID for now, can transition to PO #Bilateral common iliac stenosis: She has had multiple falls, but not sure if this is contributing. She does not complaint of leg pain, lactate normal 
 - Consider vascular input if she develops symptoms #COPD exac: 2/2 above - Steroids as above - scheduled nebs #Cirrhosis: noted on imaging. She is not presently decompensated. With regards to her overall picture, this is a worrisome diagnosis and etiology is not clear. Will have palliative assist with GOC. Could consider hepatitides, etc.  
 
#DM: A1c is 6.7%. Blood glucose stable. Continue home insulin regimen. DM diet Care Plan discussed with: Patient Discussed:  Care Plan Prophylaxis:  Lovenox Disposition:  Home w/Family 
        
___________________________________________________ Attending Physician: Conchita Woodall MD  
 
  
Subjective: Chief Complaint:  Having persistent chest pain, cough with brownish/red sputum. Very anxious Objective:  
 
 
Vitals:  
 
 
  
Last 24hrs VS reviewed since prior progress note. Most recent are: 
 
Visit Vitals /67 (BP 1 Location: Right upper arm, BP Patient Position: At rest) Pulse 98 Temp 98 °F (36.7 °C) Resp 20 Ht 5' 4\" (1.626 m) Wt 61.2 kg (135 lb) SpO2 95% BMI 23.17 kg/m² SpO2 Readings from Last 6 Encounters:  
05/23/21 95% 05/13/21 97% 03/23/21 96% 03/10/21 95% 03/03/21 95% 02/27/21 96% O2 Flow Rate (L/min): 1 l/min Intake/Output Summary (Last 24 hours) at 5/23/2021 1535 Last data filed at 5/23/2021 1220 Gross per 24 hour Intake 3643.34 ml Output 1750 ml Net 1893.34 ml Exam:  
 
Physical Exam: 
 
Gen:  Well-developed, well-nourished, in no acute distress HEENT:  Pink conjunctivae, PERRL, hearing intact to voice, moist mucous membranes Neck:  Supple, without masses, thyroid non-tender Resp:  No accessory muscle use, clear breath sounds without wheezes rales or rhonchi 
Card:  No murmurs, normal S1, S2 without thrills, bruits or peripheral edema Abd:  Soft, non-tender, non-distended, normoactive bowel sounds are present Musc:  No cyanosis or clubbing Skin:  No rashes or ulcers, skin turgor is good Neuro:  Cranial nerves 3-12 are grossly intact,  strength is 5/5 bilaterally and dorsi / plantarflexion is 5/5 bilaterally, follows commands appropriately Psych:  Good insight, oriented to person, place and time, alert Medications Reviewed: (see below) Lab Data Reviewed: (see below) 
 
______________________________________________________________________ Medications:  
 
Current Facility-Administered Medications Medication Dose Route Frequency  vancomycin (VANCOCIN) 1,000 mg in 0.9% sodium chloride 250 mL (VIAL-MATE)  1,000 mg IntraVENous Q12H  pantoprazole (PROTONIX) tablet 40 mg  40 mg Oral ACB&D  
 guaiFENesin-dextromethorphan (ROBITUSSIN DM) 100-10 mg/5 mL syrup 10 mL  10 mL Oral Q6H  
 morphine injection 2 mg  2 mg IntraVENous Q3H PRN  
 FLUoxetine (PROzac) capsule 10 mg  10 mg Oral QPM  
 gabapentin (NEURONTIN) capsule 600 mg  600 mg Oral BID  
 guaiFENesin ER (MUCINEX) tablet 600 mg  600 mg Oral BID  latanoprost (XALATAN) 0.005 % ophthalmic solution 1 Drop  1 Drop Both Eyes QHS  melatonin (rapid dissolve) tablet 5 mg  5 mg Oral QHS  arformoterol 15 mcg/budesonide 0.5 mg neb solution   Nebulization BID RT  
 oxyCODONE-acetaminophen (PERCOCET) 5-325 mg per tablet 1 Tablet  1 Tablet Oral Q6H  
 senna-docusate (PERICOLACE) 8.6-50 mg per tablet 1 Tablet  1 Tablet Oral DAILY  predniSONE (DELTASONE) tablet 40 mg  40 mg Oral BID WITH MEALS  
 hydrALAZINE (APRESOLINE) 20 mg/mL injection 10 mg  10 mg IntraVENous Q6H PRN  
 atorvastatin (LIPITOR) tablet 80 mg  80 mg Oral QHS  aspirin delayed-release tablet 81 mg  81 mg Oral DAILY  clonazePAM (KlonoPIN) tablet 0.25 mg  0.25 mg Oral QPM  
 levothyroxine (SYNTHROID) tablet 75 mcg  75 mcg Oral ACB  meclizine (ANTIVERT) tablet 12.5 mg  12.5 mg Oral Q6H PRN  
 cefepime (MAXIPIME) 2 g in sterile water (preservative free) 10 mL IV syringe  2 g IntraVENous Q12H  
 glucose chewable tablet 16 g  4 Tablet Oral PRN  
 dextrose (D50W) injection syrg 12.5-25 g  25-50 mL IntraVENous PRN  
 glucagon (GLUCAGEN) injection 1 mg  1 mg IntraMUSCular PRN  
 0.9% sodium chloride infusion  100 mL/hr IntraVENous CONTINUOUS  
 sodium chloride (NS) flush 5-40 mL  5-40 mL IntraVENous Q8H  
 sodium chloride (NS) flush 5-40 mL  5-40 mL IntraVENous PRN  
 acetaminophen (TYLENOL) tablet 650 mg  650 mg Oral Q6H PRN  Or  
 acetaminophen (TYLENOL) suppository 650 mg  650 mg Rectal Q6H PRN  polyethylene glycol (MIRALAX) packet 17 g  17 g Oral DAILY PRN  
 bisacodyL (DULCOLAX) suppository 10 mg  10 mg Rectal DAILY PRN  
 ondansetron (ZOFRAN) injection 4 mg  4 mg IntraVENous Q6H PRN  
 insulin glargine (LANTUS) injection 12 Units  0.2 Units/kg SubCUTAneous QHS  insulin lispro (HUMALOG) injection   SubCUTAneous AC&HS  
 melatonin (rapid dissolve) tablet 5 mg  5 mg Oral QHS PRN  
 alum-mag hydroxide-simeth (MYLANTA) oral suspension 30 mL  30 mL Oral Q4H PRN  
 LORazepam (ATIVAN) injection 0.5 mg  0.5 mg IntraVENous Q6H PRN  
 oxyCODONE IR (ROXICODONE) tablet 5 mg  5 mg Oral Q4H PRN  
 diphenhydrAMINE (BENADRYL) injection 25 mg  25 mg IntraVENous Q6H PRN  
 albuterol (PROVENTIL VENTOLIN) nebulizer solution 2.5 mg  2.5 mg Nebulization Q4H PRN  
 simethicone (MYLICON) tablet 80 mg  80 mg Oral QID PRN  
 albuterol-ipratropium (DUO-NEB) 2.5 MG-0.5 MG/3 ML  3 mL Nebulization Q6H RT Lab Review:  
 
Recent Labs  
  05/23/21 0358 05/22/21 
0336 05/21/21 
0910 WBC 16.2* 21.5* 28.5* HGB 10.3* 10.2* 10.3* HCT 34.3* 33.2* 33.9*  
* 441* 422* Recent Labs  
  05/23/21 0358 05/22/21 
0336 05/21/21 
0910  137 139  
K 3.8 4.0 4.6 * 107 107 CO2 27 26 26 * 232* 234* BUN 15 20 18 CREA 0.51* 0.49* 0.56  
CA 7.5* 7.4* 7.6* ALB  --   --  2.3* ALT  --   --  47 No components found for: [place]

## 2021-05-23 NOTE — PROGRESS NOTES
Bedside and Verbal shift change report given to Martha (oncoming nurse) by Steven Augustin (offgoing nurse). Report included the following information SBAR, Kardex, Intake/Output, MAR, Recent Results and Cardiac Rhythm sinus.

## 2021-05-24 NOTE — PROGRESS NOTES
Palliative Medicine Consult Lisandro: [phone] Patient Name: Ziggy Jewell YOB: 1940 Date of Initial Consult: 2021 Reason for Consult: [address] discussion Requesting Provider: Dr. Destini Albarran Primary Care Physician: Trupti Thomas MD 
 
 SUMMARY:  
Ziggy Jewell is a 80 y.o. 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 Patient presented to ER w/ cc of worsening SOB and new onset of chills over past couple of days. Patient had just been discharged home with antibiotics, on  from hospitalization for CAP. Patient was admitted on 2021 from [facility] with a diagnosis of Sepsis and hypoxia  2/2 Worsening PNA . This is the patients 6th hospitalization since 2021, Current medical issues leading to Palliative Medicine involvement include: C discussion Psychosocial hx: Patient from [place], she is ,  of 48 years  2020. Had 4 children, 2 daughters . She moved to [facility] on 2020 to live with daughter Claire Varma and her . Patients son, Collette Christensen lives in [place] with his family, however he has come to [facility] to help assist with patients care. Patient has multiple grandchildren. Her jennifer is important to her. Patient stated she is worried about her sons health and is trying to protect him from worrying about her. PALLIATIVE DIAGNOSES:  
1. Hypoxia 2. Chest congestion 3. Pain 4. Anxiety 5. Advanced Care Planning discussion 6. DNR discussion 7. Goals of Care Discussion PLAN:  
1. Patient was seen, no family at bedside. 2. Patient vaguely recalled me from previous visit.  
· Anxiety- controlled with lorazepam 0.5 mg IV every 6 hours as needed, as been using approx x4 over 24 hour period x 2days. Recommend continuing with prn lorazepam at this time/ 
3. Pain-uncontrolled, rated 7/10. Etiology chronic back pain 2/2 history of multiple compression fractures. Chest and rib pain secondary to multiple fractures. · At time of initial visit I had scheduled percocet 5/325 every 6 hours, however over past 2 days she has not taken it consistently despite being scheduled, and instead has had an increased usage of order for PRN IV  morphine 2 mg (used x 4 over past 24 hours). She also received  Oxycodone 5mg IR x 1 over past 24 · She does not need 2 prn orders, therefore I will d/c oxy IR 5 mg · Recommend continuing with scheduled percocet and using prn IV Morphine order for breakthrough pain only. 4. Hypoxia-Improved. Still requiring 2L. patient still with productive coug. Etiology includes worsening PNA in setting of COPD and pulmonary fibrosis as well as small pleural effusion. . 
5. Goals of care discussion- Patient indicated that she plans to be discharged back home with daughter. Last week I spoke with daughter and she shared concerns about being able to care for her mother with worsening debility. · I am very concerned that patient is much weaker and more debilitated than she had been PTA. I will reach out to her daughter tomorrow to discuss plan, particularly because on Friday of last week, daughter had shared similar concerns about being able to care for her mom at home, had told me she would need to go to SNF. 6.  Initial consult note routed to primary continuity provider and/or primary health care team members 7. Communicated plan of care with: Palliative Carmine AVENDANO 192 Team, Jerilyn LO CM 
 
 GOALS OF CARE / TREATMENT PREFERENCES:  
 
GOALS OF CARE: 
Patient/Health Care Proxy Stated Goals: Cure TREATMENT PREFERENCES:  
Code Status: DNR Advance Care Planning: 
[x] [facility] Interdisciplinary Team has updated the ACP Navigator with Health Care Decision Maker and Patient Capacity Primary Decision Maker: Cassia Francis - Daughter - [phone] Secondary Decision Maker: Katia Ye - Son - [phone] Advance Care Planning 5/19/2021 Patient's Healthcare Decision Maker is: - Confirm Advance Directive Yes, on file Patient Would Like to Complete Advance Directive - Medical Interventions: Full interventions Other Instructions: Other: As far as possible, the palliative care team has discussed with patient / health care proxy about goals of care / treatment preferences for patient. HISTORY:  
 
History obtained from: medical records/nurse/patient CHIEF COMPLAINT: Im in pain and I need something for anxiety HPI/SUBJECTIVE: The patient is:  
[x] Verbal and participatory [] Non-participatory due to:  
  
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 Albumin 2.3 Clinical Pain Assessment (nonverbal scale for severity on nonverbal patients):  
Clinical Pain Assessment Severity: 10 Location: chest and back Character: unknown Duration: unknown Effect: unknown Factors: unknown Frequency: unknown Activity (Movement): Lying quietly, normal position Duration: for how long has pt been experiencing pain (e.g., 2 days, 1 month, years) Frequency: how often pain is an issue (e.g., several times per day, once every few days, constant) FUNCTIONAL ASSESSMENT:  
 
Palliative Performance Scale (PPS): PPS: 40  PSYCHOSOCIAL/SPIRITUAL SCREENING:  
 
Palliative IDT has assessed this patient for cultural preferences / practices and a referral made as appropriate to needs (Cultural Services, Patient Advocacy, Ethics, etc.) Any spiritual / Sikhism concerns: 
[] Yes /  [x] No 
 
Caregiver Burnout: 
[] Yes /  [] No /  [x] No Caregiver Present Anticipatory grief assessment:  
[x] Normal  / [] Maladaptive ESAS Anxiety: Anxiety: 8 ESAS Depression:    
 
 
 REVIEW OF SYSTEMS:  
 
Positive and pertinent negative findings in ROS are noted above in HPI. The following systems were [x] reviewed / [] unable to be reviewed as noted in HPI Other findings are noted below. Systems: constitutional, ears/nose/mouth/throat, respiratory, gastrointestinal, genitourinary, musculoskeletal, integumentary, neurologic, psychiatric, endocrine. Positive findings noted below. Modified ESAS Completed by: provider Fatigue: 7 Drowsiness: 0 Pain: 10 Anxiety: 8 Anorexia: 5 Dyspnea: 2 Constipation: No  
  Stool Occurrence(s): 1 PHYSICAL EXAM:  
 
From RN flowsheet: 
Wt Readings from Last 3 Encounters:  
05/24/21 135 lb 8 oz (61.5 kg) 05/09/21 129 lb (58.5 kg) 03/23/21 129 lb (58.5 kg) Blood pressure (!) 179/86, pulse 96, temperature 97.7 °F (36.5 °C), resp. rate 17, height 5' 4\" (1.626 m), weight 135 lb 8 oz (61.5 kg), SpO2 96 %. Pain Scale 1: Numeric (0 - 10) Pain Intensity 1: 7 Pain Onset 1: chronic Pain Location 1: Back Pain Orientation 1: Posterior Pain Description 1: Aching Pain Intervention(s) 1: Medication (see MAR) Last bowel movement, if known:  
 
Constitutional: appears stated age, adequately nourished, sleeping, woke easily, NAD Eyes: pupils equal, anicteric ENMT: no nasal discharge, dry mucous membranes Cardiovascular: regular rhythm, distal pulses intact Respiratory: breathing not labored, symmetric, frequent +productive congested cough, coarse breath sounds. Gastrointestinal: soft non-tender, +bowel sounds Musculoskeletal: Left wrist/hand contracture, no tenderness to palpation Skin: warm, dry Neurologic: alert and oriented,  following commands, Left upper arm/hand limited movement, moving all extremities Psychiatric: appropriate affect, no hallucinations Other: 
 
 
 HISTORY:  
 
Active Problems: 
  HTN (hypertension) (1/3/2021) Type II diabetes mellitus ([facility] [address] (1/3/2021) Hypothyroid (1/3/2021) Acute respiratory insufficiency (5/10/2021) HAP (hospital-acquired pneumonia) (2021) Pneumonia of both lungs due to infectious organism (2021) Past Medical History:  
Diagnosis Date  Anxiety and depression  Arthritis  Chronic obstructive pulmonary disease ([facility] [address]  Chronic pain  DM type 2 causing vascular disease ([facility] [address]  GERD (gastroesophageal reflux disease)  Glaucoma   
 HTN (hypertension)  Hx of completed stroke 2021  Hypothyroid  Incontinence  Neuropathy  Polypharmacy Past Surgical History:  
Procedure Laterality Date  HX ORTHOPAEDIC    
 HX VASCULAR ACCESS Family History Problem Relation Age of Onset  No Known Problems Other   
     reviewed, patient did not know  Diabetes Mother  Heart Attack Mother  Cancer Father History reviewed, no pertinent family history. Social History Tobacco Use  Smoking status: Former Smoker Quit date: 1991 Years since quittin.4  Smokeless tobacco: Never Used Substance Use Topics  Alcohol use: Never Allergies Allergen Reactions  Shellfish Derived Anaphylaxis Current Facility-Administered Medications Medication Dose Route Frequency  [START ON 2021] Vancomycin TROUGH @0830 on 2021   Other ONCE  
 cefepime (MAXIPIME) 2 g in sterile water (preservative free) 10 mL IV syringe  2 g IntraVENous Q12H  
 [START ON 2021] predniSONE (DELTASONE) tablet 40 mg  40 mg Oral DAILY WITH BREAKFAST  vancomycin (VANCOCIN) 1,000 mg in 0.9% sodium chloride 250 mL (VIAL-MATE)  1,000 mg IntraVENous Q12H  pantoprazole (PROTONIX) tablet 40 mg  40 mg Oral ACB&D  guaiFENesin-dextromethorphan (ROBITUSSIN DM) 100-10 mg/5 mL syrup 10 mL  10 mL Oral Q6H  
 lidocaine 4 % patch 1 Patch  1 Patch TransDERmal Q24H  
 morphine injection 2 mg  2 mg IntraVENous Q3H PRN  
 FLUoxetine (PROzac) capsule 10 mg  10 mg Oral QPM  
 gabapentin (NEURONTIN) capsule 600 mg  600 mg Oral BID  
 guaiFENesin ER (MUCINEX) tablet 600 mg  600 mg Oral BID  latanoprost (XALATAN) 0.005 % ophthalmic solution 1 Drop  1 Drop Both Eyes QHS  melatonin (rapid dissolve) tablet 5 mg  5 mg Oral QHS  arformoterol 15 mcg/budesonide 0.5 mg neb solution   Nebulization BID RT  
 oxyCODONE-acetaminophen (PERCOCET) 5-325 mg per tablet 1 Tablet  1 Tablet Oral Q6H  
 senna-docusate (PERICOLACE) 8.6-50 mg per tablet 1 Tablet  1 Tablet Oral DAILY  hydrALAZINE (APRESOLINE) 20 mg/mL injection 10 mg  10 mg IntraVENous Q6H PRN  
 atorvastatin (LIPITOR) tablet 80 mg  80 mg Oral QHS  aspirin delayed-release tablet 81 mg  81 mg Oral DAILY  clonazePAM (KlonoPIN) tablet 0.25 mg  0.25 mg Oral QPM  
 levothyroxine (SYNTHROID) tablet 75 mcg  75 mcg Oral ACB  meclizine (ANTIVERT) tablet 12.5 mg  12.5 mg Oral Q6H PRN  
 glucose chewable tablet 16 g  4 Tablet Oral PRN  
 dextrose (D50W) injection syrg 12.5-25 g  25-50 mL IntraVENous PRN  
 glucagon (GLUCAGEN) injection 1 mg  1 mg IntraMUSCular PRN  
 0.9% sodium chloride infusion  100 mL/hr IntraVENous CONTINUOUS  
 sodium chloride (NS) flush 5-40 mL  5-40 mL IntraVENous Q8H  
 sodium chloride (NS) flush 5-40 mL  5-40 mL IntraVENous PRN  
 acetaminophen (TYLENOL) tablet 650 mg  650 mg Oral Q6H PRN Or  
 acetaminophen (TYLENOL) suppository 650 mg  650 mg Rectal Q6H PRN  polyethylene glycol (MIRALAX) packet 17 g  17 g Oral DAILY PRN  
 bisacodyL (DULCOLAX) suppository 10 mg  10 mg Rectal DAILY PRN  
 ondansetron (ZOFRAN) injection 4 mg  4 mg IntraVENous Q6H PRN  
 insulin glargine (LANTUS) injection 12 Units  0.2 Units/kg SubCUTAneous QHS  insulin lispro (HUMALOG) injection   SubCUTAneous AC&HS  
 melatonin (rapid dissolve) tablet 5 mg  5 mg Oral QHS PRN  
 alum-mag hydroxide-simeth (MYLANTA) oral suspension 30 mL  30 mL Oral Q4H PRN  
 LORazepam (ATIVAN) injection 0.5 mg  0.5 mg IntraVENous Q6H PRN  
 oxyCODONE IR (ROXICODONE) tablet 5 mg  5 mg Oral Q4H PRN  
 diphenhydrAMINE (BENADRYL) injection 25 mg  25 mg IntraVENous Q6H PRN  
 albuterol (PROVENTIL VENTOLIN) nebulizer solution 2.5 mg  2.5 mg Nebulization Q4H PRN  
 simethicone (MYLICON) tablet 80 mg  80 mg Oral QID PRN  
 albuterol-ipratropium (DUO-NEB) 2.5 MG-0.5 MG/3 ML  3 mL Nebulization Q6H RT  
 
 
 
 LAB AND IMAGING FINDINGS:  
 
Lab Results Component Value Date/Time WBC 14.5 (H) 05/24/2021 05:40 AM  
 HGB 9.3 (L) 05/24/2021 05:40 AM  
 PLATELET 444 25/52/7750 05:40 AM  
 
Lab Results Component Value Date/Time Sodium 140 05/24/2021 05:40 AM  
 Potassium 3.5 05/24/2021 05:40 AM  
 Chloride 108 05/24/2021 05:40 AM  
 CO2 31 05/24/2021 05:40 AM  
 BUN 11 05/24/2021 05:40 AM  
 Creatinine 0.41 (L) 05/24/2021 05:40 AM  
 Calcium 7.3 (L) 05/24/2021 05:40 AM  
 Magnesium 1.8 03/09/2021 03:23 AM  
 Phosphorus 3.6 03/10/2021 03:15 AM  
  
Lab Results Component Value Date/Time Alk. phosphatase 73 05/21/2021 09:10 AM  
 Protein, total 5.2 (L) 05/21/2021 09:10 AM  
 Albumin 2.3 (L) 05/21/2021 09:10 AM  
 Globulin 2.9 05/21/2021 09:10 AM  
 
Lab Results Component Value Date/Time INR 1.2 (H) 03/09/2021 03:23 AM  
 Prothrombin time 12.8 (H) 03/09/2021 03:23 AM  
 aPTT 28.6 03/09/2021 03:23 AM  
  
Lab Results Component Value Date/Time Iron 16 (L) 05/09/2021 08:21 PM  
 TIBC 278 05/09/2021 08:21 PM  
 Iron % saturation 6 (L) 05/09/2021 08:21 PM  
  
No results found for: PH, PCO2, PO2 No components found for: [place] No results found for: CPK, CKMB Total time: 25min Counseling / coordination time, spent as noted above: 10 
> 50% counseling / coordination?: yes Prolonged service was provided for  []30 min   []75 min in face to face time in the presence of the patient, spent as noted above. Time Start/END:  
Time Start/ End:  
Note: this can only be billed with 21723 (initial) or 16386 (follow up). If multiple start / stop times, list each separately.

## 2021-05-24 NOTE — PROGRESS NOTES
12:20pm:  CM met with patient and daughter, Malena Plasencia. Patient's daughter stated she cannot assist patient at home. SNF list provided. Patient about to transfer to the 5th floor. Name of covering CM on 5th floor provided to patient and daughter. Transition of Care Plan - RUR 43% (high risk): 1. CM to follow and monitor patient response to treatment and recommendations. Plan is to complete 2. Patient is a readmission. She has family support and caregivers 3/day week. Home O2 through [employer]. 3. Anticipate discharge home after completion of 7 day course of Vanc/cefepime 5/20 - 5/26/21. 
4. Patient is currently open with [address] for home health. Resumption orders have been sent to [address]. Family now stating more support needed post d/c. SNF list provided. 5. Outpatient follow-up 6. Family will provide transport at discharge.  
 
Milka Jimenez LCSW

## 2021-05-24 NOTE — PROGRESS NOTES
[facility] [address] 
[address] [facility], [address] [place] [address] 
[phone] Medical Progress Note NAME: Meenu Monge :  1940 MRM:  [id number] Date/Time: 2021  3:51 PM 
 
 
 
  
Assessment / Plan: #Sepsis due to Pneumonia: Technically HAP. CT abd/pelvis showed severe stenosis of bilateral common iliac arteries, but pt has no leg pain and lactate is normal. There is also e/o cirrhosis, but minimal ascites so I have low c/f SBP. Has been fluid responsive and not required pressors - Vanc/cefepime  -  to complete a 7 day course 
 - taper prednisone 
 -SLP following 
 - Continue to monitor fluid responsiveness 
 - UOP and MAPs to goal 
 
#AHRF: PNA and COPD exac  
 - wean as tolerated 
 -was discharged on oxygen after previous admission but does not use oxygen at baseline, down to 1L #Multiple fractures: CT today showed multiple fractures of ribs, pubic rami, thoracic spine, etc. She is on oxycodone for chronic pain. Ultimately, with her repeat admissions and suggestion of multiple falls, palliative care is following. She does have good support from home and lives with daughter, but do feel she is having a decline #Nausea/vomiting: With epigastric discomfort. Has been on several courses of steroids so c/f gastritis. - IV PPI BID for now, can transition to PO #Bilateral common iliac stenosis: She has had multiple falls, but not sure if this is contributing. She does not complaint of leg pain, lactate normal 
 - Consider vascular input if she develops symptoms #COPD exac: 2/2 above - Steroids as above - scheduled nebs #Cirrhosis: noted on imaging. She is not presently decompensated. With regards to her overall picture, this is a worrisome diagnosis and etiology is not clear. Will have palliative assist with GOC. Could consider hepatitides, etc.  
 
#DM: A1c is 6.7%. Blood glucose stable. Continue home insulin regimen. DM diet Care Plan discussed with: Patient Discussed:  Care Plan Prophylaxis:  Lovenox Disposition:  Home w/Family 
        
___________________________________________________ Attending Physician: Bhumika Henson MD  
 
  
Subjective: Chief Complaint:  Having persistent chest pain,  Very anxious Objective:  
 
 
Vitals:  
 
 
  
Last 24hrs VS reviewed since prior progress note. Most recent are: 
 
Visit Vitals BP (!) 142/70 Pulse 87 Temp 98.2 °F (36.8 °C) Resp 18 Ht 5' 4\" (1.626 m) Wt 61.5 kg (135 lb 8 oz) SpO2 94% BMI 23.26 kg/m² SpO2 Readings from Last 6 Encounters:  
05/24/21 94% 05/13/21 97% 03/23/21 96% 03/10/21 95% 03/03/21 95% 02/27/21 96% O2 Flow Rate (L/min): 1 l/min Intake/Output Summary (Last 24 hours) at 5/24/2021 1254 Last data filed at 5/24/2021 1233 Gross per 24 hour Intake 720 ml Output 1300 ml Net -580 ml Exam:  
 
Physical Exam: 
 
Gen:  Well-developed, well-nourished, in no acute distress HEENT:  Pink conjunctivae, PERRL, hearing intact to voice, moist mucous membranes Neck:  Supple, without masses, thyroid non-tender Resp:  No accessory muscle use, clear breath sounds without wheezes rales or rhonchi 
Card:  No murmurs, normal S1, S2 without thrills, bruits or peripheral edema Abd:  Soft, non-tender, non-distended, normoactive bowel sounds are present Musc:  No cyanosis or clubbing Skin:  No rashes or ulcers, skin turgor is good Neuro:  Cranial nerves 3-12 are grossly intact,  strength is 5/5 bilaterally and dorsi / plantarflexion is 5/5 bilaterally, follows commands appropriately Psych:  Good insight, oriented to person, place and time, alert Medications Reviewed: (see below) Lab Data Reviewed: (see below) 
 
______________________________________________________________________ Medications:  
 
Current Facility-Administered Medications Medication Dose Route Frequency  [START ON 5/25/2021] Vancomycin TROUGH @0830 on 5/25/2021   Other ONCE  
 cefepime (MAXIPIME) 2 g in sterile water (preservative free) 10 mL IV syringe  2 g IntraVENous Q12H  
 vancomycin (VANCOCIN) 1,000 mg in 0.9% sodium chloride 250 mL (VIAL-MATE)  1,000 mg IntraVENous Q12H  pantoprazole (PROTONIX) tablet 40 mg  40 mg Oral ACB&D  
 guaiFENesin-dextromethorphan (ROBITUSSIN DM) 100-10 mg/5 mL syrup 10 mL  10 mL Oral Q6H  
 lidocaine 4 % patch 1 Patch  1 Patch TransDERmal Q24H  
 morphine injection 2 mg  2 mg IntraVENous Q3H PRN  
 FLUoxetine (PROzac) capsule 10 mg  10 mg Oral QPM  
 gabapentin (NEURONTIN) capsule 600 mg  600 mg Oral BID  
 guaiFENesin ER (MUCINEX) tablet 600 mg  600 mg Oral BID  latanoprost (XALATAN) 0.005 % ophthalmic solution 1 Drop  1 Drop Both Eyes QHS  melatonin (rapid dissolve) tablet 5 mg  5 mg Oral QHS  arformoterol 15 mcg/budesonide 0.5 mg neb solution   Nebulization BID RT  
 oxyCODONE-acetaminophen (PERCOCET) 5-325 mg per tablet 1 Tablet  1 Tablet Oral Q6H  
 senna-docusate (PERICOLACE) 8.6-50 mg per tablet 1 Tablet  1 Tablet Oral DAILY  predniSONE (DELTASONE) tablet 40 mg  40 mg Oral BID WITH MEALS  
 hydrALAZINE (APRESOLINE) 20 mg/mL injection 10 mg  10 mg IntraVENous Q6H PRN  
 atorvastatin (LIPITOR) tablet 80 mg  80 mg Oral QHS  aspirin delayed-release tablet 81 mg  81 mg Oral DAILY  clonazePAM (KlonoPIN) tablet 0.25 mg  0.25 mg Oral QPM  
 levothyroxine (SYNTHROID) tablet 75 mcg  75 mcg Oral ACB  meclizine (ANTIVERT) tablet 12.5 mg  12.5 mg Oral Q6H PRN  
 glucose chewable tablet 16 g  4 Tablet Oral PRN  
 dextrose (D50W) injection syrg 12.5-25 g  25-50 mL IntraVENous PRN  
 glucagon (GLUCAGEN) injection 1 mg  1 mg IntraMUSCular PRN  
 0.9% sodium chloride infusion  100 mL/hr IntraVENous CONTINUOUS  
 sodium chloride (NS) flush 5-40 mL  5-40 mL IntraVENous Q8H  
 sodium chloride (NS) flush 5-40 mL  5-40 mL IntraVENous PRN  
 acetaminophen (TYLENOL) tablet 650 mg  650 mg Oral Q6H PRN Or  
 acetaminophen (TYLENOL) suppository 650 mg  650 mg Rectal Q6H PRN  polyethylene glycol (MIRALAX) packet 17 g  17 g Oral DAILY PRN  
 bisacodyL (DULCOLAX) suppository 10 mg  10 mg Rectal DAILY PRN  
 ondansetron (ZOFRAN) injection 4 mg  4 mg IntraVENous Q6H PRN  
 insulin glargine (LANTUS) injection 12 Units  0.2 Units/kg SubCUTAneous QHS  insulin lispro (HUMALOG) injection   SubCUTAneous AC&HS  
 melatonin (rapid dissolve) tablet 5 mg  5 mg Oral QHS PRN  
 alum-mag hydroxide-simeth (MYLANTA) oral suspension 30 mL  30 mL Oral Q4H PRN  
 LORazepam (ATIVAN) injection 0.5 mg  0.5 mg IntraVENous Q6H PRN  
 oxyCODONE IR (ROXICODONE) tablet 5 mg  5 mg Oral Q4H PRN  
 diphenhydrAMINE (BENADRYL) injection 25 mg  25 mg IntraVENous Q6H PRN  
 albuterol (PROVENTIL VENTOLIN) nebulizer solution 2.5 mg  2.5 mg Nebulization Q4H PRN  
 simethicone (MYLICON) tablet 80 mg  80 mg Oral QID PRN  
 albuterol-ipratropium (DUO-NEB) 2.5 MG-0.5 MG/3 ML  3 mL Nebulization Q6H RT Lab Review:  
 
Recent Labs  
  05/24/21 
0540 05/23/21 
0358 05/22/21 
0096 WBC 14.5* 16.2* 21.5* HGB 9.3* 10.3* 10.2* HCT 29.9* 34.3* 33.2*  
 462* 441* Recent Labs  
  05/24/21 
0540 05/23/21 
0358 05/22/21 
6223  141 137  
K 3.5 3.8 4.0  
 109* 107 CO2 31 27 26 * 196* 232* BUN 11 15 20 CREA 0.41* 0.51* 0.49* CA 7.3* 7.5* 7.4* No components found for: [place]

## 2021-05-24 NOTE — PROGRESS NOTES
Physical Therapy 1500 Pt received in bed, increased WOB noted at rest. O2 sat 94% HR 97 at rest. Pt reports too fatigue to participate with therapy. PT will continue to follow. Oxana Medina

## 2021-05-24 NOTE — PROGRESS NOTES
Occupational Therapy Note: 
Chart reviewed and spoke with nursing. Attempted this AM and patient was transferring to a different floor and unavailable. Attempted back this afternoon and patient unable to tolerate activity. Patient was received in bed with increased WOB declining participation d/t increased fatigue. Will follow-up next tx day as appropriate.    
Rita Monsalve, OTR/L

## 2021-05-24 NOTE — PROGRESS NOTES
TRANSFER - OUT REPORT: 
 
Verbal report given to Nani Ewing on Elaina Macedo  being transferred to the 5th floor for transfer of care. Report consisted of patients Situation, Background, Assessment and Recommendations(SBAR). Doris Nielsen, LCSW

## 2021-05-24 NOTE — PROGRESS NOTES
0700 Bedside shift change report given to WALLY Montgomery (oncoming nurse) by Vonnie Bence, RN (offgoing nurse). Report included the following information SBAR, Kardex, ED Summary, Intake/Output, MAR, Recent Results and Cardiac Rhythm NSR.  
 
1215 TRANSFER - OUT REPORT: 
 
Verbal report given to Radha Mcnally RN (name) on Colton Kaplan  being transferred to [facility](unit) for routine progression of care Report consisted of patients Situation, Background, Assessment and  
Recommendations(SBAR). Information from the following report(s) SBAR, Kardex, ED Summary, Intake/Output, MAR, Recent Results and Cardiac Rhythm NSR was reviewed with the receiving nurse. Lines:  
Venous Access Device power port 02/25/21 Upper chest (subclavicular area, right (Active) Venous Access Device Port 05/19/21 (Active) Central Line Being Utilized Yes 05/24/21 0877 Criteria for Appropriate Use Limited/no vessel suitable for conventional peripheral access 05/24/21 0749 Site Assessment Clean, dry, & intact 05/24/21 0749 Date of Last Dressing Change 05/19/21 05/24/21 0749 Dressing Status Clean, dry, & intact 05/24/21 0749 Dressing Type Transparent 05/24/21 8662 Action Taken Open ports on tubing capped 05/24/21 0749 Date Accessed (Medial Site) 05/19/21 05/20/21 0800 Positive Blood Return (Medial Site) Yes 05/23/21 1614 Action Taken (Medial Site) Infusing;Flushed 05/23/21 1614 Alcohol Cap Used Yes 05/24/21 0749 Opportunity for questions and clarification was provided. Patient transported with: 
 Monitor O2 @ 1 liters Registered Nurse Tech This patient was assisted with Intentional Toileting every 2 hours during this shift. Documentation of ambulation and output reflected on Flowsheet.

## 2021-05-24 NOTE — PROGRESS NOTES
Bedside shift change report given to Jazmine Marte (oncoming nurse) by Josemanuel Olson (offgoing nurse). Report included the following information SBAR, Kardex, Intake/Output, MAR and Recent Results.

## 2021-05-25 NOTE — PROGRESS NOTES
Problem: Mobility Impaired (Adult and Pediatric) Goal: *Acute Goals and Plan of Care (Insert Text) Outcome: Progressing Towards Goal 
Note: PHYSICAL THERAPY TREATMENT Patient: Dorothy Carcamo (69 y.o. female) Date: 5/25/2021 Diagnosis: HAP (hospital-acquired pneumonia) [J18.9, Y95] Pneumonia of both lungs due to infectious organism [J18.9] <principal problem not specified> Precautions:   
Chart, physical therapy assessment, plan of care and goals were reviewed. ASSESSMENT Patient continues with skilled PT services and progressing towards goals. Increased time and increased need for assistance x 2 with bed mobility. And transfer to chair and fatigues quickly with limited activity Demonstrated difficulty managing RW with LLE outside frame  Dyspneic throughout session using 2L supplemental O2. O2 sat 89% with activity, recovered to 92% sitting in chair Current Level of Function Impacting Discharge (mobility/balance): assist x 2 Other factors to consider for discharge: PLAN : 
Patient continues to benefit from skilled intervention to address the above impairments. Continue treatment per established plan of care. to address goals. Recommendation for discharge: (in order for the patient to meet his/her long term goals) Therapy up to 5 days/week in SNF setting This discharge recommendation: 
Has been made in collaboration with the attending provider and/or case management IF patient discharges home will need the following DME: to be determined (TBD) SUBJECTIVE:  
Patient stated i am tired.  OBJECTIVE DATA SUMMARY:  
Critical Behavior: 
Neurologic State: Alert Orientation Level: Oriented to person Cognition: Follows commands Functional Mobility Training: 
Bed Mobility: 
  
Supine to Sit: Moderate assistance; Additional time Transfers: 
Sit to Stand: Assist x2; Moderate assistance; Additional time Stand to Sit: Minimum assistance;Assist x2; Additional time Balance: 
Sitting: High guard Standing: Impaired; With support Ambulation/Gait Training: 
Distance (ft): 3 Feet (ft) Assistive Device: Walker, rolling;Gait belt Ambulation - Level of Assistance: Assist x2;Minimal assistance Stairs: Therapeutic Exercises:  
 
Pain Rating: 
Denies pain Activity Tolerance:  
Fair and observed SOB with activity After treatment patient left in no apparent distress:  
Sitting in chair, Call bell within reach, and Caregiver / family present COMMUNICATION/COLLABORATION:  
The patients plan of care was discussed with: Registered nurseWhitley Darby Time Calculation: 23 mins

## 2021-05-25 NOTE — PROGRESS NOTES
Problem: Dysphagia (Adult) Goal: *Acute Goals and Plan of Care (Insert Text) Description: Swallowing goals initiated 5-20-21: 
1)  tolerate dysphagia 2, thins without s/s aspiration by 5-23-21 MET Outcome: Resolved/Met SPEECH LANGUAGE PATHOLOGY DYSPHAGIA TREATMENT/DISCHARGE Patient: Farnaz Duncan (89 y.o. female) Date: 5/25/2021 Diagnosis: HAP (hospital-acquired pneumonia) [J18.9, Y95] Pneumonia of both lungs due to infectious organism [J18.9] <principal problem not specified> Precautions:    
 
ASSESSMENT: 
Patient seen with breakfast tray this morning, and suspect WFL pharyngeal swallow. Patient swallowed all meds whole with water by straw with no difficulty. Intermittent coughing noted throughout session, but it did not appear related to any specific consistency or even to PO intake. Although patient is at risk for aspiration secondary to COPD, patient with suspected WFL swallow at bedside and respiratory status is improving. If respiratory status worsens, would recommend MBS. PLAN: 
-Continue dysphagia 2/thin liquid diet 
-If respiratory status worsens, can complete MBS Patient will be discharged from acute skilled speech therapy at this time. Rationale for discharge: 
Goals achieved Discharge Recommendations:  None SUBJECTIVE:  
Patient stated I love coffee in the morning. It makes me feel good.  OBJECTIVE:  
Cognitive and Communication Status: 
Neurologic State: Alert Orientation Level: Oriented to person Cognition: Follows commands Perception: Appears intact Dysphagia Treatment: P.O. Trials: 
Patient Position: upright in bed Vocal quality prior to P.O.: No impairment Consistency Presented: Thin liquid;Mechanical soft;Pill/Tablet; Other (comment) (pills given by RN) How Presented: Self-fed/presented;Cup/sip;Straw;Successive swallows Bolus Acceptance: No impairment Bolus Formation/Control: Impaired Type of Impairment: Delayed;Mastication Propulsion: No impairment Oral Residue: None Initiation of Swallow: No impairment Laryngeal Elevation: Functional 
Aspiration Signs/Symptoms: Strong cough; Other (comment) (did not appear related to PO or any specific consitenc) NOMS:  
The NOMS functional outcome measure was used to quantify this patient's level of swallowing impairment. Based on the NOMS, the patient was determined to be at level 4 for swallow function NOMS Swallowing Levels: 
Level 1 (CN): NPO Level 2 (CM): NPO but takes consistency in therapy Level 3 (CL): Takes less than 50% of nutrition p.o. and continues with nonoral feedings; and/or safe with mod cues; and/or max diet restriction Level 4 (CK): Safe swallow but needs mod cues; and/or mod diet restriction; and/or still requires some nonoral feeding/supplements Level 5 (CJ): Safe swallow with min diet restriction; and/or needs min cues Level 6 (CI): Independent with p.o.; rare cues; usually self cues; may need to avoid some foods or needs extra time Level 7 (CH): Independent for all p.o. АНДРЕЙ. (2003). National Outcomes Measurement System (NOMS): Adult Speech-Language Pathology User's Guide. Pain: 
Pain Scale 1: Numeric (0 - 10) Pain Intensity 1: 0 Pain Location 1: Back; Abdomen After treatment:  
Patient left in no apparent distress in bed, Call bell within reach, and Nursing notified COMMUNICATION/EDUCATION:  
The patient's plan of care including recommendations, planned interventions, and recommended diet changes were discussed with: Registered nurse. Nickolas Dover SLP Time Calculation: 20 mins

## 2021-05-25 NOTE — PROGRESS NOTES
Comprehensive Nutrition Assessment Type and Reason for Visit: Initial, RD nutrition re-screen/LOS Nutrition Recommendations/Plan: 1. Continue mech altered diet. 2. Added orange Magic Cup BID to promote intake. 3. Please assist pt with meals. Nutrition Assessment:    
5/25: 79 y/o female admitted with HAP. PMH includes HTN, DM, GERD, COPD, hx CVA. Spoke with pt and daughter in room. Per daughter, pt hasn't been eating very well since admission, mostly just soup and ice cream. She has also been bringing in bagels for breakfast for pt and soaking them in coffee to make them soft. Per daughter, pt needs assistance with meals. She says pt was eating well PTA and her weight was stable. Pt c/o nausea on and off since admission, but no vomiting. Pt does not want to try Ensure but is agreeable to trying Magic Cup to help with intake. Will add and monitor acceptance. Labs- K 3.3, Hgb 9.7, -179-294. Meds- cefepime, gabapentin, insulin, Protonix, vanco, prednisone, pericolace. Documented Meal intake: 
Patient Vitals for the past 168 hrs: 
 % Diet Eaten 05/24/21 1849 76 - 100% 05/24/21 1233 1 - 25% 05/24/21 0953 26 - 50% 05/23/21 1614 76 - 100% 05/23/21 0858 76 - 100% 05/22/21 1748 51 - 75% 05/22/21 1120 76 - 100% 05/22/21 0746 1 - 25% 05/21/21 1345 51 - 75% 05/21/21 0748 1 - 25% Weight hx: Wt Readings from Last 10 Encounters:  
05/24/21 61.5 kg (135 lb 8 oz) 05/09/21 58.5 kg (129 lb)  
03/23/21 58.5 kg (129 lb) 03/08/21 58.6 kg (129 lb 3 oz) 03/02/21 55.5 kg (122 lb 5.7 oz) 02/27/21 55.5 kg (122 lb 5.7 oz) 02/08/21 57.6 kg (127 lb) 01/03/21 56.7 kg (125 lb) Malnutrition Assessment: 
Malnutrition Status: none identified Estimated Daily Nutrient Needs: 
Energy (kcal): 3148 (1065 X 1.2 AF); Weight Used for Energy Requirements: Current Protein (g): 62 (1-1.2 g/kg); Weight Used for Protein Requirements: Current Fluid (ml/day): 1384; Method Used for Fluid Requirements: 1 ml/kcal 
 
 
Nutrition Related Findings:  last BM 5/20 Wounds:   
Skin tears Current Nutrition Therapies: DIET DYSPHAGIA MECH ALTERED (NDD2) DIET NUTRITIONAL SUPPLEMENTS Lunch, Dinner; [place] Anthropometric Measures: 
· Height:  5' 4\" (162.6 cm) · Current Body Wt:  61.5 kg (135 lb 9.3 oz) · Admission Body Wt:      
· Usual Body Wt:       
· Ideal Body Wt:  120 lbs:  113 % · Adjusted Body Weight:   ; Weight Adjustment for: No adjustment · Adjusted BMI:      
· BMI Category:  Normal weight (BMI 18.5-24. 9) Nutrition Diagnosis:  
· Inadequate oral intake related to early satiety as evidenced by  (decreased intake sicne admission per pt and daughter) Nutrition Interventions:  
Food and/or Nutrient Delivery: Continue current diet, Start oral nutrition supplement Nutrition Education and Counseling: No recommendations at this time Coordination of Nutrition Care: Continue to monitor while inpatient Goals: 
po intake >50% meals/supplements next 1-3 days Nutrition Monitoring and Evaluation:  
Behavioral-Environmental Outcomes: None identified Food/Nutrient Intake Outcomes: Food and nutrient intake, Supplement intake Physical Signs/Symptoms Outcomes: Weight, Biochemical data Discharge Planning:   
Continue current diet Electronically signed by Madeleine Gardner RDN on 5/25/2021 at 2:27 PM 
 
Contact: [phone]

## 2021-05-25 NOTE — PROGRESS NOTES
Abhinav Erwin [facility] 
[facility] [place] [address] [place] [address] 
[phone] Medical Progress Note NAME: Yuli oMrris :  1940 MRM:  [id number] Date/Time: 2021  3:51 PM 
 
 
 
  
Assessment / Plan: #Sepsis due to Pneumonia: Technically HAP. CT abd/pelvis showed severe stenosis of bilateral common iliac arteries, but pt has no leg pain and lactate is normal. There is also e/o cirrhosis, but minimal ascites so I have low c/f SBP. Has been fluid responsive and not required pressors - Vanc/cefepime  -  to complete a 7 day course. Sputum culture grew staph. - taper prednisone 
 -SLP following 
 - Continue to monitor fluid responsiveness 
 - UOP and MAPs to goal 
 
#AHRF: PNA and COPD exac  
 - wean as tolerated 
 -was discharged on oxygen after previous admission but does not use oxygen at baseline, down to 1-2L 
 -family requesting pulm consult, follows Dr. Vibha Hamilton outpatient #Multiple fractures: CT today showed multiple fractures of ribs, pubic rami, thoracic spine, etc. She is on oxycodone for chronic pain. Ultimately, with her repeat admissions and suggestion of multiple falls, palliative care is following. She does have good support from home and lives with daughter, but do feel she is having a decline #Nausea/vomiting: With epigastric discomfort. Has been on several courses of steroids so c/f gastritis. - IV PPI BID for now, can transition to PO on discharge. #Bilateral common iliac stenosis: She has had multiple falls, but not sure if this is contributing. She does not complaint of leg pain, lactate normal 
 - Consider vascular input if she develops symptoms #COPD exac: 2/2 above - Steroids as above - scheduled nebs #Cirrhosis: noted on imaging. She is not presently decompensated. With regards to her overall picture, this is a worrisome diagnosis and etiology is not clear. Will have palliative assist with GOC.  Could consider hepatitides, etc.  
 
#DM: A1c is 6.7%. Blood glucose stable. Continue home insulin regimen. DM diet Care Plan discussed with: Patient Discussed:  Care Plan Prophylaxis:  Lovenox Disposition:  Home w/Family 
        
___________________________________________________ Attending Physician: Conchita Woodall MD  
 
  
Subjective: Chief Complaint:  Having persistent chest pain,  Very anxious Objective:  
 
 
Vitals:  
 
 
  
Last 24hrs VS reviewed since prior progress note. Most recent are: 
 
Visit Vitals BP (!) 161/73 (BP 1 Location: Left upper arm, BP Patient Position: At rest) Pulse 95 Temp 97.7 °F (36.5 °C) Resp 18 Ht 5' 4\" (1.626 m) Wt 61.5 kg (135 lb 8 oz) SpO2 96% BMI 23.26 kg/m² SpO2 Readings from Last 6 Encounters:  
05/25/21 96% 05/13/21 97% 03/23/21 96% 03/10/21 95% 03/03/21 95% 02/27/21 96% O2 Flow Rate (L/min): 2 l/min Intake/Output Summary (Last 24 hours) at 5/25/2021 1807 Last data filed at 5/25/2021 1750 Gross per 24 hour Intake 941.33 ml Output 2000 ml Net -1058.67 ml Exam:  
 
Physical Exam: 
 
Gen:  Well-developed, well-nourished, in no acute distress HEENT:  Pink conjunctivae, PERRL, hearing intact to voice, moist mucous membranes Neck:  Supple, without masses, thyroid non-tender Resp:  No accessory muscle use, clear breath sounds without wheezes rales or rhonchi 
Card:  No murmurs, normal S1, S2 without thrills, bruits or peripheral edema Abd:  Soft, non-tender, non-distended, normoactive bowel sounds are present Musc:  No cyanosis or clubbing Skin:  No rashes or ulcers, skin turgor is good Neuro:  Cranial nerves 3-12 are grossly intact,  strength is 5/5 bilaterally and dorsi / plantarflexion is 5/5 bilaterally, follows commands appropriately Psych:  Good insight, oriented to person, place and time, alert Medications Reviewed: (see below) Lab Data Reviewed: (see below) ______________________________________________________________________ Medications:  
 
Current Facility-Administered Medications Medication Dose Route Frequency  [START ON 5/26/2021] amLODIPine (NORVASC) tablet 5 mg  5 mg Oral DAILY  [START ON 5/26/2021] FLUoxetine (PROzac) capsule 40 mg  40 mg Oral QPM  
 cefepime (MAXIPIME) 2 g in sterile water (preservative free) 10 mL IV syringe  2 g IntraVENous Q12H  predniSONE (DELTASONE) tablet 40 mg  40 mg Oral DAILY WITH BREAKFAST  vancomycin (VANCOCIN) 1,000 mg in 0.9% sodium chloride 250 mL (VIAL-MATE)  1,000 mg IntraVENous Q12H  pantoprazole (PROTONIX) tablet 40 mg  40 mg Oral ACB&D  
 guaiFENesin-dextromethorphan (ROBITUSSIN DM) 100-10 mg/5 mL syrup 10 mL  10 mL Oral Q6H  
 lidocaine 4 % patch 1 Patch  1 Patch TransDERmal Q24H  
 morphine injection 2 mg  2 mg IntraVENous Q3H PRN  
 gabapentin (NEURONTIN) capsule 600 mg  600 mg Oral BID  
 guaiFENesin ER (MUCINEX) tablet 600 mg  600 mg Oral BID  latanoprost (XALATAN) 0.005 % ophthalmic solution 1 Drop  1 Drop Both Eyes QHS  melatonin (rapid dissolve) tablet 5 mg  5 mg Oral QHS  arformoterol 15 mcg/budesonide 0.5 mg neb solution   Nebulization BID RT  
 oxyCODONE-acetaminophen (PERCOCET) 5-325 mg per tablet 1 Tablet  1 Tablet Oral Q6H  
 senna-docusate (PERICOLACE) 8.6-50 mg per tablet 1 Tablet  1 Tablet Oral DAILY  hydrALAZINE (APRESOLINE) 20 mg/mL injection 10 mg  10 mg IntraVENous Q6H PRN  
 atorvastatin (LIPITOR) tablet 80 mg  80 mg Oral QHS  aspirin delayed-release tablet 81 mg  81 mg Oral DAILY  clonazePAM (KlonoPIN) tablet 0.25 mg  0.25 mg Oral QPM  
 levothyroxine (SYNTHROID) tablet 75 mcg  75 mcg Oral ACB  meclizine (ANTIVERT) tablet 12.5 mg  12.5 mg Oral Q6H PRN  
 glucose chewable tablet 16 g  4 Tablet Oral PRN  
 dextrose (D50W) injection syrg 12.5-25 g  25-50 mL IntraVENous PRN  
 glucagon (GLUCAGEN) injection 1 mg  1 mg IntraMUSCular PRN  
 sodium chloride (NS) flush 5-40 mL  5-40 mL IntraVENous Q8H  
 sodium chloride (NS) flush 5-40 mL  5-40 mL IntraVENous PRN  
 acetaminophen (TYLENOL) tablet 650 mg  650 mg Oral Q6H PRN Or  
 acetaminophen (TYLENOL) suppository 650 mg  650 mg Rectal Q6H PRN  polyethylene glycol (MIRALAX) packet 17 g  17 g Oral DAILY PRN  
 bisacodyL (DULCOLAX) suppository 10 mg  10 mg Rectal DAILY PRN  
 ondansetron (ZOFRAN) injection 4 mg  4 mg IntraVENous Q6H PRN  
 insulin glargine (LANTUS) injection 12 Units  0.2 Units/kg SubCUTAneous QHS  insulin lispro (HUMALOG) injection   SubCUTAneous AC&HS  
 melatonin (rapid dissolve) tablet 5 mg  5 mg Oral QHS PRN  
 alum-mag hydroxide-simeth (MYLANTA) oral suspension 30 mL  30 mL Oral Q4H PRN  
 LORazepam (ATIVAN) injection 0.5 mg  0.5 mg IntraVENous Q6H PRN  
 diphenhydrAMINE (BENADRYL) injection 25 mg  25 mg IntraVENous Q6H PRN  
 albuterol (PROVENTIL VENTOLIN) nebulizer solution 2.5 mg  2.5 mg Nebulization Q4H PRN  
 simethicone (MYLICON) tablet 80 mg  80 mg Oral QID PRN  
 albuterol-ipratropium (DUO-NEB) 2.5 MG-0.5 MG/3 ML  3 mL Nebulization Q6H RT Lab Review:  
 
Recent Labs  
  05/25/21 
0420 05/24/21 
0540 05/23/21 
0358 WBC 17.3* 14.5* 16.2* HGB 9.7* 9.3* 10.3* HCT 31.8* 29.9* 34.3*  
 374 462* Recent Labs  
  05/25/21 
0420 05/24/21 
0540 05/23/21 
0358  140 141  
K 3.3* 3.5 3.8  108 109* CO2 27 31 27 * 177* 196* BUN 12 11 15 CREA 0.47* 0.41* 0.51* CA 7.2* 7.3* 7.5* No components found for: [place]

## 2021-05-25 NOTE — PROGRESS NOTES
5/25/2021   CARE MANAGEMENT NOTE:  Pt transferred from [facility] to the 5th floor. EMR was reviewed and a handoff was received from previous  Hattie Pineda). READMISSION:  Pt was admitted to [facility] from 5/9/21 - 5/13/21 with COPD. She discharged home with [facility]. Pt was readmitted to [facility] on 5/19 with sepsis 2/2 PNA, also hx of multiple CVAs and fxs, COPD, cirrhosis, DM. Reportedly, pt resides with her dtr [place] ([phone]). Pt has caregivers 3 days per week. RUR 43% Transition Plan of Care: 1. PT/ST evals complete; OT pending. Pt ambulated 2 feet on 5/23 and home health was recommended. Pt is already receiving homecare (skilled nursing, PT) thru [facility] agency however SNF is now desired. 2. SNF - a referral was sent to [address] [place] [address] for review (dtr only wants a private room) 3. [facility] [employer] [place] will be needed 4. COVID 19 test for placement will be needed 5. Pt has home oxygen thru [facility] 6   Outpt f/u 
7. Family will transport pt upon discharge CM will continue to follow pt for SNF rehab YONY Amaya

## 2021-05-25 NOTE — PROGRESS NOTES
Bedside and Verbal shift change report given to Alexander Guallpa RN (oncoming nurse) by Christelle Rhodes RN (offgoing nurse). Report included the following information SBAR, Kardex, Procedure Summary, Intake/Output, MAR, Accordion, Recent Results and Med Rec Status.

## 2021-05-25 NOTE — PROGRESS NOTES
Problem: Self Care Deficits Care Plan (Adult) Goal: *Therapy Goal (Edit Goal, Insert Text) Description:  
FUNCTIONAL STATUS PRIOR TO ADMISSION: Patient was modified independent for basic except assistance provided for bathing in shower. Patient was ambulating with cane, and most recently with RW after hospitalization HOME SUPPORT: The patient lived with her daughter and family, who provided assistance as needed. Occupational Therapy Goals Initiated 5/25/2021 1. Patient will perform grooming at EOB with supervision/set-up within 7 day(s). 2.  Patient will perform bathing at EOB with supervision/set-up within 7 day(s). 3.  Patient will perform upper body dressing with minimal assistance/contact guard assist within 7 day(s). 4.  Patient will perform toilet transfers with minimal assistance/contact guard assist within 7 day(s). 5.  Patient will perform all aspects of toileting with minimal assistance/contact guard assist within 7 day(s). 6.  Patient will participate in upper extremity therapeutic exercise/activities with minimal assistance/contact guard assist for 5 minutes within 7 day(s). 7.  Patient will utilize energy conservation techniques during functional activities with verbal cues within 7 day(s). Outcome: Not Met OCCUPATIONAL THERAPY EVALUATION Patient: Jose R Walker (89 y.o. female) Date: 5/25/2021 Primary Diagnosis: HAP (hospital-acquired pneumonia) [J18.9, Y95] Pneumonia of both lungs due to infectious organism [J18.9] Precautions: fall ASSESSMENT Based on the objective data described below, the patient presents with severe decline in ADL task and transfer performance due to poor sitting and standing tolerance, impaired sitting and standing balance, increased tone in LUE/LLE. Patient noted with increased WOB, and sounded quite congested during activity.  SPO2 desat to 89% on 2 L/min during mobility, recovered to >92% on 2L with seated rest and instruction in breathing techniques required. Current Level of Function Impacting Discharge (ADLs/self-care): Max to total A for bathing, dressing, and toileting Functional Outcome Measure: The patient scored Total: 15/100 on the Barthel Index outcome measure which is indicative of 85% impaired ability to care for basic self needs/dependency on others; inferred 100% dependency on others for instrumental ADLs. Other factors to consider for discharge: increased level of assistance is beyond what family can safely provide Patient will benefit from skilled therapy intervention to address the above noted impairments. PLAN : 
Recommendations and Planned Interventions: self care training, functional mobility training, therapeutic exercise, balance training, therapeutic activities, endurance activities, neuromuscular re-education, patient education, home safety training, and family training/education Frequency/Duration: Patient will be followed by occupational therapy 5 times a week to address goals. Recommendation for discharge: (in order for the patient to meet his/her long term goals) Therapy up to 5 days/week in SNF setting This discharge recommendation: 
Has been made in collaboration with the attending provider and/or case management IF patient discharges home will need the following DME: patient owns DME required for discharge SUBJECTIVE:  
Patient pleasant and cooperative OBJECTIVE DATA SUMMARY:  
HISTORY:  
Past Medical History:  
Diagnosis Date Anxiety and depression Arthritis Chronic obstructive pulmonary disease (HCC) Chronic pain DM type 2 causing vascular disease ([facility] [address] GERD (gastroesophageal reflux disease) Glaucoma HTN (hypertension) Hx of completed stroke 2017, 2021 Hypothyroid Incontinence Neuropathy Polypharmacy Past Surgical History:  
Procedure Laterality Date  HX ORTHOPAEDIC    
 HX VASCULAR ACCESS    
 
 Expanded or extensive additional review of patient history:  
 
Home Situation Home Environment: Private residence # Steps to Enter: 3 Rails to Enter: Yes Hand Rails : Bilateral 
One/Two Story Residence: Other (Comment) Living Alone: No 
Support Systems: Child(darrick) Patient Expects to be Discharged to[de-identified] Private residence Current DME Used/Available at Home: Walker, rolling, Shower chair, Grab bars Tub or Shower Type: Tub/Shower combination Hand dominance: Right EXAMINATION OF PERFORMANCE DEFICITS: 
Cognitive/Behavioral Status: 
Neurologic State: Alert Orientation Level: Oriented to person Cognition: Follows commands Hearing: Auditory Auditory Impairment: Hard of hearing, bilateral 
 
Vision/Perceptual:   
    
    
    
  
    
    
Corrective Lenses: Glasses Range of Motion: 
Grossly decreased LUE secondary to CVA AROM: Generally decreased, functional 
PROM: Generally decreased, functional 
  
  
  
  
  
  
 
Strength: 
Grossly decreased LUE secondary to CVA Strength: Generally decreased, functional 
  
  
  
  
 
Coordination: 
Coordination: Within functional limits Fine Motor Skills-Upper: Left Impaired;Right Intact Gross Motor Skills-Upper: Left Impaired;Right Intact Tone & Sensation: Hypertonic LUE Tone: Abnormal 
  
   
 
Balance: 
Sitting: High guard Sitting - Static: Good (unsupported) Sitting - Dynamic: Fair (occasional) Standing: Impaired; With support Standing - Static: Constant support; Fair 
Standing - Dynamic : Constant support;Poor Functional Mobility and Transfers for ADLs: 
Bed Mobility: 
Supine to Sit: Moderate assistance; Additional time Transfers: 
Sit to Stand: Assist x2; Moderate assistance; Additional time Stand to Sit: Minimum assistance;Assist x2; Additional time Bed to Chair: Moderate assistance;Assist x2; Additional time; Adaptive equipment Assistive Device : Walker, rolling ADL Assessment: 
Feeding: Setup Oral Facial Hygiene/Grooming: Minimum assistance Bathing: Maximum assistance Upper Body Dressing: Maximum assistance Lower Body Dressing: Total assistance Toileting: Total assistance ADL Intervention and task modifications: 
  
Patient educated on pacing self, breathing techniques, fall prevention strategies during EOB activity and Eob to chair mobility Functional Measure: 
Barthel Index: 
 
Bathin Bladder: 0 Bowels: 5 Groomin Dressin Feedin Mobility: 0 Stairs: 0 Toilet Use: 0 Transfer (Bed to Chair and Back): 5 Total: 15/100 The Barthel ADL Index: Guidelines 1. The index should be used as a record of what a patient does, not as a record of what a patient could do. 2. The main aim is to establish degree of independence from any help, physical or verbal, however minor and for whatever reason. 3. The need for supervision renders the patient not independent. 4. A patient's performance should be established using the best available evidence. Asking the patient, friends/relatives and nurses are the usual sources, but direct observation and common sense are also important. However direct testing is not needed. 5. Usually the patient's performance over the preceding 24-48 hours is important, but occasionally longer periods will be relevant. 6. Middle categories imply that the patient supplies over 50 per cent of the effort. 7. Use of aids to be independent is allowed. Clarice Belcher., Barthel, D.W. ([id number]). Functional evaluation: the Barthel Index. [facility] (14)2. PRECIOUS Heath, Juan Gramajo., Snehal Feldman., [place], [address] [facility] (). Measuring the change indisability after inpatient rehabilitation; comparison of the responsiveness of the Barthel Index and Functional [place] Measure. Journal of Neurology, Neurosurgery, and Psychiatry, 66(4), 012-136.  
Edwige Jurado, N.J.A, MASSIEL Solano.PRADEEP, & Myra Hills M.A. (2004.) Assessment of post-stroke quality of life in cost-effectiveness studies: The usefulness of the Barthel Index and the EuroQoL-5D. [facility], 13, 716-41 Occupational Therapy Evaluation Charge Determination History Examination Decision-Making MEDIUM Complexity : Expanded review of history including physical, cognitive and psychosocial  history  MEDIUM Complexity : 3-5 performance deficits relating to physical, cognitive , or psychosocial skils that result in activity limitations and / or participation restrictions MEDIUM Complexity : Patient may present with comorbidities that affect occupational performnce. Miniml to moderate modification of tasks or assistance (eg, physical or verbal ) with assesment(s) is necessary to enable patient to complete evaluation Based on the above components, the patient evaluation is determined to be of the following complexity level: MEDIUM Pain Rating: 
Pain in chest/lungs reported with increased RR associated with activity. Patient notably congested Activity Tolerance:  
Poor and requires frequent rest breaks After treatment patient left in no apparent distress:   
Sitting in chair and Call bell within reach COMMUNICATION/EDUCATION:  
The patients plan of care was discussed with: Physical therapy assistant and Registered nurse. Home safety education was provided and the patient/caregiver indicated understanding. and Patient/family have participated as able in goal setting and plan of care. This patients plan of care is appropriate for delegation to \[facility] [place] [facility]\"". Thank you for this referral. 
Herberth Caceres OT Time Calculation: 25 mins'

## 2021-05-25 NOTE — PROGRESS NOTES
[address] Dosing Services: Antimicrobial Stewardship Progress Note Consult for antibiotic dosing of Vancomycin by Dr. Marcellus Barker Pharmacist reviewed antibiotic appropriateness for 79 yo female for indication of HAP Day of Therapy: 7/7 per Dr. Travis Barney, completes therapy today Ht Readings from Last 1 Encounters:  
05/20/21 162.6 cm (64\") Wt Readings from Last 1 Encounters:  
05/24/21 61.5 kg (135 lb 8 oz) Vancomycin therapy: 
Current maintenance dose: 1250(mg) every 12 hours Dose calculated to approximate a therapeutic trough of 15-20 mcg/mL. Last trough level: 17.2 mcg/ml @ 909, drawn timely=therapeutic. Plan for level / Adjustment in Therapy: continue current regimen, completes 7 days of therapy tonight. WBC elevated likely from steroids, afebrile. Dose administration notes:   Doses given appropriately as scheduled PCT = 0.62 Date Dose & Interval Measured (mcg/mL) Extrapolated (mcg/mL) ? 5/21 ? 750 mg IV q12hr ? 10.7 ?10.34  
?5/23 ?1250 mg IV q12hr ?20.6 ?19.75  
?5/25 ?1000 mg IV q12hr ?17.2 ? Other Antimicrobial  
(not dosed by pharmacist) Cefepime 2 gm IV q12hr x14 days-completes therapy tomorrow Cultures 5/23: Sputum: pending Serum Creatinine Lab Results Component Value Date/Time Creatinine 0.47 (L) 05/25/2021 04:20 AM  
  
  
Creatinine Clearance Estimated Creatinine Clearance: 54.4 mL/min (A) (by C-G formula based on SCr of 0.47 mg/dL (L)). Temp Temp: 97.6 °F (36.4 °C) WBC Lab Results Component Value Date/Time WBC 17.3 (H) 05/25/2021 04:20 AM  
 
  
H/H Lab Results Component Value Date/Time HGB 9.7 (L) 05/25/2021 04:20 AM  
 
  
Platelets Lab Results Component Value Date/Time PLATELET 626 90/57/2726 04:20 AM  
 
  
 
Pharmacist Cresencio Swann Contact information: [phone]

## 2021-05-26 NOTE — PROGRESS NOTES
Occupational therapy note: 
Chart reviewed. Attempted to see patient for OT treatment. Per RN patient currently with nausea and RN to room to assess. Requesting follow up shortly for therapy. Milvia Vick MS OTR/L

## 2021-05-26 NOTE — PROGRESS NOTES
[address] Dosing Services: Antimicrobial Stewardship Progress Note Consult for antibiotic dosing of Vancomycin by Dr. Sheila Tan Pharmacist reviewed antibiotic appropriateness for 79 yo female for indication of HAP Day of Therapy: 8 (originally planned for 7 days but patient not improving and sputum growing MRSA so continuing for now) Ht Readings from Last 1 Encounters:  
05/25/21 162.6 cm (64\") Wt Readings from Last 1 Encounters:  
05/24/21 61.5 kg (135 lb 8 oz) Vancomycin therapy: 
Current maintenance dose: 1250(mg) every 12 hours (restarting previous regimen as patient was therapeutic). Dose calculated to approximate a therapeutic trough of 15-20 mcg/mL. Last trough level: 17.2 mcg/ml on 5/25, drawn timely=therapeutic. Plan for level / Adjustment in Therapy: Continue current regimen, WBC elevated but improving (also on steroids), afebrile, renal function stable/good urine output. Surveillance level in 3-4 days (not yet ordered). Dose administration notes:   Doses given appropriately as scheduled PCT = 0.62 on 5/21 Date Dose & Interval Measured (mcg/mL) Extrapolated (mcg/mL) ? 5/21 ? 750 mg IV q12hr ? 10.7 ?10.34  
?5/23 ?1250 mg IV q12hr ?20.6 ?19.75  
?5/25 ?1000 mg IV q12hr ?17.2 ? Other Antimicrobial  
(not dosed by pharmacist) none Cultures 5/23: Sputum: MRSA (vanc JAMES=1), possible filamentous fungus (prelim) Serum Creatinine Lab Results Component Value Date/Time Creatinine 0.42 (L) 05/26/2021 03:36 AM  
  
  
Creatinine Clearance Estimated Creatinine Clearance: 54.4 mL/min (A) (by C-G formula based on SCr of 0.42 mg/dL (L)). Temp Temp: 97.7 °F (36.5 °C) WBC Lab Results Component Value Date/Time WBC 13.4 (H) 05/26/2021 03:36 AM  
 
  
H/H Lab Results Component Value Date/Time HGB 9.7 (L) 05/26/2021 03:36 AM  
 
  
Platelets Lab Results Component Value Date/Time  PLATELET 058 25/10/5812 03:36 AM  
 
  
 
Pharmacist Cresencio Shree Contact information: [phone]

## 2021-05-26 NOTE — CONSULTS
Name: [facility]: [address]  
: 1940 Admit Date: 2021 Phone: [phone]  Room: [facility]/01 PCP: Joann Duron MD  MRN: [id number] Date: 2021  Code: DNR   
   
 
11:54 addendum - spoke with pharmacy. Resp cx just resulted with MRSA. Will stop cefepime and restart Vanc (JAMES 1). Chart and notes reviewed. Data reviewed. I review the patient's current medications in the medical record at each encounter. I have evaluated and examined the patient. HPI: 
 
11:04 AM    
 
History was obtained from daughter and chart. I was asked by Rosamaria Bridges MD to see Jesse Oro in consultation for a chief complaint of . Ms. Cox is a pleasant 79 yo woman with a history of COPD/emphysema (previously followed by a pulmonologist in [place]), chronic respiratory failure with hypoxia, PE, former tobacco use, HTN, DM, hypothyroidism and anxiety who was admitted last week with worsening pneumonia and leukocytosis. Daughter reports worsening hypoxia at home prior to admission with sats in the mid 80s. She currently has chest congestion and weak, nonproductive cough. Reports chest wall soreness with coughing. Fatigues easily with activity. Resp cx this admission with prelim of moderate staph aureus. She is currently on cefepime and vanc. Speech has evaluated. She is on dysphagia diet. She was hospitalized at [facility] from - for bilateral PNA. She has had multiple admissions at [facility] this year for COPD. She was previously followed in [place] for her COPD and moved to [place] in the last 4 months. She is chronically managed on Trelegy and prn albuterol inhaler/nebulizer. Has home O2 through [address] and is on 2L at baseline. During her last hospitalization she was given levaquin and steroid taper. She notes symptomatic benefit from the prednisone. She has been taken off of blood thinners in the past due to frequent falls.  
 
 
 chest CT personally visualized and report reviewed. There is small left pleural effusion and likely worsening left lower lobe interstitial and patchy airspace consolidation. Past Medical History:  
Diagnosis Date  Anxiety and depression  Arthritis  Chronic obstructive pulmonary disease ([facility] [address]  Chronic pain  DM type 2 causing vascular disease ([facility] [address]  GERD (gastroesophageal reflux disease)  Glaucoma   
 HTN (hypertension)  Hx of completed stroke 2021  Hypothyroid  Incontinence  Neuropathy  Polypharmacy Past Surgical History:  
Procedure Laterality Date  HX ORTHOPAEDIC    
 HX VASCULAR ACCESS Family History Problem Relation Age of Onset  No Known Problems Other   
     reviewed, patient did not know  Diabetes Mother  Heart Attack Mother  Cancer Father Social History Tobacco Use  Smoking status: Former Smoker Quit date: 1991 Years since quittin.4  Smokeless tobacco: Never Used Substance Use Topics  Alcohol use: Never Allergies Allergen Reactions  Shellfish Derived Anaphylaxis Current Facility-Administered Medications Medication Dose Route Frequency  [START ON 2021] amLODIPine (NORVASC) tablet 10 mg  10 mg Oral DAILY  FLUoxetine (PROzac) capsule 40 mg  40 mg Oral QPM  
 cefepime (MAXIPIME) 2 g in sterile water (preservative free) 10 mL IV syringe  2 g IntraVENous Q12H  predniSONE (DELTASONE) tablet 40 mg  40 mg Oral DAILY WITH BREAKFAST  pantoprazole (PROTONIX) tablet 40 mg  40 mg Oral ACB&D  
 guaiFENesin-dextromethorphan (ROBITUSSIN DM) 100-10 mg/5 mL syrup 10 mL  10 mL Oral Q6H  
 lidocaine 4 % patch 1 Patch  1 Patch TransDERmal Q24H  
 morphine injection 2 mg  2 mg IntraVENous Q3H PRN  
 gabapentin (NEURONTIN) capsule 600 mg  600 mg Oral BID  
 guaiFENesin ER (MUCINEX) tablet 600 mg  600 mg Oral BID  latanoprost (XALATAN) 0.005 % ophthalmic solution 1 Drop  1 Drop Both Eyes QHS  melatonin (rapid dissolve) tablet 5 mg  5 mg Oral QHS  arformoterol 15 mcg/budesonide 0.5 mg neb solution   Nebulization BID RT  
 oxyCODONE-acetaminophen (PERCOCET) 5-325 mg per tablet 1 Tablet  1 Tablet Oral Q6H  
 senna-docusate (PERICOLACE) 8.6-50 mg per tablet 1 Tablet  1 Tablet Oral DAILY  hydrALAZINE (APRESOLINE) 20 mg/mL injection 10 mg  10 mg IntraVENous Q6H PRN  
 atorvastatin (LIPITOR) tablet 80 mg  80 mg Oral QHS  aspirin delayed-release tablet 81 mg  81 mg Oral DAILY  clonazePAM (KlonoPIN) tablet 0.25 mg  0.25 mg Oral QPM  
 levothyroxine (SYNTHROID) tablet 75 mcg  75 mcg Oral ACB  meclizine (ANTIVERT) tablet 12.5 mg  12.5 mg Oral Q6H PRN  
 glucose chewable tablet 16 g  4 Tablet Oral PRN  
 dextrose (D50W) injection syrg 12.5-25 g  25-50 mL IntraVENous PRN  
 glucagon (GLUCAGEN) injection 1 mg  1 mg IntraMUSCular PRN  
 sodium chloride (NS) flush 5-40 mL  5-40 mL IntraVENous Q8H  
 sodium chloride (NS) flush 5-40 mL  5-40 mL IntraVENous PRN  
 acetaminophen (TYLENOL) tablet 650 mg  650 mg Oral Q6H PRN Or  
 acetaminophen (TYLENOL) suppository 650 mg  650 mg Rectal Q6H PRN  polyethylene glycol (MIRALAX) packet 17 g  17 g Oral DAILY PRN  
 bisacodyL (DULCOLAX) suppository 10 mg  10 mg Rectal DAILY PRN  
 ondansetron (ZOFRAN) injection 4 mg  4 mg IntraVENous Q6H PRN  
 insulin glargine (LANTUS) injection 12 Units  0.2 Units/kg SubCUTAneous QHS  insulin lispro (HUMALOG) injection   SubCUTAneous AC&HS  
 melatonin (rapid dissolve) tablet 5 mg  5 mg Oral QHS PRN  
 alum-mag hydroxide-simeth (MYLANTA) oral suspension 30 mL  30 mL Oral Q4H PRN  
 LORazepam (ATIVAN) injection 0.5 mg  0.5 mg IntraVENous Q6H PRN  
 diphenhydrAMINE (BENADRYL) injection 25 mg  25 mg IntraVENous Q6H PRN  
 albuterol (PROVENTIL VENTOLIN) nebulizer solution 2.5 mg  2.5 mg Nebulization Q4H PRN  
 simethicone (MYLICON) tablet 80 mg  80 mg Oral QID PRN  
 albuterol-ipratropium (DUO-NEB) 2.5 MG-0.5 MG/3 ML  3 mL Nebulization Q6H RT  
 
 
 
REVIEW OF SYSTEMS  
12 point ROS negative except as stated in the HPI. Physical Exam:  
Visit Vitals BP (!) 181/75 (BP 1 Location: Right upper arm, BP Patient Position: At rest) Pulse 82 Temp 97.7 °F (36.5 °C) Resp 18 Ht 5' 4\" (1.626 m) Wt 61.5 kg (135 lb 8 oz) SpO2 97% BMI 23.26 kg/m² General:  Alert, cooperative, chronically ill appearing, appears stated age. Head:  Normocephalic, without obvious abnormality, atraumatic. Eyes:  Conjunctivae/corneas clear. Nose: Nares normal. Septum midline. Mucosa normal.   
Throat: Lips, mucosa, and tongue normal.   
Neck: Supple, symmetrical, trachea midline, no adenopathy. Lungs:   Chest congestion, weak cough, and diffuse rhonchi. Chest wall:  No tenderness or deformity. Heart:  Regular rate and rhythm, S1, S2 normal, no murmur, click, rub or gallop. Abdomen:   Soft, non-tender. Bowel sounds normal. No masses,  No organomegaly. Extremities: Extremities cool, atraumatic, no cyanosis. Mild LE edema. Pulses: 2+ and symmetric all extremities. Skin: Skin color, texture, turgor normal. No rashes or lesions Lymph nodes: Cervical, supraclavicular nodes normal.  
Neurologic: Grossly nonfocal  
 
 
Lab Results Component Value Date/Time Sodium 141 05/26/2021 03:36 AM  
 Potassium 3.0 (L) 05/26/2021 03:36 AM  
 Chloride 108 05/26/2021 03:36 AM  
 CO2 29 05/26/2021 03:36 AM  
 BUN 10 05/26/2021 03:36 AM  
 Creatinine 0.42 (L) 05/26/2021 03:36 AM  
 Glucose 157 (H) 05/26/2021 03:36 AM  
 Calcium 7.3 (L) 05/26/2021 03:36 AM  
 Magnesium 1.8 03/09/2021 03:23 AM  
 Phosphorus 3.6 03/10/2021 03:15 AM  
 Lactic acid 1.1 05/19/2021 07:50 PM  
 
 
Lab Results Component Value Date/Time WBC 13.4 (H) 05/26/2021 03:36 AM  
 HGB 9.7 (L) 05/26/2021 03:36 AM  
 PLATELET 887 36/30/3788 03:36 AM  
 MCV 81.9 05/26/2021 03:36 AM  
 
 
Lab Results Component Value Date/Time INR 1.2 (H) 03/09/2021 03:23 AM  
 aPTT 28.6 03/09/2021 03:23 AM  
 Alk. phosphatase 73 05/21/2021 09:10 AM  
 Protein, total 5.2 (L) 05/21/2021 09:10 AM  
 Albumin 2.3 (L) 05/21/2021 09:10 AM  
 Globulin 2.9 05/21/2021 09:10 AM  
 
 
Lab Results Component Value Date/Time Iron 16 (L) 05/09/2021 08:21 PM  
 TIBC 278 05/09/2021 08:21 PM  
 Iron % saturation 6 (L) 05/09/2021 08:21 PM  
 
 
Lab Results Component Value Date/Time TSH 3.75 (H) 03/08/2021 09:25 AM  
  
 
No results found for: PH, PHI, PCO2, PCO2I, PO2, PO2I, HCO3, HCO3I, FIO2, FIO2I Lab Results Component Value Date/Time Troponin-I, Qt. <0.05 05/23/2021 08:12 PM  
  
 
Lab Results Component Value Date/Time Culture result: MODERATE PROBABLE STAPHYLOCOCCUS AUREUS (A) 05/23/2021 08:12 PM  
 Culture result: No growth (<1,000 CFU/ML) 03/08/2021 08:20 PM  
 Culture result: ENTEROCOCCUS FAECALIS (A) 02/25/2021 06:25 PM  
 
 
No results found for: TOXA1, RPR, HBCM, HBSAG, HAAB, HCAB1, HAAT, G6PD, CRYAC, HIVGT, HIVR, HIV1, HIV12, HIVPC, HIVRPI Lab Results Component Value Date/Time Vancomycin,trough 17.2 (H) 05/25/2021 09:09 AM  
 Vancomycin,trough 20.6 ([facility]) 05/23/2021 09:17 AM  
 
 
Lab Results Component Value Date/Time Color YELLOW/STRAW 05/19/2021 06:15 PM  
 Appearance CLEAR 05/19/2021 06:15 PM  
 Specific gravity 1.010 05/19/2021 06:15 PM  
 pH (UA) 6.5 05/19/2021 06:15 PM  
 Protein Negative 05/19/2021 06:15 PM  
 Glucose 100 (A) 05/19/2021 06:15 PM  
 Ketone Negative 05/19/2021 06:15 PM  
 Bilirubin Negative 05/19/2021 06:15 PM  
 Blood Negative 05/19/2021 06:15 PM  
 Urobilinogen 0.2 05/19/2021 06:15 PM  
 Nitrites Negative 05/19/2021 06:15 PM  
 Leukocyte Esterase TRACE (A) 05/19/2021 06:15 PM  
 WBC 5-10 05/19/2021 06:15 PM  
 RBC 5-10 05/19/2021 06:15 PM  
 Bacteria Negative 05/19/2021 06:15 PM  
 
 
IMPRESSION 
· Acute/chronic hypoxic respiratory failure · Pneumonia: ? HAP 
· COPD · Hx of PE - not on anticoagulation due to frequent falls PLAN 
· Wean O2 as able to keep sats > 90% · Agree with cefepime and vanc. F/u final sputum cx and sensitivities. · Repeat CXR · Check MBS · Chest PT, acapella, and encourage pulmonary toilet · Scheduled Duonebs and Brovana/Pulmicort nebs - on Trelegy at baseline · Prednisone taper · SCDs Thank you for allowing us to participate in the care of this patient. We will be happy to follow along in her progress with you.  
 
Willy Hassan

## 2021-05-26 NOTE — PROGRESS NOTES
Bedside shift change report given to David Hastings RN (oncoming nurse) by Wil Valentin RN (offgoing nurse). Report included the following information SBAR, Kardex, Intake/Output, MAR and Recent Results.

## 2021-05-26 NOTE — PROGRESS NOTES
5/26/2021   CARE MANAGEMENT NOTE:  Pt transferred from [facility] to the 5th floor. EMR was reviewed and a handoff was received from previous  Gia Quevedo). READMISSION:  Pt was admitted to [facility] from 5/9/21 - 5/13/21 with COPD. She discharged home with [facility]. Pt was readmitted to [facility] on 5/19 with sepsis 2/2 PNA, also hx of multiple CVAs and fxs, COPD, cirrhosis, DM. Reportedly, pt resides with her dtr Chang Luna ([phone]). Pt has caregivers 3 days per week. 
  
RUR 43%; LOS 6 days 
  
Transition Plan of Care: 1. SNF - referrals were sent to [id number] ([facility]), Our [address] of [address], [place] Dtr expressed desire for top facility and a private room 2. [facility] [employer] [facility] Erin will be needed 3. COVID 19 test for placement was ordered on 5/26 4. [facility] will be needed at discharge 
 
 CM will continue to follow pt for SNF rehab D. Sherrod Holter

## 2021-05-26 NOTE — WOUND CARE
Wound Consult: Follow up Visit. Chart reviewed. Harry Reese Spoke with patients nurse,  Rajiv Samuel. Patient is resting on a gabby bed with PEDRO mattress at end of visit Heels off loaded with pillows. Patient is awake, alert ,daughter at bedside; requires 2 assist to move side to side in bed. patient complains of back and buttock pain. Assessment: 
Right shin- 1x0.2xo. 1 pink, no drainage noted- hydrocolloid reapplied Left shin 0.6x0. 3x0.1- pink, slight serous drainage, hydrocolloid reapplied Left lower arm- partial thickness skin tear- 2.4x1.0x0.1 pink, moist , slight serous drainage. Foam reapplied. Bilateral heels blanchable redness Buttocks no redness noted, skin intact Treatment: 
 added PEDRO to gabby bed for comfort Continue current treatment Skin Care / PI Prevention Recommendations: 1. Minimize friction/shear: minimize layers of linen/pads under patient. 2. Off load pressure/reposition:   turn and reposition approximately every 2 hours; float heels with pillows or use off loading heel boots; waffle cushion for sitting; position wedge. 3. Manage Moisture - keep skin folds dry; incontinence skin care with incontinence wipes. 4. Continue to monitor nutrition, pain, and skin risk scale, and skin assessment. Plan: We will continue to reassess  and as needed. Please re-consult should concerns arise despite continued skin/PI prevention measures. [facility], Wound / Ostomy Department Wound Healing Office [phone]

## 2021-05-26 NOTE — PROGRESS NOTES
Problem: Self Care Deficits Care Plan (Adult) Goal: *Therapy Goal (Edit Goal, Insert Text) Description:  
FUNCTIONAL STATUS PRIOR TO ADMISSION: Patient was modified independent for basic except assistance provided for bathing in shower. Patient was ambulating with cane, and most recently with RW after hospitalization HOME SUPPORT: The patient lived with her daughter and family, who provided assistance as needed. Occupational Therapy Goals Initiated 5/25/2021 1. Patient will perform grooming at EOB with supervision/set-up within 7 day(s). 2.  Patient will perform bathing at EOB with supervision/set-up within 7 day(s). 3.  Patient will perform upper body dressing with minimal assistance/contact guard assist within 7 day(s). 4.  Patient will perform toilet transfers with minimal assistance/contact guard assist within 7 day(s). 5.  Patient will perform all aspects of toileting with minimal assistance/contact guard assist within 7 day(s). 6.  Patient will participate in upper extremity therapeutic exercise/activities with minimal assistance/contact guard assist for 5 minutes within 7 day(s). 7.  Patient will utilize energy conservation techniques during functional activities with verbal cues within 7 day(s). Outcome: Progressing Towards Goal 
 OCCUPATIONAL THERAPY TREATMENT Patient: Colton Kaplan (25 y.o. female) Date: 5/26/2021 Diagnosis: HAP (hospital-acquired pneumonia) [J18.9, Y95] Pneumonia of both lungs due to infectious organism [J18.9] <principal problem not specified> Precautions:   
Chart, occupational therapy assessment, plan of care, and goals were reviewed. ASSESSMENT Patient continues with skilled OT services and is progressing towards goals. Patient received in bed, agreeable to activity. Patient reports back pain and nausea at this time. She has been medicated for both. Patient tearful during session and reports feeling very anxiou and nervous.   Patient to EOB with min/mod assist today. Total assist x 1 to don socks while seated EOB. Patient stands with mod assist x 2 and able to take a few steps to R side toward chair set up next to bed. Patient BP elevated after transfer, reading 190/84, up from 145/66 sitting EOB. Patient with decreased balance and functional mobility. Noted audible congestion and increased work of breathing throughout session. Patient agreeable to remain in chair at this time. Current Level of Function Impacting Discharge (ADLs): mod x 2 for tranfers Other factors to consider for discharge: lives with family, not at baseline currently PLAN : 
Patient continues to benefit from skilled intervention to address the above impairments. Continue treatment per established plan of care to address goals. Recommend with staff: ADLs as able, OOB for meals Recommend next OT session: progression of goals Recommendation for discharge: (in order for the patient to meet his/her long term goals) Therapy up to 5 days/week in SNF setting This discharge recommendation: 
Has been made in collaboration with the attending provider and/or case management IF patient discharges home will need the following DME: TBD SUBJECTIVE:  
Patient stated Why do I feel so nervous? Latrell Ram OBJECTIVE DATA SUMMARY:  
Cognitive/Behavioral Status: 
Neurologic State: Alert Orientation Level: Oriented to person;Oriented to place;Oriented to time Cognition: Follows commands Perception: Appears intact Perseveration: No perseveration noted Safety/Judgement: Awareness of environment Functional Mobility and Transfers for ADLs: 
Bed Mobility: 
  
 
Transfers: 
Sit to Stand: Minimum assistance; Moderate assistance;Assist x2 Balance: 
Sitting: High guard Sitting - Static: Fair (occasional) Standing: With support; Impaired ADL Intervention: 
Feeding Drink to Mouth: Supervision (using R hand ) Lower Body Dressing Assistance Socks: Total assistance (dependent) Leg Crossed Method Used: No 
Position Performed: Seated edge of bed Cues: Verbal cues provided;Physical assistance Cognitive Retraining Safety/Judgement: Awareness of environment Therapeutic Exercises:  
 
 
Pain: 
 
Activity Tolerance:  
Fair and requires rest breaks After treatment patient left in no apparent distress:  
Sitting in chair and Call bell within reach COMMUNICATION/COLLABORATION:  
The patients plan of care was discussed with: Physical therapist and Registered nurse. Carlos Araya OTR/L Time Calculation: 23 mins

## 2021-05-26 NOTE — PROGRESS NOTES
Problem: Mobility Impaired (Adult and Pediatric) Goal: *Acute Goals and Plan of Care (Insert Text) Note: PHYSICAL THERAPY TREATMENT Patient: Elaina Macedo (8 y.o. female) Date: 5/26/2021 Diagnosis: HAP (hospital-acquired pneumonia) [J18.9, Y95] Pneumonia of both lungs due to infectious organism [J18.9] <principal problem not specified> Precautions:   
Chart, physical therapy assessment, plan of care and goals were reviewed. ASSESSMENT Patient continues with skilled PT services. Pt supine to sit with mod to max assist.Pt sit to stand with mod assist of 2. Pt took 4 steps to bedside chair hand held on mod assist on  right and mod assist of a second person. Pt with limited use of left UE. Pt left sitting. Continue goals. PLAN : 
Patient continues to benefit from skilled intervention to address the above impairments. Continue treatment per established plan of care. to address goals. Recommendation for discharge: (in order for the patient to meet his/her long term goals) Therapy up to 5 days/week in SNF setting This discharge recommendation: 
Has been made in collaboration with the attending provider and/or case management IF patient discharges home will need the following DME: gait belt SUBJECTIVE:  
 
 
OBJECTIVE DATA SUMMARY:  
Critical Behavior: 
Neurologic State: Alert, Eyes open spontaneously Orientation Level: Oriented to person, Oriented to place, Oriented to time Cognition: Follows commands Functional Mobility Training: 
Bed Mobility: Mod to max supine to sit. Transfers: 
Sit to Stand: Minimum assistance; Moderate assistance;Assist x2 Stand to Sit: Minimum assistance;Assist x2 Balance: 
Sitting: High guard Sitting - Static: Fair (occasional) Standing: With support; Impaired BED to chair: 
Distance (ft): 2 Feet (ft) mod assist of 2 Activity Tolerance:  
Fair After treatment patient left in no apparent distress:  
Sitting in chair COMMUNICATION/COLLABORATION:  
The patients plan of care was discussed with: Physical therapist.  
 
Socrates Lopez PTA Time Calculation: 23 mins

## 2021-05-26 NOTE — PROGRESS NOTES
Palliative Medicine Consult Lisandro: [phone] ([phone]) Patient Name: Malcolm Sher YOB: 1940 Date of Initial Consult: 2021 Reason for Consult: [address] discussion Requesting Provider: Dr. Maury Michelle Primary Care Physician: Edmund Velazquez MD 
 
 SUMMARY:  
Malcolm Sher is a 80 y.o. 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 Patient presented to ER w/ cc of worsening SOB and new onset of chills over past couple of days. Patient had just been discharged home with antibiotics, on  from hospitalization for CAP. Patient was admitted on 2021 from [facility] with a diagnosis of Sepsis and hypoxia  2/2 Worsening PNA . This is the patients 6th hospitalization since 2021, Current medical issues leading to Palliative Medicine involvement include: GOC discussion Psychosocial hx: Patient from [place], she is ,  of 48 years  2020. Had 4 children, 2 daughters . She moved to [place] on 2020 to live with daughter Kvng Oliver and her . Patients son, Carolyn Rendon lives in [place] with his family, however he has come to [place] to help assist with patients care. Patient has multiple grandchildren. Her jennifer is important to her. Patient stated she is worried about her sons health and is trying to protect him from worrying about her. PALLIATIVE DIAGNOSES:  
1. HTN 2. Hypoxia 3. Chest congestion 4. Pain 5. Anxiety 6. Advanced Care Planning discussion 7. DNR discussion 8. Goals of Care Discussion PLAN:  
1. Patient was seen, no family at bedside. 2. HTN- Uncontrolled, asymptomatic, discussed with RN Hazel Gonsalves and Dr. Javid Cardona. PRN hydralazine given with god response · Anxiety- improved with lorazepam 0.5 mg IV every 6 hours as needed, continues to use x4 over doses every 24 hours. Attending starting patient on prozac 3. Pain- uncontrolled rated 7/10. Etiology chronic back pain 2/2 history of multiple compression fractures. Chest and rib pain secondary to multiple fractures. · Current orders for scheduled percocet 5/325 every 6 hours  and  prn IV Morphine 2mg every 3 hours as needed. ·  
 
4. Hypoxia-Improved. Still requiring 2L. patient still with productive coug. Etiology includes worsening PNA in setting of COPD and pulmonary fibrosis as well as small pleural effusion. . 
· Goals of care discussion- Patient indicated that she plans to be discharged back home with daughter and her family. Will reach out to daughter to discuss. 5.  Initial consult note routed to primary continuity provider and/or primary health care team members 6. Communicated plan of care with: Palliative IDTCarmine 192 Team, RNJerilyn CM 
 
 GOALS OF CARE / TREATMENT PREFERENCES:  
 
GOALS OF CARE: 
Patient/Health Care Proxy Stated Goals: Cure TREATMENT PREFERENCES:  
Code Status: DNR Advance Care Planning: 
[x] [facility] Interdisciplinary Team has updated the ACP Navigator with [address] and Patient Capacity Primary Decision Maker: Bev Novak - Daughter - [phone] Secondary Decision Maker: Dash Andrew - Son - [phone] Advance Care Planning 5/19/2021 Patient's Healthcare Decision Maker is: - Confirm Advance Directive Yes, on file Patient Would Like to Complete Advance Directive - Medical Interventions: Full interventions Other Instructions: Other: As far as possible, the palliative care team has discussed with patient / health care proxy about goals of care / treatment preferences for patient. HISTORY:  
 
History obtained from: medical records/nurse/patient CHIEF COMPLAINT: Im in pain and I need something for anxiety HPI/SUBJECTIVE: The patient is:  
[x] Verbal and participatory [] Non-participatory due to:  
  
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 Albumin 2.3 Clinical Pain Assessment (nonverbal scale for severity on nonverbal patients):  
Clinical Pain Assessment Severity: 10 Location: chest and back Character: unknown Duration: unknown Effect: unknown Factors: unknown Frequency: unknown Activity (Movement): Lying quietly, normal position Duration: for how long has pt been experiencing pain (e.g., 2 days, 1 month, years) Frequency: how often pain is an issue (e.g., several times per day, once every few days, constant) FUNCTIONAL ASSESSMENT:  
 
Palliative Performance Scale (PPS): PPS: 40 PSYCHOSOCIAL/SPIRITUAL SCREENING:  
 
Palliative IDT has assessed this patient for cultural preferences / practices and a referral made as appropriate to needs (Cultural Services, Patient Advocacy, Ethics, etc.) Any spiritual / Advent concerns: 
[] Yes /  [x] No 
 
Caregiver Burnout: 
[] Yes /  [] No /  [x] No Caregiver Present Anticipatory grief assessment:  
[x] Normal  / [] Maladaptive ESAS Anxiety: Anxiety: 8 ESAS Depression:    
 
 
 REVIEW OF SYSTEMS:  
 
Positive and pertinent negative findings in ROS are noted above in HPI. The following systems were [x] reviewed / [] unable to be reviewed as noted in HPI Other findings are noted below. Systems: constitutional, ears/nose/mouth/throat, respiratory, gastrointestinal, genitourinary, musculoskeletal, integumentary, neurologic, psychiatric, endocrine. Positive findings noted below.  
Modified ESAS Completed by: provider Fatigue: 7 Drowsiness: 0 Pain: 10 Anxiety: 8 Anorexia: 5 Dyspnea: 2 Constipation: No  
  Stool Occurrence(s): 0 PHYSICAL EXAM:  
 
From RN flowsheet: 
Wt Readings from Last 3 Encounters:  
05/24/21 135 lb 8 oz (61.5 kg) 05/09/21 129 lb (58.5 kg) 03/23/21 129 lb (58.5 kg) Blood pressure (!) 187/88, pulse 83, temperature 97.5 °F (36.4 °C), resp. rate 18, height 5' 4\" (1.626 m), weight 135 lb 8 oz (61.5 kg), SpO2 98 %. Pain Scale 1: Numeric (0 - 10) Pain Intensity 1: 8 Pain Onset 1: Chronic Pain Location 1: Back Pain Orientation 1: Upper, Lower Pain Description 1: Aching, Sore, Orie Falconer Pain Intervention(s) 1: Medication (see MAR), Distraction, Rest, Repositioned Last bowel movement, if known:  
 
Constitutional: appears stated age, adequately nourished, sleeping, woke easily, NAD Eyes: pupils equal, anicteric ENMT: no nasal discharge, dry mucous membranes Cardiovascular: regular rhythm, distal pulses intact Respiratory: breathing not labored, symmetric, frequent +productive congested cough, coarse breath sounds. Gastrointestinal: soft non-tender, +bowel sounds Musculoskeletal: Left wrist/hand contracture, no tenderness to palpation Skin: warm, dry Neurologic: alert and oriented,  following commands, Left upper arm/hand limited movement, moving all extremities Psychiatric: appropriate affect, no hallucinations Other: 
 
 
 HISTORY:  
 
Active Problems: 
  HTN (hypertension) (1/3/2021) Type II diabetes mellitus ([facility] (1/3/2021) Hypothyroid (1/3/2021) Acute respiratory insufficiency (5/10/2021) HAP (hospital-acquired pneumonia) (5/19/2021) Pneumonia of both lungs due to infectious organism (5/19/2021) Past Medical History:  
Diagnosis Date  Anxiety and depression  Arthritis  Chronic obstructive pulmonary disease ([facility]  Chronic pain  DM [facility]  GERD (gastroesophageal reflux disease)  Glaucoma   
 HTN (hypertension)  Hx of completed stroke 2021  Hypothyroid  Incontinence  Neuropathy  Polypharmacy Past Surgical History:  
Procedure Laterality Date  HX ORTHOPAEDIC    
 HX VASCULAR ACCESS Family History Problem Relation Age of Onset  No Known Problems Other   
     reviewed, patient did not know  Diabetes Mother  Heart Attack Mother  Cancer Father History reviewed, no pertinent family history. Social History Tobacco Use  Smoking status: Former Smoker Quit date: 1991 Years since quittin.4  Smokeless tobacco: Never Used Substance Use Topics  Alcohol use: Never Allergies Allergen Reactions  Shellfish Derived Anaphylaxis Current Facility-Administered Medications Medication Dose Route Frequency  [START ON 2021] amLODIPine (NORVASC) tablet 5 mg  5 mg Oral DAILY  [START ON 2021] FLUoxetine (PROzac) capsule 40 mg  40 mg Oral QPM  
 cefepime (MAXIPIME) 2 g in sterile water (preservative free) 10 mL IV syringe  2 g IntraVENous Q12H  predniSONE (DELTASONE) tablet 40 mg  40 mg Oral DAILY WITH BREAKFAST  pantoprazole (PROTONIX) tablet 40 mg  40 mg Oral ACB&D  
 guaiFENesin-dextromethorphan (ROBITUSSIN DM) 100-10 mg/5 mL syrup 10 mL  10 mL Oral Q6H  
 lidocaine 4 % patch 1 Patch  1 Patch TransDERmal Q24H  
 morphine injection 2 mg  2 mg IntraVENous Q3H PRN  
 gabapentin (NEURONTIN) capsule 600 mg  600 mg Oral BID  
 guaiFENesin ER (MUCINEX) tablet 600 mg  600 mg Oral BID  latanoprost (XALATAN) 0.005 % ophthalmic solution 1 Drop  1 Drop Both Eyes QHS  melatonin (rapid dissolve) tablet 5 mg  5 mg Oral QHS  arformoterol 15 mcg/budesonide 0.5 mg neb solution   Nebulization BID RT  
 oxyCODONE-acetaminophen (PERCOCET) 5-325 mg per tablet 1 Tablet  1 Tablet Oral Q6H  
 senna-docusate (PERICOLACE) 8.6-50 mg per tablet 1 Tablet  1 Tablet Oral DAILY  hydrALAZINE (APRESOLINE) 20 mg/mL injection 10 mg  10 mg IntraVENous Q6H PRN  
 atorvastatin (LIPITOR) tablet 80 mg  80 mg Oral QHS  aspirin delayed-release tablet 81 mg  81 mg Oral DAILY  clonazePAM (KlonoPIN) tablet 0.25 mg  0.25 mg Oral QPM  
 levothyroxine (SYNTHROID) tablet 75 mcg  75 mcg Oral ACB  meclizine (ANTIVERT) tablet 12.5 mg  12.5 mg Oral Q6H PRN  
 glucose chewable tablet 16 g  4 Tablet Oral PRN  
 dextrose (D50W) injection syrg 12.5-25 g  25-50 mL IntraVENous PRN  
 glucagon (GLUCAGEN) injection 1 mg  1 mg IntraMUSCular PRN  
 sodium chloride (NS) flush 5-40 mL  5-40 mL IntraVENous Q8H  
 sodium chloride (NS) flush 5-40 mL  5-40 mL IntraVENous PRN  
 acetaminophen (TYLENOL) tablet 650 mg  650 mg Oral Q6H PRN Or  
 acetaminophen (TYLENOL) suppository 650 mg  650 mg Rectal Q6H PRN  polyethylene glycol (MIRALAX) packet 17 g  17 g Oral DAILY PRN  
 bisacodyL (DULCOLAX) suppository 10 mg  10 mg Rectal DAILY PRN  
 ondansetron (ZOFRAN) injection 4 mg  4 mg IntraVENous Q6H PRN  
 insulin glargine (LANTUS) injection 12 Units  0.2 Units/kg SubCUTAneous QHS  insulin lispro (HUMALOG) injection   SubCUTAneous AC&HS  
 melatonin (rapid dissolve) tablet 5 mg  5 mg Oral QHS PRN  
 alum-mag hydroxide-simeth (MYLANTA) oral suspension 30 mL  30 mL Oral Q4H PRN  
 LORazepam (ATIVAN) injection 0.5 mg  0.5 mg IntraVENous Q6H PRN  
 diphenhydrAMINE (BENADRYL) injection 25 mg  25 mg IntraVENous Q6H PRN  
 albuterol (PROVENTIL VENTOLIN) nebulizer solution 2.5 mg  2.5 mg Nebulization Q4H PRN  
 simethicone (MYLICON) tablet 80 mg  80 mg Oral QID PRN  
 albuterol-ipratropium (DUO-NEB) 2.5 MG-0.5 MG/3 ML  3 mL Nebulization Q6H RT  
 
 
 
 LAB AND IMAGING FINDINGS:  
 
Lab Results Component Value Date/Time WBC 17.3 (H) 05/25/2021 04:20 AM  
 HGB 9.7 (L) 05/25/2021 04:20 AM  
 PLATELET 331 16/40/5200 04:20 AM  
 
Lab Results Component Value Date/Time  Sodium 140 05/25/2021 04:20 AM  
 Potassium 3.3 (L) 05/25/2021 04:20 AM  
 Chloride 108 05/25/2021 04:20 AM  
 CO2 27 05/25/2021 04:20 AM  
 BUN 12 05/25/2021 04:20 AM  
 Creatinine 0.47 (L) 05/25/2021 04:20 AM  
 Calcium 7.2 (L) 05/25/2021 04:20 AM  
 Magnesium 1.8 03/09/2021 03:23 AM  
 Phosphorus 3.6 03/10/2021 03:15 AM  
  
Lab Results Component Value Date/Time Alk. phosphatase 73 05/21/2021 09:10 AM  
 Protein, total 5.2 (L) 05/21/2021 09:10 AM  
 Albumin 2.3 (L) 05/21/2021 09:10 AM  
 Globulin 2.9 05/21/2021 09:10 AM  
 
Lab Results Component Value Date/Time INR 1.2 (H) 03/09/2021 03:23 AM  
 Prothrombin time 12.8 (H) 03/09/2021 03:23 AM  
 aPTT 28.6 03/09/2021 03:23 AM  
  
Lab Results Component Value Date/Time Iron 16 (L) 05/09/2021 08:21 PM  
 TIBC 278 05/09/2021 08:21 PM  
 Iron % saturation 6 (L) 05/09/2021 08:21 PM  
  
No results found for: PH, PCO2, PO2 No components found for: [place] No results found for: CPK, CKMB Total time: 15 min Counseling / coordination time, spent as noted above: 10 
> 50% counseling / coordination?: yes Prolonged service was provided for  []30 min   []75 min in face to face time in the presence of the patient, spent as noted above. Time Start/END:  
Time Start/ End:  
Note: this can only be billed with 80818 (initial) or 03489 (follow up). If multiple start / stop times, list each separately.

## 2021-05-26 NOTE — PROGRESS NOTES
05/26/21 0946 Chart and Patient  Assessment Pulmonary History 1 Surgical History 0 Chest X-ray 2 Respiratory Pattern 0 Mental Status 0 Breath Sounds 2 Cough 0 Level of Activity/Mobility 1 Respiratory Assessment Total Score 6 Patients nebulizer treatments changed to Q6 while awake, patient has been previously diagnosed with COPD and currently on O2

## 2021-05-26 NOTE — PROGRESS NOTES
Abhinav Erwin [facility] 79 
5[address] [facility], [address] [place] [address] 
[phone] Medical Progress Note NAME: Elaina Macedo :  1940 MRM:  [id number] Date of service: 2021  10:55 AM 
 
  
Assessment and Plan: 1. Sepsis due to Pneumonia/ HCAP. CT abd/pelvis showed severe stenosis of bilateral common iliac arteries, but pt has no leg pain and lactate is normal. There is also e/o cirrhosis, but minimal ascites so I have low c/f SBP. Treated with Vanc/cefepime  -  to complete a 7 day course. Sputum culture grew MRSA. Restart vanc. 2.  AHRF: PNA and COPD exac. Continue nebs, lenore neb, brovana, pulmicort. Pulmonary following 3. COPD exacerbation. As above continue treatment. 4.  Multiple fractures: CT today showed multiple fractures of ribs, pubic rami, thoracic spine, etc. She is on oxycodone for chronic pain. Ultimately, with her repeat admissions and suggestion of multiple falls, palliative care is following. needs SNF  
  
5.  Bilateral common iliac stenosis: no symptoms. Monitor  
  
6. Cirrhosis: noted on imaging. She is not presently compensated. 7.  Hypokalemia. replete Subjective: Chief Complaint[de-identified] Patient was seen and examined as a follow up for sepsis. Chart was reviewed. c/o back pain ROS: 
(bold if positive, if negative) Tolerating PT  Tolerating Diet Objective:  
 
Last 24hrs VS reviewed since prior progress note. Most recent are: 
 
Visit Vitals BP (!) 181/75 (BP 1 Location: Right upper arm, BP Patient Position: At rest) Pulse 82 Temp 97.7 °F (36.5 °C) Resp 18 Ht 5' 4\" (1.626 m) Wt 61.5 kg (135 lb 8 oz) SpO2 97% BMI 23.26 kg/m² SpO2 Readings from Last 6 Encounters:  
21 97% 21 97% 21 96% 03/10/21 95% 21 95% 21 96% O2 Flow Rate (L/min): 2 l/min Intake/Output Summary (Last 24 hours) at 2021 1055 Last data filed at 2021 7533 Gross per 24 hour Intake 580 ml Output 1600 ml Net -1020 ml Physical Exam: 
 
Gen:  Well-developed, well-nourished, in no acute distress HEENT:  Pink conjunctivae, PERRL, hearing intact to voice, moist mucous membranes Neck:  Supple, without masses, thyroid non-tender Resp:  No accessory muscle use, clear breath sounds without wheezes rales or rhonchi 
Card:  No murmurs, normal S1, S2 without thrills, bruits or peripheral edema Abd:  Soft, non-tender, non-distended, normoactive bowel sounds are present, no palpable organomegaly and no detectable hernias Lymph:  No cervical or inguinal adenopathy Musc:  No cyanosis or clubbing Skin:  No rashes or ulcers, skin turgor is good Neuro:  Cranial nerves are grossly intact, no focal motor weakness, follows commands appropriately Psych:  Good insight, oriented to person, place and time, alert 
__________________________________________________________________ Medications Reviewed: (see below) Medications:  
 
Current Facility-Administered Medications Medication Dose Route Frequency  [START ON 5/27/2021] amLODIPine (NORVASC) tablet 10 mg  10 mg Oral DAILY  FLUoxetine (PROzac) capsule 40 mg  40 mg Oral QPM  
 cefepime (MAXIPIME) 2 g in sterile water (preservative free) 10 mL IV syringe  2 g IntraVENous Q12H  predniSONE (DELTASONE) tablet 40 mg  40 mg Oral DAILY WITH BREAKFAST  pantoprazole (PROTONIX) tablet 40 mg  40 mg Oral ACB&D  
 guaiFENesin-dextromethorphan (ROBITUSSIN DM) 100-10 mg/5 mL syrup 10 mL  10 mL Oral Q6H  
 lidocaine 4 % patch 1 Patch  1 Patch TransDERmal Q24H  
 morphine injection 2 mg  2 mg IntraVENous Q3H PRN  
 gabapentin (NEURONTIN) capsule 600 mg  600 mg Oral BID  
 guaiFENesin ER (MUCINEX) tablet 600 mg  600 mg Oral BID  latanoprost (XALATAN) 0.005 % ophthalmic solution 1 Drop  1 Drop Both Eyes QHS  melatonin (rapid dissolve) tablet 5 mg  5 mg Oral QHS  arformoterol 15 mcg/budesonide 0.5 mg neb solution   Nebulization BID RT  
 oxyCODONE-acetaminophen (PERCOCET) 5-325 mg per tablet 1 Tablet  1 Tablet Oral Q6H  
 senna-docusate (PERICOLACE) 8.6-50 mg per tablet 1 Tablet  1 Tablet Oral DAILY  hydrALAZINE (APRESOLINE) 20 mg/mL injection 10 mg  10 mg IntraVENous Q6H PRN  
 atorvastatin (LIPITOR) tablet 80 mg  80 mg Oral QHS  aspirin delayed-release tablet 81 mg  81 mg Oral DAILY  clonazePAM (KlonoPIN) tablet 0.25 mg  0.25 mg Oral QPM  
 levothyroxine (SYNTHROID) tablet 75 mcg  75 mcg Oral ACB  meclizine (ANTIVERT) tablet 12.5 mg  12.5 mg Oral Q6H PRN  
 glucose chewable tablet 16 g  4 Tablet Oral PRN  
 dextrose (D50W) injection syrg 12.5-25 g  25-50 mL IntraVENous PRN  
 glucagon (GLUCAGEN) injection 1 mg  1 mg IntraMUSCular PRN  
 sodium chloride (NS) flush 5-40 mL  5-40 mL IntraVENous Q8H  
 sodium chloride (NS) flush 5-40 mL  5-40 mL IntraVENous PRN  
 acetaminophen (TYLENOL) tablet 650 mg  650 mg Oral Q6H PRN Or  
 acetaminophen (TYLENOL) suppository 650 mg  650 mg Rectal Q6H PRN  polyethylene glycol (MIRALAX) packet 17 g  17 g Oral DAILY PRN  
 bisacodyL (DULCOLAX) suppository 10 mg  10 mg Rectal DAILY PRN  
 ondansetron (ZOFRAN) injection 4 mg  4 mg IntraVENous Q6H PRN  
 insulin glargine (LANTUS) injection 12 Units  0.2 Units/kg SubCUTAneous QHS  insulin lispro (HUMALOG) injection   SubCUTAneous AC&HS  
 melatonin (rapid dissolve) tablet 5 mg  5 mg Oral QHS PRN  
 alum-mag hydroxide-simeth (MYLANTA) oral suspension 30 mL  30 mL Oral Q4H PRN  
 LORazepam (ATIVAN) injection 0.5 mg  0.5 mg IntraVENous Q6H PRN  
 diphenhydrAMINE (BENADRYL) injection 25 mg  25 mg IntraVENous Q6H PRN  
 albuterol (PROVENTIL VENTOLIN) nebulizer solution 2.5 mg  2.5 mg Nebulization Q4H PRN  
 simethicone (MYLICON) tablet 80 mg  80 mg Oral QID PRN  
 albuterol-ipratropium (DUO-NEB) 2.5 MG-0.5 MG/3 ML  3 mL Nebulization Q6H RT Lab Data Reviewed: (see below) Lab Review:  
 
Recent Labs  
  05/26/21 
8214 05/25/21 
0420 05/24/21 
0540 WBC 13.4* 17.3* 14.5* HGB 9.7* 9.7* 9.3* HCT 32.2* 31.8* 29.9*  
 388 374 Recent Labs  
  05/26/21 
0336 05/25/21 
0420 05/24/21 
0540  140 140  
K 3.0* 3.3* 3.5  108 108 CO2 29 27 31 * 179* 177* BUN 10 12 11 CREA 0.42* 0.47* 0.41* CA 7.3* 7.2* 7.3* Lab Results Component Value Date/Time Glucose (POC) 166 (H) 05/26/2021 07:00 AM  
 Glucose (POC) 266 (H) 05/25/2021 09:34 PM  
 Glucose (POC) 331 (H) 05/25/2021 03:13 PM  
 Glucose (POC) 297 (H) 05/25/2021 10:58 AM  
 Glucose (POC) 150 (H) 05/25/2021 07:12 AM  
 
No results for input(s): PH, PCO2, PO2, HCO3, FIO2 in the last 72 hours. No results for input(s): INR, INREXT in the last 72 hours. All Micro Results Procedure Component Value Units Date/Time CULTURE, RESPIRATORY/SPUTUM/BRONCH Inna Petit STAIN [[id number]]  (Abnormal) Collected: 05/23/21 2012 Order Status: Completed Specimen: Sputum Updated: 05/25/21 1040 Special Requests: NO SPECIAL REQUESTS     
  GRAM STAIN    
  RARE EPITHELIAL CELLS SEEN  
     
   FEW WBCS SEEN     
   FEW GRAM POSITIVE COCCI Culture result: MODERATE PROBABLE STAPHYLOCOCCUS AUREUS  
     
 URINE CULTURE HOLD SAMPLE [[id number]] Collected: 05/19/21 1815 Order Status: Completed Specimen: Serum Updated: 05/19/21 [address] Urine culture hold Urine on hold in Microbiology dept for 2 days. If unpreserved urine is submitted, it cannot be used for addtional testing after 24 hours, recollection will be required. I have reviewed notes of prior 24hr. Other pertinent lab: Total time spent with patient: 28 I personally reviewed chart, notes, data and current medications in the medical record. I have personally examined and treated the patient at bedside during this period.  
              
Care Plan discussed with: Patient, Family, Nursing Staff and >50% of time spent in counseling and coordination of care Discussed:  Care Plan Prophylaxis:  Lovenox Disposition:  SNF/LTC 
        
___________________________________________________ Attending Physician: Daniel Wise MD

## 2021-05-26 NOTE — PROGRESS NOTES
Bedside and Verbal shift change report given to Wanda Roche RN (oncoming nurse) by Jennifer Root RN (offgoing nurse). Report included the following information SBAR, Kardex, Intake/Output, MAR and Recent Results.

## 2021-05-27 NOTE — ROUTINE PROCESS
SPEECH PATHOLOGY MODIFIED BARIUM SWALLOW STUDY/DISCHARGE Patient: Colton Kaplan (26 y.o. female) Date: 5/27/2021 Primary Diagnosis: HAP (hospital-acquired pneumonia) [J18.9, Y95] Pneumonia of both lungs due to infectious organism [J18.9] Precautions: aspiration ASSESSMENT : 
Based on the objective data described below, the patient presents with mild oral-pharyngeal dysphagia. She had some a-p propulsion issues with pills, but was eventually successful. Pill briefly lodged in upper pharynx, but cleared with sips of thins. SHe has 1 trace drop of penetration of thins, which cleared with force of the swallow. No cough. Not an overwhelming dysphagia at this time, despite her severe fatigue in this study. However, with her h/o COPD, she could at times have difficulty cessating respiration for deglutition. She c/o mouth and throat sore. SLP examined mouth before MBS and noted possible oral thrush in posterior oral cavity. Will notify MD, as candidiasis is the most common etiology of odynophagia. Patient will benefit from skilled intervention to address the above impairments. Patients rehabilitation potential is considered to be Fair PLAN : 
Recommendations and Planned Interventions: 
Ok to continue dysphagia 2, thins. Frequency/Duration: Patient will NOT be followed by speech-language pathology { Discharge Recommendations: None SUBJECTIVE:  
Patient stated I am so tired. OBJECTIVE:  
 
Past Medical History:  
Diagnosis Date  Anxiety and depression  Arthritis  Chronic obstructive pulmonary disease ([address]  Chronic pain  DM type 2 causing vascular disease ([address]  GERD (gastroesophageal reflux disease)  Glaucoma   
 HTN (hypertension)  Hx of completed stroke 2017, 2021  Hypothyroid  Incontinence  Neuropathy  Polypharmacy Past Surgical History:  
Procedure Laterality Date  HX ORTHOPAEDIC    
 HX VASCULAR ACCESS Prior Level of Function/Home Situation:  
Home Situation Home Environment: Private residence # Steps to Enter: 3 Rails to Enter: Yes Hand Rails : Bilateral 
One/Two Story Residence: Other (Comment) Living Alone: No 
Support Systems: Child(darrick) Patient Expects to be Discharged to[de-identified] Private residence Current DME Used/Available at Home: Walker, rolling, Shower chair, Grab bars Tub or Shower Type: Tub/Shower combination Diet prior to admission: regular, thins Current Diet:  Dysphagia 2, thins Radiologist: Minus [place] Film Views: Lateral 
Patient Position: upright in Ha[place] chair Trial 1: Trial 2:  
Consistency Presented: Thin liquid; Solid;Puree;Pill/Tablet How Presented: SLP-fed/presented;Self-fed/presented;Spoon;Straw ORAL PHASE:     
Bolus Acceptance: No impairment Bolus Formation/Control: Impaired (a-p propulsion issues with pill): Delayed  :    
Propulsion: Discoordination (with pill and thins only) Oral Residue: Lingual (that spilled to valleculae after swallow without triggering a second swallow) PHARYNGEAL PHASE:     
Initiation of Swallow: Triggered at vallecula Timing: Pooling 1-5 sec Penetration: Flash/transient;Trace (with 1 drop of thins once. this cleared with the force of the swallow, but was silent) Pharyngeal Clearance:  (none immediately after swallow, but she had oral residue that spilled to pharynx and pooled in valleculae after the swallow with no sensation/second swallow with all consistencies. This may be as aspiration risk after the swallow. Also pill lodged in valleculae. She did have good sensation for this, and swallow additional liquid which cleared the pill. Attempted Modifications: Alternate liquids/solids Effective Modifications: Alternate liquids/solids (but then oral residue spilled again later.) Trial 3: Trial 4:  
     
     
     
     
 :    :    
     
     
    
     
     
     
     
     
     
     
     
 
Decreased Tongue Base Retraction?: No 
Laryngeal Elevation: WFL (within functional limits) Aspiration/Penetration Score: 2 (Penetration/No residue-Contrast enters the airway penetrates, remains above the folds/cords, and is cleared) NOMS:  
The NOMS functional outcome measure was used to quantify this patient's level of swallowing impairment. Based on the NOMS, the patient was determined to be at level 6 for swallow function NOMS Swallowing Levels: 
Level 1 (CN): NPO Level 2 (CM): NPO but takes consistency in therapy Level 3 (CL): Takes less than 50% of nutrition p.o. and continues with nonoral feedings; and/or safe with mod cues; and/or max diet restriction Level 4 (CK): Safe swallow but needs mod cues; and/or mod diet restriction; and/or still requires some nonoral feeding/supplements Level 5 (CJ): Safe swallow with min diet restriction; and/or needs min cues Level 6 (CI): Independent with p.o.; rare cues; usually self cues; may need to avoid some foods or needs extra time Level 7 (CH): Independent for all p.o. АНДРЕЙ. (2003). National Outcomes Measurement System (NOMS): Adult Speech-Language Pathology User's Guide. COMMUNICATION/EDUCATION:  
Patient was educated regarding Her deficit(s) of mild dysphagia  as this relates to Her diagnosis of PNA. She demonstrated Fair understanding as evidenced by complete exhaustion. Oliverio Brooks The patients plan of care including findings from [facility], recommendations, planned interventions, and recommended diet changes were discussed with: Physician. Patient/family have participated as able in goal setting and plan of care. Patient/family agree to work toward stated goals and plan of care. Thank you for this referral. 
Yimi Reardon, SLP Time Calculation: 15 mins

## 2021-05-27 NOTE — PROGRESS NOTES
[facility] 
380 [facility], [address] [place] [address] 
[phone] Medical Progress Note NAME: Dannie Grimes :  1940 MRM:  [id number] Date of service: 2021  10:55 AM 
 
  
Assessment and Plan: 1. Sepsis due to Pneumonia/ HCAP/ MRSA pneumonia. CT abd/pelvis showed severe stenosis of bilateral common iliac arteries, but pt has no leg pain and lactate is normal. There is also e/o cirrhosis, but minimal ascites so I have low c/f SBP. Treated with Vanc/cefepime  -  to complete a 7 day course. Sputum culture grew MRSA. Restarted vanc. 2.  AHRF: PNA and COPD exac. Continue nebs, lenore neb, brovana, pulmicort. Pulmonary following 3. COPD exacerbation. As above continue treatment. 4.  Multiple fractures: CT today showed multiple fractures of ribs, pubic rami, thoracic spine, etc. She is on oxycodone for chronic pain. Ultimately, with her repeat admissions and suggestion of multiple falls, palliative care is following. needs SNF  
  
5.  Bilateral common iliac stenosis: no symptoms. Monitor  
  
6. Cirrhosis: noted on imaging. She is not presently compensated. 7.  Hypokalemia. replete 8. Leukocytosis. Likely due to steroid. Monitor Subjective: Chief Complaint[de-identified] Patient was seen and examined as a follow up for sepsis. Chart was reviewed. c/o back pain and cough ROS: 
(bold if positive, if negative) Tolerating PT  Tolerating Diet Objective:  
 
Last 24hrs VS reviewed since prior progress note. Most recent are: 
 
Visit Vitals BP (!) 147/67 (BP 1 Location: Right lower arm, BP Patient Position: At rest) Pulse 86 Temp 98.4 °F (36.9 °C) Resp 18 Ht 5' 4\" (1.626 m) Wt 61.5 kg (135 lb 8 oz) SpO2 97% BMI 23.26 kg/m² SpO2 Readings from Last 6 Encounters:  
21 97% 21 97% 21 96% 03/10/21 95% 21 95% 21 96% O2 Flow Rate (L/min): 3 l/min Intake/Output Summary (Last 24 hours) at 5/27/2021 4231 Last data filed at 5/27/2021 4381 Gross per 24 hour Intake 730 ml Output 3450 ml Net -2720 ml Physical Exam: 
 
Gen:  Well-developed, well-nourished, in no acute distress HEENT:  Pink conjunctivae, PERRL, hearing intact to voice, moist mucous membranes Neck:  Supple, without masses, thyroid non-tender Resp:  No accessory muscle use, clear breath sounds without wheezes rales or rhonchi 
Card:  No murmurs, normal S1, S2 without thrills, bruits or peripheral edema Abd:  Soft, non-tender, non-distended, normoactive bowel sounds are present, no palpable organomegaly and no detectable hernias Lymph:  No cervical or inguinal adenopathy Musc:  No cyanosis or clubbing Skin:  No rashes or ulcers, skin turgor is good Neuro:  Cranial nerves are grossly intact, no focal motor weakness, follows commands appropriately Psych:  Good insight, oriented to person, place and time, alert 
__________________________________________________________________ Medications Reviewed: (see below) Medications:  
 
Current Facility-Administered Medications Medication Dose Route Frequency  amLODIPine (NORVASC) tablet 10 mg  10 mg Oral DAILY  enoxaparin (LOVENOX) injection 40 mg  40 mg SubCUTAneous Q24H  
 vancomycin (VANCOCIN) 1,000 mg in 0.9% sodium chloride 250 mL (VIAL-MATE)  1,000 mg IntraVENous Q12H  
 FLUoxetine (PROzac) capsule 40 mg  40 mg Oral QPM  
 predniSONE (DELTASONE) tablet 40 mg  40 mg Oral DAILY WITH BREAKFAST  pantoprazole (PROTONIX) tablet 40 mg  40 mg Oral ACB&D  
 guaiFENesin-dextromethorphan (ROBITUSSIN DM) 100-10 mg/5 mL syrup 10 mL  10 mL Oral Q6H  
 lidocaine 4 % patch 1 Patch  1 Patch TransDERmal Q24H  
 morphine injection 2 mg  2 mg IntraVENous Q3H PRN  
 gabapentin (NEURONTIN) capsule 600 mg  600 mg Oral BID  
 guaiFENesin ER (MUCINEX) tablet 600 mg  600 mg Oral BID  latanoprost (XALATAN) 0.005 % ophthalmic solution 1 Drop  1 Drop Both Eyes QHS  melatonin (rapid dissolve) tablet 5 mg  5 mg Oral QHS  arformoterol 15 mcg/budesonide 0.5 mg neb solution   Nebulization BID RT  
 oxyCODONE-acetaminophen (PERCOCET) 5-325 mg per tablet 1 Tablet  1 Tablet Oral Q6H  
 senna-docusate (PERICOLACE) 8.6-50 mg per tablet 1 Tablet  1 Tablet Oral DAILY  hydrALAZINE (APRESOLINE) 20 mg/mL injection 10 mg  10 mg IntraVENous Q6H PRN  
 atorvastatin (LIPITOR) tablet 80 mg  80 mg Oral QHS  aspirin delayed-release tablet 81 mg  81 mg Oral DAILY  clonazePAM (KlonoPIN) tablet 0.25 mg  0.25 mg Oral QPM  
 levothyroxine (SYNTHROID) tablet 75 mcg  75 mcg Oral ACB  meclizine (ANTIVERT) tablet 12.5 mg  12.5 mg Oral Q6H PRN  
 glucose chewable tablet 16 g  4 Tablet Oral PRN  
 dextrose (D50W) injection syrg 12.5-25 g  25-50 mL IntraVENous PRN  
 glucagon (GLUCAGEN) injection 1 mg  1 mg IntraMUSCular PRN  
 sodium chloride (NS) flush 5-40 mL  5-40 mL IntraVENous Q8H  
 sodium chloride (NS) flush 5-40 mL  5-40 mL IntraVENous PRN  
 acetaminophen (TYLENOL) tablet 650 mg  650 mg Oral Q6H PRN Or  
 acetaminophen (TYLENOL) suppository 650 mg  650 mg Rectal Q6H PRN  polyethylene glycol (MIRALAX) packet 17 g  17 g Oral DAILY PRN  
 bisacodyL (DULCOLAX) suppository 10 mg  10 mg Rectal DAILY PRN  
 ondansetron (ZOFRAN) injection 4 mg  4 mg IntraVENous Q6H PRN  
 insulin glargine (LANTUS) injection 12 Units  0.2 Units/kg SubCUTAneous QHS  insulin lispro (HUMALOG) injection   SubCUTAneous AC&HS  
 melatonin (rapid dissolve) tablet 5 mg  5 mg Oral QHS PRN  
 alum-mag hydroxide-simeth (MYLANTA) oral suspension 30 mL  30 mL Oral Q4H PRN  
 LORazepam (ATIVAN) injection 0.5 mg  0.5 mg IntraVENous Q6H PRN  
 diphenhydrAMINE (BENADRYL) injection 25 mg  25 mg IntraVENous Q6H PRN  
 albuterol (PROVENTIL VENTOLIN) nebulizer solution 2.5 mg  2.5 mg Nebulization Q4H PRN  
 simethicone (MYLICON) tablet 80 mg  80 mg Oral QID PRN  
 albuterol-ipratropium (DUO-NEB) 2.5 MG-0.5 MG/3 ML  3 mL Nebulization Q6H RT Lab Data Reviewed: (see below) Lab Review:  
 
Recent Labs  
  05/27/21 
0121 05/26/21 
0336 05/25/21 
0420 WBC 15.6* 13.4* 17.3* HGB 9.7* 9.7* 9.7* HCT 31.2* 32.2* 31.8*  
 333 388 Recent Labs  
  05/27/21 
0121 05/26/21 
0336 05/25/21 
0420  141 140  
K 3.7 3.0* 3.3*  
 108 108 CO2 31 29 27 * 157* 179* BUN 12 10 12 CREA 0.51* 0.42* 0.47* CA 6.7* 7.3* 7.2* Lab Results Component Value Date/Time Glucose (POC) 134 (H) 05/27/2021 06:22 AM  
 Glucose (POC) 399 (H) 05/26/2021 09:43 PM  
 Glucose (POC) 331 (H) 05/26/2021 04:03 PM  
 Glucose (POC) 259 (H) 05/26/2021 11:55 AM  
 Glucose (POC) 166 (H) 05/26/2021 07:00 AM  
 
No results for input(s): PH, PCO2, PO2, HCO3, FIO2 in the last 72 hours. No results for input(s): INR, INREXT, INREXT in the last 72 hours. All Micro Results Procedure Component Value Units Date/Time CULTURE, RESPIRATORY/SPUTUM/BRONCH Claudean Diamond STAIN [[id number]]  (Abnormal)  (Susceptibility) Collected: 05/23/21 2012 Order Status: Completed Specimen: Sputum Updated: 05/26/21 1145 Special Requests: NO SPECIAL REQUESTS     
  GRAM STAIN    
  RARE EPITHELIAL CELLS SEEN  
     
   FEW WBCS SEEN     
   FEW GRAM POSITIVE COCCI Culture result: MODERATE * METHICILLIN RESISTANT STAPHYLOCOCCUS AUREUS * CHECKING FOR POSSIBLE 
FILAMENTOUS FUNGUS 
  
      
  NO NORMAL RESPIRATORY ZAKIYA ISOLATED URINE CULTURE HOLD SAMPLE [[id number]] Collected: 05/19/21 1815 Order Status: Completed Specimen: Serum Updated: 05/19/21 [address] Urine culture hold Urine on hold in Microbiology dept for 2 days. If unpreserved urine is submitted, it cannot be used for addtional testing after 24 hours, recollection will be required. I have reviewed notes of prior 24hr. Other pertinent lab: Total time spent with patient: 35 I personally reviewed chart, notes, data and current medications in the medical record. I have personally examined and treated the patient at bedside during this period. Care Plan discussed with: Patient, Family, Nursing Staff and >50% of time spent in counseling and coordination of care Discussed:  Care Plan Prophylaxis:  Lovenox Disposition:  SNF/LTC 
        
___________________________________________________ Attending Physician: Daniel Stephens MD

## 2021-05-27 NOTE — PROGRESS NOTES
Name: [facility]: [facility]  
: 1940 Admit Date: 2021 Phone: [phone]  Room: [facility]/ PCP: Trupti Thomas MD  MRN: [id number] Date: 2021  Code: DNR Ms. Claudette End is a pleasant 81 yo woman with a history of COPD/emphysema (previously followed by a pulmonologist in [place]), chronic respiratory failure with hypoxia, PE, former tobacco use, HTN, DM, hypothyroidism and anxiety who was admitted last week with worsening pneumonia and leukocytosis. Daughter reports worsening hypoxia at home prior to admission with sats in the mid 80s. She currently has chest congestion and weak, nonproductive cough. Reports chest wall soreness with coughing. Fatigues easily with activity. Resp cx this admission with prelim of moderate staph aureus. She is currently on cefepime and vanc. Speech has evaluated. She is on dysphagia diet. She was hospitalized at [facility] from - for bilateral PNA. She has had multiple admissions at [facility] this year for COPD. She was previously followed in [place] for her COPD and moved to [address] [place] [address] in the last 4 months. She is chronically managed on Trelegy and prn albuterol inhaler/nebulizer. Has home O2 through [facility] and is on 2L at baseline. During her last hospitalization she was given levaquin and steroid taper. She notes symptomatic benefit from the prednisone. She has been taken off of blood thinners in the past due to frequent falls.  chest CT personally visualized and report reviewed. There is small left pleural effusion and likely worsening left lower lobe interstitial and patchy airspace consolidation. Interval history: MBS done today showing single episode of trace aspiration w/ thins. Recs are for dysphagia 2, thins Afebrile 
sats 97% on 3L 
C/o dry cough and pleuritic chest pain Very anxious - asking for ativan and morphine Past Medical History:  
Diagnosis Date  Anxiety and depression  Arthritis  Chronic obstructive pulmonary disease ([facility] [address] [facility]  Chronic pain  DM type 2 causing vascular disease ([facility]  GERD (gastroesophageal reflux disease)  Glaucoma   
 HTN (hypertension)  Hx of completed stroke 2021  Hypothyroid  Incontinence  Neuropathy  Polypharmacy Past Surgical History:  
Procedure Laterality Date  HX ORTHOPAEDIC    
 HX VASCULAR ACCESS Family History Problem Relation Age of Onset  No Known Problems Other   
     reviewed, patient did not know  Diabetes Mother  Heart Attack Mother  Cancer Father Social History Tobacco Use  Smoking status: Former Smoker Quit date: 1991 Years since quittin.4  Smokeless tobacco: Never Used Substance Use Topics  Alcohol use: Never Allergies Allergen Reactions  Shellfish Derived Anaphylaxis Current Facility-Administered Medications Medication Dose Route Frequency  nystatin (MYCOSTATIN) 100,000 unit/mL oral suspension 500,000 Units  500,000 Units Oral QID  [START ON 2021] Vancomycin - Please draw trough level prior to dose due on  at 1300. Thanks! Other ONCE  
 senna-docusate (PERICOLACE) 8.6-50 mg per tablet 2 Tablet  2 Tablet Oral BID  
 oxyCODONE ER (OxyCONTIN) tablet 20 mg  20 mg Oral Q12H  
 oxyCODONE-acetaminophen (PERCOCET 7.5) 7.5-325 mg per tablet 1 Tablet  1 Tablet Oral Q4H PRN  
 morphine injection 2 mg  2 mg IntraVENous Q4H PRN  
 amLODIPine (NORVASC) tablet 10 mg  10 mg Oral DAILY  enoxaparin (LOVENOX) injection 40 mg  40 mg SubCUTAneous Q24H  
 vancomycin (VANCOCIN) 1,000 mg in 0.9% sodium chloride 250 mL (VIAL-MATE)  1,000 mg IntraVENous Q12H  
 FLUoxetine (PROzac) capsule 40 mg  40 mg Oral QPM  
 predniSONE (DELTASONE) tablet 40 mg  40 mg Oral DAILY WITH BREAKFAST  pantoprazole (PROTONIX) tablet 40 mg  40 mg Oral ACB&D  
 guaiFENesin-dextromethorphan (ROBITUSSIN DM) 100-10 mg/5 mL syrup 10 mL  10 mL Oral Q6H  
 lidocaine 4 % patch 1 Patch  1 Patch TransDERmal Q24H  
 gabapentin (NEURONTIN) capsule 600 mg  600 mg Oral BID  
 guaiFENesin ER (MUCINEX) tablet 600 mg  600 mg Oral BID  latanoprost (XALATAN) 0.005 % ophthalmic solution 1 Drop  1 Drop Both Eyes QHS  melatonin (rapid dissolve) tablet 5 mg  5 mg Oral QHS  arformoterol 15 mcg/budesonide 0.5 mg neb solution   Nebulization BID RT  
 hydrALAZINE (APRESOLINE) 20 mg/mL injection 10 mg  10 mg IntraVENous Q6H PRN  
 atorvastatin (LIPITOR) tablet 80 mg  80 mg Oral QHS  aspirin delayed-release tablet 81 mg  81 mg Oral DAILY  clonazePAM (KlonoPIN) tablet 0.25 mg  0.25 mg Oral QPM  
 levothyroxine (SYNTHROID) tablet 75 mcg  75 mcg Oral ACB  meclizine (ANTIVERT) tablet 12.5 mg  12.5 mg Oral Q6H PRN  
 glucose chewable tablet 16 g  4 Tablet Oral PRN  
 dextrose (D50W) injection syrg 12.5-25 g  25-50 mL IntraVENous PRN  
 glucagon (GLUCAGEN) injection 1 mg  1 mg IntraMUSCular PRN  
 sodium chloride (NS) flush 5-40 mL  5-40 mL IntraVENous Q8H  
 sodium chloride (NS) flush 5-40 mL  5-40 mL IntraVENous PRN  
 acetaminophen (TYLENOL) tablet 650 mg  650 mg Oral Q6H PRN Or  
 acetaminophen (TYLENOL) suppository 650 mg  650 mg Rectal Q6H PRN  polyethylene glycol (MIRALAX) packet 17 g  17 g Oral DAILY PRN  
 bisacodyL (DULCOLAX) suppository 10 mg  10 mg Rectal DAILY PRN  
 ondansetron (ZOFRAN) injection 4 mg  4 mg IntraVENous Q6H PRN  
 insulin glargine (LANTUS) injection 12 Units  0.2 Units/kg SubCUTAneous QHS  insulin lispro (HUMALOG) injection   SubCUTAneous AC&HS  
 melatonin (rapid dissolve) tablet 5 mg  5 mg Oral QHS PRN  
 alum-mag hydroxide-simeth (MYLANTA) oral suspension 30 mL  30 mL Oral Q4H PRN  
 LORazepam (ATIVAN) injection 0.5 mg  0.5 mg IntraVENous Q6H PRN  
 diphenhydrAMINE (BENADRYL) injection 25 mg  25 mg IntraVENous Q6H PRN  
 albuterol (PROVENTIL VENTOLIN) nebulizer solution 2.5 mg  2.5 mg Nebulization Q4H PRN  
 simethicone (MYLICON) tablet 80 mg  80 mg Oral QID PRN  
 albuterol-ipratropium (DUO-NEB) 2.5 MG-0.5 MG/3 ML  3 mL Nebulization Q6H RT  
 
 
 
REVIEW OF SYSTEMS  
12 point ROS negative except as stated in the HPI. Physical Exam:  
Visit Vitals /66 (BP 1 Location: Right lower arm, BP Patient Position: At rest) Pulse 94 Temp 98 °F (36.7 °C) Resp 18 Ht 5' 4\" (1.626 m) Wt 61.5 kg (135 lb 8 oz) SpO2 97% BMI 23.26 kg/m² General:  Alert, cooperative, chronically ill appearing, appears stated age. Head:  Normocephalic, without obvious abnormality, atraumatic. Eyes:  Conjunctivae/corneas clear. Nose: Nares normal. Septum midline. Mucosa normal.   
Throat: Lips, mucosa, and tongue normal.   
Neck: Supple, symmetrical, trachea midline, no adenopathy. Lungs:   Congested cough/upper airway, lungs CTAB w/o wheeze Chest wall:    
Heart:  Regular rate and rhythm Abdomen:   Soft, non-tender. Bowel sounds normal.  
Extremities: LE edema Pulses: 2+ and symmetric all extremities. Skin: Skin color, texture, turgor normal. No rashes or lesions Lymph nodes:   
Neurologic: Grossly nonfocal  
 
 
Lab Results Component Value Date/Time Sodium 137 05/27/2021 01:21 AM  
 Potassium 3.7 05/27/2021 01:21 AM  
 Chloride 103 05/27/2021 01:21 AM  
 CO2 31 05/27/2021 01:21 AM  
 BUN 12 05/27/2021 01:21 AM  
 Creatinine 0.51 (L) 05/27/2021 01:21 AM  
 Glucose 214 (H) 05/27/2021 01:21 AM  
 Calcium 6.7 (L) 05/27/2021 01:21 AM  
 Magnesium 1.8 03/09/2021 03:23 AM  
 Phosphorus 3.6 03/10/2021 03:15 AM  
 Lactic acid 1.1 05/19/2021 07:50 PM  
 
 
Lab Results Component Value Date/Time WBC 15.6 (H) 05/27/2021 01:21 AM  
 HGB 9.7 (L) 05/27/2021 01:21 AM  
 PLATELET 735 94/00/3466 01:21 AM  
 MCV 80.8 05/27/2021 01:21 AM  
 
 
Lab Results Component Value Date/Time INR 1.2 (H) 03/09/2021 03:23 AM  
 aPTT 28.6 03/09/2021 03:23 AM  
 Alk.  phosphatase 73 05/21/2021 09:10 AM Protein, total 5.2 (L) 05/21/2021 09:10 AM  
 Albumin 2.3 (L) 05/21/2021 09:10 AM  
 Globulin 2.9 05/21/2021 09:10 AM  
 
 
Lab Results Component Value Date/Time Iron 16 (L) 05/09/2021 08:21 PM  
 TIBC 278 05/09/2021 08:21 PM  
 Iron % saturation 6 (L) 05/09/2021 08:21 PM  
 
 
Lab Results Component Value Date/Time TSH 3.75 (H) 03/08/2021 09:25 AM  
  
 
No results found for: PH, PHI, PCO2, PCO2I, PO2, PO2I, HCO3, HCO3I, FIO2, FIO2I Lab Results Component Value Date/Time Troponin-I, Qt. <0.05 05/23/2021 08:12 PM  
  
 
Lab Results Component Value Date/Time Culture result: (A) 05/23/2021 08:12 PM  
  MODERATE * METHICILLIN RESISTANT STAPHYLOCOCCUS AUREUS * Culture result: CHECKING FOR POSSIBLE 
FILAMENTOUS FUNGUS 
 (A) 05/23/2021 08:12 PM  
 Culture result: NO NORMAL RESPIRATORY ZAKIYA ISOLATED 05/23/2021 08:12 PM  
 
 
No results found for: TOXA1, RPR, HBCM, HBSAG, HAAB, HCAB1, HAAT, G6PD, CRYAC, HIVGT, HIVR, HIV1, HIV12, HIVPC, HIVRPI Lab Results Component Value Date/Time Vancomycin,trough 17.2 (H) 05/25/2021 09:09 AM  
 Vancomycin,trough 20.6 ([facility]) 05/23/2021 09:17 AM  
 
 
Lab Results Component Value Date/Time Color YELLOW/STRAW 05/19/2021 06:15 PM  
 Appearance CLEAR 05/19/2021 06:15 PM  
 Specific gravity 1.010 05/19/2021 06:15 PM  
 pH (UA) 6.5 05/19/2021 06:15 PM  
 Protein Negative 05/19/2021 06:15 PM  
 Glucose 100 (A) 05/19/2021 06:15 PM  
 Ketone Negative 05/19/2021 06:15 PM  
 Bilirubin Negative 05/19/2021 06:15 PM  
 Blood Negative 05/19/2021 06:15 PM  
 Urobilinogen 0.2 05/19/2021 06:15 PM  
 Nitrites Negative 05/19/2021 06:15 PM  
 Leukocyte Esterase TRACE (A) 05/19/2021 06:15 PM  
 WBC 5-10 05/19/2021 06:15 PM  
 RBC 5-10 05/19/2021 06:15 PM  
 Bacteria Negative 05/19/2021 06:15 PM  
 
 
IMPRESSION 
· Acute hypoxemic respiratory failure · MRSA pneumonia · Acute COPD exacerbation · Hx of PE - not on anticoagulation due to frequent falls · HTN, diastolic dysfunction · Mild AS · H/o hypothyroidism · H/o CVA · hyperglycemia PLAN 
· Supplemental O2 as needed to keep sats > 88% · Continue vancomycin · CXR in am to monitor pleural effusion · Diurese · Continue scheduled duonebs, pulmicort, brovana, mucinex · flutter valve · Prednisone taper - continue current dose today · Nystatin added for thrush Willy Jones MD

## 2021-05-27 NOTE — PROGRESS NOTES
Bedside and Verbal shift change report given to Mabel Candelario RN (oncoming nurse) by González Wayne RN (offgoing nurse). Report included the following information SBAR, Kardex, Intake/Output, MAR, Accordion and Recent Results.

## 2021-05-27 NOTE — PROGRESS NOTES
Palliative Medicine Consult Lisandro: [phone] () Patient Name: Margie Yen YOB: 1940 Date of Initial Consult: 2021 Reason for Consult: [address] discussion Requesting Provider: Dr. Joshua Osuna Primary Care Physician: Danie Capps MD 
 
 SUMMARY:  
Margie Yen is a 80 y.o. 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 Patient presented to ER w/ cc of worsening SOB and new onset of chills over past couple of days. Patient had just been discharged home with antibiotics, on  from hospitalization for CAP. Patient was admitted on 2021 from [facility] with a diagnosis of Sepsis and hypoxia  2/2 Worsening PNA . This is the patients 6th hospitalization since 2021, Current medical issues leading to Palliative Medicine involvement include: GOC discussion Psychosocial hx: Patient from [place], she is ,  of 48 years  2020. Had 4 children, 2 daughters . She moved to [place] on 2020 to live with daughter Corey Givens and her . Patients son, Nixon Fabian lives in [place] with his family, however he has come to [place] to help assist with patients care. Patient has multiple grandchildren. Her jennifer is important to her. Patient stated she is worried about her sons health and is trying to protect him from worrying about her. PALLIATIVE DIAGNOSES:  
1. HTN 2. Hypoxia 3. Pain 4. constipation 5. Anxiety 6. Goals of Care Discussion PLAN:  
1. Patient was seen, no family at bedside. 2. HTN- Controlled, received new new order for norvasc, was started today/ 
3. Anxiety- not well controlled. 0.5 mg IV every 6 hours as needed, continues to use x4 doses every 24 hours.  On  her  prozac was increased from 10mg to 40 mg. I do not plan on adjusting tx plan today, will be adjusting pain medication regimen 4. Pain- uncontrolled rated 8/10. Etiology chronic back pain 2/2 history of multiple compression fractures. Back pain secondary to multiple fractures. · Current orders for scheduled percocet 5/325 every 6 hours  and  prn IV Morphine 2mg every 3 hours as needed (she has been using x 6 doses every 24 hour period. · Patients pain not well controlled and is requiring significant prn morphine. · New order to start Oxycontin 20 mg orally every 12 hours. · New order for Percocet 7.5mg/325 orally every 4 hours as needed, mod to severe pain. PLEASE USE FIRST, BEFORE OTHER PRN PAIN MEDS 
· I also adjusted prn morphine 2mg IV to every 4 hours, severe pain · In anticipation of discharge in the coming days, I want patient to be managed primarily by using oral regimen, use of IV Morphine only after oral prn not effective . · I will follow up tomorrow to reassess effectiveness of adjustments being made 5. Constipation-  LBM ? I increased Senna S to 2 tabs twice daily. If no BM tomorrow, may beed to administer prn order 6. Hypoxia- slight worsening. O2 meeds from 2-4L. patient still with productive coug. Etiology includes worsening PNA in setting of COPD and pulmonary fibrosis as well as small pleural effusion. . 
· Goals of care discussion- Patient aware of plan for discharge to SNF. · I spoke with patients daughter at length regarding adjustments being to pain medication regimen, as well as bowel regimen and recent increased dose of prozac for anxiety. Plan to meet her at bedside approx 2PM on 3.28  
7. Initial consult note routed to primary continuity provider and/or primary health care team members 8. Communicated plan of care with: Palliative IDT, [facility] [address] Team, Aidee Meyer RN, Dr Asim Weems  GOALS OF CARE / TREATMENT PREFERENCES:  
 
GOALS OF CARE: 
Patient/Health Care Proxy Stated Goals: Cure TREATMENT PREFERENCES:  
Code Status: DNR Advance Care Planning: 
[x] [facility] Interdisciplinary Team has updated the ACP Navigator with [address] and Patient Capacity Primary Decision Maker: Aurelia Stanley - Daughter - [phone] Secondary Decision Maker: Argentina Mccurdy - Son - [phone] Advance Care Planning 5/19/2021 Patient's Healthcare Decision Maker is: - Confirm Advance Directive Yes, on file Patient Would Like to Complete Advance Directive - Medical Interventions: Full interventions Other Instructions: Other: As far as possible, the palliative care team has discussed with patient / health care proxy about goals of care / treatment preferences for patient. HISTORY:  
 
History obtained from: medical records/nurse/patient CHIEF COMPLAINT: Pain HPI/SUBJECTIVE: The patient is:  
[x] Verbal and participatory [] Non-participatory due to:  
  
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 Albumin 2.3 Clinical Pain Assessment (nonverbal scale for severity on nonverbal patients):  
Clinical Pain Assessment Severity: 10 Location: chest and back Character: unknown Duration: unknown Effect: unknown Factors: unknown Frequency: unknown Activity (Movement): Lying quietly, normal position Duration: for how long has pt been experiencing pain (e.g., 2 days, 1 month, years) Frequency: how often pain is an issue (e.g., several times per day, once every few days, constant) FUNCTIONAL ASSESSMENT:  
 
Palliative Performance Scale (PPS): PPS: 40  PSYCHOSOCIAL/SPIRITUAL SCREENING:  
 Palliative IDT has assessed this patient for cultural preferences / practices and a referral made as appropriate to needs (Cultural Services, Patient Advocacy, Ethics, etc.) Any spiritual / Advent concerns: 
[] Yes /  [x] No 
 
Caregiver Burnout: 
[] Yes /  [] No /  [x] No Caregiver Present Anticipatory grief assessment:  
[x] Normal  / [] Maladaptive ESAS Anxiety: Anxiety: 8 ESAS Depression:    
 
 
 REVIEW OF SYSTEMS:  
 
Positive and pertinent negative findings in ROS are noted above in HPI. The following systems were [x] reviewed / [] unable to be reviewed as noted in HPI Other findings are noted below. Systems: constitutional, ears/nose/mouth/throat, respiratory, gastrointestinal, genitourinary, musculoskeletal, integumentary, neurologic, psychiatric, endocrine. Positive findings noted below. Modified ESAS Completed by: provider Fatigue: 7 Drowsiness: 0 Pain: 10 Anxiety: 8 Anorexia: 5 Dyspnea: 2 Constipation: No  
  Stool Occurrence(s): 0 PHYSICAL EXAM:  
 
From RN flowsheet: 
Wt Readings from Last 3 Encounters:  
05/24/21 135 lb 8 oz (61.5 kg) 05/09/21 129 lb (58.5 kg) 03/23/21 129 lb (58.5 kg) Blood pressure 127/66, pulse 94, temperature 98 °F (36.7 °C), resp. rate 18, height 5' 4\" (1.626 m), weight 135 lb 8 oz (61.5 kg), SpO2 97 %. Pain Scale 1: Numeric (0 - 10) Pain Intensity 1: 4 Pain Onset 1: chronic Pain Location 1: Back Pain Orientation 1: Posterior Pain Description 1: Aching Pain Intervention(s) 1: Medication (see MAR) Last bowel movement, if known:  
 
Constitutional: appears stated age, adequately nourished, sleeping, woke easily, NAD Eyes: pupils equal, anicteric ENMT: no nasal discharge, dry mucous membranes Cardiovascular: regular rhythm, distal pulses intact Respiratory: breathing not labored, symmetric, frequent +productive congested cough, coarse breath sounds.  
Gastrointestinal: soft non-tender, +bowel sounds Musculoskeletal: Left wrist/hand contracture, no tenderness to palpation Skin: warm, dry Neurologic: alert and oriented,  following commands, Left upper arm/hand limited movement, moving all extremities Psychiatric: appropriate affect, no hallucinations Other: 
 
 
 HISTORY:  
 
Active Problems: 
  HTN (hypertension) (1/3/2021) Type II diabetes mellitus ([address] (1/3/2021) Hypothyroid (1/3/2021) Acute respiratory insufficiency (5/10/2021) HAP (hospital-acquired pneumonia) (2021) Pneumonia of both lungs due to infectious organism (2021) Past Medical History:  
Diagnosis Date  Anxiety and depression  Arthritis  Chronic obstructive pulmonary disease ([address]  Chronic pain  DM type 2 causing vascular disease [facility]  GERD (gastroesophageal reflux disease)  Glaucoma   
 HTN (hypertension)  Hx of completed stroke 2021  Hypothyroid  Incontinence  Neuropathy  Polypharmacy Past Surgical History:  
Procedure Laterality Date  HX ORTHOPAEDIC    
 HX VASCULAR ACCESS Family History Problem Relation Age of Onset  No Known Problems Other   
     reviewed, patient did not know  Diabetes Mother  Heart Attack Mother  Cancer Father History reviewed, no pertinent family history. Social History Tobacco Use  Smoking status: Former Smoker Quit date: 1991 Years since quittin.4  Smokeless tobacco: Never Used Substance Use Topics  Alcohol use: Never Allergies Allergen Reactions  Shellfish Derived Anaphylaxis Current Facility-Administered Medications Medication Dose Route Frequency  nystatin (MYCOSTATIN) 100,000 unit/mL oral suspension 500,000 Units  500,000 Units Oral QID  [START ON 2021] Vancomycin - Please draw trough level prior to dose due on  at 1300. Thanks!    Other ONCE  
 senna-docusate (PERICOLACE) 8.6-50 mg per tablet 2 Tablet  2 Tablet Oral BID  
 oxyCODONE ER (OxyCONTIN) tablet 20 mg  20 mg Oral Q12H  
 oxyCODONE-acetaminophen (PERCOCET 7.5) 7.5-325 mg per tablet 1 Tablet  1 Tablet Oral Q4H PRN  
 morphine injection 2 mg  2 mg IntraVENous Q4H PRN  
 amLODIPine (NORVASC) tablet 10 mg  10 mg Oral DAILY  enoxaparin (LOVENOX) injection 40 mg  40 mg SubCUTAneous Q24H  
 vancomycin (VANCOCIN) 1,000 mg in 0.9% sodium chloride 250 mL (VIAL-MATE)  1,000 mg IntraVENous Q12H  
 FLUoxetine (PROzac) capsule 40 mg  40 mg Oral QPM  
 predniSONE (DELTASONE) tablet 40 mg  40 mg Oral DAILY WITH BREAKFAST  pantoprazole (PROTONIX) tablet 40 mg  40 mg Oral ACB&D  
 guaiFENesin-dextromethorphan (ROBITUSSIN DM) 100-10 mg/5 mL syrup 10 mL  10 mL Oral Q6H  
 lidocaine 4 % patch 1 Patch  1 Patch TransDERmal Q24H  
 gabapentin (NEURONTIN) capsule 600 mg  600 mg Oral BID  
 guaiFENesin ER (MUCINEX) tablet 600 mg  600 mg Oral BID  latanoprost (XALATAN) 0.005 % ophthalmic solution 1 Drop  1 Drop Both Eyes QHS  melatonin (rapid dissolve) tablet 5 mg  5 mg Oral QHS  arformoterol 15 mcg/budesonide 0.5 mg neb solution   Nebulization BID RT  
 hydrALAZINE (APRESOLINE) 20 mg/mL injection 10 mg  10 mg IntraVENous Q6H PRN  
 atorvastatin (LIPITOR) tablet 80 mg  80 mg Oral QHS  aspirin delayed-release tablet 81 mg  81 mg Oral DAILY  clonazePAM (KlonoPIN) tablet 0.25 mg  0.25 mg Oral QPM  
 levothyroxine (SYNTHROID) tablet 75 mcg  75 mcg Oral ACB  meclizine (ANTIVERT) tablet 12.5 mg  12.5 mg Oral Q6H PRN  
 glucose chewable tablet 16 g  4 Tablet Oral PRN  
 dextrose (D50W) injection syrg 12.5-25 g  25-50 mL IntraVENous PRN  
 glucagon (GLUCAGEN) injection 1 mg  1 mg IntraMUSCular PRN  
 sodium chloride (NS) flush 5-40 mL  5-40 mL IntraVENous Q8H  
 sodium chloride (NS) flush 5-40 mL  5-40 mL IntraVENous PRN  
 acetaminophen (TYLENOL) tablet 650 mg  650 mg Oral Q6H PRN  Or  
 acetaminophen (TYLENOL) suppository 650 mg  650 mg Rectal Q6H PRN  polyethylene glycol (MIRALAX) packet 17 g  17 g Oral DAILY PRN  
 bisacodyL (DULCOLAX) suppository 10 mg  10 mg Rectal DAILY PRN  
 ondansetron (ZOFRAN) injection 4 mg  4 mg IntraVENous Q6H PRN  
 insulin glargine (LANTUS) injection 12 Units  0.2 Units/kg SubCUTAneous QHS  insulin lispro (HUMALOG) injection   SubCUTAneous AC&HS  
 melatonin (rapid dissolve) tablet 5 mg  5 mg Oral QHS PRN  
 alum-mag hydroxide-simeth (MYLANTA) oral suspension 30 mL  30 mL Oral Q4H PRN  
 LORazepam (ATIVAN) injection 0.5 mg  0.5 mg IntraVENous Q6H PRN  
 diphenhydrAMINE (BENADRYL) injection 25 mg  25 mg IntraVENous Q6H PRN  
 albuterol (PROVENTIL VENTOLIN) nebulizer solution 2.5 mg  2.5 mg Nebulization Q4H PRN  
 simethicone (MYLICON) tablet 80 mg  80 mg Oral QID PRN  
 albuterol-ipratropium (DUO-NEB) 2.5 MG-0.5 MG/3 ML  3 mL Nebulization Q6H RT  
 
 
 
 LAB AND IMAGING FINDINGS:  
 
Lab Results Component Value Date/Time WBC 15.6 (H) 05/27/2021 01:21 AM  
 HGB 9.7 (L) 05/27/2021 01:21 AM  
 PLATELET 980 32/64/5746 01:21 AM  
 
Lab Results Component Value Date/Time Sodium 137 05/27/2021 01:21 AM  
 Potassium 3.7 05/27/2021 01:21 AM  
 Chloride 103 05/27/2021 01:21 AM  
 CO2 31 05/27/2021 01:21 AM  
 BUN 12 05/27/2021 01:21 AM  
 Creatinine 0.51 (L) 05/27/2021 01:21 AM  
 Calcium 6.7 (L) 05/27/2021 01:21 AM  
 Magnesium 1.8 03/09/2021 03:23 AM  
 Phosphorus 3.6 03/10/2021 03:15 AM  
  
Lab Results Component Value Date/Time Alk. phosphatase 73 05/21/2021 09:10 AM  
 Protein, total 5.2 (L) 05/21/2021 09:10 AM  
 Albumin 2.3 (L) 05/21/2021 09:10 AM  
 Globulin 2.9 05/21/2021 09:10 AM  
 
Lab Results Component Value Date/Time INR 1.2 (H) 03/09/2021 03:23 AM  
 Prothrombin time 12.8 (H) 03/09/2021 03:23 AM  
 aPTT 28.6 03/09/2021 03:23 AM  
  
Lab Results Component Value Date/Time  Iron 16 (L) 05/09/2021 08:21 PM  
 TIBC 278 05/09/2021 08:21 PM  
 Iron % saturation 6 (L) 05/09/2021 08:21 PM  
  
No results found for: PH, PCO2, PO2 No components found for: [place] No results found for: CPK, CKMB Total time: 65 min Counseling / coordination time, spent as noted above: 50 
> 50% counseling / coordination?: yes Prolonged service was provided for  [x]30 min   []75 min in face to face time in the presence of the patient, spent as noted above. Time Start/END:  
Time Start/ End:  
Note: this can only be billed with 02493 (initial) or 75176 (follow up). If multiple start / stop times, list each separately.

## 2021-05-27 NOTE — PROGRESS NOTES
Bedside shift change report given to Huey Morales RN (oncoming nurse) by Sylvia Castillo RN (offgoing nurse). Report included the following information SBAR, Kardex, Intake/Output, MAR and Recent Results.

## 2021-05-27 NOTE — PROGRESS NOTES
Occupational therapy note: 
Orders received, chart reviewed. Spoke with RN who reports patient has been complaining of pain, very uncomfortable, very anxious and with poor sleep since admission. Patient now in bed resting, and RN requesting hold OT treatment and follow up at later time. Will follow up. Milvia Vick MS OTR/L

## 2021-05-27 NOTE — PROGRESS NOTES
5/27/2021   CARE MANAGEMENT NOTE:  CM reviewed EMR for clinical updates. READMISSION:  Pt was admitted to [facility] from 5/9/21 - 5/13/21 with COPD.  She discharged home with [facility].  Pt was readmitted to [facility] on 5/19 with sepsis 2/2 PNA, also hx of multiple CVAs and fxs, COPD, cirrhosis, DM. Reportedly, pt resides with her dtr John Rivera ([phone]).  Pt has caregivers 3 days per week. 
  
RUR 46%; LOS 7 days 
  
Transition Plan of Care: 1.  SNF - referrals were sent to [id number] [facility] (denied), [address] [address] (denied), [place] (denied), [place]. Additional referrals sent to [address], [address] [facility] expressed desire for top facility and a private room 2.  [employer] Ranell Gazella will be needed 3.  COVID 19 test for placement was ordered on 5/26 4. [facility] will be needed at discharge 
  
 CM will continue to follow pt for SNF rehab YONY Espinoza

## 2021-05-28 NOTE — PROGRESS NOTES
5/28/2021   CARE MANAGEMENT NOTE:  CM reviewed EMR for clinical updates. READMISSION:  Pt was admitted to [facility] from 5/9/21 - 5/13/21 with COPD.  She discharged home with [facility].  Pt was readmitted to [facility] on 5/19 with sepsis 2/2 PNA, also hx of multiple CVAs and fxs, COPD, cirrhosis, DM. Reportedly, pt resides with her dtr Lexie Body ([phone]).  Pt has caregivers 3 days per week. 
  
RUR 47%; LOS 8 days 
  
Transition Plan of Care: 1.  SNF - referrals were sent to the following: 
[facility] (denied), Our [address] [facility] (denied), [facility] (denied), [address], [address] [facility] [phone] (denied), [place] [facility] expressed desire for top facility and a private room 2.  [employer] Marianna Rubinstein will be needed 3.  COVID 19 test for placement was negative on 5/26 4.  [facility] will be needed at discharge 
  
 CM will continue to follow pt for SNF rehab YONY Wilkins

## 2021-05-28 NOTE — PROGRESS NOTES
[address] Dosing Services: Antimicrobial Stewardship Progress Note Consult for antibiotic dosing of vancomycin by Fox López. Pharmacist reviewed antibiotic appropriateness for 79 yo female for indication of HAP Day of Therapy: 10 (originally planned for 7 days but due to MRSA duration extended) Ht Readings from Last 1 Encounters:  
05/25/21 162.6 cm (64\") Wt Readings from Last 1 Encounters:  
05/24/21 61.5 kg (135 lb 8 oz) Vancomycin therapy: 
Current maintenance dose: 1000 mg IV every 12 hours Dose calculated to approximate a therapeutic trough of 15-20 mcg/mL. Last trough level: 28.2 mcg/ml, drawn timely=supratherapeutic. Plan for level / Adjustment in Therapy: Adjust regimen to 750mg IV every 12 hours, starting at 0600 on 5/29 to allow for additional clearance. WBC stable but remains elevated (on steroids), afebrile, renal function stable/good urine output. Next level prior to 4th dose of new regimen (not yet ordered). Dose administration notes:   Doses given appropriately as scheduled PCT = 0.62 on 5/21 Date Dose & Interval Measured (mcg/mL) Extrapolated (mcg/mL) ? 5/21 ? 750 mg IV q12hr ? 10.7 ?10.34  
?5/23 ?1250 mg IV q12hr ?20.6 ?19.75  
?5/25 ?1000 mg IV q12hr ?17.2 ? Other Antimicrobial  
(not dosed by pharmacist) none Cultures 5/23: Sputum: MRSA (vanc JAMES=1), possible filamentous fungus (prelim) Serum Creatinine Lab Results Component Value Date/Time Creatinine 0.50 (L) 05/28/2021 03:23 AM  
  
  
Creatinine Clearance Estimated Creatinine Clearance: 54.4 mL/min (A) (by C-G formula based on SCr of 0.5 mg/dL (L)). Temp Temp: 97.6 °F (36.4 °C) WBC Lab Results Component Value Date/Time WBC 16.5 (H) 05/28/2021 03:23 AM  
 
  
H/H Lab Results Component Value Date/Time HGB 10.1 (L) 05/28/2021 03:23 AM  
 
  
Platelets Lab Results Component Value Date/Time  PLATELET 359 78/15/2222 03:23 AM  
 
  
 
Pharmacist Cresencio Glacier Contact information: [phone]

## 2021-05-28 NOTE — PROGRESS NOTES
Comprehensive Nutrition Assessment Type and Reason for Visit: Hanna Cisneros Nutrition Recommendations/Plan: 1. Continue mech altered diet. 2. Continue orange Magic Cup BID to promote intake. Added Ensure Pudding BID. 3. Please assist pt with meals. Nutrition Assessment:     
5/28: Follow up. Pt sleeping at time of visit, no family in room. Per RN, pt dislikes [facility] altered food. Family still bringing in snacks from home(bagels, etc), despite RN explaining need for [facility] altered diet for safety. RD says pt c/o being hungry but only wants to eats snacks/sweet stuff (peanut butter, ice cream, pudding). RN says pt likes [facility]- will continue. Will also add Ensure Pudding since pt likes pudding. Recorded intakes of meals vary, but mostly 25-75% meals past few days. Will continue to monitor. Labs- Na 135, -[phone]. Meds- Bumex, gabapentin, insulin, prednisone, synthroid, Protonix, vanco, pericolace. 5/25: 81 y/o female admitted with HAP. PMH includes HTN, DM, GERD, COPD, hx CVA. Spoke with pt and daughter in room. Per daughter, pt hasn't been eating very well since admission, mostly just soup and ice cream. She has also been bringing in bagels for breakfast for pt and soaking them in coffee to make them soft. Per daughter, pt needs assistance with meals. She says pt was eating well PTA and her weight was stable. Pt c/o nausea on and off since admission, but no vomiting. Pt does not want to try Ensure but is agreeable to trying Magic Cup to help with intake. Will add and monitor acceptance. Labs- K 3.3, Hgb 9.7, -179-294. Meds- cefepime, gabapentin, insulin, Protonix, vanco, prednisone, pericolace. Documented Meal intake: 
Patient Vitals for the past 168 hrs: 
 % Diet Eaten 05/28/21 0849 51 - 75% 05/27/21 1254 26 - 50% 05/25/21 1423 26 - 50% 05/25/21 0850 26 - 50% 05/24/21 1849 76 - 100% 05/24/21 1233 1 - 25% 05/24/21 0953 26 - 50% 05/23/21 1614 76 - 100% 05/23/21 0858 76 - 100% 05/22/21 1748 51 - 75% 05/22/21 1120 76 - 100% 05/22/21 0746 1 - 25% Weight hx: Wt Readings from Last 10 Encounters:  
05/24/21 61.5 kg (135 lb 8 oz) 05/09/21 58.5 kg (129 lb)  
03/23/21 58.5 kg (129 lb) 03/08/21 58.6 kg (129 lb 3 oz) 03/02/21 55.5 kg (122 lb 5.7 oz) 02/27/21 55.5 kg (122 lb 5.7 oz) 02/08/21 57.6 kg (127 lb) 01/03/21 56.7 kg (125 lb) Malnutrition Assessment: 
Malnutrition Status: none identified Estimated Daily Nutrient Needs: 
Energy (kcal): 2925 (1065 X 1.2 AF); Weight Used for Energy Requirements: Current Protein (g): 62 (1-1.2 g/kg); Weight Used for Protein Requirements: Current Fluid (ml/day): 1384; Method Used for Fluid Requirements: 1 ml/kcal 
 
 
Nutrition Related Findings:  last BM 5/20; 1+ edema to all extremities Wounds:   
Skin tears Current Nutrition Therapies: DIET DYSPHAGIA MECH ALTERED (NDD2) DIET NUTRITIONAL SUPPLEMENTS Lunch, Dinner; [place] DIET NUTRITIONAL SUPPLEMENTS Breakfast, Lunch; Pudding, Fortified Anthropometric Measures: 
· Height:  5' 4\" (162.6 cm) · Current Body Wt:  61.5 kg (135 lb 9.3 oz) · Admission Body Wt:      
· Usual Body Wt:       
· Ideal Body Wt:  120 lbs:  113 % · Adjusted Body Weight:   ; Weight Adjustment for: No adjustment · Adjusted BMI:      
· BMI Category:  Normal weight (BMI 18.5-24. 9) Nutrition Diagnosis:  
· Inadequate oral intake related to early satiety as evidenced by  (decreased intake sicne admission per pt and daughter) 5/28: Nutrition dx continues. Nutrition Interventions:  
Food and/or Nutrient Delivery: Continue current diet, Start oral nutrition supplement Nutrition Education and Counseling: No recommendations at this time Coordination of Nutrition Care: Continue to monitor while inpatient Goals: 
PO intake >50% meals/supplements next 2-4 days Nutrition Monitoring and Evaluation:  
Behavioral-Environmental Outcomes: None identified Food/Nutrient Intake Outcomes: Food and nutrient intake, Supplement intake Physical Signs/Symptoms Outcomes: Weight, Biochemical data Discharge Planning:   
Continue current diet Electronically signed by Salo Zuñiga RDN on 5/28/2021 Contact: [phone]

## 2021-05-28 NOTE — PROGRESS NOTES
1900-Bedside shift change report given to Anastacia Stewart RN (oncoming nurse) by Linh Davis RN (offgoing nurse). Report included the following information SBAR, Kardex, Intake/Output, MAR and Recent Results. 2013-Bedside shift change report given to Guerda [place] RN (oncoming nurse) by Anastacia Stewart RN (offgoing nurse). Report included the following information SBAR, Kardex, Intake/Output, MAR and Recent Results.

## 2021-05-28 NOTE — PROGRESS NOTES
Bedside and Verbal shift change report given to WALLY PIKE (oncoming nurse) by Vicki Ortega RN (offgoing nurse). Report included the following information SBAR, Kardex, Intake/Output, MAR, Accordion and Recent Results.

## 2021-05-28 NOTE — PROGRESS NOTES
Physical Therapy Attempted to see patient today but patient currently undergoing LE dopplers for DVT r/o. Will f/u later today as able and appropriate.  
 
Manuelito Stanford, MS, PT

## 2021-05-28 NOTE — PROGRESS NOTES
Bedside shift change report given to Robyn Michaud RN (oncoming nurse) by Pearl Turner RN (offgoing nurse). Report included the following information SBAR, Kardex, Intake/Output, MAR and Recent Results.

## 2021-05-28 NOTE — PROGRESS NOTES
Palliative Medicine Consult Lisandro: [phone]) Patient Name: Guillermo Agrawal YOB: 1940 Date of Initial Consult: 2021 Reason for Consult: [address] discussion Requesting Provider: Dr. Magnolia Acosta Primary Care Physician: Ankush Orr MD 
 
 SUMMARY:  
Guillermo Agrawal is a 80 y.o. 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 Patient presented to ER w/ cc of worsening SOB and new onset of chills over past couple of days. Patient had just been discharged home with antibiotics, on  from hospitalization for CAP. Patient was admitted on 2021 from [facility] with a diagnosis of Sepsis and hypoxia  2/2 Worsening PNA . This is the patients 6th hospitalization since 2021, Current medical issues leading to Palliative Medicine involvement include: GOC discussion Psychosocial hx: Patient from [place], she is ,  of 48 years  2020. Had 4 children, 2 daughters . She moved to [address] [place] [address] on 2020 to live with daughter Jocy Boswell and her . Patients son, Vijaya Mccray lives in [place] with his family, however he has come to [address] [place] [address] to help assist with patients care. Patient has multiple grandchildren. Her jennifer is important to her. Patient stated she is worried about her sons health and is trying to protect him from worrying about her. PALLIATIVE DIAGNOSES:  
 
1. Hyonatremia 2. Hypoxia 3. Pain 4. constipation 5. Anxiety 6. Goals of Care Discussion PLAN:  
1. Prior to visit I spoke with patiens nurse Omero Perez RN. 2. Patient was seen x 2 today, initially family friend and CG Ej Vega was at bedside, I returned later when daughter and PEACE at bedside 3.  Anxiety- Improved s/p recent change to pain medication regimen <24 hours ago. She is down to 3  doses of the  PRN Lorazepam 0.5mg over past 24 hours, where as she had been using 4 doses consistently. · I do not plan to make any adjustments to regimen today, however, if she is dicharged over the weekend, I would recommend sending with order for Lorazepam 0..5mg twice daily, morning and afternoon scheduled, otherwise continue with current regimen · Patient also taking prozac 40mg daily, was recently increased from 10mg. Daughter reported patient with hx of significant  Hyponatremia w/ na of 120 and MD had concerns may be due to  SSRI induced electrolyte imbalances. may have contributed. Na today is 135, I decreased prozac to 20mg every night. Recommend monitoring Na closely 4. Pain- Improved. Patient acknowledged significant improvement . Etiology chronic back pain 2/2 history of multiple compression fractures. Back pain secondary to multiple fractures. · Current orders for OxyContin 20mg orally every 12 hours (new order 5/27, received x 2 doses), Percocet 7.5/325 orally every 4 hours as needed (received x 2) and Morphine 2mg IV every 4 hours as needed (received x 1). Will continue to monitor effectiveness of regimen and adjust as needed. 5. Constipation-   LBM 5/28. FU to adjustments made 5/27 at which time I increased senna S 2 tabs twice daily. Patient denies BM, stated that she has been passing gas. Abdominal exam benign, no tenderness and non distended, normal BS. I gave her prune juice to drink. 6. ** when I returned to room to meet with patient and her daughter/PEACE, I was informed she had BM s/p drinking prune juice. 7. Hypoxia- slight worsening. O2 meeds from 2-4L. patient still with productive coug. Etiology includes worsening PNA in setting of COPD and pulmonary fibrosis as well as small pleural effusion.     
8. Goals of care discussion- Discussed at length, short term plan is for patient to be discharged to SNF, with goals of getting stronger and being able to ambulate, toilet and shower with minimal assist.  
· However, after lengthy discussion, family is aware that medical team think she will more than likely continue to decline desite SNF and is at [facility] for  Rehospitalization. · Family is open to transitioning her to comfort care with Hospice should she decline instead of improve. 9.  Initial consult note routed to primary continuity provider and/or primary health care team members 10. Communicated plan of care with: Palliative [facility] Team, González Wayne RN, Dr Hernandez Serna GOALS OF CARE / TREATMENT PREFERENCES:  
 
GOALS OF CARE: 
Patient/Health Care Proxy Stated Goals: Cure TREATMENT PREFERENCES:  
Code Status: DNR Advance Care Planning: 
[x] [facility] Interdisciplinary Team has updated the ACP Navigator with [place] and Patient Capacity Primary Decision Maker: Rahul Romero - Daughter - [phone] Secondary Decision Maker: Bethany Florence - Son - [phone] Advance Care Planning 5/19/2021 Patient's Healthcare Decision Maker is: - Confirm Advance Directive Yes, on file Patient Would Like to Complete Advance Directive - Medical Interventions: Full interventions Other Instructions: Other: As far as possible, the palliative care team has discussed with patient / health care proxy about goals of care / treatment preferences for patient. HISTORY:  
 
History obtained from: medical records/nurse/patient CHIEF COMPLAINT: Pain HPI/SUBJECTIVE: The patient is:  
[x] Verbal and participatory [] Non-participatory due to:  
  
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 Albumin 2.3 Clinical Pain Assessment (nonverbal scale for severity on nonverbal patients):  
Clinical Pain Assessment Severity: 10 Location: chest and back Character: unknown Duration: unknown Effect: unknown Factors: unknown Frequency: unknown Activity (Movement): Lying quietly, normal position Duration: for how long has pt been experiencing pain (e.g., 2 days, 1 month, years) Frequency: how often pain is an issue (e.g., several times per day, once every few days, constant) FUNCTIONAL ASSESSMENT:  
 
Palliative Performance Scale (PPS): PPS: 40 PSYCHOSOCIAL/SPIRITUAL SCREENING:  
 
Palliative IDT has assessed this patient for cultural preferences / practices and a referral made as appropriate to needs (Cultural Services, Patient Advocacy, Ethics, etc.) Any spiritual / Congregation concerns: 
[] Yes /  [x] No 
 
Caregiver Burnout: 
[] Yes /  [] No /  [x] No Caregiver Present Anticipatory grief assessment:  
[x] Normal  / [] Maladaptive ESAS Anxiety: Anxiety: 8 ESAS Depression:    
 
 
 REVIEW OF SYSTEMS:  
 
Positive and pertinent negative findings in ROS are noted above in HPI. The following systems were [x] reviewed / [] unable to be reviewed as noted in HPI Other findings are noted below. Systems: constitutional, ears/nose/mouth/throat, respiratory, gastrointestinal, genitourinary, musculoskeletal, integumentary, neurologic, psychiatric, endocrine. Positive findings noted below. Modified ESAS Completed by: provider Fatigue: 7 Drowsiness: 0 Pain: 10 Anxiety: 8 Anorexia: 5 Dyspnea: 2 Constipation: No  
  Stool Occurrence(s): 1 (extra large) PHYSICAL EXAM:  
 
From RN flowsheet: 
Wt Readings from Last 3 Encounters:  
05/24/21 135 lb 8 oz (61.5 kg) 05/09/21 129 lb (58.5 kg) 03/23/21 129 lb (58.5 kg) Blood pressure (!) 147/66, pulse 70, temperature 97.6 °F (36.4 °C), resp.  rate 18, height 5' 4\" (1.626 m), weight 135 lb 8 oz (61.5 kg), SpO2 97 %. Pain Scale 1: Numeric (0 - 10) Pain Intensity 1: 6 Pain Onset 1: chronic Pain Location 1: Back Pain Orientation 1: Posterior Pain Description 1: Aching, Constant, Sore Pain Intervention(s) 1: Medication (see MAR) Last bowel movement, if known:  
 
Constitutional: appears stated age, adequately nourished, sleeping, woke easily, NAD Eyes: pupils equal, anicteric ENMT: no nasal discharge, dry mucous membranes Cardiovascular: regular rhythm, distal pulses intact Respiratory: breathing not labored, symmetric, frequent +productive congested cough, coarse breath sounds. Gastrointestinal: soft non-tender, +bowel sounds Musculoskeletal: Left wrist/hand contracture, no tenderness to palpation Skin: warm, dry Neurologic: alert and oriented,  following commands, Left upper arm/hand limited movement, moving all extremities Psychiatric: appropriate affect, no hallucinations Other: 
 
 
 HISTORY:  
 
Active Problems: 
  HTN (hypertension) (1/3/2021) Type II diabetes mellitus ([facility] [address] (1/3/2021) Hypothyroid (1/3/2021) Acute respiratory insufficiency (5/10/2021) HAP (hospital-acquired pneumonia) (5/19/2021) Pneumonia of both lungs due to infectious organism (5/19/2021) Past Medical History:  
Diagnosis Date  Anxiety and depression  Arthritis  Chronic obstructive pulmonary disease ([facility] [address]  Chronic pain  DM type 2 causing vascular disease ([facility] [address] [facility]  GERD (gastroesophageal reflux disease)  Glaucoma   
 HTN (hypertension)  Hx of completed stroke 2017, 2021  Hypothyroid  Incontinence  Neuropathy  Polypharmacy Past Surgical History:  
Procedure Laterality Date  HX ORTHOPAEDIC    
 HX VASCULAR ACCESS Family History Problem Relation Age of Onset  No Known Problems Other   
     reviewed, patient did not know  Diabetes Mother  Heart Attack Mother  Cancer Father History reviewed, no pertinent family history. Social History Tobacco Use  Smoking status: Former Smoker Quit date: 1991 Years since quittin.4  Smokeless tobacco: Never Used Substance Use Topics  Alcohol use: Never Allergies Allergen Reactions  Shellfish Derived Anaphylaxis Current Facility-Administered Medications Medication Dose Route Frequency  nystatin (MYCOSTATIN) 100,000 unit/mL oral suspension 500,000 Units  500,000 Units Oral QID  senna-docusate (PERICOLACE) 8.6-50 mg per tablet 2 Tablet  2 Tablet Oral BID  
 oxyCODONE ER (OxyCONTIN) tablet 20 mg  20 mg Oral Q12H  
 oxyCODONE-acetaminophen (PERCOCET 7.5) 7.5-325 mg per tablet 1 Tablet  1 Tablet Oral Q4H PRN  
 morphine injection 2 mg  2 mg IntraVENous Q4H PRN  
 albuterol-ipratropium (DUO-NEB) 2.5 MG-0.5 MG/3 ML  3 mL Nebulization Q4HWA RT  
 bumetanide (BUMEX) injection 1 mg  1 mg IntraVENous BID  amLODIPine (NORVASC) tablet 10 mg  10 mg Oral DAILY  enoxaparin (LOVENOX) injection 40 mg  40 mg SubCUTAneous Q24H  
 vancomycin (VANCOCIN) 1,000 mg in 0.9% sodium chloride 250 mL (VIAL-MATE)  1,000 mg IntraVENous Q12H  
 FLUoxetine (PROzac) capsule 40 mg  40 mg Oral QPM  
 predniSONE (DELTASONE) tablet 40 mg  40 mg Oral DAILY WITH BREAKFAST  pantoprazole (PROTONIX) tablet 40 mg  40 mg Oral ACB&D  
 guaiFENesin-dextromethorphan (ROBITUSSIN DM) 100-10 mg/5 mL syrup 10 mL  10 mL Oral Q6H  
 lidocaine 4 % patch 1 Patch  1 Patch TransDERmal Q24H  
 gabapentin (NEURONTIN) capsule 600 mg  600 mg Oral BID  
 guaiFENesin ER (MUCINEX) tablet 600 mg  600 mg Oral BID  latanoprost (XALATAN) 0.005 % ophthalmic solution 1 Drop  1 Drop Both Eyes QHS  melatonin (rapid dissolve) tablet 5 mg  5 mg Oral QHS  arformoterol 15 mcg/budesonide 0.5 mg neb solution   Nebulization BID RT  
 hydrALAZINE (APRESOLINE) 20 mg/mL injection 10 mg  10 mg IntraVENous Q6H PRN  
 atorvastatin (LIPITOR) tablet 80 mg  80 mg Oral QHS  aspirin delayed-release tablet 81 mg  81 mg Oral DAILY  clonazePAM (KlonoPIN) tablet 0.25 mg  0.25 mg Oral QPM  
 levothyroxine (SYNTHROID) tablet 75 mcg  75 mcg Oral ACB  meclizine (ANTIVERT) tablet 12.5 mg  12.5 mg Oral Q6H PRN  
 glucose chewable tablet 16 g  4 Tablet Oral PRN  
 dextrose (D50W) injection syrg 12.5-25 g  25-50 mL IntraVENous PRN  
 glucagon (GLUCAGEN) injection 1 mg  1 mg IntraMUSCular PRN  
 sodium chloride (NS) flush 5-40 mL  5-40 mL IntraVENous Q8H  
 sodium chloride (NS) flush 5-40 mL  5-40 mL IntraVENous PRN  
 acetaminophen (TYLENOL) tablet 650 mg  650 mg Oral Q6H PRN Or  
 acetaminophen (TYLENOL) suppository 650 mg  650 mg Rectal Q6H PRN  polyethylene glycol (MIRALAX) packet 17 g  17 g Oral DAILY PRN  
 bisacodyL (DULCOLAX) suppository 10 mg  10 mg Rectal DAILY PRN  
 ondansetron (ZOFRAN) injection 4 mg  4 mg IntraVENous Q6H PRN  
 insulin glargine (LANTUS) injection 12 Units  0.2 Units/kg SubCUTAneous QHS  insulin lispro (HUMALOG) injection   SubCUTAneous AC&HS  
 melatonin (rapid dissolve) tablet 5 mg  5 mg Oral QHS PRN  
 alum-mag hydroxide-simeth (MYLANTA) oral suspension 30 mL  30 mL Oral Q4H PRN  
 LORazepam (ATIVAN) injection 0.5 mg  0.5 mg IntraVENous Q6H PRN  
 diphenhydrAMINE (BENADRYL) injection 25 mg  25 mg IntraVENous Q6H PRN  
 albuterol (PROVENTIL VENTOLIN) nebulizer solution 2.5 mg  2.5 mg Nebulization Q4H PRN  
 simethicone (MYLICON) tablet 80 mg  80 mg Oral QID PRN  
 
 
 
 LAB AND IMAGING FINDINGS:  
 
Lab Results Component Value Date/Time WBC 16.5 (H) 05/28/2021 03:23 AM  
 HGB 10.1 (L) 05/28/2021 03:23 AM  
 PLATELET 625 87/48/8239 03:23 AM  
 
Lab Results Component Value Date/Time  Sodium 135 (L) 05/28/2021 03:23 AM  
 Potassium 3.7 05/28/2021 03:23 AM  
 Chloride 100 05/28/2021 03:23 AM  
 CO2 35 (H) 05/28/2021 03:23 AM  
 BUN 13 05/28/2021 03:23 AM  
 Creatinine 0.50 (L) 05/28/2021 03:23 AM  
 Calcium 7.0 (L) 05/28/2021 03:23 AM  
 Magnesium 1.8 03/09/2021 03:23 AM  
 Phosphorus 3.6 03/10/2021 03:15 AM  
  
Lab Results Component Value Date/Time Alk. phosphatase 73 05/21/2021 09:10 AM  
 Protein, total 5.2 (L) 05/21/2021 09:10 AM  
 Albumin 2.3 (L) 05/21/2021 09:10 AM  
 Globulin 2.9 05/21/2021 09:10 AM  
 
Lab Results Component Value Date/Time INR 1.2 (H) 03/09/2021 03:23 AM  
 Prothrombin time 12.8 (H) 03/09/2021 03:23 AM  
 aPTT 28.6 03/09/2021 03:23 AM  
  
Lab Results Component Value Date/Time Iron 16 (L) 05/09/2021 08:21 PM  
 TIBC 278 05/09/2021 08:21 PM  
 Iron % saturation 6 (L) 05/09/2021 08:21 PM  
  
No results found for: PH, PCO2, PO2 No components found for: [place] No results found for: CPK, CKMB Total time: 65 min Counseling / coordination time, spent as noted above: 50 
> 50% counseling / coordination?: yes Prolonged service was provided for  [x]30 min   []75 min in face to face time in the presence of the patient, spent as noted above. Time Start/END: 6687-5087 Time Start/ End: 4271-7497 Note: this can only be billed with 53986 (initial) or 15625 (follow up). If multiple start / stop times, list each separately.

## 2021-05-28 NOTE — PROGRESS NOTES
Name: [facility]: [address]  
: 1940 Admit Date: 2021 Phone: [phone]  Room: [facility]/01 PCP: Hermelinda Lima MD  MRN: [id number] Date: 2021  Code: DNR Ms. Zuleima Kamara is a pleasant 79 yo woman with a history of COPD/emphysema (previously followed by a pulmonologist in [place]), chronic respiratory failure with hypoxia, PE, former tobacco use, HTN, DM, hypothyroidism and anxiety who was admitted last week with worsening pneumonia and leukocytosis. Daughter reports worsening hypoxia at home prior to admission with sats in the mid 80s. She currently has chest congestion and weak, nonproductive cough. Reports chest wall soreness with coughing. Fatigues easily with activity. Resp cx this admission with prelim of moderate staph aureus. She is currently on cefepime and vanc. Speech has evaluated. She is on dysphagia diet. She was hospitalized at [address] [place] [address] from - for bilateral PNA. She has had multiple admissions at [address] [place] [address] this year for COPD. She was previously followed in [place] for her COPD and moved to [address] [place] [address] in the last 4 months. She is chronically managed on Trelegy and prn albuterol inhaler/nebulizer. Has home O2 through [employer] and is on 2L at baseline. During her last hospitalization she was given levaquin and steroid taper. She notes symptomatic benefit from the prednisone. She has been taken off of blood thinners in the past due to frequent falls.  - chest CT: There is small left pleural effusion and likely worsening left lower lobe interstitial and patchy airspace consolidation.  - MBS: single episode trace aspiration w/ thins. recs are for dysphagia 2, thins  - LE dopplers: negative for DVT *sputum culture () - MRSA Interval history: Afebrile 
sats 98% on 3L WBC 16.5 Family present at bedside On exam, has diffuse rhonchi/wheeze posteriorly as well as congested sounding cough Despite this, says she is feeling better today though still c/o R sided pleuritic chest pain CXR w/ bilateral effusions R>L Past Medical History:  
Diagnosis Date  Anxiety and depression  Arthritis  Chronic obstructive pulmonary disease ([facility]  Chronic pain  DM type 2 causing vascular disease ([facility]  GERD (gastroesophageal reflux disease)  Glaucoma   
 HTN (hypertension)  Hx of completed stroke 2021  Hypothyroid  Incontinence  Neuropathy  Polypharmacy Past Surgical History:  
Procedure Laterality Date  HX ORTHOPAEDIC    
 HX VASCULAR ACCESS Family History Problem Relation Age of Onset  No Known Problems Other   
     reviewed, patient did not know  Diabetes Mother  Heart Attack Mother  Cancer Father Social History Tobacco Use  Smoking status: Former Smoker Quit date: 1991 Years since quittin.4  Smokeless tobacco: Never Used Substance Use Topics  Alcohol use: Never Allergies Allergen Reactions  Shellfish Derived Anaphylaxis Current Facility-Administered Medications Medication Dose Route Frequency  FLUoxetine (PROzac) capsule 20 mg  20 mg Oral QPM  
 [START ON 2021] vancomycin (VANCOCIN) 750 mg in 0.9% sodium chloride 250 mL (VIAL-MATE)  750 mg IntraVENous Q12H  nystatin (MYCOSTATIN) 100,000 unit/mL oral suspension 500,000 Units  500,000 Units Oral QID  senna-docusate (PERICOLACE) 8.6-50 mg per tablet 2 Tablet  2 Tablet Oral BID  
 oxyCODONE ER (OxyCONTIN) tablet 20 mg  20 mg Oral Q12H  
 oxyCODONE-acetaminophen (PERCOCET 7.5) 7.5-325 mg per tablet 1 Tablet  1 Tablet Oral Q4H PRN  
 morphine injection 2 mg  2 mg IntraVENous Q4H PRN  
 albuterol-ipratropium (DUO-NEB) 2.5 MG-0.5 MG/3 ML  3 mL Nebulization Q4HWA RT  
 bumetanide (BUMEX) injection 1 mg  1 mg IntraVENous BID  amLODIPine (NORVASC) tablet 10 mg  10 mg Oral DAILY  enoxaparin (LOVENOX) injection 40 mg  40 mg SubCUTAneous Q24H  predniSONE (DELTASONE) tablet 40 mg  40 mg Oral DAILY WITH BREAKFAST  pantoprazole (PROTONIX) tablet 40 mg  40 mg Oral ACB&D  
 guaiFENesin-dextromethorphan (ROBITUSSIN DM) 100-10 mg/5 mL syrup 10 mL  10 mL Oral Q6H  
 lidocaine 4 % patch 1 Patch  1 Patch TransDERmal Q24H  
 gabapentin (NEURONTIN) capsule 600 mg  600 mg Oral BID  
 guaiFENesin ER (MUCINEX) tablet 600 mg  600 mg Oral BID  latanoprost (XALATAN) 0.005 % ophthalmic solution 1 Drop  1 Drop Both Eyes QHS  melatonin (rapid dissolve) tablet 5 mg  5 mg Oral QHS  arformoterol 15 mcg/budesonide 0.5 mg neb solution   Nebulization BID RT  
 hydrALAZINE (APRESOLINE) 20 mg/mL injection 10 mg  10 mg IntraVENous Q6H PRN  
 atorvastatin (LIPITOR) tablet 80 mg  80 mg Oral QHS  aspirin delayed-release tablet 81 mg  81 mg Oral DAILY  clonazePAM (KlonoPIN) tablet 0.25 mg  0.25 mg Oral QPM  
 levothyroxine (SYNTHROID) tablet 75 mcg  75 mcg Oral ACB  meclizine (ANTIVERT) tablet 12.5 mg  12.5 mg Oral Q6H PRN  
 glucose chewable tablet 16 g  4 Tablet Oral PRN  
 dextrose (D50W) injection syrg 12.5-25 g  25-50 mL IntraVENous PRN  
 glucagon (GLUCAGEN) injection 1 mg  1 mg IntraMUSCular PRN  
 sodium chloride (NS) flush 5-40 mL  5-40 mL IntraVENous Q8H  
 sodium chloride (NS) flush 5-40 mL  5-40 mL IntraVENous PRN  
 acetaminophen (TYLENOL) tablet 650 mg  650 mg Oral Q6H PRN Or  
 acetaminophen (TYLENOL) suppository 650 mg  650 mg Rectal Q6H PRN  polyethylene glycol (MIRALAX) packet 17 g  17 g Oral DAILY PRN  
 bisacodyL (DULCOLAX) suppository 10 mg  10 mg Rectal DAILY PRN  
 ondansetron (ZOFRAN) injection 4 mg  4 mg IntraVENous Q6H PRN  
 insulin glargine (LANTUS) injection 12 Units  0.2 Units/kg SubCUTAneous QHS  insulin lispro (HUMALOG) injection   SubCUTAneous AC&HS  
 melatonin (rapid dissolve) tablet 5 mg  5 mg Oral QHS PRN  
 alum-mag hydroxide-simeth (MYLANTA) oral suspension 30 mL  30 mL Oral Q4H PRN  
 LORazepam (ATIVAN) injection 0.5 mg  0.5 mg IntraVENous Q6H PRN  
 diphenhydrAMINE (BENADRYL) injection 25 mg  25 mg IntraVENous Q6H PRN  
 albuterol (PROVENTIL VENTOLIN) nebulizer solution 2.5 mg  2.5 mg Nebulization Q4H PRN  
 simethicone (MYLICON) tablet 80 mg  80 mg Oral QID PRN  
 
 
 
REVIEW OF SYSTEMS  
12 point ROS negative except as stated in the HPI. Physical Exam:  
Visit Vitals BP (!) 124/58 (BP 1 Location: Left upper arm, BP Patient Position: At rest) Pulse 81 Temp 97.6 °F (36.4 °C) Resp 18 Ht 5' 4\" (1.626 m) Wt 61.5 kg (135 lb 8 oz) SpO2 98% BMI 23.26 kg/m² General:  Alert, cooperative, chronically ill appearing, appears stated age. Head:  Normocephalic, without obvious abnormality, atraumatic. Eyes:  Conjunctivae/corneas clear. Nose: Nares normal. Septum midline. Mucosa normal.   
Throat: Lips, mucosa, and tongue normal.   
Neck: Supple, symmetrical, trachea midline, no adenopathy. Lungs:   Congested cough/upper airway, diffuse rhonch/wheeze Chest wall:    
Heart:  Regular rate and rhythm Abdomen:   Soft, non-tender. Bowel sounds normal.  
Extremities: LE edema Pulses: 2+ and symmetric all extremities. Skin: Skin color, texture, turgor normal. No rashes or lesions Lymph nodes:   
Neurologic: Grossly nonfocal  
 
 
Lab Results Component Value Date/Time Sodium 135 (L) 05/28/2021 03:23 AM  
 Potassium 3.7 05/28/2021 03:23 AM  
 Chloride 100 05/28/2021 03:23 AM  
 CO2 35 (H) 05/28/2021 03:23 AM  
 BUN 13 05/28/2021 03:23 AM  
 Creatinine 0.50 (L) 05/28/2021 03:23 AM  
 Glucose 200 (H) 05/28/2021 03:23 AM  
 Calcium 7.0 (L) 05/28/2021 03:23 AM  
 Magnesium 1.8 03/09/2021 03:23 AM  
 Phosphorus 3.6 03/10/2021 03:15 AM  
 Lactic acid 1.1 05/19/2021 07:50 PM  
 
 
Lab Results Component Value Date/Time  WBC 16.5 (H) 05/28/2021 03:23 AM  
 HGB 10.1 (L) 05/28/2021 03:23 AM  
 PLATELET 546 98/17/6607 03:23 AM  
 MCV 82.0 05/28/2021 03:23 AM Lab Results Component Value Date/Time INR 1.2 (H) 03/09/2021 03:23 AM  
 aPTT 28.6 03/09/2021 03:23 AM  
 Alk. phosphatase 73 05/21/2021 09:10 AM  
 Protein, total 5.2 (L) 05/21/2021 09:10 AM  
 Albumin 2.3 (L) 05/21/2021 09:10 AM  
 Globulin 2.9 05/21/2021 09:10 AM  
 
 
Lab Results Component Value Date/Time Iron 16 (L) 05/09/2021 08:21 PM  
 TIBC 278 05/09/2021 08:21 PM  
 Iron % saturation 6 (L) 05/09/2021 08:21 PM  
 
 
Lab Results Component Value Date/Time TSH 3.12 05/28/2021 03:23 AM  
  
 
No results found for: PH, PHI, PCO2, PCO2I, PO2, PO2I, HCO3, HCO3I, FIO2, FIO2I Lab Results Component Value Date/Time Troponin-I, Qt. <0.05 05/23/2021 08:12 PM  
  
 
Lab Results Component Value Date/Time Culture result: (A) 05/23/2021 08:12 PM  
  MODERATE * METHICILLIN RESISTANT STAPHYLOCOCCUS AUREUS * Culture result: CHECKING FOR POSSIBLE 
FILAMENTOUS FUNGUS 
 (A) 05/23/2021 08:12 PM  
 Culture result: NO NORMAL RESPIRATORY ZAKIYA ISOLATED 05/23/2021 08:12 PM  
 
 
No results found for: TOXA1, RPR, HBCM, HBSAG, HAAB, HCAB1, HAAT, G6PD, CRYAC, HIVGT, HIVR, HIV1, HIV12, HIVPC, HIVRPI Lab Results Component Value Date/Time Vancomycin,trough 28.5 ([facility]) 05/28/2021 12:25 PM  
 Vancomycin,trough 17.2 (H) 05/25/2021 09:09 AM  
 
 
Lab Results Component Value Date/Time  Color YELLOW/STRAW 05/19/2021 06:15 PM  
 Appearance CLEAR 05/19/2021 06:15 PM  
 Specific gravity 1.010 05/19/2021 06:15 PM  
 pH (UA) 6.5 05/19/2021 06:15 PM  
 Protein Negative 05/19/2021 06:15 PM  
 Glucose 100 (A) 05/19/2021 06:15 PM  
 Ketone Negative 05/19/2021 06:15 PM  
 Bilirubin Negative 05/19/2021 06:15 PM  
 Blood Negative 05/19/2021 06:15 PM  
 Urobilinogen 0.2 05/19/2021 06:15 PM  
 Nitrites Negative 05/19/2021 06:15 PM  
 Leukocyte Esterase TRACE (A) 05/19/2021 06:15 PM  
 WBC 5-10 05/19/2021 06:15 PM  
 RBC 5-10 05/19/2021 06:15 PM  
 Bacteria Negative 05/19/2021 06:15 PM  
 
 
IMPRESSION 
· Acute hypoxemic respiratory failure · MRSA pneumonia · Acute COPD exacerbation · Hx of PE - not on anticoagulation due to frequent falls · HTN, diastolic dysfunction · Mild AS · H/o hypothyroidism · H/o CVA · Hyperglycemia · Mardene More[employer] PL[employer] 
· Supplemental O2 as needed to keep sats > 88% · Continue vancomycin · Would get chest CT if any clinical change in respiratory status or fever/significant rise in WBC to check for loculated pleural effusion. Check PCT today · Continue bumex · Continue scheduled duonebs, pulmicort, brovana, mucinex · Change back to IV steroids · Needs better pulmonary toileting. Flutter valve and chest PT ordered · Diet as per speech recs · Nystatin added for thrush Will follow peripherally over the weekend and check back on Monday.  
 
Micaela Fabian MD

## 2021-05-28 NOTE — PROGRESS NOTES
[facility] 
[address] [facility], [place], [address] [place] [address] 
[phone] Medical Progress Note NAME: Tomeka Sewell :  1940 MRM:  [id number] Date of service: 2021  10:55 AM 
 
  
Assessment and Plan: 1. Sepsis due to Pneumonia/ HCAP/ MRSA pneumonia. CT abd/pelvis showed severe stenosis of bilateral common iliac arteries, but pt has no leg pain and lactate is normal. There is also e/o cirrhosis, but minimal ascites so I have low c/f SBP. Treated with Vanc/cefepime  -  to complete a 7 day course. Sputum culture grew MRSA. Restarted vanc. 2.  AHRF: PNA and COPD exac. Continue nebs, lenore neb, brovana, pulmicort. Pulmonary following 3. COPD exacerbation. As above continue treatment. 4.  Multiple fractures: CT today showed multiple fractures of ribs, pubic rami, thoracic spine, etc. She is on oxycodone for chronic pain. Ultimately, with her repeat admissions and suggestion of multiple falls, palliative care is following. needs SNF  
  
5.  Bilateral common iliac stenosis: no symptoms. Monitor  
  
6. Cirrhosis: noted on imaging. She is not presently compensated. 7.  Hypokalemia. replete 8. Leukocytosis. Likely due to steroid. Monitor 9. Thrush. On nystatin swish and swallow. 10.  RT behind knee pain and swelling. Check doppler Subjective: Chief Complaint[de-identified] Patient was seen and examined as a follow up for sepsis. Chart was reviewed. c/o RT leg pain and swelling ROS: 
(bold if positive, if negative) Tolerating PT  Tolerating Diet Objective:  
 
Last 24hrs VS reviewed since prior progress note. Most recent are: 
 
Visit Vitals BP (!) 150/76 (BP 1 Location: Right lower arm, BP Patient Position: At rest) Pulse 76 Temp 97.2 °F (36.2 °C) Resp 18 Ht 5' 4\" (1.626 m) Wt 61.5 kg (135 lb 8 oz) SpO2 97% BMI 23.26 kg/m² SpO2 Readings from Last 6 Encounters:  
21 97% 21 97% 03/23/21 96% 03/10/21 95% 03/03/21 95% 02/27/21 96% O2 Flow Rate (L/min): 3 l/min Intake/Output Summary (Last 24 hours) at 5/28/2021 1045 Last data filed at 5/28/2021 5242 Gross per 24 hour Intake 530 ml Output 5300 ml Net -4770 ml Physical Exam: 
 
Gen:  Well-developed, well-nourished, in no acute distress HEENT:  Pink conjunctivae, PERRL, hearing intact to voice, moist mucous membranes Neck:  Supple, without masses, thyroid non-tender Resp:  No accessory muscle use, clear breath sounds without wheezes rales or rhonchi 
Card:  No murmurs, normal S1, S2 without thrills, bruits or peripheral edema Abd:  Soft, non-tender, non-distended, normoactive bowel sounds are present, no palpable organomegaly and no detectable hernias Lymph:  No cervical or inguinal adenopathy Musc:  No cyanosis or clubbing Skin:  No rashes or ulcers, skin turgor is good Neuro:  Cranial nerves are grossly intact, no focal motor weakness, follows commands appropriately Psych:  Good insight, oriented to person, place and time, alert 
__________________________________________________________________ Medications Reviewed: (see below) Medications:  
 
Current Facility-Administered Medications Medication Dose Route Frequency  nystatin (MYCOSTATIN) 100,000 unit/mL oral suspension 500,000 Units  500,000 Units Oral QID  Vancomycin - Please draw trough level prior to dose due on 05/28 at 1300. Thanks! Other ONCE  
 senna-docusate (PERICOLACE) 8.6-50 mg per tablet 2 Tablet  2 Tablet Oral BID  
 oxyCODONE ER (OxyCONTIN) tablet 20 mg  20 mg Oral Q12H  
 oxyCODONE-acetaminophen (PERCOCET 7.5) 7.5-325 mg per tablet 1 Tablet  1 Tablet Oral Q4H PRN  
 morphine injection 2 mg  2 mg IntraVENous Q4H PRN  
 albuterol-ipratropium (DUO-NEB) 2.5 MG-0.5 MG/3 ML  3 mL Nebulization Q4HWA RT  
 bumetanide (BUMEX) injection 1 mg  1 mg IntraVENous BID  amLODIPine (NORVASC) tablet 10 mg  10 mg Oral DAILY  enoxaparin (LOVENOX) injection 40 mg  40 mg SubCUTAneous Q24H  
 vancomycin (VANCOCIN) 1,000 mg in 0.9% sodium chloride 250 mL (VIAL-MATE)  1,000 mg IntraVENous Q12H  
 FLUoxetine (PROzac) capsule 40 mg  40 mg Oral QPM  
 predniSONE (DELTASONE) tablet 40 mg  40 mg Oral DAILY WITH BREAKFAST  pantoprazole (PROTONIX) tablet 40 mg  40 mg Oral ACB&D  
 guaiFENesin-dextromethorphan (ROBITUSSIN DM) 100-10 mg/5 mL syrup 10 mL  10 mL Oral Q6H  
 lidocaine 4 % patch 1 Patch  1 Patch TransDERmal Q24H  
 gabapentin (NEURONTIN) capsule 600 mg  600 mg Oral BID  
 guaiFENesin ER (MUCINEX) tablet 600 mg  600 mg Oral BID  latanoprost (XALATAN) 0.005 % ophthalmic solution 1 Drop  1 Drop Both Eyes QHS  melatonin (rapid dissolve) tablet 5 mg  5 mg Oral QHS  arformoterol 15 mcg/budesonide 0.5 mg neb solution   Nebulization BID RT  
 hydrALAZINE (APRESOLINE) 20 mg/mL injection 10 mg  10 mg IntraVENous Q6H PRN  
 atorvastatin (LIPITOR) tablet 80 mg  80 mg Oral QHS  aspirin delayed-release tablet 81 mg  81 mg Oral DAILY  clonazePAM (KlonoPIN) tablet 0.25 mg  0.25 mg Oral QPM  
 levothyroxine (SYNTHROID) tablet 75 mcg  75 mcg Oral ACB  meclizine (ANTIVERT) tablet 12.5 mg  12.5 mg Oral Q6H PRN  
 glucose chewable tablet 16 g  4 Tablet Oral PRN  
 dextrose (D50W) injection syrg 12.5-25 g  25-50 mL IntraVENous PRN  
 glucagon (GLUCAGEN) injection 1 mg  1 mg IntraMUSCular PRN  
 sodium chloride (NS) flush 5-40 mL  5-40 mL IntraVENous Q8H  
 sodium chloride (NS) flush 5-40 mL  5-40 mL IntraVENous PRN  
 acetaminophen (TYLENOL) tablet 650 mg  650 mg Oral Q6H PRN Or  
 acetaminophen (TYLENOL) suppository 650 mg  650 mg Rectal Q6H PRN  polyethylene glycol (MIRALAX) packet 17 g  17 g Oral DAILY PRN  
 bisacodyL (DULCOLAX) suppository 10 mg  10 mg Rectal DAILY PRN  
 ondansetron (ZOFRAN) injection 4 mg  4 mg IntraVENous Q6H PRN  
 insulin glargine (LANTUS) injection 12 Units  0.2 Units/kg SubCUTAneous QHS  
 insulin lispro (HUMALOG) injection   SubCUTAneous AC&HS  
 melatonin (rapid dissolve) tablet 5 mg  5 mg Oral QHS PRN  
 alum-mag hydroxide-simeth (MYLANTA) oral suspension 30 mL  30 mL Oral Q4H PRN  
 LORazepam (ATIVAN) injection 0.5 mg  0.5 mg IntraVENous Q6H PRN  
 diphenhydrAMINE (BENADRYL) injection 25 mg  25 mg IntraVENous Q6H PRN  
 albuterol (PROVENTIL VENTOLIN) nebulizer solution 2.5 mg  2.5 mg Nebulization Q4H PRN  
 simethicone (MYLICON) tablet 80 mg  80 mg Oral QID PRN Lab Data Reviewed: (see below) Lab Review:  
 
Recent Labs  
  05/28/21 0323 05/27/21 
0121 05/26/21 
4762 WBC 16.5* 15.6* 13.4* HGB 10.1* 9.7* 9.7* HCT 33.7* 31.2* 32.2*  
 355 333 Recent Labs  
  05/28/21 
0323 05/27/21 
0121 05/26/21 
4919 * 137 141  
K 3.7 3.7 3.0*  
 103 108 CO2 35* 31 29 * 214* 157* BUN 13 12 10 CREA 0.50* 0.51* 0.42* CA 7.0* 6.7* 7.3* Lab Results Component Value Date/Time Glucose (POC) 105 05/28/2021 08:01 AM  
 Glucose (POC) 357 (H) 05/27/2021 09:35 PM  
 Glucose (POC) 348 (H) 05/27/2021 04:34 PM  
 Glucose (POC) 180 (H) 05/27/2021 11:15 AM  
 Glucose (POC) 134 (H) 05/27/2021 06:22 AM  
 
No results for input(s): PH, PCO2, PO2, HCO3, FIO2 in the last 72 hours. No results for input(s): INR, INREXT, INREXT in the last 72 hours. All Micro Results Procedure Component Value Units Date/Time CULTURE, RESPIRATORY/SPUTUM/BRONCH Claudean Diamond STAIN [[id number]]  (Abnormal)  (Susceptibility) Collected: 05/23/21 2012 Order Status: Completed Specimen: Sputum Updated: 05/26/21 1145 Special Requests: NO SPECIAL REQUESTS     
  GRAM STAIN    
  RARE EPITHELIAL CELLS SEEN  
     
   FEW WBCS SEEN     
   FEW GRAM POSITIVE COCCI Culture result: MODERATE * METHICILLIN RESISTANT STAPHYLOCOCCUS AUREUS * CHECKING FOR POSSIBLE 
FILAMENTOUS FUNGUS 
  
      
  NO NORMAL RESPIRATORY ZAKIYA ISOLATED  URINE CULTURE HOLD SAMPLE [[id number]] Collected: 05/19/21 1815 Order Status: Completed Specimen: Serum Updated: 05/19/21 [address] Urine culture hold Urine on hold in Microbiology dept for 2 days. If unpreserved urine is submitted, it cannot be used for addtional testing after 24 hours, recollection will be required. I have reviewed notes of prior 24hr. Other pertinent lab: Total time spent with patient: 28 I personally reviewed chart, notes, data and current medications in the medical record. I have personally examined and treated the patient at bedside during this period. Care Plan discussed with: Patient, Family, Nursing Staff and >50% of time spent in counseling and coordination of care Discussed:  Care Plan Prophylaxis:  Lovenox Disposition:  SNF/LTC 
        
___________________________________________________ Attending Physician: Daina Byrne MD

## 2021-05-29 NOTE — PROGRESS NOTES
Abhinav Erwin [facility] 79 
9[address] [facility], [place], [address] [place] [address] 
[phone] Medical Progress Note NAME: Rowan Ignacio :  1940 MRM:  [id number] Date of service: 2021  10:55 AM 
 
  
Assessment and Plan: 1. Sepsis due to Pneumonia/ HCAP/ MRSA pneumonia. CT abd/pelvis showed severe stenosis of bilateral common iliac arteries, but pt has no leg pain and lactate is normal. There is also e/o cirrhosis, but minimal ascites so I have low c/f SBP. Treated with Vanc/cefepime  -  to complete a 7 day course. Sputum culture grew MRSA. On vanc. 2.  AHRF: PNA and COPD exac. Continue nebs, lenore neb, brovana, pulmicort, solumedrol. Pulmonary following 3. COPD exacerbation. As above continue treatment. 4.  Multiple fractures: CT today showed multiple fractures of ribs, pubic rami, thoracic spine, etc. She is on oxycodone for chronic pain. Ultimately, with her repeat admissions and suggestion of multiple falls, palliative care is following. needs SNF  
  
5.  Bilateral common iliac stenosis: no symptoms. Monitor  
  
6. Cirrhosis: noted on imaging. She is not presently decompensated. 7.  Hypokalemia. replete 8. Leukocytosis. Likely due to steroid. Monitor 9. Thrush. On nystatin swish and swallow. 10.  RT behind knee pain and swelling. Doppler negative for DVT 11. Abdominal distension. Check KUB. 12.  Diarrhea. Had watery diarrhea overnight. Stop laxatives. Subjective: Chief Complaint[de-identified] Patient was seen and examined as a follow up for sepsis. Chart was reviewed. c/o diarrhea ROS: 
(bold if positive, if negative) Tolerating PT  Tolerating Diet Objective:  
 
Last 24hrs VS reviewed since prior progress note. Most recent are: 
 
Visit Vitals BP (!) 184/71 (BP 1 Location: Left upper arm) Pulse 90 Temp 97.7 °F (36.5 °C) Resp 20 Ht 5' 4\" (1.626 m) Wt 61.5 kg (135 lb 8 oz) SpO2 94% BMI 23.26 kg/m² SpO2 Readings from Last 6 Encounters:  
05/29/21 94% 05/13/21 97% 03/23/21 96% 03/10/21 95% 03/03/21 95% 02/27/21 96% O2 Flow Rate (L/min): 3 l/min Intake/Output Summary (Last 24 hours) at 5/29/2021 5523 Last data filed at 5/28/2021 1141 Gross per 24 hour Intake  Output 850 ml Net -850 ml Physical Exam: 
 
Gen:  Well-developed, well-nourished, in no acute distress HEENT:  Pink conjunctivae, PERRL, hearing intact to voice, moist mucous membranes Neck:  Supple, without masses, thyroid non-tender Resp:  No accessory muscle use, clear breath sounds without wheezes rales or rhonchi 
Card:  No murmurs, normal S1, S2 without thrills, bruits or peripheral edema Abd:  Soft, non-tender, non-distended, normoactive bowel sounds are present, no palpable organomegaly and no detectable hernias Lymph:  No cervical or inguinal adenopathy Musc:  No cyanosis or clubbing Skin:  No rashes or ulcers, skin turgor is good Neuro:  Cranial nerves are grossly intact, no focal motor weakness, follows commands appropriately Psych:  Good insight, oriented to person, place and time, alert 
__________________________________________________________________ Medications Reviewed: (see below) Medications:  
 
Current Facility-Administered Medications Medication Dose Route Frequency  FLUoxetine (PROzac) capsule 20 mg  20 mg Oral QPM  
 vancomycin (VANCOCIN) 750 mg in 0.9% sodium chloride 250 mL (VIAL-MATE)  750 mg IntraVENous Q12H  
 methylPREDNISolone (PF) (SOLU-MEDROL) injection 40 mg  40 mg IntraVENous Q6H  
 nystatin (MYCOSTATIN) 100,000 unit/mL oral suspension 500,000 Units  500,000 Units Oral QID  senna-docusate (PERICOLACE) 8.6-50 mg per tablet 2 Tablet  2 Tablet Oral BID  
 oxyCODONE ER (OxyCONTIN) tablet 20 mg  20 mg Oral Q12H  
 oxyCODONE-acetaminophen (PERCOCET 7.5) 7.5-325 mg per tablet 1 Tablet  1 Tablet Oral Q4H PRN  
 morphine injection 2 mg  2 mg IntraVENous Q4H PRN  
 albuterol-ipratropium (DUO-NEB) 2.5 MG-0.5 MG/3 ML  3 mL Nebulization Q4HWA RT  
 bumetanide (BUMEX) injection 1 mg  1 mg IntraVENous BID  amLODIPine (NORVASC) tablet 10 mg  10 mg Oral DAILY  enoxaparin (LOVENOX) injection 40 mg  40 mg SubCUTAneous Q24H  pantoprazole (PROTONIX) tablet 40 mg  40 mg Oral ACB&D  
 guaiFENesin-dextromethorphan (ROBITUSSIN DM) 100-10 mg/5 mL syrup 10 mL  10 mL Oral Q6H  
 lidocaine 4 % patch 1 Patch  1 Patch TransDERmal Q24H  
 gabapentin (NEURONTIN) capsule 600 mg  600 mg Oral BID  
 guaiFENesin ER (MUCINEX) tablet 600 mg  600 mg Oral BID  latanoprost (XALATAN) 0.005 % ophthalmic solution 1 Drop  1 Drop Both Eyes QHS  melatonin (rapid dissolve) tablet 5 mg  5 mg Oral QHS  arformoterol 15 mcg/budesonide 0.5 mg neb solution   Nebulization BID RT  
 hydrALAZINE (APRESOLINE) 20 mg/mL injection 10 mg  10 mg IntraVENous Q6H PRN  
 atorvastatin (LIPITOR) tablet 80 mg  80 mg Oral QHS  aspirin delayed-release tablet 81 mg  81 mg Oral DAILY  clonazePAM (KlonoPIN) tablet 0.25 mg  0.25 mg Oral QPM  
 levothyroxine (SYNTHROID) tablet 75 mcg  75 mcg Oral ACB  meclizine (ANTIVERT) tablet 12.5 mg  12.5 mg Oral Q6H PRN  
 glucose chewable tablet 16 g  4 Tablet Oral PRN  
 dextrose (D50W) injection syrg 12.5-25 g  25-50 mL IntraVENous PRN  
 glucagon (GLUCAGEN) injection 1 mg  1 mg IntraMUSCular PRN  
 sodium chloride (NS) flush 5-40 mL  5-40 mL IntraVENous Q8H  
 sodium chloride (NS) flush 5-40 mL  5-40 mL IntraVENous PRN  
 acetaminophen (TYLENOL) tablet 650 mg  650 mg Oral Q6H PRN Or  
 acetaminophen (TYLENOL) suppository 650 mg  650 mg Rectal Q6H PRN  polyethylene glycol (MIRALAX) packet 17 g  17 g Oral DAILY PRN  
 bisacodyL (DULCOLAX) suppository 10 mg  10 mg Rectal DAILY PRN  
 ondansetron (ZOFRAN) injection 4 mg  4 mg IntraVENous Q6H PRN  
 insulin glargine (LANTUS) injection 12 Units  0.2 Units/kg SubCUTAneous QHS  insulin lispro (HUMALOG) injection   SubCUTAneous AC&HS  
 melatonin (rapid dissolve) tablet 5 mg  5 mg Oral QHS PRN  
 alum-mag hydroxide-simeth (MYLANTA) oral suspension 30 mL  30 mL Oral Q4H PRN  
 LORazepam (ATIVAN) injection 0.5 mg  0.5 mg IntraVENous Q6H PRN  
 diphenhydrAMINE (BENADRYL) injection 25 mg  25 mg IntraVENous Q6H PRN  
 albuterol (PROVENTIL VENTOLIN) nebulizer solution 2.5 mg  2.5 mg Nebulization Q4H PRN  
 simethicone (MYLICON) tablet 80 mg  80 mg Oral QID PRN Lab Data Reviewed: (see below) Lab Review:  
 
Recent Labs  
  05/29/21 0314 05/28/21 0323 05/27/21 
0121 WBC 18.6* 16.5* 15.6* HGB 9.4* 10.1* 9.7* HCT 30.7* 33.7* 31.2*  
 368 355 Recent Labs  
  05/29/21 0314 05/28/21 0323 05/27/21 
0121  135* 137  
K 4.0 3.7 3.7 CL 98 100 103 CO2 35* 35* 31  
* 200* 214* BUN 19 13 12 CREA 0.61 0.50* 0.51* CA 7.3* 7.0* 6.7* Lab Results Component Value Date/Time Glucose (POC) 283 (H) 05/29/2021 07:56 AM  
 Glucose (POC) 469 (H) 05/29/2021 07:54 AM  
 Glucose (POC) 411 (H) 05/28/2021 10:57 PM  
 Glucose (POC) 346 (H) 05/28/2021 04:17 PM  
 Glucose (POC) 217 (H) 05/28/2021 11:19 AM  
 
No results for input(s): PH, PCO2, PO2, HCO3, FIO2 in the last 72 hours. No results for input(s): INR, INREXT, INREXT in the last 72 hours. All Micro Results Procedure Component Value Units Date/Time CULTURE, RESPIRATORY/SPUTUM/BRONCH Perri Combe STAIN [[id number]]  (Abnormal)  (Susceptibility) Collected: 05/23/21 2012 Order Status: Completed Specimen: Sputum Updated: 05/26/21 1145 Special Requests: NO SPECIAL REQUESTS     
  GRAM STAIN    
  RARE EPITHELIAL CELLS SEEN  
     
   FEW WBCS SEEN     
   FEW GRAM POSITIVE COCCI Culture result: MODERATE * METHICILLIN RESISTANT STAPHYLOCOCCUS AUREUS * CHECKING FOR POSSIBLE 
FILAMENTOUS FUNGUS 
  
      
  NO NORMAL RESPIRATORY ZAKIYA ISOLATED  URINE CULTURE HOLD SAMPLE [[id number]] Collected: 05/19/21 1815 Order Status: Completed Specimen: Serum Updated: 05/19/21 [address] Urine culture hold Urine on hold in Microbiology dept for 2 days. If unpreserved urine is submitted, it cannot be used for addtional testing after 24 hours, recollection will be required. I have reviewed notes of prior 24hr. Other pertinent lab: Total time spent with patient: 28 I personally reviewed chart, notes, data and current medications in the medical record. I have personally examined and treated the patient at bedside during this period. Care Plan discussed with: Patient, Family, Nursing Staff and >50% of time spent in counseling and coordination of care Discussed:  Care Plan Prophylaxis:  Lovenox Disposition:  SNF/LTC 
        
___________________________________________________ Attending Physician: Christina Bolton MD

## 2021-05-29 NOTE — PROGRESS NOTES
Bedside and Verbal shift change report given to Deon Washington RN (oncoming nurse) by WALLY SANABRIA (offgoing nurse). Report included the following information SBAR, Kardex, Intake/Output, MAR, Accordion, Recent Results and Quality Measures.

## 2021-05-29 NOTE — PROGRESS NOTES
1417-Chart accessed for review due to elevated MEWS 3. Pt with b/p 158/53, , noted to have chronic pain. B/p lower than previous measurements. No interventions indicated. Will follow.  Sergei LO

## 2021-05-30 NOTE — PROGRESS NOTES
Bedside and Verbal shift change report given to WALLY Pizarro (oncoming nurse) by Ro Dumont (offgoing nurse). Report included the following information SBAR, Kardex, Procedure Summary, Intake/Output, MAR, Accordion and Recent Results.

## 2021-05-30 NOTE — PROGRESS NOTES
Abhinav Erwin [facility] 79 
[facility] [place] [address] [place] [address] 
[phone] Medical Progress Note NAME: Jose R Walker :  1940 MRM:  [id number] Date of service: 2021  10:55 AM 
 
  
Assessment and Plan: 1. Sepsis due to Pneumonia/ HCAP/ MRSA pneumonia. CT abd/pelvis showed severe stenosis of bilateral common iliac arteries, but pt has no leg pain and lactate is normal. There is also e/o cirrhosis, but minimal ascites so I have low c/f SBP. Treated with Vanc/cefepime  -  to complete a 7 day course. Sputum culture grew MRSA. On vanc. 2.  AHRF: PNA and COPD exac. Continue nebs, lenore neb, brovana, pulmicort, solumedrol. Pulmonary following 3. COPD exacerbation. As above continue treatment. 4.  Multiple fractures: CT today showed multiple fractures of ribs, pubic rami, thoracic spine, etc. She is on oxycodone for chronic pain. Ultimately, with her repeat admissions and suggestion of multiple falls, palliative care is following. needs SNF  
  
5.  Bilateral common iliac stenosis: no symptoms. Monitor  
  
6. Cirrhosis: noted on imaging. She is not presently decompensated. 7.  Hypokalemia. replete 8. Leukocytosis. Likely due to steroid. Monitor 9. Thrush. On nystatin swish and swallow, magic mouth wash . 10. Diarrhea. Resolved. Stopped laxatives. Subjective: Chief Complaint[de-identified] Patient was seen and examined as a follow up for sepsis. Chart was reviewed. c/o back pain ROS: 
(bold if positive, if negative) Tolerating PT  Tolerating Diet Objective:  
 
Last 24hrs VS reviewed since prior progress note. Most recent are: 
 
Visit Vitals /61 (BP 1 Location: Right upper arm, BP Patient Position: At rest) Pulse 86 Temp 97.6 °F (36.4 °C) Resp 16 Ht 5' 4\" (1.626 m) Wt 70.4 kg (155 lb 3.3 oz) SpO2 93% BMI 26.64 kg/m² SpO2 Readings from Last 6 Encounters:  
21 93% 21 97% 03/23/21 96% 03/10/21 95% 03/03/21 95% 02/27/21 96% O2 Flow Rate (L/min): 2 l/min Intake/Output Summary (Last 24 hours) at 5/30/2021 1158 Last data filed at 5/30/2021 1053 Gross per 24 hour Intake 260 ml Output 2150 ml Net -1890 ml Physical Exam: 
 
Gen:  Well-developed, well-nourished, in no acute distress HEENT:  Pink conjunctivae, PERRL, hearing intact to voice, moist mucous membranes Neck:  Supple, without masses, thyroid non-tender Resp:  No accessory muscle use, clear breath sounds without wheezes rales or rhonchi 
Card:  No murmurs, normal S1, S2 without thrills, bruits or peripheral edema Abd:  Soft, non-tender, non-distended, normoactive bowel sounds are present, no palpable organomegaly and no detectable hernias Lymph:  No cervical or inguinal adenopathy Musc:  No cyanosis or clubbing Skin:  No rashes or ulcers, skin turgor is good Neuro:  Cranial nerves are grossly intact, no focal motor weakness, follows commands appropriately Psych:  Good insight, oriented to person, place and time, alert 
__________________________________________________________________ Medications Reviewed: (see below) Medications:  
 
Current Facility-Administered Medications Medication Dose Route Frequency  Vancomycin trough @ 1730    Other ONCE  
 FLUoxetine (PROzac) capsule 20 mg  20 mg Oral QPM  
 vancomycin (VANCOCIN) 750 mg in 0.9% sodium chloride 250 mL (VIAL-MATE)  750 mg IntraVENous Q12H  
 methylPREDNISolone (PF) (SOLU-MEDROL) injection 40 mg  40 mg IntraVENous Q6H  
 nystatin (MYCOSTATIN) 100,000 unit/mL oral suspension 500,000 Units  500,000 Units Oral QID  oxyCODONE ER (OxyCONTIN) tablet 20 mg  20 mg Oral Q12H  
 oxyCODONE-acetaminophen (PERCOCET 7.5) 7.5-325 mg per tablet 1 Tablet  1 Tablet Oral Q4H PRN  
 morphine injection 2 mg  2 mg IntraVENous Q4H PRN  
 albuterol-ipratropium (DUO-NEB) 2.5 MG-0.5 MG/3 ML  3 mL Nebulization Q4HWA RT  
 bumetanide (BUMEX) injection 1 mg  1 mg IntraVENous BID  amLODIPine (NORVASC) tablet 10 mg  10 mg Oral DAILY  enoxaparin (LOVENOX) injection 40 mg  40 mg SubCUTAneous Q24H  pantoprazole (PROTONIX) tablet 40 mg  40 mg Oral ACB&D  
 guaiFENesin-dextromethorphan (ROBITUSSIN DM) 100-10 mg/5 mL syrup 10 mL  10 mL Oral Q6H  
 lidocaine 4 % patch 1 Patch  1 Patch TransDERmal Q24H  
 gabapentin (NEURONTIN) capsule 600 mg  600 mg Oral BID  
 guaiFENesin ER (MUCINEX) tablet 600 mg  600 mg Oral BID  latanoprost (XALATAN) 0.005 % ophthalmic solution 1 Drop  1 Drop Both Eyes QHS  melatonin (rapid dissolve) tablet 5 mg  5 mg Oral QHS  arformoterol 15 mcg/budesonide 0.5 mg neb solution   Nebulization BID RT  
 hydrALAZINE (APRESOLINE) 20 mg/mL injection 10 mg  10 mg IntraVENous Q6H PRN  
 atorvastatin (LIPITOR) tablet 80 mg  80 mg Oral QHS  aspirin delayed-release tablet 81 mg  81 mg Oral DAILY  clonazePAM (KlonoPIN) tablet 0.25 mg  0.25 mg Oral QPM  
 levothyroxine (SYNTHROID) tablet 75 mcg  75 mcg Oral ACB  meclizine (ANTIVERT) tablet 12.5 mg  12.5 mg Oral Q6H PRN  
 glucose chewable tablet 16 g  4 Tablet Oral PRN  
 dextrose (D50W) injection syrg 12.5-25 g  25-50 mL IntraVENous PRN  
 glucagon (GLUCAGEN) injection 1 mg  1 mg IntraMUSCular PRN  
 sodium chloride (NS) flush 5-40 mL  5-40 mL IntraVENous Q8H  
 sodium chloride (NS) flush 5-40 mL  5-40 mL IntraVENous PRN  
 acetaminophen (TYLENOL) tablet 650 mg  650 mg Oral Q6H PRN Or  
 acetaminophen (TYLENOL) suppository 650 mg  650 mg Rectal Q6H PRN  polyethylene glycol (MIRALAX) packet 17 g  17 g Oral DAILY PRN  
 bisacodyL (DULCOLAX) suppository 10 mg  10 mg Rectal DAILY PRN  
 ondansetron (ZOFRAN) injection 4 mg  4 mg IntraVENous Q6H PRN  
 insulin glargine (LANTUS) injection 12 Units  0.2 Units/kg SubCUTAneous QHS  insulin lispro (HUMALOG) injection   SubCUTAneous AC&HS  
 melatonin (rapid dissolve) tablet 5 mg  5 mg Oral QHS PRN  
 alum-mag hydroxide-simeth (MYLANTA) oral suspension 30 mL  30 mL Oral Q4H PRN  
 LORazepam (ATIVAN) injection 0.5 mg  0.5 mg IntraVENous Q6H PRN  
 diphenhydrAMINE (BENADRYL) injection 25 mg  25 mg IntraVENous Q6H PRN  
 albuterol (PROVENTIL VENTOLIN) nebulizer solution 2.5 mg  2.5 mg Nebulization Q4H PRN  
 simethicone (MYLICON) tablet 80 mg  80 mg Oral QID PRN Lab Data Reviewed: (see below) Lab Review:  
 
Recent Labs 05/30/21 
0580 05/29/21 
1100 05/28/21 
6164 WBC 30.4* 18.6* 16.5* HGB 11.2* 9.4* 10.1* HCT 36.0 30.7* 33.7*  
* 349 368 Recent Labs 05/30/21 
1846 05/29/21 
9118 05/28/21 
8035 * 136 135* K 3.9 4.0 3.7 CL 97 98 100 CO2 35* 35* 35* * 265* 200* BUN 21* 19 13 CREA 0.63 0.61 0.50* CA 7.8* 7.3* 7.0* Lab Results Component Value Date/Time Glucose (POC) 325 (H) 05/30/2021 11:29 AM  
 Glucose (POC) 207 (H) 05/30/2021 07:04 AM  
 Glucose (POC) 414 (H) 05/29/2021 08:52 PM  
 Glucose (POC) 275 (H) 05/29/2021 04:20 PM  
 Glucose (POC) 293 (H) 05/29/2021 12:13 PM  
 
No results for input(s): PH, PCO2, PO2, HCO3, FIO2 in the last 72 hours. No results for input(s): INR, INREXT, INREXT in the last 72 hours. All Micro Results Procedure Component Value Units Date/Time CULTURE, RESPIRATORY/SPUTUM/BRONCH Mane Claw STAIN [[id number]]  (Abnormal)  (Susceptibility) Collected: 05/23/21 2012 Order Status: Completed Specimen: Sputum Updated: 05/29/21 1040 Special Requests: NO SPECIAL REQUESTS     
  GRAM STAIN    
  RARE EPITHELIAL CELLS SEEN  
     
   FEW WBCS SEEN     
   FEW GRAM POSITIVE COCCI Culture result: MODERATE * METHICILLIN RESISTANT STAPHYLOCOCCUS AUREUS *  
     
      
  RARE 
FILAMENTOUS FUNGUS 
SENDING TO STATE LAB 
  
      
  NO NORMAL RESPIRATORY ZAKIYA ISOLATED URINE CULTURE HOLD SAMPLE [[id number]] Collected: 05/19/21 1815 Order Status: Completed Specimen: Serum Updated: 05/19/21 [address]   Urine culture hold Urine on hold in Microbiology dept for 2 days. If unpreserved urine is submitted, it cannot be used for addtional testing after 24 hours, recollection will be required. I have reviewed notes of prior 24hr. Other pertinent lab: Total time spent with patient: 28 I personally reviewed chart, notes, data and current medications in the medical record. I have personally examined and treated the patient at bedside during this period. Care Plan discussed with: Patient, Family, Nursing Staff and >50% of time spent in counseling and coordination of care Discussed:  Care Plan Prophylaxis:  Lovenox Disposition:  SNF/LTC 
        
___________________________________________________ Attending Physician: Joyce Harkins MD

## 2021-05-30 NOTE — PROGRESS NOTES
[address] [address] Pharmacy Dosing Services: 5/30/2021 Consult for antibiotic dosing of vancomycin by Estelle Allen. Indication:HAP Day of Therapy: 12 Vancomycin therapy: 
Current maintenance dose: 750 mg IV every 12 hours Goal trough 15-20 mcg/mL. Last trough level: 19 mcg/ml drawn 1 hr early. Corrected trough 18 mcg/ml. Plan : therapeutic - continue current dose Pharmacy to follow daily. Pharmacist Eleanor Cui                      [phone]

## 2021-05-31 NOTE — PROGRESS NOTES
Problem: Diabetes Self-Management Goal: *Disease process and treatment process Description: Define diabetes and identify own type of diabetes; list 3 options for treating diabetes. Outcome: Progressing Towards Goal 
Goal: *Incorporating nutritional management into lifestyle Description: Describe effect of type, amount and timing of food on blood glucose; list 3 methods for planning meals. Outcome: Progressing Towards Goal 
Goal: *Incorporating physical activity into lifestyle Description: State effect of exercise on blood glucose levels. Outcome: Progressing Towards Goal 
Goal: *Developing strategies to promote health/change behavior Description: Define the ABC's of diabetes; identify appropriate screenings, schedule and personal plan for screenings. Outcome: Progressing Towards Goal 
Goal: *Using medications safely Description: State effect of diabetes medications on diabetes; name diabetes medication taking, action and side effects. Outcome: Progressing Towards Goal 
Goal: *Monitoring blood glucose, interpreting and using results Description: Identify recommended blood glucose targets  and personal targets. Outcome: Progressing Towards Goal 
Goal: *Prevention, detection, treatment of acute complications Description: List symptoms of hyper- and hypoglycemia; describe how to treat low blood sugar and actions for lowering  high blood glucose level. Outcome: Progressing Towards Goal 
Goal: *Prevention, detection and treatment of chronic complications Description: Define the natural course of diabetes and describe the relationship of blood glucose levels to long term complications of diabetes. Outcome: Progressing Towards Goal 
Goal: *Developing strategies to address psychosocial issues Description: Describe feelings about living with diabetes; identify support needed and support network Outcome: Progressing Towards Goal 
Goal: *Insulin pump training Outcome: Progressing Towards Goal 
Goal: *Sick day guidelines Outcome: Progressing Towards Goal 
Goal: *Patient Specific Goal (EDIT GOAL, INSERT TEXT) Outcome: Progressing Towards Goal 
  
Problem: Patient Education: Go to Patient Education Activity Goal: Patient/Family Education Outcome: Progressing Towards Goal 
  
Problem: Falls - Risk of 
Goal: *Absence of Falls Description: Document Yahs Quintanilla Fall Risk and appropriate interventions in the flowsheet. Outcome: Progressing Towards Goal 
Note: Fall Risk Interventions: 
Mobility Interventions: Bed/chair exit alarm Mentation Interventions: Bed/chair exit alarm Medication Interventions: Bed/chair exit alarm Elimination Interventions: Call light in reach, Bed/chair exit alarm History of Falls Interventions: Bed/chair exit alarm, Door open when patient unattended, Vital signs minimum Q4HRs X 24 hrs (comment for end date), Assess for delayed presentation/identification of injury for 48 hrs (comment for end date) Problem: Patient Education: Go to Patient Education Activity Goal: Patient/Family Education Outcome: Progressing Towards Goal 
  
Problem: Pressure Injury - Risk of 
Goal: *Prevention of pressure injury Description: Document Edouard Scale and appropriate interventions in the flowsheet. Outcome: Progressing Towards Goal 
Note: Pressure Injury Interventions: 
Sensory Interventions: Assess changes in LOC Moisture Interventions: Absorbent underpads Activity Interventions: PT/OT evaluation Mobility Interventions: PT/OT evaluation Nutrition Interventions: Document food/fluid/supplement intake Friction and Shear Interventions: Lift team/patient mobility team, Lift sheet, HOB 30 degrees or less Problem: Patient Education: Go to Patient Education Activity Goal: Patient/Family Education Outcome: Progressing Towards Goal 
  
Problem: Patient Education: Go to Patient Education Activity Goal: Patient/Family Education Outcome: Progressing Towards Goal 
  
Problem: Patient Education: Go to Patient Education Activity Goal: Patient/Family Education Outcome: Progressing Towards Goal 
  
Problem: Patient Education: Go to Patient Education Activity Goal: Patient/Family Education Outcome: Progressing Towards Goal 
  
Problem: Risk for Spread of Infection Goal: Prevent transmission of infectious organism to others Description: Prevent the transmission of infectious organisms to other patients, staff members, and visitors. Outcome: Progressing Towards Goal 
  
Problem: Patient Education:  Go to Education Activity Goal: Patient/Family Education Outcome: Progressing Towards Goal

## 2021-05-31 NOTE — PROGRESS NOTES
Name: [facility]: [facility]  
: 1940 Admit Date: 2021 Phone: [phone]  Room: [facility]/ PCP: Bobbi Pike MD  MRN: [id number] Date: 2021  Code: DNR Ms. Alexa Carmona is a pleasant 79 yo woman with a history of COPD/emphysema (previously followed by a pulmonologist in [place]), chronic respiratory failure with hypoxia, PE, former tobacco use, HTN, DM, hypothyroidism and anxiety who was admitted last week with worsening pneumonia and leukocytosis. Daughter reports worsening hypoxia at home prior to admission with sats in the mid 80s. She currently has chest congestion and weak, nonproductive cough. Reports chest wall soreness with coughing. Fatigues easily with activity. Resp cx this admission with prelim of moderate staph aureus. She is currently on cefepime and vanc. Speech has evaluated. She is on dysphagia diet. She was hospitalized at [place] from - for bilateral PNA. She has had multiple admissions at [place] this year for COPD. She was previously followed in [place] for her COPD and moved to [place] in the last 4 months. She is chronically managed on Trelegy and prn albuterol inhaler/nebulizer. Has home O2 through [facility] and is on 2L at baseline. During her last hospitalization she was given levaquin and steroid taper. She notes symptomatic benefit from the prednisone. She has been taken off of blood thinners in the past due to frequent falls.  - chest CT: There is small left pleural effusion and likely worsening left lower lobe interstitial and patchy airspace consolidation.  - MBS: single episode trace aspiration w/ thins. recs are for dysphagia 2, thins  - LE dopplers: negative for DVT *sputum culture () - MRSA Interval history: Afebrile 
sats 98% on 2L WBC 22.4 Family present at bedside Past Medical History:  
Diagnosis Date  Anxiety and depression  Arthritis  Chronic obstructive pulmonary disease ([facility] [address] [facility]  Chronic pain  DM type 2 causing vascular disease ([facility]  GERD (gastroesophageal reflux disease)  Glaucoma   
 HTN (hypertension)  Hx of completed stroke 2021  Hypothyroid  Incontinence  Neuropathy  Polypharmacy Past Surgical History:  
Procedure Laterality Date  HX ORTHOPAEDIC    
 HX VASCULAR ACCESS Family History Problem Relation Age of Onset  No Known Problems Other   
     reviewed, patient did not know  Diabetes Mother  Heart Attack Mother  Cancer Father Social History Tobacco Use  Smoking status: Former Smoker Quit date: 1991 Years since quittin.4  Smokeless tobacco: Never Used Substance Use Topics  Alcohol use: Never Allergies Allergen Reactions  Shellfish Derived Anaphylaxis Current Facility-Administered Medications Medication Dose Route Frequency  magic mouthwash (FIRST-MOUTHWASH BLM) oral suspension 10 mL  10 mL Oral Q4H PRN  
 FLUoxetine (PROzac) capsule 20 mg  20 mg Oral QPM  
 vancomycin (VANCOCIN) 750 mg in 0.9% sodium chloride 250 mL (VIAL-MATE)  750 mg IntraVENous Q12H  
 methylPREDNISolone (PF) (SOLU-MEDROL) injection 40 mg  40 mg IntraVENous Q6H  
 nystatin (MYCOSTATIN) 100,000 unit/mL oral suspension 500,000 Units  500,000 Units Oral QID  oxyCODONE ER (OxyCONTIN) tablet 20 mg  20 mg Oral Q12H  
 oxyCODONE-acetaminophen (PERCOCET 7.5) 7.5-325 mg per tablet 1 Tablet  1 Tablet Oral Q4H PRN  
 morphine injection 2 mg  2 mg IntraVENous Q4H PRN  
 albuterol-ipratropium (DUO-NEB) 2.5 MG-0.5 MG/3 ML  3 mL Nebulization Q4HWA RT  
 bumetanide (BUMEX) injection 1 mg  1 mg IntraVENous BID  amLODIPine (NORVASC) tablet 10 mg  10 mg Oral DAILY  enoxaparin (LOVENOX) injection 40 mg  40 mg SubCUTAneous Q24H  pantoprazole (PROTONIX) tablet 40 mg  40 mg Oral ACB&D  
 guaiFENesin-dextromethorphan (ROBITUSSIN DM) 100-10 mg/5 mL syrup 10 mL  10 mL Oral Q6H  
 lidocaine 4 % patch 1 Patch  1 Patch TransDERmal Q24H  
 gabapentin (NEURONTIN) capsule 600 mg  600 mg Oral BID  
 guaiFENesin ER (MUCINEX) tablet 600 mg  600 mg Oral BID  latanoprost (XALATAN) 0.005 % ophthalmic solution 1 Drop  1 Drop Both Eyes QHS  melatonin (rapid dissolve) tablet 5 mg  5 mg Oral QHS  arformoterol 15 mcg/budesonide 0.5 mg neb solution   Nebulization BID RT  
 hydrALAZINE (APRESOLINE) 20 mg/mL injection 10 mg  10 mg IntraVENous Q6H PRN  
 atorvastatin (LIPITOR) tablet 80 mg  80 mg Oral QHS  aspirin delayed-release tablet 81 mg  81 mg Oral DAILY  clonazePAM (KlonoPIN) tablet 0.25 mg  0.25 mg Oral QPM  
 levothyroxine (SYNTHROID) tablet 75 mcg  75 mcg Oral ACB  meclizine (ANTIVERT) tablet 12.5 mg  12.5 mg Oral Q6H PRN  
 glucose chewable tablet 16 g  4 Tablet Oral PRN  
 dextrose (D50W) injection syrg 12.5-25 g  25-50 mL IntraVENous PRN  
 glucagon (GLUCAGEN) injection 1 mg  1 mg IntraMUSCular PRN  
 sodium chloride (NS) flush 5-40 mL  5-40 mL IntraVENous Q8H  
 sodium chloride (NS) flush 5-40 mL  5-40 mL IntraVENous PRN  
 acetaminophen (TYLENOL) tablet 650 mg  650 mg Oral Q6H PRN Or  
 acetaminophen (TYLENOL) suppository 650 mg  650 mg Rectal Q6H PRN  polyethylene glycol (MIRALAX) packet 17 g  17 g Oral DAILY PRN  
 bisacodyL (DULCOLAX) suppository 10 mg  10 mg Rectal DAILY PRN  
 ondansetron (ZOFRAN) injection 4 mg  4 mg IntraVENous Q6H PRN  
 insulin glargine (LANTUS) injection 12 Units  0.2 Units/kg SubCUTAneous QHS  insulin lispro (HUMALOG) injection   SubCUTAneous AC&HS  
 melatonin (rapid dissolve) tablet 5 mg  5 mg Oral QHS PRN  
 alum-mag hydroxide-simeth (MYLANTA) oral suspension 30 mL  30 mL Oral Q4H PRN  
 LORazepam (ATIVAN) injection 0.5 mg  0.5 mg IntraVENous Q6H PRN  
 diphenhydrAMINE (BENADRYL) injection 25 mg  25 mg IntraVENous Q6H PRN  
 albuterol (PROVENTIL VENTOLIN) nebulizer solution 2.5 mg  2.5 mg Nebulization Q4H PRN  
 simethicone (MYLICON) tablet 80 mg  80 mg Oral QID PRN  
 
 
 
REVIEW OF SYSTEMS  
12 point ROS negative except as stated in the HPI. Physical Exam:  
Visit Vitals BP (!) 168/55 (BP 1 Location: Left upper arm, BP Patient Position: At rest) Pulse 86 Temp 97.8 °F (36.6 °C) Resp 18 Ht 5' 4\" (1.626 m) Wt 70.4 kg (155 lb 3.3 oz) SpO2 98% BMI 26.64 kg/m² General:  Alert, cooperative, chronically ill appearing, appears stated age. Head:  Normocephalic, without obvious abnormality, atraumatic. Eyes:  Conjunctivae/corneas clear. Nose: Nares normal. Septum midline. Mucosa normal.   
Throat: Lips, mucosa, and tongue normal.   
Neck: Supple, symmetrical, trachea midline, no adenopathy. Lungs:   Congested cough/upper airway, scattered rhonchi Chest wall:    
Heart:  Regular rate and rhythm Abdomen:   Soft, non-tender. Bowel sounds normal.  
Extremities: LE edema Pulses: 2+ and symmetric all extremities. Skin: Skin color, texture, turgor normal. No rashes or lesions Lymph nodes:   
Neurologic: Grossly nonfocal  
 
 
Lab Results Component Value Date/Time Sodium 134 (L) 05/31/2021 04:02 AM  
 Potassium 3.7 05/31/2021 04:02 AM  
 Chloride 95 (L) 05/31/2021 04:02 AM  
 CO2 37 (H) 05/31/2021 04:02 AM  
 BUN 25 (H) 05/31/2021 04:02 AM  
 Creatinine 0.61 05/31/2021 04:02 AM  
 Glucose 237 (H) 05/31/2021 04:02 AM  
 Calcium 7.3 (L) 05/31/2021 04:02 AM  
 Magnesium 1.9 05/31/2021 04:02 AM  
 Phosphorus 3.7 05/31/2021 04:02 AM  
 Lactic acid 1.1 05/19/2021 07:50 PM  
 
 
Lab Results Component Value Date/Time WBC 22.4 (H) 05/31/2021 04:02 AM  
 HGB 9.2 (L) 05/31/2021 04:02 AM  
 PLATELET 255 26/02/8861 04:02 AM  
 MCV 81.0 05/31/2021 04:02 AM  
 
 
Lab Results Component Value Date/Time INR 1.2 (H) 03/09/2021 03:23 AM  
 aPTT 28.6 03/09/2021 03:23 AM  
 Alk.  phosphatase 73 05/21/2021 09:10 AM  
 Protein, total 5.2 (L) 05/21/2021 09:10 AM  
 Albumin 2.3 (L) 05/21/2021 09:10 AM  
 Globulin 2.9 05/21/2021 09:10 AM  
 
 
Lab Results Component Value Date/Time Iron 16 (L) 05/09/2021 08:21 PM  
 TIBC 278 05/09/2021 08:21 PM  
 Iron % saturation 6 (L) 05/09/2021 08:21 PM  
 
 
Lab Results Component Value Date/Time TSH 3.12 05/28/2021 03:23 AM  
  
 
No results found for: PH, PHI, PCO2, PCO2I, PO2, PO2I, HCO3, HCO3I, FIO2, FIO2I Lab Results Component Value Date/Time Troponin-I, Qt. <0.05 05/23/2021 08:12 PM  
  
 
Lab Results Component Value Date/Time Culture result:  05/23/2021 08:12 PM  
  MODERATE * METHICILLIN RESISTANT STAPHYLOCOCCUS AUREUS * Culture result: RARE 
FILAMENTOUS FUNGUS 
SENDING TO STATE LAB 
 (A) 05/23/2021 08:12 PM  
 Culture result: NO NORMAL RESPIRATORY ZAKIYA ISOLATED 05/23/2021 08:12 PM  
 
 
No results found for: TOXA1, RPR, HBCM, HBSAG, HAAB, HCAB1, HAAT, G6PD, CRYAC, HIVGT, HIVR, HIV1, HIV12, HIVPC, HIVRPI Lab Results Component Value Date/Time Vancomycin,trough 19.0 (H) 05/30/2021 05:19 PM  
 Vancomycin,trough 28.5 ([facility]) 05/28/2021 12:25 PM  
 
 
Lab Results Component Value Date/Time Color YELLOW/STRAW 05/19/2021 06:15 PM  
 Appearance CLEAR 05/19/2021 06:15 PM  
 Specific gravity 1.010 05/19/2021 06:15 PM  
 pH (UA) 6.5 05/19/2021 06:15 PM  
 Protein Negative 05/19/2021 06:15 PM  
 Glucose 100 (A) 05/19/2021 06:15 PM  
 Ketone Negative 05/19/2021 06:15 PM  
 Bilirubin Negative 05/19/2021 06:15 PM  
 Blood Negative 05/19/2021 06:15 PM  
 Urobilinogen 0.2 05/19/2021 06:15 PM  
 Nitrites Negative 05/19/2021 06:15 PM  
 Leukocyte Esterase TRACE (A) 05/19/2021 06:15 PM  
 WBC 5-10 05/19/2021 06:15 PM  
 RBC 5-10 05/19/2021 06:15 PM  
 Bacteria Negative 05/19/2021 06:15 PM  
 
 
IMPRESSION 
· Acute hypoxemic respiratory failure · MRSA pneumonia · Acute COPD exacerbation · Hx of PE - not on anticoagulation due to frequent falls · HTN, diastolic dysfunction · Mild AS · H/o hypothyroidism · H/o CVA · Hyperglycemia · [employer] PLAN 
· Supplemental O2 as needed to keep sats > 88%, on 2L NC at baseline · Continue vancomycin (Day 4 of &) · Would get chest CT if any clinical change in respiratory status or fever/significant rise in WBC to check for loculated pleural effusion. · Continue bumex · Continue scheduled duonebs, pulmicort, brovana, mucinex · Wean IV steroid frequency · Flutter valve and chest PT  
· Diet as per speech recs · Nystatin for thrush Pamela Blanco MD

## 2021-05-31 NOTE — PROGRESS NOTES
Abhinav Erwin [facility] 
[address] [facility], [place], [id number] [place] [address] 
[phone] Medical Progress Note NAME: Stefani Rausch :  1940 MRM:  [id number] Date of service: 2021  8:43 AM 
 
  
Assessment and Plan:  
 
Sepsis due to Pneumonia/ HCAP/ MRSA pneumonia. CT abd/pelvis showed severe stenosis of bilateral common iliac arteries, but pt has no leg pain and lactate is normal. There is also e/o cirrhosis, but minimal ascites so I have low c/f SBP. Sputum culture grew MRSA. Treated with Vanc/cefepime  - ; now on just Vanc. AHRF: PNA and COPD exac. Continue nebs, lenore neb, brovana, pulmicort, solumedrol. Pulmonary following COPD exacerbation. As above continue treatment. Multiple fractures: CT today showed multiple fractures of ribs, pubic rami, thoracic spine, etc. She is on oxycodone for chronic pain. Ultimately, with her repeat admissions and suggestion of multiple falls, palliative care is following. needs SNF  
  
 Bilateral common iliac stenosis: no symptoms. Monitor  
  
Cirrhosis: noted on imaging. She is not presently decompensated. Hypokalemia. replete PRN. Leukocytosis. Likely due to steroid. Improving. Monitor Thrush. On nystatin swish and swallow, magic mouth wash . Diarrhea. Resolved. Stopped laxatives. Subjective: Chief Complaint[de-identified] Patient was seen and examined as a follow up for sepsis. Chart was reviewed. c/o chronic back pain and chronic SOB. ROS: 
(bold if positive, if negative) SOB/Tolerating PT  Tolerating Diet Back Pain Objective:  
 
Last 24hrs VS reviewed since prior progress note. Most recent are: 
 
Visit Vitals BP (!) 168/55 (BP 1 Location: Left upper arm, BP Patient Position: At rest) Pulse 86 Temp 97.8 °F (36.6 °C) Resp 18 Ht 5' 4\" (1.626 m) Wt 70.4 kg (155 lb 3.3 oz) SpO2 98% BMI 26.64 kg/m² SpO2 Readings from Last 6 Encounters:  
21 98% 21 97% 03/23/21 96% 03/10/21 95% 03/03/21 95% 02/27/21 96% O2 Flow Rate (L/min): 2 l/min Intake/Output Summary (Last 24 hours) at 5/31/2021 8627 Last data filed at 5/31/2021 0600 Gross per 24 hour Intake 100 ml Output 2000 ml Net -1900 ml Physical Exam: 
 
Gen:  elderly, in no acute distress HEENT:  Pink conjunctivae, PERRL, hearing intact to voice Resp:  No accessory muscle use, clear breath sounds without wheezes rales or rhonchi 
Card:  No murmurs, normal S1, S2 without thrills, bruits, b/l peripheral edema Abd:  Soft, non-tender, non-distended, normoactive bowel sounds are present, no palpable organomegaly Lymph:  No cervical or inguinal adenopathy Musc:  No cyanosis or clubbing Skin:  No rashes or ulcers, skin turgor is good Neuro:  Cranial nerves are grossly intact, no focal motor weakness, follows commands appropriately Psych:  Good insight, oriented to person, place and time, alert 
__________________________________________________________________ Medications Reviewed: (see below) Medications:  
 
Current Facility-Administered Medications Medication Dose Route Frequency  magic mouthwash (FIRST-MOUTHWASH BLM) oral suspension 10 mL  10 mL Oral Q4H PRN  
 FLUoxetine (PROzac) capsule 20 mg  20 mg Oral QPM  
 vancomycin (VANCOCIN) 750 mg in 0.9% sodium chloride 250 mL (VIAL-MATE)  750 mg IntraVENous Q12H  
 methylPREDNISolone (PF) (SOLU-MEDROL) injection 40 mg  40 mg IntraVENous Q6H  
 nystatin (MYCOSTATIN) 100,000 unit/mL oral suspension 500,000 Units  500,000 Units Oral QID  oxyCODONE ER (OxyCONTIN) tablet 20 mg  20 mg Oral Q12H  
 oxyCODONE-acetaminophen (PERCOCET 7.5) 7.5-325 mg per tablet 1 Tablet  1 Tablet Oral Q4H PRN  
 morphine injection 2 mg  2 mg IntraVENous Q4H PRN  
 albuterol-ipratropium (DUO-NEB) 2.5 MG-0.5 MG/3 ML  3 mL Nebulization Q4HWA RT  
 bumetanide (BUMEX) injection 1 mg  1 mg IntraVENous BID  amLODIPine (NORVASC) tablet 10 mg  10 mg Oral DAILY  enoxaparin (LOVENOX) injection 40 mg  40 mg SubCUTAneous Q24H  pantoprazole (PROTONIX) tablet 40 mg  40 mg Oral ACB&D  
 guaiFENesin-dextromethorphan (ROBITUSSIN DM) 100-10 mg/5 mL syrup 10 mL  10 mL Oral Q6H  
 lidocaine 4 % patch 1 Patch  1 Patch TransDERmal Q24H  
 gabapentin (NEURONTIN) capsule 600 mg  600 mg Oral BID  
 guaiFENesin ER (MUCINEX) tablet 600 mg  600 mg Oral BID  latanoprost (XALATAN) 0.005 % ophthalmic solution 1 Drop  1 Drop Both Eyes QHS  melatonin (rapid dissolve) tablet 5 mg  5 mg Oral QHS  arformoterol 15 mcg/budesonide 0.5 mg neb solution   Nebulization BID RT  
 hydrALAZINE (APRESOLINE) 20 mg/mL injection 10 mg  10 mg IntraVENous Q6H PRN  
 atorvastatin (LIPITOR) tablet 80 mg  80 mg Oral QHS  aspirin delayed-release tablet 81 mg  81 mg Oral DAILY  clonazePAM (KlonoPIN) tablet 0.25 mg  0.25 mg Oral QPM  
 levothyroxine (SYNTHROID) tablet 75 mcg  75 mcg Oral ACB  meclizine (ANTIVERT) tablet 12.5 mg  12.5 mg Oral Q6H PRN  
 glucose chewable tablet 16 g  4 Tablet Oral PRN  
 dextrose (D50W) injection syrg 12.5-25 g  25-50 mL IntraVENous PRN  
 glucagon (GLUCAGEN) injection 1 mg  1 mg IntraMUSCular PRN  
 sodium chloride (NS) flush 5-40 mL  5-40 mL IntraVENous Q8H  
 sodium chloride (NS) flush 5-40 mL  5-40 mL IntraVENous PRN  
 acetaminophen (TYLENOL) tablet 650 mg  650 mg Oral Q6H PRN Or  
 acetaminophen (TYLENOL) suppository 650 mg  650 mg Rectal Q6H PRN  polyethylene glycol (MIRALAX) packet 17 g  17 g Oral DAILY PRN  
 bisacodyL (DULCOLAX) suppository 10 mg  10 mg Rectal DAILY PRN  
 ondansetron (ZOFRAN) injection 4 mg  4 mg IntraVENous Q6H PRN  
 insulin glargine (LANTUS) injection 12 Units  0.2 Units/kg SubCUTAneous QHS  insulin lispro (HUMALOG) injection   SubCUTAneous AC&HS  
 melatonin (rapid dissolve) tablet 5 mg  5 mg Oral QHS PRN  
 alum-mag hydroxide-simeth (MYLANTA) oral suspension 30 mL  30 mL Oral Q4H PRN  
 LORazepam (ATIVAN) injection 0.5 mg  0.5 mg IntraVENous Q6H PRN  
 diphenhydrAMINE (BENADRYL) injection 25 mg  25 mg IntraVENous Q6H PRN  
 albuterol (PROVENTIL VENTOLIN) nebulizer solution 2.5 mg  2.5 mg Nebulization Q4H PRN  
 simethicone (MYLICON) tablet 80 mg  80 mg Oral QID PRN Lab Data Reviewed: (see below) Lab Review:  
 
Recent Labs 05/31/21 
8522 05/30/21 
2940 05/29/21 
2719 WBC 22.4* 30.4* 18.6* HGB 9.2* 11.2* 9.4* HCT 29.5* 36.0 30.7*  462* 349 Recent Labs 05/31/21 
2931 05/30/21 
1531 05/29/21 
1229 * 134* 136  
K 3.7 3.9 4.0  
CL 95* 97 98 CO2 37* 35* 35* * 160* 265* BUN 25* 21* 19  
CREA 0.61 0.63 0.61  
CA 7.3* 7.8* 7.3*  
MG 1.9  --   --   
PHOS 3.7  --   --   
 
Lab Results Component Value Date/Time Glucose (POC) 296 (H) 05/31/2021 07:17 AM  
 Glucose (POC) 437 (H) 05/30/2021 09:14 PM  
 Glucose (POC) 238 (H) 05/30/2021 04:34 PM  
 Glucose (POC) 325 (H) 05/30/2021 11:29 AM  
 Glucose (POC) 207 (H) 05/30/2021 07:04 AM  
 
No results for input(s): PH, PCO2, PO2, HCO3, FIO2 in the last 72 hours. No results for input(s): INR, INREXT, INREXT in the last 72 hours. All Micro Results Procedure Component Value Units Date/Time CULTURE, RESPIRATORY/SPUTUM/BRONCH Josepha Shearing STAIN [[id number]]  (Abnormal)  (Susceptibility) Collected: 05/23/21 2012 Order Status: Completed Specimen: Sputum Updated: 05/29/21 1040 Special Requests: NO SPECIAL REQUESTS     
  GRAM STAIN    
  RARE EPITHELIAL CELLS SEEN  
     
   FEW WBCS SEEN     
   FEW GRAM POSITIVE COCCI Culture result: MODERATE * METHICILLIN RESISTANT STAPHYLOCOCCUS AUREUS *  
     
      
  RARE 
FILAMENTOUS FUNGUS 
SENDING TO STATE LAB 
  
      
  NO NORMAL RESPIRATORY ZAKIYA ISOLATED URINE CULTURE HOLD SAMPLE [[id number]] Collected: 05/19/21 1815 Order Status: Completed Specimen: Serum Updated: 05/19/21 [address] Urine culture hold   Urine on hold in Microbiology dept for 2 days. If unpreserved urine is submitted, it cannot be used for addtional testing after 24 hours, recollection will be required. I have reviewed notes of prior 24hr. Other pertinent lab: Total time spent with patient: 40 I personally reviewed chart, notes, data and current medications in the medical record. I have personally examined and treated the patient at bedside during this period. Care Plan discussed with: Patient, Nursing Staff and >50% of time spent in counseling and coordination of care Discussed:  Care Plan Prophylaxis:  Lovenox Disposition:  SNF/LTC 
        
___________________________________________________ Attending Physician: Babar Florez, DO

## 2021-05-31 NOTE — PROGRESS NOTES
Bedside shift change report given to Chelsea Colon (oncoming nurse) by Pat Gonzalez (offgoing nurse). Report included the following information SBAR, Kardex, Intake/Output, MAR and Recent Results.

## 2021-05-31 NOTE — PROGRESS NOTES
Problem: Diabetes Self-Management Goal: *Disease process and treatment process Description: Define diabetes and identify own type of diabetes; list 3 options for treating diabetes. Outcome: Progressing Towards Goal 
Goal: *Incorporating nutritional management into lifestyle Description: Describe effect of type, amount and timing of food on blood glucose; list 3 methods for planning meals. Outcome: Progressing Towards Goal 
Goal: *Incorporating physical activity into lifestyle Description: State effect of exercise on blood glucose levels. Outcome: Progressing Towards Goal 
Goal: *Developing strategies to promote health/change behavior Description: Define the ABC's of diabetes; identify appropriate screenings, schedule and personal plan for screenings. Outcome: Progressing Towards Goal 
Goal: *Using medications safely Description: State effect of diabetes medications on diabetes; name diabetes medication taking, action and side effects. Outcome: Progressing Towards Goal 
Goal: *Monitoring blood glucose, interpreting and using results Description: Identify recommended blood glucose targets  and personal targets. Outcome: Progressing Towards Goal 
Goal: *Prevention, detection, treatment of acute complications Description: List symptoms of hyper- and hypoglycemia; describe how to treat low blood sugar and actions for lowering  high blood glucose level. Outcome: Progressing Towards Goal 
Goal: *Prevention, detection and treatment of chronic complications Description: Define the natural course of diabetes and describe the relationship of blood glucose levels to long term complications of diabetes. Outcome: Progressing Towards Goal 
Goal: *Developing strategies to address psychosocial issues Description: Describe feelings about living with diabetes; identify support needed and support network Outcome: Progressing Towards Goal 
Goal: *Insulin pump training Outcome: Progressing Towards Goal 
Goal: *Sick day guidelines Outcome: Progressing Towards Goal 
Goal: *Patient Specific Goal (EDIT GOAL, INSERT TEXT) Outcome: Progressing Towards Goal 
  
Problem: Patient Education: Go to Patient Education Activity Goal: Patient/Family Education Outcome: Progressing Towards Goal 
  
Problem: Falls - Risk of 
Goal: *Absence of Falls Description: Document Faina Laura Fall Risk and appropriate interventions in the flowsheet. Outcome: Progressing Towards Goal 
Note: Fall Risk Interventions: 
Mobility Interventions: Bed/chair exit alarm, Patient to call before getting OOB, PT Consult for mobility concerns, PT Consult for assist device competence, OT consult for ADLs Mentation Interventions: Bed/chair exit alarm, Update white board, Reorient patient, More frequent rounding Medication Interventions: Bed/chair exit alarm, Patient to call before getting OOB, Teach patient to arise slowly Elimination Interventions: Bed/chair exit alarm, Call light in reach, Patient to call for help with toileting needs History of Falls Interventions: Bed/chair exit alarm, Door open when patient unattended, Vital signs minimum Q4HRs X 24 hrs (comment for end date), Assess for delayed presentation/identification of injury for 48 hrs (comment for end date) Problem: Patient Education: Go to Patient Education Activity Goal: Patient/Family Education Outcome: Progressing Towards Goal 
  
Problem: Pressure Injury - Risk of 
Goal: *Prevention of pressure injury Description: Document Edouard Scale and appropriate interventions in the flowsheet. Outcome: Progressing Towards Goal 
Note: Pressure Injury Interventions: 
Sensory Interventions: Assess changes in LOC, Keep linens dry and wrinkle-free, Float heels, Maintain/enhance activity level Moisture Interventions: Absorbent underpads, Apply protective barrier, creams and emollients, Check for incontinence Q2 hours and as needed Activity Interventions: Increase time out of bed, Pressure redistribution bed/mattress(bed type) Mobility Interventions: Float heels, HOB 30 degrees or less, Turn and reposition approx. every two hours(pillow and wedges) Nutrition Interventions: Document food/fluid/supplement intake Friction and Shear Interventions: Lift team/patient mobility team, Lift sheet, HOB 30 degrees or less Problem: Patient Education: Go to Patient Education Activity Goal: Patient/Family Education Outcome: Progressing Towards Goal 
  
Problem: Patient Education: Go to Patient Education Activity Goal: Patient/Family Education Outcome: Progressing Towards Goal 
  
Problem: Patient Education: Go to Patient Education Activity Goal: Patient/Family Education Outcome: Progressing Towards Goal 
  
Problem: Patient Education: Go to Patient Education Activity Goal: Patient/Family Education Outcome: Progressing Towards Goal 
  
Problem: Risk for Spread of Infection Goal: Prevent transmission of infectious organism to others Description: Prevent the transmission of infectious organisms to other patients, staff members, and visitors. Outcome: Progressing Towards Goal 
  
Problem: Patient Education:  Go to Education Activity Goal: Patient/Family Education Outcome: Progressing Towards Goal

## 2021-06-01 NOTE — PROGRESS NOTES
6/1/2021   CARE MANAGEMENT NOTE:  CM reviewed EMR for clinical updates.  
READMISSION:  Pt was admitted to [facility] from 5/9/21 - 5/13/21 with COPD.  She discharged home with [facility].  Pt was readmitted to [facility] on 5/19 with sepsis 2/2 PNA, also hx of multiple CVAs and fxs, COPD, cirrhosis, DM. Reportedly, pt resides with her dtr Jj Brennan ([phone]).  Pt has caregivers 3 days per week. 
  
RUR 55%; COS 48 XCJT 
  
Transition Plan of Care: 1.  SNF - referrals were sent to the following: 
[facility] (denied), Our [address] of [place] [address]., Shantanu turner, [address] [place], [address] (denied), [facility] Clinical updates were sent to SNFs today for further review. Dtr expressed desire for top facility and a private room 2.  [employer] Daylene Shook will be needed 3.  COVID 19 test for placement was negative on 5/26. An updated test may be required depending on accepting SNF 4.  [facility] will be needed at discharge 
  
 CM will continue to follow pt for SNF rehab YONY Torres

## 2021-06-01 NOTE — PROGRESS NOTES
Bedside and Verbal shift change report given to Jason Soriano RN (oncoming nurse) by Yadi Lorenzana RN (offgoing nurse). Report included the following information SBAR, Kardex, Intake/Output, MAR, Accordion and Recent Results.

## 2021-06-01 NOTE — PROGRESS NOTES
Code S was called due to LT side weakness. daughter came to visit and noted that pt couldn't move her LT arm. Pt c/o LT arm weakness, unable to hold things with her left hand. Denies slurred speech. Pt has Hx of CVA with LT arm weakness residual.  
P/E: 
LT arm and leg weak 3/5 in strength. Plan:  
CT and CTA of the head. Consult neurology. ASA. Talked to tele neurology on the phone. Addendum: 2100 CT an CTA of the head and neck without acute finding 21:00 Spoke with teleneurology and reviewed CT scan result and recommended MRI of the brain. Spent 35 minutes( from 19:30-20:10)

## 2021-06-01 NOTE — PROGRESS NOTES
[facility] 
[address] [facility], [place], [id number] [place] [address] 
[phone] Medical Progress Note NAME: Margie Yen :  1940 MRM:  [id number] Date of service: 2021  2:56 PM 
 
  
Assessment and Plan:  
 
Sepsis due to Pneumonia/ HCAP/ MRSA pneumonia. There is also e/o cirrhosis, but minimal ascites so I low c/f SBP. Sputum culture grew MRSA. Treated with Vanc/cefepime  - ; now on just Vanc (day 5 of & 7). AHRF: PNA and COPD exac. Continue nebs, lenore neb, brovana, pulmicort, solumedrol. Pulmonary following COPD exacerbation. As above continue treatment. Multiple fractures: CT today showed multiple fractures of ribs, pubic rami, thoracic spine, etc. She is on oxycodone for chronic pain. Ultimately, with her repeat admissions and suggestion of multiple falls, palliative care is following. needs SNF  
  
 Bilateral common iliac stenosis: CT abd/pelvis showed severe stenosis of bilateral common iliac arteries, but pt has no leg pain and lactate is normal.  no symptoms. Monitor  
  
Cirrhosis: noted on imaging. She is not presently decompensated. Hypokalemia. replete PRN. Leukocytosis. Likely due to steroid. Improving. Monitor Thrush. On nystatin swish and swallow, magic mouth wash . Diarrhea. Resolved. Stopped laxatives. Subjective: Chief Complaint[de-identified] Patient was seen and examined as a follow up for sepsis. Chart was reviewed. c/o chronic back pain. ROS: 
(bold if positive, if negative) Tolerating PT  Tolerating Diet Back Pain Objective:  
 
Last 24hrs VS reviewed since prior progress note. Most recent are: 
 
Visit Vitals BP (!) 165/71 (BP 1 Location: Left upper arm, BP Patient Position: At rest) Pulse 81 Temp 98.1 °F (36.7 °C) Resp 18 Ht 5' 4\" (1.626 m) Wt 68.9 kg (151 lb 14.4 oz) SpO2 95% BMI 26.07 kg/m² SpO2 Readings from Last 6 Encounters:  
21 95% 21 97% 03/23/21 96% 03/10/21 95% 03/03/21 95% 02/27/21 96% O2 Flow Rate (L/min): 2 l/min Intake/Output Summary (Last 24 hours) at 6/1/2021 1456 Last data filed at 6/1/2021 2349 Gross per 24 hour Intake 120 ml Output 1600 ml Net -1480 ml Physical Exam: 
 
Gen:  elderly, in no acute distress HEENT:  Pink conjunctivae, PERRL, hearing intact to voice Resp:  No accessory muscle use, clear breath sounds without wheezes rales or rhonchi 
Card:  No murmurs, normal S1, S2 without thrills, bruits, b/l peripheral edema Abd:  Soft, non-tender, non-distended, normoactive bowel sounds are present, no palpable organomegaly Lymph:  No cervical or inguinal adenopathy Musc:  No cyanosis or clubbing Skin:  No rashes or ulcers, skin turgor is good Neuro:  Cranial nerves are grossly intact, no focal motor weakness, follows commands appropriately Psych:  Good insight, oriented to person, place and time, alert 
__________________________________________________________________ Medications Reviewed: (see below) Medications:  
 
Current Facility-Administered Medications Medication Dose Route Frequency  methylPREDNISolone (PF) (SOLU-MEDROL) injection 40 mg  40 mg IntraVENous Q12H  
 magic mouthwash (FIRST-MOUTHWASH BLM) oral suspension 10 mL  10 mL Oral Q4H PRN  
 FLUoxetine (PROzac) capsule 20 mg  20 mg Oral QPM  
 vancomycin (VANCOCIN) 750 mg in 0.9% sodium chloride 250 mL (VIAL-MATE)  750 mg IntraVENous Q12H  nystatin (MYCOSTATIN) 100,000 unit/mL oral suspension 500,000 Units  500,000 Units Oral QID  oxyCODONE ER (OxyCONTIN) tablet 20 mg  20 mg Oral Q12H  
 oxyCODONE-acetaminophen (PERCOCET 7.5) 7.5-325 mg per tablet 1 Tablet  1 Tablet Oral Q4H PRN  
 morphine injection 2 mg  2 mg IntraVENous Q4H PRN  
 albuterol-ipratropium (DUO-NEB) 2.5 MG-0.5 MG/3 ML  3 mL Nebulization Q4HWA RT  
 bumetanide (BUMEX) injection 1 mg  1 mg IntraVENous BID  amLODIPine (NORVASC) tablet 10 mg  10 mg Oral DAILY  enoxaparin (LOVENOX) injection 40 mg  40 mg SubCUTAneous Q24H  pantoprazole (PROTONIX) tablet 40 mg  40 mg Oral ACB&D  
 guaiFENesin-dextromethorphan (ROBITUSSIN DM) 100-10 mg/5 mL syrup 10 mL  10 mL Oral Q6H  
 lidocaine 4 % patch 1 Patch  1 Patch TransDERmal Q24H  
 gabapentin (NEURONTIN) capsule 600 mg  600 mg Oral BID  
 guaiFENesin ER (MUCINEX) tablet 600 mg  600 mg Oral BID  latanoprost (XALATAN) 0.005 % ophthalmic solution 1 Drop  1 Drop Both Eyes QHS  melatonin (rapid dissolve) tablet 5 mg  5 mg Oral QHS  arformoterol 15 mcg/budesonide 0.5 mg neb solution   Nebulization BID RT  
 hydrALAZINE (APRESOLINE) 20 mg/mL injection 10 mg  10 mg IntraVENous Q6H PRN  
 atorvastatin (LIPITOR) tablet 80 mg  80 mg Oral QHS  aspirin delayed-release tablet 81 mg  81 mg Oral DAILY  clonazePAM (KlonoPIN) tablet 0.25 mg  0.25 mg Oral QPM  
 levothyroxine (SYNTHROID) tablet 75 mcg  75 mcg Oral ACB  meclizine (ANTIVERT) tablet 12.5 mg  12.5 mg Oral Q6H PRN  
 glucose chewable tablet 16 g  4 Tablet Oral PRN  
 dextrose (D50W) injection syrg 12.5-25 g  25-50 mL IntraVENous PRN  
 glucagon (GLUCAGEN) injection 1 mg  1 mg IntraMUSCular PRN  
 sodium chloride (NS) flush 5-40 mL  5-40 mL IntraVENous Q8H  
 sodium chloride (NS) flush 5-40 mL  5-40 mL IntraVENous PRN  
 acetaminophen (TYLENOL) tablet 650 mg  650 mg Oral Q6H PRN Or  
 acetaminophen (TYLENOL) suppository 650 mg  650 mg Rectal Q6H PRN  polyethylene glycol (MIRALAX) packet 17 g  17 g Oral DAILY PRN  
 bisacodyL (DULCOLAX) suppository 10 mg  10 mg Rectal DAILY PRN  
 ondansetron (ZOFRAN) injection 4 mg  4 mg IntraVENous Q6H PRN  
 insulin glargine (LANTUS) injection 12 Units  0.2 Units/kg SubCUTAneous QHS  insulin lispro (HUMALOG) injection   SubCUTAneous AC&HS  
 melatonin (rapid dissolve) tablet 5 mg  5 mg Oral QHS PRN  
 alum-mag hydroxide-simeth (MYLANTA) oral suspension 30 mL  30 mL Oral Q4H PRN  
 LORazepam (ATIVAN) injection 0.5 mg  0.5 mg IntraVENous Q6H PRN  
 diphenhydrAMINE (BENADRYL) injection 25 mg  25 mg IntraVENous Q6H PRN  
 albuterol (PROVENTIL VENTOLIN) nebulizer solution 2.5 mg  2.5 mg Nebulization Q4H PRN  
 simethicone (MYLICON) tablet 80 mg  80 mg Oral QID PRN Lab Data Reviewed: (see below) Lab Review:  
 
Recent Labs  
  06/01/21 0137 05/31/21 0402 05/30/21 0456 WBC 27.9* 22.4* 30.4* HGB 10.5* 9.2* 11.2* HCT 34.1* 29.5* 36.0  321 462* Recent Labs  
  06/01/21 0137 05/31/21 0402 05/30/21 0456 * 134* 134* K 4.0 3.7 3.9 CL 95* 95* 97  
CO2 36* 37* 35* * 237* 160* BUN 25* 25* 21* CREA 0.65 0.61 0.63  
CA 7.9* 7.3* 7.8*  
MG  --  1.9  --   
PHOS  --  3.7  --   
 
Lab Results Component Value Date/Time Glucose (POC) 248 (H) 06/01/2021 11:25 AM  
 Glucose (POC) 288 (H) 06/01/2021 06:36 AM  
 Glucose (POC) 301 (H) 05/31/2021 09:06 PM  
 Glucose (POC) 340 (H) 05/31/2021 04:28 PM  
 Glucose (POC) 342 (H) 05/31/2021 11:32 AM  
 
No results for input(s): PH, PCO2, PO2, HCO3, FIO2 in the last 72 hours. No results for input(s): INR, INREXT, INREXT in the last 72 hours. All Micro Results Procedure Component Value Units Date/Time MISCELLANEOUS BATTERY [[id number]] Collected: 05/23/21 2012 Order Status: Completed Updated: 06/01/21 6509 CULTURE, RESPIRATORY/SPUTUM/BRONCH Cristhian Lull STAIN [[id number]]  (Abnormal)  (Susceptibility) Collected: 05/23/21 2012 Order Status: Completed Specimen: Sputum Updated: 05/29/21 1040 Special Requests: NO SPECIAL REQUESTS     
  GRAM STAIN    
  RARE EPITHELIAL CELLS SEEN  
     
   FEW WBCS SEEN     
   FEW GRAM POSITIVE COCCI Culture result: MODERATE * METHICILLIN RESISTANT STAPHYLOCOCCUS AUREUS *  
     
      
  RARE 
FILAMENTOUS FUNGUS 
SENDING TO STATE LAB 
  
      
  NO NORMAL RESPIRATORY ZAKIYA ISOLATED URINE CULTURE HOLD SAMPLE [[id number]] Collected: 05/19/21 1965 Order Status: Completed Specimen: Serum Updated: 05/19/21 [address] Urine culture hold Urine on hold in Microbiology dept for 2 days. If unpreserved urine is submitted, it cannot be used for addtional testing after 24 hours, recollection will be required. I have reviewed notes of prior 24hr. Other pertinent lab: Total time spent with patient: 28 I personally reviewed chart, notes, data and current medications in the medical record. I have personally examined and treated the patient at bedside during this period. Care Plan discussed with: Patient, Nursing Staff and >50% of time spent in counseling and coordination of care Discussed:  Care Plan Prophylaxis:  Lovenox Disposition:  SNF/LTC 
        
___________________________________________________ Attending Physician: Sheri Tesfaye DO

## 2021-06-01 NOTE — PROGRESS NOTES
1934: Code stroke called. Family came to nurse station to inform nurses that the pt has increased weakness in LUE. Pt has h/o stroke affecting left side, but family feels there is increased weakness. Pt's  are unequal, R>L. Unknown LKW, family feels recent, pt states she doesn't pay much attention to changes, but it's been this weak since before her admission. Pt is on ASA and lovenox, FSBS 496, /71 HR 82. Dr. Mario Bernard at bedside to assess pt, talked with family. 1945: Pt to CT 
2010: Dr. Mario Bernard to CT to tell us to take pt back to room for tele neuro to assess. 2030: Tele neuro to assess pt, not TPA candidate, but will read results and speak with hospitalist. 
Asked primary nurse to complete swallow eval prior to any PO, continue neuro checks and vitals, and to call with any questions or concerns.

## 2021-06-01 NOTE — PROGRESS NOTES
Problem: Self Care Deficits Care Plan (Adult) Goal: *Therapy Goal (Edit Goal, Insert Text) Description:  
FUNCTIONAL STATUS PRIOR TO ADMISSION: Patient was modified independent for basic except assistance provided for bathing in shower. Patient was ambulating with cane, and most recently with RW after hospitalization HOME SUPPORT: The patient lived with her daughter and family, who provided assistance as needed. Occupational Therapy Goals Initiated 5/25/2021 Weekly re assessment 6/1/2021- continue current goals 1. Patient will perform grooming at EOB with supervision/set-up within 7 day(s). 2.  Patient will perform bathing at EOB with supervision/set-up within 7 day(s). 3.  Patient will perform upper body dressing with minimal assistance/contact guard assist within 7 day(s). 4.  Patient will perform toilet transfers with minimal assistance/contact guard assist within 7 day(s). 5.  Patient will perform all aspects of toileting with minimal assistance/contact guard assist within 7 day(s). 6.  Patient will participate in upper extremity therapeutic exercise/activities with minimal assistance/contact guard assist for 5 minutes within 7 day(s). 7.  Patient will utilize energy conservation techniques during functional activities with verbal cues within 7 day(s). 6/1/2021 1321 by Abel Taylor OT Outcome: Progressing Towards Goal 
 
OCCUPATIONAL THERAPY TREATMENT/WEEKLY RE-ASSESSMENT Patient: Asif Ragland (31 y.o. female) Date: 6/1/2021 Diagnosis: HAP (hospital-acquired pneumonia) [J18.9, Y95] Pneumonia of both lungs due to infectious organism [J18.9] <principal problem not specified> Precautions:   
Chart, occupational therapy assessment, plan of care, and goals were reviewed. ASSESSMENT Patient continues with skilled OT services and is progressing towards goals. Patient received supine in bed with HOB elevated. Patient complains of urine saturation to sheets/bedding. Patient agreeable to OOB to chair to allow change. Patient requires mod assist x 2 to EOB. Once seated, she demonstrates impaired sitting balance and requires assist to maintain. Patient requires mod assist to don new gown secondary to impaired balance. Sit to stand with mod x 2, max assist for posterior hygiene. Patient transfers to chair with mod x 2. After sitting she reports need for additional voiding and able to transfer to [facility] with mod x 2. Patient manages bladder hygiene in sitting with min assist for balance and gown manipulation. Patient able to tolerate final transfer to EOB with mod x 2, and increased verbal cues for sequencing with backing up to bed. Patient left in NAD, in bed HOB elevated. Current Level of Function Impacting Discharge (ADLs): mod x 2, up to max assist for ADLs Other factors to consider for discharge: PLAN : 
Goals have been updated based on progression since last assessment. Patient continues to benefit from skilled intervention to address the above impairments. Continue to follow patient 5 times a week to address goals. Recommend with staff: UE ADLs Recommend next OT session: progression of goals Recommendation for discharge: (in order for the patient to meet his/her long term goals) Therapy up to 5 days/week in SNF setting This discharge recommendation: 
Has been made in collaboration with the attending provider and/or case management IF patient discharges home will need the following DME: TBD SUBJECTIVE:  
Patient stated Oh thank you girls.  OBJECTIVE DATA SUMMARY:  
Cognitive/Behavioral Status: 
Neurologic State: Alert Orientation Level: Oriented X4 Cognition: Appropriate decision making; Follows commands Perception: Appears intact Perseveration: No perseveration noted Safety/Judgement: Awareness of environment Functional Mobility and Transfers for ADLs: 
Bed Mobility: 
Supine to Sit: Moderate assistance;Assist x2 Sit to Supine: Moderate assistance;Assist x2 Scooting: Moderate assistance Transfers: 
Sit to Stand: Moderate assistance;Assist x2 Functional Transfers Toilet Transfer : Moderate assistance;Assist x2 Cues: Physical assistance;Verbal cues provided Adaptive Equipment: Bedside commode Bed to Chair: Moderate assistance;Assist x2 Balance: 
Sitting: Impaired Sitting - Static: Fair (occasional) Sitting - Dynamic: Poor (constant support) Standing: Impaired Standing - Static: Constant support Standing - Dynamic : Poor;Constant support ADL Intervention: 
  
 
  
 
  
 
  
 
Upper Body Dressing Assistance Hospital Gown: Moderate assistance (balance in sitting, LUE weakness) Cues: Physical assistance;Verbal cues provided Toileting Toileting Assistance: Moderate assistance Bladder Hygiene: Minimum assistance (balance in sitting) Clothing Management: Minimum assistance Adaptive Equipment: [facility] ) Cognitive Retraining Safety/Judgement: Awareness of environment Therapeutic Exercises: Tolerated UE exercises while seated in chair. Pain: 
 
 
Activity Tolerance:  
Fair and requires rest breaks After treatment patient left in no apparent distress:  
Supine in bed and Call bell within reach COMMUNICATION/COLLABORATION:  
The patients plan of care was discussed with: Physical therapist and Registered nurse. FATOUMATA Wong/L Time Calculation: 29 mins

## 2021-06-01 NOTE — ADT AUTH CERT NOTES
Pneumonia - Care Day 10 (5/28/2021) by Ann Mathur 
 
  
Review Status Review Entered Completed 5/28/2021 16:18  
  
Criteria Review Care Day: 10 Care Date: 5/28/2021 Level of Care: Telemetry Guideline Day 3 Level Of Care (X) Floor to discharge 5/28/2021 16:18:09 EDT by Kamran Galo telemetry Clinical Status   
(X) * Hemodynamic stability 5/28/2021 16:18:09 EDT by Addy Awad 81  150/76 Pain scores: 7, 0, 10, 6 (Back., posterior, aching)   
(X) * Afebrile or temperature acceptable for next level of care 5/28/2021 16:18:09 EDT by [address] 97.7   
(X) * Tachypnea absent 5/28/2021 16:18:09 EDT by Ann Mathur   
  RR 18   
(X) * Hypoxemia absent 5/28/2021 16:18:09 EDT by Ann Mathur   
  94% 3L nasal cannula CXR: Mild interval increase in the right pleural effusion. Stable left 
pleural effusion with probable pulmonary edema and lower lobe volume loss   
(X) * Mental status at baseline ( ) * Antibiotic regimen acceptable for next level of care ( ) * Discharge plans and education understood Activity ( ) * Ambulatory or acceptable for next level of care 5/28/2021 16:18:41 EDT by Ann Mathur   
  Attempted to see patient today but patient currently undergoing LE dopplers for DVT r/o Routes ( ) * Oral hydration, medications, and diet 5/28/2021 16:18:09 EDT by Ann Mathur   
  Bumex 1mg IV bid, Ativan 0.5mg IV prn x2, Morphine 2mg IV prn x1,  Norvasc 10mg PO daily,Mucinex 600mg PO bid, Robitussin 10mL/ PO q6, Insulin SC ac&HS, oxycontin 20mg PO q12, prednisone 40mg PO daily Dysphagia diet Interventions   
(X) * Oxygen absent or at baseline need 5/28/2021 16:18:09 EDT by Ann Mathur   
  3L nc 
Duoneb INH q4, arformoterol NEB bid RT - ACAPELLA 
RT- CHEST PT W/WO POSTURAL DRAINAGE 
RT- FLUTTER VALVE TX (X) * Isolation not indicated, or is performable at next level of care 5/28/2021 16:18:09 EDT by Oscar Mackey Contact isolation - MRSA   
(X) WBC   
5/28/2021 16:18:09 EDT by Nasim Liza Quintana   
  WBC 16.5, HGB 10.1, HCT 33.7, Plat 368 Na 135, K 3.7, Glucose 200, BUN 13/Cr 0.50, Ca 7.0   
(X) Incentive spirometry 5/28/2021 16:18:09 EDT by Babs Screen   
  IS, CONT cardiac monitor, C&DB, Daily weight, Elevate HOB, IS, I&Os Medications ( ) Oral antibiotics 5/28/2021 16:18:09 EDT by Babs Screen   
  Vanco 1,000mg IV q12 * Milestone Additional Notes 05/28/21 - IP Pulmonary Progress Note:  
LE dopplers: negative for DVT *sputum culture (5/23) - MRSA Interval history:  Afebrile. sats 98% on 3L, WBC 16.5 Family present at bedside On exam, has diffuse rhonchi/wheeze posteriorly as well as congested sounding cough Despite this, says she is feeling better today though still c/o R sided pleuritic chest pain CXR w/ bilateral effusions R>L General: Alert, cooperative, chronically ill appearing, appears stated age. Head: Normocephalic, without obvious abnormality, atraumatic. Eyes: Conjunctivae/corneas clear. Nose: Nares normal. Septum midline. Mucosa normal.   
Throat: Lips, mucosa, and tongue normal.   
Neck: Supple, symmetrical, trachea midline, no adenopathy. Lungs:   Congested cough/upper airway, diffuse rhonch/wheeze Chest wall:    
Heart: Regular rate and rhythm Abdomen:   Soft, non-tender. Bowel sounds normal.  
Extremities: LE edema Pulses: 2+ and symmetric all extremities. Skin: Skin color, texture, turgor normal. No rashes or lesions Lymph nodes:    
Neurologic: Grossly nonfocal  
   
IMPRESSION  
 Acute hypoxemic respiratory failure  
 MRSA pneumonia  
 Acute COPD exacerbation  
 Hx of PE - not on anticoagulation due to frequent falls  
 HTN, diastolic dysfunction  
 Mild AS  
 H/o hypothyroidism  
 H/o CVA  
 Hyperglycemia  
 Thrush   
   
PLAN Supplemental O2 as needed to keep sats > 88% Continue vancomycin Would get chest CT if any clinical change in respiratory status or fever/significant rise in WBC to check for loculated pleural effusion.  Check PCT today Continue bumex Continue scheduled duonebs, pulmicort, brovana, mucinex Change back to IV steroids Needs better pulmonary toileting. Flutter valve and chest PT ordered Diet as per speech recs Nystatin added for thrush  
   
  
  
Pneumonia - Care Day 9 (5/27/2021) by Ramón Hahn 
 
  
Review Status Review Entered Completed 5/28/2021 14:40  
  
Criteria Review Care Day: 9 Care Date: 5/27/2021 Level of Care: Telemetry Guideline Day 3 Level Of Care (X) Floor to discharge 5/28/2021 14:40:21 EDT by Steven Viramontes telemetry Clinical Status   
(X) * Hemodynamic stability 5/28/2021 14:40:21 EDT by Ramón Hahn   
  HR  87   151/70 Pain Scores: 8, 10 (Back, Posterior, aching)   
(X) * Afebrile or temperature acceptable for next level of care 5/28/2021 14:40:22 EDT by Jessa Costa 98.4 (X) * Tachypnea absent 5/28/2021 14:40:22 EDT by Ramón Hahn   
  RR 20   
(X) * Hypoxemia absent 5/28/2021 14:40:22 EDT by aRmón Hahn   
  93% 3L nc   
(X) * Mental status at baseline ( ) * Antibiotic regimen acceptable for next level of care ( ) * Discharge plans and education understood Activity ( ) * Ambulatory or acceptable for next level of care 5/28/2021 14:40:22 EDT by Rosy Krabbe Pt with pain issues  and not up to PT this afternoon Routes ( ) * Oral hydration, medications, and diet 5/28/2021 14:40:22 EDT by Ramón Hahn   
  Norvasc 10mg PO daily, aspirin 81m gPO daily, neurontin 600mg PO bid, Mucinex 600mg PO bid, Robitussin 10mL PO q6, oxycontin 20mg PO q12, Percocet 1 tab PO q6 
Bumex 1mg IV bid, ativan 0.5mg IV prn x2, morphine 2mg IV prn x4 Interventions   
(X) * Oxygen absent or at baseline need 5/28/2021 14:40:22 EDT by Ramón Hahn   
  3L nc 
Duoneb INH q4, aformoterol NEB bid   
( ) * Isolation not indicated, or is performable at next level of care (X) WBC   
5/28/2021 14:40:22 EDT by Tex Wyatt   
  WBC 15.6, HGB 9.7, HCT 31.2, plat 355, K 3.7, Glucose 214, BUN 12/Cr 0.51, Ca 6.7 (X) Incentive spirometry 5/28/2021 14:40:22 EDT by Tex Wyatt   
  Swallow eval: Single episode of trace aspiration of thin barium. RT--ACAPELLA  
IS, I&Os CONT cardiac monitor RT--FLUTTER VALVE TX  
RT--CHEST PT W/WO POSTURAL DRAINAGE Medications ( ) Oral antibiotics 5/28/2021 14:40:22 EDT by Tex Wyatt   
  Vanco 1,000mg IV q12 * Milestone Additional Notes 05/27/21 - IP Progress Note:  
Patient was seen and examined as a follow up for sepsis. Ana Denver was reviewed. c/o back pain and cough    
  
Physical Exam:  
Gen:  Well-developed, well-nourished, in no acute distress HEENT:  Pink conjunctivae, PERRL, hearing intact to voice, moist mucous membranes Neck:  Supple, without masses, thyroid non-tender Resp:  No accessory muscle use, clear breath sounds without wheezes rales or rhonchi  
Card:  No murmurs, normal S1, S2 without thrills, bruits or peripheral edema Abd:  Soft, non-tender, non-distended, normoactive bowel sounds are present, no palpable organomegaly and no detectable hernias Lymph:  No cervical or inguinal adenopathy Musc:  No cyanosis or clubbing Skin:  No rashes or ulcers, skin turgor is good Neuro:  Cranial nerves are grossly intact, no focal motor weakness, follows commands appropriately Psych:  Good insight, oriented to person, place and time, alert Assessment and Plan:  
    Sepsis due to Pneumonia/ HCAP/ MRSA pneumonia. CT abd/pelvis showed severe stenosis of bilateral common iliac arteries, but pt has no leg pain and lactate is normal. There is also e/o cirrhosis, but minimal ascites so I have low c/f SBP. Treated with Vanc/cefepime 5/20 - 5/26 to complete a 7 day course. Sputum culture grew MRSA. Restarted vanc.    
   AHRF: PNA and COPD exac.  Continue nebs, lenore neb, Bin Bender. Pulmonary following   
    COPD exacerbation. As above continue treatment.   
    Multiple fractures: CT today showed multiple fractures of ribs, pubic rami, thoracic spine, etc. She is on oxycodone for chronic pain. Ultimately, with her repeat admissions and suggestion of multiple falls, palliative care is following. needs SNF   
    Bilateral common iliac stenosis: no symptoms. Monitor   
    Cirrhosis: noted on imaging. She is not presently compensated.     
    Hypokalemia. replete   
    Leukocytosis. Likely due to steroid. Monitor

## 2021-06-01 NOTE — PROGRESS NOTES
Comprehensive Nutrition Assessment Type and Reason for Visit: Arianna Keen Nutrition Recommendations/Plan: 1. Continue mech altered diet. 2. Continue orange Magic Cup BID and Ensure Pudding BID to promote intake. 3. Please assist pt with meals. Nutrition Assessment:     
6/1: Follow up. Pt sleeping at time of visit, no family in room. Per RN, pt has been eating a little bit of the meals but eating all of the supplements. Really likes the Magic Cup, Ensure Pudding and regular pudding and ice cream. Will continue supplements. Labs- Na 135, BG [id number]. Meds- reviewed. 5/28: Follow up. Pt sleeping at time of visit, no family in room. Per RN, pt dislikes [facility] altered food. Family still bringing in snacks from home(bagels, etc), despite RN explaining need for [facility] altered diet for safety. RD says pt c/o being hungry but only wants to eats snacks/sweet stuff (peanut butter, ice cream, pudding). RN says pt likes [employer] [facility]- will continue. Will also add Ensure Pudding since pt likes pudding. Recorded intakes of meals vary, but mostly 25-75% meals past few days. Will continue to monitor. Labs- Na 135, -[phone]. Meds- Bumex, gabapentin, insulin, prednisone, synthroid, Protonix, vanco, pericolace. 5/25: 81 y/o female admitted with HAP. PMH includes HTN, DM, GERD, COPD, hx CVA. Spoke with pt and daughter in room. Per daughter, pt hasn't been eating very well since admission, mostly just soup and ice cream. She has also been bringing in bagels for breakfast for pt and soaking them in coffee to make them soft. Per daughter, pt needs assistance with meals. She says pt was eating well PTA and her weight was stable. Pt c/o nausea on and off since admission, but no vomiting. Pt does not want to try Ensure but is agreeable to trying Magic Cup to help with intake. Will add and monitor acceptance. Labs- K 3.3, Hgb 9.7, -179-294.  Meds- cefepime, gabapentin, insulin, Protonix, vanco, prednisone, pericolace. Documented Meal intake: 
Patient Vitals for the past 168 hrs: 
 % Diet Eaten 05/31/21 2113 0% 05/31/21 0600 0% 05/29/21 2200 1 - 25% 05/29/21 1559 51 - 75% 05/29/21 1010 26 - 50% 05/28/21 0849 51 - 75% 05/27/21 1254 26 - 50% Weight hx: Wt Readings from Last 10 Encounters:  
06/01/21 68.9 kg (151 lb 14.4 oz) 05/09/21 58.5 kg (129 lb)  
03/23/21 58.5 kg (129 lb) 03/08/21 58.6 kg (129 lb 3 oz) 03/02/21 55.5 kg (122 lb 5.7 oz) 02/27/21 55.5 kg (122 lb 5.7 oz) 02/08/21 57.6 kg (127 lb) 01/03/21 56.7 kg (125 lb) Malnutrition Assessment: 
Malnutrition Status: none identified Estimated Daily Nutrient Needs: 
Energy (kcal): 5491 (1065 X 1.2 AF); Weight Used for Energy Requirements: Current Protein (g): 62 (1-1.2 g/kg); Weight Used for Protein Requirements: Current Fluid (ml/day): 1384; Method Used for Fluid Requirements: 1 ml/kcal 
 
 
Nutrition Related Findings:  last BM 5/29; 3+ pitting, weeping LE edema Wounds:   
Skin tears Current Nutrition Therapies: DIET DYSPHAGIA MECH ALTERED (NDD2) DIET NUTRITIONAL SUPPLEMENTS Lunch, Dinner; [place] DIET NUTRITIONAL SUPPLEMENTS Breakfast, Lunch; Pudding, Fortified Anthropometric Measures: 
· Height:  5' 4\" (162.6 cm) · Current Body Wt:  68.9 kg (151 lb 14.4 oz) · Admission Body Wt:      
· Usual Body Wt:       
· Ideal Body Wt:  120 lbs:  113 % · Adjusted Body Weight:   ; Weight Adjustment for: No adjustment · Adjusted BMI:      
· BMI Category:  Normal weight (BMI 18.5-24. 9) Nutrition Diagnosis:  
· Inadequate oral intake related to early satiety as evidenced by  (decreased intake sicne admission per pt and daughter) 5/28: Nutrition dx continues. 6/1: Nutrition dx continues. Nutrition Interventions:  
Food and/or Nutrient Delivery: Continue current diet, Continue oral nutrition supplement Nutrition Education and Counseling: No recommendations at this time Coordination of Nutrition Care: Continue to monitor while inpatient Goals: 
PO intake >50% meals/supplements next 4-6 days Nutrition Monitoring and Evaluation:  
Behavioral-Environmental Outcomes: None identified Food/Nutrient Intake Outcomes: Food and nutrient intake, Supplement intake Physical Signs/Symptoms Outcomes: Weight, Biochemical data Discharge Planning:   
Continue current diet Electronically signed by Karol Perez RDN on 6/1/2021 Contact: [phone]

## 2021-06-01 NOTE — PROGRESS NOTES
Problem: Mobility Impaired (Adult and Pediatric) Goal: *Acute Goals and Plan of Care (Insert Text) Description: Note: FUNCTIONAL STATUS PRIOR TO ADMISSION: Patient was modified independent using a walker and single point cane for functional mobility. HOME SUPPORT PRIOR TO ADMISSION: The patient lived with daughter, also has caregivers 3 x per wk. Physical Therapy Goals Initiated 5/23/2021 1. Patient will move from supine to sit and sit to supine  in bed with supervision/set-up within 7 day(s). 2.  Patient will transfer from bed to chair and chair to bed with supervision/set-up using the least restrictive device within 7 day(s). 3.  Patient will perform sit to stand with supervision/set-up within 7 day(s). 4.  Patient will ambulate with supervision/set-up for 50 feet with the least restrictive device within 7 day(s). Physical Therapy Goals Re-Assessed 6/1/2021 and downgraded 1. Patient will move from supine to sit and sit to supine , scoot up and down, and roll side to side in bed with minimal assistance/contact guard assist within 7 day(s). 2.  Patient will transfer from bed to chair and chair to bed with moderate assistance  using the least restrictive device within 7 day(s). 3.  Patient will perform sit to stand with minimal assistance/contact guard assist within 7 day(s). 4.  Patient will ambulate with moderate assistance  for 15 feet with the least restrictive device within 7 day(s). Outcome: Progressing Towards Goal 
 PHYSICAL THERAPY TREATMENT: WEEKLY REASSESSMENT Patient: Ozzie Engel (72 y.o. female) Date: 6/1/2021 Primary Diagnosis: HAP (hospital-acquired pneumonia) [J18.9, Y95] Pneumonia of both lungs due to infectious organism [J18.9] Precautions:     
 
 
ASSESSMENT Patient continues with skilled PT services and is not progressing towards goals. Received PT supine with bed soiled in slight distress.  Pt exhibits an overall decline in strength, balance, safety awareness, activity tolerance and independence compared to initial evaluation. Pt required increased assistance with all activity this day. Upwards of Mod A x 2 for bed mobility and requiring Max A x 2 to perform SPT to recliner chair, BSC and back to bed. Tolerates minimal time OOB and requesting to return following toileting. Poor standing balance with posterior lean, inability to anteriorly weight shift, poor RW management and decreased step clearance bilaterally. Once back in bed pt much more comfortable and reduced anxiety than upon entering room. Plan for discharge to SNF remains appropriate. Patient's progression toward goals since last assessment: Pt has not made progress towards goals since last assessment. Current Level of Function Impacting Discharge (mobility/balance): Pt has significant deterioration in mobility/balance since evaluation Tinetti score decreased from 20/28 to 0/28 Functional Outcome Measure: The patient scored 0/28 on the Tinetti outcome measure which is indicative of high fall risk. Other factors to consider for discharge: supportive family, HTN, R CVA, global debility, declined therapy 3 times last week PLAN : 
Goals have been updated based on progression since last assessment. Patient continues to benefit from skilled intervention to address the above impairments. Recommendations and Planned Interventions: bed mobility training, transfer training, gait training, therapeutic exercises, neuromuscular re-education, patient and family training/education, and therapeutic activities Frequency/Duration: Patient will be followed by physical therapy:  5 times a week to address goals. Recommendation for discharge: (in order for the patient to meet his/her long term goals) Therapy up to 5 days/week in SNF setting This discharge recommendation: 
Has been made in collaboration with the attending provider and/or case management IF patient discharges home will need the following DME: bedside commode, hospital bed, and wheelchair SUBJECTIVE:  
Patient stated  It doesn't feel good to be all wet.  OBJECTIVE DATA SUMMARY:  
HISTORY:   
Past Medical History:  
Diagnosis Date Anxiety and depression Arthritis Chronic obstructive pulmonary disease (HCC) Chronic pain DM type 2 causing vascular disease ([facility] [address] GERD (gastroesophageal reflux disease) Glaucoma HTN (hypertension) Hx of completed stroke 2017, 2021 Hypothyroid Incontinence Neuropathy Polypharmacy Past Surgical History:  
Procedure Laterality Date HX ORTHOPAEDIC    
 HX VASCULAR ACCESS Personal factors and/or comorbidities impacting plan of care: high anxiety, chronic pain, COPD, recent admissions Home Situation Home Environment: Private residence # Steps to Enter: 3 Rails to Enter: Yes Hand Rails : Bilateral 
One/Two Story Residence: Other (Comment) Living Alone: No 
Support Systems: Child(darrick) Patient Expects to be Discharged to[de-identified] Private residence Current DME Used/Available at Home: Walker, rolling, Shower chair, Grab bars Tub or Shower Type: Tub/Shower combination EXAMINATION/PRESENTATION/DECISION MAKING:  
Critical Behavior: 
Neurologic State: Alert Orientation Level: Oriented X4 Cognition: Appropriate decision making, Follows commands Safety/Judgement: Awareness of environment Hearing: Auditory Auditory Impairment: Hard of hearing, bilateral 
Skin:   
Edema:  
Range Of Motion: 
AROM: Generally decreased, functional 
  
  
  
  
  
  
  
Strength:   
Strength: Generally decreased, functional 
  
  
  
  
  
  
Tone & Sensation:  
  
  
  
  
  
  
  
  
  
   
Coordination: 
Coordination: Generally decreased, functional 
Vision:  
  
Functional Mobility: 
Bed Mobility: 
Rolling: Moderate assistance Supine to Sit: Moderate assistance;Assist x1 Sit to Supine: Assist x2;Maximum assistance Scooting: Moderate assistance Transfers: 
Sit to Stand: Minimum assistance; Moderate assistance;Assist x2 Stand to Sit: Moderate assistance;Assist x2 Bed to Chair: Moderate assistance;Maximum assistance;Assist x2 Balance:  
Sitting: Impaired Sitting - Static: Fair (occasional) Sitting - Dynamic: Poor (constant support) Standing: Impaired Standing - Static: Poor;Constant support Standing - Dynamic : Poor;Constant support Ambulation/Gait Training: 
  
  
  
  
  
  
  
  
  
  
  
  
  
  
  
  
  
  
 Stairs: Therapeutic Exercises:  
Seated marching, LAQ, ankle pumps, shoulder elevation Functional Measure: 
Tinetti test: 
 
Sitting Balance: 0 Arises: 0 Attempts to Rise: 0 Immediate Standing Balance: 0 Standing Balance: 0 Nudged: 0 Eyes Closed: 0 Turn 360 Degrees - Continuous/Discontinuous: 0 Turn 360 Degrees - Steady/Unsteady: 0 Sitting Down: 0 Balance Score: 0 Balance total score Indication of Gait: 0 
R Step Length/Height: 0 
L Step Length/Height: 0 
R Foot Clearance: 0 
L Foot Clearance: 0 Step Symmetry: 0 Step Continuity: 0 Path: 0 Trunk: 0 Walking Time: 0 Gait Score: 0 Gait total score Total Score: 0/28 Overall total score Tinetti Tool Score Risk of Falls 
<19 = High Fall Risk 19-24 = Moderate Fall Risk 25-28 = Low Fall Risk Tinetti ME. Performance-Oriented Assessment of Mobility Problems in Elderly Patients. [address]; [id number]. (Scoring Description: PT Bulletin Feb. 10, 1993) Older adults: Fela Helton et al, 2009; n = 1601 [facility] elderly evaluated with ABC, VLAD, ADL, and IADL) · Mean VLAD score for males aged 69-68 years = 26.21(3.40) · Mean VLAD score for females age 69-68 years = 25.16(4.30) · Mean VLAD score for males over 80 years = 23.29(6.02) · Mean VLAD score for females over 80 years = 17.20(8.32) Pain Rating: 
Generalized pain does not rate but requesting pain medication Activity Tolerance:  
Poor, requires rest breaks, and observed SOB with activity After treatment patient left in no apparent distress:  
Supine in bed, Heels elevated for pressure relief, Call bell within reach, Bed / chair alarm activated, and Side rails x 3 
 
COMMUNICATION/EDUCATION:  
The patients plan of care was discussed with: Occupational therapist and Registered nurse. Fall prevention education was provided and the patient/caregiver indicated understanding., Patient/family have participated as able in goal setting and plan of care. , and Patient/family agree to work toward stated goals and plan of care. Thank you for this referral. 
Shaun Jackson, PT Time Calculation: 36 mins

## 2021-06-01 NOTE — PROGRESS NOTES
Name: [facility]: [address]  
: 1940 Admit Date: 2021 Phone: [phone]  Room: [facility]/01 PCP: Luis Yu MD  MRN: [id number] Date: 2021  Code: DNR Ms. Markus Hills is a pleasant 81 yo woman with a history of COPD/emphysema (previously followed by a pulmonologist in [place]), chronic respiratory failure with hypoxia, PE, former tobacco use, HTN, DM, hypothyroidism and anxiety who was admitted last week with worsening pneumonia and leukocytosis. Daughter reports worsening hypoxia at home prior to admission with sats in the mid 80s. She currently has chest congestion and weak, nonproductive cough. Reports chest wall soreness with coughing. Fatigues easily with activity. Resp cx this admission with prelim of moderate staph aureus. She is currently on cefepime and vanc. Speech has evaluated. She is on dysphagia diet. She was hospitalized at [address] [place] [address] from - for bilateral PNA. She has had multiple admissions at [address] [place] [address] this year for COPD. She was previously followed in [place] for her COPD and moved to [place] in the last 4 months. She is chronically managed on Trelegy and prn albuterol inhaler/nebulizer. Has home O2 through [address] and is on 2L at baseline. During her last hospitalization she was given levaquin and steroid taper. She notes symptomatic benefit from the prednisone. She has been taken off of blood thinners in the past due to frequent falls.  - chest CT: There is small left pleural effusion and likely worsening left lower lobe interstitial and patchy airspace consolidation.  - MBS: single episode trace aspiration w/ thins. recs are for dysphagia , thins  - LE dopplers: negative for DVT *sputum culture () - MRSA Interval history: Afebrile 
sats 98% on 2.5L 
WBC 27.9 Respiratory culture + for moderate MSA and rare filamentous fungus- sending to state lab ROS:  Feels better, but still very weak. SOB better. Still with cough that is mostly non productive. Past Medical History:  
Diagnosis Date  Anxiety and depression  Arthritis  Chronic obstructive pulmonary disease ([facility]  Chronic pain  DM type 2 causing vascular disease ([facility]  GERD (gastroesophageal reflux disease)  Glaucoma   
 HTN (hypertension)  Hx of completed stroke 2021  Hypothyroid  Incontinence  Neuropathy  Polypharmacy Past Surgical History:  
Procedure Laterality Date  HX ORTHOPAEDIC    
 HX VASCULAR ACCESS Family History Problem Relation Age of Onset  No Known Problems Other   
     reviewed, patient did not know  Diabetes Mother  Heart Attack Mother  Cancer Father Social History Tobacco Use  Smoking status: Former Smoker Quit date: 1991 Years since quittin.4  Smokeless tobacco: Never Used Substance Use Topics  Alcohol use: Never Allergies Allergen Reactions  Shellfish Derived Anaphylaxis Current Facility-Administered Medications Medication Dose Route Frequency  methylPREDNISolone (PF) (SOLU-MEDROL) injection 40 mg  40 mg IntraVENous Q12H  
 magic mouthwash (FIRST-MOUTHWASH BLM) oral suspension 10 mL  10 mL Oral Q4H PRN  
 FLUoxetine (PROzac) capsule 20 mg  20 mg Oral QPM  
 vancomycin (VANCOCIN) 750 mg in 0.9% sodium chloride 250 mL (VIAL-MATE)  750 mg IntraVENous Q12H  nystatin (MYCOSTATIN) 100,000 unit/mL oral suspension 500,000 Units  500,000 Units Oral QID  oxyCODONE ER (OxyCONTIN) tablet 20 mg  20 mg Oral Q12H  
 oxyCODONE-acetaminophen (PERCOCET 7.5) 7.5-325 mg per tablet 1 Tablet  1 Tablet Oral Q4H PRN  
 morphine injection 2 mg  2 mg IntraVENous Q4H PRN  
 albuterol-ipratropium (DUO-NEB) 2.5 MG-0.5 MG/3 ML  3 mL Nebulization Q4HWA RT  
 bumetanide (BUMEX) injection 1 mg  1 mg IntraVENous BID  amLODIPine (NORVASC) tablet 10 mg  10 mg Oral DAILY  enoxaparin (LOVENOX) injection 40 mg  40 mg SubCUTAneous Q24H  pantoprazole (PROTONIX) tablet 40 mg  40 mg Oral ACB&D  
 guaiFENesin-dextromethorphan (ROBITUSSIN DM) 100-10 mg/5 mL syrup 10 mL  10 mL Oral Q6H  
 lidocaine 4 % patch 1 Patch  1 Patch TransDERmal Q24H  
 gabapentin (NEURONTIN) capsule 600 mg  600 mg Oral BID  
 guaiFENesin ER (MUCINEX) tablet 600 mg  600 mg Oral BID  latanoprost (XALATAN) 0.005 % ophthalmic solution 1 Drop  1 Drop Both Eyes QHS  melatonin (rapid dissolve) tablet 5 mg  5 mg Oral QHS  arformoterol 15 mcg/budesonide 0.5 mg neb solution   Nebulization BID RT  
 hydrALAZINE (APRESOLINE) 20 mg/mL injection 10 mg  10 mg IntraVENous Q6H PRN  
 atorvastatin (LIPITOR) tablet 80 mg  80 mg Oral QHS  aspirin delayed-release tablet 81 mg  81 mg Oral DAILY  clonazePAM (KlonoPIN) tablet 0.25 mg  0.25 mg Oral QPM  
 levothyroxine (SYNTHROID) tablet 75 mcg  75 mcg Oral ACB  meclizine (ANTIVERT) tablet 12.5 mg  12.5 mg Oral Q6H PRN  
 glucose chewable tablet 16 g  4 Tablet Oral PRN  
 dextrose (D50W) injection syrg 12.5-25 g  25-50 mL IntraVENous PRN  
 glucagon (GLUCAGEN) injection 1 mg  1 mg IntraMUSCular PRN  
 sodium chloride (NS) flush 5-40 mL  5-40 mL IntraVENous Q8H  
 sodium chloride (NS) flush 5-40 mL  5-40 mL IntraVENous PRN  
 acetaminophen (TYLENOL) tablet 650 mg  650 mg Oral Q6H PRN Or  
 acetaminophen (TYLENOL) suppository 650 mg  650 mg Rectal Q6H PRN  polyethylene glycol (MIRALAX) packet 17 g  17 g Oral DAILY PRN  
 bisacodyL (DULCOLAX) suppository 10 mg  10 mg Rectal DAILY PRN  
 ondansetron (ZOFRAN) injection 4 mg  4 mg IntraVENous Q6H PRN  
 insulin glargine (LANTUS) injection 12 Units  0.2 Units/kg SubCUTAneous QHS  insulin lispro (HUMALOG) injection   SubCUTAneous AC&HS  
 melatonin (rapid dissolve) tablet 5 mg  5 mg Oral QHS PRN  
 alum-mag hydroxide-simeth (MYLANTA) oral suspension 30 mL  30 mL Oral Q4H PRN  
 LORazepam (ATIVAN) injection 0.5 mg  0.5 mg IntraVENous Q6H PRN  
 diphenhydrAMINE (BENADRYL) injection 25 mg  25 mg IntraVENous Q6H PRN  
 albuterol (PROVENTIL VENTOLIN) nebulizer solution 2.5 mg  2.5 mg Nebulization Q4H PRN  
 simethicone (MYLICON) tablet 80 mg  80 mg Oral QID PRN  
 
 
 
REVIEW OF SYSTEMS  
12 point ROS negative except as stated in the HPI. Physical Exam:  
Visit Vitals /63 Pulse 84 Temp 97.5 °F (36.4 °C) Resp 18 Ht 5' 4\" (1.626 m) Wt 68.9 kg (151 lb 14.4 oz) SpO2 98% BMI 26.07 kg/m² General:  Alert, cooperative, chronically ill appearing, appears stated age. Head:  Normocephalic, without obvious abnormality, atraumatic. Eyes:  Conjunctivae/corneas clear. Nose: Nares normal. Septum midline. Mucosa normal.   
Throat: Lips, mucosa, and tongue normal.   
Neck: Supple, symmetrical, trachea midline, no adenopathy. Lungs:   Congested cough/upper airway, scattered rhonchi Chest wall:    
Heart:  Regular rate and rhythm Abdomen:   Soft, non-tender. Bowel sounds normal.  
Extremities: Anasarca Pulses: 2+ and symmetric all extremities. Skin: Skin color, texture, turgor normal. No rashes or lesions Lymph nodes:   
Neurologic: Grossly nonfocal  
 
 
Lab Results Component Value Date/Time Sodium 135 (L) 06/01/2021 01:37 AM  
 Potassium 4.0 06/01/2021 01:37 AM  
 Chloride 95 (L) 06/01/2021 01:37 AM  
 CO2 36 (H) 06/01/2021 01:37 AM  
 BUN 25 (H) 06/01/2021 01:37 AM  
 Creatinine 0.65 06/01/2021 01:37 AM  
 Glucose 194 (H) 06/01/2021 01:37 AM  
 Calcium 7.9 (L) 06/01/2021 01:37 AM  
 Magnesium 1.9 05/31/2021 04:02 AM  
 Phosphorus 3.7 05/31/2021 04:02 AM  
 Lactic acid 1.1 05/19/2021 07:50 PM  
 
 
Lab Results Component Value Date/Time WBC 27.9 (H) 06/01/2021 01:37 AM  
 HGB 10.5 (L) 06/01/2021 01:37 AM  
 PLATELET 210 68/07/0218 01:37 AM  
 MCV 80.2 06/01/2021 01:37 AM  
 
 
Lab Results Component Value Date/Time  INR 1.2 (H) 03/09/2021 03:23 AM  
 aPTT 28.6 03/09/2021 03:23 AM Alk. phosphatase 73 05/21/2021 09:10 AM  
 Protein, total 5.2 (L) 05/21/2021 09:10 AM  
 Albumin 2.3 (L) 05/21/2021 09:10 AM  
 Globulin 2.9 05/21/2021 09:10 AM  
 
 
Lab Results Component Value Date/Time Iron 16 (L) 05/09/2021 08:21 PM  
 TIBC 278 05/09/2021 08:21 PM  
 Iron % saturation 6 (L) 05/09/2021 08:21 PM  
 
 
Lab Results Component Value Date/Time TSH 3.12 05/28/2021 03:23 AM  
  
 
No results found for: PH, PHI, PCO2, PCO2I, PO2, PO2I, HCO3, HCO3I, FIO2, FIO2I Lab Results Component Value Date/Time Troponin-I, Qt. <0.05 05/23/2021 08:12 PM  
  
 
Lab Results Component Value Date/Time Culture result:  05/23/2021 08:12 PM  
  MODERATE * METHICILLIN RESISTANT STAPHYLOCOCCUS AUREUS * Culture result: RARE 
FILAMENTOUS FUNGUS 
SENDING TO [facility] 
 (A) 05/23/2021 08:12 PM  
 Culture result: NO NORMAL RESPIRATORY ZAKIYA ISOLATED 05/23/2021 08:12 PM  
 
 
No results found for: TOXA1, RPR, HBCM, HBSAG, HAAB, HCAB1, HAAT, G6PD, CRYAC, HIVGT, HIVR, HIV1, HIV12, HIVPC, HIVRPI Lab Results Component Value Date/Time Vancomycin,trough 19.0 (H) 05/30/2021 05:19 PM  
 Vancomycin,trough 28.5 ([facility]) 05/28/2021 12:25 PM  
 
 
Lab Results Component Value Date/Time Color YELLOW/STRAW 05/19/2021 06:15 PM  
 Appearance CLEAR 05/19/2021 06:15 PM  
 Specific gravity 1.010 05/19/2021 06:15 PM  
 pH (UA) 6.5 05/19/2021 06:15 PM  
 Protein Negative 05/19/2021 06:15 PM  
 Glucose 100 (A) 05/19/2021 06:15 PM  
 Ketone Negative 05/19/2021 06:15 PM  
 Bilirubin Negative 05/19/2021 06:15 PM  
 Blood Negative 05/19/2021 06:15 PM  
 Urobilinogen 0.2 05/19/2021 06:15 PM  
 Nitrites Negative 05/19/2021 06:15 PM  
 Leukocyte Esterase TRACE (A) 05/19/2021 06:15 PM  
 WBC 5-10 05/19/2021 06:15 PM  
 RBC 5-10 05/19/2021 06:15 PM  
 Bacteria Negative 05/19/2021 06:15 PM  
 
 
IMPRESSION 
· Acute hypoxemic respiratory failure · MRSA pneumonia · Acute COPD exacerbation · Hx of PE - not on anticoagulation due to frequent falls · HTN, diastolic dysfunction · Mild AS · H/o hypothyroidism · H/o CVA · Hyperglycemia · [employer] 
· Supplemental O2 as needed to keep sats > 88%, on 2L NC at baseline · Continue vancomycin (Day 5 of 7) · Would get chest CT if any clinical change in respiratory status or fever/significant rise in WBC to check for loculated pleural effusion. · Continue bumex · Continue scheduled duonebs, pulmicort, brovana, mucinex · Continue steroids at current dose · Flutter valve and chest PT  
· PT/OOB as much as able · Diet as per speech recs · Nystatin for thrush Kirt Edwards, NP

## 2021-06-02 NOTE — PROGRESS NOTES
Problem: Diabetes Self-Management Goal: *Disease process and treatment process Description: Define diabetes and identify own type of diabetes; list 3 options for treating diabetes. Outcome: Progressing Towards Goal 
Goal: *Incorporating nutritional management into lifestyle Description: Describe effect of type, amount and timing of food on blood glucose; list 3 methods for planning meals. Outcome: Progressing Towards Goal 
Goal: *Incorporating physical activity into lifestyle Description: State effect of exercise on blood glucose levels. Outcome: Progressing Towards Goal 
Goal: *Developing strategies to promote health/change behavior Description: Define the ABC's of diabetes; identify appropriate screenings, schedule and personal plan for screenings. Outcome: Progressing Towards Goal 
Goal: *Using medications safely Description: State effect of diabetes medications on diabetes; name diabetes medication taking, action and side effects. Outcome: Progressing Towards Goal 
Goal: *Monitoring blood glucose, interpreting and using results Description: Identify recommended blood glucose targets  and personal targets. Outcome: Progressing Towards Goal 
Goal: *Prevention, detection, treatment of acute complications Description: List symptoms of hyper- and hypoglycemia; describe how to treat low blood sugar and actions for lowering  high blood glucose level. Outcome: Progressing Towards Goal 
Goal: *Prevention, detection and treatment of chronic complications Description: Define the natural course of diabetes and describe the relationship of blood glucose levels to long term complications of diabetes. Outcome: Progressing Towards Goal 
Goal: *Developing strategies to address psychosocial issues Description: Describe feelings about living with diabetes; identify support needed and support network Outcome: Progressing Towards Goal 
Goal: *Insulin pump training Outcome: Progressing Towards Goal 
Goal: *Sick day guidelines Outcome: Progressing Towards Goal 
Goal: *Patient Specific Goal (EDIT GOAL, INSERT TEXT) Outcome: Progressing Towards Goal 
  
Problem: Patient Education: Go to Patient Education Activity Goal: Patient/Family Education Outcome: Progressing Towards Goal 
  
Problem: Falls - Risk of 
Goal: *Absence of Falls Description: Document Daysi De La Torre Fall Risk and appropriate interventions in the flowsheet. Outcome: Progressing Towards Goal 
Note: Fall Risk Interventions: 
Mobility Interventions: Bed/chair exit alarm, PT Consult for mobility concerns, PT Consult for assist device competence, OT consult for ADLs Mentation Interventions: Bed/chair exit alarm, More frequent rounding, Door open when patient unattended, Evaluate medications/consider consulting pharmacy Medication Interventions: Bed/chair exit alarm, Evaluate medications/consider consulting pharmacy, Patient to call before getting OOB, Teach patient to arise slowly Elimination Interventions: Bed/chair exit alarm, Call light in reach, Patient to call for help with toileting needs History of Falls Interventions: Bed/chair exit alarm, Door open when patient unattended, Vital signs minimum Q4HRs X 24 hrs (comment for end date) Problem: Patient Education: Go to Patient Education Activity Goal: Patient/Family Education Outcome: Progressing Towards Goal 
  
Problem: Pressure Injury - Risk of 
Goal: *Prevention of pressure injury Description: Document Edouard Scale and appropriate interventions in the flowsheet. Outcome: Progressing Towards Goal 
Note: Pressure Injury Interventions: 
Sensory Interventions: Discuss PT/OT consult with provider, Assess changes in LOC, Assess need for specialty bed, Monitor skin under medical devices, Turn and reposition approx. every two hours (pillows and wedges if needed) Moisture Interventions: Check for incontinence Q2 hours and as needed, Absorbent underpads, Apply protective barrier, creams and emollients Activity Interventions: Increase time out of bed Mobility Interventions: HOB 30 degrees or less, Float heels, Turn and reposition approx. every two hours(pillow and wedges) Nutrition Interventions: Document food/fluid/supplement intake Friction and Shear Interventions: HOB 30 degrees or less, Foam dressings/transparent film/skin sealants Problem: Patient Education: Go to Patient Education Activity Goal: Patient/Family Education Outcome: Progressing Towards Goal 
  
Problem: Patient Education: Go to Patient Education Activity Goal: Patient/Family Education Outcome: Progressing Towards Goal 
  
Problem: Patient Education: Go to Patient Education Activity Goal: Patient/Family Education Outcome: Progressing Towards Goal 
  
Problem: Patient Education: Go to Patient Education Activity Goal: Patient/Family Education Outcome: Progressing Towards Goal 
  
Problem: Risk for Spread of Infection Goal: Prevent transmission of infectious organism to others Description: Prevent the transmission of infectious organisms to other patients, staff members, and visitors. Outcome: Progressing Towards Goal 
  
Problem: Patient Education:  Go to Education Activity Goal: Patient/Family Education Outcome: Progressing Towards Goal 
  
Problem: Patient Education: Go to Patient Education Activity Goal: Patient/Family Education Outcome: Progressing Towards Goal 
  
Problem: TIA/CVA Stroke: 0-24 hours Goal: Off Pathway (Use only if patient is Off Pathway) Outcome: Progressing Towards Goal 
Goal: Activity/Safety Outcome: Progressing Towards Goal 
Goal: Consults, if ordered Outcome: Progressing Towards Goal 
Goal: Diagnostic Test/Procedures Outcome: Progressing Towards Goal 
Goal: Nutrition/Diet Outcome: Progressing Towards Goal 
Goal: Discharge Planning Outcome: Progressing Towards Goal 
Goal: Medications Outcome: Progressing Towards Goal 
Goal: Respiratory Outcome: Progressing Towards Goal 
Goal: Treatments/Interventions/Procedures Outcome: Progressing Towards Goal 
Goal: Minimize risk of bleeding post-thrombolytic infusion Outcome: Progressing Towards Goal 
Goal: Monitor for complications post-thrombolytic infusion Outcome: Progressing Towards Goal 
Goal: Psychosocial 
Outcome: Progressing Towards Goal 
Goal: *Hemodynamically stable Outcome: Progressing Towards Goal 
Goal: *Neurologically stable Description: Absence of additional neurological deficits Outcome: Progressing Towards Goal 
Goal: *Verbalizes anxiety and depression are reduced or absent Outcome: Progressing Towards Goal 
Goal: *Absence of Signs of Aspiration on Current Diet Outcome: Progressing Towards Goal 
Goal: *Absence of deep venous thrombosis signs and symptoms(Stroke Metric) Outcome: Progressing Towards Goal 
Goal: *Ability to perform ADLs and demonstrates progressive mobility and function Outcome: Progressing Towards Goal 
Goal: *Stroke education started(Stroke Metric) Outcome: Progressing Towards Goal 
Goal: *Dysphagia screen performed(Stroke Metric) Outcome: Progressing Towards Goal 
Goal: *Rehab consulted(Stroke Metric) Outcome: Progressing Towards Goal 
  
Problem: TIA/CVA Stroke: Day 2 Until Discharge Goal: Off Pathway (Use only if patient is Off Pathway) Outcome: Progressing Towards Goal 
Goal: Activity/Safety Outcome: Progressing Towards Goal 
Goal: Diagnostic Test/Procedures Outcome: Progressing Towards Goal 
Goal: Nutrition/Diet Outcome: Progressing Towards Goal 
Goal: Discharge Planning Outcome: Progressing Towards Goal 
Goal: Medications Outcome: Progressing Towards Goal 
Goal: Respiratory Outcome: Progressing Towards Goal 
Goal: Treatments/Interventions/Procedures Outcome: Progressing Towards Goal 
Goal: Psychosocial 
Outcome: Progressing Towards Goal 
Goal: *Verbalizes anxiety and depression are reduced or absent Outcome: Progressing Towards Goal 
Goal: *Absence of aspiration Outcome: Progressing Towards Goal 
Goal: *Absence of deep venous thrombosis signs and symptoms(Stroke Metric) Outcome: Progressing Towards Goal 
Goal: *Optimal pain control at patient's stated goal 
Outcome: Progressing Towards Goal 
Goal: *Tolerating diet Outcome: Progressing Towards Goal 
Goal: *Ability to perform ADLs and demonstrates progressive mobility and function Outcome: Progressing Towards Goal 
Goal: *Stroke education continued(Stroke Metric) Outcome: Progressing Towards Goal 
  
Problem: Ischemic Stroke: Discharge Outcomes Goal: *Verbalizes anxiety and depression are reduced or absent Outcome: Progressing Towards Goal 
Goal: *Verbalize understanding of risk factor modification(Stroke Metric) Outcome: Progressing Towards Goal 
Goal: *Hemodynamically stable Outcome: Progressing Towards Goal 
Goal: *Absence of aspiration pneumonia Outcome: Progressing Towards Goal 
Goal: *Aware of needed dietary changes Outcome: Progressing Towards Goal 
Goal: *Verbalize understanding of prescribed medications including anti-coagulants, anti-lipid, and/or anti-platelets(Stroke Metric) Outcome: Progressing Towards Goal 
Goal: *Tolerating diet Outcome: Progressing Towards Goal 
Goal: *Aware of follow-up diagnostics related to anticoagulants Outcome: Progressing Towards Goal 
Goal: *Ability to perform ADLs and demonstrates progressive mobility and function Outcome: Progressing Towards Goal 
Goal: *Absence of DVT(Stroke Metric) Outcome: Progressing Towards Goal 
Goal: *Absence of aspiration Outcome: Progressing Towards Goal 
Goal: *Optimal pain control at patient's stated goal 
Outcome: Progressing Towards Goal 
Goal: *Home safety concerns addressed Outcome: Progressing Towards Goal 
Goal: *Describes available resources and support systems Outcome: Progressing Towards Goal 
Goal: *Verbalizes understanding of activation of EMS(911) for stroke symptoms(Stroke Metric) Outcome: Progressing Towards Goal 
Goal: *Understands and describes signs and symptoms to report to providers(Stroke Metric) Outcome: Progressing Towards Goal 
Goal: *Neurolgocially stable (absence of additional neurological deficits) Outcome: Progressing Towards Goal 
Goal: *Verbalizes importance of follow-up with primary care physician(Stroke Metric) Outcome: Progressing Towards Goal 
Goal: *Smoking cessation discussed,if applicable(Stroke Metric) Outcome: Progressing Towards Goal 
Goal: *Depression screening completed(Stroke Metric) Outcome: Progressing Towards Goal

## 2021-06-02 NOTE — ADT AUTH CERT NOTES
Pneumonia - Care Day 14 (6/1/2021) by Sharron Miguel 
 
  
Review Status Review Entered Completed 6/1/2021 16:50  
  
Criteria Review Care Day: 14 Care Date: 6/1/2021 Level of Care: Telemetry Guideline Day 3 Level Of Care (X) Floor to discharge 6/1/2021 16:50:03 EDT by Karina Sargent telemetry Clinical Status   
(X) * Hemodynamic stability 6/1/2021 16:50:03 EDT by Sharron Miguel   
  HR 84   165/71 
pain Scores: 3, 5, 6 (Back, posterior)   
(X) * Afebrile or temperature acceptable for next level of care 6/1/2021 16:50:03 EDT by Eva Shields 98.4 (X) * Tachypnea absent 6/1/2021 16:50:03 EDT by Sharron Miguel   
  RR 18   
(X) * Hypoxemia absent 6/1/2021 16:50:03 EDT by Sharron Miguel   
  95% 2L nc   
(X) * Mental status at baseline 6/1/2021 16:50:03 EDT by Sharron Miguel   
  Good insight, oriented to person, place and time   
( ) * Antibiotic regimen acceptable for next level of care ( ) * Discharge plans and education understood Activity ( ) * Ambulatory or acceptable for next level of care 6/1/2021 16:50:04 EDT by Hernandez Fried Pt required increased assistance with all activity this day. Upwards of Mod A x 2 for bed mobility and requiring Max A x 2 to perform SPT to recliner chair, BSC and back to bed. Tolerates minimal time OOB and requesting to return Routes ( ) * Oral hydration, medications, and diet 6/1/2021 16:50:04 EDT by Sharron Miguel   
  Percocet 1 tab PO prn x4, oxycodone 20mg PO q12 Solumedrol 40mg IV q12, Ativan 0.5mg IV prn x2 
Bumex 1mg IV bid Interventions   
(X) * Oxygen absent or at baseline need 6/1/2021 16:50:04 EDT by Sharron Miguel   
  nasal cannula Duoneb ING q4, arformoterol NEB bid   
(X) * Isolation not indicated, or is performable at next level of care 6/1/2021 16:50:04 EDT by Sharron Miguel   
  Contact Isolation   
(X) WBC   
6/1/2021 16:50:04 EDT by Sharron Miguel   
  WBC 27.9, HGB 10.5, HCT 34.1, Plat 396 na 135, HGB 4.0, Ch 95, CO2 36, Glucose 194, BUN 25/Cr 0.65, Ca 7.9 (X) Incentive spirometry Medications ( ) Oral antibiotics 6/1/2021 16:50:04 EDT by Sharron Miguel   
  Vanco 750mg IV q12 * Milestone Additional Notes 06/01/21 - IP Pulmonary progress note: Afebrile  
sats 98% on 2.5L  
WBC 27.9 Respiratory culture + for moderate MSA and rare filamentous fungus- sending to state lab ROS:  Feels better, but still very weak. SOB better. Still with cough that is mostly non productive General: Alert, cooperative, chronically ill appearing, appears stated age. Head: Normocephalic, without obvious abnormality, atraumatic. Eyes: Conjunctivae/corneas clear. Nose:Nares normal. Septum midline. Mucosa normal.   
Throat:Lips, mucosa, and tongue normal.   
Neck:Supple, symmetrical, trachea midline, no adenopathy. Lungs:  Congested cough/upper airway, scattered rhonchi Chest wall: Heart: Regular rate and rhythm Abdomen:  Soft, non-tender. Bowel sounds normal.  
Extremities:Anasarca Pulses:2+ and symmetric all extremities. Skin:Skin color, texture, turgor normal. No rashes or lesions Neurologic:Grossly nonfocal  
  
IMPRESSION Acute hypoxemic respiratory failure MRSA pneumonia Acute COPD exacerbation Hx of PE - not on anticoagulation due to frequent falls HTN, diastolic dysfunction Mild AS  
H/o hypothyroidism H/o CVA Hyperglycemia [place] Epp PLAN Supplemental O2 as needed to keep sats > 88%, on 2L NC at baseline Continue vancomycin (Day 5 of 7) Would get chest CT if any clinical change in respiratory status or fever/significant rise in WBC to check for loculated pleural effusion. Continue bumex Continue scheduled duonebs, pulmicort, brovana, mucinex Continue steroids at current dose Flutter valve and chest PT   
PT/OOB as much as able Diet as per speech recs Nystatin for thrush  
   
  
Pneumonia - Care Day 13 (5/31/2021) by Shoshana Cohen Review Status Review Entered Completed 6/1/2021 15:42  
  
Criteria Review Care Day: 13 Care Date: 5/31/2021 Level of Care: Telemetry Guideline Day 3 Level Of Care (X) Floor to discharge 6/1/2021 15:42:29 EDT by Rakesh Peterson telemetry Clinical Status   
(X) * Hemodynamic stability 6/1/2021 15:42:29 EDT by Sharlene Castorena   
  HR 92  /73, 168/55 Pain Scores: 8 (Back, Posterior, aching)   
(X) * Afebrile or temperature acceptable for next level of care 6/1/2021 15:42:29 EDT by Sharlene Castorena   
  T 98.3   
(X) * Tachypnea absent 6/1/2021 15:42:29 EDT by Sharlene Castorena   
  RR 20   
(X) * Hypoxemia absent 6/1/2021 15:42:29 EDT by Sharlene Castorena   
  93% 2L nc   
(X) * Mental status at baseline ( ) * Antibiotic regimen acceptable for next level of care ( ) * Discharge plans and education understood Activity ( ) * Ambulatory or acceptable for next level of care Routes ( ) * Oral hydration, medications, and diet 6/1/2021 15:42:29 EDT by Sharlene Castorena   
  Solumedrol 40mg IV q6, Bumex 1mg IV bid Percocet 1 tab PO prn x2, oxycodone 20mg PO q12, morphine 2mg IV prn x1, Ativan 0.5mg IV prn x3 Interventions   
(X) * Oxygen absent or at baseline need 6/1/2021 15:42:29 EDT by Sharlene Castorena   
  nasal cannula Duoneb INH q4, aformoterol NEB bid RT - Acapella, Flutter valve, Chest PT   
(X) * Isolation not indicated, or is performable at next level of care 6/1/2021 15:42:29 EDT by Sharlene Castorena   
  Contact Isolation for MRSA   
(X) WBC   
6/1/2021 15:42:29 EDT by Sharlene Castorena   
  WBC 22.4, HGB 9.2, HCT 29.5, Plat 321 Na 134, K 3.7, Ch 95, CO2 37, Glucose 237, BUN 25/Cr 0.61, Ca 7.3 (X) Incentive spirometry 6/1/2021 15:42:29 EDT by Sharlene Castorena   
  IS, CONT cardiac monitor, C&DB, Elevate HOB, I&Os Medications ( ) Oral antibiotics 6/1/2021 15:42:29 EDT by Sharlene Castorena   
  Vanco 750mg IV q12 * Milestone Additional Notes 05/31/21 - IP Progress note:  
c/o chronic back pain and chronic SOB.    
   
Assessment and Plan:  
Sepsis due to Pneumonia/ HCAP/ MRSA pneumonia. CT abd/pelvis showed severe stenosis of bilateral common iliac arteries, but pt has no leg pain and lactate is normal. There is also e/o cirrhosis, but minimal ascites so I have low c/f SBP. Sputum culture grew MRSA.  Treated with Vanc/cefepime 5/20 - 5/26; now on just Vanc.   
   
AHRF: PNA and COPD exac. Continue nebs, leonre neb, brovana, pulmicort, solumedrol. Pulmonary following   
      
COPD exacerbation. As above continue treatment.   
   
Multiple fractures: CT today showed multiple fractures of ribs, pubic rami, thoracic spine, etc. She is on oxycodone for chronic pain. Ultimately, with her repeat admissions and suggestion of multiple falls, palliative care is following. needs SNF   
   
 Bilateral common iliac stenosis: no symptoms. Monitor   
 Cirrhosis: noted on imaging. She is not presently decompensated.     
Hypokalemia. replete PRN.    
 Leukocytosis. Likely due to steroid. Improving. Monitor Thrush. On nystatin swish and swallow, magic mouth wash .   
  
-----Pulmonary progress Note:  
IMPRESSION Acute hypoxemic respiratory failure MRSA pneumonia Acute COPD exacerbation Hx of PE - not on anticoagulation due to frequent falls HTN, diastolic dysfunction Mild AS  
H/o hypothyroidism H/o CVA Hyperglycemia Thrush    
PLAN Supplemental O2 as needed to keep sats > 88%, on 2L NC at baseline Continue vancomycin (Day 4 of &) Would get chest CT if any clinical change in respiratory status or fever/significant rise in WBC to check for loculated pleural effusion. Continue bumex Continue scheduled duonebs, pulmicort, brovana, mucinex Wean IV steroid frequency Flutter valve and chest PT Diet as per speech recs Nystatin for thrush  
   
  
Pneumonia - Care Day 12 (5/30/2021) by Laina Rushing 
 
  
Review Status Review Entered Completed 6/1/2021 13:53  
  
Criteria Review Care Day: 12 Care Date: 5/30/2021 Level of Care: Telemetry Guideline Day 3 Level Of Care (X) Floor to discharge 6/1/2021 13:52:52 EDT by Oscar Burton telemetry Clinical Status   
(X) * Hemodynamic stability 6/1/2021 13:52:52 EDT by Paco Parker   
  HR 86  /68   
(X) * Afebrile or temperature acceptable for next level of care 6/1/2021 13:52:52 EDT by Makenna Rueda 97.6   
( ) * Tachypnea absent 6/1/2021 13:52:52 EDT by Paco Parker   
  RR 20   
(X) * Hypoxemia absent 6/1/2021 13:52:52 EDT by Paco Parker   
  91% 2L nasal cannula   
(X) * Mental status at baseline 6/1/2021 13:52:52 EDT by Paco Parker   
  Pain Scores: 10, 8 (Back, Posterior, Constant)   
( ) * Antibiotic regimen acceptable for next level of care ( ) * Discharge plans and education understood Activity ( ) * Ambulatory or acceptable for next level of care Routes ( ) * Oral hydration, medications, and diet 6/1/2021 13:52:52 EDT by Paco Parker   
  Bumex 1mg IV bid, Ativan 0.5mg IV prn x3, morphine 2mg IV prn x2 Solumedrol 40mg IV q6 Oxycodone 20mg PO q12, Percocet 1 tab PO prn x1 Mucinex 600mg PO bid, Robitussin 10mL PO q6, Insulin SC ac&hs Interventions   
(X) * Oxygen absent or at baseline need 6/1/2021 13:52:52 EDT by Paco Parker   
  Duoneb INH q4, aformoterol NEB bid Nasal cannula RT - Acapella, Flutter valve, Chest PT   
(X) * Isolation not indicated, or is performable at next level of care (X) WBC   
6/1/2021 13:52:52 EDT by Paco Parker   
  WBC 30.4, HGB 11.2, HCT 36.0, Plat 462 Na 134, K 3.9, CO2 35, Glucose 160, BUN 21/Cr 0.63, Ca 7.8 (X) Incentive spirometry 6/1/2021 13:52:52 EDT by Paco Parker   
  IS, Strict I&Os, C&DB, CONT cardiac monitor Medications ( ) Oral antibiotics 6/1/2021 13:52:52 EDT by Paco Parker   
  Vanco 750mg IV q12 * Milestone Additional Notes 05/30/21 - IP   
 Progress Note:  
 c/o back pain    
  
Physical Exam:  
Gen:  Well-developed, well-nourished, in no acute distress HEENT:  Pink conjunctivae, PERRL, hearing intact to voice, moist mucous membranes Neck:  Supple, without masses, thyroid non-tender Resp:  No accessory muscle use, clear breath sounds without wheezes rales or rhonchi  
Card:  No murmurs, normal S1, S2 without thrills, bruits or peripheral edema Abd:  Soft, non-tender, non-distended, normoactive bowel sounds are present, no palpable organomegaly and no detectable hernias Lymph:  No cervical or inguinal adenopathy Musc:  No cyanosis or clubbing Skin:  No rashes or ulcers, skin turgor is good Neuro:  Cranial nerves are grossly intact, no focal motor weakness, follows commands appropriately Psych:  Good insight, oriented to person, place and time, alert Assessment and Plan:  
    Sepsis due to Pneumonia/ HCAP/ MRSA pneumonia. CT abd/pelvis showed severe stenosis of bilateral common iliac arteries, but pt has no leg pain and lactate is normal. There is also e/o cirrhosis, but minimal ascites so I have low c/f SBP. Treated with Vanc/cefepime 5/20 - 5/26 to complete a 7 day course. Sputum culture grew MRSA. On vanc.    
    AHRF: PNA and COPD exac. Continue nebs, lenore neb, brovana, pulmicort, solumedrol. Pulmonary following   
    COPD exacerbation. As above continue treatment.   
    Multiple fractures: CT today showed multiple fractures of ribs, pubic rami, thoracic spine, etc. She is on oxycodone for chronic pain. Ultimately, with her repeat admissions and suggestion of multiple falls, palliative care is following. needs SNF   
    Bilateral common iliac stenosis: no symptoms. Monitor   
    Cirrhosis: noted on imaging. She is not presently decompensated.     
    Hypokalemia. replete   
    Leukocytosis. Likely due to steroid. Monitor   
    Thrush. On nystatin swish and swallow, magic mouth wash .   
    Diarrhea. Resolved.  Stopped laxatives.

## 2021-06-02 NOTE — PROGRESS NOTES
Bedside and Verbal shift change report given to [address] [place] [address] (oncoming nurse) by Ivone Brito RN (offgoing nurse). Report included the following information SBAR, Kardex, MAR, Accordion and Recent Results. At shift change with above RN, family present and stating that she can normally move her arm more than her current ability. Code S called.

## 2021-06-02 NOTE — PROGRESS NOTES
Problem: Mobility Impaired (Adult and Pediatric) Goal: *Acute Goals and Plan of Care (Insert Text) Description: Note: FUNCTIONAL STATUS PRIOR TO ADMISSION: Patient was modified independent using a walker and single point cane for functional mobility. HOME SUPPORT PRIOR TO ADMISSION: The patient lived with daughter, also has caregivers 3 x per wk. Physical Therapy Goals Initiated 5/23/2021 1. Patient will move from supine to sit and sit to supine  in bed with supervision/set-up within 7 day(s). 2.  Patient will transfer from bed to chair and chair to bed with supervision/set-up using the least restrictive device within 7 day(s). 3.  Patient will perform sit to stand with supervision/set-up within 7 day(s). 4.  Patient will ambulate with supervision/set-up for 50 feet with the least restrictive device within 7 day(s). Physical Therapy Goals Re-Assessed 6/1/2021 and downgraded 1. Patient will move from supine to sit and sit to supine , scoot up and down, and roll side to side in bed with minimal assistance/contact guard assist within 7 day(s). 2.  Patient will transfer from bed to chair and chair to bed with moderate assistance  using the least restrictive device within 7 day(s). 3.  Patient will perform sit to stand with minimal assistance/contact guard assist within 7 day(s). 4.  Patient will ambulate with moderate assistance  for 15 feet with the least restrictive device within 7 day(s). Note: PHYSICAL THERAPY TREATMENT Patient: Crista Caceres (13 y.o. female) Date: 6/2/2021 Diagnosis: HAP (hospital-acquired pneumonia) [J18.9, Y95] Pneumonia of both lungs due to infectious organism [J18.9] <principal problem not specified> Precautions:   
Chart, physical therapy assessment, plan of care and goals were reviewed. ASSESSMENT Patient continues with skilled PT services. Pt supine to sit with max assist of 2. Pt progressed to CGA to min assist sitting EOB. Pt performed UE reaching activities on EOB. Pt with limited use of left UE. Pt was able to sit on EOB 7 minutes before returning to supine max assist of 2. Pt progress slow. Continue goals. Current Level of Function Impacting Discharge (mobility/balance): Pt is max assist of 2 for bed mobility. PLAN : 
Patient continues to benefit from skilled intervention to address the above impairments. Continue treatment per established plan of care. to address goals. Recommendation for discharge: (in order for the patient to meet his/her long term goals) Therapy up to 5 days/week in SNF setting This discharge recommendation: 
Has been made in collaboration with the attending provider and/or case management IF patient discharges home will need the following DME:   
 
 
SUBJECTIVE:  
 
 
OBJECTIVE DATA SUMMARY:  
Critical Behavior: 
Neurologic State: Alert Orientation Level: Oriented X4 Cognition: Follows commands Safety/Judgement: Awareness of environment Functional Mobility Training: 
Bed Mobility: 
  
Supine to Sit: Maximum assistance of  2 Sit to Supine: Maximum assistance of 2 Balance: 
Sitting: High guard Sitting - Static: Fair (occasional) Activity Tolerance:  
Fair After treatment patient left in no apparent distress:  
Supine in bed COMMUNICATION/COLLABORATION:  
The patients plan of care was discussed with: Physical therapist.  
 
Johnnie Matos PTA Time Calculation: 23 mins

## 2021-06-02 NOTE — PROGRESS NOTES
Problem: Diabetes Self-Management Goal: *Disease process and treatment process Description: Define diabetes and identify own type of diabetes; list 3 options for treating diabetes. Outcome: Progressing Towards Goal 
Goal: *Incorporating nutritional management into lifestyle Description: Describe effect of type, amount and timing of food on blood glucose; list 3 methods for planning meals. Outcome: Progressing Towards Goal 
Goal: *Incorporating physical activity into lifestyle Description: State effect of exercise on blood glucose levels. Outcome: Progressing Towards Goal 
Goal: *Developing strategies to promote health/change behavior Description: Define the ABC's of diabetes; identify appropriate screenings, schedule and personal plan for screenings. Outcome: Progressing Towards Goal 
Goal: *Using medications safely Description: State effect of diabetes medications on diabetes; name diabetes medication taking, action and side effects. Outcome: Progressing Towards Goal 
Goal: *Monitoring blood glucose, interpreting and using results Description: Identify recommended blood glucose targets  and personal targets. Outcome: Progressing Towards Goal 
Goal: *Prevention, detection, treatment of acute complications Description: List symptoms of hyper- and hypoglycemia; describe how to treat low blood sugar and actions for lowering  high blood glucose level. Outcome: Progressing Towards Goal 
Goal: *Prevention, detection and treatment of chronic complications Description: Define the natural course of diabetes and describe the relationship of blood glucose levels to long term complications of diabetes. Outcome: Progressing Towards Goal 
Goal: *Developing strategies to address psychosocial issues Description: Describe feelings about living with diabetes; identify support needed and support network Outcome: Progressing Towards Goal 
Goal: *Insulin pump training Outcome: Progressing Towards Goal 
Goal: *Sick day guidelines Outcome: Progressing Towards Goal 
Goal: *Patient Specific Goal (EDIT GOAL, INSERT TEXT) Outcome: Progressing Towards Goal 
  
Problem: Patient Education: Go to Patient Education Activity Goal: Patient/Family Education Outcome: Progressing Towards Goal 
  
Problem: Falls - Risk of 
Goal: *Absence of Falls Description: Document Leydi El Fall Risk and appropriate interventions in the flowsheet. Outcome: Progressing Towards Goal 
Note: Fall Risk Interventions: 
Mobility Interventions: Bed/chair exit alarm, PT Consult for mobility concerns, PT Consult for assist device competence, OT consult for ADLs Mentation Interventions: Bed/chair exit alarm, More frequent rounding, Door open when patient unattended, Evaluate medications/consider consulting pharmacy Medication Interventions: Bed/chair exit alarm, Evaluate medications/consider consulting pharmacy, Patient to call before getting OOB, Teach patient to arise slowly Elimination Interventions: Bed/chair exit alarm, Call light in reach, Patient to call for help with toileting needs History of Falls Interventions: Bed/chair exit alarm, Door open when patient unattended, Vital signs minimum Q4HRs X 24 hrs (comment for end date) Problem: Patient Education: Go to Patient Education Activity Goal: Patient/Family Education Outcome: Progressing Towards Goal 
  
Problem: Pressure Injury - Risk of 
Goal: *Prevention of pressure injury Description: Document Edouard Scale and appropriate interventions in the flowsheet. Outcome: Progressing Towards Goal 
Note: Pressure Injury Interventions: 
Sensory Interventions: Discuss PT/OT consult with provider, Assess changes in LOC, Assess need for specialty bed, Monitor skin under medical devices, Turn and reposition approx. every two hours (pillows and wedges if needed) Moisture Interventions: Check for incontinence Q2 hours and as needed, Absorbent underpads, Apply protective barrier, creams and emollients Activity Interventions: Increase time out of bed Mobility Interventions: HOB 30 degrees or less, Float heels, Turn and reposition approx. every two hours(pillow and wedges) Nutrition Interventions: Document food/fluid/supplement intake Friction and Shear Interventions: HOB 30 degrees or less, Foam dressings/transparent film/skin sealants Problem: Patient Education: Go to Patient Education Activity Goal: Patient/Family Education Outcome: Progressing Towards Goal 
  
Problem: Patient Education: Go to Patient Education Activity Goal: Patient/Family Education Outcome: Progressing Towards Goal 
  
Problem: Patient Education: Go to Patient Education Activity Goal: Patient/Family Education Outcome: Progressing Towards Goal 
  
Problem: Patient Education: Go to Patient Education Activity Goal: Patient/Family Education Outcome: Progressing Towards Goal 
  
Problem: Risk for Spread of Infection Goal: Prevent transmission of infectious organism to others Description: Prevent the transmission of infectious organisms to other patients, staff members, and visitors. Outcome: Progressing Towards Goal 
  
Problem: Patient Education:  Go to Education Activity Goal: Patient/Family Education Outcome: Progressing Towards Goal 
  
Problem: Patient Education: Go to Patient Education Activity Goal: Patient/Family Education Outcome: Progressing Towards Goal 
  
Problem: TIA/CVA Stroke: 0-24 hours Goal: Off Pathway (Use only if patient is Off Pathway) Outcome: Progressing Towards Goal 
Goal: Activity/Safety Outcome: Progressing Towards Goal 
Goal: Consults, if ordered Outcome: Progressing Towards Goal 
Goal: Diagnostic Test/Procedures Outcome: Progressing Towards Goal 
Goal: Nutrition/Diet Outcome: Progressing Towards Goal 
Goal: Discharge Planning Outcome: Progressing Towards Goal 
Goal: Medications Outcome: Progressing Towards Goal 
Goal: Respiratory Outcome: Progressing Towards Goal 
Goal: Treatments/Interventions/Procedures Outcome: Progressing Towards Goal 
Goal: Minimize risk of bleeding post-thrombolytic infusion Outcome: Progressing Towards Goal 
Goal: Monitor for complications post-thrombolytic infusion Outcome: Progressing Towards Goal 
Goal: Psychosocial 
Outcome: Progressing Towards Goal 
Goal: *Hemodynamically stable Outcome: Progressing Towards Goal 
Goal: *Neurologically stable Description: Absence of additional neurological deficits Outcome: Progressing Towards Goal 
Goal: *Verbalizes anxiety and depression are reduced or absent Outcome: Progressing Towards Goal 
Goal: *Absence of Signs of Aspiration on Current Diet Outcome: Progressing Towards Goal 
Goal: *Absence of deep venous thrombosis signs and symptoms(Stroke Metric) Outcome: Progressing Towards Goal 
Goal: *Ability to perform ADLs and demonstrates progressive mobility and function Outcome: Progressing Towards Goal 
Goal: *Stroke education started(Stroke Metric) Outcome: Progressing Towards Goal 
Goal: *Dysphagia screen performed(Stroke Metric) Outcome: Progressing Towards Goal 
Goal: *Rehab consulted(Stroke Metric) Outcome: Progressing Towards Goal 
  
Problem: TIA/CVA Stroke: Day 2 Until Discharge Goal: Off Pathway (Use only if patient is Off Pathway) Outcome: Progressing Towards Goal 
Goal: Activity/Safety Outcome: Progressing Towards Goal 
Goal: Diagnostic Test/Procedures Outcome: Progressing Towards Goal 
Goal: Nutrition/Diet Outcome: Progressing Towards Goal 
Goal: Discharge Planning Outcome: Progressing Towards Goal 
Goal: Medications Outcome: Progressing Towards Goal 
Goal: Respiratory Outcome: Progressing Towards Goal 
Goal: Treatments/Interventions/Procedures Outcome: Progressing Towards Goal 
Goal: Psychosocial 
Outcome: Progressing Towards Goal 
Goal: *Verbalizes anxiety and depression are reduced or absent Outcome: Progressing Towards Goal 
Goal: *Absence of aspiration Outcome: Progressing Towards Goal 
Goal: *Absence of deep venous thrombosis signs and symptoms(Stroke Metric) Outcome: Progressing Towards Goal 
Goal: *Optimal pain control at patient's stated goal 
Outcome: Progressing Towards Goal 
Goal: *Tolerating diet Outcome: Progressing Towards Goal 
Goal: *Ability to perform ADLs and demonstrates progressive mobility and function Outcome: Progressing Towards Goal 
Goal: *Stroke education continued(Stroke Metric) Outcome: Progressing Towards Goal 
  
Problem: Ischemic Stroke: Discharge Outcomes Goal: *Verbalizes anxiety and depression are reduced or absent Outcome: Progressing Towards Goal 
Goal: *Verbalize understanding of risk factor modification(Stroke Metric) Outcome: Progressing Towards Goal 
Goal: *Hemodynamically stable Outcome: Progressing Towards Goal 
Goal: *Absence of aspiration pneumonia Outcome: Progressing Towards Goal 
Goal: *Aware of needed dietary changes Outcome: Progressing Towards Goal 
Goal: *Verbalize understanding of prescribed medications including anti-coagulants, anti-lipid, and/or anti-platelets(Stroke Metric) Outcome: Progressing Towards Goal 
Goal: *Tolerating diet Outcome: Progressing Towards Goal 
Goal: *Aware of follow-up diagnostics related to anticoagulants Outcome: Progressing Towards Goal 
Goal: *Ability to perform ADLs and demonstrates progressive mobility and function Outcome: Progressing Towards Goal 
Goal: *Absence of DVT(Stroke Metric) Outcome: Progressing Towards Goal 
Goal: *Absence of aspiration Outcome: Progressing Towards Goal 
Goal: *Optimal pain control at patient's stated goal 
Outcome: Progressing Towards Goal 
Goal: *Home safety concerns addressed Outcome: Progressing Towards Goal 
Goal: *Describes available resources and support systems Outcome: Progressing Towards Goal 
Goal: *Verbalizes understanding of activation of EMS(911) for stroke symptoms(Stroke Metric) Outcome: Progressing Towards Goal 
Goal: *Understands and describes signs and symptoms to report to providers(Stroke Metric) Outcome: Progressing Towards Goal 
Goal: *Neurolgocially stable (absence of additional neurological deficits) Outcome: Progressing Towards Goal 
Goal: *Verbalizes importance of follow-up with primary care physician(Stroke Metric) Outcome: Progressing Towards Goal 
Goal: *Smoking cessation discussed,if applicable(Stroke Metric) Outcome: Progressing Towards Goal 
Goal: *Depression screening completed(Stroke Metric) Outcome: Progressing Towards Goal

## 2021-06-02 NOTE — PROGRESS NOTES
Bedside shift change report given to Frida Lainez (oncoming nurse) by Steff Akbar (offgoing nurse). Report included the following information SBAR, Kardex, Intake/Output, MAR and Recent Results.

## 2021-06-02 NOTE — PROGRESS NOTES
Abhinav Erwin shara [address] 79 
[address] [facility], [address] [place] [address] 
[phone] Medical Progress Note NAME: Meenu Monge :  1940 MRM:  [id number] Date of service: 2021  6:03 PM 
 
  
Assessment and Plan:  
 
Sepsis due to Pneumonia/ HCAP/ MRSA pneumonia. There is also e/o cirrhosis, but minimal ascites so I low c/f SBP. Sputum culture grew MRSA. Treated with Vanc/cefepime  - ; now on just Vanc - last dose today. AHRF: PNA and COPD exac. Continue nebs, lenore neb, brovana, pulmicort, solumedrol. Pulmonary following COPD exacerbation. As above continue treatment. Multiple fractures: CT today showed multiple fractures of ribs, pubic rami, thoracic spine, etc. She is on oxycodone for chronic pain. repeat admissions and suggestion of multiple falls, palliative care is following. needs SNF  
  
 Bilateral common iliac stenosis: CT abd/pelvis showed severe stenosis of bilateral common iliac arteries, but pt has no leg pain and lactate is normal.  no symptoms. Monitor  
  
Right subclavian stenosis: consult vascular. Cirrhosis: noted on imaging. She is not presently decompensated. Hypokalemia. replete PRN. Leukocytosis. Likely due to steroid. Improving. Monitor Thrush. On nystatin swish and swallow, magic mouth wash . Diarrhea. Resolved. Stopped laxatives. Subjective: Chief Complaint[de-identified] Patient was seen and examined as a follow up for sepsis. Chart was reviewed. c/o chronic back pain. ROS: 
(bold if positive, if negative) Tolerating PT  Tolerating Diet Back Pain Objective:  
 
Last 24hrs VS reviewed since prior progress note. Most recent are: 
 
Visit Vitals BP (!) 127/55 (BP 1 Location: Right upper arm, BP Patient Position: At rest) Pulse 90 Temp 97.9 °F (36.6 °C) Resp 18 Ht 5' 4\" (1.626 m) Wt 70.8 kg (156 lb 1.4 oz) SpO2 95% BMI 26.79 kg/m² SpO2 Readings from Last 6 Encounters: 06/02/21 95% 05/13/21 97% 03/23/21 96% 03/10/21 95% 03/03/21 95% 02/27/21 96% O2 Flow Rate (L/min): 2 l/min Intake/Output Summary (Last 24 hours) at 6/2/2021 1803 Last data filed at 6/2/2021 6689 Gross per 24 hour Intake 360 ml Output 1775 ml Net -1415 ml Physical Exam: 
 
Gen:  elderly, in no acute distress HEENT:  Pink conjunctivae, PERRL, hearing intact to voice Resp:  No accessory muscle use, clear breath sounds without wheezes rales or rhonchi 
Card:  No murmurs, normal S1, S2 without thrills, bruits, b/l peripheral edema Abd:  Soft, non-tender, non-distended, normoactive bowel sounds are present, no palpable organomegaly Lymph:  No cervical or inguinal adenopathy Musc:  No cyanosis or clubbing Skin:  No rashes or ulcers, skin turgor is good Neuro:  Cranial nerves are grossly intact, no focal motor weakness, follows commands appropriately Psych:  Good insight, oriented to person, place and time, alert 
__________________________________________________________________ Medications Reviewed: (see below) Medications:  
 
Current Facility-Administered Medications Medication Dose Route Frequency  methylPREDNISolone (PF) (SOLU-MEDROL) injection 20 mg  20 mg IntraVENous Q12H  
 [START ON 6/3/2021] bumetanide (BUMEX) injection 1 mg  1 mg IntraVENous DAILY  magic mouthwash (FIRST-MOUTHWASH BLM) oral suspension 10 mL  10 mL Oral Q4H PRN  
 FLUoxetine (PROzac) capsule 20 mg  20 mg Oral QPM  
 vancomycin (VANCOCIN) 750 mg in 0.9% sodium chloride 250 mL (VIAL-MATE)  750 mg IntraVENous Q12H  nystatin (MYCOSTATIN) 100,000 unit/mL oral suspension 500,000 Units  500,000 Units Oral QID  oxyCODONE ER (OxyCONTIN) tablet 20 mg  20 mg Oral Q12H  
 oxyCODONE-acetaminophen (PERCOCET 7.5) 7.5-325 mg per tablet 1 Tablet  1 Tablet Oral Q4H PRN  
 morphine injection 2 mg  2 mg IntraVENous Q4H PRN  
 albuterol-ipratropium (DUO-NEB) 2.5 MG-0.5 MG/3 ML  3 mL Nebulization Q4HWA RT  
 amLODIPine (NORVASC) tablet 10 mg  10 mg Oral DAILY  enoxaparin (LOVENOX) injection 40 mg  40 mg SubCUTAneous Q24H  pantoprazole (PROTONIX) tablet 40 mg  40 mg Oral ACB&D  
 guaiFENesin-dextromethorphan (ROBITUSSIN DM) 100-10 mg/5 mL syrup 10 mL  10 mL Oral Q6H  
 lidocaine 4 % patch 1 Patch  1 Patch TransDERmal Q24H  
 gabapentin (NEURONTIN) capsule 600 mg  600 mg Oral BID  
 guaiFENesin ER (MUCINEX) tablet 600 mg  600 mg Oral BID  latanoprost (XALATAN) 0.005 % ophthalmic solution 1 Drop  1 Drop Both Eyes QHS  melatonin (rapid dissolve) tablet 5 mg  5 mg Oral QHS  arformoterol 15 mcg/budesonide 0.5 mg neb solution   Nebulization BID RT  
 hydrALAZINE (APRESOLINE) 20 mg/mL injection 10 mg  10 mg IntraVENous Q6H PRN  
 atorvastatin (LIPITOR) tablet 80 mg  80 mg Oral QHS  aspirin delayed-release tablet 81 mg  81 mg Oral DAILY  clonazePAM (KlonoPIN) tablet 0.25 mg  0.25 mg Oral QPM  
 levothyroxine (SYNTHROID) tablet 75 mcg  75 mcg Oral ACB  meclizine (ANTIVERT) tablet 12.5 mg  12.5 mg Oral Q6H PRN  
 glucose chewable tablet 16 g  4 Tablet Oral PRN  
 dextrose (D50W) injection syrg 12.5-25 g  25-50 mL IntraVENous PRN  
 glucagon (GLUCAGEN) injection 1 mg  1 mg IntraMUSCular PRN  
 sodium chloride (NS) flush 5-40 mL  5-40 mL IntraVENous Q8H  
 sodium chloride (NS) flush 5-40 mL  5-40 mL IntraVENous PRN  
 acetaminophen (TYLENOL) tablet 650 mg  650 mg Oral Q6H PRN Or  
 acetaminophen (TYLENOL) suppository 650 mg  650 mg Rectal Q6H PRN  polyethylene glycol (MIRALAX) packet 17 g  17 g Oral DAILY PRN  
 bisacodyL (DULCOLAX) suppository 10 mg  10 mg Rectal DAILY PRN  
 ondansetron (ZOFRAN) injection 4 mg  4 mg IntraVENous Q6H PRN  
 insulin glargine (LANTUS) injection 12 Units  0.2 Units/kg SubCUTAneous QHS  insulin lispro (HUMALOG) injection   SubCUTAneous AC&HS  
 melatonin (rapid dissolve) tablet 5 mg  5 mg Oral QHS PRN  
 alum-mag hydroxide-simeth (MYLANTA) oral suspension 30 mL  30 mL Oral Q4H PRN  
 LORazepam (ATIVAN) injection 0.5 mg  0.5 mg IntraVENous Q6H PRN  
 diphenhydrAMINE (BENADRYL) injection 25 mg  25 mg IntraVENous Q6H PRN  
 albuterol (PROVENTIL VENTOLIN) nebulizer solution 2.5 mg  2.5 mg Nebulization Q4H PRN  
 simethicone (MYLICON) tablet 80 mg  80 mg Oral QID PRN Lab Data Reviewed: (see below) Lab Review:  
 
Recent Labs  
  06/02/21 0432 06/01/21 0137 05/31/21 
0402 WBC 18.1* 27.9* 22.4* HGB 9.1* 10.5* 9.2* HCT 29.2* 34.1* 29.5*  
 396 321 Recent Labs  
  06/02/21 0432 06/01/21 0137 05/31/21 
0402 * 135* 134* K 3.6 4.0 3.7 CL 96* 95* 95* CO2 37* 36* 37* * 194* 237* BUN 23* 25* 25* CREA 0.53* 0.65 0.61  
CA 7.1* 7.9* 7.3*  
MG  --   --  1.9 PHOS  --   --  3.7 Lab Results Component Value Date/Time Glucose (POC) 216 (H) 06/02/2021 04:05 PM  
 Glucose (POC) 262 (H) 06/02/2021 11:19 AM  
 Glucose (POC) 316 (H) 06/02/2021 07:16 AM  
 Glucose (POC) 426 (H) 06/01/2021 10:25 PM  
 Glucose (POC) 496 (H) 06/01/2021 09:06 PM  
 
No results for input(s): PH, PCO2, PO2, HCO3, FIO2 in the last 72 hours. No results for input(s): INR, INREXT, INREXT in the last 72 hours. All Micro Results Procedure Component Value Units Date/Time CULTURE, RESPIRATORY/SPUTUM/BRONCH [place] STAIN [[id number]]  (Abnormal)  (Susceptibility) Collected: 05/23/21 2012 Order Status: Completed Specimen: Sputum Updated: 06/02/21 0907 Special Requests: NO SPECIAL REQUESTS     
  GRAM STAIN    
  RARE EPITHELIAL CELLS SEEN  
     
   FEW WBCS SEEN     
   FEW GRAM POSITIVE COCCI Culture result: MODERATE * METHICILLIN RESISTANT STAPHYLOCOCCUS AUREUS *  
     
      
  RARE 
FILAMENTOUS FUNGUS 
SENDING TO STATE LAB REFER TO [id number] NO NORMAL RESPIRATORY ZAKIYA ISOLATED MISCELLANEOUS BATTERY [[id number]] Collected: 05/23/21 2012 Order Status: Completed Updated: 06/01/21 2035  URINE CULTURE HOLD SAMPLE [[id number]] Collected: 05/19/21 1815 Order Status: Completed Specimen: Serum Updated: 05/19/21 [address] Urine culture hold Urine on hold in Microbiology dept for 2 days. If unpreserved urine is submitted, it cannot be used for addtional testing after 24 hours, recollection will be required. I have reviewed notes of prior 24hr. Other pertinent lab: Total time spent with patient: 28 I personally reviewed chart, notes, data and current medications in the medical record. I have personally examined and treated the patient at bedside during this period. Care Plan discussed with: Patient, Nursing Staff and >50% of time spent in counseling and coordination of care Discussed:  Care Plan Prophylaxis:  Lovenox Disposition:  SNF/LTC 
        
___________________________________________________ Attending Physician: Olga Bai DO

## 2021-06-02 NOTE — PROGRESS NOTES
Problem: Self Care Deficits Care Plan (Adult)  Goal: *Therapy Goal (Edit Goal, Insert Text)  Description:   FUNCTIONAL STATUS PRIOR TO ADMISSION: Patient was modified independent for basic except assistance provided for bathing in shower. Patient was ambulating with cane, and most recently with RW after hospitalization      HOME SUPPORT: The patient lived with her daughter and family, who provided assistance as needed. Occupational Therapy Goals  Initiated 5/25/2021  Weekly re assessment 6/1/2021- continue current goals    1. Patient will perform grooming at EOB with supervision/set-up within 7 day(s). 2.  Patient will perform bathing at EOB with supervision/set-up within 7 day(s). 3.  Patient will perform upper body dressing with minimal assistance/contact guard assist within 7 day(s). 4.  Patient will perform toilet transfers with minimal assistance/contact guard assist within 7 day(s). 5.  Patient will perform all aspects of toileting with minimal assistance/contact guard assist within 7 day(s). 6.  Patient will participate in upper extremity therapeutic exercise/activities with minimal assistance/contact guard assist for 5 minutes within 7 day(s). 7.  Patient will utilize energy conservation techniques during functional activities with verbal cues within 7 day(s). Outcome: Progressing Towards Goal   OCCUPATIONAL THERAPY TREATMENT  Patient: Oscar Mcfadden (77 y.o. female)  Date: 6/2/2021  Diagnosis: HAP (hospital-acquired pneumonia) [J18.9, Y95]  Pneumonia of both lungs due to infectious organism [J18.9] <principal problem not specified>       Precautions:    Chart, occupational therapy assessment, plan of care, and goals were reviewed. ASSESSMENT  Patient continues with skilled OT services and is progressing towards goals. Pt worked on maintaining sitting balance during simple grooming task as well as R UE there ex. Contact guard during brushing her hair, cues not to lean posterior.  She fatigues after sitting about 10 min and returned to supine. Pt requesting ice cream, something \"cold for her throat. \"     Current Level of Function Impacting Discharge (ADLs): contact guard grooming seated edge of bed, max assist LB bathe, dress    Other factors to consider for discharge:          PLAN :  Patient continues to benefit from skilled intervention to address the above impairments. Continue treatment per established plan of care to address goals. Recommend with staff: edge of bed ADl's, there ex, there act    Recommend next OT session: Cont towards goals    Recommendation for discharge: (in order for the patient to meet his/her long term goals)  Therapy up to 5 days/week in SNF setting    This discharge recommendation:  Has not yet been discussed the attending provider and/or case management    IF patient discharges home will need the following DME:        SUBJECTIVE:   Patient stated I can try.     OBJECTIVE DATA SUMMARY:   Cognitive/Behavioral Status:  Neurologic State: Alert  Orientation Level: Oriented X4  Cognition: Follows commands             Functional Mobility and Transfers for ADLs:  Bed Mobility:  Supine to Sit: Moderate assistance    Transfers:   Not attempted          Balance: contact guard sitting edge of bed       ADL Intervention:       Grooming  Grooming Assistance: Contact guard assistance  Position Performed: Seated edge of bed  Brushing/Combing Hair: Contact guard assistance       Therapeutic Exercises:   Pt worked on reaching forward 5 x R UE    Pain:  Pt medicated prior to tx    Activity Tolerance:   Poor    After treatment patient left in no apparent distress:   Supine in bed, Heels elevated for pressure relief, and Call bell within reach    COMMUNICATION/COLLABORATION:   The patients plan of care was discussed with: Physical therapy assistant, Occupational therapist, and Registered nurse.      DES eTran/L  Time Calculation: 20 mins

## 2021-06-02 NOTE — PROGRESS NOTES
Palliative Medicine Consult Lisandro: [phone] Patient Name: Xuan Sweeney YOB: 1940 Date of Initial Consult: 2021 Reason for Consult: [address] discussion Requesting Provider: Dr. Bessie South Primary Care Physician: Raffaele Crocker MD 
 
 SUMMARY:  
Xuan Sweeney is a 80 y.o. 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 Patient presented to ER w/ cc of worsening SOB and new onset of chills over past couple of days. Patient had just been discharged home with antibiotics, on  from hospitalization for CAP. Patient was admitted on 2021 from [facility] with a diagnosis of Sepsis and hypoxia  2/2 Worsening PNA . This is the patients 6th hospitalization since 2021, Current medical issues leading to Palliative Medicine involvement include: GOC discussion Psychosocial hx: Patient from [place], she is ,  of 48 years  2020. Had 4 children, 2 daughters . She moved to [place] on 2020 to live with daughter Inge Cobb and her . Patients son, Zacarias Bernardo lives in [place] with his family, however he has come to [place] to help assist with patients care. Patient has multiple grandchildren. Her jennifer is important to her. Patient stated she is worried about her sons health and is trying to protect him from worrying about her. PALLIATIVE DIAGNOSES:  
 
1. Hyonatremia 2. Hypoxia 3. Pain 4. constipation 5. Anxiety 6. Goals of Care Discussion PLAN:  
1. Patient was seen, no family at bedside. 2. Patient sitting up in bedside chair, appeared anxious and weaker.  
3. Anxiety-unchanged, patient reports continued anxiety, stated better since having pain controlled, but still remains quite anxious. She continues to require only 3  doses of the  PRN Lorazepam 0.5mg over past several days. No recommendations to adjust her current treatment plan 4. Pain- Improved. Patient acknowledged significant improvement . Etiology chronic back pain 2/2 history of multiple compression fractures. Back pain secondary to multiple fractures. · Current orders for OxyContin 20mg orally every 12 hours, Percocet 7.5/325 orally every 4 hours as needed (received x 5) and Morphine 2mg IV every 4 hours as needed (received x 0). Will continue to monitor effectiveness of regimen and adjust as needed. 5. Constipation-   LBM 5/28. FU to adjustments made 5/27 at which time I increased senna S 2 tabs twice daily. Patient denies BM, stated that she has been passing gas. Abdominal exam benign, no tenderness and non distended, normal BS. I gave her prune juice to drink. 6. ** when I returned to room to meet with patient and her daughter/PEACE, I was informed she had BM s/p drinking prune juice. 7. Hypoxia-unchanged, continues to require supplemental O2 meeds from 2-4L. patient still with productive coug. Etiology includes worsening PNA in setting of COPD and pulmonary fibrosis as well as small pleural effusion. · Goals of care discussion-to be determined. Last week family wanting to give patient the opportunity to make improvement, get stronger during therapy, will attempt to reach them to continue discussion 8. Initial consult note routed to primary continuity provider and/or primary health care team members 9. Communicated plan of care with: Palliative Carmine AVENDANO 192 Team, unit RN 
 
 GOALS OF CARE / TREATMENT PREFERENCES:  
 
GOALS OF CARE: 
Patient/Health Care Proxy Stated Goals: Cure TREATMENT PREFERENCES:  
Code Status: DNR Advance Care Planning: 
[x] The [facility] Interdisciplinary Team has updated the ACP Navigator with [address] and Patient Capacity   Primary Decision Maker: Abhinav Vigil - Daughter - [phone] Secondary Decision Maker: Alvaro Aponte - Son - [phone] Advance Care Planning 5/19/2021 Patient's Healthcare Decision Maker is: - Confirm Advance Directive Yes, on file Patient Would Like to Complete Advance Directive - Medical Interventions: Full interventions Other Instructions: Other: As far as possible, the palliative care team has discussed with patient / health care proxy about goals of care / treatment preferences for patient. HISTORY:  
 
History obtained from: medical records/nurse/patient CHIEF COMPLAINT: Pain HPI/SUBJECTIVE: The patient is:  
[x] Verbal and participatory [] Non-participatory due to:  
  
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 Albumin 2.3 Clinical Pain Assessment (nonverbal scale for severity on nonverbal patients):  
Clinical Pain Assessment Severity: 10 Location: chest and back Character: unknown Duration: unknown Effect: unknown Factors: unknown Frequency: unknown Activity (Movement): Lying quietly, normal position Duration: for how long has pt been experiencing pain (e.g., 2 days, 1 month, years) Frequency: how often pain is an issue (e.g., several times per day, once every few days, constant) FUNCTIONAL ASSESSMENT:  
 
Palliative Performance Scale (PPS): PPS: 40 PSYCHOSOCIAL/SPIRITUAL SCREENING:  
 
Palliative IDT has assessed this patient for cultural preferences / practices and a referral made as appropriate to needs (Cultural Services, Patient Advocacy, Ethics, etc.) Any spiritual / Religion concerns: 
[] Yes /  [x] No 
 
Caregiver Burnout: 
[] Yes /  [] No /  [x] No Caregiver Present Anticipatory grief assessment:  
[x] Normal  / [] Maladaptive ESAS Anxiety: Anxiety: 8 ESAS Depression:    
 
 
 REVIEW OF SYSTEMS:  
 
Positive and pertinent negative findings in ROS are noted above in HPI. The following systems were [x] reviewed / [] unable to be reviewed as noted in HPI Other findings are noted below. Systems: constitutional, ears/nose/mouth/throat, respiratory, gastrointestinal, genitourinary, musculoskeletal, integumentary, neurologic, psychiatric, endocrine. Positive findings noted below. Modified ESAS Completed by: provider Fatigue: 7 Drowsiness: 0 Pain: 10 Anxiety: 8 Anorexia: 5 Dyspnea: 2 Constipation: No  
  Stool Occurrence(s): 1 PHYSICAL EXAM:  
 
From RN flowsheet: 
Wt Readings from Last 3 Encounters:  
06/01/21 151 lb 14.4 oz (68.9 kg) 05/09/21 129 lb (58.5 kg) 03/23/21 129 lb (58.5 kg) Blood pressure 125/81, pulse 82, temperature 98.1 °F (36.7 °C), resp. rate 20, height 5' 4\" (1.626 m), weight 151 lb 14.4 oz (68.9 kg), SpO2 93 %. Pain Scale 1: Numeric (0 - 10) Pain Intensity 1: 6 Pain Onset 1: chronic Pain Location 1: Back Pain Orientation 1: Posterior Pain Description 1: Aching Pain Intervention(s) 1: Medication (see MAR) Last bowel movement, if known:  
 
Constitutional: appears stated age, adequately nourished, sleeping, woke easily, NAD Eyes: pupils equal, anicteric ENMT: no nasal discharge, dry mucous membranes Cardiovascular: regular rhythm, distal pulses intact Respiratory: breathing not labored, symmetric, frequent +productive congested cough, coarse breath sounds. Gastrointestinal: soft non-tender, +bowel sounds Musculoskeletal: Left wrist/hand contracture, no tenderness to palpation Skin: warm, dry Neurologic: alert and oriented,  following commands, Left upper arm/hand limited movement, moving all extremities Psychiatric: appropriate affect, no hallucinations Other: 
 
 
 HISTORY:  
 
Active Problems: 
  HTN (hypertension) (1/3/2021) Type II diabetes mellitus ([facility] [address] (1/3/2021) Hypothyroid (1/3/2021) Acute respiratory insufficiency (5/10/2021) HAP (hospital-acquired pneumonia) (2021) Pneumonia of both lungs due to infectious organism (2021) Past Medical History:  
Diagnosis Date  Anxiety and depression  Arthritis  Chronic obstructive pulmonary disease ([facility] [address]  Chronic pain  DM type 2 causing vascular disease ([facility] [address]  GERD (gastroesophageal reflux disease)  Glaucoma   
 HTN (hypertension)  Hx of completed stroke 2021  Hypothyroid  Incontinence  Neuropathy  Polypharmacy Past Surgical History:  
Procedure Laterality Date  HX ORTHOPAEDIC    
 HX VASCULAR ACCESS Family History Problem Relation Age of Onset  No Known Problems Other   
     reviewed, patient did not know  Diabetes Mother  Heart Attack Mother  Cancer Father History reviewed, no pertinent family history. Social History Tobacco Use  Smoking status: Former Smoker Quit date: 1991 Years since quittin.4  Smokeless tobacco: Never Used Substance Use Topics  Alcohol use: Never Allergies Allergen Reactions  Shellfish Derived Anaphylaxis Current Facility-Administered Medications Medication Dose Route Frequency  methylPREDNISolone (PF) (SOLU-MEDROL) injection 40 mg  40 mg IntraVENous Q12H  
 magic mouthwash (FIRST-MOUTHWASH BLM) oral suspension 10 mL  10 mL Oral Q4H PRN  
 FLUoxetine (PROzac) capsule 20 mg  20 mg Oral QPM  
 vancomycin (VANCOCIN) 750 mg in 0.9% sodium chloride 250 mL (VIAL-MATE)  750 mg IntraVENous Q12H  nystatin (MYCOSTATIN) 100,000 unit/mL oral suspension 500,000 Units  500,000 Units Oral QID  oxyCODONE ER (OxyCONTIN) tablet 20 mg  20 mg Oral Q12H  
 oxyCODONE-acetaminophen (PERCOCET 7.5) 7.5-325 mg per tablet 1 Tablet  1 Tablet Oral Q4H PRN  
 morphine injection 2 mg  2 mg IntraVENous Q4H PRN  
 albuterol-ipratropium (DUO-NEB) 2.5 MG-0.5 MG/3 ML  3 mL Nebulization Q4HWA RT  
 bumetanide (BUMEX) injection 1 mg  1 mg IntraVENous BID  amLODIPine (NORVASC) tablet 10 mg  10 mg Oral DAILY  enoxaparin (LOVENOX) injection 40 mg  40 mg SubCUTAneous Q24H  pantoprazole (PROTONIX) tablet 40 mg  40 mg Oral ACB&D  
 guaiFENesin-dextromethorphan (ROBITUSSIN DM) 100-10 mg/5 mL syrup 10 mL  10 mL Oral Q6H  
 lidocaine 4 % patch 1 Patch  1 Patch TransDERmal Q24H  
 gabapentin (NEURONTIN) capsule 600 mg  600 mg Oral BID  
 guaiFENesin ER (MUCINEX) tablet 600 mg  600 mg Oral BID  latanoprost (XALATAN) 0.005 % ophthalmic solution 1 Drop  1 Drop Both Eyes QHS  melatonin (rapid dissolve) tablet 5 mg  5 mg Oral QHS  arformoterol 15 mcg/budesonide 0.5 mg neb solution   Nebulization BID RT  
 hydrALAZINE (APRESOLINE) 20 mg/mL injection 10 mg  10 mg IntraVENous Q6H PRN  
 atorvastatin (LIPITOR) tablet 80 mg  80 mg Oral QHS  aspirin delayed-release tablet 81 mg  81 mg Oral DAILY  clonazePAM (KlonoPIN) tablet 0.25 mg  0.25 mg Oral QPM  
 levothyroxine (SYNTHROID) tablet 75 mcg  75 mcg Oral ACB  meclizine (ANTIVERT) tablet 12.5 mg  12.5 mg Oral Q6H PRN  
 glucose chewable tablet 16 g  4 Tablet Oral PRN  
 dextrose (D50W) injection syrg 12.5-25 g  25-50 mL IntraVENous PRN  
 glucagon (GLUCAGEN) injection 1 mg  1 mg IntraMUSCular PRN  
 sodium chloride (NS) flush 5-40 mL  5-40 mL IntraVENous Q8H  
 sodium chloride (NS) flush 5-40 mL  5-40 mL IntraVENous PRN  
 acetaminophen (TYLENOL) tablet 650 mg  650 mg Oral Q6H PRN Or  
 acetaminophen (TYLENOL) suppository 650 mg  650 mg Rectal Q6H PRN  polyethylene glycol (MIRALAX) packet 17 g  17 g Oral DAILY PRN  
 bisacodyL (DULCOLAX) suppository 10 mg  10 mg Rectal DAILY PRN  
 ondansetron (ZOFRAN) injection 4 mg  4 mg IntraVENous Q6H PRN  
 insulin glargine (LANTUS) injection 12 Units  0.2 Units/kg SubCUTAneous QHS  insulin lispro (HUMALOG) injection   SubCUTAneous AC&HS  
 melatonin (rapid dissolve) tablet 5 mg  5 mg Oral QHS PRN  
 alum-mag hydroxide-simeth (MYLANTA) oral suspension 30 mL  30 mL Oral Q4H PRN  
 LORazepam (ATIVAN) injection 0.5 mg  0.5 mg IntraVENous Q6H PRN  
 diphenhydrAMINE (BENADRYL) injection 25 mg  25 mg IntraVENous Q6H PRN  
 albuterol (PROVENTIL VENTOLIN) nebulizer solution 2.5 mg  2.5 mg Nebulization Q4H PRN  
 simethicone (MYLICON) tablet 80 mg  80 mg Oral QID PRN  
 
 
 
 LAB AND IMAGING FINDINGS:  
 
Lab Results Component Value Date/Time WBC 27.9 (H) 06/01/2021 01:37 AM  
 HGB 10.5 (L) 06/01/2021 01:37 AM  
 PLATELET 297 69/06/6564 01:37 AM  
 
Lab Results Component Value Date/Time Sodium 135 (L) 06/01/2021 01:37 AM  
 Potassium 4.0 06/01/2021 01:37 AM  
 Chloride 95 (L) 06/01/2021 01:37 AM  
 CO2 36 (H) 06/01/2021 01:37 AM  
 BUN 25 (H) 06/01/2021 01:37 AM  
 Creatinine 0.65 06/01/2021 01:37 AM  
 Calcium 7.9 (L) 06/01/2021 01:37 AM  
 Magnesium 1.9 05/31/2021 04:02 AM  
 Phosphorus 3.7 05/31/2021 04:02 AM  
  
Lab Results Component Value Date/Time Alk. phosphatase 73 05/21/2021 09:10 AM  
 Protein, total 5.2 (L) 05/21/2021 09:10 AM  
 Albumin 2.3 (L) 05/21/2021 09:10 AM  
 Globulin 2.9 05/21/2021 09:10 AM  
 
Lab Results Component Value Date/Time INR 1.2 (H) 03/09/2021 03:23 AM  
 Prothrombin time 12.8 (H) 03/09/2021 03:23 AM  
 aPTT 28.6 03/09/2021 03:23 AM  
  
Lab Results Component Value Date/Time Iron 16 (L) 05/09/2021 08:21 PM  
 TIBC 278 05/09/2021 08:21 PM  
 Iron % saturation 6 (L) 05/09/2021 08:21 PM  
  
No results found for: PH, PCO2, PO2 No components found for: [place] No results found for: CPK, CKMB Total time: 25 min Counseling / coordination time, spent as noted above: 20 
> 50% counseling / coordination?: yes Prolonged service was provided for  []30 min   []75 min in face to face time in the presence of the patient, spent as noted above. Time Start/END:  
Time Start/ End: 
Note: this can only be billed with 68939 (initial) or 06202 (follow up). If multiple start / stop times, list each separately.

## 2021-06-02 NOTE — PROGRESS NOTES
6/2/2021   CARE MANAGEMENT NOTE:  CM reviewed EMR for clinical updates.  
READMISSION:  Pt was admitted to [facility] from 5/9/21 - 5/13/21 with COPD.  She discharged home with [facility].  Pt was readmitted to [facility] on 5/19 with sepsis 2/2 PNA, also hx of multiple CVAs and fxs, COPD, cirrhosis, DM. Reportedly, pt resides with her dtr Angelique Ambrose ([phone]).  Pt has caregivers 3 days per week. 
  
RUR 55%; WOP 13 XGGZ 
  
Transition Plan of Care: 1.  SNF - accepted by [facility] 2.  [facility] [employer] [employer] [facility] will be initiated today (6/2) - left 2 vm messages with their admissions to confirm 3.  COVID 19 test for placement was negative on 5/26. An updated test will be needed closer to discharge 4.  [facility] will be needed at discharge 
  
 CM will continue to follow pt for SNF rehab MELE[facility]. Jacqueline Davila

## 2021-06-02 NOTE — PROGRESS NOTES
Name: [facility]: [address]  
: 1940 Admit Date: 2021 Phone: [phone]  Room: [facility]/01 PCP: Joey Draper MD  MRN: [id number] Date: 2021  Code: DNR Ms. Josselin Dumont is a pleasant 81 yo woman with a history of COPD/emphysema (previously followed by a pulmonologist in [place]), chronic respiratory failure with hypoxia, PE, former tobacco use, HTN, DM, hypothyroidism and anxiety who was admitted last week with worsening pneumonia and leukocytosis. Daughter reports worsening hypoxia at home prior to admission with sats in the mid 80s. She currently has chest congestion and weak, nonproductive cough. Reports chest wall soreness with coughing. Fatigues easily with activity. Resp cx this admission with prelim of moderate staph aureus. She is currently on cefepime and vanc. Speech has evaluated. She is on dysphagia diet. She was hospitalized at [address] [place] [address] from - for bilateral PNA. She has had multiple admissions at [address] [place] [address] this year for COPD. She was previously followed in [place] for her COPD and moved to [place] in the last 4 months. She is chronically managed on Trelegy and prn albuterol inhaler/nebulizer. Has home O2 through [address] and is on 2L at baseline. During her last hospitalization she was given levaquin and steroid taper. She notes symptomatic benefit from the prednisone. She has been taken off of blood thinners in the past due to frequent falls.  - chest CT: There is small left pleural effusion and likely worsening left lower lobe interstitial and patchy airspace consolidation.  - MBS: single episode trace aspiration w/ thins. recs are for dysphagia 2, thins  - LE dopplers: negative for DVT *sputum culture () - MRSA Interval history: Afebrile 
sats 99% on 2L WBC 18.1: better K 3.6 Respiratory culture + for moderate MSA and rare filamentous fungus- sending to state lab Fluid balance: -555, net 15L negative ROS:  Feels better. Still very weak. Past Medical History:  
Diagnosis Date  Anxiety and depression  Arthritis  Chronic obstructive pulmonary disease ([facility]  Chronic pain  DM type 2 causing vascular disease ([facility] [address] [facility]  GERD (gastroesophageal reflux disease)  Glaucoma   
 HTN (hypertension)  Hx of completed stroke 2021  Hypothyroid  Incontinence  Neuropathy  Polypharmacy Past Surgical History:  
Procedure Laterality Date  HX ORTHOPAEDIC    
 HX VASCULAR ACCESS Family History Problem Relation Age of Onset  No Known Problems Other   
     reviewed, patient did not know  Diabetes Mother  Heart Attack Mother  Cancer Father Social History Tobacco Use  Smoking status: Former Smoker Quit date: 1991 Years since quittin.4  Smokeless tobacco: Never Used Substance Use Topics  Alcohol use: Never Allergies Allergen Reactions  Shellfish Derived Anaphylaxis Current Facility-Administered Medications Medication Dose Route Frequency  methylPREDNISolone (PF) (SOLU-MEDROL) injection 40 mg  40 mg IntraVENous Q12H  
 magic mouthwash (FIRST-MOUTHWASH BLM) oral suspension 10 mL  10 mL Oral Q4H PRN  
 FLUoxetine (PROzac) capsule 20 mg  20 mg Oral QPM  
 vancomycin (VANCOCIN) 750 mg in 0.9% sodium chloride 250 mL (VIAL-MATE)  750 mg IntraVENous Q12H  nystatin (MYCOSTATIN) 100,000 unit/mL oral suspension 500,000 Units  500,000 Units Oral QID  oxyCODONE ER (OxyCONTIN) tablet 20 mg  20 mg Oral Q12H  
 oxyCODONE-acetaminophen (PERCOCET 7.5) 7.5-325 mg per tablet 1 Tablet  1 Tablet Oral Q4H PRN  
 morphine injection 2 mg  2 mg IntraVENous Q4H PRN  
 albuterol-ipratropium (DUO-NEB) 2.5 MG-0.5 MG/3 ML  3 mL Nebulization Q4HWA RT  
 bumetanide (BUMEX) injection 1 mg  1 mg IntraVENous BID  amLODIPine (NORVASC) tablet 10 mg  10 mg Oral DAILY  enoxaparin (LOVENOX) injection 40 mg  40 mg SubCUTAneous Q24H  pantoprazole (PROTONIX) tablet 40 mg  40 mg Oral ACB&D  
 guaiFENesin-dextromethorphan (ROBITUSSIN DM) 100-10 mg/5 mL syrup 10 mL  10 mL Oral Q6H  
 lidocaine 4 % patch 1 Patch  1 Patch TransDERmal Q24H  
 gabapentin (NEURONTIN) capsule 600 mg  600 mg Oral BID  
 guaiFENesin ER (MUCINEX) tablet 600 mg  600 mg Oral BID  latanoprost (XALATAN) 0.005 % ophthalmic solution 1 Drop  1 Drop Both Eyes QHS  melatonin (rapid dissolve) tablet 5 mg  5 mg Oral QHS  arformoterol 15 mcg/budesonide 0.5 mg neb solution   Nebulization BID RT  
 hydrALAZINE (APRESOLINE) 20 mg/mL injection 10 mg  10 mg IntraVENous Q6H PRN  
 atorvastatin (LIPITOR) tablet 80 mg  80 mg Oral QHS  aspirin delayed-release tablet 81 mg  81 mg Oral DAILY  clonazePAM (KlonoPIN) tablet 0.25 mg  0.25 mg Oral QPM  
 levothyroxine (SYNTHROID) tablet 75 mcg  75 mcg Oral ACB  meclizine (ANTIVERT) tablet 12.5 mg  12.5 mg Oral Q6H PRN  
 glucose chewable tablet 16 g  4 Tablet Oral PRN  
 dextrose (D50W) injection syrg 12.5-25 g  25-50 mL IntraVENous PRN  
 glucagon (GLUCAGEN) injection 1 mg  1 mg IntraMUSCular PRN  
 sodium chloride (NS) flush 5-40 mL  5-40 mL IntraVENous Q8H  
 sodium chloride (NS) flush 5-40 mL  5-40 mL IntraVENous PRN  
 acetaminophen (TYLENOL) tablet 650 mg  650 mg Oral Q6H PRN Or  
 acetaminophen (TYLENOL) suppository 650 mg  650 mg Rectal Q6H PRN  polyethylene glycol (MIRALAX) packet 17 g  17 g Oral DAILY PRN  
 bisacodyL (DULCOLAX) suppository 10 mg  10 mg Rectal DAILY PRN  
 ondansetron (ZOFRAN) injection 4 mg  4 mg IntraVENous Q6H PRN  
 insulin glargine (LANTUS) injection 12 Units  0.2 Units/kg SubCUTAneous QHS  insulin lispro (HUMALOG) injection   SubCUTAneous AC&HS  
 melatonin (rapid dissolve) tablet 5 mg  5 mg Oral QHS PRN  
 alum-mag hydroxide-simeth (MYLANTA) oral suspension 30 mL  30 mL Oral Q4H PRN  
 LORazepam (ATIVAN) injection 0.5 mg  0.5 mg IntraVENous Q6H PRN  
 diphenhydrAMINE (BENADRYL) injection 25 mg  25 mg IntraVENous Q6H PRN  
 albuterol (PROVENTIL VENTOLIN) nebulizer solution 2.5 mg  2.5 mg Nebulization Q4H PRN  
 simethicone (MYLICON) tablet 80 mg  80 mg Oral QID PRN  
 
 
 
REVIEW OF SYSTEMS  
12 point ROS negative except as stated in the HPI. Physical Exam:  
Visit Vitals /66 (BP 1 Location: Right upper arm, BP Patient Position: At rest) Pulse 75 Temp 97.4 °F (36.3 °C) Resp 18 Ht 5' 4\" (1.626 m) Wt 70.8 kg (156 lb 1.4 oz) SpO2 99% BMI 26.79 kg/m² General:  Alert, cooperative, chronically ill appearing, appears stated age. Head:  Normocephalic, without obvious abnormality, atraumatic. Eyes:  Conjunctivae/corneas clear. Nose: Nares normal. Septum midline. Mucosa normal.   
Throat: Lips, mucosa, and tongue normal.   
Neck: Supple, symmetrical, trachea midline, no adenopathy. Lungs:   Congested cough/upper airway, scattered rhonchi Chest wall:    
Heart:  Regular rate and rhythm Abdomen:   Soft, non-tender. Bowel sounds normal.  
Extremities: Anasarca Pulses: 2+ and symmetric all extremities. Skin: Skin color, texture, turgor normal. No rashes or lesions Lymph nodes:   
Neurologic: Grossly nonfocal  
 
 
Lab Results Component Value Date/Time Sodium 135 (L) 06/02/2021 04:32 AM  
 Potassium 3.6 06/02/2021 04:32 AM  
 Chloride 96 (L) 06/02/2021 04:32 AM  
 CO2 37 (H) 06/02/2021 04:32 AM  
 BUN 23 (H) 06/02/2021 04:32 AM  
 Creatinine 0.53 (L) 06/02/2021 04:32 AM  
 Glucose 136 (H) 06/02/2021 04:32 AM  
 Calcium 7.1 (L) 06/02/2021 04:32 AM  
 Magnesium 1.9 05/31/2021 04:02 AM  
 Phosphorus 3.7 05/31/2021 04:02 AM  
 Lactic acid 1.1 05/19/2021 07:50 PM  
 
 
Lab Results Component Value Date/Time WBC 18.1 (H) 06/02/2021 04:32 AM  
 HGB 9.1 (L) 06/02/2021 04:32 AM  
 PLATELET 759 10/42/7796 04:32 AM  
 MCV 80.9 06/02/2021 04:32 AM  
 
 
Lab Results Component Value Date/Time  INR 1.2 (H) 03/09/2021 03:23 AM  
 aPTT 28.6 03/09/2021 03:23 AM  
 Alk. phosphatase 73 05/21/2021 09:10 AM  
 Protein, total 5.2 (L) 05/21/2021 09:10 AM  
 Albumin 2.3 (L) 05/21/2021 09:10 AM  
 Globulin 2.9 05/21/2021 09:10 AM  
 
 
Lab Results Component Value Date/Time Iron 16 (L) 05/09/2021 08:21 PM  
 TIBC 278 05/09/2021 08:21 PM  
 Iron % saturation 6 (L) 05/09/2021 08:21 PM  
 
 
Lab Results Component Value Date/Time TSH 3.12 05/28/2021 03:23 AM  
  
 
No results found for: PH, PHI, PCO2, PCO2I, PO2, PO2I, HCO3, HCO3I, FIO2, FIO2I Lab Results Component Value Date/Time Troponin-I, Qt. <0.05 05/23/2021 08:12 PM  
  
 
Lab Results Component Value Date/Time Culture result:  05/23/2021 08:12 PM  
  MODERATE * METHICILLIN RESISTANT STAPHYLOCOCCUS AUREUS * Culture result: RARE 
FILAMENTOUS FUNGUS 
SENDING TO STATE [facility] 
 (A) 05/23/2021 08:12 PM  
 Culture result: NO NORMAL RESPIRATORY ZAKIYA ISOLATED 05/23/2021 08:12 PM  
 
 
No results found for: TOXA1, RPR, HBCM, HBSAG, HAAB, HCAB1, HAAT, G6PD, CRYAC, HIVGT, HIVR, HIV1, HIV12, HIVPC, HIVRPI Lab Results Component Value Date/Time Vancomycin,trough 19.0 (H) 05/30/2021 05:19 PM  
 Vancomycin,trough 28.5 ([facility]) 05/28/2021 12:25 PM  
 
 
Lab Results Component Value Date/Time Color YELLOW/STRAW 05/19/2021 06:15 PM  
 Appearance CLEAR 05/19/2021 06:15 PM  
 Specific gravity 1.010 05/19/2021 06:15 PM  
 pH (UA) 6.5 05/19/2021 06:15 PM  
 Protein Negative 05/19/2021 06:15 PM  
 Glucose 100 (A) 05/19/2021 06:15 PM  
 Ketone Negative 05/19/2021 06:15 PM  
 Bilirubin Negative 05/19/2021 06:15 PM  
 Blood Negative 05/19/2021 06:15 PM  
 Urobilinogen 0.2 05/19/2021 06:15 PM  
 Nitrites Negative 05/19/2021 06:15 PM  
 Leukocyte Esterase TRACE (A) 05/19/2021 06:15 PM  
 WBC 5-10 05/19/2021 06:15 PM  
 RBC 5-10 05/19/2021 06:15 PM  
 Bacteria Negative 05/19/2021 06:15 PM  
 
 
IMPRESSION 
· Acute hypoxemic respiratory failure · MRSA pneumonia · Acute COPD exacerbation · Hx of PE - not on anticoagulation due to frequent falls · HTN, diastolic dysfunction · Mild AS · H/o hypothyroidism · H/o CVA · Hyperglycemia · [employer] 
· Supplemental O2 as needed to keep sats > 88%, on 2L NC at baseline · Continue vancomycin- last day today · Would get chest CT if any clinical change in respiratory status or fever/significant rise in WBC to check for loculated pleural effusion. · Continue bumex; decrease to daily given serum CO2 trending up · Continue scheduled duonebs, pulmicort, brovana, mucinex · Wean steroids today · Flutter valve and chest PT  
· PT/OOB as much as able · Diet as per speech recs · Nystatin for thrush Cheryl Stone, NP

## 2021-06-02 NOTE — PROGRESS NOTES
Stroke Education provided to patient and the following topics were discussed 1. Patients personal risk factors for stroke are hypertension, hyperlipidemia, diabetes mellitus and prior stroke 2. Warning signs of Stroke: * Sudden numbness or weakness of the face, arm or leg, especially on one side of The body * Sudden confusion, trouble speaking or understanding * Sudden trouble seeing in one or both eyes * Sudden trouble walking, dizziness, loss of balance or coordination * Sudden severe headache with no known cause 3. Importance of activation Emergency Medical Services ( 9-1-1 ) immediately if experience any warning signs of stroke. 4. Be sure and schedule a follow-up appointment with your primary care doctor or any specialists as instructed. 5. You must take medicine every day to treat your risk factors for stroke. Be sure to take your medicines exactly as your doctor tells you: no more, no less. Know what your medicines are for , what they do. Anti-thrombotics /anticoagulants can help prevent strokes. You are taking the following medicine(s)  aspirin,lipitor 6. Smoking and second-hand smoke greatly increase your risk of stroke, cardiovascular disease and death. Smoking history never 7. Information provided was Stroke Handouts 8. Documentation of teaching completed in Patient Education Activity and on Care Plan with teaching response noted? yes

## 2021-06-02 NOTE — WOUND CARE
Wound Consult: Follow up Visit. Chart reviewed. Spoke with patients nurse,  Lino Wilburn RN at bedside to assist with turning. Patient is resting on a [employer]  bed with PEDRO mattress. Heels off loaded with pillows. Patient is awake cooperative; requires 2 assits to move side to side in bed. Purewick in place. Assessment: 
Bilateral heels pink, blanchable Sacrum- blanchable redness, skin intact- sacral foam dressing applied for protection Spine, middle of back- blanchable redness. Foam dressing applied for protection. Left forearm skintear- 50% flap intact with wound bed red slight bloody drainage. Foam reapplied Left lower leg, poaterior- skin tear, seeping serous drainage. 60% flap- red blanchable- hydrocolloid applied Left shin skin tear - resurfaced. Wound Recommendations: 
Continue current orders Left posterior LL- cleanse with NSS and apply duoderm every 3 days Mid spine and Sacrum- apply foam dressings for protection. Change as needed Skin Care / PI Prevention Recommendations: 1. Minimize friction/shear: minimize layers of linen/pads under patient. 2. Off load pressure/reposition:   turn and reposition approximately every 2 hours; float heels with pillows or use off loading heel boots; waffle cushion for sitting; position wedge. 3. Manage Moisture - keep skin folds dry; incontinence skin care with incontinence wipes; Zinc barrier ointment; appropriate sized briefs  ; purewick in use to help contain urine. 4. Continue to monitor nutrition, pain, and skin risk scale, and skin assessment. Plan: 
Spoke with Dr. Rose Talbert regarding findings and proposed orders for treatment. We will continue to reassess  and as needed. Please re-consult should concerns arise despite continued skin/PI prevention measures. Niya, Wound / Ostomy Department Wound Healing Office [phone]

## 2021-06-03 NOTE — PROGRESS NOTES
Palliative Medicine Consult Lisandro: [phone] ([phone]) Patient Name: Stacie Ignacio YOB: 1940 Date of Initial Consult: 2021 Reason for Consult: [address] discussion Requesting Provider: Dr. Lauren Doyle Primary Care Physician: Kathie Walker MD 
 
 SUMMARY:  
Stacie Ignacio is a 80 y.o. 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 Patient presented to ER w/ cc of worsening SOB and new onset of chills over past couple of days. Patient had just been discharged home with antibiotics, on  from hospitalization for CAP. Patient was admitted on 2021 from [facility] with a diagnosis of Sepsis and hypoxia  2/2 Worsening PNA . This is the patients 6th hospitalization since 2021, Current medical issues leading to Palliative Medicine involvement include: GOC discussion Psychosocial hx: Patient from [place], she is ,  of 48 years  2020. Had 4 children, 2 daughters . She moved to [place] on 2020 to live with daughter Alona Garay and her . Patients son, Arturo Brooke lives in [place] with his family, however he has come to [place] to help assist with patients care. Patient has multiple grandchildren. Her jennifer is important to her. Patient stated she is worried about her sons health and is trying to protect him from worrying about her. PALLIATIVE DIAGNOSES:  
 
1. Hyonatremia 2. Hypoxia 3. Pain 4. constipation 5. Anxiety 6. Goals of Care Discussion PLAN:  
1. Follow up today to assess effectiveness of pain treatment plan and to readdress [address] 2. Patient was seen, her daughter at bedside. 3. Patient laying in bed, awake, alert, oriented, mildly anxious discussing discharge plans.  
4. Provided daughter medical update, attempted to answer her questions. 5. Daughter clear that patient is going to be discharged to SNF,  Where she will receive therapy, get stronger so that she can regain ability to ambulate and manage most of her ADLS with minimal assist, then return to her daughters home. 6. I acknowledged the daughters plan, but reiterated that patient is at high risk for recurrent hospitalizations, that it is not likely she will return to prior level of independence. Per daughters request, I reached out to attending Dr. Lise Severin, requested that she contact daughter 7. Anxiety-improved somewhat, patient reports doing a little better since having pain controlled, but still feels anxious, particularly when thinking about going to SNF. She continues to require lorazepam 0.5 mg IV every 6 hours as needed, x 3 to 4  doses every 24 hour period PRN. Patient also has prozac 20mg orally every day. · I am very concerned that patients anxiety will escalate after discharge to SNF and impede her progress with therapy. Scheduling small dose may be beneficial 
8. Pain- Improved. Patient acknowledged pain is better controlled, that she now has some relief. .  Etiology chronic back pain 2/2 history of multiple compression fractures. Back pain secondary to multiple fractures. · Current orders for OxyContin 20mg orally every 12 hours, Percocet 7.5/325 orally every 4 hours as needed (received x 4) and Morphine 2mg IV every 4 hours as needed (received x 0). despite prn usage, I do not recommend adjusting regimen at this time. 9. Constipation-   LBM 5/28? Patient continues to take Senna S 2 tabs twice daily and she has prn miralax order, however she has not received. I will ask patient to drink prune juice, as this seemed to have been effective the last time. 10. Hypoxia-unchanged, continues to require supplemental O2 meeds from 2-4L. patient still with productive coug.  Etiology includes worsening PNA in setting of COPD and pulmonary fibrosis as well as small pleural effusion. · Goals of care discussion-Plan is for patient to be discharged to SNF for rehab. 11.  Initial consult note routed to primary continuity provider and/or primary health care team members 12. Communicated plan of care with: Palliative Carmine AVENDANO 192 Team, unit RN 
 
 GOALS OF CARE / TREATMENT PREFERENCES:  
 
GOALS OF CARE: 
Patient/Health Care Proxy Stated Goals: Cure TREATMENT PREFERENCES:  
Code Status: DNR Advance Care Planning: 
[x] The [facility] Interdisciplinary Team has updated the ACP Navigator with [address] and Patient Capacity Primary Decision Maker: Elisa Olivarez - Daughter - [phone] Secondary Decision Maker: Kai Carson - Son - [phone] Advance Care Planning 5/19/2021 Patient's Healthcare Decision Maker is: - Confirm Advance Directive Yes, on file Patient Would Like to Complete Advance Directive - Medical Interventions: Full interventions Other Instructions: Other: As far as possible, the palliative care team has discussed with patient / health care proxy about goals of care / treatment preferences for patient. HISTORY:  
 
History obtained from: medical records/nurse/patient CHIEF COMPLAINT: Pain HPI/SUBJECTIVE: The patient is:  
[x] Verbal and participatory [] Non-participatory due to:  
  
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 Albumin 2.3 Clinical Pain Assessment (nonverbal scale for severity on nonverbal patients):  
Clinical Pain Assessment Severity: 10 Location: chest and back Character: unknown Duration: unknown Effect: unknown Factors: unknown Frequency: unknown Activity (Movement): Lying quietly, normal position Duration: for how long has pt been experiencing pain (e.g., 2 days, 1 month, years) Frequency: how often pain is an issue (e.g., several times per day, once every few days, constant) FUNCTIONAL ASSESSMENT:  
 
Palliative Performance Scale (PPS): PPS: 40 PSYCHOSOCIAL/SPIRITUAL SCREENING:  
 
Palliative IDT has assessed this patient for cultural preferences / practices and a referral made as appropriate to needs (Cultural Services, Patient Advocacy, Ethics, etc.) Any spiritual / Yazidi concerns: 
[] Yes /  [x] No 
 
Caregiver Burnout: 
[] Yes /  [] No /  [x] No Caregiver Present Anticipatory grief assessment:  
[x] Normal  / [] Maladaptive ESAS Anxiety: Anxiety: 8 ESAS Depression:    
 
 
 REVIEW OF SYSTEMS:  
 
Positive and pertinent negative findings in ROS are noted above in HPI. The following systems were [x] reviewed / [] unable to be reviewed as noted in HPI Other findings are noted below. Systems: constitutional, ears/nose/mouth/throat, respiratory, gastrointestinal, genitourinary, musculoskeletal, integumentary, neurologic, psychiatric, endocrine. Positive findings noted below. Modified ESAS Completed by: provider Fatigue: 7 Drowsiness: 0 Pain: 10 Anxiety: 8 Anorexia: 5 Dyspnea: 2 Constipation: No  
  Stool Occurrence(s): 0 PHYSICAL EXAM:  
 
From RN flowsheet: 
Wt Readings from Last 3 Encounters:  
06/02/21 156 lb 1.4 oz (70.8 kg) 05/09/21 129 lb (58.5 kg) 03/23/21 129 lb (58.5 kg) Blood pressure 121/61, pulse 89, temperature 98 °F (36.7 °C), resp. rate 18, height 5' 4\" (1.626 m), weight 156 lb 1.4 oz (70.8 kg), SpO2 95 %. Pain Scale 1: Numeric (0 - 10) Pain Intensity 1: 6 Pain Onset 1: Chronic Pain Location 1: Back Pain Orientation 1: Posterior Pain Description 1: Aching Pain Intervention(s) 1: Medication (see MAR) 
Last bowel movement, if known:  
 
Constitutional: appears stated age, adequately nourished, sleeping, woke easily, NAD Eyes: pupils equal, anicteric ENMT: no nasal discharge, dry mucous membranes Cardiovascular: regular rhythm, distal pulses intact Respiratory: breathing not labored, symmetric, frequent +productive congested cough, coarse breath sounds. Gastrointestinal: soft non-tender, +bowel sounds Musculoskeletal: Left wrist/hand contracture, no tenderness to palpation Skin: warm, dry Neurologic: alert and oriented,  following commands, Left upper arm/hand limited movement, moving all extremities Psychiatric: appropriate affect, no hallucinations Other: 
 
 
 HISTORY:  
 
Active Problems: 
  HTN (hypertension) (1/3/2021) Type II diabetes mellitus ([facility] [address] (1/3/2021) Hypothyroid (1/3/2021) Acute respiratory insufficiency (5/10/2021) HAP (hospital-acquired pneumonia) (2021) Pneumonia of both lungs due to infectious organism (2021) Past Medical History:  
Diagnosis Date  Anxiety and depression  Arthritis  Chronic obstructive pulmonary disease ([address]  Chronic pain  DM type 2 causing vascular disease ([address]  GERD (gastroesophageal reflux disease)  Glaucoma   
 HTN (hypertension)  Hx of completed stroke 2021  Hypothyroid  Incontinence  Neuropathy  Polypharmacy Past Surgical History:  
Procedure Laterality Date  HX ORTHOPAEDIC    
 HX VASCULAR ACCESS Family History Problem Relation Age of Onset  No Known Problems Other   
     reviewed, patient did not know  Diabetes Mother  Heart Attack Mother  Cancer Father History reviewed, no pertinent family history. Social History Tobacco Use  Smoking status: Former Smoker Quit date: 1991 Years since quittin.4  Smokeless tobacco: Never Used Substance Use Topics  Alcohol use: Never Allergies Allergen Reactions  Shellfish Derived Anaphylaxis Current Facility-Administered Medications Medication Dose Route Frequency  methylPREDNISolone (PF) (SOLU-MEDROL) injection 20 mg  20 mg IntraVENous Q12H  
 [START ON 6/3/2021] bumetanide (BUMEX) injection 1 mg  1 mg IntraVENous DAILY  insulin glargine (LANTUS) injection 15 Units  15 Units SubCUTAneous QHS  magic mouthwash (FIRST-MOUTHWASH BLM) oral suspension 10 mL  10 mL Oral Q4H PRN  
 FLUoxetine (PROzac) capsule 20 mg  20 mg Oral QPM  
 nystatin (MYCOSTATIN) 100,000 unit/mL oral suspension 500,000 Units  500,000 Units Oral QID  oxyCODONE ER (OxyCONTIN) tablet 20 mg  20 mg Oral Q12H  
 oxyCODONE-acetaminophen (PERCOCET 7.5) 7.5-325 mg per tablet 1 Tablet  1 Tablet Oral Q4H PRN  
 morphine injection 2 mg  2 mg IntraVENous Q4H PRN  
 albuterol-ipratropium (DUO-NEB) 2.5 MG-0.5 MG/3 ML  3 mL Nebulization Q4HWA RT  
 amLODIPine (NORVASC) tablet 10 mg  10 mg Oral DAILY  enoxaparin (LOVENOX) injection 40 mg  40 mg SubCUTAneous Q24H  pantoprazole (PROTONIX) tablet 40 mg  40 mg Oral ACB&D  
 guaiFENesin-dextromethorphan (ROBITUSSIN DM) 100-10 mg/5 mL syrup 10 mL  10 mL Oral Q6H  
 lidocaine 4 % patch 1 Patch  1 Patch TransDERmal Q24H  
 gabapentin (NEURONTIN) capsule 600 mg  600 mg Oral BID  
 guaiFENesin ER (MUCINEX) tablet 600 mg  600 mg Oral BID  latanoprost (XALATAN) 0.005 % ophthalmic solution 1 Drop  1 Drop Both Eyes QHS  melatonin (rapid dissolve) tablet 5 mg  5 mg Oral QHS  arformoterol 15 mcg/budesonide 0.5 mg neb solution   Nebulization BID RT  
 hydrALAZINE (APRESOLINE) 20 mg/mL injection 10 mg  10 mg IntraVENous Q6H PRN  
 atorvastatin (LIPITOR) tablet 80 mg  80 mg Oral QHS  aspirin delayed-release tablet 81 mg  81 mg Oral DAILY  clonazePAM (KlonoPIN) tablet 0.25 mg  0.25 mg Oral QPM  
 levothyroxine (SYNTHROID) tablet 75 mcg  75 mcg Oral ACB  meclizine (ANTIVERT) tablet 12.5 mg  12.5 mg Oral Q6H PRN  
 glucose chewable tablet 16 g  4 Tablet Oral PRN  
 dextrose (D50W) injection syrg 12.5-25 g  25-50 mL IntraVENous PRN  
 glucagon (GLUCAGEN) injection 1 mg  1 mg IntraMUSCular PRN  
 sodium chloride (NS) flush 5-40 mL  5-40 mL IntraVENous Q8H  
 sodium chloride (NS) flush 5-40 mL  5-40 mL IntraVENous PRN  
 acetaminophen (TYLENOL) tablet 650 mg  650 mg Oral Q6H PRN Or  
 acetaminophen (TYLENOL) suppository 650 mg  650 mg Rectal Q6H PRN  polyethylene glycol (MIRALAX) packet 17 g  17 g Oral DAILY PRN  
 bisacodyL (DULCOLAX) suppository 10 mg  10 mg Rectal DAILY PRN  
 ondansetron (ZOFRAN) injection 4 mg  4 mg IntraVENous Q6H PRN  
 insulin lispro (HUMALOG) injection   SubCUTAneous AC&HS  
 melatonin (rapid dissolve) tablet 5 mg  5 mg Oral QHS PRN  
 alum-mag hydroxide-simeth (MYLANTA) oral suspension 30 mL  30 mL Oral Q4H PRN  
 LORazepam (ATIVAN) injection 0.5 mg  0.5 mg IntraVENous Q6H PRN  
 diphenhydrAMINE (BENADRYL) injection 25 mg  25 mg IntraVENous Q6H PRN  
 albuterol (PROVENTIL VENTOLIN) nebulizer solution 2.5 mg  2.5 mg Nebulization Q4H PRN  
 simethicone (MYLICON) tablet 80 mg  80 mg Oral QID PRN  
 
 
 
 LAB AND IMAGING FINDINGS:  
 
Lab Results Component Value Date/Time WBC 18.1 (H) 06/02/2021 04:32 AM  
 HGB 9.1 (L) 06/02/2021 04:32 AM  
 PLATELET 012 59/20/7939 04:32 AM  
 
Lab Results Component Value Date/Time Sodium 135 (L) 06/02/2021 04:32 AM  
 Potassium 3.6 06/02/2021 04:32 AM  
 Chloride 96 (L) 06/02/2021 04:32 AM  
 CO2 37 (H) 06/02/2021 04:32 AM  
 BUN 23 (H) 06/02/2021 04:32 AM  
 Creatinine 0.53 (L) 06/02/2021 04:32 AM  
 Calcium 7.1 (L) 06/02/2021 04:32 AM  
 Magnesium 1.9 05/31/2021 04:02 AM  
 Phosphorus 3.7 05/31/2021 04:02 AM  
  
Lab Results Component Value Date/Time Alk. phosphatase 73 05/21/2021 09:10 AM  
 Protein, total 5.2 (L) 05/21/2021 09:10 AM  
 Albumin 2.3 (L) 05/21/2021 09:10 AM  
 Globulin 2.9 05/21/2021 09:10 AM  
 
Lab Results Component Value Date/Time INR 1.2 (H) 03/09/2021 03:23 AM  
 Prothrombin time 12.8 (H) 03/09/2021 03:23 AM  
 aPTT 28.6 03/09/2021 03:23 AM  
  
Lab Results Component Value Date/Time Iron 16 (L) 05/09/2021 08:21 PM  
 TIBC 278 05/09/2021 08:21 PM  
 Iron % saturation 6 (L) 05/09/2021 08:21 PM  
  
No results found for: PH, PCO2, PO2 No components found for: [place] No results found for: CPK, CKMB Total time: 65min Counseling / coordination time, spent as noted above: 55 
> 50% counseling / coordination?: yes Prolonged service was provided for  [x]30 min   []75 min in face to face time in the presence of the patient, spent as noted above. Time Start/END:  
Time Start/ End: 
Note: this can only be billed with 82760 (initial) or 70999 (follow up). If multiple start / stop times, list each separately.

## 2021-06-03 NOTE — CONSULTS
Vascular Surgery Consult Note 6/3/2021 Subjective:  
 
Nubia Stanley is a 80 y.o.  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  She denies any antecedant history of arm pain or stereotypical steal syndrome. She has had a stent placed in her innominate artery. In addition she has bilateral aortoiliac occlussive disease which is assymptomatic at her level of activity. Past Medical History:  
Diagnosis Date  Anxiety and depression  Arthritis  Chronic obstructive pulmonary disease ([facility] [address]  Chronic pain  DM type 2 causing vascular disease ([facility] [address]  GERD (gastroesophageal reflux disease)  Glaucoma   
 HTN (hypertension)  Hx of completed stroke 2021  Hypothyroid  Incontinence  Neuropathy  Polypharmacy Past Surgical History:  
Procedure Laterality Date  HX ORTHOPAEDIC    
 HX VASCULAR ACCESS Family History Problem Relation Age of Onset  No Known Problems Other   
     reviewed, patient did not know  Diabetes Mother  Heart Attack Mother  Cancer Father Social History Tobacco Use  Smoking status: Former Smoker Quit date: 1991 Years since quittin.4  Smokeless tobacco: Never Used Substance Use Topics  Alcohol use: Never Prior to Admission medications Medication Sig Start Date End Date Taking? Authorizing Provider  
albuterol sulfate (PROVENTIL;VENTOLIN) 2.5 mg/0.5 mL nebu nebulizer solution 2.5 mg by Nebulization route every six (6) hours as needed for Wheezing or Shortness of Breath (Generally uses 2-3 times daily). Yes Provider, Historical  
calcium-cholecalciferol, D3, (CALTRATE 600+D) tablet Take 1 Tab by mouth daily (with breakfast). Yes Provider, Historical  
clonazePAM (KlonoPIN) 0.25 mg TbDi Take 0.25 mg by mouth every evening.  ODT   Yes Provider, Historical  
oxyCODONE IR (ROXICODONE) 5 mg immediate release tablet Take 5 mg by mouth two (2) times a day. Oxycodone 5 mg 0100 and 1300   Yes Provider, Historical  
FLUoxetine (PROzac) 10 mg capsule Take 10 mg by mouth every evening. Yes Provider, Historical  
guaiFENesin ER (Mucinex) 600 mg ER tablet Take 600 mg by mouth two (2) times a day. Yes Provider, Historical  
urea (URE-NA) 15 gram packet Take 1 Packet by mouth daily. 3/10/21  Yes Matias Ocasio MD  
albuterol (PROVENTIL HFA, VENTOLIN HFA, PROAIR HFA) 90 mcg/actuation inhaler Take 1 Puff by inhalation every six (6) hours as needed for Wheezing. Yes Provider, Historical  
furosemide (LASIX) 20 mg tablet Take 20 mg by mouth daily as needed (edema). Requires only sparingly, daughter reports only 2x in past 6 months   Yes Provider, Historical  
aspirin delayed-release 81 mg tablet Take 1 Tab by mouth daily. 2/27/21  Yes Imani Hoffman MD  
oxyCODONE ER Marry Heady ER) 9 mg capsule Take 9 mg by mouth every twelve (12) hours. Xtampza 0700 and 1900   Yes Provider, Historical  
insulin lispro (HumaLOG KwikPen Insulin) 100 unit/mL kwikpen by SubCUTAneous route Before breakfast, lunch, and dinner. Sliding scale 2-12 units. Generally uses 2 units if needed. Yes Provider, Historical  
latanoprost (XALATAN) 0.005 % ophthalmic solution Administer 1 Drop to both eyes nightly. Yes Other, MD Jann  
insulin glargine (Lantus Solostar U-100 Insulin) 100 unit/mL (3 mL) inpn 16 Units by SubCUTAneous route every morning. Yes Other, MD Jann  
levothyroxine (Levo-T) 75 mcg tablet Take 75 mcg by mouth Daily (before breakfast). Yes Other, MD Jann  
tolterodine ER (DETROL LA) 4 mg ER capsule Take 4 mg by mouth daily. Yes Elicia, MD Jann  
atorvastatin (LIPITOR) 80 mg tablet Take 80 mg by mouth nightly. Yes Other, MD Jann  
pantoprazole (PROTONIX) 40 mg tablet Take 40 mg by mouth daily. Yes Other, MD Jann  
gabapentin (NEURONTIN) 300 mg capsule Take 600 mg by mouth two (2) times a day.    Yes Elicia, MD Jann fluticasone-umeclidinium-vilanterol (Trelegy Ellipta) 100-62.5-25 mcg inhaler Take 1 Puff by inhalation daily. Yes Provider, Historical  
melatonin 5 mg tablet Take 5 mg by mouth nightly. Yes Provider, Historical  
magnesium oxide (MAG-OX) 400 mg tablet Take 400 mg by mouth daily. Yes Provider, Historical  
B.infantis-B.ani-B.long-B.bifi (Probiotic 4X) 10-15 mg TbEC Take 2 Caps by mouth two (2) times a day. Yes Provider, Historical  
BENEFIBER, GUAR GUM, PO Take 1 Dose by mouth daily. 1 dose - 2 teaspoons    Yes Provider, Historical  
 
Allergies Allergen Reactions  Shellfish Derived Anaphylaxis Review of Systems: 
Review of Systems Constitutional: Positive for activity change, chills and fever. Respiratory: Positive for shortness of breath. Cardiovascular: Positive for leg swelling. All other systems reviewed and are negative. Objective:  
 
 
Patient Vitals for the past 24 hrs: 
 BP Temp Pulse Resp SpO2  
06/03/21 0805   74    
06/03/21 0743     95 % 06/03/21 0735     94 % 06/03/21 0728     94 % 06/03/21 0726 (!) 135/59 97.4 °F (36.3 °C) 80 20 94 % 06/03/21 0447 135/70 97.5 °F (36.4 °C) 73 20 100 % 06/03/21 0138 138/64 97.8 °F (36.6 °C) 83 20 96 % 06/02/21 2318   81    
06/02/21 2031     95 % 06/02/21 1928 121/61 98 °F (36.7 °C) 89 18 95 % 06/02/21 1602 (!) 127/55 97.9 °F (36.6 °C) 90 18 95 % 06/02/21 1528     96 % 06/02/21 1505   83    
06/02/21 1127     95 % 06/02/21 1120 135/67 97.8 °F (36.6 °C) 91 18 94 %  
 
--------------------------------------------------------------------------------------------------------- Physical Exam:  
Physical Exam 
Constitutional:   
   Appearance: She is ill-appearing. HENT:  
   Head: Normocephalic and atraumatic. Cardiovascular:  
   Rate and Rhythm: Normal rate and regular rhythm. Heart sounds: Normal heart sounds. Pulmonary:  
   Breath sounds: Rhonchi present.   
Skin: 
 General: Skin is warm and dry. Neurological:  
   Mental Status: She is alert. Motor: Weakness present. Pertinent Test Results:  
Recent Results (from the past 24 hour(s)) GLUCOSE, POC Collection Time: 06/02/21 11:19 AM  
Result Value Ref Range Glucose (POC) 262 (H) 65 - 117 mg/dL Performed by Amirah Caban (PCT) GLUCOSE, POC Collection Time: 06/02/21  4:05 PM  
Result Value Ref Range Glucose (POC) 216 (H) 65 - 117 mg/dL Performed by Amirah Caban (PCT) GLUCOSE, POC Collection Time: 06/02/21  8:42 PM  
Result Value Ref Range Glucose (POC) 277 (H) 65 - 117 mg/dL Performed by Meka Ruiz (PCT) CBC WITH AUTOMATED DIFF Collection Time: 06/03/21  3:58 AM  
Result Value Ref Range WBC 16.1 (H) 3.6 - 11.0 K/uL  
 RBC 3.72 (L) 3.80 - 5.20 M/uL HGB 9.1 (L) 11.5 - 16.0 g/dL HCT 30.4 (L) 35.0 - 47.0 % MCV 81.7 80.0 - 99.0 FL  
 MCH 24.5 (L) 26.0 - 34.0 PG  
 MCHC 29.9 (L) 30.0 - 36.5 g/dL  
 RDW 15.0 (H) 11.5 - 14.5 % PLATELET 287 369 - 538 K/uL MPV 10.3 8.9 - 12.9 FL  
 NRBC 0.0 0  WBC ABSOLUTE NRBC 0.00 0.00 - 0.01 K/uL NEUTROPHILS 94 (H) 32 - 75 % LYMPHOCYTES 2 (L) 12 - 49 % MONOCYTES 3 (L) 5 - 13 % EOSINOPHILS 0 0 - 7 % BASOPHILS 0 0 - 1 % IMMATURE GRANULOCYTES 1 (H) 0.0 - 0.5 % ABS. NEUTROPHILS 15.1 (H) 1.8 - 8.0 K/UL  
 ABS. LYMPHOCYTES 0.3 (L) 0.8 - 3.5 K/UL  
 ABS. MONOCYTES 0.5 0.0 - 1.0 K/UL  
 ABS. EOSINOPHILS 0.0 0.0 - 0.4 K/UL  
 ABS. BASOPHILS 0.0 0.0 - 0.1 K/UL  
 ABS. IMM. GRANS. 0.2 (H) 0.00 - 0.04 K/UL  
 DF SMEAR SCANNED    
 RBC COMMENTS MICROCYTOSIS 
PRESENT 
    
 RBC COMMENTS MACROCYTOSIS 
PRESENT 
    
 RBC COMMENTS OVALOCYTES PRESENT 
    
 RBC COMMENTS ANISOCYTOSIS 1+ 
    
 RBC COMMENTS POIKILOCYTOSIS 
1+ METABOLIC PANEL, BASIC Collection Time: 06/03/21  3:58 AM  
Result Value Ref Range Sodium 135 (L) 136 - 145 mmol/L Potassium 4.3 3.5 - 5.1 mmol/L  Chloride 98 97 - 108 mmol/L  
 CO2 34 (H) 21 - 32 mmol/L Anion gap 3 (L) 5 - 15 mmol/L Glucose 235 (H) 65 - 100 mg/dL BUN 21 (H) 6 - 20 MG/DL Creatinine 0.51 (L) 0.55 - 1.02 MG/DL  
 BUN/Creatinine ratio 41 (H) 12 - 20 GFR est AA >60 >60 ml/min/1.73m2 GFR est non-AA >60 >60 ml/min/1.73m2 Calcium 7.3 (L) 8.5 - 10.1 MG/DL  
SARS-COV-2 Collection Time: 06/03/21  7:06 AM  
Result Value Ref Range SARS-CoV-2 Please find results under separate order GLUCOSE, POC Collection Time: 06/03/21  7:17 AM  
Result Value Ref Range Glucose (POC) 295 (H) 65 - 117 mg/dL Performed by Honey El (PCT) Assessmen/Plan:  
Elderly female with multiple medical problems and PVD in multiple vascular beds. She is essentially assymptomatic from these and certainly does not need any acute intervention in her current situation. I have recommended that she follow up with her vascular surgeon when things settle down so they can develop a game plan for further monitoring. Thank you for the consult. Please call if you have any questions. Signed By: Catrachita Cabrera MD   
 Karly 3, 2021

## 2021-06-03 NOTE — PROGRESS NOTES
6/3/2021   CARE MANAGEMENT NOTE:  CM reviewed EMR for clinical updates.  
READMISSION:  Pt was admitted to [facility] from 5/9/21 - 5/13/21 with COPD.  She discharged home with [facility].  Pt was readmitted to [facility] on 5/19 with sepsis 2/2 PNA, also hx of multiple CVAs and fxs, COPD, cirrhosis, DM. Reportedly, pt resides with her dtr Jimy Mckoy ([phone]).  Pt has caregivers 3 days per week. 
  
RUR 53%; YVT 65 XNPX 
  
Transition Plan of Care: 1.  SNF - accepted by [facility] 2.  [facility] Medicare [facility] [place] is pending; CM spoke to SNF admissions this a.m. and confirmed auth initiation 3.  COVID 19 test for placement was negative on 5/26. An updated Covid test was completed on 6/3 and results are pending 4.  [facility] will be needed at discharge 
  
 CM will continue to follow pt for SNF rehab YONY Barakat

## 2021-06-03 NOTE — PROGRESS NOTES
Bedside shift change report given to Angella Dixon (oncoming nurse) by Yola Ospina (offgoing nurse). Report included the following information SBAR, Kardex, Intake/Output, MAR and Recent Results.

## 2021-06-03 NOTE — PROGRESS NOTES
Name: [facility]: [address]  
: 1940 Admit Date: 2021 Phone: [phone]  Room: [facility]/01 PCP: Kiana Tolentino MD  MRN: [id number] Date: 6/3/2021  Code: DNR Ms. Harry Child is a pleasant 79 yo woman with a history of COPD/emphysema (previously followed by a pulmonologist in [place]), chronic respiratory failure with hypoxia, PE, former tobacco use, HTN, DM, hypothyroidism and anxiety who was admitted last week with worsening pneumonia and leukocytosis. Daughter reports worsening hypoxia at home prior to admission with sats in the mid 80s. She currently has chest congestion and weak, nonproductive cough. Reports chest wall soreness with coughing. Fatigues easily with activity. Resp cx this admission with prelim of moderate staph aureus. She is currently on cefepime and vanc. Speech has evaluated. She is on dysphagia diet. She was hospitalized at [facility] from - for bilateral PNA. She has had multiple admissions at [facility] this year for COPD. She was previously followed in [place] for her COPD and moved to [place] in the last 4 months. She is chronically managed on Trelegy and prn albuterol inhaler/nebulizer. Has home O2 through [address] and is on 2L at baseline. During her last hospitalization she was given levaquin and steroid taper. She notes symptomatic benefit from the prednisone. She has been taken off of blood thinners in the past due to frequent falls.  - chest CT: There is small left pleural effusion and likely worsening left lower lobe interstitial and patchy airspace consolidation.  - MBS: single episode trace aspiration w/ thins. recs are for dysphagia 2, thins  - LE dopplers: negative for DVT *sputum culture () - MRSA Interval history: Afebrile 
sats 96% on 2L WBC 16.1; better Respiratory culture + for moderate MSA and rare filamentous fungus- sending to state lab Fluid balance: -1380; net 17L negative ROS:  Feels better. Eating lunch with daughter. Still feels weak. Past Medical History:  
Diagnosis Date  Anxiety and depression  Arthritis  Chronic obstructive pulmonary disease ([facility]  Chronic pain  DM type 2 causing vascular disease ([facility] [facility]  GERD (gastroesophageal reflux disease)  Glaucoma   
 HTN (hypertension)  Hx of completed stroke 2021  Hypothyroid  Incontinence  Neuropathy  Polypharmacy Past Surgical History:  
Procedure Laterality Date  HX ORTHOPAEDIC    
 HX VASCULAR ACCESS Family History Problem Relation Age of Onset  No Known Problems Other   
     reviewed, patient did not know  Diabetes Mother  Heart Attack Mother  Cancer Father Social History Tobacco Use  Smoking status: Former Smoker Quit date: 1991 Years since quittin.4  Smokeless tobacco: Never Used Substance Use Topics  Alcohol use: Never Allergies Allergen Reactions  Shellfish Derived Anaphylaxis Current Facility-Administered Medications Medication Dose Route Frequency  albuterol-ipratropium (DUO-NEB) 2.5 MG-0.5 MG/3 ML  3 mL Nebulization Q6HWA RT  
 methylPREDNISolone (PF) (SOLU-MEDROL) injection 20 mg  20 mg IntraVENous Q12H  
 bumetanide (BUMEX) injection 1 mg  1 mg IntraVENous DAILY  insulin glargine (LANTUS) injection 15 Units  15 Units SubCUTAneous QHS  magic mouthwash (FIRST-MOUTHWASH BLM) oral suspension 10 mL  10 mL Oral Q4H PRN  
 FLUoxetine (PROzac) capsule 20 mg  20 mg Oral QPM  
 nystatin (MYCOSTATIN) 100,000 unit/mL oral suspension 500,000 Units  500,000 Units Oral QID  oxyCODONE ER (OxyCONTIN) tablet 20 mg  20 mg Oral Q12H  
 oxyCODONE-acetaminophen (PERCOCET 7.5) 7.5-325 mg per tablet 1 Tablet  1 Tablet Oral Q4H PRN  
 morphine injection 2 mg  2 mg IntraVENous Q4H PRN  
 amLODIPine (NORVASC) tablet 10 mg  10 mg Oral DAILY  enoxaparin (LOVENOX) injection 40 mg  40 mg SubCUTAneous Q24H  pantoprazole (PROTONIX) tablet 40 mg  40 mg Oral ACB&D  
 guaiFENesin-dextromethorphan (ROBITUSSIN DM) 100-10 mg/5 mL syrup 10 mL  10 mL Oral Q6H  
 lidocaine 4 % patch 1 Patch  1 Patch TransDERmal Q24H  
 gabapentin (NEURONTIN) capsule 600 mg  600 mg Oral BID  
 guaiFENesin ER (MUCINEX) tablet 600 mg  600 mg Oral BID  latanoprost (XALATAN) 0.005 % ophthalmic solution 1 Drop  1 Drop Both Eyes QHS  melatonin (rapid dissolve) tablet 5 mg  5 mg Oral QHS  arformoterol 15 mcg/budesonide 0.5 mg neb solution   Nebulization BID RT  
 hydrALAZINE (APRESOLINE) 20 mg/mL injection 10 mg  10 mg IntraVENous Q6H PRN  
 atorvastatin (LIPITOR) tablet 80 mg  80 mg Oral QHS  aspirin delayed-release tablet 81 mg  81 mg Oral DAILY  clonazePAM (KlonoPIN) tablet 0.25 mg  0.25 mg Oral QPM  
 levothyroxine (SYNTHROID) tablet 75 mcg  75 mcg Oral ACB  meclizine (ANTIVERT) tablet 12.5 mg  12.5 mg Oral Q6H PRN  
 glucose chewable tablet 16 g  4 Tablet Oral PRN  
 dextrose (D50W) injection syrg 12.5-25 g  25-50 mL IntraVENous PRN  
 glucagon (GLUCAGEN) injection 1 mg  1 mg IntraMUSCular PRN  
 sodium chloride (NS) flush 5-40 mL  5-40 mL IntraVENous Q8H  
 sodium chloride (NS) flush 5-40 mL  5-40 mL IntraVENous PRN  
 acetaminophen (TYLENOL) tablet 650 mg  650 mg Oral Q6H PRN Or  
 acetaminophen (TYLENOL) suppository 650 mg  650 mg Rectal Q6H PRN  polyethylene glycol (MIRALAX) packet 17 g  17 g Oral DAILY PRN  
 bisacodyL (DULCOLAX) suppository 10 mg  10 mg Rectal DAILY PRN  
 ondansetron (ZOFRAN) injection 4 mg  4 mg IntraVENous Q6H PRN  
 insulin lispro (HUMALOG) injection   SubCUTAneous AC&HS  
 melatonin (rapid dissolve) tablet 5 mg  5 mg Oral QHS PRN  
 alum-mag hydroxide-simeth (MYLANTA) oral suspension 30 mL  30 mL Oral Q4H PRN  
 LORazepam (ATIVAN) injection 0.5 mg  0.5 mg IntraVENous Q6H PRN  
 diphenhydrAMINE (BENADRYL) injection 25 mg  25 mg IntraVENous Q6H PRN  
 albuterol (PROVENTIL VENTOLIN) nebulizer solution 2.5 mg  2.5 mg Nebulization Q4H PRN  
 simethicone (MYLICON) tablet 80 mg  80 mg Oral QID PRN  
 
 
 
REVIEW OF SYSTEMS  
12 point ROS negative except as stated in the HPI. Physical Exam:  
Visit Vitals BP (!) 143/64 (BP 1 Location: Left upper arm, BP Patient Position: At rest) Pulse 83 Temp 97.8 °F (36.6 °C) Resp 20 Ht 5' 4\" (1.626 m) Wt 70.8 kg (156 lb 1.4 oz) SpO2 96% BMI 26.79 kg/m² General:  Alert, cooperative, chronically ill appearing, appears stated age. Head:  Normocephalic, without obvious abnormality, atraumatic. Eyes:  Conjunctivae/corneas clear. Nose: Nares normal. Septum midline. Mucosa normal.   
Throat: Lips, mucosa, and tongue normal.   
Neck: Supple, symmetrical, trachea midline, no adenopathy. Lungs:   Congested cough/upper airway, scattered rhonchi Chest wall:    
Heart:  Regular rate and rhythm Abdomen:   Soft, non-tender. Bowel sounds normal.  
Extremities: Anasarca Pulses: 2+ and symmetric all extremities. Skin: Skin color, texture, turgor normal. No rashes or lesions Lymph nodes:   
Neurologic: Grossly nonfocal  
 
 
Lab Results Component Value Date/Time Sodium 135 (L) 06/03/2021 03:58 AM  
 Potassium 4.3 06/03/2021 03:58 AM  
 Chloride 98 06/03/2021 03:58 AM  
 CO2 34 (H) 06/03/2021 03:58 AM  
 BUN 21 (H) 06/03/2021 03:58 AM  
 Creatinine 0.51 (L) 06/03/2021 03:58 AM  
 Glucose 235 (H) 06/03/2021 03:58 AM  
 Calcium 7.3 (L) 06/03/2021 03:58 AM  
 Magnesium 1.9 05/31/2021 04:02 AM  
 Phosphorus 3.7 05/31/2021 04:02 AM  
 Lactic acid 1.1 05/19/2021 07:50 PM  
 
 
Lab Results Component Value Date/Time WBC 16.1 (H) 06/03/2021 03:58 AM  
 HGB 9.1 (L) 06/03/2021 03:58 AM  
 PLATELET 149 22/66/7922 03:58 AM  
 MCV 81.7 06/03/2021 03:58 AM  
 
 
Lab Results Component Value Date/Time INR 1.2 (H) 03/09/2021 03:23 AM  
 aPTT 28.6 03/09/2021 03:23 AM  
 Alk.  phosphatase 73 05/21/2021 09:10 AM  
 Protein, total 5.2 (L) 05/21/2021 09:10 AM  
 Albumin 2.3 (L) 05/21/2021 09:10 AM  
 Globulin 2.9 05/21/2021 09:10 AM  
 
 
Lab Results Component Value Date/Time Iron 16 (L) 05/09/2021 08:21 PM  
 TIBC 278 05/09/2021 08:21 PM  
 Iron % saturation 6 (L) 05/09/2021 08:21 PM  
 
 
Lab Results Component Value Date/Time TSH 3.12 05/28/2021 03:23 AM  
  
 
No results found for: PH, PHI, PCO2, PCO2I, PO2, PO2I, HCO3, HCO3I, FIO2, FIO2I Lab Results Component Value Date/Time Troponin-I, Qt. <0.05 05/23/2021 08:12 PM  
  
 
Lab Results Component Value Date/Time Culture result:  05/23/2021 08:12 PM  
  MODERATE * METHICILLIN RESISTANT STAPHYLOCOCCUS AUREUS * Culture result: (A) 05/23/2021 08:12 PM  
  RARE 
[address] [facility] LAB REFER TO [id number] Culture result: NO NORMAL RESPIRATORY ZAKIYA ISOLATED 05/23/2021 08:12 PM  
 
 
No results found for: TOXA1, RPR, HBCM, HBSAG, HAAB, HCAB1, HAAT, G6PD, CRYAC, HIVGT, HIVR, HIV1, HIV12, HIVPC, HIVRPI Lab Results Component Value Date/Time Vancomycin,trough 19.0 (H) 05/30/2021 05:19 PM  
 Vancomycin,trough 28.5 ([facility]) 05/28/2021 12:25 PM  
 
 
Lab Results Component Value Date/Time Color YELLOW/STRAW 05/19/2021 06:15 PM  
 Appearance CLEAR 05/19/2021 06:15 PM  
 Specific gravity 1.010 05/19/2021 06:15 PM  
 pH (UA) 6.5 05/19/2021 06:15 PM  
 Protein Negative 05/19/2021 06:15 PM  
 Glucose 100 (A) 05/19/2021 06:15 PM  
 Ketone Negative 05/19/2021 06:15 PM  
 Bilirubin Negative 05/19/2021 06:15 PM  
 Blood Negative 05/19/2021 06:15 PM  
 Urobilinogen 0.2 05/19/2021 06:15 PM  
 Nitrites Negative 05/19/2021 06:15 PM  
 Leukocyte Esterase TRACE (A) 05/19/2021 06:15 PM  
 WBC 5-10 05/19/2021 06:15 PM  
 RBC 5-10 05/19/2021 06:15 PM  
 Bacteria Negative 05/19/2021 06:15 PM  
 
 
IMPRESSION 
· Acute hypoxemic respiratory failure · MRSA pneumonia · Acute COPD exacerbation · Hx of PE - not on anticoagulation due to frequent falls · HTN, diastolic dysfunction · Mild AS · H/o hypothyroidism · H/o CVA · Hyperglycemia · [employer] 
· Supplemental O2 as needed to keep sats > 88%, on 2L NC at baseline · Completed Vanc · Would get chest CT if any clinical change in respiratory status or fever/significant rise in WBC to check for loculated pleural effusion. · Stop Bumex · Continue scheduled duonebs, pulmicort, brovana, mucinex · Switch steroids to PO 
· Flutter valve and chest PT  
· PT/OOB as much as able · Diet as per speech recs · Nystatin for thrush Vickii Na, NP

## 2021-06-03 NOTE — PROGRESS NOTES
Problem: Falls - Risk of 
Goal: *Absence of Falls Description: Document Anita Spare Fall Risk and appropriate interventions in the flowsheet. Outcome: Progressing Towards Goal 
Note: Fall Risk Interventions: 
Mobility Interventions: Bed/chair exit alarm, OT consult for ADLs, PT Consult for assist device competence, PT Consult for mobility concerns, Patient to call before getting OOB, Communicate number of staff needed for ambulation/transfer Mentation Interventions: Bed/chair exit alarm, Door open when patient unattended, Toileting rounds, More frequent rounding, Familiar objects from home, Evaluate medications/consider consulting pharmacy, Eyeglasses and hearing aids Medication Interventions: Bed/chair exit alarm, Evaluate medications/consider consulting pharmacy, Patient to call before getting OOB, Teach patient to arise slowly Elimination Interventions: Bed/chair exit alarm, Call light in reach, Elevated toilet seat, Patient to call for help with toileting needs History of Falls Interventions: Bed/chair exit alarm, Evaluate medications/consider consulting pharmacy, Investigate reason for fall, Room close to nurse's station, Vital signs minimum Q4HRs X 24 hrs (comment for end date) Problem: Patient Education: Go to Patient Education Activity Goal: Patient/Family Education Outcome: Progressing Towards Goal 
  
Problem: Pressure Injury - Risk of 
Goal: *Prevention of pressure injury Description: Document Edouard Scale and appropriate interventions in the flowsheet. Outcome: Progressing Towards Goal 
Note: Pressure Injury Interventions: 
Sensory Interventions: Float heels, Keep linens dry and wrinkle-free, Monitor skin under medical devices Moisture Interventions: Absorbent underpads, Check for incontinence Q2 hours and as needed, Offer toileting Q_hr, Moisture barrier, Minimize layers, Maintain skin hydration (lotion/cream), Limit adult briefs, Internal/External urinary devices Activity Interventions: Increase time out of bed, Pressure redistribution bed/mattress(bed type) Mobility Interventions: Float heels, HOB 30 degrees or less, Pressure redistribution bed/mattress (bed type), PT/OT evaluation Nutrition Interventions: Document food/fluid/supplement intake Friction and Shear Interventions: Foam dressings/transparent film/skin sealants, HOB 30 degrees or less, Lift sheet, Lift team/patient mobility team 
 
  
 
 
 
  
Problem: Patient Education: Go to Patient Education Activity Goal: Patient/Family Education Outcome: Progressing Towards Goal

## 2021-06-03 NOTE — PROGRESS NOTES
06/03/21 1017 Chart and Patient  Assessment Pulmonary History 1 Surgical History 0 Chest X-ray 1 Respiratory Pattern 0 Mental Status 0 Breath Sounds 2 Cough 2 Level of Activity/Mobility 1 Respiratory Assessment Total Score 7 Patients nebulizer treatments changed to Q6. BS are diminished with mild coarsness.

## 2021-06-03 NOTE — PROGRESS NOTES
attempted to follow up with Mrs. Cox on the Med. Surgical Unit. Mrs. Joon Ramirez was lying in bed and appeared to be resting comfortably. No family was present at this time. 's are available for further support upon referral 
Joelle Humphries.  Paging Service: [phone])

## 2021-06-03 NOTE — PROGRESS NOTES
[address] [place] [address], [address] [place] [address] 
[phone] Medical Progress Note NAME: Farnaz Duncan :  1940 MRM:  [id number] Date of service: 6/3/2021  1:21 PM 
 
  
Assessment and Plan:  
 
Sepsis due to Pneumonia/ HCAP/ MRSA pneumonia. There is also e/o cirrhosis, but minimal ascites so I low c/f SBP. Sputum culture grew MRSA. Treated with cefepime  - ; and s/p Vanc - . AHRF: PNA and COPD exac. Continue nebs, lenore neb, brovana, pulmicort, solumedrol (weaning). Pulmonary following COPD exacerbation. As above continue treatment. Multiple fractures: CT today showed multiple fractures of ribs, pubic rami, thoracic spine, etc. She is on oxycodone for chronic pain. repeat admissions and suggestion of multiple falls, palliative care is following. needs SNF  
  
 Bilateral common iliac stenosis: CT abd/pelvis showed severe stenosis of bilateral common iliac arteries, but pt has no leg pain and lactate is normal.  no symptoms. Monitor  
  
Right subclavian stenosis: vascular evaluated; rec OP f/u. Cirrhosis: noted on imaging. She is not presently decompensated. Hypokalemia. replete PRN. Leukocytosis. Likely due to steroid. Improving. Monitor Thrush. On nystatin swish and swallow, magic mouth wash . Diarrhea. Resolved. Stopped laxatives. Subjective: Chief Complaint[de-identified] Patient was seen and examined as a follow up for sepsis. Chart was reviewed. c/o chronic back pain. ROS: 
(bold if positive, if negative) Tolerating PT  Tolerating Diet Back Pain Objective:  
 
Last 24hrs VS reviewed since prior progress note. Most recent are: 
 
Visit Vitals BP (!) 143/64 (BP 1 Location: Left upper arm, BP Patient Position: At rest) Pulse 83 Temp 97.8 °F (36.6 °C) Resp 20 Ht 5' 4\" (1.626 m) Wt 70.8 kg (156 lb 1.4 oz) SpO2 95% BMI 26.79 kg/m² SpO2 Readings from Last 6 Encounters: 06/03/21 95% 05/13/21 97% 03/23/21 96% 03/10/21 95% 03/03/21 95% 02/27/21 96% O2 Flow Rate (L/min): 2 l/min Intake/Output Summary (Last 24 hours) at 6/3/2021 1321 Last data filed at 6/3/2021 9083 Gross per 24 hour Intake 350 ml Output 1850 ml Net -1500 ml Physical Exam: 
 
Gen:  elderly, in no acute distress HEENT:  Pink conjunctivae, PERRL, hearing intact to voice Resp:  No accessory muscle use, dimished b/l  
Card:  No murmurs, normal S1, S2 without thrills, bruits, b/l peripheral edema Abd:  Soft, non-tender, non-distended, normoactive bowel sounds are present, no palpable organomegaly Lymph:  No cervical or inguinal adenopathy Musc:  No cyanosis or clubbing Skin:  No rashes or ulcers, skin turgor is good Neuro:  Cranial nerves are grossly intact, weakness, follows commands appropriately Psych:  Good insight, oriented to person, place and time, alert 
__________________________________________________________________ Medications Reviewed: (see below) Medications:  
 
Current Facility-Administered Medications Medication Dose Route Frequency  albuterol-ipratropium (DUO-NEB) 2.5 MG-0.5 MG/3 ML  3 mL Nebulization Q6HWA RT  
 methylPREDNISolone (PF) (SOLU-MEDROL) injection 20 mg  20 mg IntraVENous Q12H  
 bumetanide (BUMEX) injection 1 mg  1 mg IntraVENous DAILY  insulin glargine (LANTUS) injection 15 Units  15 Units SubCUTAneous QHS  magic mouthwash (FIRST-MOUTHWASH BLM) oral suspension 10 mL  10 mL Oral Q4H PRN  
 FLUoxetine (PROzac) capsule 20 mg  20 mg Oral QPM  
 nystatin (MYCOSTATIN) 100,000 unit/mL oral suspension 500,000 Units  500,000 Units Oral QID  oxyCODONE ER (OxyCONTIN) tablet 20 mg  20 mg Oral Q12H  
 oxyCODONE-acetaminophen (PERCOCET 7.5) 7.5-325 mg per tablet 1 Tablet  1 Tablet Oral Q4H PRN  
 morphine injection 2 mg  2 mg IntraVENous Q4H PRN  
 amLODIPine (NORVASC) tablet 10 mg  10 mg Oral DAILY  enoxaparin (LOVENOX) injection 40 mg  40 mg SubCUTAneous Q24H  pantoprazole (PROTONIX) tablet 40 mg  40 mg Oral ACB&D  
 guaiFENesin-dextromethorphan (ROBITUSSIN DM) 100-10 mg/5 mL syrup 10 mL  10 mL Oral Q6H  
 lidocaine 4 % patch 1 Patch  1 Patch TransDERmal Q24H  
 gabapentin (NEURONTIN) capsule 600 mg  600 mg Oral BID  
 guaiFENesin ER (MUCINEX) tablet 600 mg  600 mg Oral BID  latanoprost (XALATAN) 0.005 % ophthalmic solution 1 Drop  1 Drop Both Eyes QHS  melatonin (rapid dissolve) tablet 5 mg  5 mg Oral QHS  arformoterol 15 mcg/budesonide 0.5 mg neb solution   Nebulization BID RT  
 hydrALAZINE (APRESOLINE) 20 mg/mL injection 10 mg  10 mg IntraVENous Q6H PRN  
 atorvastatin (LIPITOR) tablet 80 mg  80 mg Oral QHS  aspirin delayed-release tablet 81 mg  81 mg Oral DAILY  clonazePAM (KlonoPIN) tablet 0.25 mg  0.25 mg Oral QPM  
 levothyroxine (SYNTHROID) tablet 75 mcg  75 mcg Oral ACB  meclizine (ANTIVERT) tablet 12.5 mg  12.5 mg Oral Q6H PRN  
 glucose chewable tablet 16 g  4 Tablet Oral PRN  
 dextrose (D50W) injection syrg 12.5-25 g  25-50 mL IntraVENous PRN  
 glucagon (GLUCAGEN) injection 1 mg  1 mg IntraMUSCular PRN  
 sodium chloride (NS) flush 5-40 mL  5-40 mL IntraVENous Q8H  
 sodium chloride (NS) flush 5-40 mL  5-40 mL IntraVENous PRN  
 acetaminophen (TYLENOL) tablet 650 mg  650 mg Oral Q6H PRN Or  
 acetaminophen (TYLENOL) suppository 650 mg  650 mg Rectal Q6H PRN  polyethylene glycol (MIRALAX) packet 17 g  17 g Oral DAILY PRN  
 bisacodyL (DULCOLAX) suppository 10 mg  10 mg Rectal DAILY PRN  
 ondansetron (ZOFRAN) injection 4 mg  4 mg IntraVENous Q6H PRN  
 insulin lispro (HUMALOG) injection   SubCUTAneous AC&HS  
 melatonin (rapid dissolve) tablet 5 mg  5 mg Oral QHS PRN  
 alum-mag hydroxide-simeth (MYLANTA) oral suspension 30 mL  30 mL Oral Q4H PRN  
 LORazepam (ATIVAN) injection 0.5 mg  0.5 mg IntraVENous Q6H PRN  
 diphenhydrAMINE (BENADRYL) injection 25 mg  25 mg IntraVENous Q6H PRN  
 albuterol (PROVENTIL VENTOLIN) nebulizer solution 2.5 mg  2.5 mg Nebulization Q4H PRN  
 simethicone (MYLICON) tablet 80 mg  80 mg Oral QID PRN Lab Data Reviewed: (see below) Lab Review:  
 
Recent Labs  
  06/03/21 0358 06/02/21 0432 06/01/21 
2165 WBC 16.1* 18.1* 27.9* HGB 9.1* 9.1* 10.5* HCT 30.4* 29.2* 34.1*  
 308 396 Recent Labs  
  06/03/21 0358 06/02/21 0432 06/01/21 
4350 * 135* 135* K 4.3 3.6 4.0  
CL 98 96* 95* CO2 34* 37* 36* * 136* 194* BUN 21* 23* 25* CREA 0.51* 0.53* 0.65 CA 7.3* 7.1* 7.9* Lab Results Component Value Date/Time Glucose (POC) 223 (H) 06/03/2021 11:11 AM  
 Glucose (POC) 295 (H) 06/03/2021 07:17 AM  
 Glucose (POC) 277 (H) 06/02/2021 08:42 PM  
 Glucose (POC) 216 (H) 06/02/2021 04:05 PM  
 Glucose (POC) 262 (H) 06/02/2021 11:19 AM  
 
No results for input(s): PH, PCO2, PO2, HCO3, FIO2 in the last 72 hours. No results for input(s): INR, INREXT, INREXT in the last 72 hours. All Micro Results Procedure Component Value Units Date/Time CULTURE, RESPIRATORY/SPUTUM/BRONCH Eugena Bharat STAIN [[id number]]  (Abnormal)  (Susceptibility) Collected: 05/23/21 2012 Order Status: Completed Specimen: Sputum Updated: 06/02/21 1452 Special Requests: NO SPECIAL REQUESTS     
  GRAM STAIN    
  RARE EPITHELIAL CELLS SEEN  
     
   FEW WBCS SEEN     
   FEW GRAM POSITIVE COCCI Culture result: MODERATE * METHICILLIN RESISTANT STAPHYLOCOCCUS AUREUS *  
     
      
  RARE 
FILAMENTOUS FUNGUS 
SENDING TO [facility] LAB REFER TO [id number] NO NORMAL RESPIRATORY ZAKIYA ISOLATED MISCELLANEOUS BATTERY [[id number]] Collected: 05/23/21 2012 Order Status: Completed Updated: 06/01/21 0782 URINE CULTURE HOLD SAMPLE [[id number]] Collected: 05/19/21 1815 Order Status: Completed Specimen: Serum Updated: 05/19/21 [address] Urine culture hold Urine on hold in Microbiology dept for 2 days.   If unpreserved urine is submitted, it cannot be used for addtional testing after 24 hours, recollection will be required. I have reviewed notes of prior 24hr. Other pertinent lab: Total time spent with patient: 28 I personally reviewed chart, notes, data and current medications in the medical record. I have personally examined and treated the patient at bedside during this period. Care Plan discussed with: Patient, Nursing Staff and >50% of time spent in counseling and coordination of care Discussed:  Care Plan Prophylaxis:  Lovenox Disposition:  SNF/LTC 
        
___________________________________________________ Attending Physician: Frances Alejo DO

## 2021-06-03 NOTE — PROGRESS NOTES
PHYSICAL THERAPY:Pt declined PT after encouragement  reporting being to fatigued. PT will follow next treatment day.

## 2021-06-04 NOTE — PROGRESS NOTES
Abhinav Erwin [facility] [address] 
[address] [facility], [place], [address] [facility] 
[phone] Medical Progress Note NAME: Rowan Ignacio :  1940 MRM:  [id number] Date of service: 2021  5:41 PM 
 
  
Assessment and Plan:  
 
Sepsis due to Pneumonia/ HCAP/ MRSA pneumonia. There is also e/o cirrhosis, but minimal ascites so I low c/f SBP. Sputum culture grew MRSA. Treated with cefepime  - ; and s/p Vanc - . AHRF: PNA and COPD exac. Continue nebs, lenore neb, brovana, pulmicort, solumedrol (weaning). Pulmonary following COPD exacerbation. As above continue treatment. Multiple fractures: CT today showed multiple fractures of ribs, pubic rami, thoracic spine, etc. She is on oxycodone for chronic pain. repeat admissions and suggestion of multiple falls, palliative care is following. needs SNF  
  
 Bilateral common iliac stenosis: CT abd/pelvis showed severe stenosis of bilateral common iliac arteries, but pt has no leg pain and lactate is normal.  no symptoms. Monitor  
  
Right subclavian stenosis: vascular evaluated; rec OP f/u. Cirrhosis: noted on imaging. She is not presently decompensated. Hypokalemia. replete PRN. Leukocytosis. Likely due to steroid. Improving. Monitor Thrush. On nystatin swish and swallow, magic mouth wash . Diarrhea. Resolved. Stopped laxatives. Subjective: Chief Complaint[de-identified] Patient was seen and examined as a follow up for sepsis. Chart was reviewed. c/o chronic back pain. Spoke with daughter at bedside and provided update. ROS: 
(bold if positive, if negative) Tolerating PT  Tolerating Diet Back Pain Objective:  
 
Last 24hrs VS reviewed since prior progress note. Most recent are: 
 
Visit Vitals BP (!) 160/70 (BP 1 Location: Left upper arm, BP Patient Position: At rest) Pulse 86 Temp 98.5 °F (36.9 °C) Resp 16 Ht 5' 4\" (1.626 m) Wt 70.5 kg (155 lb 8 oz) SpO2 97% BMI 26.69 kg/m² SpO2 Readings from Last 6 Encounters:  
06/04/21 97% 05/13/21 97% 03/23/21 96% 03/10/21 95% 03/03/21 95% 02/27/21 96% O2 Flow Rate (L/min): 2 l/min Intake/Output Summary (Last 24 hours) at 6/4/2021 1741 Last data filed at 6/4/2021 7153 Gross per 24 hour Intake 360 ml Output 800 ml Net -440 ml Physical Exam: 
 
Gen:  elderly, in no acute distress HEENT:  Pink conjunctivae, PERRL, hearing intact to voice Resp:  No accessory muscle use, dimished b/l  
Card:  No murmurs, normal S1, S2 without thrills, bruits, b/l peripheral edema Abd:  Soft, non-tender, non-distended, normoactive bowel sounds are present, no palpable organomegaly Lymph:  No cervical or inguinal adenopathy Musc:  No cyanosis or clubbing Skin:  No rashes or ulcers, skin turgor is good Neuro:  Cranial nerves are grossly intact, weakness, follows commands appropriately Psych:  Good insight, oriented to person, place and time, alert 
__________________________________________________________________ Medications Reviewed: (see below) Medications:  
 
Current Facility-Administered Medications Medication Dose Route Frequency  insulin glargine (LANTUS) injection 13 Units  13 Units SubCUTAneous QHS  albuterol-ipratropium (DUO-NEB) 2.5 MG-0.5 MG/3 ML  3 mL Nebulization Q6HWA RT  
 predniSONE (DELTASONE) tablet 20 mg  20 mg Oral DAILY WITH BREAKFAST  magic mouthwash (FIRST-MOUTHWASH BLM) oral suspension 10 mL  10 mL Oral Q4H PRN  
 FLUoxetine (PROzac) capsule 20 mg  20 mg Oral QPM  
 nystatin (MYCOSTATIN) 100,000 unit/mL oral suspension 500,000 Units  500,000 Units Oral QID  oxyCODONE ER (OxyCONTIN) tablet 20 mg  20 mg Oral Q12H  
 oxyCODONE-acetaminophen (PERCOCET 7.5) 7.5-325 mg per tablet 1 Tablet  1 Tablet Oral Q4H PRN  
 morphine injection 2 mg  2 mg IntraVENous Q4H PRN  
 amLODIPine (NORVASC) tablet 10 mg  10 mg Oral DAILY  enoxaparin (LOVENOX) injection 40 mg  40 mg SubCUTAneous Q24H  pantoprazole (PROTONIX) tablet 40 mg  40 mg Oral ACB&D  
 guaiFENesin-dextromethorphan (ROBITUSSIN DM) 100-10 mg/5 mL syrup 10 mL  10 mL Oral Q6H  
 lidocaine 4 % patch 1 Patch  1 Patch TransDERmal Q24H  
 gabapentin (NEURONTIN) capsule 600 mg  600 mg Oral BID  
 guaiFENesin ER (MUCINEX) tablet 600 mg  600 mg Oral BID  latanoprost (XALATAN) 0.005 % ophthalmic solution 1 Drop  1 Drop Both Eyes QHS  melatonin (rapid dissolve) tablet 5 mg  5 mg Oral QHS  arformoterol 15 mcg/budesonide 0.5 mg neb solution   Nebulization BID RT  
 hydrALAZINE (APRESOLINE) 20 mg/mL injection 10 mg  10 mg IntraVENous Q6H PRN  
 atorvastatin (LIPITOR) tablet 80 mg  80 mg Oral QHS  aspirin delayed-release tablet 81 mg  81 mg Oral DAILY  clonazePAM (KlonoPIN) tablet 0.25 mg  0.25 mg Oral QPM  
 levothyroxine (SYNTHROID) tablet 75 mcg  75 mcg Oral ACB  meclizine (ANTIVERT) tablet 12.5 mg  12.5 mg Oral Q6H PRN  
 glucose chewable tablet 16 g  4 Tablet Oral PRN  
 dextrose (D50W) injection syrg 12.5-25 g  25-50 mL IntraVENous PRN  
 glucagon (GLUCAGEN) injection 1 mg  1 mg IntraMUSCular PRN  
 sodium chloride (NS) flush 5-40 mL  5-40 mL IntraVENous Q8H  
 sodium chloride (NS) flush 5-40 mL  5-40 mL IntraVENous PRN  
 acetaminophen (TYLENOL) tablet 650 mg  650 mg Oral Q6H PRN Or  
 acetaminophen (TYLENOL) suppository 650 mg  650 mg Rectal Q6H PRN  polyethylene glycol (MIRALAX) packet 17 g  17 g Oral DAILY PRN  
 bisacodyL (DULCOLAX) suppository 10 mg  10 mg Rectal DAILY PRN  
 ondansetron (ZOFRAN) injection 4 mg  4 mg IntraVENous Q6H PRN  
 insulin lispro (HUMALOG) injection   SubCUTAneous AC&HS  
 melatonin (rapid dissolve) tablet 5 mg  5 mg Oral QHS PRN  
 alum-mag hydroxide-simeth (MYLANTA) oral suspension 30 mL  30 mL Oral Q4H PRN  
 LORazepam (ATIVAN) injection 0.5 mg  0.5 mg IntraVENous Q6H PRN  
 diphenhydrAMINE (BENADRYL) injection 25 mg  25 mg IntraVENous Q6H PRN  
 albuterol (PROVENTIL VENTOLIN) nebulizer solution 2.5 mg  2.5 mg Nebulization Q4H PRN  
 simethicone (MYLICON) tablet 80 mg  80 mg Oral QID PRN Lab Data Reviewed: (see below) Lab Review:  
 
Recent Labs  
  06/04/21 0527 06/03/21 0358 06/02/21 
4063 WBC 16.4* 16.1* 18.1* HGB 9.3* 9.1* 9.1*  
HCT 30.7* 30.4* 29.2*  
 339 308 Recent Labs  
  06/04/21 
0527 06/03/21 
0358 06/02/21 
9026 * 135* 135* K 4.0 4.3 3.6 CL 98 98 96* CO2 35* 34* 37* GLU 98 235* 136* BUN 21* 21* 23* CREA 0.44* 0.51* 0.53* CA 7.4* 7.3* 7.1* Lab Results Component Value Date/Time Glucose (POC) 205 (H) 06/04/2021 05:19 PM  
 Glucose (POC) 132 (H) 06/04/2021 11:49 AM  
 Glucose (POC) 95 06/04/2021 06:29 AM  
 Glucose (POC) 300 (H) 06/03/2021 09:29 PM  
 Glucose (POC) 342 (H) 06/03/2021 04:09 PM  
 
No results for input(s): PH, PCO2, PO2, HCO3, FIO2 in the last 72 hours. No results for input(s): INR, INREXT, INREXT in the last 72 hours. All Micro Results Procedure Component Value Units Date/Time CULTURE, RESPIRATORY/SPUTUM/BRONCH Perri Combe STAIN [[id number]]  (Abnormal)  (Susceptibility) Collected: 05/23/21 2012 Order Status: Completed Specimen: Sputum Updated: 06/02/21 4761 Special Requests: NO SPECIAL REQUESTS     
  GRAM STAIN    
  RARE EPITHELIAL CELLS SEEN  
     
   FEW WBCS SEEN     
   FEW GRAM POSITIVE COCCI Culture result: MODERATE * METHICILLIN RESISTANT STAPHYLOCOCCUS AUREUS *  
     
      
  RARE 
FILAMENTOUS FUNGUS 
SENDING TO STATE LAB REFER TO [id number] NO NORMAL RESPIRATORY ZAKIYA ISOLATED MISCELLANEOUS BATTERY [[id number]] Collected: 05/23/21 2012 Order Status: Completed Updated: 06/01/21 9065 URINE CULTURE HOLD SAMPLE [[id number]] Collected: 05/19/21 1815 Order Status: Completed Specimen: Serum Updated: 05/19/21 [address] Urine culture hold Urine on hold in Microbiology dept for 2 days.   If unpreserved urine is submitted, it cannot be used for addtional testing after 24 hours, recollection will be required. I have reviewed notes of prior 24hr. Other pertinent lab: Total time spent with patient: 28 I personally reviewed chart, notes, data and current medications in the medical record. I have personally examined and treated the patient at bedside during this period. Care Plan discussed with: Patient, Nursing Staff and >50% of time spent in counseling and coordination of care Discussed:  Care Plan Prophylaxis:  Lovenox Disposition:  SNF/LTC 
        
___________________________________________________ Attending Physician: Liliana Nguyen DO

## 2021-06-04 NOTE — PROGRESS NOTES
Problem: Mobility Impaired (Adult and Pediatric) Goal: *Acute Goals and Plan of Care (Insert Text) Description: Note: FUNCTIONAL STATUS PRIOR TO ADMISSION: Patient was modified independent using a walker and single point cane for functional mobility. HOME SUPPORT PRIOR TO ADMISSION: The patient lived with daughter, also has caregivers 3 x per wk. Physical Therapy Goals Initiated 5/23/2021 1. Patient will move from supine to sit and sit to supine  in bed with supervision/set-up within 7 day(s). 2.  Patient will transfer from bed to chair and chair to bed with supervision/set-up using the least restrictive device within 7 day(s). 3.  Patient will perform sit to stand with supervision/set-up within 7 day(s). 4.  Patient will ambulate with supervision/set-up for 50 feet with the least restrictive device within 7 day(s). Physical Therapy Goals Re-Assessed 6/1/2021 and downgraded 1. Patient will move from supine to sit and sit to supine , scoot up and down, and roll side to side in bed with minimal assistance/contact guard assist within 7 day(s). 2.  Patient will transfer from bed to chair and chair to bed with moderate assistance  using the least restrictive device within 7 day(s). 3.  Patient will perform sit to stand with minimal assistance/contact guard assist within 7 day(s). 4.  Patient will ambulate with moderate assistance  for 15 feet with the least restrictive device within 7 day(s). Note: PHYSICAL THERAPY TREATMENT Patient: Nuiba Stanley (43 y.o. female) Date: 6/4/2021 Diagnosis: HAP (hospital-acquired pneumonia) [J18.9, Y95] Pneumonia of both lungs due to infectious organism [J18.9] <principal problem not specified> Precautions:   
Chart, physical therapy assessment, plan of care and goals were reviewed. ASSESSMENT Patient continues with skilled PT services. Pt performed LE AROM exercise with cues. Pt comes to sit with max assist.Pt progressed to CGA sitting on EOB. Pt mod assist of 2 sit to stand. Pt pivoted to chair with mod assist of 2. 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 Pt left sitting with family present. Nurse notified. Continue goals. PLAN : 
Patient continues to benefit from skilled intervention to address the above impairments. Continue treatment per established plan of care. to address goals. Recommendation for discharge: (in order for the patient to meet his/her long term goals) Therapy up to 5 days/week in SNF setting This discharge recommendation: 
Has been made in collaboration with the attending provider and/or case management IF patient discharges home will need the following DME: to be determined (TBD) SUBJECTIVE:  
 
 
OBJECTIVE DATA SUMMARY:  
Critical Behavior: 
Neurologic State: Alert, Appropriate for age Orientation Level: Disoriented to time, Oriented to person, Oriented to place Cognition: Follows commands Safety/Judgement: Awareness of environment Functional Mobility Training: 
Bed Mobility: 
  
Supine to Sit: Maximum assistance Transfers: 
Sit to Stand: Moderate assistance;Assist x2 Stand to Sit: Moderate assistance;Assist x2 Balance: 
Sitting: High guard Sitting - Static: Fair (occasional) Standing: Impaired; With support Jaylen ramos bed to chair Mod assist of 2. Activity Tolerance:  
Fair After treatment patient left in no apparent distress:  
Sitting in chair COMMUNICATION/COLLABORATION:  
The patients plan of care was discussed with: Physical therapist.  
 
Naseem Weaver PTA Time Calculation: 23 mins

## 2021-06-04 NOTE — PROGRESS NOTES
Problem: Self Care Deficits Care Plan (Adult) Goal: *Therapy Goal (Edit Goal, Insert Text) Description:  
FUNCTIONAL STATUS PRIOR TO ADMISSION: Patient was modified independent for basic except assistance provided for bathing in shower. Patient was ambulating with cane, and most recently with RW after hospitalization HOME SUPPORT: The patient lived with her daughter and family, who provided assistance as needed. Occupational Therapy Goals Initiated 5/25/2021 Weekly re assessment 6/1/2021- continue current goals 1. Patient will perform grooming at EOB with supervision/set-up within 7 day(s). 2.  Patient will perform bathing at EOB with supervision/set-up within 7 day(s). 3.  Patient will perform upper body dressing with minimal assistance/contact guard assist within 7 day(s). 4.  Patient will perform toilet transfers with minimal assistance/contact guard assist within 7 day(s). 5.  Patient will perform all aspects of toileting with minimal assistance/contact guard assist within 7 day(s). 6.  Patient will participate in upper extremity therapeutic exercise/activities with minimal assistance/contact guard assist for 5 minutes within 7 day(s). 7.  Patient will utilize energy conservation techniques during functional activities with verbal cues within 7 day(s). Outcome: Progressing Towards Goal 
 
OCCUPATIONAL THERAPY TREATMENT Patient: Susan Esparza (5 y.o. female) Date: 6/4/2021 Diagnosis: HAP (hospital-acquired pneumonia) [J18.9, Y95] Pneumonia of both lungs due to infectious organism [J18.9] <principal problem not specified> Precautions:   
Chart, occupational therapy assessment, plan of care, and goals were reviewed. ASSESSMENT Patient continues with skilled OT services and is progressing towards goals. Patient received in bed after sitting in chair this date, participated in rolling multiple times and scooting in bed.  Noted bilateral LE edema and pain right medial thigh/groin. Will continue to follow. Current Level of Function Impacting Discharge (ADLs): mod assist rolling, min assist scooting up in bed Other factors to consider for discharge: pending rehab PLAN : 
Patient continues to benefit from skilled intervention to address the above impairments. Continue treatment per established plan of care to address goals. Recommend with staff: in chair for meals Recommend next OT session: ADLs seated edge of bed Recommendation for discharge: (in order for the patient to meet his/her long term goals) Therapy up to 5 days/week in SNF setting This discharge recommendation: 
Has been made in collaboration with the attending provider and/or case management IF patient discharges home will need the following DME: none SUBJECTIVE:  
Patient stated Oh thank you, that does feel better.  stated after repositioned in bed OBJECTIVE DATA SUMMARY:  
Cognitive/Behavioral Status: 
Neurologic State: Alert Functional Mobility and Transfers for ADLs: 
Bed Mobility: 
Rolling: Moderate assistance (increased assist to right versus left) Supine to Sit: Maximum assistance Scooting: Minimum assistance; Other (comment) (instructed on positioning of LEs in bridge) Transfers: 
Sit to Stand: Moderate assistance;Assist x2 Balance: 
Sitting: High guard Sitting - Static: Fair (occasional) Standing: Impaired; With support ADL Intervention: 
  
 
  
Educated on role of OT, noted on arrival patient on right side of bed, gait belt around waist. Agreeable to bed mobility and repositioning. Participated in rolling left and right x's 2 times each direction, instructed on positioning of LE's in bridge, noted LE edema and increased time to position LE in bridge to roll. Repositioned at midline in bed, repositioned chucks under her for improved skin protection Instructed on pushing to head of bed with LE's and completed with supervision Therapeutic Exercises:  
Instructed to perform bilateral ankle pumps Pain: 
Right medial thigh Activity Tolerance:  
Fair After treatment patient left in no apparent distress:  
Supine in bed, Heels elevated for pressure relief, Call bell within reach, Bed / chair alarm activated, and Side rails x 3 
 
COMMUNICATION/COLLABORATION:  
The patients plan of care was discussed with: Registered nurse. Orville Young OTR/L Time Calculation: 16 mins

## 2021-06-04 NOTE — PROGRESS NOTES
Problem: Diabetes Self-Management Goal: *Disease process and treatment process Outcome: Progressing Towards Goal 
Goal: *Incorporating nutritional management into lifestyle Outcome: Progressing Towards Goal 
Goal: *Incorporating physical activity into lifestyle Outcome: Progressing Towards Goal 
Goal: *Developing strategies to promote health/change behavior Outcome: Progressing Towards Goal 
Goal: *Using medications safely Outcome: Progressing Towards Goal 
Goal: *Monitoring blood glucose, interpreting and using results Outcome: Progressing Towards Goal 
Goal: *Prevention, detection, treatment of acute complications Outcome: Progressing Towards Goal 
Goal: *Prevention, detection and treatment of chronic complications Outcome: Progressing Towards Goal 
Goal: *Developing strategies to address psychosocial issues Outcome: Progressing Towards Goal 
Goal: *Insulin pump training Outcome: Progressing Towards Goal 
Goal: *Sick day guidelines Outcome: Progressing Towards Goal 
Goal: *Patient Specific Goal (EDIT GOAL, INSERT TEXT) Outcome: Progressing Towards Goal 
  
Problem: Falls - Risk of 
Goal: *Absence of Falls Outcome: Progressing Towards Goal 
Note: Fall Risk Interventions: 
Mobility Interventions: Bed/chair exit alarm, Communicate number of staff needed for ambulation/transfer, Patient to call before getting OOB Mentation Interventions: Bed/chair exit alarm, Reorient patient Medication Interventions: Bed/chair exit alarm, Patient to call before getting OOB Elimination Interventions: Bed/chair exit alarm, Call light in reach History of Falls Interventions: Bed/chair exit alarm Problem: Pressure Injury - Risk of 
Goal: *Prevention of pressure injury Outcome: Progressing Towards Goal 
Note: Pressure Injury Interventions: 
Sensory Interventions: Assess changes in LOC Moisture Interventions: Absorbent underpads, Minimize layers, Moisture barrier Activity Interventions: Increase time out of bed, Pressure redistribution bed/mattress(bed type) Mobility Interventions: HOB 30 degrees or less, Pressure redistribution bed/mattress (bed type) Nutrition Interventions: Document food/fluid/supplement intake Friction and Shear Interventions: Foam dressings/transparent film/skin sealants, HOB 30 degrees or less, Minimize layers

## 2021-06-04 NOTE — PROGRESS NOTES
Name: [facility]: [facility]  
: 1940 Admit Date: 2021 Phone: [phone]  Room: [facility]/ PCP: Bobbi Pike MD  MRN: [id number] Date: 2021  Code: DNR Ms. Alexa Carmona is a pleasant 81 yo woman with a history of COPD/emphysema (previously followed by a pulmonologist in [place]), chronic respiratory failure with hypoxia, PE, former tobacco use, HTN, DM, hypothyroidism and anxiety who was admitted last week with worsening pneumonia and leukocytosis. Daughter reports worsening hypoxia at home prior to admission with sats in the mid 80s. She currently has chest congestion and weak, nonproductive cough. Reports chest wall soreness with coughing. Fatigues easily with activity. Resp cx this admission with prelim of moderate staph aureus. She is currently on cefepime and vanc. Speech has evaluated. She is on dysphagia diet. She was hospitalized at [facility] from - for bilateral PNA. She has had multiple admissions at [facility] this year for COPD. She was previously followed in [place] for her COPD and moved to [facility] in the last 4 months. She is chronically managed on Trelegy and prn albuterol inhaler/nebulizer. Has home O2 through [address] and is on 2L at baseline. During her last hospitalization she was given levaquin and steroid taper. She notes symptomatic benefit from the prednisone. She has been taken off of blood thinners in the past due to frequent falls.  - chest CT: There is small left pleural effusion and likely worsening left lower lobe interstitial and patchy airspace consolidation.  - MBS: single episode trace aspiration w/ thins. recs are for dysphagia 2, thins  - LE dopplers: negative for DVT *sputum culture () - MRSA Interval history: TMax 99 
sats 91-95% on 2L WBC 16.4; up slightly Respiratory culture + for moderate MSA and rare filamentous fungus- sending to state lab Fluid balance: -+110 ROS:  Reports feeling well from a breathing standpoint. Still weak. No cough, sputum production, SOB, chest tightness, fever, or chills. Past Medical History:  
Diagnosis Date  Anxiety and depression  Arthritis  Chronic obstructive pulmonary disease ([facility]  Chronic pain  DM type 2 causing vascular disease ([facility]  GERD (gastroesophageal reflux disease)  Glaucoma   
 HTN (hypertension)  Hx of completed stroke 2021  Hypothyroid  Incontinence  Neuropathy  Polypharmacy Past Surgical History:  
Procedure Laterality Date  HX ORTHOPAEDIC    
 HX VASCULAR ACCESS Family History Problem Relation Age of Onset  No Known Problems Other   
     reviewed, patient did not know  Diabetes Mother  Heart Attack Mother  Cancer Father Social History Tobacco Use  Smoking status: Former Smoker Quit date: 1991 Years since quittin.4  Smokeless tobacco: Never Used Substance Use Topics  Alcohol use: Never Allergies Allergen Reactions  Shellfish Derived Anaphylaxis Current Facility-Administered Medications Medication Dose Route Frequency  albuterol-ipratropium (DUO-NEB) 2.5 MG-0.5 MG/3 ML  3 mL Nebulization Q6HWA RT  
 predniSONE (DELTASONE) tablet 20 mg  20 mg Oral DAILY WITH BREAKFAST  insulin glargine (LANTUS) injection 15 Units  15 Units SubCUTAneous QHS  magic mouthwash (FIRST-MOUTHWASH BLM) oral suspension 10 mL  10 mL Oral Q4H PRN  
 FLUoxetine (PROzac) capsule 20 mg  20 mg Oral QPM  
 nystatin (MYCOSTATIN) 100,000 unit/mL oral suspension 500,000 Units  500,000 Units Oral QID  oxyCODONE ER (OxyCONTIN) tablet 20 mg  20 mg Oral Q12H  
 oxyCODONE-acetaminophen (PERCOCET 7.5) 7.5-325 mg per tablet 1 Tablet  1 Tablet Oral Q4H PRN  
 morphine injection 2 mg  2 mg IntraVENous Q4H PRN  
 amLODIPine (NORVASC) tablet 10 mg  10 mg Oral DAILY  enoxaparin (LOVENOX) injection 40 mg  40 mg SubCUTAneous Q24H  pantoprazole (PROTONIX) tablet 40 mg  40 mg Oral ACB&D  
 guaiFENesin-dextromethorphan (ROBITUSSIN DM) 100-10 mg/5 mL syrup 10 mL  10 mL Oral Q6H  
 lidocaine 4 % patch 1 Patch  1 Patch TransDERmal Q24H  
 gabapentin (NEURONTIN) capsule 600 mg  600 mg Oral BID  
 guaiFENesin ER (MUCINEX) tablet 600 mg  600 mg Oral BID  latanoprost (XALATAN) 0.005 % ophthalmic solution 1 Drop  1 Drop Both Eyes QHS  melatonin (rapid dissolve) tablet 5 mg  5 mg Oral QHS  arformoterol 15 mcg/budesonide 0.5 mg neb solution   Nebulization BID RT  
 hydrALAZINE (APRESOLINE) 20 mg/mL injection 10 mg  10 mg IntraVENous Q6H PRN  
 atorvastatin (LIPITOR) tablet 80 mg  80 mg Oral QHS  aspirin delayed-release tablet 81 mg  81 mg Oral DAILY  clonazePAM (KlonoPIN) tablet 0.25 mg  0.25 mg Oral QPM  
 levothyroxine (SYNTHROID) tablet 75 mcg  75 mcg Oral ACB  meclizine (ANTIVERT) tablet 12.5 mg  12.5 mg Oral Q6H PRN  
 glucose chewable tablet 16 g  4 Tablet Oral PRN  
 dextrose (D50W) injection syrg 12.5-25 g  25-50 mL IntraVENous PRN  
 glucagon (GLUCAGEN) injection 1 mg  1 mg IntraMUSCular PRN  
 sodium chloride (NS) flush 5-40 mL  5-40 mL IntraVENous Q8H  
 sodium chloride (NS) flush 5-40 mL  5-40 mL IntraVENous PRN  
 acetaminophen (TYLENOL) tablet 650 mg  650 mg Oral Q6H PRN Or  
 acetaminophen (TYLENOL) suppository 650 mg  650 mg Rectal Q6H PRN  polyethylene glycol (MIRALAX) packet 17 g  17 g Oral DAILY PRN  
 bisacodyL (DULCOLAX) suppository 10 mg  10 mg Rectal DAILY PRN  
 ondansetron (ZOFRAN) injection 4 mg  4 mg IntraVENous Q6H PRN  
 insulin lispro (HUMALOG) injection   SubCUTAneous AC&HS  
 melatonin (rapid dissolve) tablet 5 mg  5 mg Oral QHS PRN  
 alum-mag hydroxide-simeth (MYLANTA) oral suspension 30 mL  30 mL Oral Q4H PRN  
 LORazepam (ATIVAN) injection 0.5 mg  0.5 mg IntraVENous Q6H PRN  
 diphenhydrAMINE (BENADRYL) injection 25 mg  25 mg IntraVENous Q6H PRN  
 albuterol (PROVENTIL VENTOLIN) nebulizer solution 2.5 mg  2.5 mg Nebulization Q4H PRN  
 simethicone (MYLICON) tablet 80 mg  80 mg Oral QID PRN  
 
 
 
REVIEW OF SYSTEMS  
12 point ROS negative except as stated in the HPI. Physical Exam:  
Visit Vitals BP (!) 151/64 (BP 1 Location: Left upper arm, BP Patient Position: At rest) Pulse 80 Temp 99 °F (37.2 °C) Resp 16 Ht 5' 4\" (1.626 m) Wt 70.5 kg (155 lb 8 oz) SpO2 91% BMI 26.69 kg/m² General:  Alert, cooperative, chronically ill appearing, appears stated age. Head:  Normocephalic, without obvious abnormality, atraumatic. Eyes:  Conjunctivae/corneas clear. Nose: Nares normal. Septum midline. Mucosa normal.   
Throat: Lips, mucosa, and tongue normal.   
Neck: Supple, symmetrical, trachea midline, no adenopathy. Lungs:   Congested cough/upper airway, scattered rhonchi Chest wall:    
Heart:  Regular rate and rhythm Abdomen:   Soft, non-tender. Bowel sounds normal.  
Extremities: Anasarca Pulses: 2+ and symmetric all extremities. Skin: Skin color, texture, turgor normal. No rashes or lesions Lymph nodes:   
Neurologic: Grossly nonfocal  
 
 
Lab Results Component Value Date/Time Sodium 134 (L) 06/04/2021 05:27 AM  
 Potassium 4.0 06/04/2021 05:27 AM  
 Chloride 98 06/04/2021 05:27 AM  
 CO2 35 (H) 06/04/2021 05:27 AM  
 BUN 21 (H) 06/04/2021 05:27 AM  
 Creatinine 0.44 (L) 06/04/2021 05:27 AM  
 Glucose 98 06/04/2021 05:27 AM  
 Calcium 7.4 (L) 06/04/2021 05:27 AM  
 Magnesium 1.9 05/31/2021 04:02 AM  
 Phosphorus 3.7 05/31/2021 04:02 AM  
 Lactic acid 1.1 05/19/2021 07:50 PM  
 
 
Lab Results Component Value Date/Time WBC 16.4 (H) 06/04/2021 05:27 AM  
 HGB 9.3 (L) 06/04/2021 05:27 AM  
 PLATELET 151 78/55/4721 05:27 AM  
 MCV 81.6 06/04/2021 05:27 AM  
 
 
Lab Results Component Value Date/Time INR 1.2 (H) 03/09/2021 03:23 AM  
 aPTT 28.6 03/09/2021 03:23 AM  
 Alk.  phosphatase 73 05/21/2021 09:10 AM  
 Protein, total 5.2 (L) 05/21/2021 09:10 AM  
 Albumin 2.3 (L) 05/21/2021 09:10 AM  
 Globulin 2.9 05/21/2021 09:10 AM  
 
 
Lab Results Component Value Date/Time Iron 16 (L) 05/09/2021 08:21 PM  
 TIBC 278 05/09/2021 08:21 PM  
 Iron % saturation 6 (L) 05/09/2021 08:21 PM  
 
 
Lab Results Component Value Date/Time TSH 3.12 05/28/2021 03:23 AM  
  
 
No results found for: PH, PHI, PCO2, PCO2I, PO2, PO2I, HCO3, HCO3I, FIO2, FIO2I Lab Results Component Value Date/Time Troponin-I, Qt. <0.05 05/23/2021 08:12 PM  
  
 
Lab Results Component Value Date/Time Culture result:  05/23/2021 08:12 PM  
  MODERATE * METHICILLIN RESISTANT STAPHYLOCOCCUS AUREUS * Culture result: (A) 05/23/2021 08:12 PM  
  RARE 
[address] [facility] REFER TO [id number] Culture result: NO NORMAL RESPIRATORY ZAKIYA ISOLATED 05/23/2021 08:12 PM  
 
 
No results found for: TOXA1, RPR, HBCM, HBSAG, HAAB, HCAB1, HAAT, G6PD, CRYAC, HIVGT, HIVR, HIV1, HIV12, HIVPC, HIVRPI Lab Results Component Value Date/Time Vancomycin,trough 19.0 (H) 05/30/2021 05:19 PM  
 Vancomycin,trough 28.5 ([facility]) 05/28/2021 12:25 PM  
 
 
Lab Results Component Value Date/Time Color YELLOW/STRAW 05/19/2021 06:15 PM  
 Appearance CLEAR 05/19/2021 06:15 PM  
 Specific gravity 1.010 05/19/2021 06:15 PM  
 pH (UA) 6.5 05/19/2021 06:15 PM  
 Protein Negative 05/19/2021 06:15 PM  
 Glucose 100 (A) 05/19/2021 06:15 PM  
 Ketone Negative 05/19/2021 06:15 PM  
 Bilirubin Negative 05/19/2021 06:15 PM  
 Blood Negative 05/19/2021 06:15 PM  
 Urobilinogen 0.2 05/19/2021 06:15 PM  
 Nitrites Negative 05/19/2021 06:15 PM  
 Leukocyte Esterase TRACE (A) 05/19/2021 06:15 PM  
 WBC 5-10 05/19/2021 06:15 PM  
 RBC 5-10 05/19/2021 06:15 PM  
 Bacteria Negative 05/19/2021 06:15 PM  
 
 
IMPRESSION 
· Acute hypoxemic respiratory failure · MRSA pneumonia · Acute COPD exacerbation · Hx of PE - not on anticoagulation due to frequent falls · HTN, diastolic dysfunction · Mild AS · H/o hypothyroidism · H/o CVA · Hyperglycemia · Mardene Morelle PLAN 
· Supplemental O2 as needed to keep sats > 88%, on 2L NC at baseline · Completed Vanc · Stop Bumex · Continue scheduled duonebs, pulmicort, brovana, mucinex · Switch steroids to PO 
· Flutter valve and chest PT  
· PT/OOB as much as able · Will need repeat chest CT in 6 weeks · Diet as per speech recs · Nystatin for thrush 
  
 is stable from a pulmonary standpoint. We will sign off and arrange for outpatient follow-up in 2-3 weeks. Please call with questions.    
 
Cristopher Hensley NP

## 2021-06-04 NOTE — PROGRESS NOTES
6/4/2021   CARE MANAGEMENT NOTE:  CM reviewed EMR for clinical updates.  
READMISSION:  Pt was admitted to [facility] from 5/9/21 - 5/13/21 with COPD.  She discharged home with [facility].  Pt was readmitted to [facility] on 5/19 with sepsis 2/2 PNA, also hx of multiple CVAs and fxs, COPD, cirrhosis, DM. Reportedly, pt resides with her dtr Sarah Hamilton ([phone]).  Pt has caregivers 3 days per week. 
  
RUR 53%; AWI 86 CGNO 
  
Transition Plan of Care: 1.  SNF - accepted by [facility]. Dtr is resisting any SNF unless there is an available private room. SNF informed dtr that a pvt room is not guaranteed but they will make accommodations if possible 2.  [employer] Jacquelin Mantis is pending; CM spoke to SNF admissions this a.m. and confirmed auth initiation on 6/3. 
3.  COVID 19 test for placement was negative on 5/26. An updated Covid test was completed on 6/3 and results are pending 4.  [facility] will be needed at discharge 
  
 CM will continue to follow pt for SNF rehab DWhitley Ponce

## 2021-06-04 NOTE — ROUTINE PROCESS
Bedside and Verbal shift change report given to WALLY Esteves (oncoming nurse) by Jd Hummel (offgoing nurse). Report included the following information SBAR and Kardex.

## 2021-06-04 NOTE — PROGRESS NOTES
Problem: Diabetes Self-Management Goal: *Disease process and treatment process Outcome: Progressing Towards Goal 
Goal: *Incorporating nutritional management into lifestyle Outcome: Progressing Towards Goal 
Goal: *Incorporating physical activity into lifestyle Outcome: Progressing Towards Goal 
Goal: *Developing strategies to promote health/change behavior Outcome: Progressing Towards Goal 
Goal: *Using medications safely Outcome: Progressing Towards Goal 
Goal: *Monitoring blood glucose, interpreting and using results Outcome: Progressing Towards Goal 
Goal: *Prevention, detection, treatment of acute complications Outcome: Progressing Towards Goal 
Goal: *Prevention, detection and treatment of chronic complications Outcome: Progressing Towards Goal 
Goal: *Developing strategies to address psychosocial issues Outcome: Progressing Towards Goal 
Goal: *Insulin pump training Outcome: Progressing Towards Goal 
Goal: *Sick day guidelines Outcome: Progressing Towards Goal 
Goal: *Patient Specific Goal (EDIT GOAL, INSERT TEXT) Outcome: Progressing Towards Goal 
  
Problem: Patient Education: Go to Patient Education Activity Goal: Patient/Family Education Outcome: Progressing Towards Goal 
  
Problem: Pressure Injury - Risk of 
Goal: *Prevention of pressure injury Outcome: Progressing Towards Goal 
Note: Pressure Injury Interventions: 
Sensory Interventions: Assess changes in LOC, Minimize linen layers Moisture Interventions: Absorbent underpads, Minimize layers, Moisture barrier Activity Interventions: Pressure redistribution bed/mattress(bed type) Mobility Interventions: HOB 30 degrees or less, Pressure redistribution bed/mattress (bed type) Nutrition Interventions: Document food/fluid/supplement intake Friction and Shear Interventions: HOB 30 degrees or less, Minimize layers

## 2021-06-05 NOTE — PROGRESS NOTES
Bedside and Verbal shift change report given to Binh Barnes RN (oncoming nurse) by Xochilt Delgado RN (offgoing nurse). Report included the following information SBAR, Kardex, Procedure Summary, Intake/Output, MAR, Accordion, Recent Results and Med Rec Status.

## 2021-06-05 NOTE — ACP (ADVANCE CARE PLANNING)
Advance Care Planning Advance Care Planning (ACP) Physician/NP/PA Conversation Date of Conversation: 5/19/2021 Conducted with: daughter Healthcare Decision Maker:  
  Primary Decision Maker: Malou Connolly - Daughter - [phone] Secondary Decision Maker: Lily Crawford - Son - [phone] Care Preferences: Hospitalization: \"If your health worsens and it becomes clear that your chance of recovery is unlikely, what would be your preference regarding hospitalization? \" The patient would prefer hospitalization. Ventilation: \"If you were unable to breathe on your own and your chance of recovery was unlikely, what would be your preference about the use of a ventilator (breathing machine) if it was available to you? \" The patient would NOT desire the use of a ventilator. Resuscitation: \"In the event your heart stopped as a result of an underlying serious health condition, would you want attempts to be made to restart your heart, or would you prefer a natural death? \" No, do NOT attempt to resuscitate. Additional topics discussed: treatment goals Conversation Outcomes / Follow-Up Plan:  
Discussed plan for respiratory failure treatment - weaning steroids, continuing nebs. Dicussed further imaging with xray for left elbow due to daughters concern for left arm weakness. Dicussed negative ct and cta head for stroke. Reviewed some of current medications. Daughter noted overall decline in mobility since a few weeks ago when she at home with her. Confirmed DNR. Length of Voluntary ACP Conversation in minutes:  20 minutes Chanel Khalil DO

## 2021-06-05 NOTE — PROGRESS NOTES
Abhinav Erwin [facility] 
[address] [facility], [place], [address] [place] [address] 
[phone] Medical Progress Note NAME: Jesse Oro :  1940 MRM:  [id number] Date of service: 2021  5:43 PM 
 
  
Assessment and Plan:  
 
Sepsis due to Pneumonia/ HCAP/ MRSA pneumonia. There is also e/o cirrhosis, but minimal ascites so I low c/f SBP. Sputum culture grew MRSA. Treated with cefepime  - ; and s/p Vanc - . AHRF: PNA and COPD exac. Continue nebs, lenore neb, brovana, pulmicort, solumedrol (weaning). Pulmonary following COPD exacerbation. As above continue treatment. Multiple fractures: CT today showed multiple fractures of ribs, pubic rami, thoracic spine, etc. She is on oxycodone for chronic pain. repeat admissions and suggestion of multiple falls, palliative care is following. needs SNF  
  
 Bilateral common iliac stenosis: CT abd/pelvis showed severe stenosis of bilateral common iliac arteries, but pt has no leg pain and lactate is normal.  no symptoms. Monitor  
  
Right subclavian stenosis: vascular evaluated; rec OP f/u. Cirrhosis: noted on imaging. She is not presently decompensated. Hypokalemia. replete PRN. Leukocytosis. Likely due to steroid. Improving. Monitor Thrush. On nystatin swish and swallow, magic mouth wash . Diarrhea. Resolved. Stopped laxatives. Subjective: Chief Complaint[de-identified] Patient was seen and examined as a follow up for sepsis. Chart was reviewed. c/o chronic back pain. ROS: 
(bold if positive, if negative) Tolerating PT  Tolerating Diet Back Pain Objective:  
 
Last 24hrs VS reviewed since prior progress note. Most recent are: 
 
Visit Vitals BP (!) 142/73 (BP 1 Location: Left upper arm, BP Patient Position: At rest) Pulse 82 Temp 98.5 °F (36.9 °C) Resp 18 Ht 5' 4\" (1.626 m) Wt 70.8 kg (156 lb 1.4 oz) SpO2 96% BMI 26.79 kg/m² SpO2 Readings from Last 6 Encounters: 06/05/21 96% 05/13/21 97% 03/23/21 96% 03/10/21 95% 03/03/21 95% 02/27/21 96% O2 Flow Rate (L/min): 2 l/min Intake/Output Summary (Last 24 hours) at 6/5/2021 1742 Last data filed at 6/5/2021 1624 Gross per 24 hour Intake 440 ml Output 1750 ml Net -1310 ml Physical Exam: 
 
Gen:  elderly, in no acute distress HEENT:  Pink conjunctivae, PERRL, hearing intact to voice Resp:  No accessory muscle use, dimished b/l  
Card:  No murmurs, normal S1, S2 without thrills, bruits, b/l peripheral edema Abd:  Soft, non-tender, non-distended, normoactive bowel sounds are present, no palpable organomegaly Lymph:  No cervical or inguinal adenopathy Musc:  No cyanosis or clubbing Skin:  No rashes or ulcers, skin turgor is good Neuro:  Cranial nerves are grossly intact, weakness, follows commands appropriately Psych:  Good insight, oriented to person, place and time, alert 
__________________________________________________________________ Medications Reviewed: (see below) Medications:  
 
Current Facility-Administered Medications Medication Dose Route Frequency  insulin glargine (LANTUS) injection 13 Units  13 Units SubCUTAneous QHS  albuterol-ipratropium (DUO-NEB) 2.5 MG-0.5 MG/3 ML  3 mL Nebulization Q6HWA RT  
 predniSONE (DELTASONE) tablet 20 mg  20 mg Oral DAILY WITH BREAKFAST  magic mouthwash (FIRST-MOUTHWASH BLM) oral suspension 10 mL  10 mL Oral Q4H PRN  
 FLUoxetine (PROzac) capsule 20 mg  20 mg Oral QPM  
 nystatin (MYCOSTATIN) 100,000 unit/mL oral suspension 500,000 Units  500,000 Units Oral QID  oxyCODONE ER (OxyCONTIN) tablet 20 mg  20 mg Oral Q12H  
 oxyCODONE-acetaminophen (PERCOCET 7.5) 7.5-325 mg per tablet 1 Tablet  1 Tablet Oral Q4H PRN  
 morphine injection 2 mg  2 mg IntraVENous Q4H PRN  
 amLODIPine (NORVASC) tablet 10 mg  10 mg Oral DAILY  enoxaparin (LOVENOX) injection 40 mg  40 mg SubCUTAneous Q24H  pantoprazole (PROTONIX) tablet 40 mg  40 mg Oral ACB&D  
 guaiFENesin-dextromethorphan (ROBITUSSIN DM) 100-10 mg/5 mL syrup 10 mL  10 mL Oral Q6H  
 lidocaine 4 % patch 1 Patch  1 Patch TransDERmal Q24H  
 gabapentin (NEURONTIN) capsule 600 mg  600 mg Oral BID  
 guaiFENesin ER (MUCINEX) tablet 600 mg  600 mg Oral BID  latanoprost (XALATAN) 0.005 % ophthalmic solution 1 Drop  1 Drop Both Eyes QHS  melatonin (rapid dissolve) tablet 5 mg  5 mg Oral QHS  arformoterol 15 mcg/budesonide 0.5 mg neb solution   Nebulization BID RT  
 hydrALAZINE (APRESOLINE) 20 mg/mL injection 10 mg  10 mg IntraVENous Q6H PRN  
 atorvastatin (LIPITOR) tablet 80 mg  80 mg Oral QHS  aspirin delayed-release tablet 81 mg  81 mg Oral DAILY  clonazePAM (KlonoPIN) tablet 0.25 mg  0.25 mg Oral QPM  
 levothyroxine (SYNTHROID) tablet 75 mcg  75 mcg Oral ACB  meclizine (ANTIVERT) tablet 12.5 mg  12.5 mg Oral Q6H PRN  
 glucose chewable tablet 16 g  4 Tablet Oral PRN  
 dextrose (D50W) injection syrg 12.5-25 g  25-50 mL IntraVENous PRN  
 glucagon (GLUCAGEN) injection 1 mg  1 mg IntraMUSCular PRN  
 sodium chloride (NS) flush 5-40 mL  5-40 mL IntraVENous Q8H  
 sodium chloride (NS) flush 5-40 mL  5-40 mL IntraVENous PRN  
 acetaminophen (TYLENOL) tablet 650 mg  650 mg Oral Q6H PRN Or  
 acetaminophen (TYLENOL) suppository 650 mg  650 mg Rectal Q6H PRN  polyethylene glycol (MIRALAX) packet 17 g  17 g Oral DAILY PRN  
 bisacodyL (DULCOLAX) suppository 10 mg  10 mg Rectal DAILY PRN  
 ondansetron (ZOFRAN) injection 4 mg  4 mg IntraVENous Q6H PRN  
 insulin lispro (HUMALOG) injection   SubCUTAneous AC&HS  
 melatonin (rapid dissolve) tablet 5 mg  5 mg Oral QHS PRN  
 alum-mag hydroxide-simeth (MYLANTA) oral suspension 30 mL  30 mL Oral Q4H PRN  
 LORazepam (ATIVAN) injection 0.5 mg  0.5 mg IntraVENous Q6H PRN  
 diphenhydrAMINE (BENADRYL) injection 25 mg  25 mg IntraVENous Q6H PRN  
 albuterol (PROVENTIL VENTOLIN) nebulizer solution 2.5 mg  2.5 mg Nebulization Q4H PRN  
 simethicone (MYLICON) tablet 80 mg  80 mg Oral QID PRN Lab Data Reviewed: (see below) Lab Review:  
 
Recent Labs  
  06/05/21 0356 06/04/21 0527 06/03/21 0358 WBC 16.9* 16.4* 16.1* HGB 9.8* 9.3* 9.1*  
HCT 33.4* 30.7* 30.4*  340 339 Recent Labs  
  06/05/21 0356 06/04/21 0527 06/03/21 0358 * 134* 135* K 4.2 4.0 4.3 CL 97 98 98 CO2 34* 35* 34* * 98 235* BUN 19 21* 21* CREA 0.49* 0.44* 0.51* CA 7.4* 7.4* 7.3* Lab Results Component Value Date/Time Glucose (POC) 284 (H) 06/05/2021 04:26 PM  
 Glucose (POC) 138 (H) 06/05/2021 11:16 AM  
 Glucose (POC) 100 06/05/2021 06:19 AM  
 Glucose (POC) 302 (H) 06/04/2021 09:04 PM  
 Glucose (POC) 205 (H) 06/04/2021 05:19 PM  
 
No results for input(s): PH, PCO2, PO2, HCO3, FIO2 in the last 72 hours. No results for input(s): INR, INREXT, INREXT in the last 72 hours. All Micro Results Procedure Component Value Units Date/Time CULTURE, RESPIRATORY/SPUTUM/BRONCH Destini Quince STAIN [[id number]]  (Abnormal)  (Susceptibility) Collected: 05/23/21 2012 Order Status: Completed Specimen: Sputum Updated: 06/02/21 1452 Special Requests: NO SPECIAL REQUESTS     
  GRAM STAIN    
  RARE EPITHELIAL CELLS SEEN  
     
   FEW WBCS SEEN     
   FEW GRAM POSITIVE COCCI Culture result: MODERATE * METHICILLIN RESISTANT STAPHYLOCOCCUS AUREUS *  
     
      
  RARE 
FILAMENTOUS FUNGUS 
SENDING TO STATE LAB REFER TO [id number] NO NORMAL RESPIRATORY ZAKIYA ISOLATED MISCELLANEOUS BATTERY [[id number]] Collected: 05/23/21 2012 Order Status: Completed Updated: 06/01/21 7190 URINE CULTURE HOLD SAMPLE [[id number]] Collected: 05/19/21 1815 Order Status: Completed Specimen: Serum Updated: 05/19/21 [address] Urine culture hold Urine on hold in Microbiology dept for 2 days.   If unpreserved urine is submitted, it cannot be used for addtional testing after 24 hours, recollection will be required. I have reviewed notes of prior 24hr. Other pertinent lab: Total time spent with patient: 22 I personally reviewed chart, notes, data and current medications in the medical record. I have personally examined and treated the patient at bedside during this period. Care Plan discussed with: Patient, Nursing Staff and >50% of time spent in counseling and coordination of care Discussed:  Care Plan Prophylaxis:  Lovenox Disposition:  SNF/LTC 
        
___________________________________________________ Attending Physician: Ines Brown, DO

## 2021-06-05 NOTE — ROUTINE PROCESS
Bedside and Verbal shift change report given to WALLY Esteves(oncoming nurse) by Liliya Molina (offgoing nurse). Report included the following information SBAR and Kardex.

## 2021-06-06 NOTE — PROGRESS NOTES
Bedside and Verbal shift change report given to Mathew Lezama (oncoming nurse) by Joselyn Solorio RN (offgoing nurse).  Report included the following information SBAR, Kardex, Procedure Summary, Intake/Output, MAR, Accordion, Recent Results and Med Rec Status. '

## 2021-06-06 NOTE — PROGRESS NOTES
Abhinav Erwin [facility] 79 
[address] [facility], [id number] [place] [address] 
[phone] Medical Progress Note NAME:         Rowan Ignacio :        1940 MRM:        [id number] Date of service: 2021 Subjective: Patient has been seen and examined as a follow up for multiple medical issues. Chart, labs, diagnostics reviewed. Generalized weakness. She says her pain is better controled now. No fever or chills. No cough or SOB Objective: 
 
Vital Signs: 
 
Visit Vitals BP (!) 141/61 (BP 1 Location: Left upper arm, BP Patient Position: At rest) Pulse 91 Temp 97.6 °F (36.4 °C) Resp 18 Ht 5' 4\" (1.626 m) Wt 70.8 kg (156 lb 1.4 oz) SpO2 98% BMI 26.79 kg/m² Intake/Output Summary (Last 24 hours) at 2021 1445 Last data filed at 2021 1342 Gross per 24 hour Intake 936 ml Output 2900 ml Net -1964 ml Physical Examination: 
 
General:   Weak and very frail looking elderly patient, not in any distress Eyes:   pink conjunctivae, PERRLA with no discharge. ENT:   no ottorrhea or rhinorrhea with dry mucous membranes Neck: no masses, thyroid non-tender and trachea central. 
Pulm:  no accessory muscle use, decreased but clear breath sounds without crackles or wheezes Card:  no JVD or murmurs, has regular and normal S1, S2 without thrills, bruits. + peripheral edema Abd:  Soft, non-tender, non-distended, normoactive bowel sounds with no palpable organomegaly Musc:  No cyanosis, clubbing, atrophy or deformities. Skin:  No rashes, bruising or ulcers. Neuro: Awake and alert. Generally a non focal exam. Follows commands appropriately Psych:  Has little insight to her illness Current Facility-Administered Medications Medication Dose Route Frequency  insulin glargine (LANTUS) injection 13 Units  13 Units SubCUTAneous QHS  albuterol-ipratropium (DUO-NEB) 2.5 MG-0.5 MG/3 ML 3 mL Nebulization Q6HWA RT  
 predniSONE (DELTASONE) tablet 20 mg  20 mg Oral DAILY WITH BREAKFAST  magic mouthwash (FIRST-MOUTHWASH BLM) oral suspension 10 mL  10 mL Oral Q4H PRN  
 FLUoxetine (PROzac) capsule 20 mg  20 mg Oral QPM  
 nystatin (MYCOSTATIN) 100,000 unit/mL oral suspension 500,000 Units  500,000 Units Oral QID  oxyCODONE ER (OxyCONTIN) tablet 20 mg  20 mg Oral Q12H  
 oxyCODONE-acetaminophen (PERCOCET 7.5) 7.5-325 mg per tablet 1 Tablet  1 Tablet Oral Q4H PRN  
 morphine injection 2 mg  2 mg IntraVENous Q4H PRN  
 amLODIPine (NORVASC) tablet 10 mg  10 mg Oral DAILY  enoxaparin (LOVENOX) injection 40 mg  40 mg SubCUTAneous Q24H  pantoprazole (PROTONIX) tablet 40 mg  40 mg Oral ACB&D  
 guaiFENesin-dextromethorphan (ROBITUSSIN DM) 100-10 mg/5 mL syrup 10 mL  10 mL Oral Q6H  
 lidocaine 4 % patch 1 Patch  1 Patch TransDERmal Q24H  
 gabapentin (NEURONTIN) capsule 600 mg  600 mg Oral BID  
 guaiFENesin ER (MUCINEX) tablet 600 mg  600 mg Oral BID  latanoprost (XALATAN) 0.005 % ophthalmic solution 1 Drop  1 Drop Both Eyes QHS  melatonin (rapid dissolve) tablet 5 mg  5 mg Oral QHS  arformoterol 15 mcg/budesonide 0.5 mg neb solution   Nebulization BID RT  
 hydrALAZINE (APRESOLINE) 20 mg/mL injection 10 mg  10 mg IntraVENous Q6H PRN  
 atorvastatin (LIPITOR) tablet 80 mg  80 mg Oral QHS  aspirin delayed-release tablet 81 mg  81 mg Oral DAILY  clonazePAM (KlonoPIN) tablet 0.25 mg  0.25 mg Oral QPM  
 levothyroxine (SYNTHROID) tablet 75 mcg  75 mcg Oral ACB  meclizine (ANTIVERT) tablet 12.5 mg  12.5 mg Oral Q6H PRN  
 glucose chewable tablet 16 g  4 Tablet Oral PRN  
 dextrose (D50W) injection syrg 12.5-25 g  25-50 mL IntraVENous PRN  
 glucagon (GLUCAGEN) injection 1 mg  1 mg IntraMUSCular PRN  
 sodium chloride (NS) flush 5-40 mL  5-40 mL IntraVENous Q8H  
 sodium chloride (NS) flush 5-40 mL  5-40 mL IntraVENous PRN  
 acetaminophen (TYLENOL) tablet 650 mg 650 mg Oral Q6H PRN Or  
 acetaminophen (TYLENOL) suppository 650 mg  650 mg Rectal Q6H PRN  polyethylene glycol (MIRALAX) packet 17 g  17 g Oral DAILY PRN  
 bisacodyL (DULCOLAX) suppository 10 mg  10 mg Rectal DAILY PRN  
 ondansetron (ZOFRAN) injection 4 mg  4 mg IntraVENous Q6H PRN  
 insulin lispro (HUMALOG) injection   SubCUTAneous AC&HS  
 melatonin (rapid dissolve) tablet 5 mg  5 mg Oral QHS PRN  
 alum-mag hydroxide-simeth (MYLANTA) oral suspension 30 mL  30 mL Oral Q4H PRN  
 LORazepam (ATIVAN) injection 0.5 mg  0.5 mg IntraVENous Q6H PRN  
 diphenhydrAMINE (BENADRYL) injection 25 mg  25 mg IntraVENous Q6H PRN  
 albuterol (PROVENTIL VENTOLIN) nebulizer solution 2.5 mg  2.5 mg Nebulization Q4H PRN  
 simethicone (MYLICON) tablet 80 mg  80 mg Oral QID PRN Laboratory data and review: 
 
Recent Labs  
  06/05/21 
0356 06/04/21 
0527 WBC 16.9* 16.4* HGB 9.8* 9.3* HCT 33.4* 30.7*  340 Recent Labs  
  06/05/21 
0356 06/04/21 
1672 * 134* K 4.2 4.0  
CL 97 98 CO2 34* 35* * 98 BUN 19 21* CREA 0.49* 0.44* CA 7.4* 7.4* No components found for: [facility]: Imaging studies have been reviewed Assessment and Plan: 
 
Sepsis due to MRSA pneumonia: likely HCAP. Sputum culture grew MRSA. She has completed her IV antibiotic regimen with IV Cefepime 5/20 - 5/26 and Vanc 5/20- 6/02. Now stable. CM working on discharge planning Acute hypoxic respiratory failure POA: due to pneumonia and COPD. Continue to wean off supplemental oxygen to keep saturations above 90% COPD with acute exacerbation POA: better. Not wheezing. Seen and followed by pulmonology. Continue Duoneb, Robitusin, Prednisone taper Multiple fractures / Chronic pain / Debility POA: noted on CT imaging on admission. Likely has recurrent falls. On chronic pain medications. Seen by Palliative care. Continue current pain regimen which seems to be working well.  Awaiting placement Hypertension POA: Continue Amlodipine Hypothyroidism POA: Continue Levothyroxine Bilateral common iliac stenosis POA: chronic. Noted on CT abd/pelvis that showed severe stenosis of bilateral common iliac arteries. No symptoms. Outpatient monitoring Right subclavian stenosis: vascular evaluated; rec OP f/u.  
  
Cirrhosis: noted on imaging. She is not presently decompensated.    
  
Thrush. On nystatin swish and swallow, magic mouth wash .  
  
Total time spent for the patient's care: 35 Minutes Care Plan discussed with: Patient, Care Manager and Nursing Staff Discussed:  Care Plan and D/C Planning Prophylaxis:  Lovenox Anticipated Disposition:  SNF/LTC 
        
___________________________________________________ Attending Physician:   Sowmya Puente MD

## 2021-06-06 NOTE — PROGRESS NOTES
Problem: Diabetes Self-Management Goal: *Disease process and treatment process Description: Define diabetes and identify own type of diabetes; list 3 options for treating diabetes. Outcome: Progressing Towards Goal 
Goal: *Incorporating nutritional management into lifestyle Description: Describe effect of type, amount and timing of food on blood glucose; list 3 methods for planning meals. Outcome: Progressing Towards Goal 
Goal: *Incorporating physical activity into lifestyle Description: State effect of exercise on blood glucose levels. Outcome: Progressing Towards Goal 
Goal: *Developing strategies to promote health/change behavior Description: Define the ABC's of diabetes; identify appropriate screenings, schedule and personal plan for screenings. Outcome: Progressing Towards Goal 
Goal: *Using medications safely Description: State effect of diabetes medications on diabetes; name diabetes medication taking, action and side effects. Outcome: Progressing Towards Goal 
Goal: *Monitoring blood glucose, interpreting and using results Description: Identify recommended blood glucose targets  and personal targets. Outcome: Progressing Towards Goal 
Goal: *Prevention, detection, treatment of acute complications Description: List symptoms of hyper- and hypoglycemia; describe how to treat low blood sugar and actions for lowering  high blood glucose level. Outcome: Progressing Towards Goal 
Goal: *Prevention, detection and treatment of chronic complications Description: Define the natural course of diabetes and describe the relationship of blood glucose levels to long term complications of diabetes. Outcome: Progressing Towards Goal 
Goal: *Developing strategies to address psychosocial issues Description: Describe feelings about living with diabetes; identify support needed and support network Outcome: Progressing Towards Goal 
Goal: *Insulin pump training Outcome: Progressing Towards Goal 
Goal: *Sick day guidelines Outcome: Progressing Towards Goal 
Goal: *Patient Specific Goal (EDIT GOAL, INSERT TEXT) Outcome: Progressing Towards Goal 
  
Problem: Falls - Risk of 
Goal: *Absence of Falls Description: Document Ildefonso Zamora Fall Risk and appropriate interventions in the flowsheet. Outcome: Progressing Towards Goal 
Note: Fall Risk Interventions: 
Mobility Interventions: Assess mobility with egress test, Utilize gait belt for transfers/ambulation, Utilize walker, cane, or other assistive device, PT Consult for assist device competence, PT Consult for mobility concerns, OT consult for ADLs, Patient to call before getting OOB, Communicate number of staff needed for ambulation/transfer, Bed/chair exit alarm Mentation Interventions: Bed/chair exit alarm, Door open when patient unattended, Eyeglasses and hearing aids, More frequent rounding, Reorient patient, Room close to nurse's station, Update white board Medication Interventions: Bed/chair exit alarm, Evaluate medications/consider consulting pharmacy, Patient to call before getting OOB, Teach patient to arise slowly Elimination Interventions: Bed/chair exit alarm, Call light in reach, Elevated toilet seat, Toileting schedule/hourly rounds, Patient to call for help with toileting needs History of Falls Interventions: Bed/chair exit alarm Problem: Patient Education: Go to Patient Education Activity Goal: Patient/Family Education Outcome: Progressing Towards Goal 
  
Problem: Pressure Injury - Risk of 
Goal: *Prevention of pressure injury Description: Document Edouard Scale and appropriate interventions in the flowsheet. Outcome: Progressing Towards Goal 
Note: Pressure Injury Interventions: 
Sensory Interventions: Float heels, Keep linens dry and wrinkle-free, Maintain/enhance activity level, Minimize linen layers, Use 30-degree side-lying position Moisture Interventions: Check for incontinence Q2 hours and as needed, Internal/External urinary devices, Apply protective barrier, creams and emollients, Absorbent underpads, Maintain skin hydration (lotion/cream), Minimize layers, Offer toileting Q_hr Activity Interventions: Increase time out of bed, Pressure redistribution bed/mattress(bed type), PT/OT evaluation Mobility Interventions: Float heels, Pressure redistribution bed/mattress (bed type), PT/OT evaluation, HOB 30 degrees or less, Turn and reposition approx. every two hours(pillow and wedges) Nutrition Interventions: Document food/fluid/supplement intake Friction and Shear Interventions: Minimize layers, Lift team/patient mobility team, Lift sheet, HOB 30 degrees or less, Foam dressings/transparent film/skin sealants, Apply protective barrier, creams and emollients, Feet elevated on foot rest 
 
  
 
 
 
  
Problem: Patient Education: Go to Patient Education Activity Goal: Patient/Family Education Outcome: Progressing Towards Goal 
  
Problem: Patient Education: Go to Patient Education Activity Goal: Patient/Family Education Outcome: Progressing Towards Goal 
  
Problem: Patient Education: Go to Patient Education Activity Goal: Patient/Family Education Outcome: Progressing Towards Goal 
  
Problem: Patient Education: Go to Patient Education Activity Goal: Patient/Family Education Outcome: Progressing Towards Goal 
  
Problem: Risk for Spread of Infection Goal: Prevent transmission of infectious organism to others Description: Prevent the transmission of infectious organisms to other patients, staff members, and visitors. Outcome: Progressing Towards Goal 
  
Problem: Patient Education:  Go to Education Activity Goal: Patient/Family Education Outcome: Progressing Towards Goal 
  
Problem: Patient Education: Go to Patient Education Activity Goal: Patient/Family Education Outcome: Progressing Towards Goal

## 2021-06-07 PROBLEM — R53.81 PHYSICAL DEBILITY: Status: ACTIVE | Noted: 2021-01-01

## 2021-06-07 PROBLEM — D63.8 ANEMIA OF CHRONIC ILLNESS: Status: ACTIVE | Noted: 2021-01-01

## 2021-06-07 PROBLEM — T07.XXXA MULTIPLE FRACTURES: Status: ACTIVE | Noted: 2021-01-01

## 2021-06-07 NOTE — DISCHARGE INSTRUCTIONS
TRANSFER  INSTRUCTIONS    NAME: Malcolm Sher   :  1940   MRN:  [id number]     Date:    2021     ADMIT DATE: 2021     TRANSFER DATE: 2021     DISPOSITION: SNF    DISCHARGE DIAGNOSIS:  Active Problems:    Acute on chronic respiratory failure with hypoxia (HCC) (5/10/2021)    Chronic obstructive pulmonary disease (HCC) ()    HAP (hospital-acquired pneumonia) (2021)    Multiple fractures (2021)    Physical debility (2021)    HTN (hypertension) (1/3/2021)    Hypothyroid (1/3/2021)    History of CVA (cerebrovascular accident) (1/3/2021)    DM (diabetes mellitus), type 2 (HCC) ()    Anxiety and depression ()    GERD (gastroesophageal reflux disease) ()    Anemia of chronic illness (5/10/2021)    MEDICATIONS: See medication list on the AVS    Recommended diet:  Dysphagia mechanical altered Diabetic Diet    Recommended activity: Activity as tolerated    Follow Up: Follow-up Information     Follow up With Specialties Details Why [address] [facility] home health    [phone]    [address] [place] [address]   [address] [place] [address]  [phone]    Edmund Velazquez, [address]  [phone]            Information obtained by :  I understand that if any problems occur once I am at home I am to contact my physician. I understand and acknowledge receipt of the instructions indicated above.                                                                                                                                            Physician's or R.N.'s Signature                                                                  Date/Time                                                                                                                                              Patient or Representative Signature                                                          Date/Time

## 2021-06-07 NOTE — PROGRESS NOTES
I spoke with pt's daughter Sangeeta Yang [phone]. We discussed that [facility] has the [place] from insurance. Jocy Boswell stated that she really wanted a private room. I called Viviana Ayan with [facility] [phone]  and he said that they did not have any private rooms. They could put her on a list for one when one becomes available. I also sent a referral to [facility] as all of their beds are private. I called Jocy Boswell back and relayed the above. Jocy Boswell said that the ratings for [employer] higher than [facility]". She said to let her mother make the decision \"rating vs private room\". I spoke with the patient and she stated that she would like to go to [address] [place] [address]  to set up transportation and I will call Jocy Boswell back with a time.  She plans on meeting her mother at [address] [place] [address], [facility]

## 2021-06-07 NOTE — PALLIATIVE CARE DISCHARGE
The Palliative Medicine team was consulted as part of your / your loved one's care in the hospital. Our team is a supportive service; we strive to relieve suffering and improve quality of life. You identified the following goal(s) as your main focus for healthcare: Patient/Health Care Proxy Stated Goals:  Rehabilitation We reviewed advance care planning information, which includes the following: 
Advance Care Planning 6/7/2021 Patient's Healthcare Decision Maker is: Verbal statement (Legal Next of Kin remains as decision maker) Confirm Advance Directive Yes, not on file Patient Would Like to Complete Advance Directive In place, copy is not currently on file We reviewed / discussed your code status as: DNR 
   Full Code means perform CPR in the event of cardiac arrest 
   [facility] means do NOT perform CPR in the event of cardiac arrest 
   Partial Code means you have specific preferences, please discuss with your health care team 
   No Order means this issue was not addressed / resolved during your stay You shared that you have an Advance Medical Directive and a Durable Do Not Resuscitate form in place. It is always recommended that you provide a copy of these important documents for your medical record. Because of the importance of this information, we are providing you with a printed copy to share with other healthcare providers after this hospitalization is complete. If any of the above information is incomplete or incorrect, please contact the Palliative Medicine team at [phone].

## 2021-06-07 NOTE — DISCHARGE SUMMARY
Abhinav Erwin shara Archer 79  [address] [facility], [address] [place] [address]  Tel: [phone]    Physician Discharge Summary    Patient ID:    Ziggy Jewell  Age:              80 y.o.    : 1940  MRN:             [id number]     PCP: Trupti Thomas MD     Date of Admission: 2021    Date of Discharge:  2021    Principal admission Diagnosis:   HAP (hospital-acquired pneumonia) [J18.9, Y95]  Pneumonia of both lungs due to infectious organism [J18.9]    Discharge Diagnoses:    Acute on chronic respiratory failure with hypoxia ([facility] (5/10/2021)    Chronic obstructive pulmonary disease ([facility]     HAP (hospital-acquired pneumonia) (2021)    Multiple fractures (2021)    Physical debility (2021)    HTN (hypertension) (1/3/2021)    Hypothyroid (1/3/2021)    History of CVA (cerebrovascular accident) (1/3/2021)    DM (diabetes mellitus), type 2 ([facility]     Anxiety and depression     GERD (gastroesophageal reflux disease)     Anemia of chronic illness (5/10/2021)    Hospital Course:     Ms. Claudette End is a 80 y.o. admitted to [facility] and treated for the following:    Sepsis due to MRSA pneumonia: likely HCAP. Sputum culture grew MRSA. She has completed her IV antibiotic regimen with IV Cefepime  -  and Vanc - . Now stable. Awaiting discharge once all arrangements are completed. Given her frail nature and multiple co-morbid conditions, she is at a high risk for deterioration and or re-admission. Seen and followed by palliative care. Continue supportive care. Being discharged for continued rehab      Acute hypoxic respiratory failure POA: due to pneumonia and COPD. Continue to wean off supplemental oxygen to keep saturations above 90%     COPD with acute exacerbation POA: better. Seen and followed by pulmonology who signed off.  Continue bronchodilators as below, prednisone taper to her maintenance dose.       Multiple fractures / Chronic pain / Debility POA: noted on CT imaging on admission. Likely has recurrent falls. On chronic pain medications. Seen by Palliative care. Continue current pain regimen which seems to be working well. Bowel regimen     Hypertension POA: Continue Amlodipine     Hypothyroidism POA: Continue Levothyroxine      Bilateral common iliac stenosis POA: chronic. Noted on CT abd/pelvis that showed severe stenosis of bilateral common iliac arteries. No symptoms. Outpatient monitoring      Right subclavian stenosis: vascular evaluated; rec OP f/u.      Cirrhosis: noted on imaging. She is not presently decompensated.       Discharge Exam:    Visit Vitals  BP (!) 159/72 Comment: after ativan   Pulse 87   Temp 98 °F (36.7 °C)   Resp 18   Ht 5' 4\" (1.626 m)   Wt 65.7 kg (144 lb 13.5 oz)   SpO2 96%   BMI 24.86 kg/m²        Patient has been seen and examined. General: weak but not in distress   Pulm: clear breath sounds without wheezes  Card: no murmurs, normal S1, S2 without thrills, bruits   Abd:    soft, non-tender, normoactive bowel sounds  Skin: no rashes and skin turgor is good  Neuro: awake, alert and has a non focal     Activity: Activity as tolerated    Diet: Mechanical soft diabetic diet    Current Discharge Medication List      START taking these medications    Details   acetaminophen (TYLENOL) 325 mg tablet Take 2 Tablets by mouth every six (6) hours as needed for Pain or Fever for up to 10 doses. Indications: pain  Qty: 10 Tablet, Refills: 0      albuterol-ipratropium (DUO-NEB) 2.5 mg-0.5 mg/3 ml nebu 3 mL by Nebulization route every six (6) hours for 10 days. Give treatments while awake  Qty: 30 Nebule, Refills: 0      guaiFENesin-dextromethorphan (ROBITUSSIN DM) 100-10 mg/5 mL syrup Take 10 mL by mouth every six (6) hours for 10 days. Qty: 400 mL, Refills: 0      polyethylene glycol (MIRALAX) 17 gram packet Take 1 Packet by mouth daily for 30 days.  Indications: constipation  Qty: 30 Packet, Refills: 0      simethicone (MYLICON) 80 mg chewable tablet Take 1 Tablet by mouth four (4) times daily as needed for Flatulence (Gaseous discomfort) for up to 10 doses. Qty: 10 Tablet, Refills: 0      insulin lispro (HUMALOG) 100 unit/mL injection See scale above  Indications: type 2 diabetes mellitus  Qty: 1 Vial, Refills: 0      oxyCODONE ER (OxyCONTIN) 20 mg ER tablet Take 1 Tablet by mouth every twelve (12) hours for 30 doses. Max Daily Amount: 40 mg. Indications: severe chronic pain requiring long-term opioid treatment  Qty: 30 Tablet, Refills: 0    Associated Diagnoses: Chronic pain syndrome      oxyCODONE-acetaminophen (PERCOCET 7.5) 7.5-325 mg per tablet Take 1 Tablet by mouth every four (4) hours as needed for Pain for up to 10 doses. Max Daily Amount: 6 Tablets. Qty: 10 Tablet, Refills: 0    Associated Diagnoses: Chronic pain syndrome      naloxone (EVZIO) 2 mg/0.4 mL auto-injector 0.4 mL by IntraMUSCular route once as needed for Overdose for up to 1 dose. Indications: decrease in rate & depth of breathing due to opioid drug, opioid overdose  Qty: 1 Device, Refills: 0      amLODIPine (NORVASC) 10 mg tablet Take 1 Tablet by mouth daily for 30 days. Indications: high blood pressure  Qty: 30 Tablet, Refills: 0         CONTINUE these medications which have CHANGED    Details   gabapentin (NEURONTIN) 300 mg capsule Take 2 Capsules by mouth two (2) times a day for 30 days. Max Daily Amount: 1,200 mg. Qty: 120 Capsule, Refills: 0    Associated Diagnoses: Neuropathy      clonazePAM (KlonoPIN) 0.25 mg TbDi Take 1 Tablet by mouth every evening for 30 days. Max Daily Amount: 0.25 mg. ODT  Qty: 30 Tablet, Refills: 0    Associated Diagnoses: Anxiety and depression      predniSONE (DELTASONE) 5 mg tablet Take 4 Tablets by mouth daily (with breakfast) for 3 days, THEN 3 Tablets daily (with breakfast) for 3 days, THEN 2 Tablets daily (with breakfast) for 3 days, THEN 1 Tablet daily (with breakfast) for 30 days.   Qty: 57 Tablet, Refills: 0 insulin glargine (Lantus Solostar U-100 Insulin) 100 unit/mL (3 mL) inpn 16 Units by SubCUTAneous route nightly for 30 days. Indications: type 2 diabetes mellitus  Qty: 4.8 mL, Refills: 0         CONTINUE these medications which have NOT CHANGED    Details   albuterol sulfate (PROVENTIL;VENTOLIN) 2.5 mg/0.5 mL nebu nebulizer solution 2.5 mg by Nebulization route every six (6) hours as needed for Wheezing or Shortness of Breath (Generally uses 2-3 times daily). calcium-cholecalciferol, D3, (CALTRATE 600+D) tablet Take 1 Tab by mouth daily (with breakfast). FLUoxetine (PROzac) 10 mg capsule Take 10 mg by mouth every evening. guaiFENesin ER (Mucinex) 600 mg ER tablet Take 600 mg by mouth two (2) times a day. urea (URE-NA) 15 gram packet Take 1 Packet by mouth daily. Qty: 30 Packet, Refills: 0      furosemide (LASIX) 20 mg tablet Take 20 mg by mouth daily as needed (edema). Requires only sparingly, daughter reports only 2x in past 6 months      aspirin delayed-release 81 mg tablet Take 1 Tab by mouth daily. Qty: 30 Tab, Refills: 0      latanoprost (XALATAN) 0.005 % ophthalmic solution Administer 1 Drop to both eyes nightly. levothyroxine (Levo-T) 75 mcg tablet Take 75 mcg by mouth Daily (before breakfast). tolterodine ER (DETROL LA) 4 mg ER capsule Take 4 mg by mouth daily. atorvastatin (LIPITOR) 80 mg tablet Take 80 mg by mouth nightly. pantoprazole (PROTONIX) 40 mg tablet Take 40 mg by mouth daily. fluticasone-umeclidinium-vilanterol (Trelegy Ellipta) 100-62.5-25 mcg inhaler Take 1 Puff by inhalation daily. melatonin 5 mg tablet Take 5 mg by mouth nightly.      magnesium oxide (MAG-OX) 400 mg tablet Take 400 mg by mouth daily. B.infantis-B.ani-B.long-B.bifi (Probiotic 4X) 10-15 mg TbEC Take 2 Caps by mouth two (2) times a day.          STOP taking these medications       oxyCODONE IR (ROXICODONE) 5 mg immediate release tablet Comments:   Reason for Stopping: albuterol (PROVENTIL HFA, VENTOLIN HFA, PROAIR HFA) 90 mcg/actuation inhaler Comments:   Reason for Stopping:         oxyCODONE ER (Xtampza ER) 9 mg capsule Comments:   Reason for Stopping:         insulin lispro (HumaLOG KwikPen Insulin) 100 unit/mL kwikpen Comments:   Reason for Stopping:         BENEFIBER, GUAR GUM, PO Comments:   Reason for Stopping: Follow-up Information     Follow up With Specialties Details Why [address] [facility]    [phone]    [address], [address]   [address] [place], [place] [phone]            Follow-up tests or labs: As noted above if any. Discharge Condition: Stable    Disposition: SNF    Time taken to arrange discharge:  35 minutes.     Signed:  Sloan Libman, MD     [facility]  6/7/2021   12:10 PM

## 2021-06-07 NOTE — ADT AUTH CERT NOTES
Utilization Reviews 
 
  
Pneumonia - Care Day 17 (6/4/2021) by Ann Mathur 
 
  
Review Status Review Entered Completed 6/4/2021 15:26  
  
Criteria Review Care Day: 17 Care Date: 6/4/2021 Level of Care: Telemetry Guideline Day 3 Level Of Care (X) Floor to discharge 6/4/2021 15:26:19 EDT by Kamran Galo telemetry Clinical Status   
(X) * Hemodynamic stability 6/4/2021 15:26:19 EDT by Addy Awad 80 /70   
(X) * Afebrile or temperature acceptable for next level of care 6/4/2021 15:26:19 EDT by [address]   
(X) * Tachypnea absent 6/4/2021 15:26:19 EDT by Ann Mathur   
  RR 16   
(X) * Hypoxemia absent 6/4/2021 15:26:19 EDT by Ann Matuhr   
  91% 2L nc   
(X) * Mental status at baseline 6/4/2021 15:26:19 EDT by Ann Mathur   
  Pain score: 7, 3, 7 (Back, Medial, aching)   
( ) * Antibiotic regimen acceptable for next level of care ( ) * Discharge plans and education understood Activity ( ) * Ambulatory or acceptable for next level of care 6/4/2021 15:26:19 EDT by Manju Bartholomew comes to sit with max assist.Pt progressed to CGA sitting on EOB. Pt mod assist of 2 sit to stand. Pt pivoted to chair with mod assist of 2. 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 Routes ( ) * Oral hydration, medications, and diet 6/4/2021 15:26:19 EDT by Ann Mathur   
  Insulin SC ac&hs Morphine 2mg IV prn x3, Oxycontin 20mg PO q12 Prednisone 20mg PO daily Interventions   
(X) * Oxygen absent or at baseline need 6/4/2021 15:26:19 EDT by Ann Mathur   
  Nasal cannula Duoneb INH q6, arformoterol INH bid Mucinex 600mg PO bid, Robitussin 10mL PO q6   
(X) * Isolation not indicated, or is performable at next level of care 6/4/2021 15:26:19 EDT by Ann Mathur   
  Contact Isolation - MRSA   
(X) WBC   
6/4/2021 15:26:19 EDT by Ann Mathur   
  WBC 16.4, HGB 9.3, HCT 30.7, plat 340 Na 134, CO2 35, BUN 21/Cr 0.44, Ca 7.4 (X) Incentive spirometry Medications ( ) Oral antibiotics 6/4/2021 15:26:19 EDT by Delmy Smith Completed Vanc * Milestone Additional Notes 06/04/21 - IP Pulmonary progress note: Interval history: TMax 99  
sats 91-95% on 2L WBC 16.4; up slightly Respiratory culture + for moderate MSA and rare filamentous fungus- sending to state lab Fluid balance: -+110  
   
ROS:  Reports feeling well from a breathing standpoint.  Still weak.  No cough, sputum production, SOB, chest tightness, fever, or chills. IMPRESSION Acute hypoxemic respiratory failure MRSA pneumonia Acute COPD exacerbation Hx of PE - not on anticoagulation due to frequent falls HTN, diastolic dysfunction Mild AS  
H/o hypothyroidism H/o CVA Hyperglycemia Thrush   
   
PLAN Supplemental O2 as needed to keep sats > 88%, on 2L NC at baseline Completed Vanc Stop Bumex Continue scheduled duonebs, pulmicort, brovana, mucinex Switch steroids to PO Flutter valve and chest PT   
PT/OOB as much as able Will need repeat chest CT in 6 weeks Diet as per speech recs Nystatin for thrush  
  
  
Pneumonia - Care Day 16 (6/3/2021) by Augusto Boswell 
 
  
Review Status Review Entered Completed 6/4/2021 14:21  
  
Criteria Review Care Day: 16 Care Date: 6/3/2021 Level of Care: Telemetry Guideline Day 3 Level Of Care (X) Floor to discharge 6/4/2021 14:20:53 EDT by Augusto Boswell   
  Remote Telemetry Clinical Status   
(X) * Hemodynamic stability 6/4/2021 14:20:53 EDT by Jason Alejandro 94 /76, 167/73   
(X) * Afebrile or temperature acceptable for next level of care 6/4/2021 14:20:53 EDT by Augusto Boswell   
  T 98.6   
(X) * Tachypnea absent 6/4/2021 14:20:53 EDT by Augusto Boswell   
  RR 20   
(X) * Hypoxemia absent 6/4/2021 14:20:53 EDT by Augusto Boswell   
  95% 2L nc   
(X) * Mental status at baseline 6/4/2021 14:20:53 EDT by Delmy Smith Pain Score: 8 (Back, Upper, Aching)   
( ) * Antibiotic regimen acceptable for next level of care ( ) * Discharge plans and education understood Activity ( ) * Ambulatory or acceptable for next level of care 6/4/2021 14:20:53 EDT by Yasmin Gutierrez declined PT after encouragement  reporting being too fatigued. Routes ( ) * Oral hydration, medications, and diet 6/4/2021 14:20:53 EDT by Niki Peacock   
  Mucinex 600mg PO bid, Robitussin 10mL PO q6, Isulin SC ac&hs,  
Ativan 0.5mg IV prn x2, oxycontin 1 tab PO prn x2, Bumex 1mg IV daily, Solumedrol 20mg IV q12 Interventions   
(X) * Oxygen absent or at baseline need 6/4/2021 14:20:53 EDT by Niki Peacock   
  2 L nasal cannula Duoneb INH q6, arformoterol INH bid   
(X) * Isolation not indicated, or is performable at next level of care 6/4/2021 14:20:53 EDT by Niki Peacock   
  Contact Isolation MRSA   
(X) WBC   
6/4/2021 14:20:53 EDT by Liza Khalil   
  WBC 16.1, HGB 9.1, HCT 30.4, Plat 339, Na 135, CO2 34, Glucose 235, BUN 21/Cr 0.51, Ca 7.3 (X) Incentive spirometry 6/4/2021 14:20:53 EDT by Niki Peacock   
  Cardiac monitor, C&DB, Daily weight, elevate HOB, I&Os, IS 
RT - Acapella, Chest PT, Flutter Valve * Milestone Additional Notes 06/03/21- IP Pulmonary Progress Note:  
sats 96% on 2L WBC 16.1; better Respiratory culture + for moderate MSA and rare filamentous fungus- sending to state lab Fluid balance: -1380; net 17L negative ROS:  Feels better. Eating lunch with daughter. Nadia Duarte feels weak. General: Alert, cooperative, chronically ill appearing, appears stated age. Head: Normocephalic, without obvious abnormality, atraumatic. Eyes: Conjunctivae/corneas clear. Nose:Nares normal. Septum midline. Mucosa normal.   
Throat:Lips, mucosa, and tongue normal.   
Neck:Supple, symmetrical, trachea midline, no adenopathy. Lungs:  Congested cough/upper airway, scattered rhonchi Chest wall: Heart: Regular rate and rhythm Abdomen:  Soft, non-tender. Bowel sounds normal.  
Extremities:Anasarca Pulses:2+ and symmetric all extremities. Skin:Skin color, texture, turgor normal. No rashes or lesions Neurologic:Grossly nonfocal  
  
IMPRESSION Acute hypoxemic respiratory failure MRSA pneumonia Acute COPD exacerbation Hx of PE - not on anticoagulation due to frequent falls HTN, diastolic dysfunction Mild AS  
H/o hypothyroidism H/o CVA Hyperglycemia Thrush   
   
PLAN Supplemental O2 as needed to keep sats > 88%, on 2L NC at baseline Completed Vanc Would get chest CT if any clinical change in respiratory status or fever/significant rise in WBC to check for loculated pleural effusion.   
Stop Bumex Continue scheduled duonebs, pulmicort, brovana, mucinex Switch steroids to PO Flutter valve and chest PT   
PT/OOB as much as able Diet as per speech recs Nystatin for thrush Hospitalist Assessment and Plan:  
     Sepsis due to Pneumonia/ HCAP/ MRSA pneumonia. There is also e/o cirrhosis, but minimal ascites so I low c/f SBP. Sputum culture grew MRSA.  Treated with cefepime 5/20 - 5/26; and s/p Vanc 5/20- 6/02.   
      AHRF: PNA and COPD exac. Continue nebs, lenore neb, brovana, pulmicort, solumedrol (weaning). Pulmonary following   
      COPD exacerbation. As above continue treatment.   
      Multiple fractures: CT today showed multiple fractures of ribs, pubic rami, thoracic spine, etc. She is on oxycodone for chronic pain. repeat admissions and suggestion of multiple falls, palliative care is following. needs SNF   
      Bilateral common iliac stenosis: CT abd/pelvis showed severe stenosis of bilateral common iliac arteries, but pt has no leg pain and lactate is normal.  no symptoms. Monitor   
      Right subclavian stenosis: vascular evaluated; rec OP f/u.   
      Cirrhosis: noted on imaging. She is not presently decompensated.     
      Hypokalemia. replete PRN.       Leukocytosis. Likely due to steroid. Improving. Monitor   
      Thrush. On nystatin swish and swallow, magic mouth wash .   
   
  
  
Pneumonia - Care Day 15 (6/2/2021) by Samreen Sapp 
 
  
Review Status Review Entered Completed 6/3/2021 14:37  
  
Criteria Review Care Day: 15 Care Date: 6/2/2021 Level of Care: Telemetry Guideline Day 3 Level Of Care (X) Floor to discharge 6/3/2021 14:37:10 EDT by Samreen Sapp   
  Remote telemetry Clinical Status   
(X) * Hemodynamic stability 6/3/2021 14:37:10 EDT by Sonido Caraballo 91   127/55 Pain Scores: 7, 8, 7 (Back, Neck, Aching)   
(X) * Afebrile or temperature acceptable for next level of care 6/3/2021 14:37:10 EDT by Samreen Sapp   
  T 97.7   
(X) * Tachypnea absent 6/3/2021 14:37:10 EDT by Samreen Sapp   
  RR 20   
(X) * Hypoxemia absent 6/3/2021 14:37:10 EDT by Samreen LIKECHARITY   
  95% 2L nc   
(X) * Mental status at baseline ( ) * Antibiotic regimen acceptable for next level of care 6/3/2021 14:37:10 EDT by Samreen Sekou   
  Vanco 750mg IV q12   
( ) * Discharge plans and education understood Activity ( ) * Ambulatory or acceptable for next level of care 6/3/2021 14:37:10 EDT by Aleksandr Degroot Pt with limited use of left UE. Pt was able to sit on EOB 7 minutes before returning to supine max assist of 2. Pt progress slow Routes ( ) * Oral hydration, medications, and diet 6/3/2021 14:37:10 EDT by Samreen Click   
  Ativan 0.5mg IV prn x4, Solumedrol 20mg IV q12, Morphine 2mg IV prn x1, oxycodone 20mg PO q12, Percocet 1 tab PO prn x3 Interventions   
(X) * Oxygen absent or at baseline need 6/3/2021 14:37:10 EDT by Samreen Sekou   
  nasal cannula   
(X) * Isolation not indicated, or is performable at next level of care 6/3/2021 14:37:10 EDT by Samreen Sapp   
  Resp Culture: 
GRAM STAINFEW WBCS SEEN FEW [address] MODERATE * METHICILLIN RESISTANT STAPHYLOCOCCUS AUREUS  
RARE FILAMENTOUS FUNGUS Contact Isolation   
(X) WBC   
6/3/2021 14:37:10 EDT by Samreen Sapp   
  ABC 18.1, HGB 9.1, HCT 29.2, Plat 308 Na 135, Ch 96, CO2 37, Glucose 136, BUN 23/Cr 0.53, Ca 7.1 (X) Incentive spirometry 6/3/2021 14:37:10 EDT by Samreen Sapp   
  Duoneb iNH q4, Mucinex 600mg PO bid, [id number] PO q6   
Medications ( ) Oral antibiotics 6/3/2021 14:37:10 EDT by Samreen Sapp   
  Vanco 750mg IV q12 * Milestone Additional Notes 06/02/21 - IP Progress note:  
 c/o chronic back pain.  
   
Assessment and Plan:  
     Sepsis due to Pneumonia/ HCAP/ MRSA pneumonia. There is also e/o cirrhosis, but minimal ascites so I low c/f SBP. Sputum culture grew MRSA.  Treated with Vanc/cefepime 5/20 - 5/26; now on just Vanc - last dose today.   
     AHRF: PNA and COPD exac. Continue nebs, lenore neb, brovana, pulmicort, solumedrol. Pulmonary following   
      COPD exacerbation. As above continue treatment.   
      Multiple fractures: CT today showed multiple fractures of ribs, pubic rami, thoracic spine, etc. She is on oxycodone for chronic pain. repeat admissions and suggestion of multiple falls, palliative care is following. needs SNF   
      Bilateral common iliac stenosis: CT abd/pelvis showed severe stenosis of bilateral common iliac arteries, but pt has no leg pain and lactate is normal.  no symptoms. Monitor   
      Right subclavian stenosis: consult vascular.    
      Cirrhosis: noted on imaging. She is not presently decompensated.     
      Hypokalemia. replete PRN.       Leukocytosis. Likely due to steroid. Improving. Monitor   
      Thrush. On nystatin swish and swallow, magic mouth wash .   
   
----Pulmonary Progress note: WOT 87.9: better K 3.6 Respiratory culture + for moderate MSA and rare filamentous fungus- sending to state lab Fluid balance: -555, net 15L negative ROS:  Feels better. Still very weak.    
   
IMPRESSION Acute hypoxemic respiratory failure MRSA pneumonia Acute COPD exacerbation Hx of PE - not on anticoagulation due to frequent falls HTN, diastolic dysfunction Mild AS  
H/o hypothyroidism H/o CVA Hyperglycemia Thrush   
   
PLAN Supplemental O2 as needed to keep sats > 88%, on 2L NC at baseline Continue vancomycin- last day today Would get chest CT if any clinical change in respiratory status or fever/significant rise in WBC to check for loculated pleural effusion. Continue bumex; decrease to daily given serum CO2 trending up Continue scheduled duonebs, pulmicort, brovana, mucinex Wean steroids today Flutter valve and chest PT   
PT/OOB as much as able Diet as per speech recs Nystatin for thrush

## 2021-06-07 NOTE — PROGRESS NOTES
[facility] will be here at 4:50 to transport the patient.  I have updated Moriah Schilling and Katia Plan in admissions at . Eileen 116, MSW

## 2021-06-07 NOTE — PROGRESS NOTES
1611 Attempt report x1 to [facility], left VM awaitng call back. [facility] delayed to 1650. Pt bathed and changed gown, no home clothes available to pt at this time. Port de-accessed per protocol, no complications. 1700 [facility]  time now 1800. 
 
1800 Called back to [facility] an was able to give report to WALLY DYKES. Denies further questions at this time. , call back number provided if questions arise. Prescriptions included in d/c packet. 
 
1900. [facility] updated pickup time to 2245. CM beeper paged.

## 2021-06-07 NOTE — PROGRESS NOTES
Messaged Dr. Katt Muñoz about patient requesting her \"nerve medicine\". IV access was d/c'ed on day shift. Asked MD for a one time dose of PO ativan due to patient leaving at 11pm to go to SNF.

## 2021-06-07 NOTE — PROGRESS NOTES
Bedside and Verbal shift change report given to WALLY Bran (oncoming nurse) by Juancarlos Moya (offgoing nurse). Report included the following information SBAR, Kardex, Procedure Summary, Intake/Output, MAR, Recent Results and Med Rec Status.

## 2021-06-07 NOTE — PROGRESS NOTES
Bedside and Verbal shift change report given to jacek (oncoming nurse) by Jacqueline Cordero (offgoing nurse). Report included the following information SBAR, Kardex and Recent Results.

## 2021-06-07 NOTE — PROGRESS NOTES
Bedside and Verbal shift change report given to Seamus Llanes RN (oncoming nurse) by Andrew Canales RN (offgoing nurse). Report included the following information SBAR, Kardex, Procedure Summary, Intake/Output, MAR, Accordion, Recent Results and Med Rec Status.

## 2021-06-08 NOTE — ADT AUTH CERT NOTES
Pneumonia - Care Day 17 (6/4/2021) by Merline Reyes 
 
  
Review Status Review Entered Completed 6/4/2021 15:26  
  
Criteria Review Care Day: 17 Care Date: 6/4/2021 Level of Care: Telemetry Guideline Day 3 Level Of Care (X) Floor to discharge 6/4/2021 15:26:19 EDT by Tawana Li telemetry Clinical Status   
(X) * Hemodynamic stability 6/4/2021 15:26:19 EDT by Yoselyn Hamlin 80 /70   
(X) * Afebrile or temperature acceptable for next level of care 6/4/2021 15:26:19 EDT by Daniel Grissom 99   
(X) * Tachypnea absent 6/4/2021 15:26:19 EDT by Merline Reyes   
  RR 16   
(X) * Hypoxemia absent 6/4/2021 15:26:19 EDT by Merline Reyes   
  91% 2L nc   
(X) * Mental status at baseline 6/4/2021 15:26:19 EDT by Merline Reyes   
  Pain score: 7, 3, 7 (Back, Medial, aching)   
( ) * Antibiotic regimen acceptable for next level of care ( ) * Discharge plans and education understood Activity ( ) * Ambulatory or acceptable for next level of care 6/4/2021 15:26:19 EDT by Lionel Byrne comes to sit with max assist.Pt progressed to CGA sitting on EOB. Pt mod assist of 2 sit to stand. Pt pivoted to chair with mod assist of 2. 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 Routes ( ) * Oral hydration, medications, and diet 6/4/2021 15:26:19 EDT by Merline Reyes   
  Insulin SC ac&hs Morphine 2mg IV prn x3, Oxycontin 20mg PO q12 Prednisone 20mg PO daily Interventions   
(X) * Oxygen absent or at baseline need 6/4/2021 15:26:19 EDT by Merline Reyes   
  Nasal cannula Duoneb INH q6, arformoterol INH bid Mucinex 600mg PO bid, Robitussin 10mL PO q6   
(X) * Isolation not indicated, or is performable at next level of care 6/4/2021 15:26:19 EDT by Merline Reyes   
  Contact Isolation - MRSA   
(X) WBC   
6/4/2021 15:26:19 EDT by Merline Reyes   
  WBC 16.4, HGB 9.3, HCT 30.7, plat 340 Na 134, CO2 35, BUN 21/Cr 0.44, Ca 7.4 (X) Incentive spirometry Medications ( ) Oral antibiotics 6/4/2021 15:26:19 EDT by Delmy Smith Completed Vanc * Milestone Additional Notes 06/04/21 - IP Pulmonary progress note: Interval history: TMax 99  
sats 91-95% on 2L WBC 16.4; up slightly Respiratory culture + for moderate MSA and rare filamentous fungus- sending to state lab Fluid balance: -+110  
   
ROS:  Reports feeling well from a breathing standpoint.  Still weak.  No cough, sputum production, SOB, chest tightness, fever, or chills. IMPRESSION Acute hypoxemic respiratory failure MRSA pneumonia Acute COPD exacerbation Hx of PE - not on anticoagulation due to frequent falls HTN, diastolic dysfunction Mild AS  
H/o hypothyroidism H/o CVA Hyperglycemia Thrush   
   
PLAN Supplemental O2 as needed to keep sats > 88%, on 2L NC at baseline Completed Vanc Stop Bumex Continue scheduled duonebs, pulmicort, brovana, mucinex Switch steroids to PO Flutter valve and chest PT   
PT/OOB as much as able Will need repeat chest CT in 6 weeks Diet as per speech recs Nystatin for thrush  
  
  
Pneumonia - Care Day 16 (6/3/2021) by Augusto Boswell 
 
  
Review Status Review Entered Completed 6/4/2021 14:21  
  
Criteria Review Care Day: 16 Care Date: 6/3/2021 Level of Care: Telemetry Guideline Day 3 Level Of Care (X) Floor to discharge 6/4/2021 14:20:53 EDT by Augusto Boswell   
  Remote Telemetry Clinical Status   
(X) * Hemodynamic stability 6/4/2021 14:20:53 EDT by Jason Alejandro 94 /76, 167/73   
(X) * Afebrile or temperature acceptable for next level of care 6/4/2021 14:20:53 EDT by Augusto Boswell   
  T 98.6   
(X) * Tachypnea absent 6/4/2021 14:20:53 EDT by Augusto Boswell   
  RR 20   
(X) * Hypoxemia absent 6/4/2021 14:20:53 EDT by Augusto Boswell   
  95% 2L nc   
(X) * Mental status at baseline 6/4/2021 14:20:53 EDT by Augusto Boswell   
  Pain Score: 8 (Back, Upper, Aching)   
( ) * Antibiotic regimen acceptable for next level of care ( ) * Discharge plans and education understood Activity ( ) * Ambulatory or acceptable for next level of care 6/4/2021 14:20:53 EDT by Colt Do declined PT after encouragement  reporting being too fatigued. Routes ( ) * Oral hydration, medications, and diet 6/4/2021 14:20:53 EDT by Hayele Salgado   
  Mucinex 600mg PO bid, Robitussin 10mL PO q6, Isulin SC ac&hs,  
Ativan 0.5mg IV prn x2, oxycontin 1 tab PO prn x2, Bumex 1mg IV daily, Solumedrol 20mg IV q12 Interventions   
(X) * Oxygen absent or at baseline need 6/4/2021 14:20:53 EDT by Haylee Salgado   
  2 L nasal cannula Duoneb INH q6, arformoterol INH bid   
(X) * Isolation not indicated, or is performable at next level of care 6/4/2021 14:20:53 EDT by Haylee Salgado   
  Contact Isolation MRSA   
(X) WBC   
6/4/2021 14:20:53 EDT by Liza Brewster   
  WBC 16.1, HGB 9.1, HCT 30.4, Plat 339, Na 135, CO2 34, Glucose 235, BUN 21/Cr 0.51, Ca 7.3 (X) Incentive spirometry 6/4/2021 14:20:53 EDT by Haylee Salgado   
  Cardiac monitor, C&DB, Daily weight, elevate HOB, I&Os, IS 
RT - Acapella, Chest PT, Flutter Valve * Milestone Additional Notes 06/03/21- IP Pulmonary Progress Note:  
sats 96% on 2L WBC 16.1; better Respiratory culture + for moderate MSA and rare filamentous fungus- sending to state lab Fluid balance: -1380; net 17L negative ROS:  Feels better. Eating lunch with daughter. Anshufelisa Pike feels weak. General: Alert, cooperative, chronically ill appearing, appears stated age. Head: Normocephalic, without obvious abnormality, atraumatic. Eyes: Conjunctivae/corneas clear. Nose:Nares normal. Septum midline. Mucosa normal.   
Throat:Lips, mucosa, and tongue normal.   
Neck:Supple, symmetrical, trachea midline, no adenopathy. Lungs:  Congested cough/upper airway, scattered rhonchi Chest wall: Heart: Regular rate and rhythm Abdomen:  Soft, non-tender. Bowel sounds normal.  
Extremities:Anasarca Pulses:2+ and symmetric all extremities. Skin:Skin color, texture, turgor normal. No rashes or lesions Neurologic:Grossly nonfocal  
  
IMPRESSION Acute hypoxemic respiratory failure MRSA pneumonia Acute COPD exacerbation Hx of PE - not on anticoagulation due to frequent falls HTN, diastolic dysfunction Mild AS  
H/o hypothyroidism H/o CVA Hyperglycemia Thrush   
   
PLAN Supplemental O2 as needed to keep sats > 88%, on 2L NC at baseline Completed Vanc Would get chest CT if any clinical change in respiratory status or fever/significant rise in WBC to check for loculated pleural effusion.   
Stop Bumex Continue scheduled duonebs, pulmicort, brovana, mucinex Switch steroids to PO Flutter valve and chest PT   
PT/OOB as much as able Diet as per speech recs Nystatin for thrush Hospitalist Assessment and Plan:  
     Sepsis due to Pneumonia/ HCAP/ MRSA pneumonia. There is also e/o cirrhosis, but minimal ascites so I low c/f SBP. Sputum culture grew MRSA.  Treated with cefepime 5/20 - 5/26; and s/p Vanc 5/20- 6/02.   
      AHRF: PNA and COPD exac. Continue nebs, lenore neb, brovana, pulmicort, solumedrol (weaning). Pulmonary following   
      COPD exacerbation. As above continue treatment.   
      Multiple fractures: CT today showed multiple fractures of ribs, pubic rami, thoracic spine, etc. She is on oxycodone for chronic pain. repeat admissions and suggestion of multiple falls, palliative care is following. needs SNF   
      Bilateral common iliac stenosis: CT abd/pelvis showed severe stenosis of bilateral common iliac arteries, but pt has no leg pain and lactate is normal.  no symptoms. Monitor   
      Right subclavian stenosis: vascular evaluated; rec OP f/u.   
      Cirrhosis: noted on imaging. She is not presently decompensated.     
      Hypokalemia. replete PRN.       Leukocytosis. Likely due to steroid. Improving. Monitor   
      Thrush. On nystatin swish and swallow, magic mouth wash

## 2021-06-08 NOTE — PROGRESS NOTES
Problem: Diabetes Self-Management Goal: *Disease process and treatment process Description: Define diabetes and identify own type of diabetes; list 3 options for treating diabetes. Outcome: Progressing Towards Goal 
Goal: *Incorporating nutritional management into lifestyle Description: Describe effect of type, amount and timing of food on blood glucose; list 3 methods for planning meals. Outcome: Progressing Towards Goal 
Goal: *Incorporating physical activity into lifestyle Description: State effect of exercise on blood glucose levels. Outcome: Progressing Towards Goal 
Goal: *Developing strategies to promote health/change behavior Description: Define the ABC's of diabetes; identify appropriate screenings, schedule and personal plan for screenings. Outcome: Progressing Towards Goal 
Goal: *Using medications safely Description: State effect of diabetes medications on diabetes; name diabetes medication taking, action and side effects. Outcome: Progressing Towards Goal 
Goal: *Monitoring blood glucose, interpreting and using results Description: Identify recommended blood glucose targets  and personal targets. Outcome: Progressing Towards Goal 
Goal: *Prevention, detection, treatment of acute complications Description: List symptoms of hyper- and hypoglycemia; describe how to treat low blood sugar and actions for lowering  high blood glucose level. Outcome: Progressing Towards Goal 
Goal: *Prevention, detection and treatment of chronic complications Description: Define the natural course of diabetes and describe the relationship of blood glucose levels to long term complications of diabetes. Outcome: Progressing Towards Goal 
Goal: *Developing strategies to address psychosocial issues Description: Describe feelings about living with diabetes; identify support needed and support network Outcome: Progressing Towards Goal 
Goal: *Insulin pump training Outcome: Progressing Towards Goal 
Goal: *Sick day guidelines Outcome: Progressing Towards Goal 
Goal: *Patient Specific Goal (EDIT GOAL, INSERT TEXT) Outcome: Progressing Towards Goal 
  
Problem: Patient Education: Go to Patient Education Activity Goal: Patient/Family Education Outcome: Progressing Towards Goal 
  
Problem: Falls - Risk of 
Goal: *Absence of Falls Description: Document Aleksandr Diaz Fall Risk and appropriate interventions in the flowsheet. Outcome: Progressing Towards Goal 
Note: Fall Risk Interventions: 
Mobility Interventions: Bed/chair exit alarm, OT consult for ADLs, Patient to call before getting OOB, PT Consult for mobility concerns, PT Consult for assist device competence, Utilize walker, cane, or other assistive device Mentation Interventions: Adequate sleep, hydration, pain control, Door open when patient unattended, Evaluate medications/consider consulting pharmacy, Bed/chair exit alarm, More frequent rounding, Toileting rounds, Update white board Medication Interventions: Bed/chair exit alarm, Evaluate medications/consider consulting pharmacy, Patient to call before getting OOB, Teach patient to arise slowly Elimination Interventions: Bed/chair exit alarm, Call light in reach, Patient to call for help with toileting needs, Elevated toilet seat History of Falls Interventions: Bed/chair exit alarm, Consult care management for discharge planning, Door open when patient unattended, Evaluate medications/consider consulting pharmacy, Vital signs minimum Q4HRs X 24 hrs (comment for end date) Problem: Patient Education: Go to Patient Education Activity Goal: Patient/Family Education Outcome: Progressing Towards Goal 
  
Problem: Pressure Injury - Risk of 
Goal: *Prevention of pressure injury Description: Document Edouard Scale and appropriate interventions in the flowsheet.  
Outcome: Progressing Towards Goal 
Note: Pressure Injury Interventions: 
Sensory Interventions: Float heels, Assess changes in LOC, Minimize linen layers, Monitor skin under medical devices, Turn and reposition approx. every two hours (pillows and wedges if needed) Moisture Interventions: Absorbent underpads, Apply protective barrier, creams and emollients, Check for incontinence Q2 hours and as needed, Internal/External urinary devices, Maintain skin hydration (lotion/cream), Minimize layers, Offer toileting Q_hr Activity Interventions: Increase time out of bed, Trapeze to reposition Mobility Interventions: HOB 30 degrees or less, Float heels, Turn and reposition approx. every two hours(pillow and wedges) Nutrition Interventions: Document food/fluid/supplement intake Friction and Shear Interventions: Apply protective barrier, creams and emollients, Feet elevated on foot rest, Foam dressings/transparent film/skin sealants, HOB 30 degrees or less

## 2021-06-10 NOTE — ADT AUTH CERT NOTES
Pneumonia - Care Day 19 (6/6/2021) by Edu Tavera 
 
  
Review Status Review Entered Completed 6/10/2021 11:27  
  
Criteria Review Care Day: 19 Care Date: 6/6/2021 Level of Care: Telemetry Guideline Day 3 Level Of Care (X) Floor to discharge 6/10/2021 11:26:16 EDT by Edu Tavera   
  Remote telemetry Clinical Status   
(X) * Hemodynamic stability 6/10/2021 11:26:16 EDT by Uriah Jamison HR 91    177/73 Pain Score: 3, 7 (Back)   
(X) * Afebrile or temperature acceptable for next level of care 6/10/2021 11:26:16 EDT by Garfield Solid 99   
(X) * Tachypnea absent 6/10/2021 11:26:16 EDT by Edu Tavera   
  RR 18   
(X) * Hypoxemia absent 6/10/2021 11:26:16 EDT by Edu Tavera   
  94% 2L nc   
(X) * Mental status at baseline 6/10/2021 11:26:16 EDT by Uriah Jamison Awake and alert   
(X) * Antibiotic regimen acceptable for next level of care 6/10/2021 11:26:16 EDT by Edu Tavera   
  IV Cefepime 5/20 - 5/26 and Vanc 5/20- 6/02 ( ) * Discharge plans and education understood 6/10/2021 11:26:16 EDT by Uriah Jamison CM working on discharge planning Activity ( ) * Ambulatory or acceptable for next level of care Routes   
(X) * Oral hydration, medications, and diet 6/10/2021 11:26:16 EDT by Edu Tavera   
  Norvasc 10mg PO daily, mucinex 600mg PO bid, Robitussin 10mL po q6, Insulin SC ac&hs, Lidocaine patch, aativan 0.5mg IV prn x2,  
morphine 2mg IV prn x2, Zofran 4mg IV prn x1, oxycontin 20mg PO q12, prednisone 20mg PO daily Interventions   
(X) * Oxygen absent or at baseline need 6/10/2021 11:26:16 EDT by Edu Tavera   
  94% 2L nc   
(X) * Isolation not indicated, or is performable at next level of care 6/10/2021 11:26:16 EDT by Edu Tavera   
  Contact Isolation - MRSA   
(X) Incentive spirometry 6/10/2021 11:26:16 EDT by Edu Tavera   
  Duoneb INH q6, arformoterol INH bid * Milestone Additional Notes 06/06/21 - IP   
 Progress Note:  
Generalized weakness. She says her pain is better controled now. No fever or chills. No cough or SOB Physical Examination:  
General:   Weak and very frail looking elderly patient, not in any distress   
Eyes:   pink conjunctivae, PERRLA with no discharge. ENT: Mabelene Nakayama or rhinorrhea with dry mucous membranes Neck: no masses, thyroid non-tender and trachea central.  
Pulm:  no accessory muscle use, decreased but clear breath sounds without crackles or wheezes Card:  no JVD or murmurs, has regular and normal S1, S2 without thrills, bruits. + peripheral edema Abd:  Soft, non-tender, non-distended, normoactive bowel sounds with no palpable organomegaly Musc:  No cyanosis, clubbing, atrophy or deformities. Skin:  No rashes, bruising or ulcers. Neuro: Awake and alert. Generally a non focal exam. Follows commands appropriately Psych:  Has little insight to her illness   
   
  
Assessment and Plan:  
      Sepsis due to MRSA pneumonia: likely HCAP. Sputum culture grew MRSA. She has completed her IV antibiotic regimen with IV Cefepime 5/20 - 5/26 and Vanc 5/20- 6/02. Now stable. CM working on discharge planning  
      Acute hypoxic respiratory failure POA: due to pneumonia and COPD. Continue to wean off supplemental oxygen to keep saturations above 90%       COPD with acute exacerbation POA: better. Not wheezing. Seen and followed by pulmonology. Continue Duoneb, Robitusin, Prednisone taper   
      Multiple fractures / Chronic pain / Arelis Beau: noted on CT imaging on admission. Likely has recurrent falls. On chronic pain medications. Seen by Palliative care. Continue current pain regimen which seems to be working well. Awaiting placement  
      Hypertension POA: Continue Amlodipine  
      Hypothyroidism POA: Continue Levothyroxine   
      Bilateral common iliac stenosis POA: chronic.  Noted on CT abd/pelvis that showed severe stenosis of bilateral common iliac arteries. No symptoms. Outpatient monitoring   
      Right subclavian stenosis: vascular evaluated; rec OP f/u.   
      Cirrhosis: noted on imaging. She is not presently decompensated.     
      Thrush. On nystatin swish and swallow, magic mouth wash .   
   
  
  
Pneumonia - Care Day 18 (6/5/2021) by Heriberto Lopes 
 
  
Review Status Review Entered Completed 6/10/2021 11:15  
  
Criteria Review Care Day: 18 Care Date: 6/5/2021 Level of Care: Telemetry Guideline Day 3 Level Of Care (X) Floor to discharge 6/10/2021 11:15:02 EDT by Corine Robledo telemetry Clinical Status   
(X) * Hemodynamic stability 6/10/2021 11:15:02 EDT by Heriberto Lopes   
  HR 92   146/67 Pain Score: 7,3,7 (Back)   
(X) * Afebrile or temperature acceptable for next level of care 6/10/2021 11:15:02 EDT by Maria Dolores James 99   
(X) * Tachypnea absent 6/10/2021 11:15:02 EDT by Heriberto Lopes   
  RR 16   
(X) * Hypoxemia absent 6/10/2021 11:15:02 EDT by Heriberto Lopes   
  96% 2L nc   
(X) * Mental status at baseline 6/10/2021 11:15:02 EDT by Alan Haider oriented to person, place and time   
(X) * Antibiotic regimen acceptable for next level of care 6/10/2021 11:15:02 EDT by Alan Haider Treated with cefepime 5/20 - 5/26; and s/p Vanc 5/20- 6/02 ( ) * Discharge plans and education understood Activity ( ) * Ambulatory or acceptable for next level of care 6/10/2021 11:15:02 EDT by Heriberto Lopes   
  Back Pain Routes   
(X) * Oral hydration, medications, and diet 6/10/2021 11:15:02 EDT by Heriberto Lopes   
  norvasc 10mg PO daily, mucinex 600mg PO bid, Robitussin 10mL po q6, Insulin SC ac&hs, Lidocaine patch, Ativan 0.5mg IV prn x3 Morphine 2mg IV prn x3, oxycontin 20mg PO q12, prednisone 20mg PO daily Interventions   
(X) * Oxygen absent or at baseline need 6/10/2021 11:15:02 EDT by eHriberto Lopes   
  2L nc   
(X) * Isolation not indicated, or is performable at next level of care 6/10/2021 11:15:02 EDT by Paco Parker   
  Contact Isolation - MRSA   
(X) WBC   
6/10/2021 11:15:02 EDT by Paco Parker   
  WBC 16.9, HGB 9.8, HCT 33.4, Plat 394 Na 134, CO2 34, Glucose 146, BUN 19/Cr 0.49, Ca 7.4 (X) Incentive spirometry 6/10/2021 11:15:02 EDT by Paco Parker   
  Duoneb INH q4, arformoterol INH bid * Milestone Additional Notes 06/05/21 - IP Progress Note:  
Patient was seen and examined as a follow up for sepsis.  Chart was reviewed. c/o chronic back pain. ROS:  
Tolerating PT             Tolerating Diet Back Pain   
   
  
Assessment and Plan:  
      Sepsis due to Pneumonia/ HCAP/ MRSA pneumonia. There is also e/o cirrhosis, but minimal ascites so I low c/f SBP. Sputum culture grew MRSA.  Treated with cefepime 5/20 - 5/26; and s/p Vanc 5/20- 6/02.   
   
AHRF: PNA and COPD exac. Continue nebs, lenore neb, brovana, pulmicort, solumedrol (weaning). Pulmonary following   
      
COPD exacerbation. As above continue treatment.   
   
Multiple fractures: CT today showed multiple fractures of ribs, pubic rami, thoracic spine, etc. She is on oxycodone for chronic pain. repeat admissions and suggestion of multiple falls, palliative care is following. needs SNF   
   
 Bilateral common iliac stenosis: CT abd/pelvis showed severe stenosis of bilateral common iliac arteries, but pt has no leg pain and lactate is normal.  no symptoms. Monitor   
   
Right subclavian stenosis: vascular evaluated; rec OP f/u.   
   
Cirrhosis: noted on imaging. She is not presently decompensated.     
   
Hypokalemia. replete PRN.    
 Leukocytosis. Likely due to steroid. Improving. Monitor   
  Thrush.  On nystatin swish and swallow, magic mouth wash .

## 2021-06-11 NOTE — ADT AUTH CERT NOTES
Pneumonia - Care Day 19 (6/6/2021) by Macy Orr 
 
  
Review Status Review Entered Completed 6/10/2021 11:27  
  
Criteria Review Care Day: 19 Care Date: 6/6/2021 Level of Care: Telemetry Guideline Day 3 Level Of Care (X) Floor to discharge 6/10/2021 11:26:16 EDT by Macy Orr   
  Remote telemetry Clinical Status   
(X) * Hemodynamic stability 6/10/2021 11:26:16 EDT by Bernie Padilla HR 91    177/73 Pain Score: 3, 7 (Back)   
(X) * Afebrile or temperature acceptable for next level of care 6/10/2021 11:26:16 EDT by López Castellon 99   
(X) * Tachypnea absent 6/10/2021 11:26:16 EDT by Macy Orr   
  RR 18   
(X) * Hypoxemia absent 6/10/2021 11:26:16 EDT by Macy Orr   
  94% 2L nc   
(X) * Mental status at baseline 6/10/2021 11:26:16 EDT by Bernie Padilla Awake and alert   
(X) * Antibiotic regimen acceptable for next level of care 6/10/2021 11:26:16 EDT by Macy Orr   
  IV Cefepime 5/20 - 5/26 and Vanc 5/20- 6/02 ( ) * Discharge plans and education understood 6/10/2021 11:26:16 EDT by Bernie Padilla CM working on discharge planning Activity ( ) * Ambulatory or acceptable for next level of care Routes   
(X) * Oral hydration, medications, and diet 6/10/2021 11:26:16 EDT by Macy Orr   
  Norvasc 10mg PO daily, mucinex 600mg PO bid, Robitussin 10mL po q6, Insulin SC ac&hs, Lidocaine patch, aativan 0.5mg IV prn x2,  
morphine 2mg IV prn x2, Zofran 4mg IV prn x1, oxycontin 20mg PO q12, prednisone 20mg PO daily Interventions   
(X) * Oxygen absent or at baseline need 6/10/2021 11:26:16 EDT by Macy Orr   
  94% 2L nc   
(X) * Isolation not indicated, or is performable at next level of care 6/10/2021 11:26:16 EDT by Macy Orr   
  Contact Isolation - MRSA   
(X) Incentive spirometry 6/10/2021 11:26:16 EDT by Macy Orr   
  Duoneb INH q6, arformoterol INH bid * Milestone Additional Notes 06/06/21 - IP   
 Progress Note:  
Generalized weakness. She says her pain is better controled now. No fever or chills. No cough or SOB Physical Examination:  
General:   Weak and very frail looking elderly patient, not in any distress   
Eyes:   pink conjunctivae, PERRLA with no discharge. ENT: Steele Sensor or rhinorrhea with dry mucous membranes Neck: no masses, thyroid non-tender and trachea central.  
Pulm:  no accessory muscle use, decreased but clear breath sounds without crackles or wheezes Card:  no JVD or murmurs, has regular and normal S1, S2 without thrills, bruits. + peripheral edema Abd:  Soft, non-tender, non-distended, normoactive bowel sounds with no palpable organomegaly Musc:  No cyanosis, clubbing, atrophy or deformities. Skin:  No rashes, bruising or ulcers. Neuro: Awake and alert. Generally a non focal exam. Follows commands appropriately Psych:  Has little insight to her illness   
   
  
Assessment and Plan:  
      Sepsis due to MRSA pneumonia: likely HCAP. Sputum culture grew MRSA. She has completed her IV antibiotic regimen with IV Cefepime 5/20 - 5/26 and Vanc 5/20- 6/02. Now stable. CM working on discharge planning  
      Acute hypoxic respiratory failure POA: due to pneumonia and COPD. Continue to wean off supplemental oxygen to keep saturations above 90%       COPD with acute exacerbation POA: better. Not wheezing. Seen and followed by pulmonology. Continue Duoneb, Robitusin, Prednisone taper   
      Multiple fractures / Chronic pain / Manju Ends: noted on CT imaging on admission. Likely has recurrent falls. On chronic pain medications. Seen by Palliative care. Continue current pain regimen which seems to be working well. Awaiting placement  
      Hypertension POA: Continue Amlodipine  
      Hypothyroidism POA: Continue Levothyroxine   
      Bilateral common iliac stenosis POA: chronic.  Noted on CT abd/pelvis that showed severe stenosis of bilateral common iliac arteries. No symptoms. Outpatient monitoring   
      Right subclavian stenosis: vascular evaluated; rec OP f/u.   
      Cirrhosis: noted on imaging. She is not presently decompensated.     
      Thrush. On nystatin swish and swallow, magic mouth wash .   
   
  
  
Pneumonia - Care Day 18 (6/5/2021) by Claude Linares 
 
  
Review Status Review Entered Completed 6/10/2021 11:15  
  
Criteria Review Care Day: 18 Care Date: 6/5/2021 Level of Care: Telemetry Guideline Day 3 Level Of Care (X) Floor to discharge 6/10/2021 11:15:02 EDT by Shahriar Shields telemetry Clinical Status   
(X) * Hemodynamic stability 6/10/2021 11:15:02 EDT by Claude Linares   
  HR 92   146/67 Pain Score: 7,3,7 (Back)   
(X) * Afebrile or temperature acceptable for next level of care 6/10/2021 11:15:02 EDT by Ginette House 99   
(X) * Tachypnea absent 6/10/2021 11:15:02 EDT by Claude Linares   
  RR 16   
(X) * Hypoxemia absent 6/10/2021 11:15:02 EDT by Claude Linares   
  96% 2L nc   
(X) * Mental status at baseline 6/10/2021 11:15:02 EDT by Negrito Jessica oriented to person, place and time   
(X) * Antibiotic regimen acceptable for next level of care 6/10/2021 11:15:02 EDT by Negrito Jessica Treated with cefepime 5/20 - 5/26; and s/p Vanc 5/20- 6/02 ( ) * Discharge plans and education understood Activity ( ) * Ambulatory or acceptable for next level of care 6/10/2021 11:15:02 EDT by Claude Linares   
  Back Pain Routes   
(X) * Oral hydration, medications, and diet 6/10/2021 11:15:02 EDT by Claude Linares   
  norvasc 10mg PO daily, mucinex 600mg PO bid, Robitussin 10mL po q6, Insulin SC ac&hs, Lidocaine patch, Ativan 0.5mg IV prn x3 Morphine 2mg IV prn x3, oxycontin 20mg PO q12, prednisone 20mg PO daily Interventions   
(X) * Oxygen absent or at baseline need 6/10/2021 11:15:02 EDT by Claude Linares   
  2L nc   
(X) * Isolation not indicated, or is performable at next level of care 6/10/2021 11:15:02 EDT by Augusto Boswell   
  Contact Isolation - MRSA   
(X) WBC   
6/10/2021 11:15:02 EDT by Augusto Boswell   
  WBC 16.9, HGB 9.8, HCT 33.4, Plat 394 Na 134, CO2 34, Glucose 146, BUN 19/Cr 0.49, Ca 7.4 (X) Incentive spirometry 6/10/2021 11:15:02 EDT by Augusto Boswell   
  Duoneb INH q4, arformoterol INH bid * Milestone Additional Notes 21 - IP Progress Note:  
Patient was seen and examined as a follow up for sepsis.  Chart was reviewed. c/o chronic back pain. ROS:  
Tolerating PT             Tolerating Diet Back Pain   
   
  
Assessment and Plan:  
      Sepsis due to Pneumonia/ HCAP/ MRSA pneumonia. There is also e/o cirrhosis, but minimal ascites so I low c/f SBP. Sputum culture grew MRSA.  Treated with cefepime  - ; and s/p Vanc - .   
   
AHRF: PNA and COPD exac. Continue nebs, lenore neb, brovana, pulmicort, solumedrol (weaning). Pulmonary following   
      
COPD exacerbation. As above continue treatment.   
   
Multiple fractures: CT today showed multiple fractures of ribs, pubic rami, thoracic spine, etc. She is on oxycodone for chronic pain. repeat admissions and suggestion of multiple falls, palliative care is following. needs SNF   
   
 Bilateral common iliac stenosis: CT abd/pelvis showed severe stenosis of bilateral common iliac arteries, but pt has no leg pain and lactate is normal.  no symptoms. Monitor   
   
Right subclavian stenosis: vascular evaluated; rec OP f/u.   
   
Cirrhosis: noted on imaging. She is not presently decompensated.     
   
Hypokalemia. replete PRN.    
 Leukocytosis. Likely due to steroid. Improving. Monitor   
  Thrush. On nystatin swish and swallow, magic mouth wash . DISCHARGE SUMMARY Comments Comment Last edited by  on  at Patient Demographics Patient Name Jdoy Jones  
[id number] Legal Sex Female   
1940 Address [address] , [place] [address] Phone [phone] (Home) [phone] (Mobile) *Preferred* Discharge Information Discharge Provider Date/Time Disposition Destination Nate Scherer MD / [phone] [address] [place] [address] (none) Comments (none) Discharge Summary Notes Discharge Summary by Nate Scherer MD at 21 1210 Version 1 of 1 Author: Nate Scherer MD Service: Hospitalist Author Type: Physician Filed: 21 1448 Date of Service: 21 1210 Status: Signed : Nate Scherer MD (Physician)  
  
  
       
                                    
 [facility] [address] 
[address] Tel: [phone] 
  
Physician Discharge Summary 
  
Patient ID:    Nubia Stanley Age:              80 y.o.    : 1940 MRN:             [id number]  
  
PCP: Marika Mcdermott MD  
  
Date of Admission: 2021 
  
Date of Discharge:  2021 
  
Principal admission Diagnosis: 
 HAP (hospital-acquired pneumonia) [J18.9, S45] Pneumonia of both lungs due to infectious organism [J18.9] 
  Discharge Diagnoses: 
  Acute on chronic respiratory failure with hypoxia ([facility] [address] (5/10/2021) Chronic obstructive pulmonary disease (HCC) HAP (hospital-acquired pneumonia) (2021) Multiple fractures (2021) Physical debility (2021) HTN (hypertension) (1/3/2021) Hypothyroid (1/3/2021) History of CVA (cerebrovascular accident) (1/3/2021) DM (diabetes mellitus), type 2 ([address] Anxiety and depression GERD (gastroesophageal reflux disease) Anemia of chronic illness (5/10/2021) 
  Hospital Course:  
  
Ms. Cox is a 80 y.o. admitted to [facility] and treated for the following: 
  
Sepsis due to MRSA pneumonia: likely HCAP. Sputum culture grew MRSA. She has completed her IV antibiotic regimen with IV Cefepime  -  and Vanc - .  Now stable. Awaiting discharge once all arrangements are completed. Given her frail nature and multiple co-morbid conditions, she is at a high risk for deterioration and or re-admission. Seen and followed by palliative care. Continue supportive care. Being discharged for continued rehab  
  
Acute hypoxic respiratory failure POA: due to pneumonia and COPD. Continue to wean off supplemental oxygen to keep saturations above 90% 
  
COPD with acute exacerbation POA: better. Seen and followed by pulmonology who signed off. Continue bronchodilators as below, prednisone taper to her maintenance dose.   
  
Multiple fractures / Chronic pain / Debility POA: noted on CT imaging on admission. Likely has recurrent falls. On chronic pain medications. Seen by Palliative care. Continue current pain regimen which seems to be working well. Bowel regimen 
  
Hypertension POA: Continue Amlodipine 
  
Hypothyroidism POA: Continue Levothyroxine  
  
Bilateral common iliac stenosis POA: chronic. Noted on CT abd/pelvis that showed severe stenosis of bilateral common iliac arteries. No symptoms. Outpatient monitoring  
  
Right subclavian stenosis: vascular evaluated; rec OP f/u.  
  
Cirrhosis: noted on imaging. She is not presently decompensated.    
  
Discharge Exam:   
Visit Vitals BP (!) 159/72 Comment: after ativan Pulse 87 Temp 98 °F (36.7 °C) Resp 18 Ht 5' 4\" (1.626 m) Wt 65.7 kg (144 lb 13.5 oz) SpO2 96% BMI 24.86 kg/m²  
  
  
Patient has been seen and examined. 
  
General: weak but not in distress Pulm:  clear breath sounds without wheezes Card:   no murmurs, normal S1, S2 without thrills, bruits Abd:    soft, non-tender, normoactive bowel sounds Skin:   no rashes and skin turgor is good Neuro: awake, alert and has a non focal  
  
Activity: Activity as tolerated 
  
Diet: Mechanical soft diabetic diet 
  
     
Current Discharge Medication List  
   
     
START taking these medications  
  Details  
acetaminophen (TYLENOL) 325 mg tablet Take 2 Tablets by mouth every six (6) hours as needed for Pain or Fever for up to 10 doses. Indications: pain 
Qty: 10 Tablet, Refills: 0  
   
albuterol-ipratropium (DUO-NEB) 2.5 mg-0.5 mg/3 ml nebu 3 mL by Nebulization route every six (6) hours for 10 days. Give treatments while awake Qty: 30 Nebule, Refills: 0  
   
guaiFENesin-dextromethorphan (ROBITUSSIN DM) 100-10 mg/5 mL syrup Take 10 mL by mouth every six (6) hours for 10 days. Qty: 400 mL, Refills: 0  
   
polyethylene glycol (MIRALAX) 17 gram packet Take 1 Packet by mouth daily for 30 days. Indications: constipation 
Qty: 30 Packet, Refills: 0  
   
simethicone (MYLICON) 80 mg chewable tablet Take 1 Tablet by mouth four (4) times daily as needed for Flatulence (Gaseous discomfort) for up to 10 doses. Qty: 10 Tablet, Refills: 0  
   
insulin lispro (HUMALOG) 100 unit/mL injection See scale above  Indications: type 2 diabetes mellitus Qty: 1 Vial, Refills: 0  
   
oxyCODONE ER (OxyCONTIN) 20 mg ER tablet Take 1 Tablet by mouth every twelve (12) hours for 30 doses. Max Daily Amount: 40 mg. Indications: severe chronic pain requiring long-term opioid treatment 
Qty: 30 Tablet, Refills: 0  
  Associated Diagnoses: Chronic pain syndrome  
   
oxyCODONE-acetaminophen (PERCOCET 7.5) 7.5-325 mg per tablet Take 1 Tablet by mouth every four (4) hours as needed for Pain for up to 10 doses. Max Daily Amount: 6 Tablets. Qty: 10 Tablet, Refills: 0  
  Associated Diagnoses: Chronic pain syndrome  
   
naloxone (EVZIO) 2 mg/0.4 mL auto-injector 0.4 mL by IntraMUSCular route once as needed for Overdose for up to 1 dose. Indications: decrease in rate & depth of breathing due to opioid drug, opioid overdose Qty: 1 Device, Refills: 0  
   
amLODIPine (NORVASC) 10 mg tablet Take 1 Tablet by mouth daily for 30 days. Indications: high blood pressure Qty: 30 Tablet, Refills: 0  
   
   
     
CONTINUE these medications which have CHANGED  
  Details  
gabapentin (NEURONTIN) 300 mg capsule Take 2 Capsules by mouth two (2) times a day for 30 days. Max Daily Amount: 1,200 mg. Qty: 120 Capsule, Refills: 0  
  Associated Diagnoses: Neuropathy  
   
clonazePAM (KlonoPIN) 0.25 mg TbDi Take 1 Tablet by mouth every evening for 30 days. Max Daily Amount: 0.25 mg. ODT Qty: 30 Tablet, Refills: 0  
  Associated Diagnoses: Anxiety and depression  
   
predniSONE (DELTASONE) 5 mg tablet Take 4 Tablets by mouth daily (with breakfast) for 3 days, THEN 3 Tablets daily (with breakfast) for 3 days, THEN 2 Tablets daily (with breakfast) for 3 days, THEN 1 Tablet daily (with breakfast) for 30 days. Qty: 57 Tablet, Refills: 0  
   
insulin glargine (Lantus Solostar U-100 Insulin) 100 unit/mL (3 mL) inpn 16 Units by SubCUTAneous route nightly for 30 days. Indications: type 2 diabetes mellitus Qty: 4.8 mL, Refills: 0  
   
   
     
CONTINUE these medications which have NOT CHANGED  
  Details  
albuterol sulfate (PROVENTIL;VENTOLIN) 2.5 mg/0.5 mL nebu nebulizer solution 2.5 mg by Nebulization route every six (6) hours as needed for Wheezing or Shortness of Breath (Generally uses 2-3 times daily).  
   
calcium-cholecalciferol, D3, (CALTRATE 600+D) tablet Take 1 Tab by mouth daily (with breakfast).  
   
FLUoxetine (PROzac) 10 mg capsule Take 10 mg by mouth every evening.  
   
guaiFENesin ER (Mucinex) 600 mg ER tablet Take 600 mg by mouth two (2) times a day.  
   
urea (URE-NA) 15 gram packet Take 1 Packet by mouth daily. Qty: 30 Packet, Refills: 0  
   
furosemide (LASIX) 20 mg tablet Take 20 mg by mouth daily as needed (edema). Requires only sparingly, daughter reports only 2x in past 6 months  
   
aspirin delayed-release 81 mg tablet Take 1 Tab by mouth daily. Qty: 30 Tab, Refills: 0  
   
latanoprost (XALATAN) 0.005 % ophthalmic solution Administer 1 Drop to both eyes nightly.  
   
levothyroxine (Levo-T) 75 mcg tablet Take 75 mcg by mouth Daily (before breakfast).   
   
tolterodine ER (DETROL LA) 4 mg ER capsule Take 4 mg by mouth daily.  
   
atorvastatin (LIPITOR) 80 mg tablet Take 80 mg by mouth nightly.  
   
pantoprazole (PROTONIX) 40 mg tablet Take 40 mg by mouth daily.  
   
fluticasone-umeclidinium-vilanterol (Trelegy Ellipta) 100-62.5-25 mcg inhaler Take 1 Puff by inhalation daily.  
   
melatonin 5 mg tablet Take 5 mg by mouth nightly.  
   
magnesium oxide (MAG-OX) 400 mg tablet Take 400 mg by mouth daily.  
   
B.infantis-B.ani-B.long-B.bifi (Probiotic 4X) 10-15 mg TbEC Take 2 Caps by mouth two (2) times a day.  
   
   
    
STOP taking these medications  
   
  oxyCODONE IR (ROXICODONE) 5 mg immediate release tablet Comments:  
Reason for Stopping:   
     
  albuterol (PROVENTIL HFA, VENTOLIN HFA, PROAIR HFA) 90 mcg/actuation inhaler Comments:  
Reason for Stopping:   
     
  oxyCODONE ER (Xtampza ER) 9 mg capsule Comments:  
Reason for Stopping:   
     
  insulin lispro (HumaLOG KwikPen Insulin) 100 unit/mL kwikpen Comments:  
Reason for Stopping:   
     
  BENEFIBER, GUAR GUM, PO Comments:  
Reason for Stopping:   
     
   
  
  
        
Follow-up Information   
  Follow up With Specialties Details Why Contact [facility]       [phone]  
  [address] [place] [address] 
[phone]  
  Danie Capps MD Family Medicine     [address] 
[phone] 
   
   
  
  
Follow-up tests or labs: As noted above if any. 
  
Discharge Condition: Stable 
  
Disposition: [facility] 
  
Time taken to arrange discharge:  35 minutes. 
  
Signed: 
Ledy Gillette MD     [facility] 6/7/2021   12:10 PM

## 2021-06-21 PROBLEM — J18.9 HCAP (HEALTHCARE-ASSOCIATED PNEUMONIA): Status: ACTIVE | Noted: 2021-01-01

## 2021-06-21 PROBLEM — R77.8 TROPONIN LEVEL ELEVATED: Status: ACTIVE | Noted: 2021-01-01

## 2021-06-21 PROBLEM — J44.9 COPD (CHRONIC OBSTRUCTIVE PULMONARY DISEASE) (HCC): Status: ACTIVE | Noted: 2021-01-01

## 2021-06-21 PROBLEM — J96.01 ACUTE HYPOXEMIC RESPIRATORY FAILURE (HCC): Status: ACTIVE | Noted: 2021-01-01

## 2021-06-21 PROBLEM — E87.6 HYPOKALEMIA: Status: ACTIVE | Noted: 2021-01-01

## 2021-06-21 PROBLEM — R79.89 ELEVATED BRAIN NATRIURETIC PEPTIDE (BNP) LEVEL: Status: ACTIVE | Noted: 2021-01-01

## 2021-06-21 PROBLEM — R07.2 PRECORDIAL PAIN: Status: ACTIVE | Noted: 2021-01-01

## 2021-06-21 PROBLEM — S09.90XA HEAD TRAUMA: Status: ACTIVE | Noted: 2021-01-01

## 2021-06-21 NOTE — ED PROVIDER NOTES
EMERGENCY DEPARTMENT HISTORY AND PHYSICAL EXAM      Date: 6/20/2021  Patient Name: Allyson Rendon    History of Presenting Illness     Chief Complaint   Patient presents with    Fall     EMS reports pt with 2 falls out of bed today, low oxygen sats, back pain, cough, and fever    Head Injury    Back Pain    Cough    Fever       History Provided By: Patient and EMS    HPI: Allyson Rendon, 80 y.o. female with PMHx significant for COPD, hypertension, hypothyroidism, diabetes, GERD, anxiety and depression, hospital-acquired pneumonia who was discharged on June 7 after an admission for sepsis in March for pneumonia presents [facility] the ED [facility] where she was discharged for rehab after her most recent hospitalization with chief complaint of generally feeling bad along with cough, back pain, and 2 falls out of bed in the last 2 hours. Patient also has a contusion/hematoma to her forehead from one of the falls. Noted to have a fever of 100.7 on arrival.  Patient reports that she feels terrible and is specifically complaining of back pain. Noted to have a rhonchorous cough during exam.  She is on 3 L nasal cannula at baseline at [facility] [employer] care. Reportedly was satting in the 80s on 3 L and was briefly bumped up to 6 L in transport, but is satting greater than 90 on 3 L in the ED. Had her second Covid vaccines on Easter Sunday. PCP: Elizabeth Banegas MD    No current facility-administered medications on file prior to encounter. Current Outpatient Medications on File Prior to Encounter   Medication Sig Dispense Refill    acetaminophen (TYLENOL) 325 mg tablet Take 2 Tablets by mouth every six (6) hours as needed for Pain or Fever for up to 10 doses. Indications: pain 10 Tablet 0    gabapentin (NEURONTIN) 300 mg capsule Take 2 Capsules by mouth two (2) times a day for 30 days.  Max Daily Amount: 1,200 mg. 120 Capsule 0    clonazePAM (KlonoPIN) 0.25 mg TbDi Take 1 Tablet by mouth every evening for 30 days. Max Daily Amount: 0.25 mg. ODT 30 Tablet 0    polyethylene glycol (MIRALAX) 17 gram packet Take 1 Packet by mouth daily for 30 days. Indications: constipation 30 Packet 0    simethicone (MYLICON) 80 mg chewable tablet Take 1 Tablet by mouth four (4) times daily as needed for Flatulence (Gaseous discomfort) for up to 10 doses. 10 Tablet 0    predniSONE (DELTASONE) 5 mg tablet Take 4 Tablets by mouth daily (with breakfast) for 3 days, THEN 3 Tablets daily (with breakfast) for 3 days, THEN 2 Tablets daily (with breakfast) for 3 days, THEN 1 Tablet daily (with breakfast) for 30 days. 57 Tablet 0    insulin glargine (Lantus Solostar U-100 Insulin) 100 unit/mL (3 mL) inpn 16 Units by SubCUTAneous route nightly for 30 days. Indications: type 2 diabetes mellitus 4.8 mL 0    insulin lispro (HUMALOG) 100 unit/mL injection See scale above  Indications: type 2 diabetes mellitus 1 Vial 0    oxyCODONE ER (OxyCONTIN) 20 mg ER tablet Take 1 Tablet by mouth every twelve (12) hours for 30 doses. Max Daily Amount: 40 mg. Indications: severe chronic pain requiring long-term opioid treatment 30 Tablet 0    amLODIPine (NORVASC) 10 mg tablet Take 1 Tablet by mouth daily for 30 days. Indications: high blood pressure 30 Tablet 0    albuterol sulfate (PROVENTIL;VENTOLIN) 2.5 mg/0.5 mL nebu nebulizer solution 2.5 mg by Nebulization route every six (6) hours as needed for Wheezing or Shortness of Breath (Generally uses 2-3 times daily).  calcium-cholecalciferol, D3, (CALTRATE 600+D) tablet Take 1 Tab by mouth daily (with breakfast).  FLUoxetine (PROzac) 10 mg capsule Take 10 mg by mouth every evening.  guaiFENesin ER (Mucinex) 600 mg ER tablet Take 600 mg by mouth two (2) times a day.  urea (URE-NA) 15 gram packet Take 1 Packet by mouth daily. 30 Packet 0    furosemide (LASIX) 20 mg tablet Take 20 mg by mouth daily as needed (edema).  Requires only sparingly, daughter reports only 2x in past 6 months      aspirin delayed-release 81 mg tablet Take 1 Tab by mouth daily. 30 Tab 0    latanoprost (XALATAN) 0.005 % ophthalmic solution Administer 1 Drop to both eyes nightly.  levothyroxine (Levo-T) 75 mcg tablet Take 75 mcg by mouth Daily (before breakfast).  tolterodine ER (DETROL LA) 4 mg ER capsule Take 4 mg by mouth daily.  atorvastatin (LIPITOR) 80 mg tablet Take 80 mg by mouth nightly.  pantoprazole (PROTONIX) 40 mg tablet Take 40 mg by mouth daily.  fluticasone-umeclidinium-vilanterol (Trelegy Ellipta) 100-62.5-25 mcg inhaler Take 1 Puff by inhalation daily.  melatonin 5 mg tablet Take 5 mg by mouth nightly.  magnesium oxide (MAG-OX) 400 mg tablet Take 400 mg by mouth daily.  B.infantis-B.ani-B.long-B.bifi (Probiotic 4X) 10-15 mg TbEC Take 2 Caps by mouth two (2) times a day. Past History     Past Medical History:  Past Medical History:   Diagnosis Date    Anxiety and depression     Arthritis     Chronic obstructive pulmonary disease ([facility]     Chronic pain     DM type 2 causing vascular disease ([facility]     GERD (gastroesophageal reflux disease)     Glaucoma     HTN (hypertension)     Hx of completed stroke 2021    Hypothyroid     Incontinence     Neuropathy     Polypharmacy        Past Surgical History:  Past Surgical History:   Procedure Laterality Date    HX ORTHOPAEDIC      HX VASCULAR ACCESS         Family History:  Family History   Problem Relation Age of Onset    No Known Problems Other         reviewed, patient did not know     Diabetes Mother     Heart Attack Mother     Cancer Father        Social History:  Social History     Tobacco Use    Smoking status: Former Smoker     Quit date: 1991     Years since quittin.4    Smokeless tobacco: Never Used   Vaping Use    Vaping Use: Never used   Substance Use Topics    Alcohol use: Never    Drug use: Never       Allergies:   Allergies   Allergen Reactions    Shellfish Derived Anaphylaxis         Review of Systems   Review of Systems   Constitutional: Positive for appetite change and fever. HENT: Positive for congestion. Negative for ear pain and sinus pain. Respiratory: Positive for cough and shortness of breath. Cardiovascular: Negative for chest pain, palpitations and leg swelling. Gastrointestinal: Negative for abdominal pain, constipation, diarrhea, nausea and vomiting. Genitourinary: Negative for dysuria, flank pain and hematuria. Musculoskeletal: Positive for back pain. Negative for myalgias and neck pain. Skin: Negative for rash and wound. Neurological: Negative for dizziness, syncope, light-headedness and headaches. Psychiatric/Behavioral: Negative for confusion. The patient is not nervous/anxious. All other systems reviewed and are negative. Physical Exam   General appearance -elderly, ill-appearing, and in no distress  Eyes - pupils equal and reactive, extraocular eye movements intact  ENT - mucous membranes moist, pharynx normal without lesions  Neck - supple, no significant adenopathy; non-tender to palpation  Chest -rhonchorous breath sounds bilaterally, with cough present during exam; non-tender to palpation  Heart - normal rate and regular rhythm, S1 and S2 normal, no murmurs noted  Abdomen - soft, nontender, nondistended, no masses or organomegaly  Musculoskeletal - no joint tenderness, deformity or swelling; normal ROM  Extremities - peripheral pulses normal, 1+ bilateral pedal edema  Skin - normal coloration and turgor, no rashes skin tear anterior right lower leg, abrasion left posterior shoulder  Neurological - alert, oriented x3, normal speech, no focal findings or movement disorder noted    Diagnostic Study Results     Labs -   No results found for this or any previous visit (from the past 12 hour(s)).     Radiologic Studies -   CT CHEST WO CONT   Final Result   New patchy bilateral consolidation and interlobular septal thickening may represent pneumonia superimposed on a background of pulmonary edema. Underlying   moderately severe centrilobular emphysema. Small right pleural effusion. CT HEAD WO CONT   Final Result   No acute intracranial process. Small frontal scalp hematoma. CT ABD PELV WO CONT   Final Result   Patchy bibasilar airspace disease, new on the right, is compatible with   pneumonia. Small right pleural effusion. Cholecystolithiasis, with no CT   evidence of acute cholecystitis. Moderate amount of stool throughout the colon. XR CHEST PORT   Final Result   Increased bilateral interstitial and alveolar opacities may   represent pulmonary edema and/or pneumonia. CT Results  (Last 48 hours)    None        CXR Results  (Last 48 hours)               06/20/21 2325  XR CHEST PORT Final result    Impression:  Increased bilateral interstitial and alveolar opacities may   represent pulmonary edema and/or pneumonia. Narrative:  INDICATION: Previous abnormal chest radiograph       COMPARISON: 5/28/2021       FINDINGS: AP portable imaging of the chest performed at 11:11 PM demonstrates a   stable cardiomediastinal silhouette. Right internal jugular Port-A-Cath is   stable in position. Bilateral interstitial and alveolar opacities are increased. No significant pleural effusion is seen. No significant osseous abnormalities   are seen. Medical Decision Making   I am the first provider for this patient. I reviewed the vital signs, available nursing notes, past medical history, past surgical history, family history and social history. Vital Signs-Reviewed the patient's vital signs.   Patient Vitals for the past 12 hrs:   Temp Pulse Resp BP SpO2   06/20/21 2243 (!) 100.7 °F (38.2 °C)       06/20/21 2242 98.9 °F (37.2 °C) 93 16 95/68 96 %       EKG: Sinus rhythm with first-degree AV block, 87 bpm, right bundle branch block, normal QTC intervals, nonspecific ST changes    Records Reviewed: Nursing Notes and Old Medical Records    Provider Notes (Medical Decision Making):   Differential diagnosis: UTI, pneumonia, intracranial bleed  We will check CBC, CMP, lactate, paired cultures, EKG, chest x-ray, UA    ED Course:   Initial assessment performed. The patients presenting problems have been discussed, and they are in agreement with the care plan formulated and outlined with them. I have encouraged them to ask questions as they arise throughout their visit. Progress Notes:  ED Course as of Jun 21 1626   Mon Jun 21, 2021   0139  patient presents with multiple falls out of bed. States that she feels ill. Alert and oriented. Noted to have fever on arrival.  Lab work shows leukocytosis. X-ray shows bilateral pulmonary infiltrates. Will treat for community-acquired pneumonia given patient's recent hospitalization for pneumonia. Patient became hypotensive after arrival.  Blood pressures 05L systolic. Levophed peripherally ordered, however review of medical record reveals that patient has right subclavian artery stenosis and patient's blood pressures have been checked on the right. When blood pressure cuff moved to left arm, patient is not hypotensive. Will hold on Levophed.   Case discussed with Dr. Lee Mckinnon (hospitalist) who will see and admit the patient.    [AO]      ED Course User Index  [AO] Joy Serna MD     CRITICAL CARE NOTE :        IMPENDING DETERIORATION -Respiratory, Cardiovascular and Metabolic    ASSOCIATED RISK FACTORS - Hypotension, Hypoxia and Metabolic changes    MANAGEMENT- Bedside Assessment and Supervision of Care    INTERPRETATION -  Xrays, CT Scan, ECG and Blood Pressure    INTERVENTIONS - hemodynamic mngmt and Metobolic interventions    CASE REVIEW - Hospitalist/Intensivist and Nursing    TREATMENT RESPONSE -Improved    PERFORMED BY - Self        NOTES   :      I have spent 70 minutes of critical care time involved in lab review, consultations with specialist, family decision- making, bedside attention and documentation. During this entire length of time I was immediately available to the patient . Rusty Cano MD      Disposition:  Admit to hospitalist         Diagnosis     Clinical Impression:   1. Hospital-acquired pneumonia    2.  Sepsis, due to unspecified organism, unspecified whether acute organ dysfunction present ([facility] [address] [facility]

## 2021-06-21 NOTE — ED NOTES
TRANSFER - OUT REPORT: 
 
Verbal report given to Jazmin(name) on [place]  being transferred to Neuro Tele (unit) for routine progression of care Report consisted of patients Situation, Background, Assessment and  
Recommendations(SBAR). Information from the following report(s) SBAR, Kardex, ED Summary, [facility] and Recent Results was reviewed with the receiving nurse. Lines:  
Venous Access Device power port 02/25/21 Upper chest (subclavicular area, right (Active) Peripheral IV 06/21/21 Right Antecubital (Active) Peripheral IV 06/21/21 Anterior;Distal;Right Forearm (Active) Opportunity for questions and clarification was provided. Patient transported with: 
  & transport

## 2021-06-21 NOTE — ED NOTES
TRANSFER - IN REPORT: 
 
Verbal report received from Danny(name) on [place]. Report consisted of patients Situation, Background, Assessment and  
Recommendations(SBAR). Information from the following report(s) SBAR and ED Summary was reviewed with the receiving nurse. Opportunity for questions and clarification was provided. 5030 Lab notified pt trop is 0.35 tried to perfect serve hospitalist, no on is currently on call  
 
 
[phone] RT at beside 8641 Cardiology consult called  
 
5543 Notified Karly Bose, due to pt being extremely anxious and yells out  From the room 1012 This RN clean & changed pt  
 
1015  at bedside evaluating pt

## 2021-06-21 NOTE — PROGRESS NOTES
Transition of Care Plan: 
 
RUR: 47% Disposition: return to SNF- [facility] 
>will need insurance authorization, PT/OT orders Follow up appointments: PCP, Cardiology after rehab  
DME needed: N/A Transportation at Discharge: [facility] medical transport [place] or means to access home: N/A 
 Medicare letter: to be reviewed prior to d/c Caregiver Contact: Kellie Aggarwal (daughter) [phone] Discharge Caregiver contacted prior to discharge? Yes Reason for Readmission:  HCAP, COPD, Hypokalemia, Head trauma RUR Score/Risk Level: 47% PCP: First and Last name:  Matthew Madrigal Name of Practice:  
 Are you a current patient: Yes/No: yes Approximate date of last visit: May 15th, 2021 Can you participate in a virtual visit with your PCP: No 
 
Is a Care Conference indicated:   No  
 
 
Did you attend your follow up appointment (s): If not, why not: N/A, discharged on 6/7 to SNF and has been there since. Resources/supports as identified by patient/family:   Supportive daughter, son in law, [facility] Parent prior to SNF Top Challenges facing patient (as identified by patient/family and CM): None voiced Finances/Medication cost?  Pt has Social 2 Step; gets prescriptions filled at [facility]- [facility] Transportation   Family vs medical transport Support system or lack thereof? Supportive daughter who is mPOA Living arrangements? Lives with daughter and son in law in a two level home, stays on 1st floor Self-care/ADLs/Cognition? PLOF- ambulatory w/ cane, ind w/ toileting, min A w/ bathing and dressing Current Advanced Directive/Advance Care Plan:   
 
Advance Care Planning General Advance Care Planning (ACP) Conversation Date of Conversation: 6/21/2021 Conducted with: Healthcare Decision Maker: Named in Advance Directive or [address] [place] [address] Healthcare Decision Maker:  
  Primary Decision Maker: Dameon Mcleod - Daughter - [phone] Secondary Decision Maker: Arik Bryant Son - [phone] Click here to complete [place] including selection of the Healthcare Decision Maker Relationship (ie \"Primary\") Today we documented Decision Maker(s) consistent with Legal Next of Kin hierarchy. Content/Action Overview: Has ACP document(s) NOT on file - requested patient to provide Reviewed DNR/DNI and patient elects Full Code (Attempt Resuscitation) Length of Voluntary ACP Conversation in minutes:  <16 minutes (Non-Billable) Christian Harris Plan for utilizing home health:   No, return to SNF at discharge per daughter Transition of Care Plan:    Based on readmission, the patient's previous Plan of Care 
 has been evaluated and/or modified. The current Transition of Care Plan is: return to [facility]. Pt will benefit from PT/OT evaluations and will require insurance authorization for placement. Chart reviewed. Pt admitted from [facility] after falling out of bed twice in same day. Assessment completed on this day with pt's daughter and Baylee Starr ([phone]). Pt was discharged from [facility] on 6/7 following a near 3 week inpatient stay. Pt had previous stay in March due to sepsis. When discharged from [address] [place] [address], pt went to [facility] for SNF placement. At baseline, pt resides with daughter and son in law in a two level home with 3 steps to enter. Pt stays on first floor of the home. Pt's prior level of function is modified independent with use of a cane. Pt requires min A with bathing and dressing due to hx of CVA. Pt was able to toilet independently. Daughter assists with medication management, finances, meals, and transportation. Pt has hx of HHC with [facility]. Preferred pharmacy is [facility] on [address] Pt known to be active with PCP, Dr. Baylee Nice, with last visit on May 15th. No ACP on file.  Daughter states that she will bring in a copy of ACP tomorrow when she comes to visit. Daughter is hopeful that pt can return to [facility] at discharge for SNF stay. Referral sent via CC link. Pt will need insurance authorization and PT/OT orders prior to d/c. Pt will require medical transport at d/c. Daughter very concerned of pt having a private room. Briefly discussed finances with daughter. She reports that pt is not eligible for Medicaid at this time, though she is considering more and more long term care options for pt depending on her recovery from this hospital stay. Assigned CM will continue to follow. Care Management Interventions PCP Verified by CM: Yes Soundra Slider ) Palliative Care Criteria Met (RRAT>21 & CHF Dx)?: No 
Mode of Transport at Discharge: BLS Transition of Care Consult (CM Consult): Discharge Planning Discharge Durable Medical Equipment: No 
Physical Therapy Consult: No 
Occupational Therapy Consult: No 
Speech Therapy Consult: No 
Current Support Network: Relative's Home, Nursing Facility ([facility], lives with dtr at baseline) Confirm Follow Up Transport: Family The Plan for Transition of Care is Related to the Following Treatment Goals : SNF placement The Patient and/or Patient Representative was Provided with a Choice of Provider and Agrees with the Discharge Plan?: Yes Name of the Patient Representative Who was Provided with a Choice of Provider and Agrees with the Discharge Plan: Eunice Eileen (daughter) Freedom of Choice List was Provided with Basic Dialogue that Supports the Patient's Individualized Plan of Care/Goals, Treatment Preferences and Shares the Quality Data Associated with the Providers?: Yes [employer] Information Provided?: No 
Discharge Location Discharge Placement: Skilled nursing facility (return to [address] [place] [address]) Readmission Assessment Number of days since last admission?: 8-30 days Previous disposition: SNF Who is being interviewed?: Caregiver What was the patient's/caregiver's perception as to why they think they needed to return back to the hospital?: Other (Comment) (Medical emergency/fall) Did you visit your Primary Care Physician after you left the hospital, before you returned this time?: No 
Why weren't you able to visit your PCP?: Other (Comment) (followed by facility MD) Did you see a specialist, such as Cardiac, Pulmonary, Orthopedic Physician, etc. after you left the hospital?: No 
Who advised the patient to return to the hospital?: Skilled Unit Does the patient report anything that got in the way of taking their medications?: No 
In our efforts to provide the best possible care to you and others like you, can you think of anything that we could have done to help you after you left the hospital the first time, so that you might not have needed to return so soon?: Other (Comment) (None voiced) NAN Hammond Care Manager, [facility] 
[phone]

## 2021-06-21 NOTE — CONSULTS
Name: Alejandro Hernandez 1940 [id number] 
6/21/2021 11:48 AM 
 
 Assessment/Plan: 
 
 Assessment:  
  
 Active Problems: 
  Precordial pain (2/25/2021) COPD (chronic obstructive pulmonary disease) ([address] (6/21/2021) Hypokalemia (6/21/2021) Head trauma (6/21/2021) Troponin level elevated (6/21/2021) Elevated brain natriuretic peptide (BNP) level (6/21/2021) HCAP (healthcare-associated pneumonia) (6/21/2021) Acute hypoxemic respiratory failure ([address] (6/21/2021) She has a flat troponin and CP that is reproducible by palpation. I suspect that she struck her chest on one of her falls from bed; I do not think the ongoing low level CP represents ischemia. She does have a low grade trop elevation and some ST changes; would manage these conservatively in light of her frailty with multiple other issues. Dr Juanjo Agrawal to follow. Would treat with lovenox thru tomorrow. Add low dose BB Admit Date: 6/20/2021 Admit Diagnosis: HCAP (healthcare-associated pneumonia) [J18.9] Acute hypoxemic respiratory failure ([address] [J96.01] COPD (chronic obstructive pulmonary disease) ([address] [J44.9] Elevated brain natriuretic peptide (BNP) level [R79.89] Hypokalemia [E87.6] Troponin level elevated [R77.8] Head trauma [S09.90XA] Primary Care Romulo Reardon MD    
Attending Provider: Azul Ashraf MD 
Primary Cardiologist: Dr Juanjo Agrawal Consulting Cardiologist: Dr Meera Lerma CC/REASON FOR CONSULT: CP, trop Subjective: 
 
 Alejandro Hernandez is a 80 y.o. female admitted for HCAP (healthcare-associated pneumonia) [J18.9] Acute hypoxemic respiratory failure ([address] [J96.01] COPD (chronic obstructive pulmonary disease) ([address] [J44.9] Elevated brain natriuretic peptide (BNP) level [R79.89] Hypokalemia [E87.6] Troponin level elevated [R77.8] Head trauma [S09.90XA]. Patient complains of  chest pain, dyspnea. Review of Symptoms: A comprehensive review of systems was negative except for: Constitutional: positive for anorexia Respiratory: positive for dyspnea on exertion Cardiovascular: positive for chest pain, dyspnea Previous treatment/evaluation includes echocardiogram and coronary angioplasty . Past Medical History:  
Diagnosis Date  Anxiety and depression  Arthritis  Chronic obstructive pulmonary disease ([facility]  Chronic pain  DM type 2 causing vascular disease ([facility]  GERD (gastroesophageal reflux disease)  Glaucoma   
 HTN (hypertension)  Hx of completed stroke 2017, 2021  Hypothyroid  Incontinence  Neuropathy  Polypharmacy Past Surgical History:  
Procedure Laterality Date  HX ORTHOPAEDIC    
 HX VASCULAR ACCESS Current Facility-Administered Medications Medication Dose Route Frequency  vancomycin (VANCOCIN) 750 mg in 0.9% sodium chloride 250 mL (VIAL-MATE)  750 mg IntraVENous Q12H  
 [Held by provider] amLODIPine (NORVASC) tablet 10 mg  10 mg Oral DAILY  aspirin delayed-release tablet 81 mg  81 mg Oral DAILY  atorvastatin (LIPITOR) tablet 80 mg  80 mg Oral QHS  
 L.acidophilus-paracasei-S.thermophil-bifidobacter (RISAQUAD) 8 billion cell capsule  1 Capsule Oral DAILY  clonazePAM (KlonoPIN) disintegrating tablet 0.25 mg  0.25 mg Oral QPM  
 FLUoxetine (PROzac) capsule 10 mg  10 mg Oral QPM  
 furosemide (LASIX) tablet 20 mg  20 mg Oral DAILY PRN  
 gabapentin (NEURONTIN) capsule 600 mg  600 mg Oral BID  insulin glargine (LANTUS) injection 10 Units  10 Units SubCUTAneous QHS  latanoprost (XALATAN) 0.005 % ophthalmic solution 1 Drop  1 Drop Both Eyes QHS  levothyroxine (SYNTHROID) tablet 75 mcg  75 mcg Oral ACB  pantoprazole (PROTONIX) tablet 40 mg  40 mg Oral DAILY  polyethylene glycol (MIRALAX) packet 17 g  17 g Oral DAILY  predniSONE (DELTASONE) tablet 40 mg  40 mg Oral DAILY WITH BREAKFAST  oxybutynin chloride XL (DITROPAN XL) tablet 10 mg  10 mg Oral DAILY  albuterol-ipratropium (DUO-NEB) 2.5 MG-0.5 MG/3 ML  3 mL Nebulization QID RT  
 arformoterol 15 mcg/budesonide 0.5 mg neb solution   Nebulization BID  albuterol (PROVENTIL VENTOLIN) nebulizer solution 10 mg  10 mg Nebulization Q4H PRN  
 docusate sodium (COLACE) capsule 100 mg  100 mg Oral BID  [START ON 6/22/2021] levoFLOXacin (LEVAQUIN) 750 mg in D5W IVPB  750 mg IntraVENous Q24H  
 oxyCODONE-acetaminophen (PERCOCET) 5-325 mg per tablet 1 Tablet  1 Tablet Oral Q4H PRN  
 aztreonam (AZACTAM) 1 g in 0.9% sodium chloride (MBP/ADV) 100 mL MBP  1 g IntraVENous Q8H  
 sodium chloride (NS) flush 5-40 mL  5-40 mL IntraVENous Q8H  
 sodium chloride (NS) flush 5-40 mL  5-40 mL IntraVENous PRN  
 acetaminophen (TYLENOL) tablet 650 mg  650 mg Oral Q6H PRN Or  
 acetaminophen (TYLENOL) suppository 650 mg  650 mg Rectal Q6H PRN  polyethylene glycol (MIRALAX) packet 17 g  17 g Oral DAILY PRN  
 ondansetron (ZOFRAN ODT) tablet 4 mg  4 mg Oral Q8H PRN Or  
 ondansetron (ZOFRAN) injection 4 mg  4 mg IntraVENous Q6H PRN  
 ALPRAZolam (XANAX) tablet 0.25 mg  0.25 mg Oral Q6H PRN  
 sodium chloride (NS) flush 5-10 mL  5-10 mL IntraVENous PRN Current Outpatient Medications Medication Sig  
 albuterol (PROVENTIL VENTOLIN) 2.5 mg /3 mL (0.083 %) nebu 2.5 mg by Nebulization route every six (6) hours.  clonazePAM (KlonoPIN) 0.25 mg TbDi Take 0.25 mg by mouth three (3) times daily.  furosemide (LASIX) 40 mg tablet Take 40 mg by mouth daily.  insulin lispro (HUMALOG) 100 unit/mL injection by SubCUTAneous route nightly. Sliding scale 200-249 = 1 unit 250-349 = 2 unit Call MD if > 350  
 insulin lispro (HUMALOG) 100 unit/mL injection by SubCUTAneous route Before breakfast, lunch, and dinner. Sliding scale 140-199 = 0 units 200-249 = 2 units 250-299 = 3 units 300-349 = 4 units Call MD if > 350  furosemide (LASIX) 20 mg tablet Take 20 mg by mouth daily as needed (edema).   
 naloxone (EVZIO) 2 mg/0.4 mL auto-injector 2 mg by IntraMUSCular route once as needed for Overdose.  oxyCODONE-acetaminophen (Percocet) 7.5-325 mg per tablet Take 1 Tablet by mouth every four (4) hours as needed for Pain.  predniSONE (DELTASONE) 5 mg tablet Take 5 mg by mouth daily.  L.acid,para-B. bifidum-S.therm (RISAQUAD) 8 billion cell cap cap Take 2 Capsules by mouth two (2) times a day.  acetaminophen (TYLENOL) 325 mg tablet Take 2 Tablets by mouth every six (6) hours as needed for Pain or Fever for up to 10 doses. Indications: pain  
 gabapentin (NEURONTIN) 300 mg capsule Take 2 Capsules by mouth two (2) times a day for 30 days. Max Daily Amount: 1,200 mg.  polyethylene glycol (MIRALAX) 17 gram packet Take 1 Packet by mouth daily for 30 days. Indications: constipation  simethicone (MYLICON) 80 mg chewable tablet Take 1 Tablet by mouth four (4) times daily as needed for Flatulence (Gaseous discomfort) for up to 10 doses.  insulin glargine (Lantus Solostar U-100 Insulin) 100 unit/mL (3 mL) inpn 16 Units by SubCUTAneous route nightly for 30 days. Indications: type 2 diabetes mellitus  oxyCODONE ER (OxyCONTIN) 20 mg ER tablet Take 1 Tablet by mouth every twelve (12) hours for 30 doses. Max Daily Amount: 40 mg. Indications: severe chronic pain requiring long-term opioid treatment  amLODIPine (NORVASC) 10 mg tablet Take 1 Tablet by mouth daily for 30 days. Indications: high blood pressure  calcium-cholecalciferol, D3, (CALTRATE 600+D) tablet Take 1 Tab by mouth daily (with breakfast).  FLUoxetine (PROzac) 10 mg capsule Take 10 mg by mouth every evening.  guaiFENesin ER (Mucinex) 600 mg ER tablet Take 600 mg by mouth two (2) times a day.  urea (URE-NA) 15 gram packet Take 1 Packet by mouth daily.  aspirin delayed-release 81 mg tablet Take 1 Tab by mouth daily.  latanoprost (XALATAN) 0.005 % ophthalmic solution Administer 1 Drop to both eyes nightly.   
 levothyroxine (Levo-T) 75 mcg tablet Take 75 mcg by mouth Daily (before breakfast).  tolterodine ER (DETROL LA) 4 mg ER capsule Take 4 mg by mouth daily.  atorvastatin (LIPITOR) 80 mg tablet Take 80 mg by mouth nightly.  pantoprazole (PROTONIX) 40 mg tablet Take 40 mg by mouth daily.  fluticasone-umeclidinium-vilanterol (Trelegy Ellipta) 100-62.5-25 mcg inhaler Take 1 Puff by inhalation daily.  melatonin 5 mg tablet Take 5 mg by mouth nightly.  magnesium oxide (MAG-OX) 400 mg tablet Take 400 mg by mouth daily. Allergies Allergen Reactions  Shellfish Derived Anaphylaxis Family History Problem Relation Age of Onset  No Known Problems Other   
     reviewed, patient did not know  Diabetes Mother  Heart Attack Mother  Cancer Father Social History Socioeconomic History  Marital status:  Spouse name: Not on file  Number of children: Not on file  Years of education: Not on file  Highest education level: Not on file Tobacco Use  Smoking status: Former Smoker Quit date: 1991 Years since quittin.4  Smokeless tobacco: Never Used Vaping Use  Vaping Use: Never used Substance and Sexual Activity  Alcohol use: Never  Drug use: Never Social Determinants of Health Financial Resource Strain:  Difficulty of Paying Living Expenses:   
Food Insecurity:  Worried About [facility] in the Last Year:   
[address] in the Last Year:   
Transportation Needs:   
 Lack of Transportation (Medical):  Lack of Transportation (Non-Medical): Physical Activity:   
 Days of Exercise per Week:  Minutes of Exercise per Session:   
Stress:  Feeling of Stress :   
Social Connections:  Frequency of Communication with Friends and Family:  Frequency of Social Gatherings with Friends and Family:  Attends Congregation Services:  Active Member of Clubs or Organizations:  Attends Club or Organization Meetings:  Marital Status:   
 
 
 
 Objective:  
  
Physical Exam 
Vitals:  
 06/21/21 0815 06/21/21 0856 06/21/21 0920 06/21/21 1134 BP: (!) 135/55 Pulse: 83 Resp: 20 Temp: 97.9 °F (36.6 °C) SpO2: 97% 96% 98% 95% Weight:      
Height:      
 
 
General:  Alert, cooperative, in pain, appears stated age. Eyes:  Conjunctivae/corneas clear. PERRL, EOMs intact. Fundi benign Ears:  Normal TMs and external ear canals both ears. Nose: Nares normal. No drainage or sinus tenderness. Mouth/Throat: Moist mucous membranes. Dentition normal.   
Neck: Supple, symmetrical, trachea midline, no carotid bruit and no JVD. Back:   Symmetric, no curvature. ROM normal. No CVA tenderness. Lungs:   Clear to auscultation bilaterally. Heart:  Regular rate and rhythm, S1, S2 normal, no murmur, click, rub or gallop. Anterior chest is sore to touch Abdomen:   Soft, non-tender. Bowel sounds normal. No masses,  No organomegaly. Extremities: Extremities normal, atraumatic, no cyanosis or edema. Vascular: 2+ and symmetric all extremities. Skin: Skin color normal. No rashes or lesions Lymph nodes: No Lymphadenopathy Neurologic: CNII-XII intact. Normal strength throughout. Telemetry: normal sinus rhythm ECG: normal sinus rhythm, nonspecific ST and T waves changes, RBBB Echocardiogram: Not done Intake/Output Summary (Last 24 hours) at 6/21/2021 1148 Last data filed at 6/21/2021 1117 Gross per 24 hour Intake 3296 ml Output 1050 ml Net 2246 ml Data Review:  
 
Recent Labs  
  06/21/21 
9224 06/21/21 
0426 06/21/21 0032 TROIQ 0.35* 0.37* 0.37* Recent Labs  
  06/21/21 0032 * K 3.1*  
CL 93* CO2 33* BUN 19  
CREA 0.74 GLU 90  
CA 7.8* Recent Labs  
  06/21/21 0032 WBC 15.8* HGB 8.8* HCT 27.6*  
 Recent Labs  
  06/21/21 0032 AP 78 No results for input(s): CHOL, LDLC in the last 72 hours.  
 
No lab exists for component: TGL, HDLC,  HBA1C No results for input(s): CRP, TSH, TSHEXT in the last 72 hours. No lab exists for component: ESR Thank you very much for this referral. I appreciate the opportunity to participate in this patient's care. I will follow along with above stated plan.  
 
Enola Jobs, MD Denver Moat, MD

## 2021-06-21 NOTE — PROGRESS NOTES
Bedside and Verbal shift change report given to Sandeep Bailey RN (oncoming nurse) by Maya Brooks RN (offgoing nurse). Report included the following information SBAR, Kardex, Intake/Output, MAR, Recent Results and Med Rec Status.

## 2021-06-21 NOTE — PROGRESS NOTES
Pharmacy Medication Reconciliation Clarified PTA med list with paperwork from [facility]. PTA medication list was corrected to the following:  
 
Prior to Admission Medications Prescriptions Last Dose Informant Taking? FLUoxetine (PROzac) 10 mg capsule  Transfer Papers Yes Sig: Take 10 mg by mouth every evening. L.acid,para-B. bifidum-S.therm (RISAQUAD) 8 billion cell cap cap  Transfer Papers Yes Sig: Take 2 Capsules by mouth two (2) times a day. acetaminophen (TYLENOL) 325 mg tablet  Transfer Papers Yes Sig: Take 2 Tablets by mouth every six (6) hours as needed for Pain or Fever for up to 10 doses. Indications: pain  
albuterol (PROVENTIL VENTOLIN) 2.5 mg /3 mL (0.083 %) nebu  Transfer Papers Yes Si.5 mg by Nebulization route every six (6) hours. amLODIPine (NORVASC) 10 mg tablet  Transfer Papers Yes Sig: Take 1 Tablet by mouth daily for 30 days. Indications: high blood pressure  
aspirin delayed-release 81 mg tablet  Transfer Papers Yes Sig: Take 1 Tab by mouth daily. atorvastatin (LIPITOR) 80 mg tablet  Transfer Papers Yes Sig: Take 80 mg by mouth nightly. calcium-cholecalciferol, D3, (CALTRATE 600+D) tablet  Transfer Papers Yes Sig: Take 1 Tab by mouth daily (with breakfast). clonazePAM (KlonoPIN) 0.25 mg TbDi  Transfer Papers Yes Sig: Take 0.25 mg by mouth three (3) times daily. fluticasone-umeclidinium-vilanterol (Trelegy Ellipta) 100-62.5-25 mcg inhaler  Transfer Papers Yes Sig: Take 1 Puff by inhalation daily. furosemide (LASIX) 20 mg tablet  Transfer Papers Yes Sig: Take 20 mg by mouth daily as needed (edema). furosemide (LASIX) 40 mg tablet  Transfer Papers Yes Sig: Take 40 mg by mouth daily. gabapentin (NEURONTIN) 300 mg capsule  Transfer Papers Yes Sig: Take 2 Capsules by mouth two (2) times a day for 30 days. Max Daily Amount: 1,200 mg.  
guaiFENesin ER (Mucinex) 600 mg ER tablet  Transfer Papers Yes Sig: Take 600 mg by mouth two (2) times a day.  
insulin glargine (Lantus Solostar U-100 Insulin) 100 unit/mL (3 mL) inpn  Transfer Papers Yes Si Units by SubCUTAneous route nightly for 30 days. Indications: type 2 diabetes mellitus  
insulin lispro (HUMALOG) 100 unit/mL injection  Transfer Papers Yes Sig: by SubCUTAneous route nightly. Sliding scale 200-249 = 1 unit 250-349 = 2 unit Call MD if > 350  
insulin lispro (HUMALOG) 100 unit/mL injection  Transfer Papers Yes Sig: by SubCUTAneous route Before breakfast, lunch, and dinner. Sliding scale 140-199 = 0 units 200-249 = 2 units 250-299 = 3 units 300-349 = 4 units Call MD if > 350  
latanoprost (XALATAN) 0.005 % ophthalmic solution  Transfer Papers Yes Sig: Administer 1 Drop to both eyes nightly. levothyroxine (Levo-T) 75 mcg tablet  Transfer Papers Yes Sig: Take 75 mcg by mouth Daily (before breakfast). magnesium oxide (MAG-OX) 400 mg tablet  Transfer Papers Yes Sig: Take 400 mg by mouth daily. melatonin 5 mg tablet  Transfer Papers Yes Sig: Take 5 mg by mouth nightly. naloxone (EVZIO) 2 mg/0.4 mL auto-injector  Transfer Papers Yes Si mg by IntraMUSCular route once as needed for Overdose. oxyCODONE ER (OxyCONTIN) 20 mg ER tablet  Transfer Papers Yes Sig: Take 1 Tablet by mouth every twelve (12) hours for 30 doses. Max Daily Amount: 40 mg. Indications: severe chronic pain requiring long-term opioid treatment  
oxyCODONE-acetaminophen (Percocet) 7.5-325 mg per tablet  Transfer Papers Yes Sig: Take 1 Tablet by mouth every four (4) hours as needed for Pain.  
pantoprazole (PROTONIX) 40 mg tablet  Transfer Papers Yes Sig: Take 40 mg by mouth daily. polyethylene glycol (MIRALAX) 17 gram packet  Transfer Papers Yes Sig: Take 1 Packet by mouth daily for 30 days. Indications: constipation  
predniSONE (DELTASONE) 5 mg tablet  Transfer Papers Yes Sig: Take 5 mg by mouth daily. simethicone (MYLICON) 80 mg chewable tablet  Transfer Papers Yes Sig: Take 1 Tablet by mouth four (4) times daily as needed for Flatulence (Gaseous discomfort) for up to 10 doses. tolterodine ER (DETROL LA) 4 mg ER capsule  Transfer Papers Yes Sig: Take 4 mg by mouth daily. urea (URE-NA) 15 gram packet  Transfer Papers Yes Sig: Take 1 Packet by mouth daily. Facility-Administered Medications: None Thank you, Rashmi Patricia, PHARMD

## 2021-06-21 NOTE — ED NOTES
Pt presents to the ED via EMS. EMS states that pt has a fall twice today at rehab facility. Pt is A&O x 3 (not to time). EMS stated pt complains of fever, cough, back pain, and low O2 saturation. Pt has COPD and is on 3L NC and maintains sats in 93% to 94%. Pt currently complains of left leg pain and head pain.

## 2021-06-21 NOTE — PROGRESS NOTES
Received message from patient's nurse Thomas Johns stating / informing : 
 
pt asking for pain meds and anxiety meds. pt still has chest pain, EKG CTA of chest and trops all done throughout the night Discussion / orders: 
 
Labs, troponin level, EKG and CT of chest have all been reviewed. Patient has been started on antibiotic Troponin level mildly elevated · We will get cardiology consult · Ordered weight based lovenox x 1 dose until seen by cardiologist 
· Continue to trend troponin · Nitroglycerin sublingual 0.4x1 now · NPO for now · Ativan 0.25 mg iv x 1 Patient Vitals for the past 4 hrs: 
 Temp Pulse Resp BP SpO2  
06/21/21 0631 97.5 °F (36.4 °C)      
06/21/21 0615  78 16  96 %  
06/21/21 0600  78 16 (!) 133/49 92 %  
06/21/21 0445  74 15 (!) 113/51 95 % 06/21/21 0245  83 20 (!) 116/42 90 % CT Results  (Last 48 hours) 06/21/21 0336  CT CHEST WO CONT Final result Impression:  New patchy bilateral consolidation and interlobular septal thickening may  
represent pneumonia superimposed on a background of pulmonary edema. Underlying  
moderately severe centrilobular emphysema. Small right pleural effusion. Narrative:  INDICATION: Abnormal chest x-ray COMPARISON: Chest CT from 5/19/2021 CONTRAST: None. TECHNIQUE:  5 mm axial images were obtained through the chest. Coronal and  
sagittal reformats were generated. CT dose reduction was achieved through use  
of a standardized protocol tailored for this examination and automatic exposure  
control for dose modulation. The absence of intravenous contrast reduces the sensitivity for evaluation of  
the mediastinum, valery, vasculature, and upper abdominal organs. FINDINGS:  
   
CHEST WALL: No mass or axillary lymphadenopathy. Right internal jugular Port-A-Cath is stable in position. THYROID: No nodule. MEDIASTINUM: No mass or lymphadenopathy. VALERY: No mass or lymphadenopathy. THORACIC AORTA: Severely atherosclerotic. No aneurysm. MAIN PULMONARY ARTERY: Normal in caliber. TRACHEA/BRONCHI: Bilateral bronchial wall thickening. ESOPHAGUS: No wall thickening or dilatation. HEART: Normal in size. Extensive coronary artery calcifications PLEURA: Small right pleural effusion. LUNGS: New patchy bilateral consolidation and interlobular septal thickening. Moderately severe centrilobular emphysema. INCIDENTALLY IMAGED UPPER ABDOMEN: No significant abnormality in the  
incidentally imaged upper abdomen. BONES: Unchanged old thoracic compression deformities and old bilateral rib  
fractures. 06/20/21 2335  CT HEAD WO CONT Final result Impression:  No acute intracranial process. Small frontal scalp hematoma. Narrative:  EXAM: CT HEAD WO CONT INDICATION: head injury COMPARISON: June 1, 2021. CONTRAST: None. TECHNIQUE: Unenhanced CT of the head was performed using 5 mm images. Brain and  
bone windows were generated. Coronal and sagittal reformats. CT dose reduction  
was achieved through use of a standardized protocol tailored for this  
examination and automatic exposure control for dose modulation. FINDINGS:  
The ventricles and sulci are stable in size, shape and configuration. . There is  
unchanged periventricular white matter disease, right greater than left. There  
is no intracranial hemorrhage, extra-axial collection, or mass effect. The  
basilar cisterns are open. No CT evidence of acute infarct. The bone windows demonstrate no abnormalities. The visualized portions of the  
paranasal sinuses and mastoid air cells are clear. There is a small frontal  
scalp hematoma. 06/20/21 2335  CT ABD PELV WO CONT Final result Impression:  Patchy bibasilar airspace disease, new on the right, is compatible with  
pneumonia. Small right pleural effusion.  Cholecystolithiasis, with no CT  
evidence of acute cholecystitis. Moderate amount of stool throughout the colon. Narrative:  EXAM: CT ABD PELV WO CONT INDICATION: abd pain COMPARISON: 5/20/2021 CONTRAST:  None. TECHNIQUE:   
Thin axial images were obtained through the abdomen and pelvis. Coronal and  
sagittal reformats were generated. Oral contrast was not administered. CT dose  
reduction was achieved through use of a standardized protocol tailored for this  
examination and automatic exposure control for dose modulation. The absence of intravenous contrast material reduces the sensitivity for  
evaluation of the vasculature and solid organs. FINDINGS:   
LOWER THORAX: There is patchy bibasilar airspace disease, new on the right. Small right pleural effusion is noted. Extensive coronary artery calcifications  
are present. LIVER: No mass. BILIARY TREE: Gallbladder contains small calculi but demonstrates no wall  
thickening. CBD is not dilated. SPLEEN: within normal limits. PANCREAS: No focal abnormality. ADRENALS: Unremarkable. KIDNEYS/URETERS: No calculus or hydronephrosis. Unchanged small right renal  
cyst.  
STOMACH: Unremarkable. SMALL BOWEL: No dilatation or wall thickening. COLON: No dilatation or wall thickening. Moderate amount of stool throughout. APPENDIX: Not visualized. PERITONEUM: No ascites or pneumoperitoneum. RETROPERITONEUM: No lymphadenopathy or aortic aneurysm. REPRODUCTIVE ORGANS: Unremarkable URINARY BLADDER: No mass or calculus. BONES: No destructive bone lesion. Right hip prosthesis. Internal fixation  
hardware in the left hip. Old bilateral rib fractures. Old thoracolumbar  
compression deformities. ABDOMINAL WALL: No mass or hernia. ADDITIONAL COMMENTS: N/A  
   
  
  
 
EKG Results Procedure 720 Value Units Date/Time EKG 12 LEAD INITIAL [[id number]] Collected: 06/21/21 4324 Order Status: Completed Updated: 06/21/21 1267   Ventricular Rate 84 BPM Atrial Rate 84 BPM   
  QRS Duration 122 ms   
  Q-T Interval 420 ms QTC Calculation (Bezet) 496 ms Calculated R Axis 21 degrees Calculated T Axis 12 degrees Diagnosis -- Wide QRS rhythm Right bundle branch block Septal infarct , age undetermined T wave abnormality, consider lateral ischemia When compared with ECG of 21-JUN-2021 00:22, 
MANUAL COMPARISON REQUIRED, DATA IS UNCONFIRMED 
  
 EKG, 12 LEAD, SUBSEQUENT [[id number]] Order Status: Sent EKG, 12 LEAD, INITIAL [[id number]] Collected: 06/21/21 0022 Order Status: Completed Updated: 06/21/21 9045 Ventricular Rate 87 BPM   
  Atrial Rate 87 BPM   
  P-R Interval 210 ms QRS Duration 124 ms Q-T Interval 378 ms QTC Calculation (Bezet) 454 ms Calculated P Axis 52 degrees Calculated R Axis 15 degrees Calculated T Axis 22 degrees Diagnosis --  
  Sinus rhythm with 1st degree AV block Right bundle branch block When compared with ECG of 19-MAY-2021 16:01, No significant change was found 02/25/21 ECHO ADULT COMPLETE 02/26/2021 2/26/2021 Interpretation Summary · LV: Estimated LVEF is 60 - 65%. Normal cavity size, wall thickness and systolic function (ejection fraction normal). Wall motion: normal. Mild (grade 1) left ventricular diastolic dysfunction. · AV: Aortic valve leaflet calcification present. Aortic valve mean gradient is 21 mmHg. Mild aortic valve stenosis is present. · MV: Severe mitral annular calcification. Moderate mitral valve regurgitation is present. · TV: Mild tricuspid valve regurgitation is present. · Right Atrium: Catheter present and appears normal. 
 
Signed by: Robbert Osler, MD on 2/26/2021  4:55 PM 
 
 
  
 
 
 
Please note that this note was dictated using Dragon computer voice recognition software. Quite often unanticipated grammatical, syntax, homophones, and other interpretive errors are inadvertently transcribed by the computer software. Please disregard these errors. Please excuse any errors that have escaped final proofreading.

## 2021-06-21 NOTE — ED NOTES
Bedside and Verbal shift change report given to Javid Zuñiga RN (oncoming nurse) by Christin Slater RN (offgoing nurse). Report included the following information SBAR, ED Summary, MAR and Recent Results.

## 2021-06-21 NOTE — H&P
Hospitalist Admission Note    NAME: Cornelius Mckeon   :  1940   MRN:  [id number]     Date/Time:  2021 2:12 AM    Patient PCP: Shane Montero MD  _____________________________________________________________________  Given the patient's current clinical presentation, I have a high level of concern for decompensation if discharged from the emergency department. Complex decision making was performed, which includes reviewing the patient's available past medical records, laboratory results, and x-ray films. My assessment of this patient's clinical condition and my plan of care is as follows. Assessment / Plan:    HCAP  Recent history of Mrsa pneumonia, discharged on  to [facility] [place]  S/p mechanical fall x2   from  bed s/p closed head trauma yesterday  Hypoxia currently saturating 96% on her baseline 3 L nasal cannula  T-max 100.7  Mild elevated troponin 0 0.37 likely type B, EKG showed no acute ischemic changes, will trend troponin and monitor on telemetry  proBNP 2696 recent echo showed normal ejection fraction but patient is currently on Lasix 40 mg at home will continue that and will avoid IV fluids  Hypokalemia 3.1 and hyponatremia 132  Constipation continue stool softener  S/p Covid  vaccine  second shot was  in [facility]  CT ABD PELV WO CONT   Patchy bibasilar airspace disease, new on the right, is compatible with  pneumonia. Small right pleural effusion. Cholecystolithiasis, with no CT  evidence of acute cholecystitis. Moderate amount of stool throughout the colon. CT head No acute intracranial process. Small frontal scalp hematoma. Chest x-ray increased bilateral interstitial and alveolar opacities may represent pulmonary edema and/or pneumonia. EKG sinus rhythm with first-degree AV block heart rate 87, right bundle branch block    History of COPD on 3 L nasal [place] at baseline and currently patient is on prednisone 5 mg at home.   We will continue breathing treatment and increase prednisone to 40 mg for now      Admit to hospitalist service  MRSA isolation  We will get CT chest without contrast to further evaluate   Continue broad-spectrum antibiotic for now for HCAP  We will check procalcitonin level  Primary team to consider infectious disease and pulmonary consult in the morning once we have a CT chest  Please avoid IV fluid administration secondary to history of fluid overload and elevated proBNP and patient is being on Lasix at home      Hypertension POA: Continue Amlodipine     Hypothyroidism POA: Continue Levothyroxine      History of bilateral common iliac stenosis POA: chronic. Noted on CT abd/pelvis that showed severe stenosis of bilateral common iliac arteries. No symptoms. Outpatient monitoring      Right subclavian stenosis: She was evaluated by vascular last month at outside hospital and; rec OP f/u. History of cirrhosis: noted on imaging. She is not presently decompensated    History of type 2 diabetes mellitus. Check A1c level. Sliding scale insulin. Of note blood pressure only to be check on the left side as patient has  history of right subclavian artery stenosis      Code Status: Full code  Surrogate Decision Maker:  Alessia Dickinson Daughter [phone]     Marcia Viola [phone]         DVT Prophylaxis: Subcu heparin  GI Prophylaxis: not indicated    Baseline: Dependent    Old record review is below    2D echocardiogram on 2/26/2021  LV: Estimated LVEF is 60 - 65%. Normal cavity size, wall thickness and systolic function (ejection fraction normal). Wall motion: normal. Mild (grade 1) left ventricular diastolic dysfunction. AV: Aortic valve leaflet calcification present. Aortic valve mean gradient is 21 mmHg. Mild aortic valve stenosis is present. MV: Severe mitral annular calcification. Moderate mitral valve regurgitation is present. TV: Mild tricuspid valve regurgitation is present.   Right Atrium: Catheter present and appears normal.     Date of Discharge:  6/7/2021     Principal admission Diagnosis:   HAP (hospital-acquired pneumonia) [J18.9, Y95]  Pneumonia of both lungs due to infectious organism [J18.9]     Discharge Diagnoses:    Acute on chronic respiratory failure with hypoxia ([address] (5/10/2021)    Chronic obstructive pulmonary disease ([facility] [address]     HAP (hospital-acquired pneumonia) (5/19/2021)    Multiple fractures (6/7/2021)    Physical debility (6/7/2021)    HTN (hypertension) (1/3/2021)    Hypothyroid (1/3/2021)    History of CVA (cerebrovascular accident) (1/3/2021)    DM (diabetes mellitus), type 2 ([facility] [address]     Anxiety and depression     GERD (gastroesophageal reflux disease)     Anemia of chronic illness (5/10/2021)     Hospital Course:  Discharge on June 7, 2021   admitted to [facility] with sepsis due to MRSA pneumonia: likely HCAP. Sputum culture grew MRSA. She has completed her IV antibiotic regimen with IV Cefepime 5/20 - 5/26 and Vanc 5/20- 6/02. Acute hypoxic respiratory failure POA: due to pneumonia and COPD. , prednisone taper to her maintenance dose. Multiple fractures / Chronic pain / Debility POA: noted on CT imaging on admission. Likely has recurrent falls. On chronic pain medications. Seen by Palliative care. Continue current pain regimen which seems to be working well. Bowel regimen     . Subjective:   CHIEF COMPLAINT: Fever/cough/mechanical fall x2 out of bed/back pain/closed head injury/hypoxia    Pt presents to the ED via EMS. EMS states that pt has a fall twice today at rehab facility. Pt is A&O x 3 (not to time). EMS stated pt complains of fever, cough, back pain, and low O2 saturation. Pt has COPD and is on 3L NC and maintains sats in 93% to 94%. Pt currently complains of left leg pain and head pain.         HISTORY OF PRESENT ILLNESS:       María Elena Fatima, 80 y.o. female with PMHx significant for COPD on 3 L nasal cannula at baseline, hypertension, hypothyroidism, diabetes, GERD, anxiety and depression, CVA with left-sided residual weakness, history of pulmonary embolism in April but was taken off Eliquis due to hemoptysis per report, recent hospital-acquired pneumonia who was discharged on June 7 from [facility] [place] after an admission for sepsis  2/2 MRSA pneumonia/COPD exacerbation presents to the ED from [facility] where she was discharged for rehab after her most recent hospitalization with chief complaint of generally feeling bad along with cough, back pain, and 2 falls out of bed in the last 2 hours. Patient also has a contusion/hematoma to her forehead from one of the falls. Noted to have a fever of 100.7 on arrival.  Patient reports that she feels terrible and is specifically complaining of back pain. Noted to have a rhonchorous cough during exam.  She is on 3 L nasal cannula at baseline at [address] [address]. Reportedly was satting in the 80s on 3 L and was briefly bumped up to 6 L in transport, but is satting greater than 90 on 3 L in the ED. Had her second Covid vaccines on Easter Sunday. PCP: Dao Taylor MD         We were asked to admit for work up and evaluation of the above problems. Vital Signs-Reviewed the patient's vital signs.   Patient Vitals for the past 12 hrs:    Temp Pulse Resp BP SpO2   06/20/21 2243 (!) 100.7 °F (38.2 °C)       06/20/21 2242 98.9 °F (37.2 °C) 93 16 95/68 96 %          Past Medical History:   Diagnosis Date    Anxiety and depression     Arthritis     Chronic obstructive pulmonary disease (HCC)     Chronic pain     DM type 2 causing vascular disease (HCC)     GERD (gastroesophageal reflux disease)     Glaucoma     HTN (hypertension)     Hx of completed stroke 2017, 2021    Hypothyroid     Incontinence     Neuropathy     Polypharmacy         Past Surgical History:   Procedure Laterality Date    HX ORTHOPAEDIC      HX VASCULAR ACCESS         Social History     Tobacco Use    Smoking status: Former Smoker     Quit date: 1/1/1991     Years since quittin.4    Smokeless tobacco: Never Used   Substance Use Topics    Alcohol use: Never        Family History   Problem Relation Age of Onset    No Known Problems Other         reviewed, patient did not know     Diabetes Mother     Heart Attack Mother     Cancer Father      Allergies   Allergen Reactions    Shellfish Derived Anaphylaxis        Prior to Admission medications    Medication Sig Start Date End Date Taking? Authorizing Provider   acetaminophen (TYLENOL) 325 mg tablet Take 2 Tablets by mouth every six (6) hours as needed for Pain or Fever for up to 10 doses. Indications: pain 21   Rosalia Melissa MD   gabapentin (NEURONTIN) 300 mg capsule Take 2 Capsules by mouth two (2) times a day for 30 days. Max Daily Amount: 1,200 mg. 21  Rosalia Melissa MD   clonazePAM (KlonoPIN) 0.25 mg TbDi Take 1 Tablet by mouth every evening for 30 days. Max Daily Amount: 0.25 mg. ODT 21  Rosalia Melissa MD   polyethylene glycol (MIRALAX) 17 gram packet Take 1 Packet by mouth daily for 30 days. Indications: constipation 21  Rosalia Melissa MD   simethicone (MYLICON) 80 mg chewable tablet Take 1 Tablet by mouth four (4) times daily as needed for Flatulence (Gaseous discomfort) for up to 10 doses. 21   Rosalia Melissa MD   predniSONE (DELTASONE) 5 mg tablet Take 4 Tablets by mouth daily (with breakfast) for 3 days, THEN 3 Tablets daily (with breakfast) for 3 days, THEN 2 Tablets daily (with breakfast) for 3 days, THEN 1 Tablet daily (with breakfast) for 30 days. 21  Rosalia Melissa MD   insulin glargine (Lantus Solostar U-100 Insulin) 100 unit/mL (3 mL) inpn 16 Units by SubCUTAneous route nightly for 30 days.  Indications: type 2 diabetes mellitus 21  Rosalia Melissa MD   insulin lispro (HUMALOG) 100 unit/mL injection See scale above  Indications: type 2 diabetes mellitus 21   Rosalia Melissa MD   oxyCODONE ER (OxyCONTIN) 20 mg ER tablet Take 1 Tablet by mouth every twelve (12) hours for 30 doses. Max Daily Amount: 40 mg. Indications: severe chronic pain requiring long-term opioid treatment 6/7/21 6/22/21  Antonieta Fu MD   amLODIPine (NORVASC) 10 mg tablet Take 1 Tablet by mouth daily for 30 days. Indications: high blood pressure 6/8/21 7/8/21  Antonieta Fu MD   albuterol sulfate (PROVENTIL;VENTOLIN) 2.5 mg/0.5 mL nebu nebulizer solution 2.5 mg by Nebulization route every six (6) hours as needed for Wheezing or Shortness of Breath (Generally uses 2-3 times daily). Provider, Historical   calcium-cholecalciferol, D3, (CALTRATE 600+D) tablet Take 1 Tab by mouth daily (with breakfast). Provider, Historical   FLUoxetine (PROzac) 10 mg capsule Take 10 mg by mouth every evening. Provider, Historical   guaiFENesin ER (Mucinex) 600 mg ER tablet Take 600 mg by mouth two (2) times a day. Provider, Historical   urea (URE-NA) 15 gram packet Take 1 Packet by mouth daily. 3/10/21   Saranya Jimenez MD   furosemide (LASIX) 20 mg tablet Take 20 mg by mouth daily as needed (edema). Requires only sparingly, daughter reports only 2x in past 6 months    Provider, Historical   aspirin delayed-release 81 mg tablet Take 1 Tab by mouth daily. 2/27/21   Saran Perkins MD   latanoprost (XALATAN) 0.005 % ophthalmic solution Administer 1 Drop to both eyes nightly. Jann Rubi MD   levothyroxine (Levo-T) 75 mcg tablet Take 75 mcg by mouth Daily (before breakfast). Jann Rubi MD   tolterodine ER (DETROL LA) 4 mg ER capsule Take 4 mg by mouth daily. Jann Rubi MD   atorvastatin (LIPITOR) 80 mg tablet Take 80 mg by mouth nightly. Jann Rubi MD   pantoprazole (PROTONIX) 40 mg tablet Take 40 mg by mouth daily. Jann Rubi MD   fluticasone-umeclidinium-vilanterol (Trelegy Ellipta) 100-62.5-25 mcg inhaler Take 1 Puff by inhalation daily. Provider, Historical   melatonin 5 mg tablet Take 5 mg by mouth nightly.     Provider, Historical   magnesium oxide (MAG-OX) 400 mg tablet Take 400 mg by mouth daily. Provider, Historical   B.infantis-B.ani-B.long-B.bifi (Probiotic 4X) 10-15 mg TbEC Take 2 Caps by mouth two (2) times a day. Provider, Historical       REVIEW OF SYSTEMS:     I am not able to complete the review of systems because: The patient is intubated and sedated    The patient has altered mental status due to his acute medical problems    The patient has baseline aphasia from prior stroke(s)    The patient has baseline dementia and is not reliable historian    The patient is in acute medical distress and unable to provide information         Constitutional: Positive for appetite change and fever. HENT: Positive for congestion. Negative for ear pain and sinus pain. Respiratory: Positive for cough and shortness of breath. Cardiovascular: Negative for chest pain, palpitations and leg swelling. Gastrointestinal: Negative for abdominal pain, constipation, diarrhea, nausea and vomiting. Genitourinary: Negative for dysuria, flank pain and hematuria. Musculoskeletal: Positive for back pain. Negative for myalgias and neck pain. Skin: Negative for rash and wound. Neurological: Negative for dizziness, syncope, light-headedness and headaches. Psychiatric/Behavioral: Negative for confusion. The patient is not nervous/anxious. All other systems reviewed and are negative.       Objective:   VITALS:    Visit Vitals  BP (!) 103/50   Pulse 85   Temp 98 °F (36.7 °C)   Resp 17   Ht 5' 4\" (1.626 m)   Wt 61.9 kg (136 lb 7.4 oz)   SpO2 94%   BMI 23.42 kg/m²       PHYSICAL EXAM:    General appearance -elderly, ill-appearing, and in no distress  Eyes - pupils equal and reactive, extraocular eye movements intact  ENT - mucous membranes moist, pharynx normal without lesions  Neck - supple, no significant adenopathy; non-tender to palpation  Chest -rhonchorous breath sounds bilaterally, with cough present during exam; non-tender to palpation  Heart - normal rate and regular rhythm, S1 and S2 normal, no murmurs noted  Abdomen - soft, nontender, nondistended, no masses or organomegaly  Musculoskeletal - no joint tenderness, deformity or swelling; normal ROM  Extremities - peripheral pulses normal, 1+ bilateral pedal edema  Skin - normal coloration and turgor, no rashes skin tear anterior right lower leg, abrasion left posterior shoulder  Neurological - alert, oriented x3, normal speech, no focal findings or movement disorder noted    _______________________________________________________________________  Care Plan discussed with:    Comments   Patient Y    Family      RN Y    Care Manager                    Consultant:  SCOTT VALDES MD   _______________________________________________________________________  Expected  Disposition:   Home with Family X   HH/PT/OT/RN    SNF/LTC    SIMONE    ________________________________________________________________________  TOTAL TIME: 72   Minutes    Critical Care Provided     Minutes non procedure based      Comments    X Reviewed previous records   >50% of visit spent in counseling and coordination of care X Discussion with patient and/or family and questions answered       Given the patient's current clinical presentation, I have a high level of concern for decompensation if discharged from the ED. Complex decision making was performed which includes reviewing the patient's available past medical records, laboratory results, and Xray films. I have also directly communicated my plan and discussed this case with the involved ED physician.     ____________________________________________________________________  Justin Verde MD    Procedures: see electronic medical records for all procedures/Xrays and details which were not copied into this note but were reviewed prior to creation of Plan.     LAB DATA REVIEWED:    Recent Results (from the past 24 hour(s))   EKG, 12 LEAD, INITIAL    Collection Time: 06/21/21 12:22 AM   Result Value Ref Range    Ventricular Rate 87 BPM    Atrial Rate 87 BPM    P-R Interval 210 ms    QRS Duration 124 ms    Q-T Interval 378 ms    QTC Calculation (Bezet) 454 ms    Calculated P Axis 52 degrees    Calculated R Axis 15 degrees    Calculated T Axis 22 degrees    Diagnosis       Sinus rhythm with 1st degree AV block  Right bundle branch block  When compared with ECG of 19-MAY-2021 16:01,  No significant change was found     URINALYSIS W/ REFLEX CULTURE    Collection Time: 06/21/21 12:32 AM    Specimen: Urine   Result Value Ref Range    Color YELLOW/STRAW      Appearance CLEAR CLEAR      Specific gravity 1.013 1.003 - 1.030      pH (UA) 6.5 5.0 - 8.0      Protein Negative NEG mg/dL    Glucose Negative NEG mg/dL    Ketone Negative NEG mg/dL    Bilirubin Negative NEG      Blood Negative NEG      Urobilinogen 1.0 0.2 - 1.0 EU/dL    Nitrites Negative NEG      Leukocyte Esterase Negative NEG      WBC 0-4 0 - 4 /hpf    RBC 0-5 0 - 5 /hpf    Epithelial cells FEW FEW /lpf    Bacteria Negative NEG /hpf    UA:UC IF INDICATED CULTURE NOT INDICATED BY UA RESULT CNI      Hyaline cast 0-2 0 - 5 /lpf   METABOLIC PANEL, COMPREHENSIVE    Collection Time: 06/21/21 12:32 AM   Result Value Ref Range    Sodium 132 (L) 136 - 145 mmol/L    Potassium 3.1 (L) 3.5 - 5.1 mmol/L    Chloride 93 (L) 97 - 108 mmol/L    CO2 33 (H) 21 - 32 mmol/L    Anion gap 6 5 - 15 mmol/L    Glucose 90 65 - 100 mg/dL    BUN 19 6 - 20 MG/DL    Creatinine 0.74 0.55 - 1.02 MG/DL    BUN/Creatinine ratio 26 (H) 12 - 20      GFR est AA >60 >60 ml/min/1.73m2    GFR est non-AA >60 >60 ml/min/1.73m2    Calcium 7.8 (L) 8.5 - 10.1 MG/DL    Bilirubin, total 0.7 0.2 - 1.0 MG/DL    ALT (SGPT) 30 12 - 78 U/L    AST (SGOT) 42 (H) 15 - 37 U/L    Alk.  phosphatase 78 45 - 117 U/L    Protein, total 6.3 (L) 6.4 - 8.2 g/dL    Albumin 2.3 (L) 3.5 - 5.0 g/dL    Globulin 4.0 2.0 - 4.0 g/dL    A-G Ratio 0.6 (L) 1.1 - 2.2     CBC WITH AUTOMATED DIFF    Collection Time: 06/21/21 12:32 AM Result Value Ref Range    WBC 15.8 (H) 3.6 - 11.0 K/uL    RBC 3.58 (L) 3.80 - 5.20 M/uL    HGB 8.8 (L) 11.5 - 16.0 g/dL    HCT 27.6 (L) 35.0 - 47.0 %    MCV 77.1 (L) 80.0 - 99.0 FL    MCH 24.6 (L) 26.0 - 34.0 PG    MCHC 31.9 30.0 - 36.5 g/dL    RDW 15.0 (H) 11.5 - 14.5 %    PLATELET 525 734 - 103 K/uL    MPV 9.7 8.9 - 12.9 FL    NRBC 0.0 0  WBC    ABSOLUTE NRBC 0.00 0.00 - 0.01 K/uL    NEUTROPHILS 90 (H) 32 - 75 %    LYMPHOCYTES 3 (L) 12 - 49 %    MONOCYTES 6 5 - 13 %    EOSINOPHILS 0 0 - 7 %    BASOPHILS 0 0 - 1 %    IMMATURE GRANULOCYTES 1 (H) 0.0 - 0.5 %    ABS. NEUTROPHILS 14.2 (H) 1.8 - 8.0 K/UL    ABS. LYMPHOCYTES 0.5 (L) 0.8 - 3.5 K/UL    ABS. MONOCYTES 0.9 0.0 - 1.0 K/UL    ABS. EOSINOPHILS 0.0 0.0 - 0.4 K/UL    ABS. BASOPHILS 0.0 0.0 - 0.1 K/UL    ABS. IMM.  GRANS. 0.2 (H) 0.00 - 0.04 K/UL    DF SMEAR SCANNED      RBC COMMENTS MICROCYTOSIS  1+        RBC COMMENTS HYPOCHROMIA  1+       TROPONIN I    Collection Time: 06/21/21 12:32 AM   Result Value Ref Range    Troponin-I, Qt. 0.37 (H) <0.05 ng/mL   NT-PRO BNP    Collection Time: 06/21/21 12:32 AM   Result Value Ref Range    NT pro-BNP 2,696 (H) <450 PG/ML   BLOOD GAS,CHEM8,LACTIC ACID POC    Collection Time: 06/21/21 12:40 AM   Result Value Ref Range    Calcium, ionized (POC) 1.06 (L) 1.12 - 1.32 mmol/L    BICARBONATE 28 mmol/L    Base excess (POC) 3.7 mmol/L    Sample source VENOUS BLOOD      CO2, POC 28 (H) 19 - 24 MMOL/L    Sodium,  (L) 136 - 145 MMOL/L    Potassium, POC 3.1 (L) 3.5 - 5.5 MMOL/L    Chloride, POC 88 (L) 100 - 108 MMOL/L    Glucose,  74 - 106 MG/DL    Creatinine, POC 0.8 0.6 - 1.3 MG/DL    Lactic Acid (POC) 0.90 0.40 - 2.00 mmol/L    pH, venous (POC) 7.46 (H) 7.32 - 7.42      pCO2, venous (POC) 39.8 (L) 41 - 51 MMHG    pO2, venous (POC) 57 (H) 25 - 40 mmHg

## 2021-06-22 PROBLEM — Z71.89 ACP (ADVANCE CARE PLANNING): Status: ACTIVE | Noted: 2021-01-01

## 2021-06-22 NOTE — HOSPICE
[address] Info Visit:      This SW called pt's daughter Lamin Conklin to schedule hospice information session. Lamin Conklin and her  are available to meet tomorrow. Meeting arranged for tomorrow 6/23 at 10:30 am in pt's room.       Demetri Gracia, [facility]    [phone]

## 2021-06-22 NOTE — PROGRESS NOTES
[address]  [phone]      Cardiology Progress Note      6/22/2021 11:11 AM    Admit Date: 6/20/2021    Admit Diagnosis:   HCAP (healthcare-associated pneumonia) [J18.9]  Acute hypoxemic respiratory failure ([facility] [J96.01]  COPD (chronic obstructive pulmonary disease) ([facility] [J44.9]  Elevated brain natriuretic peptide (BNP) level [R79.89]  Hypokalemia [E87.6]  Troponin level elevated [R77.8]  Head trauma [S09.90XA]    Subjective:     Mery Carreon continues to endorse some chest tenderness, overall malaise. States she is breathing better. Low BP this am, norvasc held.    Mild troponin bump, trended down to 0.10 peak 0.37    Visit Vitals  BP (!) 85/45   Pulse 82   Temp 97.6 °F (36.4 °C)   Resp 18   Ht 5' 4\" (1.626 m)   Wt 55.4 kg (122 lb 2.2 oz)   SpO2 94%   BMI 20.96 kg/m²       Current Facility-Administered Medications   Medication Dose Route Frequency    methylPREDNISolone (PF) (SOLU-MEDROL) injection 40 mg  40 mg IntraVENous Q12H    [START ON 6/23/2021] VANCOMYCIN INFORMATION NOTE   Other ONCE    vancomycin (VANCOCIN) 750 mg in 0.9% sodium chloride 250 mL (VIAL-MATE)  750 mg IntraVENous Q12H    [Held by provider] amLODIPine (NORVASC) tablet 10 mg  10 mg Oral DAILY    aspirin delayed-release tablet 81 mg  81 mg Oral DAILY    atorvastatin (LIPITOR) tablet 80 mg  80 mg Oral QHS    L.acidophilus-paracasei-S.thermophil-bifidobacter (RISAQUAD) 8 billion cell capsule  1 Capsule Oral DAILY    FLUoxetine (PROzac) capsule 10 mg  10 mg Oral QPM    furosemide (LASIX) tablet 20 mg  20 mg Oral DAILY PRN    gabapentin (NEURONTIN) capsule 600 mg  600 mg Oral BID    insulin glargine (LANTUS) injection 10 Units  10 Units SubCUTAneous QHS    latanoprost (XALATAN) 0.005 % ophthalmic solution 1 Drop  1 Drop Both Eyes QHS    levothyroxine (SYNTHROID) tablet 75 mcg  75 mcg Oral ACB    pantoprazole (PROTONIX) tablet 40 mg  40 mg Oral DAILY    polyethylene glycol (MIRALAX) packet 17 g  17 g Oral DAILY    oxybutynin chloride XL (DITROPAN XL) tablet 10 mg  10 mg Oral DAILY    albuterol-ipratropium (DUO-NEB) 2.5 MG-0.5 MG/3 ML  3 mL Nebulization QID RT    albuterol (PROVENTIL VENTOLIN) nebulizer solution 10 mg  10 mg Nebulization Q4H PRN    docusate sodium (COLACE) capsule 100 mg  100 mg Oral BID    levoFLOXacin (LEVAQUIN) 750 mg in D5W IVPB  750 mg IntraVENous Q24H    oxyCODONE-acetaminophen (PERCOCET) 5-325 mg per tablet 1 Tablet  1 Tablet Oral Q4H PRN    aztreonam (AZACTAM) 1 g in 0.9% sodium chloride (MBP/ADV) 100 mL MBP  1 g IntraVENous Q8H    sodium chloride (NS) flush 5-40 mL  5-40 mL IntraVENous Q8H    sodium chloride (NS) flush 5-40 mL  5-40 mL IntraVENous PRN    acetaminophen (TYLENOL) tablet 650 mg  650 mg Oral Q6H PRN    Or    acetaminophen (TYLENOL) suppository 650 mg  650 mg Rectal Q6H PRN    polyethylene glycol (MIRALAX) packet 17 g  17 g Oral DAILY PRN    ondansetron (ZOFRAN ODT) tablet 4 mg  4 mg Oral Q8H PRN    Or    ondansetron (ZOFRAN) injection 4 mg  4 mg IntraVENous Q6H PRN    ALPRAZolam (XANAX) tablet 0.25 mg  0.25 mg Oral Q6H PRN    arformoterol 15 mcg/budesonide 0.5 mg neb solution   Nebulization BID RT    clonazePAM (KlonoPIN) tablet 0.25 mg  0.25 mg Oral QPM    sodium chloride (NS) flush 5-10 mL  5-10 mL IntraVENous PRN       Objective:      Physical Exam:  General Appearance: thin, frail, chronically ill appearing  female in NAD   Chest:  coarse, wheezing  Cardiovascular:  Regular rate and rhythm, no murmur. Abdomen:   Soft, non-tender, bowel sounds are active.    Extremities: thin, DP/PT palpable no edema noted   Skin: scattered bruises, bruise to forehead     Data Review:   Recent Labs     06/22/21  0119 06/21/21  0032   WBC 7.6 15.8*   HGB 7.5* 8.8*   HCT 24.2* 27.6*    339     Recent Labs     06/22/21  0119 06/21/21  0032    132*   K 3.7 3.1*    93*   CO2 29 33*   * 90   BUN 17 19   CREA 0.60 0.74   CA 7. 5* 7.8*   MG 1.9  --    ALB  --  2.3*   TBILI  --  0.7   ALT  --  30       Recent Labs     06/22/21  0119 06/21/21  1255 06/21/21  0658 06/21/21  0426 06/21/21  0032   TROIQ 0.10* 0.22* 0.35* 0.37* 0.37*       No intake or output data in the 24 hours ending 06/22/21 1111     Cardiographics:     Telemetry: NSR 90's RBBB  EKG: RBBB, NSR   Cxray: \"No significant change in diffuse bilateral interstitial and alveolar opacities. \"  ECHO: 2/26/2021  · LV: Estimated LVEF is 60 - 65%. Normal cavity size, wall thickness and systolic function (ejection fraction normal). Wall motion: normal. Mild (grade 1) left ventricular diastolic dysfunction. · AV: Aortic valve leaflet calcification present. Aortic valve mean gradient is 21 mmHg. Mild aortic valve stenosis is present. · MV: Severe mitral annular calcification. Moderate mitral valve regurgitation is present. · TV: Mild tricuspid valve regurgitation is present. · Right Atrium: Catheter present and appears normal.    New ECHO pending     Assessment:     Active Problems:    Precordial pain (2/25/2021)      COPD (chronic obstructive pulmonary disease) (HCC) (6/21/2021)      Hypokalemia (6/21/2021)      Head trauma (6/21/2021)      Troponin level elevated (6/21/2021)      Elevated brain natriuretic peptide (BNP) level (6/21/2021)      HCAP (healthcare-associated pneumonia) (6/21/2021)      Acute hypoxemic respiratory failure ([address] (6/21/2021)        Plan:   Elevated troponin, in presence of HCAP vs pulmonary edema:  Chest x-ray increased bilateral interstitial and alveolar opacities may represent pulmonary edema and/or pneumonia.   Prior ECHO reviewed, new pending  Troponin peak 0.37, trended down to 0.10   NT pro-BNP 2,696  Strict I/O, labs,weights   Continue IV abx as per internal medicine  Continue daily diuresis  Monitor tele  Continue 81 mg PO ASA daily   Will not pursue ischemic evaluation at this time, treat sepsis    Chest Pain:  Likely musculoskeletal related to fall x 2 out of bed  Pain medications as per internal medicine      HTN: currently hypotensive  Hold PTA norvasc    DM II:  Manager per internal medicine    Hypothyroidism:  Continue levothyroxine    Palliative consult noted and agree.       Jacklyn Pearson [id number],KR  DNP,APRN,AG-ACNP-BC

## 2021-06-22 NOTE — PROGRESS NOTES
Pt complaining of chronic back pain, and she is short of breath. Giving Percocet, called pharmacy to be sure that it's ok to give being that she is receiving antibiotics and recently received Alprazolam. Pharmacy approved. 0600: went to give percocet, pt sleeping. Returned to bin.

## 2021-06-22 NOTE — PROGRESS NOTES
Palliative Medicine    RN reports pt c/o pain 8/10 1 hour after getting 0.2mg IV dilaudid and oxycontin 20mg    PLAN:  -give another 0.2mg IV Dilaudid now.   -increase future dilaudid doses to 0.5mg IV q. 2 hours prn.  -call me overnight if dose adjustments are needed [phone].

## 2021-06-22 NOTE — PROGRESS NOTES
0235: Pt was complaining that \" I can't breath\", SPO2 in the 80's at 4L NC, Titrated up to 6 L and pt was remained SPO2 in the 80's. Called Rapid Response to intervene. Pt SPO2 came back up to the 90's. Chest xray ordered and completed, Lasix ordered and given due to crackles in the lungs, that can be heard without ausculation. Xanax ordered and given. Pt currently resting comfortably, O2 95% at 4L NC, call bell within reach. Will continue to monitor.

## 2021-06-22 NOTE — PROGRESS NOTES
ADULT PROTOCOL: JET AEROSOL  REASSESSMENT Patient  Riley Garcia     80 y.o.   female     6/22/2021  4:37 AM 
 
Breath Sounds Pre Procedure: Right Breath Sounds: Coarse Left Breath Sounds: Coarse Breath Sounds Post Procedure: Right Breath Sounds: Coarse Left Breath Sounds: Coarse Breathing pattern: Pre procedure Breathing Pattern: Regular Post procedure Breathing Pattern: Regular Heart Rate: Pre procedure Pulse: 69 
         Post procedure Pulse: 76 Resp Rate: Pre procedure Respirations: 20 
         Post procedure Respirations: 20 
 
 
Cough: Pre procedure Cough: Non-productive Post procedure Cough: Non-productive Suctioned: NO Oxygen: O2 Device: Nasal cannula   4L Changed: NO SpO2: Pre procedure SpO2: 98 %   with oxygen Post procedure SpO2: 98 %  with oxygen Nebulizer Therapy: Current medications Aerosolized Medications: DuoNeb Changed: NO Smoking History: former smoker Problem List:  
Patient Active Problem List  
Diagnosis Code  
 HTN (hypertension) I10  Type II diabetes mellitus ([facility] E11.9  Hypothyroid E03.9  Anxiety F41.9  History of CVA (cerebrovascular accident) Z80.78  
 Acute pulmonary embolism (HCC) I26.99  
 Precordial pain R07.2  Dizziness R42  Abdominal pain R10.9  Hyponatremia E87.1  Dizzy R42  
 Hx of completed stroke Z80.78  
 DM (diabetes mellitus), type 2 ([facility] E11.9  Chronic obstructive pulmonary disease ([facility] J44.9  Arthritis M19.90  Chronic pain G89.29  
 Anxiety and depression F41.9, F32.9  Glaucoma H40.9  Neuropathy G62.9  
 Polypharmacy Z79.899  Incontinence R32  GERD (gastroesophageal reflux disease) K21.9  Elbow laceration, left, initial encounter S51.012A  Acute metabolic encephalopathy X51.02  
 Acute respiratory insufficiency R06.89  
 CAP (community acquired pneumonia) J18.9  Pneumonia J18.9  COPD with acute exacerbation ([facility] J44.1  Acute on chronic respiratory failure with hypoxia (HCC) J96.21  
 Anemia of chronic illness D63.8  Chronic pulmonary embolism (HCC) I27.82  
 HAP (hospital-acquired pneumonia) J18.9, Y95  
 Pneumonia of both lungs due to infectious organism J18.9  Multiple fractures T07. Santana Massey  Physical debility R53.81  
 COPD (chronic obstructive pulmonary disease) (HCC) J44.9  Hypokalemia E87.6  Head trauma S09.90XA  Troponin level elevated R77.8  Elevated brain natriuretic peptide (BNP) level R79.89  
 HCAP (healthcare-associated pneumonia) J18.9  Acute hypoxemic respiratory failure (HCC) J96.01 Respiratory Therapist: Sim Gates RT

## 2021-06-22 NOTE — PROGRESS NOTES
End of Shift Note Bedside shift change report given to Saba Baltazar RN (oncoming nurse) by Shalini Escobedo (offgoing nurse). Report included the following information SBAR, Kardex, Intake/Output and MAR Shift worked:  nights Shift summary and any significant changes:  
  Rapid called for Hypoxia. Concerns for physician to address:    
Zone phone for oncoming shift:   [phone] Patient Information Claus Zepeda 80 y.o. 
6/20/2021 10:29 PM by Yolette Manzano MD. Claus Zepeda was admitted from [facility] Problem List 
Patient Active Problem List  
 Diagnosis Date Noted  COPD (chronic obstructive pulmonary disease) ([address] 06/21/2021  Hypokalemia 06/21/2021  
 Head trauma 06/21/2021  Troponin level elevated 06/21/2021  Elevated brain natriuretic peptide (BNP) level 06/21/2021  HCAP (healthcare-associated pneumonia) 06/21/2021  Acute hypoxemic respiratory failure ([address] 06/21/2021  Multiple fractures 06/07/2021  Physical debility 06/07/2021  
 HAP (hospital-acquired pneumonia) 05/19/2021  Pneumonia of both lungs due to infectious organism 05/19/2021  Chronic pulmonary embolism ([address] 05/11/2021  Acute respiratory insufficiency 05/10/2021  CAP (community acquired pneumonia) 05/10/2021  Pneumonia 05/10/2021  COPD with acute exacerbation ([address] 05/10/2021  Acute on chronic respiratory failure with hypoxia ([address] 05/10/2021  Anemia of chronic illness 05/10/2021  Elbow laceration, left, initial encounter 03/08/2021  Acute metabolic encephalopathy 88/61/6715  Dizzy 03/03/2021  DM (diabetes mellitus), type 2 ([address]  Chronic obstructive pulmonary disease ([address]  Arthritis  Chronic pain  Anxiety and depression  Glaucoma  Neuropathy  Polypharmacy  Incontinence  GERD (gastroesophageal reflux disease)  Hyponatremia 03/02/2021  Acute pulmonary embolism ([address] 02/25/2021  Precordial pain 02/25/2021  Dizziness 02/25/2021  Abdominal pain 02/25/2021  
 HTN (hypertension) 01/03/2021  Type II diabetes mellitus ([facility] 01/03/2021  Hypothyroid 01/03/2021  Anxiety 01/03/2021  
 History of CVA (cerebrovascular accident) 01/03/2021  Hx of completed stroke 2021 Past Medical History:  
Diagnosis Date  Anxiety and depression  Arthritis  Chronic obstructive pulmonary disease ([facility]  Chronic pain  DM type 2 causing vascular disease ([facility]  GERD (gastroesophageal reflux disease)  Glaucoma   
 HTN (hypertension)  Hx of completed stroke 2017, 2021  Hypothyroid  Incontinence  Neuropathy  Polypharmacy Core Measures: CVA: Yes No 
CHF:Yes Yes PNA:No No 
 
Activity: 
Activity Level: Bed Rest 
Number times ambulated in hallways past shift: 0 Number of times OOB to chair past shift: 0 Cardiac:  
Cardiac Monitoring: Yes     
Cardiac Rhythm: Sinus Rhythm Access:  
Current line(s): HD access Genitourinary:  
Urinary status: voiding Respiratory:  
O2 Device: Nasal cannula Chronic home O2 use?: YES Incentive spirometer at bedside: NO 
  
 
GI: 
  
Current diet:  ADULT DIET Dysphagia - Pureed; 3 carb choices (45 gm/meal) Passing flatus: YES Tolerating current diet: YES Pain Management:  
Patient states pain is manageable on current regimen: YES Skin: 
Edouard Score: 15 Interventions: PT/OT consult Patient Safety: 
Fall Score: Total Score: 4 Interventions: bed/chair alarm High Fall Risk: Yes 
@Rollbelt 
@dexterity to release roll belt  Yes/No ( must document dexterity  here by stating Yes or No here, otherwise this is a restraint and must follow restraint documentation policy.) DVT prophylaxis: DVT prophylaxis Med- Yes DVT prophylaxis SCD or MIGDALIA- No  
 
Wounds: (If Applicable) Wounds- Yes Location legs and buttocks Active Consults: 
IP CONSULT TO CARDIOLOGY Length of Stay: 
Expected LOS: 5d 9h 
Actual LOS: 1 Discharge Plan:  
 
 
Edith Medrano

## 2021-06-22 NOTE — PROGRESS NOTES
RAPID RESPONSE TEAM - follow up    Rounded on patient due to recent RRT. Discussed with primary RN. No acute concerns, VSS, MEWS 1. Visit Vitals  BP (!) 103/49 (BP 1 Location: Left upper arm, BP Patient Position: At rest)   Pulse 83   Temp 97.3 °F (36.3 °C)   Resp 18   Ht 5' 4\" (1.626 m)   Wt 55.4 kg (122 lb 2.2 oz)   SpO2 92%   BMI 20.96 kg/m²       No RRT interventions indicated at this time. Please call with any questions or concerns.      Hay Franklin

## 2021-06-22 NOTE — PROGRESS NOTES
Rapid Response Team Note    Called by RN at 2:35 AM to see patient for Acute respiratory distress with hypoxia stat. Upon entering the room the patient was complaining of difficulty breathing, sats in the 80's on NC 4 L    Events as described by RN caring for patient noted. Visit Vitals  /67   Pulse 84   Temp 97.3 °F (36.3 °C)   Resp 18   Ht 5' 4\" (1.626 m)   Wt 61.9 kg (136 lb 7.4 oz)   SpO2 94%   BMI 23.42 kg/m²       Gen: awake, alert. In moderate distress  HEENT: wnl  Chest: symmetrical, audible crackles  Abd: soft, non tender  Neuro: AAO  Ext: Jones, left arm contracted (preexistent)    Pertinent Recent Labs and Xrays:  Recent Labs     06/22/21  0119 06/21/21  0032   WBC 7.6 15.8*   HGB 7.5* 8.8*   HCT 24.2* 27.6*    339     Recent Labs     06/22/21  0119 06/21/21  0032    132*   K 3.7 3.1*    93*   CO2 29 33*   BUN 17 19   CREA 0.60 0.74   * 90   CA 7.5* 7.8*   MG 1.9  --      No results for input(s): INR, INREXT in the last 72 hours. No results for input(s): PH, PCO2, PO2 in the last 72 hours. No results for input(s): PHI, PO2I, PCO2I in the last 72 hours.   Recent Labs     06/22/21  0119 06/21/21  1255   TROIQ 0.10* 0.22*     Lab Results   Component Value Date/Time    Glucose (POC) 147 (H) 06/21/2021 11:14 AM    Glucose (POC) 106 06/21/2021 03:04 AM    Glucose (POC) 277 (H) 06/07/2021 09:11 PM    Glucose (POC) 240 (H) 06/07/2021 04:29 PM    Glucose (POC) 284 (H) 06/07/2021 11:12 AM    Glucose,  06/21/2021 12:40 AM           A/P: Acute respiratory distress with hypoxia  Anxiety  - chest X-ray  - Lasix 40 mg IV x 1 dose  - give prn Xanax  - titrate O2 to keep sats > 92%        Suzi Gonzales NP  Critical care time unrelated to prior notes: 20 minutes

## 2021-06-22 NOTE — PROGRESS NOTES
Spiritual Care Assessment/Progress Note  [facility]      NAME: Mery Carreon      MRN: [id number]  AGE: 80 y.o.  SEX: female  Holiness Affiliation: Meera Leal   Language: English     6/22/2021     Total Time (in minutes): 5     Spiritual Assessment begun in [facility] through conversation with:         []Patient        [x] Family    [] Friend(s)        Reason for Consult: Palliative Care, Initial/Spiritual Assessment     Spiritual beliefs: (Please include comment if needed)     [x] Identifies with a jennifer tradition:         [] Supported by a jennifer community:            [] Claims no spiritual orientation:           [] Seeking spiritual identity:                [] Adheres to an individual form of spirituality:           [] Not able to assess:                           Identified resources for coping:      [x] Prayer                               [] Music                  [] Guided Imagery     [x] Family/friends                 [] Pet visits     [x] Devotional reading                         [] Unknown     [] Other:                                             Interventions offered during this visit: (See comments for more details)          Family/Friend(s): Initial Assessment, Affirmation of jennifer, Catharsis/review of pertinent events in supportive environment, Prayer (assurance of)     Plan of Care:     [x] Support spiritual and/or cultural needs    [] Support AMD and/or advance care planning process      [] Support grieving process   [] Coordinate Rites and/or Rituals    [] Coordination with community clergy   [] No spiritual needs identified at this time   [] Detailed Plan of Care below (See Comments)  [] Make referral to Music Therapy  [] Make referral to Pet Therapy     [] Make referral to Addiction services  [] Make referral to [facility]  [] Make referral to Spiritual Care Partner  [] No future visits requested        [x] Follow up upon further referrals     Comments: Provided support for this pt's dtr in [facility]. Pt was resting during this visit. Pt's dtr was bedside. Facilitated life review to assess potential support needs or coping strategies. Pt's showed [address] pt's personal devotional device, which offers reading of scriptures. Pt's dtr shared that pt finds comfort in this device. Pt's dtr asked [address] to lift pt in prayers. Assured pt of prayers and affirmed ongoing availability of support. Erik Sesay MDiv.  Staff   Request  Support[facility]Spirit[facility] Care [facility]

## 2021-06-22 NOTE — CONSULTS
Palliative Medicine Consult  Lisandro: [phone] ([phone])    Patient Name: Deedee Baer  YOB: 1940    Date of Initial Consult: 21  Reason for Consult: Pt overwhelmed with symptoms, states shes \"ready\". Needs pain management. Requesting Provider: López Mckeon MD   Primary Care Physician: Raina Silva MD     SUMMARY:   Deedee Baer is a 80 y.o. 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 Current medical issues leading to Palliative Medicine involvement include: pain and goals of care. Psychosocial: At baseline, pt resides with daughter and son in law in a two level home with 3 steps to enter. Pt stays on first floor of the home. Pt's prior level of function is modified independent with use of a cane. Pt requires min A with bathing and dressing due to hx of CVA. Pt was able to toilet independently. Daughter assists with medication management, finances, meals, and transportation. Patient from [place], she is ,  of 48 years  2020. Had 4 children, 2 daughters . She moved to [place] on 2020 to live with daughter Byron Jackson and her . Patients son, Ryan Mercado lives in [place] with his family, however he has come to [place] to help assist with patients care. Patient has multiple grandchildren. Her jennifer is important to her. PALLIATIVE DIAGNOSES:   1. Back pain  2. Poor appetite  3. Fever   4. Cough   5. SOB: hospital acquired PNA  6. Recently treated for MRSA PNA  7. Mechanical fall from bed x 2  8. Closed head injury  9.  Chest pain: reproducible pain,  Chest CT shows Unchanged old thoracic compression deformities and old bilateral rib fractures. 10. Goals of care  11. DNR discussion     PLAN:   1. Prior to visit, I completed an extensive review of patient's medical records, including medical documentation, vital signs, MARs, and results of various labs and other diagnostics. I also spoke with patient's nurse Yolanda De La Rosa and attending Dr. Rigoberto Abdi. 2. Met with pt and dtr Ashia Hough:    3. PAIN:  Back pain due to vertebral fractures have been an issue for years, has been taking Oxycontin 20mg bid and oxycodone 5mg for breakthrough pain, usually at 1pm and 1am.  She is currently in mild to moderate distress from pain, which she states is burning in her chest as well as sharp pain in her spine. She has been avoiding acetaminophen due to liver issues. Pt has been up to a chair and using bedside commode prior to this admission. 1. Agree with restarting oxycontin 20mg bid. 2. Discontinue percocet. 3. Dilaudid 0.2mg IVP q. 2 hours prn breakthrough pain, hold for sedation. 4. Nursing order: please use bedside commode, not a bedpan. 4. GOALS OF CARE:  Pt states she is tired, lived a good life, is ready to [facility]. \"  I asked her if she would feel that way if I were able to get her pain under better control and she said \"yes\", that her QOL has diminished, she had no improvement of functional status while in rehab, and does not want to go back. \"I just want one day of feeling good then I'm ready to go. \"  Briefly discussed Hospice; pt and dtr would like an info session. Addressed code status; both pt and daughter state DNR is appropriate. 1. Hospice info session ordered. 2. Code status changed to DNR. 5. Initial consult note routed to primary continuity provider and/or primary health care team members  6.  Communicated plan of care with: Palliative Carm[facility] AVENDAN[facility] Team     GOALS OF CARE / TREATMENT PREFERENCES:     GOALS OF CARE:  Patient/Health Care Proxy Stated Goals: Comfort    TREATMENT PREFERENCES:   Code Status: DNR    Advance Care Planning:  [] [facility] Interdisciplinary Team has updated the ACP Navigator with Health Care Decision Maker and Patient Capacity      Primary Decision Maker: Nathalia Negrete - Daughter - [phone]    Secondary Decision Maker: Hiram Alvarado - Son - [phone]  Advance Care Planning 6/22/2021   Patient's Healthcare Decision Maker is: Named in scanned ACP document   Confirm Advance Directive Yes, not on file   Patient Would Like to Complete Advance Directive -       Medical Interventions: Comfort measures     Other Instructions:   Artificially Administered Nutrition: No feeding tube     Other:    As far as possible, the palliative care team has discussed with patient / health care proxy about goals of care / treatment preferences for patient. HISTORY:     History obtained from: chart, patient and [employer]    CHIEF COMPLAINT: chest pain, \"I don't feel well\"    HPI/SUBJECTIVE:    The patient is:   [x] Verbal and participatory  [] Non-participatory due to:     6/21: BIBA with c/o feeling bad, cough, back pain, and 2 falls out of bed in the past 2 hours, she presents with contusion/hematoma on her forehead, fever of 100.7, rhonchorous cough, on 3L NC. She has had both COVID shots. EKG: Sinus rhythm with first-degree AV block, 87 bpm, right bundle branch block, normal QTC intervals, nonspecific ST changes  CXR shows bilateral pulmonary infiltrates. BRAIN MRI: No acute intracranial abnormality. 2. Unchanged chronic infarcts in the right frontoparietal and temporo-occipital lobes. 3. Unchanged generalized parenchymal volume loss and mild to moderate chronic microvascular ischemic disease. Unchanged tiny chronic infarct in the left cerebellum. 6/22: RRT this am for hypoxia and resp distress, pt reports she woke up \"unable to breathe\", sats 84% on 4LNC, sat 98% on 6L NC, lungs with crackles thoughout, lasix given.   CXR shows No significant change in diffuse bilateral interstitial and alveolar opacities. Clinical Pain Assessment (nonverbal scale for severity on nonverbal patients):   Clinical Pain Assessment  Severity: 8  Location: chest and back  Character: burning and sharp  Duration: months and years  Effect: movement limited, hurts to cough and deep breathe  Factors: coughing, movement  Frequency: constant     Activity (Movement): Restless, excessive activity and/or withdrawal reflexes    Duration: for how long has pt been experiencing pain (e.g., 2 days, 1 month, years)  Frequency: how often pain is an issue (e.g., several times per day, once every few days, constant)     FUNCTIONAL ASSESSMENT:     Palliative Performance Scale (PPS):  PPS: 20       PSYCHOSOCIAL/SPIRITUAL SCREENING:     Palliative IDT has assessed this patient for cultural preferences / practices and a referral made as appropriate to needs (Cultural Services, Patient Advocacy, Ethics, etc.)    Any spiritual / Temple concerns:  [] Yes /  [x] No    Caregiver Burnout:  [] Yes /  [x] No /  [] No Caregiver Present      Anticipatory grief assessment:   [x] Normal  / [] Maladaptive       ESAS Anxiety: Anxiety: 0    ESAS Depression: Depression: 3        REVIEW OF SYSTEMS:     Positive and pertinent negative findings in ROS are noted above in HPI. The following systems were [x] reviewed / [] unable to be reviewed as noted in HPI  Other findings are noted below. Systems: constitutional, ears/nose/mouth/throat, respiratory, gastrointestinal, genitourinary, musculoskeletal, integumentary, neurologic, psychiatric, endocrine. Positive findings noted below.   Modified ESAS Completed by: provider   Fatigue: 7 Drowsiness: 0   Depression: 3 Pain: 8   Anxiety: 0 Nausea: 0   Anorexia: 8 Dyspnea: 0     Constipation: Yes              PHYSICAL EXAM:     From RN flowsheet:  Wt Readings from Last 3 Encounters:   06/22/21 122 lb 2.2 oz (55.4 kg)   06/07/21 144 lb 13.5 oz (65.7 kg)   05/09/21 129 lb (58.5 kg)     Blood pressure (!) 84/45, pulse 82, temperature 97.6 °F (36.4 °C), resp. rate 18, height 5' 4\" (1.626 m), weight 122 lb 2.2 oz (55.4 kg), SpO2 95 %.     Pain Scale 1: Numeric (0 - 10)  Pain Intensity 1: 0     Pain Location 1: Leg  Pain Orientation 1: Left, Right  Pain Description 1: Throbbing  Pain Intervention(s) 1: Medication (see MAR)  Last bowel movement, if known:     Constitutional: elderly, frail, mild-mod distress from pain  Eyes: pupils equal, anicteric  ENMT: no nasal discharge, dry mucous membranes  Cardiovascular: regular rhythm, distal pulses intact  Respiratory: breathing not labored, symmetric, rhonchi throughout bilat, productive cough  Gastrointestinal: firm lower quads  Musculoskeletal: no deformity, no tenderness to palpation  Skin: warm, dry, scattered bruises  Neurologic: following commands, moving all extremities  Psychiatric: full affect, no hallucinations         HISTORY:     Active Problems:    Precordial pain (2/25/2021)      COPD (chronic obstructive pulmonary disease) ([facility] [address] (6/21/2021)      Hypokalemia (6/21/2021)      Head trauma (6/21/2021)      Troponin level elevated (6/21/2021)      Elevated brain natriuretic peptide (BNP) level (6/21/2021)      HCAP (healthcare-associated pneumonia) (6/21/2021)      Acute hypoxemic respiratory failure ([facility] [address] (6/21/2021)      Past Medical History:   Diagnosis Date    Anxiety and depression     Arthritis     Chronic obstructive pulmonary disease (HCC)     Chronic pain     DM type 2 causing vascular disease (HCC)     GERD (gastroesophageal reflux disease)     Glaucoma     HTN (hypertension)     Hx of completed stroke 2017, 2021    Hypothyroid     Incontinence     Neuropathy     Polypharmacy       Past Surgical History:   Procedure Laterality Date    HX ORTHOPAEDIC      HX VASCULAR ACCESS        Family History   Problem Relation Age of Onset    No Known Problems Other         reviewed, patient did not know     Diabetes Mother     Heart Attack Mother     Cancer Father History reviewed, no pertinent family history.   Social History     Tobacco Use    Smoking status: Former Smoker     Quit date: 1991     Years since quittin.4    Smokeless tobacco: Never Used   Substance Use Topics    Alcohol use: Never     Allergies   Allergen Reactions    Shellfish Derived Anaphylaxis      Current Facility-Administered Medications   Medication Dose Route Frequency    methylPREDNISolone (PF) (SOLU-MEDROL) injection 40 mg  40 mg IntraVENous Q12H    [START ON 2021] VANCOMYCIN INFORMATION NOTE   Other ONCE    oxyCODONE ER (OxyCONTIN) tablet 20 mg  20 mg Oral Q12H    HYDROmorphone (DILAUDID) injection 0.2 mg  0.2 mg IntraVENous Q2H PRN    vancomycin (VANCOCIN) 750 mg in 0.9% sodium chloride 250 mL (VIAL-MATE)  750 mg IntraVENous Q12H    [Held by provider] amLODIPine (NORVASC) tablet 10 mg  10 mg Oral DAILY    aspirin delayed-release tablet 81 mg  81 mg Oral DAILY    atorvastatin (LIPITOR) tablet 80 mg  80 mg Oral QHS    L.acidophilus-paracasei-S.thermophil-bifidobacter (RISAQUAD) 8 billion cell capsule  1 Capsule Oral DAILY    FLUoxetine (PROzac) capsule 10 mg  10 mg Oral QPM    furosemide (LASIX) tablet 20 mg  20 mg Oral DAILY PRN    gabapentin (NEURONTIN) capsule 600 mg  600 mg Oral BID    insulin glargine (LANTUS) injection 10 Units  10 Units SubCUTAneous QHS    latanoprost (XALATAN) 0.005 % ophthalmic solution 1 Drop  1 Drop Both Eyes QHS    levothyroxine (SYNTHROID) tablet 75 mcg  75 mcg Oral ACB    pantoprazole (PROTONIX) tablet 40 mg  40 mg Oral DAILY    polyethylene glycol (MIRALAX) packet 17 g  17 g Oral DAILY    oxybutynin chloride XL (DITROPAN XL) tablet 10 mg  10 mg Oral DAILY    albuterol-ipratropium (DUO-NEB) 2.5 MG-0.5 MG/3 ML  3 mL Nebulization QID RT    albuterol (PROVENTIL VENTOLIN) nebulizer solution 10 mg  10 mg Nebulization Q4H PRN    docusate sodium (COLACE) capsule 100 mg  100 mg Oral BID    levoFLOXacin (LEVAQUIN) 750 mg in D5W IVPB  750 mg IntraVENous Q24H    aztreonam (AZACTAM) 1 g in 0.9% sodium chloride (MBP/ADV) 100 mL MBP  1 g IntraVENous Q8H    sodium chloride (NS) flush 5-40 mL  5-40 mL IntraVENous Q8H    sodium chloride (NS) flush 5-40 mL  5-40 mL IntraVENous PRN    acetaminophen (TYLENOL) tablet 650 mg  650 mg Oral Q6H PRN    Or    acetaminophen (TYLENOL) suppository 650 mg  650 mg Rectal Q6H PRN    ondansetron (ZOFRAN ODT) tablet 4 mg  4 mg Oral Q8H PRN    Or    ondansetron (ZOFRAN) injection 4 mg  4 mg IntraVENous Q6H PRN    ALPRAZolam (XANAX) tablet 0.25 mg  0.25 mg Oral Q6H PRN    arformoterol 15 mcg/budesonide 0.5 mg neb solution   Nebulization BID RT    clonazePAM (KlonoPIN) tablet 0.25 mg  0.25 mg Oral QPM    sodium chloride (NS) flush 5-10 mL  5-10 mL IntraVENous PRN          LAB AND IMAGING FINDINGS:     Lab Results   Component Value Date/Time    WBC 7.6 06/22/2021 01:19 AM    HGB 7.5 (L) 06/22/2021 01:19 AM    PLATELET 997 45/30/7570 01:19 AM     Lab Results   Component Value Date/Time    Sodium 136 06/22/2021 01:19 AM    Potassium 3.7 06/22/2021 01:19 AM    Chloride 102 06/22/2021 01:19 AM    CO2 29 06/22/2021 01:19 AM    BUN 17 06/22/2021 01:19 AM    Creatinine 0.60 06/22/2021 01:19 AM    Calcium 7.5 (L) 06/22/2021 01:19 AM    Magnesium 1.9 06/22/2021 01:19 AM    Phosphorus 3.7 05/31/2021 04:02 AM      Lab Results   Component Value Date/Time    Alk.  phosphatase 78 06/21/2021 12:32 AM    Protein, total 6.3 (L) 06/21/2021 12:32 AM    Albumin 2.3 (L) 06/21/2021 12:32 AM    Globulin 4.0 06/21/2021 12:32 AM     Lab Results   Component Value Date/Time    INR 1.2 (H) 03/09/2021 03:23 AM    Prothrombin time 12.8 (H) 03/09/2021 03:23 AM    aPTT 28.6 03/09/2021 03:23 AM      Lab Results   Component Value Date/Time    Iron 16 (L) 05/09/2021 08:21 PM    TIBC 278 05/09/2021 08:21 PM    Iron % saturation 6 (L) 05/09/2021 08:21 PM      No results found for: PH, PCO2, PO2  No components found for: GLPOC   No results found for: CPK, CKMB             Total time: 75  Counseling / coordination time, spent as noted above: 65  > 50% counseling / coordination?: y    Prolonged service was provided for  []30 min   []75 min in face to face time in the presence of the patient, spent as noted above. Time Start:   Time End:   Note: this can only be billed with 33214 (initial) or 67509 (follow up). If multiple start / stop times, list each separately.

## 2021-06-22 NOTE — PROGRESS NOTES
Hospitalist Progress Note    NAME: Carisa Gonzalez   :  1940   MRN:  [id number]       Assessment / Plan:  Acute on chronic hypoxic respiratory failure POA  Bilateral PNA/HCAP  COPD exacerbation  Acute pulmonary edema    -Recent admission at [facility] -, for HAP  -Multiple hospital admissions past 6 months  -Presented with sats in 80s, fever 100.7, cough. Mechanical fall    -Continue vancomycin, aztreonam, Levaquin  -Send sputum culture, last sputum culture with MRSA positive in past admission  -Nebs, continue home inhalers    -Episode of pulmonary edema overnight, received Lasix, continue Lasix daily from tomorrow, strict I's and O's  -Cardiology on board, for chest pain, troponin downtrending. Chest pain likely musculoskeletal  -Echo pending    -Solu-Medrol 40 mg IV twice daily  -At baseline on 3 L nasal cannula at home, currently on 4 L, wean down to keep saturation greater than 89%    Hypokalemia/hyponatremia, resolved      History of chronic back pain, continue OxyContin 20 mg twice daily, as needed Percocet    History of bilateral common iliac stenosis, medical management    History of hypothyroidism/hypertension, continue home medication. Hold Norvasc for hypotension    History of right subclavian stenosis, evaluated by vascular last month at OSH. Avoid BP measurement in the right arm      Surrogate decision maker :daughter, son  DVT prophylaxis, subcu heparin  CODE STATUS, DNR     Subjective:     Chief Complaint / Reason for Physician Visit  \" Reports significant back pain, reports left-sided chest pain, which is reproducible on exam.  Overnight required dose of IV Lasix for hypoxia. \". Discussed with RN events overnight.      Review of Systems:  Symptom Y/N Comments  Symptom Y/N Comments   Fever/Chills n   Chest Pain y    Poor Appetite n   Edema n    Cough y   Abdominal Pain n    Sputum y   Joint Pain     SOB/TORO    Pruritis/Rash     Nausea/vomit    Tolerating PT/OT     Diarrhea Tolerating Diet     Constipation    Other       Could NOT obtain due to:      Objective:     VITALS:   Last 24hrs VS reviewed since prior progress note. Most recent are:  Patient Vitals for the past 24 hrs:   Temp Pulse Resp BP SpO2   06/22/21 1237    (!) 84/45    06/22/21 1236    (!) 133/50    06/22/21 1214     95 %   06/22/21 1058 97.6 °F (36.4 °C) 82 18 (!) 85/45 94 %   06/22/21 0847     96 %   06/22/21 0830     95 %   06/22/21 0802 97.9 °F (36.6 °C) 66 18 (!) 123/50 95 %   06/22/21 0400  75      06/22/21 0336 97.5 °F (36.4 °C) 75 22 (!) 100/47 95 %   06/22/21 0259  78  (!) 99/56 96 %   06/22/21 0252  79 26 (!) 110/58 95 %   06/22/21 0240  84  114/67 94 %   06/22/21 0235  88 25  98 %   06/21/21 2351  76      06/21/21 2328 97.3 °F (36.3 °C) 72 18 110/62 97 %   06/21/21 2054     98 %   06/21/21 2007 97.9 °F (36.6 °C) 90 20 (!) 106/59    06/21/21 2000  69      06/21/21 1554 97.6 °F (36.4 °C) 85 20 132/64        Intake/Output Summary (Last 24 hours) at 6/22/2021 1503  Last data filed at 6/22/2021 1249  Gross per 24 hour   Intake    Output 700 ml   Net -700 ml        I had a face to face encounter and independently examined this patient on 6/22/2021, as outlined below:  PHYSICAL EXAM:  General: WD, WN. Alert, cooperative, no acute distress    EENT:  EOMI. Anicteric sclerae. MMM  Resp:  Coarse sounds bilaterally, rales bilaterally, expiratory wheezing  CV:  Regular  rhythm,  No edema  GI:  Soft, Non distended, Non tender. +Bowel sounds  Neurologic:  Alert and oriented X 3, normal speech,   Psych:   Good insight. Not anxious nor agitated  Skin:  No rashes.   No jaundice    Reviewed most current lab test results and cultures  YES  Reviewed most current radiology test results   YES  Review and summation of old records today    NO  Reviewed patient's current orders and MAR    YES  PMH/SH reviewed - no change compared to H&P  ________________________________________________________________________  Care Plan discussed with:    Comments   Patient x    Family      RN x    Care Manager     Consultant                        Multidiciplinary team rounds were held today with , nursing, pharmacist and clinical coordinator. Patient's plan of care was discussed; medications were reviewed and discharge planning was addressed. ________________________________________________________________________  Total NON critical care TIME:  34  Minutes    Total CRITICAL CARE TIME Spent:   Minutes non procedure based      Comments   >50% of visit spent in counseling and coordination of care     ________________________________________________________________________  Destinee Urrutia MD     Procedures: see electronic medical records for all procedures/Xrays and details which were not copied into this note but were reviewed prior to creation of Plan. LABS:  I reviewed today's most current labs and imaging studies.   Pertinent labs include:  Recent Labs     06/22/21 0119 06/21/21 0032   WBC 7.6 15.8*   HGB 7.5* 8.8*   HCT 24.2* 27.6*    339     Recent Labs     06/22/21 0119 06/21/21 0032    132*   K 3.7 3.1*    93*   CO2 29 33*   * 90   BUN 17 19   CREA 0.60 0.74   CA 7.5* 7.8*   MG 1.9  --    ALB  --  2.3*   TBILI  --  0.7   ALT  --  30       Signed: Destinee Urrutia MD

## 2021-06-22 NOTE — PROGRESS NOTES
Problem: Falls - Risk of 
Goal: *Absence of Falls Description: Document Osiris Matthews Fall Risk and appropriate interventions in the flowsheet. Outcome: Progressing Towards Goal 
Note: Fall Risk Interventions: 
Mobility Interventions: Bed/chair exit alarm, Patient to call before getting OOB Medication Interventions: Bed/chair exit alarm, Patient to call before getting OOB Elimination Interventions: Bed/chair exit alarm, Call light in reach History of Falls Interventions: Bed/chair exit alarm, Room close to nurse's station Problem: Risk for Spread of Infection Goal: Prevent transmission of infectious organism to others Description: Prevent the transmission of infectious organisms to other patients, staff members, and visitors. Outcome: Progressing Towards Goal 
  
Problem: Pressure Injury - Risk of 
Goal: *Prevention of pressure injury Description: Document Edouard Scale and appropriate interventions in the flowsheet. Outcome: Progressing Towards Goal 
Note: Pressure Injury Interventions: 
Sensory Interventions: Assess changes in LOC Moisture Interventions: Absorbent underpads, Minimize layers Activity Interventions: PT/OT evaluation Mobility Interventions: PT/OT evaluation Nutrition Interventions: Document food/fluid/supplement intake Friction and Shear Interventions: Minimize layers

## 2021-06-22 NOTE — PROGRESS NOTES
Transition of Care Plan:     RUR: 49% - \"high risk\"  Disposition: TBD - anticipate return to SNF - [address] (pending insurance auth) vs possible hospice/comfort care? * CM requested PT/OT orders in IDR 6/22/21, as pt will require insurance authorization for SNF placement  Follow up appointments: PCP & specialist as indicated   DME needed: Defer to rehab  Transportation at Discharge: CM to secure medical transport for d/c; PCS to be completed  Keys or means to access home: N/A - pt likely transitioning to SNF at d/c  IM Medicare letter: 2nd IM needed prior to d/c  Caregiver Contact: Pt's daughter Marylen Cluster: [phone])   Discharge Caregiver contacted prior to discharge? To be contacted prior to d/c  COVID-19 test: Pt will need rapid COVID-19 test completed if she returns to SNF; information reported out in IDR 6/21/21 & 6/22/21    Update - 4:25 PM: Per [facility]'s note from today, a hospice information session has been scheduled to take place tomorrow (6/23/21) at 10:30 AM.    Initial note: CM reviewed pt's chart prior to moving forward with d/c planning. Previous CM, Asael Vazquez sent a referral via [employer] [facility] to SNF ([address]) 6/21/21; referral accepted by the facility (pending insurance auth). CM requested PT/OT consults in IDR on 6/22/21. CM reviewed palliative care note from 6/22/21 & noted consult for hospice information session. CM sent referral via [facility] to [facility] for hospice information session; referral pending at this time. CM will continue to follow & remain available for d/c planning.     Marcia White, MSW  Care Manager, [address] [facility]

## 2021-06-22 NOTE — ACP (ADVANCE CARE PLANNING)
Advance Care Planning     Advance Care Planning (ACP) Physician/NP/PA Conversation      Date of Conversation: 6/20/2021  Conducted with: Patient with Decision Making Capacity and daughter Jackie Flick:     Primary Decision Maker: Carmelo Veliz - Daughter - [phone]    Secondary Decision Maker: Phil Oliver - Son - [phone]        Care Preferences:    Hospitalization: \"If your health worsens and it becomes clear that your chance of recovery is unlikely, what would be your preference regarding hospitalization? \"  The patient would prefer comfort-focused treatment without hospitalization. Ventilation: \"If you were unable to breathe on your own and your chance of recovery was unlikely, what would be your preference about the use of a ventilator (breathing machine) if it was available to you? \" The patient would NOT desire the use of a ventilator. Resuscitation: \"In the event your heart stopped as a result of an underlying serious health condition, would you want attempts to be made to restart your heart, or would you prefer a natural death? \"   No, do NOT attempt to resuscitate.     Additional topics discussed: treatment goals, resuscitation preferences, end of life care preferences (vegetative state/imminent death) and hospice care    Conversation Outcomes / Follow-Up Plan:     1) DNR  2) pt requesting Hospice, info session ordered  3) pain management    Reviewed DNR/DNI and patient elects DNR    Length of Voluntary ACP Conversation in minutes:  30 minutes    Rayo Lomax NP

## 2021-06-22 NOTE — PROGRESS NOTES
RAPID RESPONSE TEAM    Responded to overhead adult rapid response to room 3109 of hypoxia and respiratory distress. Admitted 6/20/21 for HCAP, was asleep, and states she woke up \"not able to breath. \"  Per primary RN Lottie Alexandra, patient was on 4 lpm nasal canula, SpO2 84%, now on 6 lpm nasal canula and SpO2 98%. Lungs with crackles through out. STAT chest xray ordered. NP Yamilet Steve arrived to bedside, order received for lasix 40 mg IV once. O2 decreased to 4 lpm nasal canula, SpO2>94%. Patient extremely anxious, history of anxiety, discussed with JANNA Bryant, give Xanax 0.25 mg PO. Patient to remain in room at this time. Patient Vitals for the past 12 hrs:   Temp Pulse Resp BP SpO2   06/22/21 0259  78  (!) 99/56 96 %   06/22/21 0252  79 26 (!) 110/58 95 %   06/22/21 0240  84  114/67 94 %   06/22/21 0235  88 25  98 %   06/21/21 2351  76      06/21/21 2328 97.3 °F (36.3 °C) 72 18 110/62 97 %   06/21/21 2054     98 %   06/21/21 2007 97.9 °F (36.6 °C) 90 20 (!) 106/59    06/21/21 2000  69      06/21/21 1554 97.6 °F (36.4 °C) 85 20 132/64      Please call with any needs or concerns.      Monae Aldridge  Rapid Response RN  Delicia Faria

## 2021-06-22 NOTE — HOSPICE
[facility]  [phone]     Patient Name: Marco A Griffith  YOB: 1940  Age: 80 y.o. [facility] RN Note:  Hospice consult received, reviewing chart. Will follow up with Unit Nurse and Care Manager to discuss plan of care, patient status and discharge disposition within the hour. Thank you for the opportunity to be of service to this patient. 15:15: Reviewed patient status with bedside nurse, Shandra Givens. Aneesh Landeros, hospice will arrange time to meet with patient and family today. 16:00: Reviewed patient with Palliative provider, Addie Mcconnell NP. Plan to meet with hospice team on Wednesday at 10:30 when pt daughter and her  bedside.     Oralia Tolentino RN, [place] [address] Nurse Liaison  [phone] Mobile  [phone] Office  Available on [employer]

## 2021-06-22 NOTE — PROGRESS NOTES
Pt received prn dilaudid for pain 8/10 to her back, legs, and chest. PRN dilaudid administered. Pain reassesed and pt states it didn't improve much still a 8-9/10. Spoke with henry martinez, orders received to give an additional 0.2mg dilaudid and then will increase dose to 0.5mg q2h prn. Will continue to monitor pain. 1930: Bedside and Verbal shift change report given to Mary Batista RN (oncoming nurse) by Parish Prince (offgoing nurse). Report given with SBAR, Kardex, Intake/Output, MAR and Recent Results.

## 2021-06-23 NOTE — HOSPICE
[address] Info Visit:      [facility] [place] information to pt, her daughter Jose Rafael Witt, son Anthony Medrano, and other family members. Family deciding on disposition, options include home with private caregivers, placement, or [facility]. Hospice MD plans to visit with pt and family to discuss options.         Sole Ryan, [address] [place] [address]    [phone]

## 2021-06-23 NOTE — PROGRESS NOTES
Hospitalist Progress Note    NAME: Deedee Baer   :  1940   MRN:  [id number]       Assessment / Plan:  Acute on chronic hypoxic respiratory failure POA  Bilateral PNA/HCAP  COPD exacerbation  Acute pulmonary edema, suspect diastolic CHF  Moderate MR    -Recent admission at [address] [place] [address] -, for HAP  -Multiple hospital admissions past 6 months  -Presented with sats in 80s, fever 100.7, cough. Mechanical fall    -Continue vancomycin, aztreonam, Levaquin  -Send sputum culture, last sputum culture with MRSA positive in past admission  -Nebs, continue home inhalers    -Episode of pulmonary edema received Lasix, continue Lasix daily   -Cardiology on board, for chest pain, troponin downtrending. Chest pain likely musculoskeletal  -Echo pending    -Solu-Medrol 40 mg IV twice daily  -At baseline on 3 L nasal cannula at home, currently on 4 L, wean down to keep saturation greater than 89%    Hypokalemia/hyponatremia, resolved      History of chronic back pain, continue OxyContin 20 mg twice daily, as needed Percocet    History of bilateral common iliac stenosis, medical management    History of hypothyroidism/hypertension, continue home medication. Hold Norvasc for hypotension    History of right subclavian stenosis, evaluated by vascular last month at OSH. Avoid BP measurement in the right arm      Surrogate decision maker :daughter, son  DVT prophylaxis, subcu heparin  CODE STATUS, DNR    Discussed with the family at the bedside, family had hospice information session earlier. Patient with multiple comorbidities, poor quality of life, recommends hospice. Dispo-possible hospice house, tomorrow     Subjective:     Chief Complaint / Reason for Physician Visit  Complaints of anxiety, uncontrolled pain despite increased pain medication yesterday.     Review of Systems:  Symptom Y/N Comments  Symptom Y/N Comments   Fever/Chills n   Chest Pain y    Poor Appetite n   Edema n    Cough y   Abdominal Pain n    Sputum y   Joint Pain     SOB/TORO    Pruritis/Rash     Nausea/vomit    Tolerating PT/OT     Diarrhea    Tolerating Diet     Constipation    Other       Could NOT obtain due to:      Objective:     VITALS:   Last 24hrs VS reviewed since prior progress note. Most recent are:  Patient Vitals for the past 24 hrs:   Temp Pulse Resp BP SpO2   06/23/21 1523     97 %   06/23/21 1423    (!) 142/64    06/23/21 1206 98 °F (36.7 °C) 87 20 (!) 142/64 98 %   06/23/21 1136     95 %   06/23/21 0750 98.1 °F (36.7 °C) 86 20 (!) 155/69 90 %   06/22/21 2342 97.9 °F (36.6 °C) 75 18 (!) 131/58 95 %   06/22/21 1954 98 °F (36.7 °C) 93 18 (!) 134/51 94 %   06/22/21 1948     93 %   06/22/21 1635     92 %       Intake/Output Summary (Last 24 hours) at 6/23/2021 1602  Last data filed at 6/23/2021 2887  Gross per 24 hour   Intake    Output 401 ml   Net -401 ml        I had a face to face encounter and independently examined this patient on 6/23/2021, as outlined below:  PHYSICAL EXAM:  General: WD, WN. Alert, cooperative, no acute distress    EENT:  EOMI. Anicteric sclerae. MMM  Resp:  Coarse sounds bilaterally, rales bilaterally, expiratory wheezing  CV:  Regular  rhythm,  No edema  GI:  Soft, Non distended, Non tender. +Bowel sounds  Neurologic:  Alert and oriented X 3, normal speech,   Psych:   Good insight. Not anxious nor agitated  Skin:  No rashes. No jaundice    Reviewed most current lab test results and cultures  YES  Reviewed most current radiology test results   YES  Review and summation of old records today    NO  Reviewed patient's current orders and MAR    YES  PMH/SH reviewed - no change compared to H&P  ________________________________________________________________________  Care Plan discussed with:    Comments   Patient x    Family      RN x    Care Manager     Consultant                        Multidiciplinary team rounds were held today with , nursing, pharmacist and clinical coordinator. Patient's plan of care was discussed; medications were reviewed and discharge planning was addressed. ________________________________________________________________________  Total NON critical care TIME:  34  Minutes    Total CRITICAL CARE TIME Spent:   Minutes non procedure based      Comments   >50% of visit spent in counseling and coordination of care     ________________________________________________________________________  Argenis Gray MD     Procedures: see electronic medical records for all procedures/Xrays and details which were not copied into this note but were reviewed prior to creation of Plan. LABS:  I reviewed today's most current labs and imaging studies.   Pertinent labs include:  Recent Labs     06/23/21  0231 06/22/21  0119 06/21/21  0032   WBC 13.5* 7.6 15.8*   HGB 8.8* 7.5* 8.8*   HCT 28.9* 24.2* 27.6*    253 339     Recent Labs     06/23/21  0231 06/22/21  0119 06/21/21  0032   * 136 132*   K 4.1 3.7 3.1*   CL 97 102 93*   CO2 28 29 33*   * 250* 90   BUN 16 17 19   CREA 0.79 0.60 0.74   CA 7.9* 7.5* 7.8*   MG  --  1.9  --    ALB  --   --  2.3*   TBILI  --   --  0.7   ALT  --   --  30       Signed: Argenis Gray MD

## 2021-06-23 NOTE — PROGRESS NOTES
End of Shift Note    Bedside shift change report given to Alex Dugan RN (oncoming nurse) by Carolyn Lozano (offgoing nurse). Report included the following information SBAR, Kardex, Intake/Output and MAR    Shift worked:  nights   Shift summary and any significant changes:    High level of anxiety. Continued with pain management.        Concerns for physician to address:     Zone phone for oncoming shift:   [phone]     Patient Information  Cheryl Beaver  80 y.o.  6/20/2021 10:29 PM by Isabel Brunson MD. Cheryl Beaver was admitted from [facility]    Problem List  Patient Active Problem List    Diagnosis Date Noted    ACP (advance care planning) 06/22/2021    COPD (chronic obstructive pulmonary disease) ([address] 06/21/2021    Hypokalemia 06/21/2021    Head trauma 06/21/2021    Troponin level elevated 06/21/2021    Elevated brain natriuretic peptide (BNP) level 06/21/2021    HCAP (healthcare-associated pneumonia) 06/21/2021    Acute hypoxemic respiratory failure ([address] 06/21/2021    Multiple fractures 06/07/2021    Physical debility 06/07/2021    HAP (hospital-acquired pneumonia) 05/19/2021    Pneumonia of both lungs due to infectious organism 05/19/2021    Chronic pulmonary embolism ([address] 05/11/2021    Acute respiratory insufficiency 05/10/2021    CAP (community acquired pneumonia) 05/10/2021    Pneumonia 05/10/2021    COPD with acute exacerbation ([address] 05/10/2021    Acute on chronic respiratory failure with hypoxia ([address] 05/10/2021    Anemia of chronic illness 05/10/2021    Elbow laceration, left, initial encounter 03/08/2021    Acute metabolic encephalopathy 57/60/2148    Dizzy 03/03/2021    DM (diabetes mellitus), type 2 ([address]     Chronic obstructive pulmonary disease (HCC)     Arthritis     Chronic pain     Anxiety and depression     Glaucoma     Neuropathy     Polypharmacy     Incontinence     GERD (gastroesophageal reflux disease)     Hyponatremia 03/02/2021    Acute pulmonary embolism ([address] 02/25/2021    Precordial pain 02/25/2021    Dizziness 02/25/2021    Abdominal pain 02/25/2021    HTN (hypertension) 01/03/2021    Type II diabetes mellitus ([facility] 01/03/2021    Hypothyroid 01/03/2021    Anxiety 01/03/2021    History of CVA (cerebrovascular accident) 01/03/2021    Hx of completed stroke 2021     Past Medical History:   Diagnosis Date    Anxiety and depression     Arthritis     Chronic obstructive pulmonary disease (HCC)     Chronic pain     DM type 2 causing vascular disease ([facility]     GERD (gastroesophageal reflux disease)     Glaucoma     HTN (hypertension)     Hx of completed stroke 2017, 2021    Hypothyroid     Incontinence     Neuropathy     Polypharmacy        Core Measures:  CVA: Yes No  CHF:Yes Yes  PNA:No No    Activity:  Activity Level: Bed Rest  Number times ambulated in hallways past shift: 0  Number of times OOB to chair past shift: 0    Cardiac:   Cardiac Monitoring: Yes      Cardiac Rhythm: Sinus Rhythm    Access:   Current line(s): HD access       Genitourinary:   Urinary status: voiding      Respiratory:   O2 Device: Nasal cannula  Chronic home O2 use?: YES  Incentive spirometer at bedside: NO       GI:     Current diet:  ADULT DIET Dysphagia - Pureed; 3 carb choices (45 gm/meal)  DIET ONE TIME MESSAGE  Passing flatus: YES  Tolerating current diet: YES       Pain Management:   Patient states pain is manageable on current regimen: YES    Skin:  Edouard Score: 15  Interventions: PT/OT consult    Patient Safety:  Fall Score:  Total Score: 4  Interventions: bed/chair alarm  High Fall Risk: Yes  @Rollbelt  @dexterity to release roll belt  Yes/No ( must document dexterity  here by stating Yes or No here, otherwise this is a restraint and must follow restraint documentation policy.)    DVT prophylaxis:  DVT prophylaxis Med- Yes  DVT prophylaxis SCD or MIGDALIA- No     Wounds: (If Applicable)  Wounds- Yes  Location legs and buttocks    Active Consults:  IP CONSULT TO CARDIOLOGY  IP CONSULT TO PALLIATIVE CARE - PROVIDER    Length of Stay:  Expected LOS: 5d 9h  Actual LOS: 2  Discharge Plan:       Jackelin Welsh

## 2021-06-23 NOTE — PROGRESS NOTES
Problem: Falls - Risk of  Goal: *Absence of Falls  Description: Document Cain Werner Fall Risk and appropriate interventions in the flowsheet. Outcome: Progressing Towards Goal  Note: Fall Risk Interventions:  Mobility Interventions: Bed/chair exit alarm         Medication Interventions: Bed/chair exit alarm    Elimination Interventions: Call light in reach, Bed/chair exit alarm    History of Falls Interventions: Bed/chair exit alarm         Problem: Pressure Injury - Risk of  Goal: *Prevention of pressure injury  Description: Document Edouard Scale and appropriate interventions in the flowsheet.   Outcome: Progressing Towards Goal  Note: Pressure Injury Interventions:  Sensory Interventions: Assess changes in LOC    Moisture Interventions: Absorbent underpads, Minimize layers    Activity Interventions: Increase time out of bed, PT/OT evaluation    Mobility Interventions: HOB 30 degrees or less, Pressure redistribution bed/mattress (bed type)    Nutrition Interventions: Document food/fluid/supplement intake    Friction and Shear Interventions: HOB 30 degrees or less

## 2021-06-23 NOTE — PROGRESS NOTES
Bedside and Verbal shift change report given to WALLY Sylvester (oncoming nurse) [facility] Report given with SBAR, Kardex, Intake/Output, MAR and Recent Results.

## 2021-06-23 NOTE — PROGRESS NOTES
Pharmacy Automatic Renal Dosing Protocol - Antimicrobials    Indication for Antimicrobials: Pneumonia (HAP)    Current Regimen of Each Antimicrobial:  Vancomycin 750 mg q12h (start ; day 3)  Levofloxacin 750mg IV q24h (start ; day 3/7)  Aztreonam 1g IV q8h (start ; day 3/7)    Previous Antimicrobial Therapy:  Zosyn 3.375g IV x 1   Recently completed course of vancomycin + cefepime and zosyn over the past 6 weeks    Goal Level: VANCOMYCIN TROUGH GOAL RANGE    Vancomycin Trough: 15 - 20 mcg/mL  (AUC: 400 - 600 mg/hr/Liter/day)     Date Dose & Interval Measured (mcg/mL) Extrapolated (mcg/mL)    750mg q12h 16.6 13.1                 Date & time of next level:      Significant Cultures:   21 sputum - MRSA (final)   bcx - ngsf (pending)    Radiology / Imaging results: (X-ray, CT scan or MRI):     Paralysis, amputations, malnutrition: none noted    Labs:  Recent Labs     21  0231 21  0119 21  0032   CREA 0.79 0.60 0.74   BUN 16 17 19   WBC 13.5* 7.6 15.8*     Temp (24hrs), Av.8 °F (36.6 °C), Min:97.3 °F (36.3 °C), Max:98.1 °F (36.7 °C)      Is the Patient on Dialysis? Creatinine Clearance (mL/min):   CrCl (Actual Body Weight): 51.9  CrCl (Adjusted Body Weight): 49.7  CrCl (Ideal Body Weight): 48.2    Impression/Plan:   Continue levofloxacin as ordered, crcl is borderline  Continue aztreonam as ordered. (Patient completed courses of cefepime and zosyn recently)   Vancomycin level near goal, continue current dosing. Based on SCr tomorrow, could consider reducing to 500 mg q12h. Hospice consulted, but still pending  BMP in AM  Antimicrobial stop date  7 days for levofloxacin, aztreonam; TBD for vancomycin     Pharmacy will follow daily and adjust medications as appropriate for renal function and/or serum levels.     Thank you,  Jose Shannon, PHARMD    Recommended duration of therapy  [email or web address]. docx    Renal Dosing  [email or web address]/Pharmacy/[email or web address]/Renal%[email or web address]. pdf

## 2021-06-23 NOTE — PROGRESS NOTES
[address]  [phone]      Cardiology Progress Note      6/23/2021 1000 AM    Admit Date: 6/20/2021    Admit Diagnosis:   HCAP (healthcare-associated pneumonia) [J18.9]  Acute hypoxemic respiratory failure ([facility] [address] [J96.01]  COPD (chronic obstructive pulmonary disease) ([facility] [address] [J44.9]  Elevated brain natriuretic peptide (BNP) level [R79.89]  Hypokalemia [E87.6]  Troponin level elevated [R77.8]  Head trauma [S09.90XA]    Subjective:     Kassie Martinez continues to endorse some chest tenderness, overall malaise. States she is breathing better. States \"I want to talk to the people who decide that this all needs to end\". Ana Huntley in room states they have a fammily meeting scheduled for 10:30am today.      BP normalized, resume norvasc   Mild troponin bump, trended down to 0.10 peak 0.37  Repeat ECHO still pending     Visit Vitals  BP (!) 155/69   Pulse 86   Temp 98.1 °F (36.7 °C)   Resp 20   Ht 5' 4\" (1.626 m)   Wt 58.9 kg (129 lb 13.6 oz)   SpO2 90%   BMI 22.29 kg/m²       Current Facility-Administered Medications   Medication Dose Route Frequency    methylPREDNISolone (PF) (SOLU-MEDROL) injection 40 mg  40 mg IntraVENous Q12H    oxyCODONE ER (OxyCONTIN) tablet 20 mg  20 mg Oral Q12H    furosemide (LASIX) tablet 40 mg  40 mg Oral DAILY    HYDROmorphone (DILAUDID) injection 0.5 mg  0.5 mg IntraVENous Q2H PRN    vancomycin (VANCOCIN) 750 mg in 0.9% sodium chloride 250 mL (VIAL-MATE)  750 mg IntraVENous Q12H    [Held by provider] amLODIPine (NORVASC) tablet 10 mg  10 mg Oral DAILY    aspirin delayed-release tablet 81 mg  81 mg Oral DAILY    atorvastatin (LIPITOR) tablet 80 mg  80 mg Oral QHS    L.acidophilus-paracasei-S.thermophil-bifidobacter (RISAQUAD) 8 billion cell capsule  1 Capsule Oral DAILY    FLUoxetine (PROzac) capsule 10 mg  10 mg Oral QPM    furosemide (LASIX) tablet 20 mg  20 mg Oral DAILY PRN    gabapentin (NEURONTIN) capsule 600 mg  600 mg Oral BID    insulin glargine (LANTUS) injection 10 Units  10 Units SubCUTAneous QHS    latanoprost (XALATAN) 0.005 % ophthalmic solution 1 Drop  1 Drop Both Eyes QHS    levothyroxine (SYNTHROID) tablet 75 mcg  75 mcg Oral ACB    pantoprazole (PROTONIX) tablet 40 mg  40 mg Oral DAILY    polyethylene glycol (MIRALAX) packet 17 g  17 g Oral DAILY    oxybutynin chloride XL (DITROPAN XL) tablet 10 mg  10 mg Oral DAILY    albuterol-ipratropium (DUO-NEB) 2.5 MG-0.5 MG/3 ML  3 mL Nebulization QID RT    albuterol (PROVENTIL VENTOLIN) nebulizer solution 10 mg  10 mg Nebulization Q4H PRN    docusate sodium (COLACE) capsule 100 mg  100 mg Oral BID    levoFLOXacin (LEVAQUIN) 750 mg in D5W IVPB  750 mg IntraVENous Q24H    aztreonam (AZACTAM) 1 g in 0.9% sodium chloride (MBP/ADV) 100 mL MBP  1 g IntraVENous Q8H    sodium chloride (NS) flush 5-40 mL  5-40 mL IntraVENous Q8H    sodium chloride (NS) flush 5-40 mL  5-40 mL IntraVENous PRN    acetaminophen (TYLENOL) tablet 650 mg  650 mg Oral Q6H PRN    Or    acetaminophen (TYLENOL) suppository 650 mg  650 mg Rectal Q6H PRN    ondansetron (ZOFRAN ODT) tablet 4 mg  4 mg Oral Q8H PRN    Or    ondansetron (ZOFRAN) injection 4 mg  4 mg IntraVENous Q6H PRN    ALPRAZolam (XANAX) tablet 0.25 mg  0.25 mg Oral Q6H PRN    arformoterol 15 mcg/budesonide 0.5 mg neb solution   Nebulization BID RT    clonazePAM (KlonoPIN) tablet 0.25 mg  0.25 mg Oral QPM    sodium chloride (NS) flush 5-10 mL  5-10 mL IntraVENous PRN       Objective:      Physical Exam:  General Appearance: thin, frail, chronically ill appearing  female in NAD   Chest:  coarse, wheezing  Cardiovascular:  Regular rate and rhythm, no murmur. Abdomen:   Soft, non-tender, bowel sounds are active.    Extremities: thin, DP/PT palpable no edema noted   Skin: scattered bruises, bruise to forehead     Data Review:   Recent Labs     06/23/21  0231 06/22/21  0119 06/21/21  0032   WBC 13.5* 7.6 15.8*   HGB 8.8* 7.5* 8.8*   HCT 28.9* 24.2* 27.6*    253 339     Recent Labs     06/23/21  0231 06/22/21  0119 06/21/21  0032   * 136 132*   K 4.1 3.7 3.1*   CL 97 102 93*   CO2 28 29 33*   * 250* 90   BUN 16 17 19   CREA 0.79 0.60 0.74   CA 7.9* 7.5* 7.8*   MG  --  1.9  --    ALB  --   --  2.3*   TBILI  --   --  0.7   ALT  --   --  30       Recent Labs     06/22/21  0119 06/21/21  1255 06/21/21  0658 06/21/21  0426 06/21/21  0032   TROIQ 0.10* 0.22* 0.35* 0.37* 0.37*         Intake/Output Summary (Last 24 hours) at 6/23/2021 1028  Last data filed at 6/23/2021 0738  Gross per 24 hour   Intake    Output 1101 ml   Net -1101 ml        Cardiographics:     Telemetry: NSR 90's RBBB  EKG: RBBB, NSR   Cxray: \"No significant change in diffuse bilateral interstitial and alveolar opacities. \"  ECHO: 2/26/2021  · LV: Estimated LVEF is 60 - 65%. Normal cavity size, wall thickness and systolic function (ejection fraction normal). Wall motion: normal. Mild (grade 1) left ventricular diastolic dysfunction. · AV: Aortic valve leaflet calcification present. Aortic valve mean gradient is 21 mmHg. Mild aortic valve stenosis is present. · MV: Severe mitral annular calcification. Moderate mitral valve regurgitation is present. · TV: Mild tricuspid valve regurgitation is present.   · Right Atrium: Catheter present and appears normal.    New ECHO pending     Assessment:     Active Problems:    Precordial pain (2/25/2021)      COPD (chronic obstructive pulmonary disease) ([facility] [address] (6/21/2021)      Hypokalemia (6/21/2021)      Head trauma (6/21/2021)      Troponin level elevated (6/21/2021)      Elevated brain natriuretic peptide (BNP) level (6/21/2021)      HCAP (healthcare-associated pneumonia) (6/21/2021)      Acute hypoxemic respiratory failure ([address] (6/21/2021)      ACP (advance care planning) (6/22/2021)        Plan:   Elevated troponin, in presence of HCAP vs pulmonary edema:  Chest x-ray increased bilateral interstitial and alveolar opacities may represent pulmonary edema and/or pneumonia. Prior ECHO reviewed, new pending  Troponin peak 0.37, trended down to 0.10   NT pro-BNP 2,696  Strict I/O, labs,weights   Continue IV abx as per internal medicine  Continue daily diuresis  Monitor tele  Continue 81 mg PO ASA daily   Will not pursue ischemic evaluation at this time, treat sepsis    Chest Pain:  Likely musculoskeletal related to fall x 2 out of bed  Pain medications as per internal medicine      HTN: has normalized  Resume norvasc     DM II:  Manager per internal medicine    Hypothyroidism:  Continue levothyroxine    Palliative consult noted and agree. Family discussion 10:30 am, will follow results of this conversation to determine course of care.       Phyllis MELARA,CI  DNP,APRN,AG-ACNP-BC

## 2021-06-23 NOTE — PROGRESS NOTES
Palliative Medicine Consult  Lisandro: [phone] ([phone]    Patient Name: Rakesh Masterson  YOB: 1940    Date of Initial Consult: 21  Reason for Consult: Pt overwhelmed with symptoms, states shes \"ready\". Needs pain management. Requesting Provider: Agustin Tovar MD   Primary Care Physician: Rosalia Rollins MD     SUMMARY:   Rakesh Masterson is a 80 y.o. 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 Current medical issues leading to Palliative Medicine involvement include: pain and goals of care. Psychosocial: At baseline, pt resides with daughter and son in law in a two level home with 3 steps to enter. Pt stays on first floor of the home. Pt's prior level of function is modified independent with use of a cane. Pt requires min A with bathing and dressing due to hx of CVA. Pt was able to toilet independently. Daughter assists with medication management, finances, meals, and transportation. Patient from [place], she is ,  of 48 years  2020. Had 4 children, 2 daughters . She moved to [facility] on 2020 to live with daughter Jacquelyn Ramirez and her . Patients son, Marisela Medina lives in [place] with his family, however he has come to [facility] to help assist with patients care. Patient has multiple grandchildren. Her jennifer is important to her. PALLIATIVE DIAGNOSES:   1. Back pain  2. Poor appetite  3. Fever   4. Cough   5. SOB: hospital acquired PNA  6. Recently treated for MRSA PNA  7. Mechanical fall from bed x 2  8. Closed head injury  9.  Chest pain: reproducible pain,  Chest CT shows Unchanged old thoracic compression deformities and old bilateral rib fractures. 10. Goals of care  11. DNR discussion     PLAN:   1. Prior to visit, I completed a review of patient's medical records, including medical documentation, vital signs, MARs, and results of various labs and other diagnostics. I also spoke with patient's nurse Chapin Morales and attending Dr. Laura Veilz. 2. PAIN:  Back pain due to vertebral fractures have been an issue for years, has been taking Oxycontin 20mg bid and oxycodone 5mg for breakthrough pain, usually at 1pm and 1am.  Oxycontin 20mg bid and IVP Dilaudid 0.5mg for break though pain started yesterday: pt reports pain is still not controlled and would like a higher dose. 1. Continue oxycontin 20mg bid. 2. Increase Dilaudid to 0.75mg IVP q. 2 hours prn breakthrough pain, hold for sedation. 3. Nursing order: please use bedside commode, not a bedpan. 3. ANXIETY: RN, family and pt report high anxiety, pt requesting increase in frequency of xanax dose. 1. Change xanax 0.25mg PO q. 6 hours prn to q. 4 hours prn anxiety. 4. GOALS OF CARE:  Met with Hospice and attending today, plan is to move to [facility]. Will continue to be available until admitted to Hospice. 5. Initial consult note routed to primary continuity provider and/or primary health care team members  6.  Communicated plan of care with: Palliative [facility] Team     GOALS OF CARE / TREATMENT PREFERENCES:     GOALS OF CARE:  Patient/Health Care Proxy Stated Goals: Comfort    TREATMENT PREFERENCES:   Code Status: DNR    Advance Care Planning:  [] [facility] Interdisciplinary Team has updated the ACP Navigator with Health Care Decision Maker and Patient Capacity      Primary Decision Maker: Ned Markdasha - Daughter - [phone]    Secondary Decision Maker: Anette Olsen - [phone]  Advance Care Planning 6/22/2021   Patient's [address] is: Named in scanned ACP document   Confirm Advance Directive Yes, not on file   Patient Would Like to Complete Advance Directive -       Medical Interventions: Comfort measures     Other Instructions:   Artificially Administered Nutrition: No feeding tube     Other:    As far as possible, the palliative care team has discussed with patient / health care proxy about goals of care / treatment preferences for patient. HISTORY:     History obtained from: chart, patient and dtr [facility]    CHIEF COMPLAINT: chest pain, \"I don't feel well\"    HPI/SUBJECTIVE:    The patient is:   [x] Verbal and participatory  [] Non-participatory due to:     6/21: BIBA with c/o feeling bad, cough, back pain, and 2 falls out of bed in the past 2 hours, she presents with contusion/hematoma on her forehead, fever of 100.7, rhonchorous cough, on 3L NC. She has had both COVID shots. EKG: Sinus rhythm with first-degree AV block, 87 bpm, right bundle branch block, normal QTC intervals, nonspecific ST changes  CXR shows bilateral pulmonary infiltrates. BRAIN MRI: No acute intracranial abnormality. 2. Unchanged chronic infarcts in the right frontoparietal and temporo-occipital lobes. 3. Unchanged generalized parenchymal volume loss and mild to moderate chronic microvascular ischemic disease. Unchanged tiny chronic infarct in the left cerebellum. 6/22: RRT this am for hypoxia and resp distress, pt reports she woke up \"unable to breathe\", sats 84% on 4LNC, sat 98% on 6L NC, lungs with crackles thoughout, lasix given. CXR shows No significant change in diffuse bilateral interstitial and alveolar opacities.     Clinical Pain Assessment (nonverbal scale for severity on nonverbal patients):   Clinical Pain Assessment  Severity: 8  Location: chest and back  Character: burning and sharp  Duration: months and years  Effect: movement limited, hurts to cough and deep breathe  Factors: coughing, movement  Frequency: constant     Activity (Movement): Restless, excessive activity and/or withdrawal reflexes    Duration: for how long has pt been experiencing pain (e.g., 2 days, 1 month, years)  Frequency: how often pain is an issue (e.g., several times per day, once every few days, constant)     FUNCTIONAL ASSESSMENT:     Palliative Performance Scale (PPS):  PPS: 20       PSYCHOSOCIAL/SPIRITUAL SCREENING:     Palliative IDT has assessed this patient for cultural preferences / practices and a referral made as appropriate to needs (Cultural Services, Patient Advocacy, Ethics, etc.)    Any spiritual / Latter day concerns:  [] Yes /  [x] No    Caregiver Burnout:  [] Yes /  [x] No /  [] No Caregiver Present      Anticipatory grief assessment:   [x] Normal  / [] Maladaptive       ESAS Anxiety: Anxiety: 0    ESAS Depression: Depression: 3        REVIEW OF SYSTEMS:     Positive and pertinent negative findings in ROS are noted above in HPI. The following systems were [x] reviewed / [] unable to be reviewed as noted in HPI  Other findings are noted below. Systems: constitutional, ears/nose/mouth/throat, respiratory, gastrointestinal, genitourinary, musculoskeletal, integumentary, neurologic, psychiatric, endocrine. Positive findings noted below. Modified ESAS Completed by: provider   Fatigue: 7 Drowsiness: 0   Depression: 3 Pain: 8   Anxiety: 0 Nausea: 0   Anorexia: 8 Dyspnea: 0     Constipation: Yes     Stool Occurrence(s): 1        PHYSICAL EXAM:     From RN flowsheet:  Wt Readings from Last 3 Encounters:   06/23/21 129 lb (58.5 kg)   06/07/21 144 lb 13.5 oz (65.7 kg)   05/09/21 129 lb (58.5 kg)     Blood pressure (!) 142/64, pulse 87, temperature 98 °F (36.7 °C), resp. rate 20, height 5' 4\" (1.626 m), weight 129 lb (58.5 kg), SpO2 97 %.     Pain Scale 1: Numeric (0 - 10)  Pain Intensity 1: 5  Pain Onset 1: Chronic  Pain Location 1: Back  Pain Orientation 1: Lower, Right  Pain Description 1: Throbbing  Pain Intervention(s) 1: Medication (see MAR)  Last bowel movement, if known:     Constitutional: elderly, frail, mildly anxious  Eyes: pupils equal, anicteric  ENMT: no nasal discharge, dry mucous membranes  Cardiovascular: regular rhythm, distal pulses intact  Respiratory: breathing not labored, symmetric, rhonchi throughout bilat, productive cough  Gastrointestinal: firm lower quads  Musculoskeletal: no deformity, no tenderness to palpation  Skin: warm, dry, scattered bruises  Neurologic: following commands, moving all extremities  Psychiatric: full affect, no hallucinations         HISTORY:     Active Problems:    Precordial pain (2021)      COPD (chronic obstructive pulmonary disease) ([address] (2021)      Hypokalemia (2021)      Head trauma (2021)      Troponin level elevated (2021)      Elevated brain natriuretic peptide (BNP) level (2021)      HCAP (healthcare-associated pneumonia) (2021)      Acute hypoxemic respiratory failure ([address] (2021)      ACP (advance care planning) (2021)      Past Medical History:   Diagnosis Date    Anxiety and depression     Arthritis     Chronic obstructive pulmonary disease ([address]     Chronic pain     DM type 2 causing vascular disease ([address]     GERD (gastroesophageal reflux disease)     Glaucoma     HTN (hypertension)     Hx of completed stroke 2021    Hypothyroid     Incontinence     Neuropathy     Polypharmacy       Past Surgical History:   Procedure Laterality Date    HX ORTHOPAEDIC      HX VASCULAR ACCESS        Family History   Problem Relation Age of Onset    No Known Problems Other         reviewed, patient did not know     Diabetes Mother     Heart Attack Mother     Cancer Father       History reviewed, no pertinent family history.   Social History     Tobacco Use    Smoking status: Former Smoker     Quit date: 1991     Years since quittin.4    Smokeless tobacco: Never Used   Substance Use Topics    Alcohol use: Never     Allergies   Allergen Reactions    Shellfish Derived Anaphylaxis      Current Facility-Administered Medications   Medication Dose Route Frequency  ALPRAZolam (XANAX) tablet 0.25 mg  0.25 mg Oral Q4H PRN    HYDROmorphone (DILAUDID) injection 0.75 mg  0.75 mg IntraVENous Q2H PRN    methylPREDNISolone (PF) (SOLU-MEDROL) injection 40 mg  40 mg IntraVENous Q12H    oxyCODONE ER (OxyCONTIN) tablet 20 mg  20 mg Oral Q12H    furosemide (LASIX) tablet 40 mg  40 mg Oral DAILY    vancomycin (VANCOCIN) 750 mg in 0.9% sodium chloride 250 mL (VIAL-MATE)  750 mg IntraVENous Q12H    amLODIPine (NORVASC) tablet 10 mg  10 mg Oral DAILY    aspirin delayed-release tablet 81 mg  81 mg Oral DAILY    atorvastatin (LIPITOR) tablet 80 mg  80 mg Oral QHS    L.acidophilus-paracasei-S.thermophil-bifidobacter (RISAQUAD) 8 billion cell capsule  1 Capsule Oral DAILY    FLUoxetine (PROzac) capsule 10 mg  10 mg Oral QPM    furosemide (LASIX) tablet 20 mg  20 mg Oral DAILY PRN    gabapentin (NEURONTIN) capsule 600 mg  600 mg Oral BID    insulin glargine (LANTUS) injection 10 Units  10 Units SubCUTAneous QHS    latanoprost (XALATAN) 0.005 % ophthalmic solution 1 Drop  1 Drop Both Eyes QHS    levothyroxine (SYNTHROID) tablet 75 mcg  75 mcg Oral ACB    pantoprazole (PROTONIX) tablet 40 mg  40 mg Oral DAILY    polyethylene glycol (MIRALAX) packet 17 g  17 g Oral DAILY    oxybutynin chloride XL (DITROPAN XL) tablet 10 mg  10 mg Oral DAILY    albuterol-ipratropium (DUO-NEB) 2.5 MG-0.5 MG/3 ML  3 mL Nebulization QID RT    albuterol (PROVENTIL VENTOLIN) nebulizer solution 10 mg  10 mg Nebulization Q4H PRN    docusate sodium (COLACE) capsule 100 mg  100 mg Oral BID    levoFLOXacin (LEVAQUIN) 750 mg in D5W IVPB  750 mg IntraVENous Q24H    aztreonam (AZACTAM) 1 g in 0.9% sodium chloride (MBP/ADV) 100 mL MBP  1 g IntraVENous Q8H    sodium chloride (NS) flush 5-40 mL  5-40 mL IntraVENous Q8H    sodium chloride (NS) flush 5-40 mL  5-40 mL IntraVENous PRN    acetaminophen (TYLENOL) tablet 650 mg  650 mg Oral Q6H PRN    Or    acetaminophen (TYLENOL) suppository 650 mg  650 mg Rectal Q6H PRN    ondansetron (ZOFRAN ODT) tablet 4 mg  4 mg Oral Q8H PRN    Or    ondansetron (ZOFRAN) injection 4 mg  4 mg IntraVENous Q6H PRN    arformoterol 15 mcg/budesonide 0.5 mg neb solution   Nebulization BID RT    clonazePAM (KlonoPIN) tablet 0.25 mg  0.25 mg Oral QPM    sodium chloride (NS) flush 5-10 mL  5-10 mL IntraVENous PRN          LAB AND IMAGING FINDINGS:     Lab Results   Component Value Date/Time    WBC 13.5 (H) 06/23/2021 02:31 AM    HGB 8.8 (L) 06/23/2021 02:31 AM    PLATELET 844 37/87/8687 02:31 AM     Lab Results   Component Value Date/Time    Sodium 132 (L) 06/23/2021 02:31 AM    Potassium 4.1 06/23/2021 02:31 AM    Chloride 97 06/23/2021 02:31 AM    CO2 28 06/23/2021 02:31 AM    BUN 16 06/23/2021 02:31 AM    Creatinine 0.79 06/23/2021 02:31 AM    Calcium 7.9 (L) 06/23/2021 02:31 AM    Magnesium 1.9 06/22/2021 01:19 AM    Phosphorus 3.7 05/31/2021 04:02 AM      Lab Results   Component Value Date/Time    Alk. phosphatase 78 06/21/2021 12:32 AM    Protein, total 6.3 (L) 06/21/2021 12:32 AM    Albumin 2.3 (L) 06/21/2021 12:32 AM    Globulin 4.0 06/21/2021 12:32 AM     Lab Results   Component Value Date/Time    INR 1.2 (H) 03/09/2021 03:23 AM    Prothrombin time 12.8 (H) 03/09/2021 03:23 AM    aPTT 28.6 03/09/2021 03:23 AM      Lab Results   Component Value Date/Time    Iron 16 (L) 05/09/2021 08:21 PM    TIBC 278 05/09/2021 08:21 PM    Iron % saturation 6 (L) 05/09/2021 08:21 PM      No results found for: PH, PCO2, PO2  No components found for: GLPOC   No results found for: CPK, CKMB             Total time: 40  Counseling / coordination time, spent as noted above: 35  > 50% counseling / coordination?: y    Prolonged service was provided for  []30 min   []75 min in face to face time in the presence of the patient, spent as noted above. Time Start:   Time End:   Note: this can only be billed with 75836 (initial) or 78615 (follow up). If multiple start / stop times, list each separately.

## 2021-06-24 NOTE — PROGRESS NOTES
Hospice Medical Director    Chart reviewed and met with patient, daughter and PEACE. Patient with CHF/HCAP/aspiration/dysphagia/COPD and continuing to decline despite IV abx, IV steroids, nebs, aggressive tx. Patient remains very anxious and also with ongoing pain in the back(chronic) and associated SOB. Patient received dilaudid 0.75 mg IV x 5 doses and xanax 0.25 mg x4 doses overnight. Remains very anxious and having ongoing pain/SOB. Discussed goals and patient clear on EOL/comfort care and ok with stopping abx/IV steroids. She just does not want to suffer. Family understands as we provide meds for comfort, she may sleep more and they agree on transition to comfort focused care. Patient still able to eat some ice cream/jello and has struggled with some pills. Will make the following recommendations as we await transfer to [facility]. Patient does meet GIP level of care at this time and high risk for rapid decline. 1. Access port  2. Place conner  3. Transition all treatment to comfort care and so have ordered medications from comfort order set. 4. Rapid COVID test  5. Educated patient and family that the [facility] is not a long term facility and if she were to stabilize, we would need to have a discussion about disposition-likely home with daughter. Max days is 21 at hospice house and if she is routine care, family knows it is $256/day  6. Discussed with Dre Osborne-hospice liaisons and Cuong Hogan-covering the hospice house today as .

## 2021-06-24 NOTE — H&P
[address] [facility] Help to Those in Need  [phone]    Patient Name: Vargas Luna  YOB: 1940    Date of Provider Hospice Visit: 06/24/21    Level of Care:   [x] General Inpatient (GIP)    [] Routine   [] Respite    Current Location of Care:  [] [facility] [] [facility] [] [facility] [] [facility] [x] Hospice House [facility]    [facility] referred from:  [] [facility] [] [facility] [x] [facility] [] [facility] [] Home [] Other:     Date of Original Hospice Admission: 06/24/21  Hospice Medical Director at time of admission: [address] [place] [address] Diagnosis: Acute Respiratory Failure  Diagnoses RELATED to the terminal prognosis:  Advanced COPD, pulmonary fibrosis, HCAP  Other Diagnoses: HTN, DM, Hypothyroidism, 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 year old who was admitted to [facility]. 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 Pt was managed aggressively in hospital but did not respond well and continued to decline, doing poorly with increased pain, anxiety and breathing distress. Pt has had multiple hospital admissions in past 6 months and is frail, debilitated with progressive functional decline. PPS 20%    Refer to LCD     The patient/family chose comfort measures with the support of Hospice. HOSPICE DIAGNOSES   Active Symptoms:  1. Labored breathing  2. Worsening chronic pain; diffuse  3.  Increased oropharyngeal secretions  4. Anxiety/restlessness; significant  5. Dysphagia/anorexia/aspiration risk  6. Leg edema/heart failure  7. Hospice care pt     PLAN   1. Admit to [facility] LOC as pt has just stopped aggressive interventions and treatments for pneumonia/HF at hospital and moved to [facility] for comfort measures. Expected to decline quickly, is symptomatic that is expected to be worse now, and needing close monitoring and active management of symptoms  2. O2 4l NC for comfort  3. Insert conner's catheter for comfort  4. Pt has peripheral IV that can be used, also has a port that can be accessed. 5. D/c all oral medications as pt is quite lethargic and at high risk for choking/aspiration. Kept synthroid for now daily as long as able to swallow. 6. Dilaudid 1mg IV scheduled every 4 hours and every 15mts as needed for pain/dyspnea  7. Ativan 1mg IV scheduled every 4 hours and every 15mts as needed for anxiety/pain/dyspnea  8. Robinul 0.2mg IV scheduled every 4 hours for secretions  9. Other comfort meds as needed. Expect she will decline soon and further adjustments will be needed    10.  and SW to support family needs  6. Disposition: likely will pass here  12. Hospice Plan of care was reviewed in detail and agree with current plan of care    Prognosis estimated based on 06/24/21 clinical assessment is:   [] Hours to Days    [x] Days to Weeks    [] Other:    Communicated plan of care with: Hospice Case Manager; Hospice IDT; Care Team     GOALS OF CARE     Patient/Medical POA stated Goal of Care: comfort    [x] I have reviewed and/or updated ACP information in the Advance Care Planning Navigator. This information is available in the [address] link in the patient's chart header.     Primary Decision [facility] Dave Agent):   Primary Decision Maker: Jostin Bazzi - [phone]    Secondary Decision Maker: Lucero Francisco - [phone]    Resuscitation Status: DNR  If DNR is there a Durable DNR on file? : [x] Yes [] No (If no, complete Durable DNR)    HISTORY     History obtained from: chart review, pt    CHIEF COMPLAINT: tired  The patient is:   [x] Verbal  [] Nonverbal  [] Unresponsive    HPI/SUBJECTIVE:  [facility] and being [facility] in [facility]. Vitals not done yet but appears to be breathing hard, labored, anxious and admits feeling tired. Pt received several doses of dilaudid and ativan at hospital and got a dose prior to transfer       REVIEW OF SYSTEMS     The following systems were: [x] reviewed  [] unable to be reviewed    Positive ROS include:  Constitutional: fatigue, weakness, in pain, short of breath  Ears/nose/mouth/throat: increased airway secretions  Respiratory:shortness of breath, wheezing  Gastrointestinal:poor appetite, nausea, vomiting, abdominal pain, constipation, diarrhea  Musculoskeletal:pain, deformities, swelling legs  Neurologic:confusion, hallucinations, weakness  Psychiatric:anxiety, feeling depressed, poor sleep  Endocrine:     Adult Non-Verbal Pain Assessment Score:      Face  [] 0   No particular expression or smile  [] 1   Occasional grimace, tearing, frowning, wrinkled forehead  [] 2   Frequent grimace, tearing, frowning, wrinkled forehead    Activity (movement)  [] 0   Lying quietly, normal position  [] 1   Seeking attention through movement or slow, cautious movement  [] 2   Restless, excessive activity and/or withdrawal reflexes    Guarding  [] 0   Lying quietly, no positioning of hands over areas of body  [] 1   Splinting areas of the body, tense  [] 2   Rigid, stiff    Physiology (vital signs)  [] 0   Stable vital signs  [] 1   Change in any of the following: SBP > 20mm Hg; HR > 20/minute  [] 2   Change in any of the following: SBP > 30mm Hg; HR > 25/minute    Respiratory  [] 0   Baseline RR/SpO2, compliant with ventilator  [] 1   RR > 10 above baseline, or 5% drop SpO2, mild asynchrony with ventilator  [] 2   RR > 20 above baseline, or 10% drop SpO2, asynchrony with ventilator     FUNCTIONAL ASSESSMENT     Palliative Performance Scale (PPS): 20%       PSYCHOSOCIAL/SPIRITUAL ASSESSMENT     Active Problems:    * No active hospital problems.  *    Past Medical History:   Diagnosis Date    Anxiety and depression     Arthritis     Chronic obstructive pulmonary disease (HCC)     Chronic pain     DM type 2 causing vascular disease (HCC)     GERD (gastroesophageal reflux disease)     Glaucoma     HTN (hypertension)     Hx of completed stroke 2021    Hypothyroid     Incontinence     Neuropathy     Polypharmacy       Past Surgical History:   Procedure Laterality Date    HX ORTHOPAEDIC      HX VASCULAR ACCESS        Social History     Tobacco Use    Smoking status: Former Smoker     Quit date: 1991     Years since quittin.4    Smokeless tobacco: Never Used   Substance Use Topics    Alcohol use: Never     Family History   Problem Relation Age of Onset    No Known Problems Other         reviewed, patient did not know     Diabetes Mother     Heart Attack Mother     Cancer Father       Allergies   Allergen Reactions    Shellfish Derived Anaphylaxis      Current Facility-Administered Medications   Medication Dose Route Frequency    acetaminophen (TYLENOL) suppository 650 mg  650 mg Rectal Q6H PRN    albuterol (PROVENTIL VENTOLIN) nebulizer solution 10 mg  10 mg Nebulization Q4H PRN    albuterol-ipratropium (DUO-NEB) 2.5 MG-0.5 MG/3 ML  3 mL Nebulization TID    gabapentin (NEURONTIN) capsule 600 mg  600 mg Oral BID    glycopyrrolate (ROBINUL) injection 0.2 mg  0.2 mg IntraVENous Q4H PRN    HYDROmorphone (DILAUDID) injection 1 mg  1 mg IntraVENous Q15MIN PRN    ketorolac (TORADOL) injection 30 mg  30 mg IntraVENous Q8H PRN    LORazepam (ATIVAN) injection 1 mg  1 mg IntraVENous Q15MIN PRN    [START ON 2021] levothyroxine (SYNTHROID) tablet 75 mcg (Patient Supplied)  75 mcg Oral 6am    ondansetron (ZOFRAN) injection 4 mg  4 mg IntraVENous Q4H PRN    oxyCODONE ER (OxyCONTIN) tablet 20 mg  20 mg Oral Q12H    [START ON 6/25/2021] polyethylene glycol (MIRALAX) packet 17 g  17 g Oral DAILY    prochlorperazine (COMPAZINE) injection 10 mg  10 mg IntraVENous Q4H PRN    [START ON 6/25/2021] amLODIPine (NORVASC) tablet 10 mg (Patient Supplied)  10 mg Oral DAILY    bisacodyL (DULCOLAX) suppository 10 mg  10 mg Rectal DAILY PRN    albuterol (PROVENTIL VENTOLIN) nebulizer solution 10 mg  10 mg Nebulization Q4H PRN        PHYSICAL EXAM     Wt Readings from Last 3 Encounters:   06/23/21 58.5 kg (129 lb)   06/07/21 65.7 kg (144 lb 13.5 oz)   05/09/21 58.5 kg (129 lb)       There were no vitals taken for this visit.     Supplemental O2  [x] Yes: 4l NC  [] NO  Last bowel movement:    Currently this patient has:  [x] Peripheral IV [] PICC  [x] PORT [] ICD    [] Eckert Catheter [] NG Tube   [] PEG Tube    [] Rectal Tube [] Drain  [] Other:     Constitutional:lethargic, frail, appears tired, slightly labored with breathing  Eyes: pallor+  ENMT: increased secretions that are audible  Cardiovascular: tachycardic, trace edema legs 1+ pitting, systolic murmur on precordium  Respiratory: labored breathing, wheezing, upper chest secretions  Gastrointestinal: soft, non tender, no masses  Musculoskeletal: thin extremities, muscle wasting  Skin:areas of bruising  Neurologic:lethargic but no focal deficits  Psychiatric: slightly anxious  Other:       Pertinent Lab and or Imaging Tests:  Lab Results   Component Value Date/Time    Sodium 133 (L) 06/24/2021 03:46 AM    Potassium 4.3 06/24/2021 03:46 AM    Chloride 100 06/24/2021 03:46 AM    CO2 29 06/24/2021 03:46 AM    Anion gap 4 (L) 06/24/2021 03:46 AM    Glucose 156 (H) 06/24/2021 03:46 AM    BUN 23 (H) 06/24/2021 03:46 AM    Creatinine 0.82 06/24/2021 03:46 AM    BUN/Creatinine ratio 28 (H) 06/24/2021 03:46 AM    GFR est AA >60 06/24/2021 03:46 AM    GFR est non-AA >60 06/24/2021 03:46 AM    Calcium 7.9 (L) 06/24/2021 03:46 AM     Lab Results   Component Value Date/Time    Protein, total 6.3 (L) 06/21/2021 12:32 AM    Albumin 2.3 (L) 06/21/2021 12:32 AM           Total time: 70mts  Counseling / coordination time: 30mts  > 50% counseling / coordination?:

## 2021-06-24 NOTE — PROGRESS NOTES
Pharmacy Automatic Renal Dosing Protocol - Antimicrobials    Indication for Antimicrobials: Pneumonia (HAP)    Current Regimen of Each Antimicrobial:  Vancomycin 750 mg q12h (start ; day 4)  Levofloxacin 750mg IV q24h (start ; day /7)  Aztreonam 1g IV q8h (start ; day /7)    Previous Antimicrobial Therapy:  Zosyn 3.375g IV x 1   Recently completed course of vancomycin + cefepime and zosyn over the past 6 weeks    Goal Level: VANCOMYCIN TROUGH GOAL RANGE    Vancomycin Trough: 15 - 20 mcg/mL  (AUC: 400 - 600 mg/hr/Liter/day)     Date Dose & Interval Measured (mcg/mL) Extrapolated (mcg/mL)    750mg q12h 16.6 13.1    750 mg q12h 17.5 17.33           Date & time of next level:      Significant Cultures:   21 sputum - MRSA (final)   bcx - ngsf (pending)    Radiology / Imaging results: (X-ray, CT scan or MRI):  No significant change in diffuse bilateral interstitial and alveolar opacities. Paralysis, amputations, malnutrition: none noted    Labs:  Recent Labs     21  0346 21  0231 21  0119   CREA 0.82 0.79 0.60   BUN 23* 16 17   WBC  --  13.5* 7.6     Temp (24hrs), Av.9 °F (36.6 °C), Min:97.5 °F (36.4 °C), Max:98.5 °F (36.9 °C)      Is the Patient on Dialysis? Creatinine Clearance (mL/min):   CrCl (Actual Body Weight): 49.7  CrCl (Adjusted Body Weight): 47.8  CrCl (Ideal Body Weight): 46.5    Impression/Plan:   SCr trending up, reduced levofloxacin to 750 mg q48h   Continue aztreonam as ordered. (Patient completed courses of cefepime and zosyn recently)   Vancomycin level therapeutic, continue vancomycin 750 mg every 12 hours. With SCr trending up, will watch closely  hospice was consulted  BMP in AM  Antimicrobial stop date  7 days for levofloxacin, aztreonam; TBD for vancomycin     Pharmacy will follow daily and adjust medications as appropriate for renal function and/or serum levels.     Thank you,  Loly Beckham, PHARMD    Recommended duration of therapy  http://[facility]/[place]/[facility]/Pharmacy/Clinical%20Companion/Duration%20of%20ABX%20therapy. docx    Renal Dosing  http://[facility]/[place]/[facility]/Pharmacy/Clinical%20Companion/Renal%[id number]. pdf

## 2021-06-24 NOTE — PROGRESS NOTES
[address]  [phone]      Cardiology Progress Note      6/24/2021 0915 AM    Admit Date: 6/20/2021    Admit Diagnosis:   HCAP (healthcare-associated pneumonia) [J18.9]  Acute hypoxemic respiratory failure ([facility] [address] [J96.01]  COPD (chronic obstructive pulmonary disease) ([facility] [address] [J44.9]  Elevated brain natriuretic peptide (BNP) level [R79.89]  Hypokalemia [E87.6]  Troponin level elevated [R77.8]  Head trauma [S09.90XA]    Subjective:     More Talamantes continues to endorse some chest tenderness, overall malaise. States she is breathing better. States \"I want to talk to my daughter and have this end\". Proceeds to give diet orders, stating she cannot eat what's on tray. Requesting Jello, Yogurt, and Pudding only. Requesting anxiety and pain medications.        BP normalized, continue norvasc   Mild troponin bump, trended down to 0.10 peak 0.37  Repeat ECHO still pending, if transitioning to hospice, may dc    Visit Vitals  BP (!) 164/70 (BP 1 Location: Left upper arm, BP Patient Position: At rest)   Pulse 96   Temp 97.6 °F (36.4 °C)   Resp 18   Ht 5' 4\" (1.626 m)   Wt 58.5 kg (129 lb)   SpO2 94%   BMI 22.14 kg/m²       Current Facility-Administered Medications   Medication Dose Route Frequency    albuterol-ipratropium (DUO-NEB) 2.5 MG-0.5 MG/3 ML  3 mL Nebulization TID RT    [START ON 6/26/2021] levoFLOXacin (LEVAQUIN) 750 mg in D5W IVPB  750 mg IntraVENous Q48H    ketorolac (TORADOL) injection 15 mg  15 mg IntraVENous Q6H    ALPRAZolam (XANAX) tablet 0.25 mg  0.25 mg Oral Q4H PRN    HYDROmorphone (DILAUDID) injection 0.75 mg  0.75 mg IntraVENous Q2H PRN    methylPREDNISolone (PF) (SOLU-MEDROL) injection 40 mg  40 mg IntraVENous Q12H    oxyCODONE ER (OxyCONTIN) tablet 20 mg  20 mg Oral Q12H    furosemide (LASIX) tablet 40 mg  40 mg Oral DAILY    vancomycin (VANCOCIN) 750 mg in 0.9% sodium chloride 250 mL (VIAL-MATE)  750 mg IntraVENous Q12H    amLODIPine (NORVASC) tablet 10 mg  10 mg Oral DAILY    aspirin delayed-release tablet 81 mg  81 mg Oral DAILY    atorvastatin (LIPITOR) tablet 80 mg  80 mg Oral QHS    L.acidophilus-paracasei-S.thermophil-bifidobacter (RISAQUAD) 8 billion cell capsule  1 Capsule Oral DAILY    FLUoxetine (PROzac) capsule 10 mg  10 mg Oral QPM    furosemide (LASIX) tablet 20 mg  20 mg Oral DAILY PRN    gabapentin (NEURONTIN) capsule 600 mg  600 mg Oral BID    insulin glargine (LANTUS) injection 10 Units  10 Units SubCUTAneous QHS    latanoprost (XALATAN) 0.005 % ophthalmic solution 1 Drop  1 Drop Both Eyes QHS    levothyroxine (SYNTHROID) tablet 75 mcg  75 mcg Oral ACB    pantoprazole (PROTONIX) tablet 40 mg  40 mg Oral DAILY    polyethylene glycol (MIRALAX) packet 17 g  17 g Oral DAILY    oxybutynin chloride XL (DITROPAN XL) tablet 10 mg  10 mg Oral DAILY    albuterol (PROVENTIL VENTOLIN) nebulizer solution 10 mg  10 mg Nebulization Q4H PRN    docusate sodium (COLACE) capsule 100 mg  100 mg Oral BID    aztreonam (AZACTAM) 1 g in 0.9% sodium chloride (MBP/ADV) 100 mL MBP  1 g IntraVENous Q8H    sodium chloride (NS) flush 5-40 mL  5-40 mL IntraVENous Q8H    sodium chloride (NS) flush 5-40 mL  5-40 mL IntraVENous PRN    acetaminophen (TYLENOL) tablet 650 mg  650 mg Oral Q6H PRN    Or    acetaminophen (TYLENOL) suppository 650 mg  650 mg Rectal Q6H PRN    ondansetron (ZOFRAN ODT) tablet 4 mg  4 mg Oral Q8H PRN    Or    ondansetron (ZOFRAN) injection 4 mg  4 mg IntraVENous Q6H PRN    arformoterol 15 mcg/budesonide 0.5 mg neb solution   Nebulization BID RT    clonazePAM (KlonoPIN) tablet 0.25 mg  0.25 mg Oral QPM    sodium chloride (NS) flush 5-10 mL  5-10 mL IntraVENous PRN       Objective:      Physical Exam:  General Appearance: thin, frail, chronically ill appearing  female in NAD   Chest:  coarse, wheezing  Cardiovascular:  Regular rate and rhythm, no murmur. Abdomen:   Soft, non-tender, bowel sounds are active.    Extremities: thin, DP/PT palpable no edema noted   Skin: scattered bruises, bruise to forehead     Data Review:   Recent Labs     06/23/21  0231 06/22/21  0119   WBC 13.5* 7.6   HGB 8.8* 7.5*   HCT 28.9* 24.2*    253     Recent Labs     06/24/21  0346 06/23/21  0231 06/22/21  0119   * 132* 136   K 4.3 4.1 3.7    97 102   CO2 29 28 29   * 208* 250*   BUN 23* 16 17   CREA 0.82 0.79 0.60   CA 7.9* 7.9* 7.5*   MG  --   --  1.9       Recent Labs     06/22/21  0119 06/21/21  1255   TROIQ 0.10* 0.22*         Intake/Output Summary (Last 24 hours) at 6/24/2021 1006  Last data filed at 6/24/2021 0758  Gross per 24 hour   Intake    Output 600 ml   Net -600 ml        Cardiographics:     Telemetry: NSR 90's RBBB  EKG: RBBB, NSR   Cxray: \"No significant change in diffuse bilateral interstitial and alveolar opacities. \"  ECHO: 2/26/2021  · LV: Estimated LVEF is 60 - 65%. Normal cavity size, wall thickness and systolic function (ejection fraction normal). Wall motion: normal. Mild (grade 1) left ventricular diastolic dysfunction. · AV: Aortic valve leaflet calcification present. Aortic valve mean gradient is 21 mmHg. Mild aortic valve stenosis is present. · MV: Severe mitral annular calcification. Moderate mitral valve regurgitation is present. · TV: Mild tricuspid valve regurgitation is present.   · Right Atrium: Catheter present and appears normal.    New ECHO pending     Assessment:     Active Problems:    Precordial pain (2/25/2021)      COPD (chronic obstructive pulmonary disease) ([facility] [address] (6/21/2021)      Hypokalemia (6/21/2021)      Head trauma (6/21/2021)      Troponin level elevated (6/21/2021)      Elevated brain natriuretic peptide (BNP) level (6/21/2021)      HCAP (healthcare-associated pneumonia) (6/21/2021)      Acute hypoxemic respiratory failure ([address] (6/21/2021)      ACP (advance care planning) (6/22/2021)        Plan:   Elevated troponin, in presence of HCAP vs pulmonary edema:  Chest x-ray increased bilateral interstitial and alveolar opacities may represent pulmonary edema and/or pneumonia. Prior ECHO reviewed, new pending  Troponin peak 0.37, trended down to 0.10   NT pro-BNP 2,696  Strict I/O, labs,weights   Continue IV abx as per internal medicine  Continue daily diuresis at increased dose for palliative reasons  Monitor telemetry  Continue 81 mg PO ASA daily   Will not pursue ischemic evaluation at this time, treat sepsis    Chest Pain:  Likely musculoskeletal related to fall x 2 out of bed  Pain medications as per internal medicine   Adding a few doses of toradol IV, platelets and renal function stable      HTN: has normalized  Continue norvasc and diuretic     DM II:  Manager per internal medicine    Hypothyroidism:  Continue levothyroxine    Palliative consult noted and agree. Noted plans for hospice. Cardiology will sign-off please call with questions.       MICHAEL Chacon  DNP,APRN,AG-ACNP-BC

## 2021-06-24 NOTE — PROGRESS NOTES
Palliative Medicine      Stopped in to see pt: ran into [address] [place] [address], who reports pt is being admitted to Hospice today, Hospice physician/medical director saw pt a few minutes ago and is going to make medication adjustments, therefore, no need for me to see this am.  Met with dtr Ashia Hough and her , who voiced appreciation for the care pt is receiving and pleased with plan to move pt to the [facility]. Provided encouragement. Will remain available for assistance until pt is under Hospice care. Thank you for including Palliative Medicine in this patient's care.    LANCE Ricci

## 2021-06-24 NOTE — PROGRESS NOTES
This was an initial visit to assess needs and offer support. Ms Daryle Redhead was alert and comfortable talking of her EOL experience, noting that there have been lot's of changes in her health the last year. However, according to her she has been lovingly supported by her daughter and her daughter's . She also has a granddaughter, out of town but who is supporting via phone. She has a deep jennifer and affirms that [facility] is a good place where we will all meet again. Prayers were offered.     Thanks for the opportunity to  to this fusibly,

## 2021-06-24 NOTE — PROGRESS NOTES
ADULT PROTOCOL: JET AEROSOL  REASSESSMENT    Patient  Mery Carreon     80 y.o.   female     6/24/2021  9:10 AM    Breath Sounds Pre Procedure: Right Breath Sounds: Coarse                               Left Breath Sounds: Coarse    Breath Sounds Post Procedure: Right Breath Sounds: Coarse                                 Left Breath Sounds: Coarse    Breathing pattern: Pre procedure Breathing Pattern: Regular          Post procedure Breathing Pattern: Regular    Heart Rate: Pre procedure Pulse: 70           Post procedure Pulse: 74    Resp Rate: Pre procedure Respirations: 18           Post procedure Respirations: 20    Cough: Pre procedure Cough: Non-productive               Post procedure Cough: Non-productive    Oxygen: O2 Device: Nasal cannula        Changed: no    SpO2: Pre procedure SpO2: 95 %                 Post procedure SpO2: 95 %      Nebulizer Therapy: Current medications Aerosolized Medications: Brovana      Changed: no    Problem List:   Patient Active Problem List   Diagnosis Code    HTN (hypertension) I10    Type II diabetes mellitus ([facility] [address] E11.9    Hypothyroid E03.9    Anxiety F41.9    History of CVA (cerebrovascular accident) Z80.78    Acute pulmonary embolism (HCC) I26.99    Precordial pain R07.2    Dizziness R42    Abdominal pain R10.9    Hyponatremia E87.1    Dizzy R42    Hx of completed stroke Z80.78    DM (diabetes mellitus), type 2 (HCC) E11.9    Chronic obstructive pulmonary disease (HCC) J44.9    Arthritis M19.90    Chronic pain G89.29    Anxiety and depression F41.9, F32.9    Glaucoma H40.9    Neuropathy G62.9    Polypharmacy Z79.899    Incontinence R32    GERD (gastroesophageal reflux disease) K21.9    Elbow laceration, left, initial encounter S51.012A    Acute metabolic encephalopathy H16.19    Acute respiratory insufficiency R06.89    CAP (community acquired pneumonia) J18.9    Pneumonia J18.9    COPD with acute exacerbation (HCC) J44.1    Acute on chronic respiratory failure with hypoxia (HCC) J96.21    Anemia of chronic illness D63.8    Chronic pulmonary embolism (HCC) I27.82    HAP (hospital-acquired pneumonia) J18.9, Y95    Pneumonia of both lungs due to infectious organism J18.9    Multiple fractures T07. [place] Physical debility R53.81    COPD (chronic obstructive pulmonary disease) (HCC) J44.9    Hypokalemia E87.6    Head trauma S09.90XA    Troponin level elevated R77.8    Elevated brain natriuretic peptide (BNP) level R79.89    HCAP (healthcare-associated pneumonia) J18.9    Acute hypoxemic respiratory failure (HCC) J96.01    ACP (advance care planning) Z71.89       Respiratory Therapist: Sharon Lopez

## 2021-06-24 NOTE — HOSPICE
1645-Pt arrived to [facility] by [facility] transport team.  Pt moved to room 16 and transferred to hospital bed. Pt very drowsy, look of confusion and slight fear. Pt reassured and therapeutic touch given. Pt with furrowed eyebrows and loud audible secretions. Pt with tense extremities, arms folded and hard to open and relax. Dr. Alyssa Ash examined pt at bedside, medication adjustments to be made. Pt given PRN dilaudid, ativan and robinul for s/s of pain, anxiety and audible secretions. RN assessment completed. Skin assessment done. 1715-Pt resting with eyes closed. More neutral facial expression. Pt  Body more relaxed. Will continue to monitor for medication effectiveness and symptom relief. 1800-Scheduled medications given via PIV. Pt sleeping, body clenched. 1830-IV medications effective at this time. Pt appears more relaxed, forehead and facial expression neutral.  Body less tense. Pt daughter and PEACE at bedside. Discussed medications, pain management, EOL s/s and daughter explained pt likes and having her Bible at bedside. 1900-report given to oncoming RN.

## 2021-06-24 NOTE — PROGRESS NOTES
AVS reviewed and given to pt along daughter a bedside. DNR printed for DC IV in placed. Eckert placement deferred. Verified No call to give report number. Awaiting [facility] for DC.

## 2021-06-24 NOTE — DISCHARGE SUMMARY
Hospitalist Discharge Summary     Patient ID:  Joselyn Murillo  [id number]  5 y.o.  1940 6/20/2021    PCP on record: Dao Taylor MD    Admit date: 6/20/2021  Discharge date and time: 6/24/2021    DISCHARGE DIAGNOSIS:    Acute on chronic hypoxic respiratory failure POA  Bilateral PNA/HCAP  COPD exacerbation  Acute pulmonary edema, suspect diastolic CHF  Moderate MR  History of chronic back pain  History of bilateral common iliac stenosis,   History of hypothyroidism/hypertension,  History of right subclavian stenosis, evaluated by vascular last month at OSH. CONSULTATIONS:  IP CONSULT TO CARDIOLOGY    Excerpted HPI from H&P of Karla Pindea MD:    Pt presents to the ED via EMS. EMS states that pt has a fall twice today at rehab facility. Pt is A&O x 3 (not to time). EMS stated pt complains of fever, cough, back pain, and low O2 saturation. Pt has COPD and is on 3L NC and maintains sats in 93% to 94%.  Pt currently complains of left leg pain and head pain.          HISTORY OF PRESENT ILLNESS:        Alean Hatchet y.o. female with PMHx significant for COPD on 3 L nasal cannula at baseline, hypertension, hypothyroidism, diabetes, GERD, anxiety and depression, CVA with left-sided residual weakness, history of pulmonary embolism in April but was taken off Eliquis due to hemoptysis per report, recent hospital-acquired pneumonia who was discharged on June 7 from [facility] [place] after an admission for sepsis  2/2 MRSA pneumonia/COPD exacerbation presents to the ED from [facility] where she was discharged for rehab after her most recent hospitalization with chief complaint of generally feeling bad along with cough, back pain, and 2 falls out of bed in the last 2 hours.  Patient also has a contusion/hematoma to her forehead from one of the falls.  Noted to have a fever of 100.7 on arrival.  Patient reports that she feels terrible and is specifically complaining of back pain.  Noted to have a rhonchorous cough during exam.  She is on 3 L nasal cannula at baseline at [facility] Monica Moss was satting in the 80s on 3 L and was briefly bumped up to 6 L in transport, but is satting greater than 90 on 3 L in the ED. [facility] her second Covid vaccines on Easter Sunday.     Soledad Smith MD          ______________________________________________________________________  DISCHARGE SUMMARY/HOSPITAL COURSE:  for full details see H&P, daily progress notes, labs, consult notes. Acute on chronic hypoxic respiratory failure POA  Bilateral PNA/HCAP  COPD exacerbation  Acute pulmonary edema, suspect diastolic CHF    Had around  -Recent admission at [address] [place] [address] 5/19-6/7, for HAP  -Multiple hospital admissions past 6 months, 7 admissions  -Presented with sats in 80s, fever 100.7, cough. Mechanical fall      -She was placed on broad-spectrum antibiotics, likely healthcare associated pneumonia, from multiple readmissions. She uses 3 L nasal cannula at baseline and she has been on around 4 to 5 L while admitted. During hospitalization she also had episode of pulmonary edema, suspected from diastolic heart failure, somewhat improved from IV Lasix.  -She also has been having multiple falls lately, and she has been having chest pain, which was musculoskeletal in nature.  -At home she is on significant narcotics and benzodiazepines.  -Family had a meeting with the palliative team, later on with hospice team.  Discussed with the family, reviewed chart in detail from past admission, she has multiple comorbidities, her quality of life is very poor, and in constant decline, I have recommended that we should focus on comfort care. Family and patient   Agrees.   She was discharged with hospice     Hypokalemia/hyponatremia, resolved        History of chronic back pain, continue OxyContin 20 mg twice daily,     History of bilateral common iliac stenosis, medical management     History of hypothyroidism/hypertension, continue home medication. Hold Norvasc for hypotension     History of right subclavian stenosis, evaluated by vascular last month at OSH. Avoid BP measurement in the right arm      _______________________________________________________________________  Patient seen and examined by me on discharge day. Pertinent Findings:  Gen:    Distress+  Chest: Coarse sounds,b/l exp wheezing  CVS:   Regular rhythm. No edema  Abd:  Soft, not distended, not tender  Neuro:  Alert,   _______________________________________________________________________  DISCHARGE MEDICATIONS:   Discharge Medication List as of 6/24/2021  2:06 PM      CONTINUE these medications which have NOT CHANGED    Details   albuterol (PROVENTIL VENTOLIN) 2.5 mg /3 mL (0.083 %) nebu 2.5 mg by Nebulization route every six (6) hours. , Historical Med      clonazePAM (KlonoPIN) 0.25 mg TbDi Take 0.25 mg by mouth three (3) times daily. , Historical Med      !! insulin lispro (HUMALOG) 100 unit/mL injection by SubCUTAneous route nightly. Sliding scale  200-249 = 1 unit  250-349 = 2 unit  Call MD if > 350, Historical Med      !! insulin lispro (HUMALOG) 100 unit/mL injection by SubCUTAneous route Before breakfast, lunch, and dinner. Sliding scale  140-199 = 0 units  200-249 = 2 units  250-299 = 3 units  300-349 = 4 units  Call MD if > 350, Historical Med      naloxone (EVZIO) 2 mg/0.4 mL auto-injector 2 mg by IntraMUSCular route once as needed for Overdose., Historical Med      oxyCODONE-acetaminophen (Percocet) 7.5-325 mg per tablet Take 1 Tablet by mouth every four (4) hours as needed for Pain., Historical Med      predniSONE (DELTASONE) 5 mg tablet Take 5 mg by mouth daily. , Historical Med      L.acid,para-B. bifidum-S.therm (RISAQUAD) 8 billion cell cap cap Take 2 Capsules by mouth two (2) times a day., Historical Med      !! furosemide (LASIX) 40 mg tablet Take 40 mg by mouth daily. , Historical Med      !! furosemide (LASIX) 20 mg tablet Take 20 mg by mouth daily as needed (edema). , Historical Med      acetaminophen (TYLENOL) 325 mg tablet Take 2 Tablets by mouth every six (6) hours as needed for Pain or Fever for up to 10 doses. Indications: pain, No Print, Disp-10 Tablet, R-0      gabapentin (NEURONTIN) 300 mg capsule Take 2 Capsules by mouth two (2) times a day for 30 days. Max Daily Amount: 1,200 mg., Print, Disp-120 Capsule, R-0      polyethylene glycol (MIRALAX) 17 gram packet Take 1 Packet by mouth daily for 30 days. Indications: constipation, No Print, Disp-30 Packet, R-0      simethicone (MYLICON) 80 mg chewable tablet Take 1 Tablet by mouth four (4) times daily as needed for Flatulence (Gaseous discomfort) for up to 10 doses. , No Print, Disp-10 Tablet, R-0      amLODIPine (NORVASC) 10 mg tablet Take 1 Tablet by mouth daily for 30 days. Indications: high blood pressure, No Print, Disp-30 Tablet, R-0      insulin glargine (Lantus Solostar U-100 Insulin) 100 unit/mL (3 mL) inpn 16 Units by SubCUTAneous route nightly for 30 days. Indications: type 2 diabetes mellitus, No Print, Disp-4.8 mL, R-0      FLUoxetine (PROzac) 10 mg capsule Take 10 mg by mouth every evening., Historical Med      calcium-cholecalciferol, D3, (CALTRATE 600+D) tablet Take 1 Tab by mouth daily (with breakfast). , Historical Med      guaiFENesin ER (Mucinex) 600 mg ER tablet Take 600 mg by mouth two (2) times a day., Historical Med      urea (URE-NA) 15 gram packet Take 1 Packet by mouth daily. , Normal, Disp-30 Packet, R-0      aspirin delayed-release 81 mg tablet Take 1 Tab by mouth daily. , Print, Disp-30 Tab, R-0      latanoprost (XALATAN) 0.005 % ophthalmic solution Administer 1 Drop to both eyes nightly., Historical Med      levothyroxine (Levo-T) 75 mcg tablet Take 75 mcg by mouth Daily (before breakfast). , Historical Med      tolterodine ER (DETROL LA) 4 mg ER capsule Take 4 mg by mouth daily. , Historical Med      atorvastatin (LIPITOR) 80 mg tablet Take 80 mg by mouth nightly., Historical Med      fluticasone-umeclidinium-vilanterol (Trelegy Ellipta) 100-62.5-25 mcg inhaler Take 1 Puff by inhalation daily. , Historical Med      melatonin 5 mg tablet Take 5 mg by mouth nightly., Historical Med      magnesium oxide (MAG-OX) 400 mg tablet Take 400 mg by mouth daily. , Historical Med      pantoprazole (PROTONIX) 40 mg tablet Take 40 mg by mouth daily. , Historical Med       !! - Potential duplicate medications found. Please discuss with provider. STOP taking these medications       oxyCODONE ER (OxyCONTIN) 20 mg ER tablet Comments:   Reason for Stopping:                 Patient Follow Up Instructions:    Activity: Activity as tolerated  Diet: Comfort feeding  Wound Care: None needed        Follow-up Information     Follow up With Specialties Details Why Contact Lydia Johnson Rem[place], [facility] [address]  [phone]          ________________________________________________________________    Risk of deterioration: High    Condition at Discharge:    __________________________________________________________________    Disposition  IP Hospice    ____________________________________________________________________    Code Status: DNR/DNI  ___________________________________________________________________      Total time in minutes spent coordinating this discharge (includes going over instructions, follow-up, prescriptions, and preparing report for sign off to her PCP) :  >30 minutes    Signed:  Magalie Miller MD

## 2021-06-24 NOTE — HOSPICE
[facility]  [phone]    Inpatient Nursing Admission   Patient Name: Mery Carreon  YOB: 1940  Age: 80 y.o. Date of Hospice Admission: 6/24/2021  Hospice Attending Elected by Patient: Tatyana Mata MD  Primary Care Physician: Samanta De La Rosa MD  Admitting RN: Castro Casey   : [address]      Level of Care (GIP/Routine/Respite): GIP  Facility of Care: [facility]  Patient Room: 16/01     HOSPICE SUMMARY   ER Visits/ Hospitalizations in past year: Eight ED visits, seven hospitalizations    Hospice Diagnosis: Acute Hypoxemic Respiratory Failure  Onset Date of Hospice Diagnosis: June 2021  Summary of Disease Progression Leading to Hospice Diagnosis: Patient with a past history of COPD has supplemental O2 at home but had not used until 5/2021 hospitalization, pulmonary fibrosis, HTN, DM, Hypothyroidism, multiple CVA's w/ residual left sided weakness (1st approx 2016, second 2/2021), PE, anxiety, Falls, chronic rib fractures and chronic back pain, who was admitted on 6/20/2021 from [facility] with a diagnosis of hospital acquired PNA.    Diagnoses RELATED to the terminal prognosis: ARF  Other Diagnoses: COPD, PNA,   Patient Active Problem List   Diagnosis Code    HTN (hypertension) I10    Type II diabetes mellitus ([facility] [address] E11.9    Hypothyroid E03.9    Anxiety F41.9    History of CVA (cerebrovascular accident) Z80.78    Acute pulmonary embolism (HCC) I26.99    Precordial pain R07.2    Dizziness R42    Abdominal pain R10.9    Hyponatremia E87.1    Dizzy R42    Hx of completed stroke Z80.78    DM (diabetes mellitus), type 2 (HCC) E11.9    Chronic obstructive pulmonary disease (HCC) J44.9    Arthritis M19.90    Chronic pain G89.29    Anxiety and depression F41.9, F32.9    Glaucoma H40.9    Neuropathy G62.9    Polypharmacy Z79.899    Incontinence R32    GERD (gastroesophageal reflux disease) K21.9    Elbow laceration, left, initial encounter S51.012A    Acute metabolic encephalopathy K58.09    Acute respiratory insufficiency R06.89    CAP (community acquired pneumonia) J18.9    Pneumonia J18.9    COPD with acute exacerbation (HCC) J44.1    Acute on chronic respiratory failure with hypoxia (HCC) J96.21    Anemia of chronic illness D63.8    Chronic pulmonary embolism (HCC) I27.82    HAP (hospital-acquired pneumonia) J18.9, Y95    Pneumonia of both lungs due to infectious organism J18.9    Multiple fractures T07. Lonn Potter Physical debility R53.81    COPD (chronic obstructive pulmonary disease) (HCC) J44.9    Hypokalemia E87.6    Head trauma S09.90XA    Troponin level elevated R77.8    Elevated brain natriuretic peptide (BNP) level R79.89    HCAP (healthcare-associated pneumonia) J18.9    Acute hypoxemic respiratory failure (HCC) J96.01    ACP (advance care planning) Z71.89       Rationale for a prognosis of life expectancy of 6 months or less if the disease follows its normal course (Disease Specific History):     Apolinar Ibarra is a 80 y.o. who was admitted to [facility]. The patient's principle diagnosis of acute respiratory failure has resulted in pain, anxiety, restlessness, shortness of breath  Functionally, the patient's Palliative Performance Scale has declined over a period of days and is estimated at 20. Objective information that support this patients limited prognosis includes: Chest CT, chest xray. The patient/family chose comfort measures with the support of Hospice.     Patient meets for GIP LOC as evidenced by need for IV medications for symptom management   Prognosis estimated based on 06/24/21 clinical assessment is:   [] Few to Many Hours  [] Hours to Days   [x] Few to Many Days   [] Days to Weeks   [] Few to Many Weeks   [] Weeks to Months   [] Few to Many Months    ASSESSMENT    Patient self-reports:  [x]  Yes    [] No    SYMPTOMS: pain, anxiety, agitation, shortness of breath     SIGNS/PHYSICAL FINDINGS: restlessness, grimacing, labored breathing     FAST for all dementia:  n/a    Learning Assessment:  Patient  Is patient willing/able to learn? no  What is the highest level of education completed? Unknown   Learning preference (written material, demonstration, visual)? No preference   Learning barriers (ESOL, [place], poor vision)? ESOL   Caregiver  Is caregiver willing to learn care for patient? Yes  What is the highest level of education completed? Unknown   Learning preference (written material, demonstration, visual)? No preference   Learning barriers (ESOL, [place], poor vision)? None noted     CLINICAL INFORMATION     Wt Readings from Last 3 Encounters:   06/23/21 58.5 kg (129 lb)   06/07/21 65.7 kg (144 lb 13.5 oz)   05/09/21 58.5 kg (129 lb)     Ht Readings from Last 3 Encounters:   06/23/21 5' 4\" (1.626 m)   05/25/21 5' 4\" (1.626 m)   05/09/21 5' 4\" (1.626 m)     There is no height or weight on file to calculate BMI. There were no vitals taken for this visit. LAB VALUES  No results found for this visit on 06/24/21 (from the past 12 hour(s)). No results found for this visit on 06/24/21 (from the past 6 hour(s)). Lab Results   Component Value Date/Time    Protein, total 6.3 (L) 06/21/2021 12:32 AM    Albumin 2.3 (L) 06/21/2021 12:32 AM       Currently this patient has:  [x] Supplemental O2 [x] Peripheral IV  [] PICC    [x] PORT   [] Eckert Catheter [] NG Tube   [] PEG Tube [] Ostomy    [] AICD: Has ICD been deactivated? [] Yes [] No:______    PLAN     1. Admit to GIP level of care for symptoms of shortness of breath, pain, and anxiety/agitation   2. Start scheduled IV lorazepam 1 mg every four hours and every fifteen minutes as needed for agitation and anxiety. 3. Start scheduled IV dilaudid 1 mg every four hours and every fifteen minutes as needed for pain/shortness of breath  4.  Start PRN albuterol 10 mg every four hours as needed, start bisacodyl suppository daily as needed, tylenol 650 mg suppository every six hours as needed, Toradol 30 mg every 8 hours as needed, Zofran 4 mg every four hours as needed, Compzaine 10 mg every four hours as needed. 5. Infection control and prevention as needed. If port is accessed maintain routine port care. 6. Provide support/education to caregiver/family7. Monitor closely for changes in symptoms  8. Provide support and frequent rounds for patient comfort and safety   9. Maintain skin integrity as tolerated for hospice patient, turning and repositioning for comfort  10. Provide  and  for patient and family support  6. Continue to discuss discharge plan for any changes    Hospice Team Frequency Orders:  Skilled Nurse - Daily x 14 days with 5 PRN visits for symptom control. MSW  1 x weekly with 5 visits PRN family support and need for volunteer services.   1 x weekly with 5 visits PRN spiritual support. CNA  daily x 14 days    ADVANCE CARE PLANNING (Complete in ACP Flow Sheet)   Code Status: DNR  Durable DNR: [x]  Yes  []  No  Code Status Discussed/Confirmed: Yes  Preference for Other Life Sustaining Treatment Discussed/Confirmed: Yes  Hospitalization Preference: Yes  Advance Care Planning 2021   Patient's Healthcare Decision Maker is: Named in scanned ACP document   Confirm Advance Directive Yes, not on file   Patient Would Like to Complete Advance Directive -        Service: [] Yes  []  No      [x] Unknown  Appropriate for Pinning Ceremony:  [] Yes     [x] No  Buddhist: Episcopal   Home: TBD    DISCHARGE PLANNING     1. Discharge Plan: Patient most likely to [facility] away at [facility]. If patient stabilizes patient will change to routine LOC and begin to look for placement for patient. 2. Patient/Family teaching: Yes- hospice house routine, medications.    3. Response to patient/family teaching: recpetive     SOCIAL/EMOTIONAL/SPIRITUAL NEEDS     Spiritual Issues Identified: Patient's jennifer is very important to her, patient moved to live with her family in late 2020 during the pandemic so patient may not have developed a community. Psych/ Social/ Emotional Issues Identified: Patient seems very close to her children however her  passed away a year ago and patient moved to be with her children. Caregiver Support:  [x] Provided information on End of Life Care   [x] Material Provided: Gone From My Sight or [employer]   Dr. Harshad Menchaca contacted, discharge to hospice order received  Dr. Cynthia Prabhakar contacted, agrees to serve as attending provider for hospice and provided verbal certification of terminal illness with life expectancy of 6 months or less. Orders for hospice admission, medications and plan of treatment received. Medication reconciliation completed. MEDS: See medication list below  DME: Per [facility]  Supplies: Per [facility]  IDT communication to include MD, SN, SW, CH and support team    ALLERGIES AND MEDICATIONS     Allergies:    Allergies   Allergen Reactions    Shellfish Derived Anaphylaxis     Current Facility-Administered Medications   Medication Dose Route Frequency    acetaminophen (TYLENOL) suppository 650 mg  650 mg Rectal Q6H PRN    albuterol (PROVENTIL VENTOLIN) nebulizer solution 10 mg  10 mg Nebulization Q4H PRN    albuterol-ipratropium (DUO-NEB) 2.5 MG-0.5 MG/3 ML  3 mL Nebulization TID    gabapentin (NEURONTIN) capsule 600 mg  600 mg Oral BID    glycopyrrolate (ROBINUL) injection 0.2 mg  0.2 mg IntraVENous Q4H PRN    HYDROmorphone (DILAUDID) injection 1 mg  1 mg IntraVENous Q15MIN PRN    ketorolac (TORADOL) injection 30 mg  30 mg IntraVENous Q8H PRN    LORazepam (ATIVAN) injection 1 mg  1 mg IntraVENous Q15MIN PRN    [START ON 6/25/2021] levothyroxine (SYNTHROID) tablet 75 mcg (Patient Supplied)  75 mcg Oral 6am    ondansetron (ZOFRAN) injection 4 mg  4 mg IntraVENous Q4H PRN    oxyCODONE ER (OxyCONTIN) tablet 20 mg  20 mg Oral Q12H    [START ON 6/25/2021] polyethylene glycol (MIRALAX) packet 17 g  17 g Oral DAILY    prochlorperazine (COMPAZINE) injection 10 mg  10 mg IntraVENous Q4H PRN    [START ON 6/25/2021] amLODIPine (NORVASC) tablet 10 mg (Patient Supplied)  10 mg Oral DAILY    bisacodyL (DULCOLAX) suppository 10 mg  10 mg Rectal DAILY PRN    albuterol (PROVENTIL VENTOLIN) nebulizer solution 10 mg  10 mg Nebulization Q4H PRN

## 2021-06-24 NOTE — PROGRESS NOTES
Spoke to daughter at bedside about placing conner prior to DC per mar.  Daughter stated it would be a better decision to wait until she was settle at the hospice house to place the conner

## 2021-06-24 NOTE — DISCHARGE INSTRUCTIONS
HOSPITALIST DISCHARGE INSTRUCTIONS    NAME: Georgette Blankenship   :  1940   MRN:  [id number]     Date/Time:  2021 2:48 PM    ADMIT DATE: 2021   DISCHARGE DATE: 2021     Acute on chronic hypoxic respiratory failure POA  Bilateral PNA/HCAP  COPD exacerbation  Acute pulmonary edema, suspect diastolic CHF  Moderate MR  History of chronic back pain  History of bilateral common iliac stenosis,   History of hypothyroidism/hypertension,  History of right subclavian stenosis, evaluated by vascular last month at OSH. · It is important that you take the medication exactly as they are prescribed. · Keep your medication in the bottles provided by the pharmacist and keep a list of the medication names, dosages, and times to be taken in your wallet. · Do not take other medications without consulting your doctor. What to do at [address]:  Comfort feeding    Recommended activity: Activity as tolerated      If you have questions regarding the hospital related prescriptions or hospital related issues please call [facility] at [phone]. You can always direct your questions to your primary care doctor if you are unable to reach your hospital physician; your PCP works as an extension of your hospital doctor just like your hospital doctor is an extension of your PCP for your time at the hospital [facility])      Additional Instructions:      Bring these papers with you to your follow up appointments. The papers will help your doctors be sure to continue the care plan from the hospital.              Information obtained by :  I understand that if any problems occur once I am at home I am to contact my physician. I understand and acknowledge receipt of the instructions indicated above.                                                                                                                                            Physician's or R.N.'s Signature Date/Time                                                                                                                                              Patient or Representative Signature

## 2021-06-24 NOTE — HOSPICE
[facility] RN note:  Meeting with hospice team (liaisons and Dr Azul Gomez) with pt, daughter Greer Said (mpoa-documents to be brought to [facility]) and Jerri's . Pt having pain, dyspnea and nausea despite 5 PRN doses of IV dilaudid in addition to scheduled ER po oxycodone. Pt states, \"I just want to be comfortable\" while requesting additional dose of IV dilaudid. Dose increased to 1mg IV every 29LRM PRN per Dr Azul Gomez. Plan is to transfer to [facility], please pre medicate with IV lorazepam to help with anxiety. Jeffrey Muhammad 1940 Room 3109   DX: Acute Hypoxemic Resp Failure/HCAP/COPD   Date of Consult: 6/22/2021   Clinical Notes: pt with increased pain, dyspnea and nausea. IV medications adjusted by DR MEDINA D/C to [facility] at 2:15, will pre medicate prior to transport. DDNRand consents signed, covid neg. [facility] 860 [id number], contact BRANDEN. 2:17--transport delayed to 3:30 per SAKSHI Borrego who will notify family. Thank you for the opportunity to care for this pt and family. Please contact hospice at [phone] with any questions or concerns.

## 2021-06-24 NOTE — PROGRESS NOTES
Problem: Falls - Risk of  Goal: *Absence of Falls  Description: Document Frankleydaroque Hercules Fall Risk and appropriate interventions in the flowsheet. Outcome: Progressing Towards Goal  Note: Fall Risk Interventions:  Mobility Interventions: Bed/chair exit alarm, Communicate number of staff needed for ambulation/transfer         Medication Interventions: Bed/chair exit alarm, Patient to call before getting OOB    Elimination Interventions: Bed/chair exit alarm, Call light in reach    History of Falls Interventions: Bed/chair exit alarm         Problem: Pressure Injury - Risk of  Goal: *Prevention of pressure injury  Description: Document Edouard Scale and appropriate interventions in the flowsheet.   Outcome: Progressing Towards Goal  Note: Pressure Injury Interventions:  Sensory Interventions: Assess changes in LOC    Moisture Interventions: Internal/External urinary devices, Minimize layers    Activity Interventions: Assess need for specialty bed    Mobility Interventions: Assess need for specialty bed    Nutrition Interventions: Document food/fluid/supplement intake    Friction and Shear Interventions: Minimize layers

## 2021-06-24 NOTE — PROGRESS NOTES
Transition of Care Plan:     RUR: 40% - \"high risk\"  [address]  Follow up appointments: Pt d/c to [facility]  DME needed: N/A - pt d/c to [facility]  Transportation at [facility] transport secured for 2:15 PM; PCS completed & placed on chart. DDNR will need to be copied & attached to [address] prior to transport crew leaving the facility  [address] or means to access home: N/A - Pt d/c to [facility]  IM Medicare letter: 2nd IM needed prior to d/c  Caregiver Contact: Pt's daughter Estuardo [phone])   Discharge Caregiver contacted prior to discharge? Family present at bedside the day of d/c (21)    Update - 2:15 PM: CM received update from RN reporting that [facility] contacted the unit & informed her updated ETA will be 3:30 PM. CM contacted [facility] RN (Amy) of delay; Amy verbalized understanding. CM to enter a delay in d/c to reflect the current barriers preventing pt from transitioning out of [address] Initial note: CM acknowledged d/c. CM reviewed pt's chart prior to moving forward with d/c planning. CM consulted with [facility] RN (Glasco), who requested for CM to secure medical transport at 2:30 PM to [facility]. CM sent referral via [employer] to [facility] requesting transport at 2:15 PM; pick-up time confirmed. PCS completed, copy placed on chart. CM contacted [facility] RN (Amy: [phone]) to inform her of transport time & inquire whether or not DDNR has been completed; Amy noted transport time & confirmed DDNR has been completed. Amy reported that she will bring a copy of the DDNR for [facility] paperwork. No further CM needs identified. CM will remain available for consult if additional needs arise before the pt leaves the facility. Care Management Interventions  PCP Verified by CM:  Yes  Palliative Care Criteria Met (RRAT>21 & CHF Dx)?: Yes  Palliative Consult Recommended?: Yes  Mode of Transport at Discharge: [address]  [address] Time of Discharge: 4084  Transition of Care Consult (CM Consult): Hospice House  Discharge Durable Medical Equipment: No  Physical Therapy Consult: Yes  Occupational Therapy Consult: Yes  Speech Therapy Consult: Yes  Current Support Network: Relative's Home  Confirm Follow Up Transport: Family  The Plan for Transition of Care is Related to the Following Treatment Goals : hospice   The Patient and/or Patient Representative was Provided with a Choice of Provider and Agrees with the Discharge Plan?: Yes  Name of the Patient Representative Who was Provided with a Choice of Provider and Agrees with the Discharge Plan: Alexander Rodriguez (daughter)  Freedom of Choice List was Provided with Basic Dialogue that Supports the Patient's Individualized Plan of Care/Goals, Treatment Preferences and Shares the Quality Data Associated with the Providers?: Yes   Resource Information Provided?: No  Discharge Location  Discharge Placement: Other: ([facility])    nAgel Castillo, [address] [facility], [address] [place] [address]

## 2021-06-24 NOTE — PROGRESS NOTES
End of Shift Note    Bedside shift change report given to Bam (oncoming nurse) by PATRICE Dominguez (offgoing nurse).   Report included the following information SBAR    Shift worked:  4834-0556   Shift summary and any significant changes:     PRN Xanex given x3; PRN Dilaudid given x3; patient very anxious throughout shift and was asking for anxiety meds multiple times        Concerns for physician to address:  Anxiety of patient    Zone phone for oncoming shift:   7614     Patient Information  Allyson Rendon  80 y.o.  6/20/2021 10:29 PM by Kimberly Liu MD. Allyson Rendon was admitted from [facility]    Problem List  Patient Active Problem List    Diagnosis Date Noted    ACP (advance care planning) 06/22/2021    COPD (chronic obstructive pulmonary disease) ([address] 06/21/2021    Hypokalemia 06/21/2021    Head trauma 06/21/2021    Troponin level elevated 06/21/2021    Elevated brain natriuretic peptide (BNP) level 06/21/2021    HCAP (healthcare-associated pneumonia) 06/21/2021    Acute hypoxemic respiratory failure ([address] 06/21/2021    Multiple fractures 06/07/2021    Physical debility 06/07/2021    HAP (hospital-acquired pneumonia) 05/19/2021    Pneumonia of both lungs due to infectious organism 05/19/2021    Chronic pulmonary embolism ([address] 05/11/2021    Acute respiratory insufficiency 05/10/2021    CAP (community acquired pneumonia) 05/10/2021    Pneumonia 05/10/2021    COPD with acute exacerbation ([address] 05/10/2021    Acute on chronic respiratory failure with hypoxia ([address] 05/10/2021    Anemia of chronic illness 05/10/2021    Elbow laceration, left, initial encounter 03/08/2021    Acute metabolic encephalopathy 01/12/8225    Dizzy 03/03/2021    DM (diabetes mellitus), type 2 ([address]     Chronic obstructive pulmonary disease ([address]     Arthritis     Chronic pain     Anxiety and depression     Glaucoma     Neuropathy     Polypharmacy     Incontinence     GERD (gastroesophageal reflux disease)  Hyponatremia 03/02/2021    Acute pulmonary embolism ([facility] 02/25/2021    Precordial pain 02/25/2021    Dizziness 02/25/2021    Abdominal pain 02/25/2021    HTN (hypertension) 01/03/2021    Type II diabetes mellitus ([facility] 01/03/2021    Hypothyroid 01/03/2021    Anxiety 01/03/2021    History of CVA (cerebrovascular accident) 01/03/2021    Hx of completed stroke 2021     Past Medical History:   Diagnosis Date    Anxiety and depression     Arthritis     Chronic obstructive pulmonary disease (HCC)     Chronic pain     DM type 2 causing vascular disease ([facility]     GERD (gastroesophageal reflux disease)     Glaucoma     HTN (hypertension)     Hx of completed stroke 2017, 2021    Hypothyroid     Incontinence     Neuropathy     Polypharmacy        Core Measures:  CVA: No No  CHF:No No  PNA:Yes Yes     Activity:  Activity Level: Bed Rest  Number times ambulated in hallways past shift: 0  Number of times OOB to chair past shift: 0    Cardiac:   Cardiac Monitoring: Yes      Cardiac Rhythm: Sinus Rhythm    Access:   Current line(s): PIV       Genitourinary:   Urinary status: external catheter    Respiratory:   O2 Device: Nasal cannula  Chronic home O2 use?: YES  Incentive spirometer at bedside: NO       GI:  Last Bowel Movement Date: 06/23/21  Current diet:  ADULT DIET Dysphagia - Pureed; 3 carb choices (45 gm/meal)  DIET ONE TIME MESSAGE  Passing flatus: YES  Tolerating current diet: YES       Pain Management:   Patient states pain is manageable on current regimen: YES    Skin:  Edouard Score: 15  Interventions: increase time out of bed and internal/external urinary devices    Patient Safety:  Fall Score:  Total Score: 4  Interventions: bed/chair alarm, gripper socks and pt to call before getting OOB  High Fall Risk: Yes  @Rollbelt  @dexterity to release roll belt  Yes/No ( must document dexterity  here by stating Yes or No here, otherwise this is a restraint and must follow restraint documentation policy.)    DVT prophylaxis:  DVT prophylaxis Med- Yes  DVT prophylaxis SCD or MIGDALIA- No     Wounds: (If Applicable)  Wounds- Yes  Location: abrasion R knee    Active Consults:  IP CONSULT TO CARDIOLOGY  IP CONSULT TO PALLIATIVE CARE - PROVIDER    Length of Stay:  Expected LOS: 5d 9h  Actual LOS: 3  Discharge Plan: Yes; hospice       PATRICE Askew none known none known none known

## 2021-06-25 NOTE — [facility] IDG CHAPLAIN NOTES
Patient: Apolinar Ibarra    Date: 06/25/21  Time: 2:44 PM    [facility] [place]  Notes  Follow-up visit with pt at the [facility] in room 16. Pt soundly sleeping, did not respond during length of visit. Accompanied by daughter and grand-daughter Octavia Meadowsa at bedside. Interventions:  introduced self and role. Family appeared to be coping well and opened up about pt's jennifer. Pt had an audio bible playing near to pt. Family stated that pt was at peace and had assurance about her destination. Family shares this jennifer. Asked  for prayer which was provided. Affirmed jennifer and advised of availability of chaplains for additional spiritual support as desired. Family thanked  for visit. Goal for next two weeks: Chaplains will be available to provide prayer and EOL for pt and family. Signed by: Tez Handley

## 2021-06-25 NOTE — PROGRESS NOTES
Follow-up visit with pt at the [facility] in room 16. Pt soundly sleeping, did not respond during length of visit. Accompanied by daughter and grand-daughter Iain Caraballo at bedside.  introduced self and role. Family appeared to be coping well and opened up about pt's jennifer. Pt had an audio bible playing near to pt. Family stated that pt was at peace and had assurance about her destination. Family shares this jennifer. Asked  for prayer which was provided. Affirmed jennifer and advised of availability of chaplains for additional spiritual support as desired. Family thanked  for visit. Goal for next two weeks: Chaplains will be available to provide prayer and EOL for pt and family. Signed by: Lety Olivier

## 2021-06-25 NOTE — PROGRESS NOTES
[facility]  [phone]    Patient Name: Kee Araiza  YOB: 1940    Date of Provider Hospice Visit: 06/25/21    Level of Care:   [x] General Inpatient (GIP)    [] Routine   [] Respite    Current Location of Care:  [] [facility] [] [facility] [] [facility] [] [facility] [x] Hospice [facility], patient referred from:  [] [facility] [] [facility] [x] [facility] [] [facility] [] Home [] Other:     Date of Original Hospice Admission: 06/24/21  Hospice Medical Director at time of admission: [address] [place] [address] Diagnosis: Acute Respiratory Failure  Diagnoses RELATED to the terminal prognosis:  Advanced COPD, pulmonary fibrosis, HCAP  Other Diagnoses: HTN, DM, Hypothyroidism, multiple CVA's w/ residual left sided weakness, PE, anxiety, Falls, chronic rib fractures and chronic back pain     HOSPICE SUMMARY   Do not cut and paste chart information other than imaging findings    Kee Araiza is a 80y.o. year old who was admitted to [facility]. 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 Pt was managed aggressively in hospital but did not respond well and continued to decline, doing poorly with increased pain, anxiety and breathing distress. Pt has had multiple hospital admissions in past 6 months and is frail, debilitated with progressive functional decline. PPS 20%    Refer to LCD     The patient/family chose comfort measures with the support of Hospice. HOSPICE DIAGNOSES   Active Symptoms:  1. Labored breathing  2. Worsening chronic pain; diffuse  3.  Increased oropharyngeal secretions  4. Anxiety/restlessness; significant  5. Dysphagia/anorexia/aspiration risk  6. Leg edema/heart failure  7. Hospice care pt     PLAN   1. Patient will continue [facility] level care as she needs frequent nursing assessment, IV medication management as she was transition off all oral medication, not safe to transition home. We already made adjustments in her Dilaudid this morning as patient also was on high-dose opioids in the home setting. She appears much more comfortable this afternoon with adjustments in medication. 2. Dilaudid has been increased to 2 mg IV every 4 hours scheduled every 15 minutes as needed  3. Ativan 1 mg IV every 4 hours scheduled every 15 minutes as needed  4. Robinul 0.2 mg IV every 4 hours  5. O2 4l NC for comfort  6. Insert conner's catheter for comfort-1300 cc out immediately  7. Port has been accessed  8. Plan reviewed with patient's daughter, granddaughter, and son-in-law at the bedside. They are very pleased with hospice care and staying for the patient appears comfortable. We will make further adjustments in medication as needed and watch for increased pain/shortness of breath. 5.  and SW to support family needs  10. Disposition: likely will pass here  11. Hospice Plan of care was reviewed in detail and agree with current plan of care    Prognosis estimated based on 06/25/21 clinical assessment is:   [x] Hours to Days    [] Days to Weeks    [] Other:    Communicated plan of care with: Hospice Case Manager; Hospice IDT; Care Team     GOALS OF CARE     Patient/Medical POA stated Goal of Care: comfort    [x] I have reviewed and/or updated ACP information in the Advance Care Planning Navigator. This information is available in the [address] link in the patient's chart header.     Primary Decision [facility] Agent):   Primary Decision Maker: Greg Mcdowell - Daughter - [phone]    Secondary Decision Maker: Mathieu Espinoza - Son - [phone]    Resuscitation Status: DNR  If DNR is there a Durable DNR on file? : [x] Yes [] No (If no, complete Durable DNR)    HISTORY     History obtained from: chart review, pt    CHIEF COMPLAINT: tired  The patient is:   [x] Verbal  [] Nonverbal  [] Unresponsive    HPI/SUBJECTIVE:  Pt has just arrived with ambulance from [address] [facility] and being settled in bed. Vitals not done yet but appears to be breathing hard, labored, anxious and admits feeling tired. Pt received several doses of dilaudid and ativan at hospital and got a dose prior to transfer    6/25patient minimally responsive. Appears much more comfortable than earlier in the day.        REVIEW OF SYSTEMS     The following systems were: [x] reviewed  [] unable to be reviewed    Positive ROS include:  Constitutional: fatigue, weakness, in pain, short of breath  Ears/nose/mouth/throat: increased airway secretions  Respiratory:shortness of breath, wheezing  Gastrointestinal:poor appetite, nausea, vomiting, abdominal pain, constipation, diarrhea  Musculoskeletal:pain, deformities, swelling legs  Neurologic:confusion, hallucinations, weakness  Psychiatric:anxiety, feeling depressed, poor sleep  Endocrine:     Adult Non-Verbal Pain Assessment Score: 6-prior to medication    Face  [] 0   No particular expression or smile  [] 1   Occasional grimace, tearing, frowning, wrinkled forehead  [x] 2   Frequent grimace, tearing, frowning, wrinkled forehead    Activity (movement)  [] 0   Lying quietly, normal position  [] 1   Seeking attention through movement or slow, cautious movement  [x] 2   Restless, excessive activity and/or withdrawal reflexes    Guarding  [] 0   Lying quietly, no positioning of hands over areas of body  [x] 1   Splinting areas of the body, tense  [] 2   Rigid, stiff    Physiology (vital signs)  [x] 0   Stable vital signs  [] 1   Change in any of the following: SBP > 20mm Hg; HR > 20/minute  [] 2   Change in any of the following: SBP > 30mm Hg; HR > 25/minute    Respiratory  [] 0 Baseline RR/SpO2, compliant with ventilator  [x] 1   RR > 10 above baseline, or 5% drop SpO2, mild asynchrony with ventilator  [] 2   RR > 20 above baseline, or 10% drop SpO2, asynchrony with ventilator     FUNCTIONAL ASSESSMENT     Palliative Performance Scale (PPS): 10       PSYCHOSOCIAL/SPIRITUAL ASSESSMENT     Active Problems:    * No active hospital problems.  *    Past Medical History:   Diagnosis Date    Anxiety and depression     Arthritis     Chronic obstructive pulmonary disease (HCC)     Chronic pain     DM type 2 causing vascular disease (HCC)     GERD (gastroesophageal reflux disease)     Glaucoma     HTN (hypertension)     Hx of completed stroke 2021    Hypothyroid     Incontinence     Neuropathy     Polypharmacy       Past Surgical History:   Procedure Laterality Date    HX ORTHOPAEDIC      HX VASCULAR ACCESS        Social History     Tobacco Use    Smoking status: Former Smoker     Quit date: 1991     Years since quittin.5    Smokeless tobacco: Never Used   Substance Use Topics    Alcohol use: Never     Family History   Problem Relation Age of Onset    No Known Problems Other         reviewed, patient did not know     Diabetes Mother     Heart Attack Mother     Cancer Father       Allergies   Allergen Reactions    Shellfish Derived Anaphylaxis      Current Facility-Administered Medications   Medication Dose Route Frequency    HYDROmorphone (DILAUDID) injection 2 mg  2 mg IntraVENous Q4H    acetaminophen (TYLENOL) suppository 650 mg  650 mg Rectal Q6H PRN    albuterol (PROVENTIL VENTOLIN) nebulizer solution 10 mg  10 mg Nebulization Q4H PRN    HYDROmorphone (DILAUDID) injection 1 mg  1 mg IntraVENous Q15MIN PRN    ketorolac (TORADOL) injection 30 mg  30 mg IntraVENous Q8H PRN    LORazepam (ATIVAN) injection 1 mg  1 mg IntraVENous Q15MIN PRN    ondansetron (ZOFRAN) injection 4 mg  4 mg IntraVENous Q4H PRN    prochlorperazine (COMPAZINE) injection 10 mg  10 mg IntraVENous Q4H PRN    bisacodyL (DULCOLAX) suppository 10 mg  10 mg Rectal DAILY PRN    glycopyrrolate (ROBINUL) injection 0.2 mg  0.2 mg IntraVENous Q4H    LORazepam (ATIVAN) injection 1 mg  1 mg IntraVENous Q4H        PHYSICAL EXAM     Wt Readings from Last 3 Encounters:   06/23/21 58.5 kg (129 lb)   06/07/21 65.7 kg (144 lb 13.5 oz)   05/09/21 58.5 kg (129 lb)       Visit Vitals  BP (!) 85/58   Pulse 97   Temp 98.6 °F (37 °C)   Resp 12   SpO2 (!) 71%       Supplemental O2  [x] Yes: 4l NC  [] NO  Last bowel movement:    Currently this patient has:  [x] Peripheral IV [] PICC  [x] PORT [] ICD    [] Eckert Catheter [] NG Tube   [] PEG Tube    [] Rectal Tube [] Drain  [] Other:     Constitutional: Minimally responsive, no furrowing of the brow, no moaning  Eyes: pallor+  ENMT: Minimal secretions  Cardiovascular: tachycardic, trace edema legs 1+ pitting, systolic murmur on precordium  Respiratory: Respiratory rate in the low teens, no accessory muscle use, poor air exchange,  Gastrointestinal: soft, non tender, no masses  Musculoskeletal: thin extremities, muscle wasting  Skin:areas of bruising  Neurologic: Nonfocal  Psychiatric: Restless earlier other:       Pertinent Lab and or Imaging Tests:  Lab Results   Component Value Date/Time    Sodium 133 (L) 06/24/2021 03:46 AM    Potassium 4.3 06/24/2021 03:46 AM    Chloride 100 06/24/2021 03:46 AM    CO2 29 06/24/2021 03:46 AM    Anion gap 4 (L) 06/24/2021 03:46 AM    Glucose 156 (H) 06/24/2021 03:46 AM    BUN 23 (H) 06/24/2021 03:46 AM    Creatinine 0.82 06/24/2021 03:46 AM    BUN/Creatinine ratio 28 (H) 06/24/2021 03:46 AM    GFR est AA >60 06/24/2021 03:46 AM    GFR est non-AA >60 06/24/2021 03:46 AM    Calcium 7.9 (L) 06/24/2021 03:46 AM     Lab Results   Component Value Date/Time    Protein, total 6.3 (L) 06/21/2021 12:32 AM    Albumin 2.3 (L) 06/21/2021 12:32 AM           Total time:   Counseling / coordination time:   > 50% counseling / coordination?:

## 2021-06-25 NOTE — HOSPICE
1900: report received from Leti Miller, [address] [facility]: pt assessed, daughter and son in law at bedside. Pt having periods of apnea lasting 20-30 seconds with audible secretions. Family educated on EOL symptoms and plan of care, they verbalized understanding that pt may be declining as she is now lethargic and only responds to pain as well as changes in her breathing in the last few hours. 2030: PRN Dilaudid given, pt is grimacing  2120: R chest port accessed, PIV infiltrated and removed. 2210: scheduled IV medications given. Pt is minimally responsive and will open her eyes at times but does not interact  2305: pt resting on R side with her bible tape playing at bedside. Relaxed facial expression  0000: pt lying quietly, skin is warm and dry. RR 7 with brief periods of apnea  0050: pt resting with eyes closed. Respirations are irregular but unlabored, no grimacing noted  0150: scheduled IV medications given. Pt continues with brief periods of apnea   0210: pt bathed and was incontinent of urine, groin is excoriated- barrier cream applied  0255: pt awake and able to speak a few words. Pt states \"my back hurts\" and became tearful. PRN IV dilaudid given for pain. Pt continues to express pain for several minutes and then thanked RN for bathing her. 0330: pt continues to be awake but drowsy, pt stated she needs to void- pt voided in brief, wilfred care completed, brief changed and barrier cream applied. Skin on upper spine is reddened but blanchable. Mepilex applied. Turned to R side. 4856: resting quietly with eyes closed, respirations are unlabored  0520: pt is resting quietly with eyes closed, respirations are unlabored, RR 8 with brief periods of apnea. 6194: scheduled IV medications given.  Pt with relaxed facial expression, no grimacing or dyspnea  0700: report given to oncoming nurse          NAME OF PATIENT:  [place]    LEVEL OF CARE: GIP    REASON FOR GIP:   Pain, despite numerous changes in medications, Unmanageable respiratory distress, Medication adjustment that must be monitored 24/ and Stabilizing treatment that cannot take place at home    *PATIENT REMAINS ELIGIBLE FOR GIP LEVEL OF CARE AS EVIDENCED BY: Pt requires IV medications to manage symptoms and requires frequent nursing assessments     REASON FOR RESPITE:  n/a    O2 SAFETY:  Concentrator positioning (6\" from furniture/drapes) and No petroleum based products on face while oxygen in use    FALL INTERVENTIONS PROVIDED:   Implemented/recommended resources for alarm system (personal alarm, bed alarm, call bell, etc.) , Implemented/recommended environmental changes (remove hazards, lower bed, improve lighting, etc.) and Implemented/recommended increased supervision/assistance    INTERDISPLINARY COMMUNICATION/COLLABORATION:  Physician, MSW, [place] and RN, CNA    NEW MEDICATION INITIATION DOCUMENTATION:  n/a    Reason medication is being initiated:  n/a    MD / Provider name consulted re: change in status / initiation of new medication: n/a    New Symptom(s): n/a    New Order(s): n/a    Name of the person notified of the changes: n/a    Name of person being taught: n/a    Instructions given: n/a    Side Effects taught: n/a    Response to teaching: n/a      COMFORTABLE DYING MEASURE:  Is Patient/family satisfied with symptom level?  yes    DISCHARGE PLAN:  Pt will likely  at [facility], if she stabilizes she will return home with hospice

## 2021-06-25 NOTE — HOSPICE
1900: report received from WALLY Vasquez  8953: assessment completed, pt is resting with eyes closed, respirations are irregular with intermittent periods of apnea but unlabored, pt is unresponsive. Mottling present in bilateral lower extremities up to knees, L great toe is cyanotic   2010: pt resting quietly with eyes closed. Respirations are unlabored, pt remains unresponsive  2100: mouth care completed, pt remains unresponsive. RR 12/minute   2155: pt daughter Martina Alonso called for update, update provided. 2200: scheduled IV medications given, pt breathing is shallow and irregular  2217: pt noted to be without respirations or heart sounds for 2 full minutes, pronounced dead at 2217             NAME OF PATIENT:  Allyson Rendon    LEVEL OF CARE: [facility]    REASON FOR GIP:   Pain, despite numerous changes in medications, Unmanageable respiratory distress, Medication adjustment that must be monitored 24/7 and Stabilizing treatment that cannot take place at home    *PATIENT REMAINS ELIGIBLE FOR [facility] LEVEL OF CARE AS EVIDENCED BY: Pt requires IV medications to manage symptoms, pt unable to tolerate PO medications.  Pt requires frequent nursing assessments       REASON FOR RESPITE:  n/a    O2 SAFETY:  Concentrator positioning (6\" from furniture/drapes) and No petroleum based products on face while oxygen in use    FALL INTERVENTIONS PROVIDED:   Implemented/recommended resources for alarm system (personal alarm, bed alarm, call bell, etc.) , Implemented/recommended environmental changes (remove hazards, lower bed, improve lighting, etc.) and Implemented/recommended increased supervision/assistance    INTERDISPLINARY COMMUNICATION/COLLABORATION:  Physician, MSW, [place] and RN, CNA    NEW MEDICATION INITIATION DOCUMENTATION:  n/a    Reason medication is being initiated: n/a    MD / Provider name consulted re: change in status / initiation of new medication: n/a    New Symptom(s): n/a    New Order(s): n/a    Name of the person notified of the changes: n/a    Name of person being taught: n/a    Instructions given: n/a    Side Effects taught: n/a    Response to teaching: n/a      COMFORTABLE DYING MEASURE:  Is Patient/family satisfied with symptom level?  yes    DISCHARGE PLAN:  Pt will likely  at [facility], if she stabilizes she will return home with daughter

## 2021-06-25 NOTE — HOSPICE
NAME OF PATIENT:  Allyson Rendon    LEVEL OF CARE:  GIP    REASON FOR GIP:   Pain, despite numerous changes in medications, Unmanageable respiratory distress, Terminal agitation, despite changes to medications, Medication adjustment that must be monitored 24/7 and Stabilizing treatment that cannot take place at home    *PATIENT REMAINS ELIGIBLE FOR [facility] LEVEL OF CARE AS EVIDENCED BY: (MUST BE ADDRESSED OF PATIENT GIP)      REASON FOR RESPITE:  n/a      O2 SAFETY:  Concentrator positioning (6\" from furniture/drapes), Tanks stored in velasquez , No petroleum based products on face while oxygen in use and Oxygen sign on the door    FALL INTERVENTIONS PROVIDED:   Implemented/recommended use of non-skid footwear, Implemented/recommended use of fall risk identification flag to all team members, Implemented/recommended assistive devices and encouraged their use, Implemented/recommended resources for alarm system (personal alarm, bed alarm, call bell, etc.) , Implemented/recommended environmental changes (remove hazards, lower bed, improve lighting, etc.) and Implemented/recommended increased supervision/assistance    INTERDISPLINARY COMMUNICATION/COLLABORATION:  DAVID LO    NEW MEDICATION INITIATION DOCUMENTATION:  n/a      Reason medication is being initiated:   n/a    MD / Provider name consulted re: change in status / initiation of new medication:   n/a    New Symptom(s):   n/a    New Order(s):   n/a    Name of the person notified of the changes:   n/a    Name of person being taught:   n/a    Instructions given:   n/a    Side Effects taught:   n/a    Response to teaching:   n/a      COMFORTABLE DYING MEASURE:  Is Patient/family satisfied with symptom level?  yes    DISCHARGE PLAN:  Patient may pass at [facility], should patient stabilize will return home with family. 0700  Report received from [facility]  0730  Patient voiced back pain, medicated with prn dilaudid. Cynthia Oliveira RN spoke with patient's daughter [facility], update provided.   2086 Patient resting quietly with eyes closed. Lungs very coarse, O2 sat 71%. Positioned for comfort. 0830  No changes, resting with eyes closed. Irregular respirations. 0930  Medicated with scheduled ativan, dilaudid, and robinul. 0945  Family at bedside, update provided. All questions answered. Patient appears to be resting comfortable. Occasional periods of apnea noted. 1015  Patient resting quietly. [facility] patient position, family at bedside. Great grandchildren having a window visit. 36  Family remains at bedside, patient resting with eyes closed. Respirations continue to be irregular and coarse. 1220 medicated with prn dilaudid prior to turning patient to her left side. Family at bedside. 1300  Eckert catheter placed without difficulty. 1300 cc liban urine immediately noted. Patient tolerated well. 1330  Scheduled robinul, ativan and dilaudid given. Family at bedside. 4258  Dr. Car Hayes at bedside providing family with update. 1500  Patient appears to be resting comfortably. Family remians at bedside. 1600  Resting quietly, family remains at bedside. 1700  Resting quietly with eyes closed. 1715  Medicated with scheduled ativan, dilaudid and robinul. 1745  Repositioned patient to her right side for comfort. [phone]  Patient continues to rest quietly with eyes closed.

## 2021-06-25 NOTE — PROGRESS NOTES
Problem: Falls - Risk of  Goal: *Absence of Falls  Description: Document Edith Gum Fall Risk and appropriate interventions in the flowsheet.   Outcome: Progressing Towards Goal  Note: Fall Risk Interventions:  Mobility Interventions: Bed/chair exit alarm    Mentation Interventions: Bed/chair exit alarm, Adequate sleep, hydration, pain control, Door open when patient unattended    Medication Interventions: Evaluate medications/consider consulting pharmacy    Elimination Interventions: Bed/chair exit alarm, Call light in reach    History of Falls Interventions: Bed/chair exit alarm, Door open when patient unattended

## 2021-06-25 NOTE — PROGRESS NOTES
Problem: Falls - Risk of  Goal: *Absence of Falls  Description: Document Rosey Rivera Fall Risk and appropriate interventions in the flowsheet. Outcome: Progressing Towards Goal  Note: Fall Risk Interventions:  Mobility Interventions: Bed/chair exit alarm    Mentation Interventions: Bed/chair exit alarm, Adequate sleep, hydration, pain control, Door open when patient unattended    Medication Interventions: Bed/chair exit alarm    Elimination Interventions: Bed/chair exit alarm, Call light in reach    History of Falls Interventions: Bed/chair exit alarm, Door open when patient unattended         Problem: Pressure Injury - Risk of  Goal: *Prevention of pressure injury  Description: Document Edouard Scale and appropriate interventions in the flowsheet.   Outcome: Progressing Towards Goal  Note: Pressure Injury Interventions:  Sensory Interventions: Assess changes in LOC, Check visual cues for pain, Minimize linen layers, Maintain/enhance activity level    Moisture Interventions: Absorbent underpads, Apply protective barrier, creams and emollients, Maintain skin hydration (lotion/cream), Minimize layers, Moisture barrier    Activity Interventions: Pressure redistribution bed/mattress(bed type)    Mobility Interventions: Float heels, HOB 30 degrees or less    Nutrition Interventions: Document food/fluid/supplement intake    Friction and Shear Interventions: Apply protective barrier, creams and emollients, HOB 30 degrees or less, Minimize layers

## 2021-06-26 ENCOUNTER — HOME CARE VISIT (OUTPATIENT)
Dept: HOSPICE | Facility: HOSPICE | Age: 81
End: 2021-06-26
Payer: MEDICARE

## 2021-06-26 NOTE — PROGRESS NOTES
Problem: Falls - Risk of  Goal: *Absence of Falls  Description: Document Shoshanaalejandro Velazco Fall Risk and appropriate interventions in the flowsheet. Outcome: Progressing Towards Goal  Note: Fall Risk Interventions:  Mobility Interventions: Bed/chair exit alarm    Mentation Interventions: Bed/chair exit alarm, Adequate sleep, hydration, pain control, Door open when patient unattended    Medication Interventions: Bed/chair exit alarm    Elimination Interventions: Bed/chair exit alarm, Call light in reach    History of Falls Interventions: Bed/chair exit alarm, Door open when patient unattended         Problem: Pressure Injury - Risk of  Goal: *Prevention of pressure injury  Description: Document Edouard Scale and appropriate interventions in the flowsheet.   Outcome: Progressing Towards Goal  Note: Pressure Injury Interventions:  Sensory Interventions: Assess changes in LOC, Check visual cues for pain, Float heels, Minimize linen layers, Maintain/enhance activity level    Moisture Interventions: Absorbent underpads, Apply protective barrier, creams and emollients, Internal/External urinary devices, Maintain skin hydration (lotion/cream), Minimize layers    Activity Interventions: Pressure redistribution bed/mattress(bed type)    Mobility Interventions: HOB 30 degrees or less, Float heels    Nutrition Interventions: Document food/fluid/supplement intake    Friction and Shear Interventions: HOB 30 degrees or less, Minimize layers

## 2021-06-27 LAB
BACTERIA SPEC CULT: NORMAL
BACTERIA SPEC CULT: NORMAL
SERVICE CMNT-IMP: NORMAL
SERVICE CMNT-IMP: NORMAL

## 2021-06-29 LAB
Lab: NORMAL
REFERENCE LAB,REFLB: NORMAL
TEST DESCRIPTION:,ATST: NORMAL

## 2021-11-09 NOTE — PROGRESS NOTES
Problem: Self Care Deficits Care Plan (Adult) Goal: *Acute Goals and Plan of Care (Insert Text) Description:  
FUNCTIONAL STATUS PRIOR TO ADMISSION: Patient lived with daughter, used quad cane and assistance for ambulation, and had aide 2x per week for assistance as well. HOME SUPPORT: The patient lived with daughter . Occupational Therapy Goals Initiated 3/3/2021 1. Patient will perform lower body dressing with supervision/set-up within 7 day(s). 2.  Patient will perform grooming with supervision/set-up within 7 day(s). 3.  Patient will perform toilet transfers with supervision/set-up within 7 day(s). 4.  Patient will perform all aspects of toileting with supervision/set-up within 7 day(s). 5.  Patient will participate in upper extremity therapeutic exercise/activities with supervision/set-up for 10 minutes within 7 day(s). 3/3/2021 1216 by Margot Wilkerson OT Outcome: Progressing Towards Goal 
 
 OCCUPATIONAL THERAPY EVALUATION Patient: Serg Montez (41 y.o. female) Date: 3/3/2021 Primary Diagnosis: Hyponatremia [E87.1] Dizzy [R42] Precautions: fall ASSESSMENT Based on the objective data described below, the patient presents with hospital admission secondary to dizziness. Patient reports dizziness has resolved and received seated in chair, agreeable to activity. Patient reports history of CVA with L HP affective UE/LE. Patient uses quad cane for ambulation. Today patient note with left elbow in flexion with increased tone, but able to extend with cues. Patient reports using only cane at home as daughter not interested in patient using RW. Patient requires only CGA with using RW and able to hold RW with bilateral UEs. Patient requires up to min assist for ADLs and reports assist from family and aide at home. Patient to benefit from continued OT services.  
 
Current Level of Function Impacting Discharge (ADLs/self-care): CGA-MIN A 
 
 Functional Outcome Measure: The patient scored 55/100 on the Barthel INDex  outcome measure . Other factors to consider for discharge: none Patient will benefit from skilled therapy intervention to address the above noted impairments. PLAN : 
Recommendations and Planned Interventions: self care training, functional mobility training, therapeutic exercise, balance training, therapeutic activities, endurance activities, patient education, home safety training and family training/education Frequency/Duration: Patient will be followed by occupational therapy 5 times a week to address goals. Recommendation for discharge: (in order for the patient to meet his/her long term goals) Occupational therapy at least 2 days/week in the home AND ensure assist and/or supervision for safety with ADLs and transfers This discharge recommendation: 
Has been made in collaboration with the attending provider and/or case management IF patient discharges home will need the following DME: none SUBJECTIVE:  
Patient stated My daughter doesn't like the walker.  OBJECTIVE DATA SUMMARY:  
HISTORY:  
Past Medical History:  
Diagnosis Date  Anxiety and depression  Arthritis  Chronic obstructive pulmonary disease ([facility] [address]  Chronic pain  DM type 2 causing vascular disease ([facility] [address]  GERD (gastroesophageal reflux disease)  Glaucoma   
 HTN (hypertension)  Hx of completed stroke 2021  Hypothyroid  Incontinence  Neuropathy  Polypharmacy Past Surgical History:  
Procedure Laterality Date  HX ORTHOPAEDIC    
 HX VASCULAR ACCESS Expanded or extensive additional review of patient history:  
 
Home Situation Home Environment: Private residence # Steps to Enter: 4 Rails to Enter: Yes Hand Rails : Bilateral 
One/Two Story Residence: Two story, live on 1st floor Living Alone: No 
Support Systems: Family member(s)(lives with dtr; home care aide 2xwk; and [facility]) Patient Expects to be Discharged to[de-identified] Private residence Current DME Used/Available at Home: Tod Acevedo, quad, Shower chair, Grab bars Tub or Shower Type: Tub/Shower combination Hand dominance: Right EXAMINATION OF PERFORMANCE DEFICITS: 
Cognitive/Behavioral Status: 
Neurologic State: Alert Orientation Level: Oriented X4 Cognition: Follows commands Perception: Appears intact Perseveration: No perseveration noted Skin: intact as seen Edema: none noted Hearing: 
  
 
Vision/Perceptual:   
    
    
    
  
    
    
Corrective Lenses: Glasses Range of Motion: 
AROM: Generally decreased, functional(decreased L UE sh flex/elbow-wrist ext d/t old CVA) Strength: 
Strength: Generally decreased, functional(LUE/LE) Coordination: 
Coordination: Generally decreased, functional(LUE/LE) Fine Motor Skills-Upper: Left Intact; Right Intact Gross Motor Skills-Upper: Left Intact; Right Intact Tone & Sensation: 
 
Tone: Abnormal(hypertonic LUE flexor synergy) Sensation: Impaired(LUE/LE) Balance: 
Sitting: Intact Standing: With support Functional Mobility and Transfers for ADLs: 
Bed Mobility: 
  
 
Transfers: 
Sit to Stand: Minimum assistance;Assist x1 Stand to Sit: Contact guard assistance Bathroom Mobility: Contact guard assistance Toilet Transfer : Contact guard assistance Assistive Device : Walker, rolling ADL Assessment: 
Feeding: Setup Oral Facial Hygiene/Grooming: Contact guard assistance Bathing: Minimum assistance Upper Body Dressing: Supervision Lower Body Dressing: Minimum assistance Toileting: Contact guard assistance ADL Intervention and task modifications: 
  
 
  
 
  
 
  
 
  
 
  
 
  
 
  
 
Therapeutic Exercise: 
  
Functional Measure: 
Barthel Index: 
 
Bathin Bladder: 10 Bowels: 10 
Groomin Dressin Feeding: 10 Mobility: 0 Stairs: 0 Toilet Use: 5 Transfer (Bed to Chair and Back): 10 Total: 55/100 The Barthel ADL Index: Guidelines 1. The index should be used as a record of what a patient does, not as a record of what a patient could do. 2. The main aim is to establish degree of independence from any help, physical or verbal, however minor and for whatever reason. 3. The need for supervision renders the patient not independent. 4. A patient's performance should be established using the best available evidence. Asking the patient, friends/relatives and nurses are the usual sources, but direct observation and common sense are also important. However direct testing is not needed. 5. Usually the patient's performance over the preceding 24-48 hours is important, but occasionally longer periods will be relevant. 6. Middle categories imply that the patient supplies over 50 per cent of the effort. 7. Use of aids to be independent is allowed. Bertina Aschoff., Barthel, D.W. (2112). Functional evaluation: the Barthel Index. [address] [facility] (14)2. Braeden Mcgrath carlos PRECIOUS Can, Jen Wilkinson., Gabriella Anderson., [place], [address] [place] [address] (1999). Measuring the change indisability after inpatient rehabilitation; comparison of the responsiveness of the Barthel Index and Functional Allen Measure. Journal of Neurology, Neurosurgery, and Psychiatry, 66(4), 593-384. Zeina Samuel, N.J.A, AILYN Solano, & Kristi Joshi MWhitleyA. (2004.) Assessment of post-stroke quality of life in cost-effectiveness studies: The usefulness of the Barthel Index and the EuroQoL-5D. [facility], 13, 003-03 Occupational Therapy Evaluation Charge Determination History Examination Decision-Making LOW Complexity : Brief history review  LOW Complexity : 1-3 performance deficits relating to physical, cognitive , or psychosocial skils that result in activity limitations and / or participation restrictions  LOW Complexity : No comorbidities that affect functional and no verbal or physical assistance needed to complete eval tasks Based on the above components, the patient evaluation is determined to be of the following complexity level: LOW Pain Rating: 
 
 
Activity Tolerance:  
Good After treatment patient left in no apparent distress:   
Sitting in chair and Call bell within reach COMMUNICATION/EDUCATION:  
The patients plan of care was discussed with: Physical therapist and Registered nurse. Home safety education was provided and the patient/caregiver indicated understanding., Patient/family have participated as able in goal setting and plan of care. and Patient/family agree to work toward stated goals and plan of care. This patients plan of care is appropriate for delegation to [facility]. Thank you for this referral. 
Stephanie Womack OTR/L Time Calculation: 26 mins Simponi Counseling:  I discussed with the patient the risks of golimumab including but not limited to myelosuppression, immunosuppression, autoimmune hepatitis, demyelinating diseases, lymphoma, and serious infections.  The patient understands that monitoring is required including a PPD at baseline and must alert us or the primary physician if symptoms of infection or other concerning signs are noted.

## 2022-03-08 NOTE — HOSPICE
[address] [facility] Help to Those in Need  [phone]    Social Work Admission Note  Patient Name: Simeon Ansari  YOB: 1940  Age: 80 y.o. Date of Visit: 21  Facility of Care: [facility]  Patient Room:      Hospice Attending: Monalisa Sue MD  Hospice Diagnosis: Acute Hypoxemic Respiratory Failure    Level of Care:    [x]  GIP    []  Respite   []  Routine    NARRATIVE   LCSW met with patient at bedside. No family present at time of visit, though personal items at bedside along with tape recorder playing voices of family members talking to pt were in room. Patient not responsive to sound or touch during visit, LCSW provided calm presence. LCSW later placed call to daughter, Griffin Blevins to check in. She says she is holding up well and had been able to get back to bedside with her daughter to visit. LCSW offered ongoing support as needed. ADVANCE CARE PLANNING    Code Status: DNR  Durable DNR: X Yes  _ No  Advance Care Planning 2021   Patient's Healthcare Decision Maker is: Named in scanned ACP document   Confirm Advance Directive Yes, not on file   Patient Would Like to Complete Advance Directive -       Relationship Status:  []  Single     []        []      []  Domestic Partner     []  /  []  Common Law  []    [x]  Unknown    If in a relationship, name of partner/spouse:  Duration of relationship:    Sikhism: Taoism     Home: [facility] [phone]   Resources Provided: Supportive presence at bedside    Social Work Initial Assessment     Gender:  female    Race/Ethnicity: (rochelle all that apply)  []  [place]) or [place] Native  []  [address]  []  Black or [place] American  []   or   []  [facility] or [facility]  [x]  [facility]  []  [facility] Service:    []  Yes   []  No       [x]  Unknown  Appropriate for Pinning Ceremony:   []  Yes      []  No  Is patient using VA benefits?    []  Yes      [] No     Primary Language: English  []   Needed  []   utilized during visit    Ability to express thoughts/needs/feelings  []  Expressed thoughts/feelings/needs without difficulty  []  Requires extra time and cuing  []  Speech limited single words  []  Uses only gestures (eye, blinking eye or head movement/pointing)  []  Unable to express thoughts/feelings/needs (speech unintelligible or inappropriate)  [x]  Unresponsive  Notes:      Mental Status:  []  Alert-oriented to:     []  Person     []  Place     []  Time  []  Comatose-responds to:    []   Verbal stimuli    []  Tactile stimuli    []  Painful stimuli  []  Forgetful  []  Disoriented/Confused  [x]  Lethargic  []  Agitated  []  Other (specify):    Notes:      Patients description of Illness/Current Health Status:    [x]  Patient unable to discuss  []  Patient unwilling to discuss  []  (Specify)        Knowledge/Understanding of Disease Process  Patient:    []  Demonstrates knowledge/understanding of disease process   []  Demonstrates knowledge/understanding of treatment plan   []  Demonstrates knowledge/understanding of prognosis   []  Demonstrates acceptance of prognosis   []  Demonstrates knowledge/understanding of resuscitation status   []  Other (specify)  Caregiver:   [x]  Demonstrates knowledge/understanding of disease process   [x]  Demonstrates knowledge/understanding of treatment plan   [x]  Demonstrates knowledge/understanding of prognosis   [x]  Demonstrates acceptance of prognosis   []  Demonstrates knowledge/understanding of resuscitation status   []  Other (specify)  Notes:      Patients living arrangement/care setting:  Use the PRIOR COLUMN when the PATIENTS current health status necessitated a change in his/her primary residence.     Prior Current Response              []             []    Patients own home/residence              []             []    Home of family member/friend              []             []    Boarding home              []             []    Assisted living facility/jail center              []             []    Hospital/Acute care facility              []             []    Skilled nursing facility              []             []    Long term care facility/Nursing home              []             [x]    Hospice in Patient      Primary Caregiver:  []  No Primary Caregiver  Name of Primary Caregiver: Joshuaamorloree Ayan  Relationship or Primary Caregiver:    []  Spouse/Significant other       []  Natural Child        []  Step child       []  Sibling   []  Parent   []  Friend/Neighbor   []  Community/[employer] Volunteer   []  Paid help   []  Other (specify):___________  Notes:       Family members/Significant others:  Name:  Relationship:  Phone Number:  Actively involved in care? []  Yes  []  No    Name:  Relationship:  Phone Number:  Actively involved in care? []  Yes  []  No    Name:  Relationship:  Phone Number:  Actively involved in care?   []  Yes  []  No    Social support systems: (select ONE best description)  []  Excellent social support system which includes three or more family members or friends  [x]  Good social support system which includes two or less members or friends  []  [address] support which includes one family member or friend  []  Poor social support; no family members or friends; basically ALONE  Notes:      Emotional Status: (rochelle all that apply)    Patient Caregiver Response                 []                [x]    Mood/Affect stable and appropriate                   []                []    Angry                 []                []    Anxious                 []                []    Apprehensive                 []                []    Avoidant                 []                []    Clinging                 []                []    Depressed                 []                []    Distraught                 []                []    Elated                 []                []    Euphoric []                []    Fearful                 []                []    Flat Affect                 []                []    Helpless                 []                []    Hostile                 []                []    Impulsive                 []                []    Irritable                 []                []    Labile                 []                []    Manic                 []                []    Restlessness                 []                []    Sad                 []                []    Suspicious                 []                []    Tearful                 []                []    Withdrawn     Notes:     Coping Skills (strengths/weakness):    Patient: Coping Skills (strength/weakness):    Family/caregiver (strength/weakness):     [place]lock of care (rochelle all that apply):     [x]  No burden evident   []  Family must administer medications   []  Illness causing financial strain   []  Family/Support feels overwhelmed   []  Family/Support sleep disturbed with patients care   []  Patients care causes extra physical stress  of death  []  Illness causes changes in family lifestyle  []  Illness impacting family/support employment  []  Family experiencing increased time demands  []  Patients behavior endangers family  []  Denial of patients illness  []  Concern over outcome of illness/fear  []  Patients behavior embarrassing to family   Notes:      Risk Factors: (rochelle all that apply):    [x]  No burden evident   []  Alcohol abuse  []  Financial resources inadequate to meet basic needs (food/house/etc)  []  Financial resources inadequate to meet health care needs (supplies/equipment/medications)  []  Food/nutrition resources inadequate  []  Home environment unsafe/inadequate for home care  []  Homicidal risk  []  Lives alone or without concerned relatives  []  Multiple medications/complex schedule  []  Physical limitations increase likelihood of falls  []  Plan of care/treatments complicated  [] Substance use/abuse  []  Suicidal risk  []  Visual impairment threatens safety/ability to perform self-care  []  Other (specify):     Abuse/Neglect (actual/potential risks):  [x]  No signs of abuse/neglect  []  History of abuse/neglect                 []  [id number]          []  Sexual  []  History of domestic violence  []  Lacks adequate physical care  []  Lacks emotional nurturing/support  []  Lacks appropriate stimulation/cognitive experiences  []  Left alone inappropriately  []  Lacks necessary supervision  []  Inadequate or delayed medical care  []  Unsafe environment (i.e guns/drug use/history of violence in the home/etc.)  []  Bruising or other physical signs of injury present  []  Other (specify):  Notes:   []  Refer to child/adult protective services      Current Sources of Stress (in Addition to Current Illness):   [x]  None reported  []  Bills/Debt    []  Career/Job change    []   (short term)    []   (long term)    []  Death of a child (recent)    []  Death of a parent (recent)   []  Death of a spouse (recent)   []  Employment status changed   []  Family discord    []  Financial loss/Inadequate inther (specify):come  []  Job loss  []  Legal issues unresolved  []  Lifestyle change  []  Marital discord  []  Marriage within the last year  []  Paperwork (insurance/legal/etc) overwhelming  []  Separation/Divorce  []  Other (specify):  Notes:      Current [employer] Being Utilized     1. Interventions/Plan of Care     1. Assess social and emotional factors related to coping with end of life issues  2. Community resource planning/referral   3. Relocation to different care setting if/when symptoms stabilize  4. Discharge Planning      1.  Should patient stabilize will dc home w/ family and hospice support    MSW Assessment Completed by: Chilo Muñoz  06/25/21    Time In: 100      Time Out:120 Yes

## 2023-06-09 NOTE — PROGRESS NOTES
[facility] 
[address] [facility], [address] [place] [address] 
[phone] Medical Progress Note NAME: Baldemar Guzamn :  1940 MRM:  [id number] Date/Time: 5/10/2021  2:50 PM 
 
 
 
  
Assessment / Plan: #Acute Hypoxemic Respiratory Failure: Wears 2L nc at night, but has a requirement for such now. Diffuse wheezing on exam c/w COPD exacerbation. CT w/o PO but possible bibasilar pneumonia. She has had no fever or leukocytosis, so my concern for this is actually quite low. Regardless, antiinflammatory effect of fluoroquinolone may be beneficial. COVID neg and s/p vax x 2. Aspiration pneumonitis a concern - Treat COPD exacerbation 
 - SLT swallow eval 
  
#COPD Exacerbation: As above, low concern for bacterial pneumonia given absence of fever, wbc; and neg procal. Higher concern for aspiration pneumonitis - Pred 40mg 
 - Scheduled duoneb - Likely d/c abx tmr #Nausea: Pt had episode of intense nausea and vomiting this afternoon with extreme discomfort. This was about 1 hour after receiving her home pain medicines on an empty stomach, and I suspect this as the culprit. Had BM this morning, so low c/f SBO.  
 - IV phenergan  
 - Start IV PPI until taking better PO #Anxiety: Pt is quite anxious at baseline it seems. On clonaz 0.25 nightly prn as well as fluox 
 - Cont home fluox, clonaz #Chronic Pain: On oxycodone ER 9mg BID as outpt with Oxy 5 IR as needed - Cont as able #Anemia: Slightly down from baseline. No e/o acute bleeding #DM: A1c is 6.7%. She is on glar 16u as outpt with SSI 
 - Incr glar to 20u; implement SSI for steroids #Weakness/Falls: Good support from home as lives with daughter 
 - PT/OT to see Care Plan discussed with: Patient and Nursing Staff Discussed:  Care Plan Prophylaxis:  SCD's Disposition:  Home w/Family 
        
___________________________________________________ Attending Physician: Rosa Wilson MD  
 
  
Subjective: Chief Complaint:  Marly Roberto. This morning she felt well and wanted to try to get home. However, this afternoon had episode of intense nausea and vomiting after pain meds. Worse appearing, so will keep ROS: 
(bold if positive, if negative) Tolerating PT  Tolerating Diet Objective:  
 
 
Vitals:  
 
 
  
Last 24hrs VS reviewed since prior progress note. Most recent are: 
 
Visit Vitals BP (!) 185/74 Pulse 95 Temp 98.7 °F (37.1 °C) Resp 20 Ht 5' 4\" (1.626 m) Wt 58.5 kg (129 lb) SpO2 95% BMI 22.14 kg/m² SpO2 Readings from Last 6 Encounters:  
05/10/21 95% 03/23/21 96% 03/10/21 95% 03/03/21 95% 02/27/21 96% 02/08/21 96% Intake/Output Summary (Last 24 hours) at 5/10/2021 1450 Last data filed at 5/10/2021 0574 Gross per 24 hour Intake 1022 ml Output  Net 1022 ml Exam:  
 
Physical Exam: 
 
Gen:  Frail, elderly, anxious HEENT:  Pink conjunctivae, PERRL, hearing intact to voice, moist mucous membranes Neck:  Supple, without masses, thyroid non-tender Resp:  Diffuse ronchi, no distress Card:  No murmurs, normal S1, S2 without thrills, bruits or peripheral edema Abd:  Soft, non-tender, non-distended, normoactive bowel sounds are present Musc:  No cyanosis or clubbing Skin:  No rashes or ulcers, skin turgor is good Neuro:  Cranial nerves 3-12 are grossly intact,  strength is 5/5 bilaterally and dorsi / plantarflexion is 5/5 bilaterally, follows commands appropriately Psych:  Good insight, oriented to person, place and time, alert Medications Reviewed: (see below) Lab Data Reviewed: (see below) 
 
______________________________________________________________________ Medications:  
 
Current Facility-Administered Medications Medication Dose Route Frequency  0.9% sodium chloride infusion  100 mL/hr IntraVENous CONTINUOUS  
 sodium chloride (NS) flush 5-40 mL  5-40 mL IntraVENous Q8H  
 sodium chloride (NS) flush 5-40 mL  5-40 mL IntraVENous PRN  
 acetaminophen (TYLENOL) tablet 650 mg  650 mg Oral Q6H PRN Or  
 acetaminophen (TYLENOL) suppository 650 mg  650 mg Rectal Q6H PRN  polyethylene glycol (MIRALAX) packet 17 g  17 g Oral DAILY PRN  
 bisacodyL (DULCOLAX) suppository 10 mg  10 mg Rectal DAILY PRN  
 ondansetron (ZOFRAN) injection 4 mg  4 mg IntraVENous Q6H PRN  
 famotidine (PEPCID) tablet 20 mg  20 mg Oral BID  aspirin delayed-release tablet 81 mg  81 mg Oral DAILY  atorvastatin (LIPITOR) tablet 80 mg  80 mg Oral QHS  clonazePAM (KlonoPIN) tablet 0.25 mg  0.25 mg Oral QHS  levothyroxine (SYNTHROID) tablet 75 mcg  75 mcg Oral ACB  magnesium oxide (MAG-OX) tablet 400 mg  400 mg Oral DAILY  furosemide (LASIX) tablet 20 mg  20 mg Oral DAILY PRN  
 glucose chewable tablet 16 g  4 Tab Oral PRN  
 dextrose (D50W) injection syrg 12.5-25 g  25-50 mL IntraVENous PRN  
 glucagon (GLUCAGEN) injection 1 mg  1 mg IntraMUSCular PRN  
 insulin lispro (HUMALOG) injection   SubCUTAneous AC&HS  predniSONE (DELTASONE) tablet 40 mg  40 mg Oral DAILY WITH BREAKFAST  [START ON 5/11/2021] levoFLOXacin (LEVAQUIN) tablet 750 mg  750 mg Oral Q48H  
 melatonin (rapid dissolve) tablet 5 mg  5 mg Oral QHS  gabapentin (NEURONTIN) capsule 600 mg  600 mg Oral BID  
 oxyCODONE ER (OxyCONTIN) tablet 10 mg  10 mg Oral Q12H  
 albuterol-ipratropium (DUO-NEB) 2.5 MG-0.5 MG/3 ML  3 mL Nebulization QID RT  
 FLUoxetine (PROzac) capsule 10 mg  10 mg Oral QPM  
 latanoprost (XALATAN) 0.005 % ophthalmic solution 1 Drop  1 Drop Both Eyes QHS  oxyCODONE IR (ROXICODONE) tablet 5 mg  5 mg Oral BID PRN  
 urea (URE-NA) 15 gram packet 1 Packet  1 Packet Oral DAILY  [START ON 5/11/2021] insulin glargine (LANTUS) injection 18 Units  0.3 Units/kg SubCUTAneous DAILY  promethazine (PHENERGAN) 12.5 mg in 0.9% sodium chloride 50 mL IVPB  12.5 mg IntraVENous NOW  pantoprazole (PROTONIX) 40 mg in 0.9% sodium chloride 10 mL injection  40 mg IntraVENous NOW Lab Review:  
 
Recent Labs 05/09/21 2021 WBC 10.8 HGB 8.2* HCT 26.6*  
 Recent Labs 05/09/21 2021   
K 4.0  
 CO2 29 * BUN 34* CREA 0.85 CA 7.7* ALB 2.9* ALT 40 No components found for: [place] FAMILY HISTORY:  No pertinent family history in first degree relatives

## 2023-07-13 NOTE — PROGRESS NOTES
attempted to follow up with Mrs. Cox on the Med. Surgical Unit. Mrs. Lolis Painter was lying in bed and appeared to be resting comfortably.  did not disturb her at this time. Consulted with [facility]. Care NP, Bam. Mrs. Carly Celaya daughter, David Whitley, should be visiting the hospital later this afternoon. 's are available for further support upon referral 
oJelle Sandoval. Loren Estrada.  Paging Service: [phone] Hydroquinone Counseling:  Patient advised that medication may result in skin irritation, lightening (hypopigmentation), dryness, and burning.  In the event of skin irritation, the patient was advised to reduce the amount of the drug applied or use it less frequently.  Rarely, spots that are treated with hydroquinone can become darker (pseudoochronosis).  Should this occur, patient instructed to stop medication and call the office. The patient verbalized understanding of the proper use and possible adverse effects of hydroquinone.  All of the patient's questions and concerns were addressed.

## 2025-03-20 NOTE — HOSPICE
Chronic, at goal (stable), continue current treatment plan    Orders:    amphetamine-dextroamphetamine (ADDERALL, 5MG,) 5 MG tablet; Take 1 tablet by mouth 2 times daily for 30 days. Max Daily Amount: 10 mg    amphetamine-dextroamphetamine (ADDERALL, 5MG,) 5 MG tablet; Take 1 tablet by mouth 2 times daily for 30 days. Max Daily Amount: 10 mg    amphetamine-dextroamphetamine (ADDERALL, 5MG,) 5 MG tablet; Take 1 tablet by mouth 2 times daily for 30 days. Max Daily Amount: 10 mg     [facility] Those in Need  [phone]    Patient Name: Claus Zepeda  YOB: 1940  Age: 80 y.o. [facility] RN Note:  Hospice consult noted. Chart reviewed. Plan of care discussed with patients nurse, care manager and palliative NP Shahla. In to meet with patient, dtr, son-in law, grandson and son in [place] via phone at bedside with hospice BRISEIDA Moura. Discussed Hospice philosophy, general plan of care, levels of care, services and on call procedures. Family information packet provided & reviewed with family at bedside. Discussion with family that hospice house would be an option - family is adamant about not sending patient to an group home or SNF as they are concerned she would not get proper care and they aren't allowed to visit. Family cannot care for her at home because they fear she will aspirate again and do not want her to suffer or be in distress for an extended period of [place]. Patient may require inpatient symptom management for SOB, pain and anxiety so [facility] could be ideal for family and they are willing to pay routine daily rate should she stabilize. Family would like some time to discuss privately with Dr. Ludwig Vazquez before committing to hospice. Hospice physician to meet with patient and family bedside 6/24 to discuss options and end of life care to assist in their decision making process. Should patient transfer to hospice house she will need a negative rapid COVID test prior to discharge. Thank you for the opportunity to be of service to this patient.         Ermelinda Abraham, RN, BSN  Hospice Nurse Liaison  [phone]